# Patient Record
Sex: FEMALE | Race: WHITE | Employment: OTHER | URBAN - METROPOLITAN AREA
[De-identification: names, ages, dates, MRNs, and addresses within clinical notes are randomized per-mention and may not be internally consistent; named-entity substitution may affect disease eponyms.]

---

## 2017-01-13 ENCOUNTER — OFFICE VISIT (OUTPATIENT)
Dept: FAMILY MEDICINE CLINIC | Age: 62
End: 2017-01-13

## 2017-01-13 VITALS
BODY MASS INDEX: 39.4 KG/M2 | HEART RATE: 72 BPM | SYSTOLIC BLOOD PRESSURE: 120 MMHG | HEIGHT: 64 IN | DIASTOLIC BLOOD PRESSURE: 70 MMHG | WEIGHT: 230.8 LBS | TEMPERATURE: 97.7 F

## 2017-01-13 DIAGNOSIS — I10 ESSENTIAL HYPERTENSION: Primary | ICD-10-CM

## 2017-01-13 DIAGNOSIS — R53.83 FATIGUE, UNSPECIFIED TYPE: ICD-10-CM

## 2017-01-13 DIAGNOSIS — N83.202 CYST OF LEFT OVARY: ICD-10-CM

## 2017-01-13 DIAGNOSIS — I10 ESSENTIAL HYPERTENSION: ICD-10-CM

## 2017-01-13 DIAGNOSIS — E11.9 TYPE 2 DIABETES MELLITUS WITHOUT COMPLICATION, WITHOUT LONG-TERM CURRENT USE OF INSULIN (HCC): ICD-10-CM

## 2017-01-13 DIAGNOSIS — D75.89 MACROCYTOSIS: ICD-10-CM

## 2017-01-13 LAB
FOLATE: 12.9 NG/ML (ref 4.78–24.2)
MAGNESIUM: 1.8 MG/DL (ref 1.8–2.4)
TSH SERPL DL<=0.05 MIU/L-ACNC: 1.57 UIU/ML (ref 0.27–4.2)
VITAMIN B-12: 397 PG/ML (ref 211–911)

## 2017-01-13 PROCEDURE — 99213 OFFICE O/P EST LOW 20 MIN: CPT | Performed by: FAMILY MEDICINE

## 2017-01-13 RX ORDER — POTASSIUM CHLORIDE 750 MG/1
10 TABLET, EXTENDED RELEASE ORAL 2 TIMES DAILY
Qty: 90 TABLET | Refills: 2 | Status: SHIPPED
Start: 2017-01-13 | End: 2017-03-07 | Stop reason: SDUPTHER

## 2017-01-13 ASSESSMENT — PATIENT HEALTH QUESTIONNAIRE - PHQ9
2. FEELING DOWN, DEPRESSED OR HOPELESS: 0
SUM OF ALL RESPONSES TO PHQ QUESTIONS 1-9: 0
SUM OF ALL RESPONSES TO PHQ9 QUESTIONS 1 & 2: 0
1. LITTLE INTEREST OR PLEASURE IN DOING THINGS: 0

## 2017-03-07 ENCOUNTER — TELEPHONE (OUTPATIENT)
Dept: FAMILY MEDICINE CLINIC | Age: 62
End: 2017-03-07

## 2017-03-07 RX ORDER — POTASSIUM CHLORIDE 750 MG/1
10 TABLET, EXTENDED RELEASE ORAL 2 TIMES DAILY
Qty: 180 TABLET | Refills: 1 | Status: SHIPPED | OUTPATIENT
Start: 2017-03-07 | End: 2017-10-06 | Stop reason: SDUPTHER

## 2017-03-13 ENCOUNTER — HOSPITAL ENCOUNTER (OUTPATIENT)
Dept: ULTRASOUND IMAGING | Age: 62
Discharge: HOME | End: 2017-03-13
Attending: INTERNAL MEDICINE | Admitting: INTERNAL MEDICINE

## 2017-03-13 DIAGNOSIS — R19.00 PELVIC MASS: ICD-10-CM

## 2017-03-13 DIAGNOSIS — N63.0 BREAST LUMP: ICD-10-CM

## 2017-03-13 DIAGNOSIS — R19.00 INTRA-ABDOMINAL AND PELVIC SWELLING, MASS AND LUMP, UNSPECIFIED SITE: ICD-10-CM

## 2017-03-20 ENCOUNTER — OFFICE VISIT (OUTPATIENT)
Dept: FAMILY MEDICINE CLINIC | Age: 62
End: 2017-03-20

## 2017-03-20 ENCOUNTER — TELEPHONE (OUTPATIENT)
Dept: FAMILY MEDICINE CLINIC | Age: 62
End: 2017-03-20

## 2017-03-20 ENCOUNTER — HOSPITAL ENCOUNTER (OUTPATIENT)
Dept: NON INVASIVE DIAGNOSTICS | Age: 62
Discharge: HOME | End: 2017-03-20
Attending: FAMILY MEDICINE | Admitting: FAMILY MEDICINE

## 2017-03-20 VITALS
TEMPERATURE: 97.5 F | DIASTOLIC BLOOD PRESSURE: 80 MMHG | WEIGHT: 230.6 LBS | BODY MASS INDEX: 39.37 KG/M2 | HEIGHT: 64 IN | SYSTOLIC BLOOD PRESSURE: 124 MMHG

## 2017-03-20 DIAGNOSIS — R05.9 COUGH: ICD-10-CM

## 2017-03-20 DIAGNOSIS — R05.9 COUGH: Primary | ICD-10-CM

## 2017-03-20 DIAGNOSIS — R09.81 SINUS CONGESTION: ICD-10-CM

## 2017-03-20 PROCEDURE — 99213 OFFICE O/P EST LOW 20 MIN: CPT | Performed by: FAMILY MEDICINE

## 2017-03-20 RX ORDER — AZITHROMYCIN 250 MG/1
TABLET, FILM COATED ORAL
Qty: 1 PACKET | Refills: 0 | Status: SHIPPED | OUTPATIENT
Start: 2017-03-20 | End: 2017-03-30

## 2017-05-15 ENCOUNTER — OFFICE VISIT (OUTPATIENT)
Dept: FAMILY MEDICINE CLINIC | Age: 62
End: 2017-05-15

## 2017-05-15 VITALS
TEMPERATURE: 98 F | DIASTOLIC BLOOD PRESSURE: 80 MMHG | WEIGHT: 235.6 LBS | HEART RATE: 72 BPM | BODY MASS INDEX: 40.22 KG/M2 | SYSTOLIC BLOOD PRESSURE: 124 MMHG | HEIGHT: 64 IN

## 2017-05-15 DIAGNOSIS — I10 ESSENTIAL HYPERTENSION: ICD-10-CM

## 2017-05-15 DIAGNOSIS — E78.2 MIXED HYPERLIPIDEMIA: ICD-10-CM

## 2017-05-15 DIAGNOSIS — E11.9 TYPE 2 DIABETES MELLITUS WITHOUT COMPLICATION, WITHOUT LONG-TERM CURRENT USE OF INSULIN (HCC): ICD-10-CM

## 2017-05-15 DIAGNOSIS — I10 ESSENTIAL HYPERTENSION: Primary | ICD-10-CM

## 2017-05-15 PROCEDURE — 99213 OFFICE O/P EST LOW 20 MIN: CPT | Performed by: FAMILY MEDICINE

## 2017-05-15 RX ORDER — HYDROCHLOROTHIAZIDE 25 MG/1
25 TABLET ORAL DAILY
Qty: 90 TABLET | Refills: 2 | Status: SHIPPED | OUTPATIENT
Start: 2017-05-15 | End: 2018-04-13 | Stop reason: SDUPTHER

## 2017-05-15 RX ORDER — METFORMIN HYDROCHLORIDE 500 MG/1
500 TABLET, EXTENDED RELEASE ORAL 2 TIMES DAILY WITH MEALS
Qty: 180 TABLET | Refills: 1 | Status: SHIPPED | OUTPATIENT
Start: 2017-05-15 | End: 2018-01-16 | Stop reason: SDUPTHER

## 2017-05-15 ASSESSMENT — ENCOUNTER SYMPTOMS
DIARRHEA: 0
CONSTIPATION: 0
BLOOD IN STOOL: 0
NAUSEA: 0
COUGH: 0
VOMITING: 0
CHEST TIGHTNESS: 0
ABDOMINAL PAIN: 0
SHORTNESS OF BREATH: 0
ABDOMINAL DISTENTION: 0

## 2017-05-16 ENCOUNTER — TELEPHONE (OUTPATIENT)
Dept: FAMILY MEDICINE CLINIC | Age: 62
End: 2017-05-16

## 2017-05-16 LAB
A/G RATIO: 2.3 (ref 1.1–2.2)
ALBUMIN SERPL-MCNC: 4.1 G/DL (ref 3.4–5)
ALP BLD-CCNC: 82 U/L (ref 40–129)
ALT SERPL-CCNC: 62 U/L (ref 10–40)
ANION GAP SERPL CALCULATED.3IONS-SCNC: 14 MMOL/L (ref 3–16)
AST SERPL-CCNC: 37 U/L (ref 15–37)
BILIRUB SERPL-MCNC: 0.4 MG/DL (ref 0–1)
BUN BLDV-MCNC: 24 MG/DL (ref 7–20)
CALCIUM SERPL-MCNC: 9.1 MG/DL (ref 8.3–10.6)
CHLORIDE BLD-SCNC: 101 MMOL/L (ref 99–110)
CHOLESTEROL, TOTAL: 169 MG/DL (ref 0–199)
CO2: 27 MMOL/L (ref 21–32)
CREAT SERPL-MCNC: 0.7 MG/DL (ref 0.6–1.1)
ESTIMATED AVERAGE GLUCOSE: 157.1 MG/DL
GFR AFRICAN AMERICAN: >60
GFR NON-AFRICAN AMERICAN: >60
GLOBULIN: 1.8 G/DL
GLUCOSE BLD-MCNC: 136 MG/DL (ref 70–99)
HBA1C MFR BLD: 7.1 %
HDLC SERPL-MCNC: 55 MG/DL (ref 40–60)
LDL CHOLESTEROL CALCULATED: 89 MG/DL
POTASSIUM SERPL-SCNC: 4.2 MMOL/L (ref 3.5–5.1)
SODIUM BLD-SCNC: 142 MMOL/L (ref 136–145)
TOTAL PROTEIN: 5.9 G/DL (ref 6.4–8.2)
TRIGL SERPL-MCNC: 125 MG/DL (ref 0–150)
VLDLC SERPL CALC-MCNC: 25 MG/DL

## 2017-06-26 PROBLEM — Z51.81 VISIT FOR MONITORING RITUXAN THERAPY: Status: ACTIVE | Noted: 2017-06-26

## 2017-06-26 PROBLEM — Z79.620 VISIT FOR MONITORING RITUXAN THERAPY: Status: ACTIVE | Noted: 2017-06-26

## 2017-08-23 ENCOUNTER — OFFICE VISIT (OUTPATIENT)
Dept: FAMILY MEDICINE CLINIC | Age: 62
End: 2017-08-23

## 2017-08-23 VITALS
TEMPERATURE: 97.6 F | SYSTOLIC BLOOD PRESSURE: 118 MMHG | DIASTOLIC BLOOD PRESSURE: 80 MMHG | WEIGHT: 235.8 LBS | HEIGHT: 64 IN | BODY MASS INDEX: 40.26 KG/M2

## 2017-08-23 DIAGNOSIS — B02.30 HERPES ZOSTER WITH OPHTHALMIC COMPLICATION, UNSPECIFIED HERPES ZOSTER EYE DISEASE: Primary | ICD-10-CM

## 2017-08-23 PROCEDURE — 99213 OFFICE O/P EST LOW 20 MIN: CPT | Performed by: FAMILY MEDICINE

## 2017-08-23 RX ORDER — FAMCICLOVIR 500 MG/1
500 TABLET, FILM COATED ORAL 3 TIMES DAILY
Qty: 21 TABLET | Refills: 0 | Status: SHIPPED | OUTPATIENT
Start: 2017-08-23 | End: 2017-08-30

## 2017-08-24 ENCOUNTER — TELEPHONE (OUTPATIENT)
Dept: FAMILY MEDICINE CLINIC | Age: 62
End: 2017-08-24

## 2017-08-25 ENCOUNTER — TELEPHONE (OUTPATIENT)
Dept: FAMILY MEDICINE CLINIC | Age: 62
End: 2017-08-25

## 2017-08-25 LAB
VARICELLA-ZOSTER, PCR: DETECTED
VZ SOURCE: ABNORMAL

## 2017-09-15 ENCOUNTER — OFFICE VISIT (OUTPATIENT)
Dept: FAMILY MEDICINE CLINIC | Age: 62
End: 2017-09-15

## 2017-09-15 VITALS
WEIGHT: 238.8 LBS | TEMPERATURE: 97.8 F | DIASTOLIC BLOOD PRESSURE: 84 MMHG | SYSTOLIC BLOOD PRESSURE: 122 MMHG | BODY MASS INDEX: 40.77 KG/M2 | HEIGHT: 64 IN

## 2017-09-15 DIAGNOSIS — E11.9 TYPE 2 DIABETES MELLITUS WITHOUT COMPLICATION, WITHOUT LONG-TERM CURRENT USE OF INSULIN (HCC): ICD-10-CM

## 2017-09-15 DIAGNOSIS — Z12.11 COLON CANCER SCREENING: ICD-10-CM

## 2017-09-15 DIAGNOSIS — I10 ESSENTIAL HYPERTENSION: Primary | ICD-10-CM

## 2017-09-15 DIAGNOSIS — I10 ESSENTIAL HYPERTENSION: ICD-10-CM

## 2017-09-15 LAB
A/G RATIO: 1.9 (ref 1.1–2.2)
ALBUMIN SERPL-MCNC: 4.4 G/DL (ref 3.4–5)
ALP BLD-CCNC: 100 U/L (ref 40–129)
ALT SERPL-CCNC: 80 U/L (ref 10–40)
ANION GAP SERPL CALCULATED.3IONS-SCNC: 17 MMOL/L (ref 3–16)
AST SERPL-CCNC: 55 U/L (ref 15–37)
BILIRUB SERPL-MCNC: 0.7 MG/DL (ref 0–1)
BUN BLDV-MCNC: 14 MG/DL (ref 7–20)
CALCIUM SERPL-MCNC: 9.9 MG/DL (ref 8.3–10.6)
CHLORIDE BLD-SCNC: 99 MMOL/L (ref 99–110)
CO2: 27 MMOL/L (ref 21–32)
CREAT SERPL-MCNC: 0.6 MG/DL (ref 0.6–1.1)
CREATININE URINE: 50.7 MG/DL (ref 28–259)
GFR AFRICAN AMERICAN: >60
GFR NON-AFRICAN AMERICAN: >60
GLOBULIN: 2.3 G/DL
GLUCOSE BLD-MCNC: 122 MG/DL (ref 70–99)
MICROALBUMIN UR-MCNC: <1.2 MG/DL
MICROALBUMIN/CREAT UR-RTO: NORMAL MG/G (ref 0–30)
POTASSIUM SERPL-SCNC: 3.8 MMOL/L (ref 3.5–5.1)
SODIUM BLD-SCNC: 143 MMOL/L (ref 136–145)
TOTAL PROTEIN: 6.7 G/DL (ref 6.4–8.2)

## 2017-09-15 PROCEDURE — 99213 OFFICE O/P EST LOW 20 MIN: CPT | Performed by: FAMILY MEDICINE

## 2017-09-15 RX ORDER — BRIMONIDINE TARTRATE/TIMOLOL 0.2%-0.5%
DROPS OPHTHALMIC (EYE)
Refills: 4 | COMMUNITY
Start: 2017-09-02 | End: 2018-04-13 | Stop reason: ALTCHOICE

## 2017-09-15 RX ORDER — ACYCLOVIR 800 MG/1
800 TABLET ORAL 2 TIMES DAILY
Refills: 3 | COMMUNITY
Start: 2017-08-30 | End: 2019-03-08 | Stop reason: ALTCHOICE

## 2017-09-15 RX ORDER — LOTEPREDNOL ETABONATE 5 MG/G
GEL OPHTHALMIC
Refills: 4 | Status: ON HOLD | COMMUNITY
Start: 2017-09-02 | End: 2019-11-21

## 2017-09-16 LAB
ESTIMATED AVERAGE GLUCOSE: 157.1 MG/DL
HBA1C MFR BLD: 7.1 %

## 2017-10-06 RX ORDER — POTASSIUM CHLORIDE 750 MG/1
TABLET, EXTENDED RELEASE ORAL
Qty: 180 TABLET | Refills: 1 | Status: SHIPPED | OUTPATIENT
Start: 2017-10-06 | End: 2018-04-13 | Stop reason: SDUPTHER

## 2017-10-23 ENCOUNTER — HOSPITAL ENCOUNTER (OUTPATIENT)
Dept: VASCULAR LAB | Age: 62
Discharge: HOME | End: 2017-10-23
Attending: INTERNAL MEDICINE | Admitting: INTERNAL MEDICINE

## 2017-10-23 DIAGNOSIS — R60.9 EDEMA: ICD-10-CM

## 2017-10-27 ENCOUNTER — NURSE ONLY (OUTPATIENT)
Dept: FAMILY MEDICINE CLINIC | Age: 62
End: 2017-10-27

## 2017-10-27 DIAGNOSIS — Z23 NEED FOR PNEUMOCOCCAL VACCINATION: Primary | ICD-10-CM

## 2017-10-27 PROCEDURE — 90471 IMMUNIZATION ADMIN: CPT | Performed by: FAMILY MEDICINE

## 2017-10-27 PROCEDURE — 90670 PCV13 VACCINE IM: CPT | Performed by: FAMILY MEDICINE

## 2018-01-15 ENCOUNTER — OFFICE VISIT (OUTPATIENT)
Dept: FAMILY MEDICINE CLINIC | Age: 63
End: 2018-01-15

## 2018-01-15 ENCOUNTER — HOSPITAL ENCOUNTER (OUTPATIENT)
Dept: NON INVASIVE DIAGNOSTICS | Age: 63
Discharge: HOME | End: 2018-01-15
Attending: FAMILY MEDICINE | Admitting: FAMILY MEDICINE

## 2018-01-15 VITALS
SYSTOLIC BLOOD PRESSURE: 124 MMHG | WEIGHT: 242.8 LBS | TEMPERATURE: 97.5 F | HEART RATE: 72 BPM | BODY MASS INDEX: 41.45 KG/M2 | DIASTOLIC BLOOD PRESSURE: 82 MMHG | HEIGHT: 64 IN

## 2018-01-15 DIAGNOSIS — G89.29 CHRONIC MIDLINE LOW BACK PAIN WITH LEFT-SIDED SCIATICA: Primary | ICD-10-CM

## 2018-01-15 DIAGNOSIS — M54.42 CHRONIC MIDLINE LOW BACK PAIN WITH LEFT-SIDED SCIATICA: Primary | ICD-10-CM

## 2018-01-15 DIAGNOSIS — I10 ESSENTIAL HYPERTENSION: ICD-10-CM

## 2018-01-15 DIAGNOSIS — M25.561 CHRONIC PAIN OF BOTH KNEES: ICD-10-CM

## 2018-01-15 DIAGNOSIS — G89.29 CHRONIC PAIN OF BOTH KNEES: ICD-10-CM

## 2018-01-15 DIAGNOSIS — E11.9 TYPE 2 DIABETES MELLITUS WITHOUT COMPLICATION, WITHOUT LONG-TERM CURRENT USE OF INSULIN (HCC): ICD-10-CM

## 2018-01-15 DIAGNOSIS — M25.562 CHRONIC PAIN OF BOTH KNEES: ICD-10-CM

## 2018-01-15 DIAGNOSIS — G89.29 CHRONIC MIDLINE LOW BACK PAIN WITH LEFT-SIDED SCIATICA: ICD-10-CM

## 2018-01-15 DIAGNOSIS — E78.2 MIXED HYPERLIPIDEMIA: ICD-10-CM

## 2018-01-15 DIAGNOSIS — M54.42 CHRONIC MIDLINE LOW BACK PAIN WITH LEFT-SIDED SCIATICA: ICD-10-CM

## 2018-01-15 LAB
ESTIMATED AVERAGE GLUCOSE: 177.2 MG/DL
GLUCOSE BLD-MCNC: 176 MG/DL (ref 70–99)
HBA1C MFR BLD: 7.8 %

## 2018-01-15 PROCEDURE — 99213 OFFICE O/P EST LOW 20 MIN: CPT | Performed by: FAMILY MEDICINE

## 2018-01-15 ASSESSMENT — ENCOUNTER SYMPTOMS
BLOOD IN STOOL: 0
SHORTNESS OF BREATH: 0

## 2018-01-16 DIAGNOSIS — M54.42 CHRONIC LEFT-SIDED LOW BACK PAIN WITH LEFT-SIDED SCIATICA: Primary | ICD-10-CM

## 2018-01-16 DIAGNOSIS — G89.29 CHRONIC LEFT-SIDED LOW BACK PAIN WITH LEFT-SIDED SCIATICA: Primary | ICD-10-CM

## 2018-01-16 RX ORDER — METFORMIN HYDROCHLORIDE 500 MG/1
1000 TABLET, EXTENDED RELEASE ORAL 2 TIMES DAILY WITH MEALS
Qty: 180 TABLET | Refills: 1
Start: 2018-01-16 | End: 2018-01-17 | Stop reason: SDUPTHER

## 2018-01-17 ENCOUNTER — OFFICE VISIT (OUTPATIENT)
Dept: ORTHOPEDIC SURGERY | Age: 63
End: 2018-01-17

## 2018-01-17 ENCOUNTER — HOSPITAL ENCOUNTER (OUTPATIENT)
Dept: NON INVASIVE DIAGNOSTICS | Age: 63
Discharge: HOME | End: 2018-01-17
Attending: ORTHOPAEDIC SURGERY | Admitting: ORTHOPAEDIC SURGERY

## 2018-01-17 VITALS
RESPIRATION RATE: 16 BRPM | BODY MASS INDEX: 41.32 KG/M2 | HEIGHT: 64 IN | DIASTOLIC BLOOD PRESSURE: 77 MMHG | SYSTOLIC BLOOD PRESSURE: 129 MMHG | HEART RATE: 101 BPM | WEIGHT: 242 LBS

## 2018-01-17 DIAGNOSIS — M54.16 LUMBAR RADICULOPATHY, CHRONIC: ICD-10-CM

## 2018-01-17 DIAGNOSIS — E66.01 MORBID OBESITY WITH BMI OF 40.0-44.9, ADULT (HCC): ICD-10-CM

## 2018-01-17 DIAGNOSIS — M17.12 PRIMARY OSTEOARTHRITIS OF LEFT KNEE: ICD-10-CM

## 2018-01-17 DIAGNOSIS — M17.12 PRIMARY OSTEOARTHRITIS OF LEFT KNEE: Primary | ICD-10-CM

## 2018-01-17 PROCEDURE — 99244 OFF/OP CNSLTJ NEW/EST MOD 40: CPT | Performed by: ORTHOPAEDIC SURGERY

## 2018-01-17 RX ORDER — METFORMIN HYDROCHLORIDE 500 MG/1
1000 TABLET, EXTENDED RELEASE ORAL 2 TIMES DAILY WITH MEALS
Qty: 180 TABLET | Refills: 1 | Status: SHIPPED | OUTPATIENT
Start: 2018-01-17 | End: 2018-01-29 | Stop reason: SDUPTHER

## 2018-01-17 NOTE — PROGRESS NOTES
twice a day 180 tablet 1    acyclovir (ZOVIRAX) 800 MG tablet Take 800 mg by mouth 3 times daily  3    COMBIGAN 0.2-0.5 % ophthalmic solution USE ONE DROP INTO LEFT EYE TWICE A DAY AS DIRECTED  4    LOTEMAX 0.5 % ophthalmic gel INSTILL 1 GTT MARIMAR QID  4    LORazepam (ATIVAN) 0.5 MG tablet Take 1 tablet by mouth every 6 hours as needed for Anxiety 30 tablet 0    hydrochlorothiazide (HYDRODIURIL) 25 MG tablet Take 1 tablet by mouth daily 90 tablet 2     No current facility-administered medications for this visit. Past Medical History:   Diagnosis Date    Cancer ([facility] [address]     Hypertension     Melanoma in situ of back [facility]) 2011    dr Driscoll Born        Past Surgical History:   Procedure Laterality Date    BREAST CYST ASPIRATION      in past benign bilateral over years. 2 episodes     SECTION      CHOLECYSTECTOMY      COLONOSCOPY  9/10/12    polyp, su, repeat 5 years    ENDOMETRIAL ABLATION  2007    FINE NEEDLE ASPIRATION      GALLBLADDER SURGERY      TUBAL LIGATION         Family History   Problem Relation Age of Onset    High Blood Pressure Mother     Cancer Mother      breast    High Blood Pressure Father     Cancer Father      lung       Social History     Social History    Marital status:      Spouse name: N/A    Number of children: N/A    Years of education: N/A     Occupational History    Not on file.      Social History Main Topics    Smoking status: Former Smoker     Years: 0.80     Types: Cigarettes     Start date: 1975     Quit date: 2016    Smokeless tobacco: Never Used    Alcohol use 0.0 oz/week      Comment: 2-3 times a week    Drug use: Unknown    Sexual activity: Not on file     Other Topics Concern    Not on file     Social History Narrative    No narrative on file        Vitals:    18 0934   BP: 129/77   Pulse: 101   Resp: 16   Weight: 242 lb (109.8 kg)   Height: 5' 4\" (1.626 m)       Physical Exam  Constitutional  well-groomed, well-nourished, morbidly obese  Psychiatric  pleasant, normal mood & affect, oriented to place, person, and situation  Cardiovascular  RRR, positive peripheral edema, no varicosities  Respiratory  respirations even and unlabored  Gastrointestinal  protuberant belly   HEENT - normocephalic atraumatic  Skin  no rashes, wounds, or lesions seen  Spine - equivocal SLR  Neurological - SILT SP/DP/T/sural/saphenous nerve distributions; EHL/TA/GS intact  Bilateral knee:   Clinically neutral alignment    moderate effusion noted   No atrophy seen    Moderate tenderness to palpation medial joint line bilaterally   Mild tenderness to palpation lateral joint line   Range of Motion:    Right 5 - 90    Left 5 - 95   Special tests:    pain with Layla exam     positive crepitus with ROM    positive patellar grind, painful palpable click   Ligamentous testing:    Varus stress stable    Valgus stress stable    Imaging:  Images were personally reviewed by myself and discussed with the patient  Bilateral knee 4 views performed today in clinic   - Overall alignment is varus, right greater than left. The medial compartment is bone on bone consistent with severe OA in the right knee, and moderately narrowed on the left. Medial compartment is associated with large osteophytes seen. The lateral compartment is mildly narrowed with medium osteophytes, right greater than left. The anterior compartment is moderately narrowed with small osteophytes seen. Loose bodies are seen posteriorly. There is evidence of mild tibiofemoral subluxation consistent with varus deformity. Assessment & Plan:  63 y.o. female who presents with   1. Primary osteoarthritis of left knee  XR KNEE RIGHT (MIN 4 VIEWS)    [facility]   2.  Primary osteoarthritis of right knee  XR KNEE LEFT (MIN 4 VIEWS)   3. Lumbar radiculopathy, chronic      MRI lumbar spine scheduled for end of month, will have her see Dr Mason Davey after

## 2018-01-17 NOTE — LETTER
[address] [address]  Phone: [phone]  Fax: [phone]    Tati Mcclellan MD        January 17, 2018     Nayan Jules MD  [address]    Patient: Alycia Brown  MR Number: [id number]  YOB: 1955  Date of Visit: 1/17/2018    Dear Dr. Artist Feeling:    Thank you for referring Alycia Brown to me for consultation of her bilateral knee pain. I discussed with her nonoperative strategies, and recommended weight loss to decrease stress on her joints. We elected to defer injections for now. If you have any questions or if I may be of further assistance, please do not hesitate to contact me. I look forward to following Alycia Brown along with you.     Sincerely,    Tati Mcclellan MD

## 2018-01-26 ENCOUNTER — TELEPHONE (OUTPATIENT)
Dept: FAMILY MEDICINE CLINIC | Age: 63
End: 2018-01-26

## 2018-01-29 ENCOUNTER — HOSPITAL ENCOUNTER (OUTPATIENT)
Dept: MRI IMAGING | Age: 63
Discharge: HOME | End: 2018-01-29
Attending: FAMILY MEDICINE | Admitting: FAMILY MEDICINE

## 2018-01-29 ENCOUNTER — TELEPHONE (OUTPATIENT)
Dept: FAMILY MEDICINE CLINIC | Age: 63
End: 2018-01-29

## 2018-01-29 DIAGNOSIS — M54.42 CHRONIC LEFT-SIDED LOW BACK PAIN WITH LEFT-SIDED SCIATICA: ICD-10-CM

## 2018-01-29 DIAGNOSIS — G89.29 CHRONIC LEFT-SIDED LOW BACK PAIN WITH LEFT-SIDED SCIATICA: ICD-10-CM

## 2018-01-29 RX ORDER — METFORMIN HYDROCHLORIDE 500 MG/1
1000 TABLET, EXTENDED RELEASE ORAL 2 TIMES DAILY WITH MEALS
Qty: 360 TABLET | Refills: 1 | Status: SHIPPED | OUTPATIENT
Start: 2018-01-29 | End: 2018-08-10 | Stop reason: SDUPTHER

## 2018-01-29 NOTE — TELEPHONE ENCOUNTER
Mannie Miller from [address] [place] [address] calling to make sure this is correct change on   metFORMIN (GLUCOPHAGE-XR) 500 MG extended release tablet 180 tablet 1 1/17/2018     Sig - Route: Take 2 tablets by mouth 2 times daily (with meals) - Oral       and if she can get verbal ok to dispense #360 x 3.     Please advise, Mannie Miller [phone]

## 2018-02-07 ENCOUNTER — TELEPHONE (OUTPATIENT)
Dept: FAMILY MEDICINE CLINIC | Age: 63
End: 2018-02-07

## 2018-02-15 ENCOUNTER — TELEPHONE (OUTPATIENT)
Dept: FAMILY MEDICINE CLINIC | Age: 63
End: 2018-02-15

## 2018-02-15 DIAGNOSIS — E11.9 TYPE 2 DIABETES MELLITUS WITHOUT COMPLICATION, WITHOUT LONG-TERM CURRENT USE OF INSULIN (HCC): ICD-10-CM

## 2018-02-15 DIAGNOSIS — I10 ESSENTIAL HYPERTENSION: Primary | ICD-10-CM

## 2018-02-16 ENCOUNTER — OFFICE VISIT (OUTPATIENT)
Dept: ORTHOPEDIC SURGERY | Age: 63
End: 2018-02-16

## 2018-02-16 VITALS
HEART RATE: 64 BPM | SYSTOLIC BLOOD PRESSURE: 138 MMHG | HEIGHT: 64 IN | BODY MASS INDEX: 41.32 KG/M2 | TEMPERATURE: 97.5 F | WEIGHT: 242 LBS | DIASTOLIC BLOOD PRESSURE: 80 MMHG

## 2018-02-16 DIAGNOSIS — M70.62 TROCHANTERIC BURSITIS OF LEFT HIP: ICD-10-CM

## 2018-02-16 DIAGNOSIS — M51.36 DEGENERATIVE DISC DISEASE, LUMBAR: Primary | ICD-10-CM

## 2018-02-16 PROBLEM — M51.369 DEGENERATIVE DISC DISEASE, LUMBAR: Status: ACTIVE | Noted: 2018-02-16

## 2018-02-16 PROCEDURE — 99243 OFF/OP CNSLTJ NEW/EST LOW 30: CPT | Performed by: ORTHOPAEDIC SURGERY

## 2018-03-07 ENCOUNTER — HOSPITAL ENCOUNTER (OUTPATIENT)
Dept: PHYSICAL THERAPY | Age: 63
Discharge: HOME | End: 2018-03-23
Admitting: ORTHOPAEDIC SURGERY

## 2018-03-07 NOTE — FLOWSHEET NOTE
pain L buttock when walking at a faster pace. Stair climbing with 2 rails: ascended and descended alternating. Pt stated at home her stairs are taller and she takes one step at a time. Body Mechanics: DTR B quads and Achilles 0/3, (-) clonus B, (-) dural tension. Strength RLE  R Hip Flexion: 4/5 (L low back pain)  Strength LLE  L Hip Flexion: 4-/5  L Hip ABduction: 4+/5 (minimal pain)  L Hip ADduction: 4/5  L Hip External Rotation: 4+/5      Exercises:current hx CA  Exercise/Equipment Resistance/Repetitions Other comments                                                                            Other Therapeutic Activities:      Home Exercise Program:    3/8/18 HEP was instructed and included add set, glut set, SKTC, piriformis stretch. .  Pt was given written hand outs and demonstrated exercises correctly. Pt expressed understanding of exercises. Manual Treatments:    STM L ITB/TFL 15'. Modalities:  Has CA    Timed Code Treatment Minutes:  30    Total Treatment Minutes:  60    Treatment/Activity Tolerance:  [] Patient tolerated treatment well [] Patient limited by fatigue  [x] Patient limited by pain  [] Patient limited by other medical complications  [] Other:     Prognosis: [x] Good [] Fair  [] Poor    Patient Requires Follow-up: [] Yes  [] No    Plan:   [] Continue per plan of care [] Alter current plan (see comments)  [x] Plan of care initiated [] Hold pending MD visit [] Discharge  Plan for Next Session: begin L hip strengthening.      Electronically signed by:  Ruth Ann Lim, PT

## 2018-03-07 NOTE — PROGRESS NOTES
Outpatient Physical Therapy  [] [facility]    Phone: [phone]   Fax: [phone]   [] [facility]  Phone: [phone]              Fax: [phone]  [x] [facility]   Phone: [phone]   Fax: [phone]     To: Referring Practitioner: Dr. Joann Yip      Patient: Arnav Joiner   : 1955   MRN: [id number]  Evaluation Date: 3/7/2018      Diagnosis Information:  · Diagnosis: DDD, lumbar (M51.36); Trochanteric bursitis of L hip (M70.62)   · Treatment Diagnosis: Decreased functional mobility     Physical Therapy Certification/Re-Certification Form  Dear Dr. Rafaela Buerger,  The following patient has been evaluated for physical therapy services and for therapy to continue, Medicare requires monthly physician review of the treatment plan. Please review the attached evaluation and/or summary of the patient's plan of care, and verify that you agree therapy should continue by signing the attached document and sending it back to our office. Plan of Care/Treatment to date:  [x] Therapeutic Exercise    [] Modalities:  [] Therapeutic Activity     [] Ultrasound  [] Electrical Stimulation  [] Gait Training      [] Cervical Traction [] Lumbar Traction  [] Neuromuscular Re-education    [x] Cold/hotpack [] Iontophoresis   [x] Instruction in HEP     Other:  [x] Manual Therapy      []             [] Aquatic Therapy      []           ? Frequency/Duration: 6 PT sessions total    Rehab Potential: [] Excellent [x] Good [] Fair  [] Poor       Electronically signed by:  Meliton Monzon PT      If you have any questions or concerns, please don't hesitate to call.   Thank you for your referral.      Physician Signature:________________________________Date:__________________  By signing above, therapists plan is approved by physician
6 PT sessions  Short term goal 1: Pt will be independent with HEP. Short term goal 2: L hip ABD strength 4+/5 and painless. Short term goal 3: Pt will be able to walk 1-2 miles without pain following walking. Patient Goals   Patient goals : \"I don't want any pain. I want to get up and I want to move. \"       Therapy Time   Individual Concurrent Group Co-treatment   Time In 6700         Time Out 1630         Minutes 60         Timed Code Treatment Minutes: 720 Jose Harmon

## 2018-03-09 ENCOUNTER — HOSPITAL ENCOUNTER (OUTPATIENT)
Dept: OTHER | Age: 63
Discharge: HOME | End: 2018-03-16
Attending: ORTHOPAEDIC SURGERY | Admitting: ORTHOPAEDIC SURGERY

## 2018-03-16 ENCOUNTER — HOSPITAL ENCOUNTER (OUTPATIENT)
Dept: PHYSICAL THERAPY | Age: 63
Discharge: HOME | End: 2018-03-17
Admitting: ORTHOPAEDIC SURGERY

## 2018-03-16 NOTE — FLOWSHEET NOTE
Physical Therapy Daily Treatment Note  Date:  3/16/2018    Patient Name:  Teresa Lara    :  1955  MRN: [id number]  Restrictions/Precautions:    Pertinent Medical History:  Medical/Treatment Diagnosis Information:  · Diagnosis: DDD, lumbar (M51.36); Trochanteric bursitis of L hip (M70.62)  · Treatment Diagnosis: Decreased functional mobility  Insurance/Certification information:  PT Insurance Information: [facility]  Physician Information:  Referring Practitioner: Dr. Alex Woods of care signed (Y/N):  Sent to in basket on 3/7/18  Visit# / total visits:    Pain level: /10     G-Code (if applicable):      Date / Visit # G-Code Applied:  3/7/18  PT G-Codes  Functional Assessment Tool Used: LEFS  Score: 42  Functional Limitation: Mobility: Walking and moving around  Mobility: Walking and Moving Around Current Status (): At least 40 percent but less than 60 percent impaired, limited or restricted  Mobility: Walking and Moving Around Goal Status (): At least 20 percent but less than 40 percent impaired, limited or restricted    Progress Note: []  Yes  []  No  Next due by: Visit #10      History of Injury: Pt presented to PT with L hip pain. Pain started in September with walking at [address] [facility] with her daughter. Hip pain has gradually become worse. Pt rated L hip pain 5-9/10. Pain increases when L side lying. 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 [employer]me[employer]idal[employer] I can't sit for long periods. \"  Pt can sit up to 1 hour. Pt stated her walking is limited. Pt has weakness with stair climbing- \"I go one at a time. \"  Pt stated she leans when she walks on level surfaces, as told to her by her sister. Last week pt walked 2 miles, 2 times, without pain, but \"I feel the pressure\" after walking. Subjective:   Did okay following initial evaluation. 3/12/18 Back was sore following last treatment, has soreness with bridge at home.   Sitting at home at the computer bothers pt.  3/16/18 pain

## 2018-03-19 ENCOUNTER — HOSPITAL ENCOUNTER (OUTPATIENT)
Dept: PHYSICAL THERAPY | Age: 63
Discharge: HOME | End: 2018-03-20
Admitting: ORTHOPAEDIC SURGERY

## 2018-03-19 NOTE — FLOWSHEET NOTE
Physical Therapy Daily Treatment Note  Date:  3/19/2018    Patient Name:  Bobbi Ulrich    :  1955  MRN: [id number]  Restrictions/Precautions:    Pertinent Medical History:  Medical/Treatment Diagnosis Information:  · Diagnosis: DDD, lumbar (M51.36); Trochanteric bursitis of L hip (M70.62)  · Treatment Diagnosis: Decreased functional mobility  Insurance/Certification information:  PT Insurance Information: [employer]  Physician Information:  Referring Practitioner: Dr. Edis Grajeda of care signed (Y/N):  Sent to in basket on 3/7/18  Visit# / total visits:  5/6  Pain level: /10     G-Code (if applicable):      Date / Visit # G-Code Applied:  3/7/18  PT G-Codes  Functional Assessment Tool Used: LEFS  Score: 42  Functional Limitation: Mobility: Walking and moving around  Mobility: Walking and Moving Around Current Status (): At least 40 percent but less than 60 percent impaired, limited or restricted  Mobility: Walking and Moving Around Goal Status (): At least 20 percent but less than 40 percent impaired, limited or restricted    Progress Note: []  Yes  []  No  Next due by: Visit #10      History of Injury: Pt presented to PT with L hip pain. Pain started in September with walking at [address] [facility] with her daughter. Hip pain has gradually become worse. Pt rated L hip pain 5-9/10. Pain increases when L side lying. 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 [employer] I can't sit for long periods. \"  Pt can sit up to 1 hour. Pt stated her walking is limited. Pt has weakness with stair climbing- \"I go one at a time. \"  Pt stated she leans when she walks on level surfaces, as told to her by her sister. Last week pt walked 2 miles, 2 times, without pain, but \"I feel the pressure\" after walking. Subjective:   Did okay following initial evaluation. 3/12/18 Back was sore following last treatment, has soreness with bridge at home.   Sitting at home at the computer bothers pt.  3/16/18 pain has been a lot better. Pain was rated 1-2/10. No longer has pain from lying on L side. Pt has stiffness from sitting a long time, but not pain. Pt stated walking is not limited, and no longer feels \"pressure\" from walking a long time. Stair climbing is \"much better\" as she can alternate. 3/19/18 Pt stated she walked on the [facility] into a dorm without pain. Pt has not had any pain for the past 3 days. Pt has not woken due to L buttock/back pain recently. Pt can lie on L side. 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 Pt has long walk from car to office. Objective:  Observation:   Test measurements:   3/7/18  Palpation: TTP L TFL, L ITB. Observation: Gait: decreased L trunk roation, L pelvis elevated, pain L buttock when walking at a faster pace. Stair climbing with 2 rails: ascended and descended alternating. Pt stated at home her stairs are taller and she takes one step at a time. Body Mechanics: DTR B quads and Achilles 0/3, (-) clonus B, (-) dural tension. Strength RLE  R Hip Flexion: 4/5 (L low back pain)  Strength LLE  L Hip Flexion: 4-/5  L Hip ABduction: 4+/5 (minimal pain)  L Hip ADduction: 4/5  L Hip External Rotation: 4+/5   3/16/18  Palpation: TTP L ITB. Observation: Gait: decreased L trunk roation, L pelvis elevated, pain L buttock when walking at a faster pace. Strength RLE  R Hip Flexion: 5/5 (no back pain)  Strength LLE  L Hip Flexion: 5/5  L Hip ABduction: 5/5 (no pain)  L Hip ADduction: 5/5  L Hip External Rotation: 4+/5      Exercises:current hx CA  Exercise/Equipment Resistance/Repetitions Other comments   Nu Step 7'    Stretches  Gastroc incline  Hamstring   2x30\"  2x30\"    Bridge  T-band hip ER 10x  Green, 20x    R SL  L pelvic pro/retraction   20x                                                         Other Therapeutic Activities:    3/12/18 PT and pt discussed sitting posture with towel roll, with seat height.     Home Exercise Program:    3/8/18 HEP was instructed and included add set, glut set, SKTC, piriformis stretch. .  Pt was given written hand outs and demonstrated exercises correctly. Pt expressed understanding of exercises. 3/9/18 fall outs, bridge; reviewed HEP from 3/8/18- pt performed well, although PT corrected PPT.  3/12/18 reviewed PPT- good technique. Reviewed bridge, asked pt to raise buttocks only 1-2 inches from surface. New HEP: seated trunk flexion, standing HS stretch. 3/16/18 clam shell      Manual Treatments:    STM L ITB, L LE MFR pull; manual total 15'. Modalities:  Has CA    Timed Code Treatment Minutes:  35    Total Treatment Minutes:  35    Treatment/Activity Tolerance:  [] Patient tolerated treatment well [] Patient limited by fatigue  [x] Patient limited by pain  [] Patient limited by other medical complications  [] Other:     Prognosis: [x] Good [] Fair  [] Poor    Patient Requires Follow-up: [] Yes  [] No    Plan:   [] Continue per plan of care [] Alter current plan (see comments)  [x] Plan of care initiated [] Hold pending MD visit [] Discharge  Plan for Next Session: begin L hip strengthening.      Electronically signed by:  Pravin Rodriguez PT

## 2018-04-06 DIAGNOSIS — E11.9 TYPE 2 DIABETES MELLITUS WITHOUT COMPLICATION, WITHOUT LONG-TERM CURRENT USE OF INSULIN (HCC): ICD-10-CM

## 2018-04-06 DIAGNOSIS — I10 ESSENTIAL HYPERTENSION: ICD-10-CM

## 2018-04-06 LAB
A/G RATIO: 2.3 (ref 1.1–2.2)
ALBUMIN SERPL-MCNC: 4.3 G/DL (ref 3.4–5)
ALP BLD-CCNC: 98 U/L (ref 40–129)
ALT SERPL-CCNC: 64 U/L (ref 10–40)
ANION GAP SERPL CALCULATED.3IONS-SCNC: 17 MMOL/L (ref 3–16)
AST SERPL-CCNC: 47 U/L (ref 15–37)
BACTERIA: ABNORMAL /HPF
BILIRUB SERPL-MCNC: 0.6 MG/DL (ref 0–1)
BILIRUBIN URINE: NEGATIVE
BLOOD, URINE: NEGATIVE
BUN BLDV-MCNC: 19 MG/DL (ref 7–20)
CALCIUM SERPL-MCNC: 9.2 MG/DL (ref 8.3–10.6)
CHLORIDE BLD-SCNC: 100 MMOL/L (ref 99–110)
CHOLESTEROL, TOTAL: 190 MG/DL (ref 0–199)
CLARITY: ABNORMAL
CO2: 26 MMOL/L (ref 21–32)
COLOR: YELLOW
CREAT SERPL-MCNC: 0.7 MG/DL (ref 0.6–1.2)
EPITHELIAL CELLS, UA: 2 /HPF (ref 0–5)
ESTIMATED AVERAGE GLUCOSE: 154.2 MG/DL
FOLATE: 11.93 NG/ML (ref 4.78–24.2)
GFR AFRICAN AMERICAN: >60
GFR NON-AFRICAN AMERICAN: >60
GLOBULIN: 1.9 G/DL
GLUCOSE BLD-MCNC: 151 MG/DL (ref 70–99)
GLUCOSE URINE: NEGATIVE MG/DL
HBA1C MFR BLD: 7 %
HDLC SERPL-MCNC: 55 MG/DL (ref 40–60)
HYALINE CASTS: 2 /LPF (ref 0–8)
KETONES, URINE: NEGATIVE MG/DL
LDL CHOLESTEROL CALCULATED: 102 MG/DL
LEUKOCYTE ESTERASE, URINE: ABNORMAL
MICROSCOPIC EXAMINATION: YES
NITRITE, URINE: NEGATIVE
PH UA: 6
POTASSIUM SERPL-SCNC: 4.2 MMOL/L (ref 3.5–5.1)
PROTEIN UA: NEGATIVE MG/DL
RBC UA: 5 /HPF (ref 0–4)
SODIUM BLD-SCNC: 143 MMOL/L (ref 136–145)
SPECIFIC GRAVITY UA: 1.02
TOTAL PROTEIN: 6.2 G/DL (ref 6.4–8.2)
TRIGL SERPL-MCNC: 167 MG/DL (ref 0–150)
UROBILINOGEN, URINE: 0.2 E.U./DL
VITAMIN B-12: 427 PG/ML (ref 211–911)
VLDLC SERPL CALC-MCNC: 33 MG/DL
WBC UA: 24 /HPF (ref 0–5)

## 2018-04-10 DIAGNOSIS — R82.81 PYURIA: Primary | ICD-10-CM

## 2018-04-11 DIAGNOSIS — R82.81 PYURIA: ICD-10-CM

## 2018-04-11 DIAGNOSIS — C83.08 SMALL CELL B-CELL LYMPHOMA, LYMPH NODES OF MULTIPLE SITES (HCC): ICD-10-CM

## 2018-04-13 ENCOUNTER — OFFICE VISIT (OUTPATIENT)
Dept: FAMILY MEDICINE CLINIC | Age: 63
End: 2018-04-13

## 2018-04-13 VITALS
HEART RATE: 80 BPM | BODY MASS INDEX: 40.46 KG/M2 | TEMPERATURE: 97.6 F | HEIGHT: 64 IN | WEIGHT: 237 LBS | SYSTOLIC BLOOD PRESSURE: 120 MMHG | DIASTOLIC BLOOD PRESSURE: 84 MMHG

## 2018-04-13 DIAGNOSIS — F41.9 ANXIETY: ICD-10-CM

## 2018-04-13 DIAGNOSIS — N39.0 URINARY TRACT INFECTION WITHOUT HEMATURIA, SITE UNSPECIFIED: ICD-10-CM

## 2018-04-13 DIAGNOSIS — E78.2 MIXED HYPERLIPIDEMIA: ICD-10-CM

## 2018-04-13 DIAGNOSIS — E11.9 TYPE 2 DIABETES MELLITUS WITHOUT COMPLICATION, WITHOUT LONG-TERM CURRENT USE OF INSULIN (HCC): ICD-10-CM

## 2018-04-13 DIAGNOSIS — I10 ESSENTIAL HYPERTENSION: Primary | ICD-10-CM

## 2018-04-13 LAB
ORGANISM: ABNORMAL
URINE CULTURE, ROUTINE: ABNORMAL
URINE CULTURE, ROUTINE: ABNORMAL

## 2018-04-13 PROCEDURE — 99213 OFFICE O/P EST LOW 20 MIN: CPT | Performed by: FAMILY MEDICINE

## 2018-04-13 RX ORDER — SULFAMETHOXAZOLE AND TRIMETHOPRIM 800; 160 MG/1; MG/1
1 TABLET ORAL 2 TIMES DAILY
Qty: 14 TABLET | Refills: 0 | Status: SHIPPED | OUTPATIENT
Start: 2018-04-13 | End: 2018-04-20

## 2018-04-13 RX ORDER — LORAZEPAM 0.5 MG/1
0.5 TABLET ORAL 2 TIMES DAILY PRN
Qty: 30 TABLET | Refills: 0 | Status: SHIPPED | OUTPATIENT
Start: 2018-04-13 | End: 2018-04-28

## 2018-04-13 RX ORDER — HYDROCHLOROTHIAZIDE 25 MG/1
25 TABLET ORAL DAILY
Qty: 90 TABLET | Refills: 2 | Status: SHIPPED | OUTPATIENT
Start: 2018-04-13 | End: 2018-10-03 | Stop reason: CLARIF

## 2018-04-13 RX ORDER — POTASSIUM CHLORIDE 750 MG/1
10 TABLET, EXTENDED RELEASE ORAL 2 TIMES DAILY
Qty: 180 TABLET | Refills: 2 | Status: SHIPPED | OUTPATIENT
Start: 2018-04-13 | End: 2018-11-09 | Stop reason: SDUPTHER

## 2018-04-13 ASSESSMENT — ENCOUNTER SYMPTOMS
ABDOMINAL PAIN: 0
VOMITING: 0
CONSTIPATION: 0
NAUSEA: 0
DIARRHEA: 0
ABDOMINAL DISTENTION: 0
CHEST TIGHTNESS: 0
COUGH: 0
SHORTNESS OF BREATH: 0
BLOOD IN STOOL: 0

## 2018-04-13 ASSESSMENT — PATIENT HEALTH QUESTIONNAIRE - PHQ9
SUM OF ALL RESPONSES TO PHQ QUESTIONS 1-9: 0
2. FEELING DOWN, DEPRESSED OR HOPELESS: 0
1. LITTLE INTEREST OR PLEASURE IN DOING THINGS: 0
SUM OF ALL RESPONSES TO PHQ9 QUESTIONS 1 & 2: 0

## 2018-06-12 ENCOUNTER — HOSPITAL ENCOUNTER (OUTPATIENT)
Dept: CT IMAGING | Age: 63
Discharge: HOME | End: 2018-06-12
Attending: INTERNAL MEDICINE | Admitting: INTERNAL MEDICINE

## 2018-06-12 VITALS
SYSTOLIC BLOOD PRESSURE: 128 MMHG | DIASTOLIC BLOOD PRESSURE: 63 MMHG | TEMPERATURE: 97.1 F | BODY MASS INDEX: 39.69 KG/M2 | WEIGHT: 224 LBS | OXYGEN SATURATION: 98 % | HEART RATE: 58 BPM | HEIGHT: 63 IN | RESPIRATION RATE: 18 BRPM

## 2018-06-12 DIAGNOSIS — C83.08 SMALL B-CELL LYMPHOMA OF LYMPH NODES OF MULTIPLE REGIONS (HCC): ICD-10-CM

## 2018-06-12 DIAGNOSIS — C83.08 SMALL CELL B-CELL LYMPHOMA OF LYMPH NODES OF MULTIPLE SITES (HCC): ICD-10-CM

## 2018-06-12 LAB
GLUCOSE BLD-MCNC: 130 MG/DL (ref 70–99)
INR BLD: 0.98 (ref 0.86–1.14)
PERFORMED ON: ABNORMAL
PLATELET # BLD: 202 K/UL (ref 135–450)
PROTHROMBIN TIME: 11.2 SEC (ref 9.8–13)

## 2018-06-12 RX ORDER — MIDAZOLAM HYDROCHLORIDE 1 MG/ML
INJECTION INTRAMUSCULAR; INTRAVENOUS DAILY PRN
Status: COMPLETED | OUTPATIENT
Start: 2018-06-12 | End: 2018-06-12

## 2018-06-12 RX ORDER — FENTANYL CITRATE 50 UG/ML
INJECTION, SOLUTION INTRAMUSCULAR; INTRAVENOUS DAILY PRN
Status: COMPLETED | OUTPATIENT
Start: 2018-06-12 | End: 2018-06-12

## 2018-06-12 RX ADMIN — MIDAZOLAM HYDROCHLORIDE 0.5 MG: 1 INJECTION INTRAMUSCULAR; INTRAVENOUS at 09:32

## 2018-06-12 RX ADMIN — MIDAZOLAM HYDROCHLORIDE 0.5 MG: 1 INJECTION INTRAMUSCULAR; INTRAVENOUS at 09:36

## 2018-06-12 RX ADMIN — FENTANYL CITRATE 25 MCG: 50 INJECTION, SOLUTION INTRAMUSCULAR; INTRAVENOUS at 09:28

## 2018-06-12 RX ADMIN — MIDAZOLAM HYDROCHLORIDE 1 MG: 1 INJECTION INTRAMUSCULAR; INTRAVENOUS at 09:29

## 2018-06-12 RX ADMIN — FENTANYL CITRATE 25 MCG: 50 INJECTION, SOLUTION INTRAMUSCULAR; INTRAVENOUS at 09:30

## 2018-06-12 ASSESSMENT — PAIN SCALES - GENERAL
PAINLEVEL_OUTOF10: 0

## 2018-06-12 ASSESSMENT — PAIN - FUNCTIONAL ASSESSMENT: PAIN_FUNCTIONAL_ASSESSMENT: 0-10

## 2018-06-29 ENCOUNTER — HOSPITAL ENCOUNTER (OUTPATIENT)
Dept: CT IMAGING | Age: 63
Discharge: HOME | End: 2018-06-29
Attending: INTERNAL MEDICINE | Admitting: INTERNAL MEDICINE

## 2018-06-29 VITALS
HEART RATE: 69 BPM | WEIGHT: 222 LBS | RESPIRATION RATE: 16 BRPM | DIASTOLIC BLOOD PRESSURE: 65 MMHG | BODY MASS INDEX: 39.34 KG/M2 | HEIGHT: 63 IN | OXYGEN SATURATION: 96 % | SYSTOLIC BLOOD PRESSURE: 110 MMHG | TEMPERATURE: 98 F

## 2018-06-29 DIAGNOSIS — C83.08 SMALL B-CELL LYMPHOMA OF LYMPH NODES OF MULTIPLE REGIONS (HCC): ICD-10-CM

## 2018-06-29 DIAGNOSIS — C83.08 SMALL CELL B-CELL LYMPHOMA OF LYMPH NODES OF MULTIPLE SITES (HCC): ICD-10-CM

## 2018-06-29 LAB
APTT: 91.1 SEC (ref 26–36)
BASOPHILS ABSOLUTE: 0.1 K/UL (ref 0–0.2)
BASOPHILS RELATIVE PERCENT: 1.2 %
EOSINOPHILS ABSOLUTE: 0.2 K/UL (ref 0–0.6)
EOSINOPHILS RELATIVE PERCENT: 1.6 %
HCT VFR BLD CALC: 40.5 % (ref 36–48)
HEMOGLOBIN: 13.9 G/DL (ref 12–16)
INR BLD: 0.98 (ref 0.86–1.14)
LYMPHOCYTES ABSOLUTE: 0.5 K/UL (ref 1–5.1)
LYMPHOCYTES RELATIVE PERCENT: 5.1 %
MCH RBC QN AUTO: 32.8 PG (ref 26–34)
MCHC RBC AUTO-ENTMCNC: 34.2 G/DL (ref 31–36)
MCV RBC AUTO: 95.8 FL (ref 80–100)
MONOCYTES ABSOLUTE: 0.6 K/UL (ref 0–1.3)
MONOCYTES RELATIVE PERCENT: 6.7 %
NEUTROPHILS ABSOLUTE: 8.2 K/UL (ref 1.7–7.7)
NEUTROPHILS RELATIVE PERCENT: 85.4 %
PDW BLD-RTO: 13.9 % (ref 12.4–15.4)
PLATELET # BLD: 271 K/UL (ref 135–450)
PMV BLD AUTO: 9.8 FL (ref 5–10.5)
PROTHROMBIN TIME: 11.2 SEC (ref 9.8–13)
RBC # BLD: 4.23 M/UL (ref 4–5.2)
WBC # BLD: 9.6 K/UL (ref 4–11)

## 2018-06-29 RX ORDER — MIDAZOLAM HYDROCHLORIDE 1 MG/ML
2 INJECTION INTRAMUSCULAR; INTRAVENOUS ONCE
Status: COMPLETED | OUTPATIENT
Start: 2018-06-29 | End: 2018-06-29

## 2018-06-29 RX ORDER — FENTANYL CITRATE 50 UG/ML
50 INJECTION, SOLUTION INTRAMUSCULAR; INTRAVENOUS EVERY 5 MIN PRN
Status: DISCONTINUED | OUTPATIENT
Start: 2018-06-29 | End: 2018-06-30 | Stop reason: HOSPADM

## 2018-06-29 RX ORDER — ACETAMINOPHEN 325 MG/1
650 TABLET ORAL EVERY 4 HOURS PRN
Status: DISCONTINUED | OUTPATIENT
Start: 2018-06-29 | End: 2018-06-30 | Stop reason: HOSPADM

## 2018-06-29 RX ORDER — MIDAZOLAM HYDROCHLORIDE 1 MG/ML
1 INJECTION INTRAMUSCULAR; INTRAVENOUS EVERY 5 MIN PRN
Status: DISCONTINUED | OUTPATIENT
Start: 2018-06-29 | End: 2018-06-30 | Stop reason: HOSPADM

## 2018-06-29 RX ORDER — HEPARIN SODIUM (PORCINE) LOCK FLUSH IV SOLN 100 UNIT/ML 100 UNIT/ML
100 SOLUTION INTRAVENOUS PRN
Status: DISCONTINUED | OUTPATIENT
Start: 2018-06-29 | End: 2018-06-30 | Stop reason: HOSPADM

## 2018-06-29 RX ORDER — ONDANSETRON 2 MG/ML
4 INJECTION INTRAMUSCULAR; INTRAVENOUS EVERY 8 HOURS PRN
Status: DISCONTINUED | OUTPATIENT
Start: 2018-06-29 | End: 2018-06-30 | Stop reason: HOSPADM

## 2018-06-29 RX ADMIN — HEPARIN SODIUM (PORCINE) LOCK FLUSH IV SOLN 100 UNIT/ML 300 UNITS: 100 SOLUTION at 12:19

## 2018-06-29 RX ADMIN — MIDAZOLAM HYDROCHLORIDE 1 MG: 1 INJECTION INTRAMUSCULAR; INTRAVENOUS at 11:05

## 2018-06-29 RX ADMIN — FENTANYL CITRATE 50 MCG: 50 INJECTION, SOLUTION INTRAMUSCULAR; INTRAVENOUS at 11:05

## 2018-06-29 RX ADMIN — MIDAZOLAM HYDROCHLORIDE 1 MG: 1 INJECTION INTRAMUSCULAR; INTRAVENOUS at 11:10

## 2018-06-29 RX ADMIN — FENTANYL CITRATE 50 MCG: 50 INJECTION, SOLUTION INTRAMUSCULAR; INTRAVENOUS at 11:15

## 2018-06-29 RX ADMIN — MIDAZOLAM HYDROCHLORIDE 2 MG: 1 INJECTION INTRAMUSCULAR; INTRAVENOUS at 11:15

## 2018-06-29 RX ADMIN — ACETAMINOPHEN 650 MG: 325 TABLET ORAL at 12:10

## 2018-06-29 RX ADMIN — FENTANYL CITRATE 25 MCG: 50 INJECTION, SOLUTION INTRAMUSCULAR; INTRAVENOUS at 11:10

## 2018-06-29 ASSESSMENT — PAIN SCALES - GENERAL
PAINLEVEL_OUTOF10: 0

## 2018-07-02 ENCOUNTER — HOSPITAL ENCOUNTER (OUTPATIENT)
Dept: CT IMAGING | Age: 63
Discharge: HOME | End: 2018-07-02
Attending: INTERNAL MEDICINE | Admitting: INTERNAL MEDICINE

## 2018-07-02 DIAGNOSIS — C83.08 SMALL B-CELL LYMPHOMA OF LYMPH NODES OF MULTIPLE REGIONS (HCC): ICD-10-CM

## 2018-07-02 DIAGNOSIS — C83.08 SMALL CELL B-CELL LYMPHOMA OF LYMPH NODES OF MULTIPLE SITES (HCC): ICD-10-CM

## 2018-08-01 ENCOUNTER — TELEPHONE (OUTPATIENT)
Dept: FAMILY MEDICINE CLINIC | Age: 63
End: 2018-08-01

## 2018-08-01 DIAGNOSIS — E11.9 TYPE 2 DIABETES MELLITUS WITHOUT COMPLICATION, WITHOUT LONG-TERM CURRENT USE OF INSULIN (HCC): Primary | ICD-10-CM

## 2018-08-01 NOTE — TELEPHONE ENCOUNTER
Pt has an appt on Aug. 13, 2018 for DM, HTN check up  , pt is needing blood work prior to appt. Pl advise.    [phone]

## 2018-08-08 DIAGNOSIS — E11.9 TYPE 2 DIABETES MELLITUS WITHOUT COMPLICATION, WITHOUT LONG-TERM CURRENT USE OF INSULIN (HCC): ICD-10-CM

## 2018-08-08 LAB
A/G RATIO: 2.1 (ref 1.1–2.2)
ALBUMIN SERPL-MCNC: 4 G/DL (ref 3.4–5)
ALP BLD-CCNC: 108 U/L (ref 40–129)
ALT SERPL-CCNC: 44 U/L (ref 10–40)
ANION GAP SERPL CALCULATED.3IONS-SCNC: 14 MMOL/L (ref 3–16)
AST SERPL-CCNC: 35 U/L (ref 15–37)
BILIRUB SERPL-MCNC: 0.6 MG/DL (ref 0–1)
BUN BLDV-MCNC: 13 MG/DL (ref 7–20)
CALCIUM SERPL-MCNC: 9.1 MG/DL (ref 8.3–10.6)
CHLORIDE BLD-SCNC: 102 MMOL/L (ref 99–110)
CHOLESTEROL, TOTAL: 161 MG/DL (ref 0–199)
CO2: 28 MMOL/L (ref 21–32)
CREAT SERPL-MCNC: 0.6 MG/DL (ref 0.6–1.2)
GFR AFRICAN AMERICAN: >60
GFR NON-AFRICAN AMERICAN: >60
GLOBULIN: 1.9 G/DL
GLUCOSE BLD-MCNC: 104 MG/DL (ref 70–99)
LDL CHOLESTEROL DIRECT: 96 MG/DL
POTASSIUM SERPL-SCNC: 3.6 MMOL/L (ref 3.5–5.1)
SODIUM BLD-SCNC: 144 MMOL/L (ref 136–145)
TOTAL PROTEIN: 5.9 G/DL (ref 6.4–8.2)

## 2018-08-09 LAB
ESTIMATED AVERAGE GLUCOSE: 125.5 MG/DL
HBA1C MFR BLD: 6 %

## 2018-08-10 ENCOUNTER — TELEPHONE (OUTPATIENT)
Dept: FAMILY MEDICINE CLINIC | Age: 63
End: 2018-08-10

## 2018-08-10 RX ORDER — METFORMIN HYDROCHLORIDE 500 MG/1
500 TABLET, EXTENDED RELEASE ORAL 2 TIMES DAILY WITH MEALS
Qty: 180 TABLET | Refills: 1 | Status: SHIPPED | OUTPATIENT
Start: 2018-08-10 | End: 2018-08-13 | Stop reason: ALTCHOICE

## 2018-08-10 NOTE — TELEPHONE ENCOUNTER
Pt needs refill on metFORMIN (GLUCOPHAGE-XR) 500 MG extended release tablet to [address] , [address] [phone] - F [phone]. She is almost out.       Please advise, [phone]

## 2018-08-13 ENCOUNTER — OFFICE VISIT (OUTPATIENT)
Dept: FAMILY MEDICINE CLINIC | Age: 63
End: 2018-08-13

## 2018-08-13 VITALS
DIASTOLIC BLOOD PRESSURE: 80 MMHG | WEIGHT: 220.8 LBS | HEIGHT: 63 IN | HEART RATE: 72 BPM | BODY MASS INDEX: 39.12 KG/M2 | SYSTOLIC BLOOD PRESSURE: 116 MMHG | TEMPERATURE: 98.1 F

## 2018-08-13 DIAGNOSIS — E11.9 TYPE 2 DIABETES MELLITUS WITHOUT COMPLICATION, WITHOUT LONG-TERM CURRENT USE OF INSULIN (HCC): ICD-10-CM

## 2018-08-13 DIAGNOSIS — I10 ESSENTIAL HYPERTENSION: Primary | ICD-10-CM

## 2018-08-13 PROCEDURE — 99213 OFFICE O/P EST LOW 20 MIN: CPT | Performed by: FAMILY MEDICINE

## 2018-08-13 NOTE — PROGRESS NOTES
-----------------------------------  Body mass index is 39.11 kg/m². Wt Readings from Last 3 Encounters:         When saw me the first time 1/2016 she weighed 234#  08/13/18 : 220 lb 12.8 oz (100.2 kg)              Intentional weight loss  06/29/18 : 222 lb (100.7 kg)  06/12/18 : 224 lb (101.6 kg)    BP Readings from Last 3 Encounters:  08/13/18 : 116/80  06/29/18 : 110/65  06/12/18 : 128/63                  Review of Systems    Objective:   Physical Exam   Constitutional: She appears well-developed and well-nourished. No distress. Neck: No thyroid mass and no thyromegaly present. Cardiovascular: Normal rate, regular rhythm and normal heart sounds. Exam reveals no gallop and no friction rub. No murmur heard. Trace edema ankles and lower legs   Pulmonary/Chest: Effort normal and breath sounds normal. No accessory muscle usage. No tachypnea. No respiratory distress. She has no decreased breath sounds. She has no wheezes. She has no rhonchi. She has no rales. Lymphadenopathy:     She has no cervical adenopathy. Right: No supraclavicular adenopathy present. Left: No supraclavicular adenopathy present. Neurological: She is alert. Skin: Skin is warm, dry and intact. She is not diaphoretic. No pallor. Assessment:        Diagnosis Orders   1. Essential hypertension     2. Type 2 diabetes mellitus without complication, without long-term current use of insulin ([address]         Doing  Well.  She does have worsening of lymphoma disease  May be having some issues with medications now that She has lost weight       Plan:      Stop the metformin  Continue the other medications  Call in 1-2 weeks with how you are feeling  See back in about 3 months         Alvaro Tejeda MD

## 2018-08-28 ENCOUNTER — TELEPHONE (OUTPATIENT)
Dept: FAMILY MEDICINE CLINIC | Age: 63
End: 2018-08-28

## 2018-09-21 ENCOUNTER — HOSPITAL ENCOUNTER (OUTPATIENT)
Dept: CT IMAGING | Age: 63
Discharge: HOME | End: 2018-09-21
Payer: COMMERCIAL

## 2018-09-21 DIAGNOSIS — C83.08 SMALL B-CELL LYMPHOMA OF LYMPH NODES OF MULTIPLE REGIONS (HCC): ICD-10-CM

## 2018-09-21 PROCEDURE — 71260 CT THORAX DX C+: CPT

## 2018-09-21 PROCEDURE — 74177 CT ABD & PELVIS W/CONTRAST: CPT

## 2018-09-21 PROCEDURE — 70491 CT SOFT TISSUE NECK W/DYE: CPT

## 2018-09-21 PROCEDURE — 6360000004 HC RX CONTRAST MEDICATION: Performed by: INTERNAL MEDICINE

## 2018-09-21 RX ORDER — HEPARIN SODIUM (PORCINE) LOCK FLUSH IV SOLN 100 UNIT/ML 100 UNIT/ML
500 SOLUTION INTRAVENOUS PRN
Status: DISCONTINUED | OUTPATIENT
Start: 2018-09-21 | End: 2018-09-22 | Stop reason: HOSPADM

## 2018-09-21 RX ADMIN — IOPAMIDOL 100 ML: 755 INJECTION, SOLUTION INTRAVENOUS at 10:05

## 2018-09-21 RX ADMIN — IOHEXOL 50 ML: 240 INJECTION, SOLUTION INTRATHECAL; INTRAVASCULAR; INTRAVENOUS; ORAL at 10:05

## 2018-09-24 ENCOUNTER — HOSPITAL ENCOUNTER (OUTPATIENT)
Dept: CT IMAGING | Age: 63
Discharge: HOME | End: 2018-09-24
Payer: COMMERCIAL

## 2018-09-24 DIAGNOSIS — C83.08 SMALL B-CELL LYMPHOMA OF LYMPH NODES OF MULTIPLE REGIONS (HCC): ICD-10-CM

## 2018-09-24 PROCEDURE — 71260 CT THORAX DX C+: CPT

## 2018-09-24 PROCEDURE — 6360000004 HC RX CONTRAST MEDICATION: Performed by: INTERNAL MEDICINE

## 2018-09-24 RX ADMIN — IOPAMIDOL 75 ML: 755 INJECTION, SOLUTION INTRAVENOUS at 11:43

## 2018-09-26 ENCOUNTER — TELEPHONE (OUTPATIENT)
Dept: FAMILY MEDICINE CLINIC | Age: 63
End: 2018-09-26

## 2018-09-26 DIAGNOSIS — E11.9 TYPE 2 DIABETES MELLITUS WITHOUT COMPLICATION, WITHOUT LONG-TERM CURRENT USE OF INSULIN (HCC): Primary | ICD-10-CM

## 2018-09-26 NOTE — TELEPHONE ENCOUNTER
Pt is going out of the country to [place] on Nov. 10, 2018 and pt is wanting to know if she could get the Hep A vaccine and flu vaccine at the same time here at our office?     Pl advise  [phone]

## 2018-09-27 ENCOUNTER — NURSE ONLY (OUTPATIENT)
Dept: FAMILY MEDICINE CLINIC | Age: 63
End: 2018-09-27
Payer: COMMERCIAL

## 2018-09-27 DIAGNOSIS — Z23 NEED FOR HEPATITIS A IMMUNIZATION: Primary | ICD-10-CM

## 2018-09-27 DIAGNOSIS — Z23 NEEDS FLU SHOT: ICD-10-CM

## 2018-09-27 PROCEDURE — 90471 IMMUNIZATION ADMIN: CPT | Performed by: FAMILY MEDICINE

## 2018-09-27 PROCEDURE — 90686 IIV4 VACC NO PRSV 0.5 ML IM: CPT | Performed by: FAMILY MEDICINE

## 2018-09-27 PROCEDURE — 90472 IMMUNIZATION ADMIN EACH ADD: CPT | Performed by: FAMILY MEDICINE

## 2018-09-27 PROCEDURE — 90632 HEPA VACCINE ADULT IM: CPT | Performed by: FAMILY MEDICINE

## 2018-09-27 NOTE — PROGRESS NOTES
Vaccine Information Sheet, \"Influenza - Inactivated\"  given to Hansa Urias, or parent/legal guardian of  Hansa Urias and verbalized understanding. Patient responses:    Have you ever had a reaction to a flu vaccine? No  Are you able to eat eggs without adverse effects? Yes  Do you have any current illness? No  Have you ever had Guillian Alvord Syndrome? No    Flu vaccine given per order. Please see immunization tab.

## 2018-09-27 NOTE — TELEPHONE ENCOUNTER
[phone] [facility] advised that lab orders are in [facility] and to be fasting at least 10-12 hours.

## 2018-10-01 ENCOUNTER — HOSPITAL ENCOUNTER (OUTPATIENT)
Age: 63
Discharge: HOME | End: 2018-10-01
Payer: COMMERCIAL

## 2018-10-01 ENCOUNTER — TELEPHONE (OUTPATIENT)
Dept: CARDIOLOGY CLINIC | Age: 63
End: 2018-10-01

## 2018-10-01 LAB
EKG ATRIAL RATE: 340 BPM
EKG DIAGNOSIS: NORMAL
EKG Q-T INTERVAL: 346 MS
EKG QRS DURATION: 78 MS
EKG QTC CALCULATION (BAZETT): 465 MS
EKG R AXIS: 35 DEGREES
EKG T AXIS: -38 DEGREES
EKG VENTRICULAR RATE: 109 BPM

## 2018-10-01 PROCEDURE — 93005 ELECTROCARDIOGRAM TRACING: CPT | Performed by: NURSE PRACTITIONER

## 2018-10-01 PROCEDURE — 93010 ELECTROCARDIOGRAM REPORT: CPT | Performed by: INTERNAL MEDICINE

## 2018-10-03 ENCOUNTER — HOSPITAL ENCOUNTER (OUTPATIENT)
Dept: NON INVASIVE DIAGNOSTICS | Age: 63
Discharge: HOME | End: 2018-10-03
Payer: COMMERCIAL

## 2018-10-03 ENCOUNTER — OFFICE VISIT (OUTPATIENT)
Dept: CARDIOLOGY CLINIC | Age: 63
End: 2018-10-03
Payer: COMMERCIAL

## 2018-10-03 VITALS
SYSTOLIC BLOOD PRESSURE: 118 MMHG | OXYGEN SATURATION: 98 % | WEIGHT: 223 LBS | HEIGHT: 63 IN | HEART RATE: 122 BPM | BODY MASS INDEX: 39.51 KG/M2 | DIASTOLIC BLOOD PRESSURE: 78 MMHG

## 2018-10-03 DIAGNOSIS — I48.91 NEW ONSET A-FIB (HCC): ICD-10-CM

## 2018-10-03 DIAGNOSIS — C85.90 NON-HODGKIN'S LYMPHOMA, UNSPECIFIED BODY REGION, UNSPECIFIED NON-HODGKIN LYMPHOMA TYPE (HCC): ICD-10-CM

## 2018-10-03 DIAGNOSIS — R42 DIZZINESS: ICD-10-CM

## 2018-10-03 DIAGNOSIS — I10 ESSENTIAL HYPERTENSION: ICD-10-CM

## 2018-10-03 DIAGNOSIS — R60.0 LOCALIZED EDEMA: ICD-10-CM

## 2018-10-03 DIAGNOSIS — I48.91 NEW ONSET A-FIB (HCC): Primary | ICD-10-CM

## 2018-10-03 LAB
LV EF: 55 %
LVEF MODALITY: NORMAL

## 2018-10-03 PROCEDURE — 93306 TTE W/DOPPLER COMPLETE: CPT

## 2018-10-03 PROCEDURE — 93000 ELECTROCARDIOGRAM COMPLETE: CPT | Performed by: INTERNAL MEDICINE

## 2018-10-03 PROCEDURE — 99215 OFFICE O/P EST HI 40 MIN: CPT | Performed by: INTERNAL MEDICINE

## 2018-10-03 RX ORDER — FUROSEMIDE 20 MG/1
20 TABLET ORAL DAILY
Qty: 30 TABLET | Refills: 3 | Status: SHIPPED | OUTPATIENT
Start: 2018-10-03 | End: 2018-12-17 | Stop reason: SDUPTHER

## 2018-10-03 RX ORDER — METOPROLOL SUCCINATE 25 MG/1
25 TABLET, EXTENDED RELEASE ORAL EVERY EVENING
Qty: 30 TABLET | Refills: 3 | Status: SHIPPED | OUTPATIENT
Start: 2018-10-03 | End: 2018-10-24 | Stop reason: SDUPTHER

## 2018-10-03 RX ORDER — ASPIRIN 325 MG
325 TABLET ORAL DAILY
COMMUNITY
End: 2018-10-31 | Stop reason: HOSPADM

## 2018-10-03 NOTE — PROGRESS NOTES
her rate. Her CHADS-Vasc risk score is elevated at 3, therefore she warrants anticoagulation therapy. I will have her begin taking Eliquis 5 mg BID. She can begin taking this after her upcoming tooth extraction. I have also ordered an echocardiogram to rule out any structural abnormalities such as LV dysfunction or LA chamber enlargement. It is difficult to say if the Revlimid caused this but I would continue the treatment. In light of her lower extremity edema, I will have her begin taking Lasix 20 mg daily. The edema is probably multifactorial form the cancer treatment and the a-fib. Stopping the HCTZ for the dizziness just may it come to the fore. The dizziness was probably actually from rapid atrial fibrillation with some lower blood pressures. I will see her in office for follow up in 2 weeks and determine whether to proceed to cardioversion. .     This note was scribed in the presence of Kishore Arora MD by General Souza, RN. Physician Attestation:  The scribes documentation has been prepared under my direction and personally reviewed by me in its entirety. I, Dr. Noah Cohen personally performed the services described in this documentation as scribed by my RN,  [employer] in my presence, and I confirm that the note above accurately reflects all work, treatment, procedures, and medical decision making performed by me.

## 2018-10-10 DIAGNOSIS — I48.91 NEW ONSET A-FIB (HCC): ICD-10-CM

## 2018-10-10 LAB
ANION GAP SERPL CALCULATED.3IONS-SCNC: 12 MMOL/L (ref 3–16)
BUN BLDV-MCNC: 17 MG/DL (ref 7–20)
CALCIUM SERPL-MCNC: 9.1 MG/DL (ref 8.3–10.6)
CHLORIDE BLD-SCNC: 103 MMOL/L (ref 99–110)
CO2: 28 MMOL/L (ref 21–32)
CREAT SERPL-MCNC: 0.7 MG/DL (ref 0.6–1.2)
GFR AFRICAN AMERICAN: >60
GFR NON-AFRICAN AMERICAN: >60
GLUCOSE BLD-MCNC: 119 MG/DL (ref 70–99)
POTASSIUM SERPL-SCNC: 4.2 MMOL/L (ref 3.5–5.1)
SODIUM BLD-SCNC: 143 MMOL/L (ref 136–145)

## 2018-10-17 ENCOUNTER — OFFICE VISIT (OUTPATIENT)
Dept: SLEEP MEDICINE | Age: 63
End: 2018-10-17
Payer: COMMERCIAL

## 2018-10-17 VITALS
TEMPERATURE: 97.7 F | HEART RATE: 61 BPM | DIASTOLIC BLOOD PRESSURE: 80 MMHG | WEIGHT: 216.2 LBS | HEIGHT: 63 IN | RESPIRATION RATE: 18 BRPM | OXYGEN SATURATION: 97 % | BODY MASS INDEX: 38.31 KG/M2 | SYSTOLIC BLOOD PRESSURE: 110 MMHG

## 2018-10-17 DIAGNOSIS — I10 ESSENTIAL HYPERTENSION: ICD-10-CM

## 2018-10-17 DIAGNOSIS — I48.91 ATRIAL FIBRILLATION, CONTROLLED (HCC): ICD-10-CM

## 2018-10-17 DIAGNOSIS — G47.33 OSA (OBSTRUCTIVE SLEEP APNEA): Primary | ICD-10-CM

## 2018-10-17 PROCEDURE — 99204 OFFICE O/P NEW MOD 45 MIN: CPT | Performed by: PSYCHIATRY & NEUROLOGY

## 2018-10-17 RX ORDER — LORATADINE 10 MG/1
10 CAPSULE, LIQUID FILLED ORAL DAILY
COMMUNITY

## 2018-10-17 ASSESSMENT — ENCOUNTER SYMPTOMS
APNEA: 0
CHOKING: 0
GASTROINTESTINAL NEGATIVE: 1
EYES NEGATIVE: 1
ALLERGIC/IMMUNOLOGIC NEGATIVE: 1

## 2018-10-17 ASSESSMENT — SLEEP AND FATIGUE QUESTIONNAIRES
HOW LIKELY ARE YOU TO NOD OFF OR FALL ASLEEP WHILE SITTING AND TALKING TO SOMEONE: 0
HOW LIKELY ARE YOU TO NOD OFF OR FALL ASLEEP IN A CAR, WHILE STOPPED FOR A FEW MINUTES IN TRAFFIC: 0
HOW LIKELY ARE YOU TO NOD OFF OR FALL ASLEEP WHILE SITTING AND READING: 0
HOW LIKELY ARE YOU TO NOD OFF OR FALL ASLEEP WHILE SITTING INACTIVE IN A PUBLIC PLACE: 0
HOW LIKELY ARE YOU TO NOD OFF OR FALL ASLEEP WHILE WATCHING TV: 1
HOW LIKELY ARE YOU TO NOD OFF OR FALL ASLEEP WHILE LYING DOWN TO REST IN THE AFTERNOON WHEN CIRCUMSTANCES PERMIT: 0
HOW LIKELY ARE YOU TO NOD OFF OR FALL ASLEEP WHILE SITTING QUIETLY AFTER LUNCH WITHOUT ALCOHOL: 0
NECK CIRCUMFERENCE (INCHES): 14.75
HOW LIKELY ARE YOU TO NOD OFF OR FALL ASLEEP WHEN YOU ARE A PASSENGER IN A CAR FOR AN HOUR WITHOUT A BREAK: 0
ESS TOTAL SCORE: 1

## 2018-10-17 NOTE — PROGRESS NOTES
MD CHIRAG Gray Board Certified in Sleep Medicine  Certified [facility] Sleep Medicine  Board Certified in Neurology [address]  [address] [facility] [address],  [address]  [address] [facility] ([address] [facility]  [address], [address]           [address]  [address] [address] [phone]    Subjective:     Patient ID: Kristin Gale is a 63 y.o. female. Chief Complaint   Patient presents with    Other     Dr Kashif Hernandez, onset of afib, snores,        HPI:        Kristin Glae is a 63 y.o. female referred by Dr Kashif Hernandez fora sleep evaluation. She complains of snoring, tossing and turning, teeth grinding, new diagnosis A-Fib but she denies snorting, choking, periods of not breathing, knees buckling with laughing, completely or partially paralyzed while falling asleep or waking up, noisy environment, uncomfortable room temperature, uncomfortable bedding. Symptoms began a few years ago, unchanged since that time. The patient's bed-partner confirmed the snoring but not the stopped breathing at night  SLEEP SCHEDULE: Goes to bed around 8-11 PM in theweekdays and 10-11 PM in the weekends. It usually takes the patient 15-20 minutes to fall asleep. The patient gets up 1-2 per night to go to the bathroom. The Patient finally gets up at 5-7 AM during the weekdays and 5-7 AM in the weekends. patient wakes up with dry mouth and sometimes morning headache. . the headache usually dull headache lasts 30-60 minutes. The patient has restless sleep with frequent arousals in addition to the Patient has significant daytime sleepiness. The Patient scored Total score: 1 on [place] Sleepiness Scale ( more than 10 is indicative of daytime sleepiness)and 18 in fatigue scale ( more than 36 is indicativeof daytime fatigue).  The patient takes 1-2 naps/week for 60 minutes and pain    Abnormal EKG    Small cell b-cell lymphoma, lymph nodes of multiple sites ([address]    Type 2 diabetes mellitus without complication (HCC)    Hyperlipidemia    Cyst of left ovary    Endometrial thickening on ultrasound    Visit for monitoring Rituxan therapy    Degenerative disc disease, lumbar    Trochanteric bursitis of left hip       Past Medical History:   Diagnosis Date    [facility]     Hypertension     Melanoma in situ of back [facility]) 2011    dr Frantz Bocanegra       Past Surgical History:   Procedure Laterality Date    BREAST CYST ASPIRATION      in past benign bilateral over years. 2 episodes     SECTION      CHOLECYSTECTOMY      COLONOSCOPY  9/10/12    polyp, us, repeat 5 years    ENDOMETRIAL ABLATION  2007    FINE NEEDLE ASPIRATION      GALLBLADDER SURGERY      OVARY REMOVAL Left 2018    benign growth, dr Love Gone at [facility]    TUBAL LIGATION         Family History   Problem Relation Age of Onset    High Blood Pressure Mother     Cancer Mother         breast    High Blood Pressure Father     Cancer Father         lung       Review of Systems   Constitutional: Negative. HENT: Negative. Eyes: Negative. Respiratory: Negative for apnea and choking. Cardiovascular: Positive for leg swelling. Gastrointestinal: Negative. Endocrine: Negative. Genitourinary: Positive for frequency (nighttime). Musculoskeletal: Negative. Allergic/Immunologic: Negative. Neurological: Negative. Hematological: Negative. Psychiatric/Behavioral: The patient is nervous/anxious. All other systems reviewed and are negative. Objective:     Vitals:  Weight BMI Neck circumference    Wt Readings from Last 3 Encounters:   10/17/18 216 lb 3.2 oz (98.1 kg)   10/03/18 223 lb (101.2 kg)   18 220 lb 12.8 oz (100.2 kg)    Body mass index is 38.3 kg/m².  Neck circumference: 14.75     BP HR SaO2   BP Readings from Last 3 Encounters:   10/17/18 110/80   10/03/18 118/78

## 2018-10-24 ENCOUNTER — TELEPHONE (OUTPATIENT)
Dept: CARDIOLOGY CLINIC | Age: 63
End: 2018-10-24

## 2018-10-24 ENCOUNTER — OFFICE VISIT (OUTPATIENT)
Dept: CARDIOLOGY CLINIC | Age: 63
End: 2018-10-24
Payer: COMMERCIAL

## 2018-10-24 VITALS
HEIGHT: 63 IN | WEIGHT: 217 LBS | OXYGEN SATURATION: 98 % | DIASTOLIC BLOOD PRESSURE: 82 MMHG | BODY MASS INDEX: 38.45 KG/M2 | SYSTOLIC BLOOD PRESSURE: 138 MMHG | HEART RATE: 116 BPM

## 2018-10-24 DIAGNOSIS — I48.91 ATRIAL FIBRILLATION WITH RVR (HCC): ICD-10-CM

## 2018-10-24 DIAGNOSIS — I10 ESSENTIAL HYPERTENSION: ICD-10-CM

## 2018-10-24 DIAGNOSIS — I48.91 ATRIAL FIBRILLATION WITH RVR (HCC): Primary | ICD-10-CM

## 2018-10-24 DIAGNOSIS — C85.90 NON-HODGKIN'S LYMPHOMA, UNSPECIFIED BODY REGION, UNSPECIFIED NON-HODGKIN LYMPHOMA TYPE (HCC): ICD-10-CM

## 2018-10-24 DIAGNOSIS — R60.0 LOCALIZED EDEMA: ICD-10-CM

## 2018-10-24 LAB
ANION GAP SERPL CALCULATED.3IONS-SCNC: 16 MMOL/L (ref 3–16)
BUN BLDV-MCNC: 22 MG/DL (ref 7–20)
CALCIUM SERPL-MCNC: 9.6 MG/DL (ref 8.3–10.6)
CHLORIDE BLD-SCNC: 101 MMOL/L (ref 99–110)
CO2: 27 MMOL/L (ref 21–32)
CREAT SERPL-MCNC: 0.8 MG/DL (ref 0.6–1.2)
GFR AFRICAN AMERICAN: >60
GFR NON-AFRICAN AMERICAN: >60
GLUCOSE BLD-MCNC: 92 MG/DL (ref 70–99)
POTASSIUM SERPL-SCNC: 3.8 MMOL/L (ref 3.5–5.1)
SODIUM BLD-SCNC: 144 MMOL/L (ref 136–145)

## 2018-10-24 PROCEDURE — 93000 ELECTROCARDIOGRAM COMPLETE: CPT | Performed by: INTERNAL MEDICINE

## 2018-10-24 PROCEDURE — 99214 OFFICE O/P EST MOD 30 MIN: CPT | Performed by: INTERNAL MEDICINE

## 2018-10-24 RX ORDER — METOPROLOL SUCCINATE 50 MG/1
50 TABLET, EXTENDED RELEASE ORAL EVERY EVENING
Qty: 30 TABLET | Refills: 4
Start: 2018-10-24 | End: 2018-10-31

## 2018-10-24 NOTE — PROGRESS NOTES
[address] [facility]  1955    Reason for Consult: Atrial Fibrillation     CC: Atrial Fibrillation     HPI:  The patient is 63 y.o. female with a past medical history significant for hypertension and atrial fibrillation. She was treated for Non-Hodgkins lymphoma with radiation. She was told earlier this year that the previous spot of cancer has returned. She is currently being treated again and was thought to be in atrial fibrillation while in office at [facility]. She was sent for an ECG and this was confirmed. She is currently on the 4 of 12 months of treatment. She feels a little dyspnea with ambulation but nothing significant. She presents today for follow up. She continues with chemo and has been tolerating. She reports when in office at [facility] earlier this week she was told she was in a regular rhythm. No ECG was done at that time. She has not noticed any racing of her heartbeat. Review of Systems:  Constitutional: No fatigue, weakness, night sweats or fever. HEENT: No new vision difficulties or ringing in the ears. Respiratory: No new SOB, PND, orthopnea or cough. Cardiovascular: See HPI   GI: No n/v, diarrhea, constipation, abdominal pain or changes in bowel habits. No melena, no hematochezia  : No urinary frequency, urgency, incontinence, hematuria or dysuria. Skin: No cyanosis or skin lesions. Musculoskeletal: New leg swelling bilaterally. No new muscle or joint pain. Neurological: Lightheadedness. No syncope or TIA-like symptoms. Psychiatric: No anxiety, insomnia or depression     Past Medical History:   Diagnosis Date    [facility]     Hypertension     Melanoma [facility] situ of back [facility])     dr Helena Thomas     Past Surgical History:   Procedure Laterality Date    BREAST CYST ASPIRATION      in past benign bilateral over years.  2 episodes     SECTION      CHOLECYSTECTOMY      COLONOSCOPY  9/10/12    polyp, sharlene, repeat

## 2018-10-31 ENCOUNTER — HOSPITAL ENCOUNTER (OUTPATIENT)
Dept: CARDIAC CATH/INVASIVE PROCEDURES | Age: 63
Discharge: HOME | End: 2018-10-31
Payer: COMMERCIAL

## 2018-10-31 DIAGNOSIS — I48.91 ATRIAL FIBRILLATION WITH RVR (HCC): ICD-10-CM

## 2018-10-31 PROCEDURE — 93010 ELECTROCARDIOGRAM REPORT: CPT | Performed by: INTERNAL MEDICINE

## 2018-10-31 PROCEDURE — 92960 CARDIOVERSION ELECTRIC EXT: CPT | Performed by: INTERNAL MEDICINE

## 2018-10-31 PROCEDURE — 93005 ELECTROCARDIOGRAM TRACING: CPT | Performed by: INTERNAL MEDICINE

## 2018-10-31 RX ORDER — METOPROLOL SUCCINATE 50 MG/1
50 TABLET, EXTENDED RELEASE ORAL 2 TIMES DAILY
Qty: 30 TABLET | Refills: 4 | Status: SHIPPED | OUTPATIENT
Start: 2018-10-31 | End: 2018-10-31

## 2018-10-31 RX ORDER — SODIUM CHLORIDE 0.9 % (FLUSH) 0.9 %
10 SYRINGE (ML) INJECTION EVERY 12 HOURS SCHEDULED
Status: DISCONTINUED | OUTPATIENT
Start: 2018-10-31 | End: 2018-10-31 | Stop reason: ALTCHOICE

## 2018-10-31 RX ORDER — SODIUM CHLORIDE 0.9 % (FLUSH) 0.9 %
10 SYRINGE (ML) INJECTION EVERY 12 HOURS SCHEDULED
Status: DISCONTINUED | OUTPATIENT
Start: 2018-10-31 | End: 2018-11-01 | Stop reason: HOSPADM

## 2018-10-31 RX ORDER — SODIUM CHLORIDE 0.9 % (FLUSH) 0.9 %
10 SYRINGE (ML) INJECTION PRN
Status: DISCONTINUED | OUTPATIENT
Start: 2018-10-31 | End: 2018-10-31 | Stop reason: ALTCHOICE

## 2018-10-31 RX ORDER — SODIUM CHLORIDE 9 MG/ML
INJECTION, SOLUTION INTRAVENOUS CONTINUOUS
Status: DISCONTINUED | OUTPATIENT
Start: 2018-10-31 | End: 2018-11-01 | Stop reason: HOSPADM

## 2018-10-31 RX ORDER — SODIUM CHLORIDE 0.9 % (FLUSH) 0.9 %
10 SYRINGE (ML) INJECTION PRN
Status: DISCONTINUED | OUTPATIENT
Start: 2018-10-31 | End: 2018-11-01 | Stop reason: HOSPADM

## 2018-10-31 RX ORDER — METOPROLOL SUCCINATE 50 MG/1
50 TABLET, EXTENDED RELEASE ORAL 2 TIMES DAILY
Qty: 60 TABLET | Refills: 4 | Status: SHIPPED | OUTPATIENT
Start: 2018-10-31 | End: 2018-12-14

## 2018-10-31 NOTE — H&P
ENDOMETRIAL ABLATION   2007    FINE NEEDLE ASPIRATION        GALLBLADDER SURGERY        OVARY REMOVAL Left 01/19/2018     benign growth, dr Delmar Gil [facility]    TUBAL LIGATION             Family History         Family History   Problem Relation Age of Onset    High Blood Pressure Mother      Cancer Mother           breast    High Blood Pressure Father      Cancer Father           lung                 Social History   Substance Use Topics    Smoking status: Former Smoker       Years: 0.80       Types: Cigarettes       Start date: 1/7/1975       Quit date: 1/2/2016    Smokeless tobacco: Never Used    Alcohol use 0.0 oz/week          Comment: 2-3 times a week              Allergies   Allergen Reactions    Penicillins Hives      Current Facility-Administered Medications          Current Outpatient Prescriptions   Medication Sig Dispense Refill    loratadine (CLARITIN) 10 MG capsule Take 10 mg by mouth daily        aspirin 325 MG tablet Take 325 mg by mouth daily        furosemide (LASIX) 20 MG tablet Take 1 tablet by mouth daily 30 tablet 3    metoprolol succinate (TOPROL XL) 25 MG extended release tablet Take 1 tablet by mouth every evening 30 tablet 3    apixaban (ELIQUIS) 5 MG TABS tablet Take 1 tablet by mouth 2 times daily 60 tablet 4    potassium chloride (KLOR-CON M) 10 MEQ extended release tablet Take 1 tablet by mouth 2 times daily 180 tablet 2    acyclovir (ZOVIRAX) 800 MG tablet Take 800 mg by mouth 3 times daily   3    LOTEMAX 0.5 % ophthalmic gel INSTILL 1 GTT MARIMAR QID   4      No current facility-administered medications for this visit.          [id number] Score for Atrial Fibrillation Stroke Risk    Risk   Factors   Component Value   C CHF No 0   H HTN Yes 1   A2 Age >= 76 No,  (63 y.o.) 0   D DM Yes 1   S2 Prior Stroke/TIA No 0   V Vascular Disease No 0   A Age 74-69 No,  (59 y.o.) 0   Sc Sex female 1     [id number]  Score   3   Score last updated 72/6/23 5:27 PM     Click here for a T3 through T7 levels, both within the spinal canal and adjacent   to the thoracic spine. Some differential considerations include lymphoma,   metastatic disease, paraspinal hemorrhage or abscess, or neurogenic tumor. MRI the thoracic spine with and without IV contrast is recommended for   further assessment. 2. No findings to suggest large central pulmonary embolism. 3. Small nonspecific left breast nodule, for which correlation with any   recent mammogram is suggested. 4. Hepatic steatosis. 5. Nodularity of bilateral adrenal glands, indeterminate by CT criteria.      MRI:  12/30/15  IMPRESSION:   1. Enhancing left paraspinal mass extending from T3 through T6 with extension   into the left neural foraminal and left epidural space from T3-T4 disc level   through T6 vertebral body level. This compresses and displaces the upper   thoracic spinal cord to the right, resulting in moderate to severe spinal   canal stenosis at T4 and T5. Differential diagnosis includes metastatic   disease, lymphoma, or possibly neurogenic neoplasm. No abnormal thoracic   spinal cord signal to suggest intramedullary disease or edema. The findings were sent to the [facility]   at 9:46 a.m. on 01/04/2016 to be communicated to a licensed caregiver.      ECHO:  12/30/15  Concentric LVH with excellent systolic function. EF is 60%. Trivial Mitral, Aortic and Tricuspid Regurgitation.     Stress Study:1/15/16  Summary   Pharmacological Stress/MPI Results:       1. Technically a satisfactory study. 2. Normal pharmacological stress portion of the study. 3. No evidence of Ischemia by Myocardial Perfusion Imaging. 4. Gated Study shows normal LV size and Systolic function; EF is >63 %.      Echo 10/3/18  Overall left ventricular systolic function appears normal. Ejection fraction  is visually estimated to be 55%. Patient appears to be in atrial fibrillation with rapid ventricular response.   Normal

## 2018-11-01 LAB
EKG ATRIAL RATE: 120 BPM
EKG DIAGNOSIS: NORMAL
EKG Q-T INTERVAL: 350 MS
EKG QRS DURATION: 78 MS
EKG QTC CALCULATION (BAZETT): 467 MS
EKG R AXIS: 38 DEGREES
EKG T AXIS: -30 DEGREES
EKG VENTRICULAR RATE: 107 BPM

## 2018-11-01 NOTE — PROCEDURES
[address] [place] [address] 16                                 PROCEDURE NOTE    PATIENT NAME: Stella Mary                     :        1955  MED REC NO:   [id number]                          ROOM:  ACCOUNT NO:   [de-identified]                           ADMIT DATE: 10/31/2018  PROVIDER:     Kev Mcarthur MD    [address] CARDIOVERSION REPORT. DATE OF PROCEDURE:  10/31/2018    SURGEON:  Kev Mcarthur MD    INDICATIONS FOR PROCEDURE:  Atrial fibrillation with rapid ventricular  response. PROCEDURE:  The risks and benefits of the procedure were explained to  the patient and informed consent was obtained. She was brought to the  cardiac catheterization lab recovery area and placed in the supine  position. Cardioversion pads were placed in the anterior and posterior  distribution. Emergency equipment was then placed. The patient received deep sedation with a total of 70 mg of IV Brevital.  Her ASA grade was II and her Mallampati score was I. Total duration of  sedation was 10 minutes. The vital signs were monitored throughout the  procedure and remained stable. There were no complications from  sedation. When deep sedation was achieved, the patient received a total of three  200-joule biphasic synchronize cardioversion shocks. She had pauses  after each shock, but returned to atrial fibrillation. Pads were  repositioned after each shock. Unfortunately, we were unsuccessful in  getting the patient into a normal sinus rhythm. She remains in atrial  fibrillation with an accelerated rate in the low 100s. She recovered with no neurologic deficits. There were no complications  in the procedure. ASSESSMENT:  Unsuccessful attempt x3 shocks to get the patient out of  atrial fibrillation with rapid ventricular response. The patient will have her Toprol-XL increased to 50 mg b.i.d.   She

## 2018-11-03 DIAGNOSIS — E11.9 TYPE 2 DIABETES MELLITUS WITHOUT COMPLICATION, WITHOUT LONG-TERM CURRENT USE OF INSULIN (HCC): ICD-10-CM

## 2018-11-03 LAB
ANION GAP SERPL CALCULATED.3IONS-SCNC: 13 MMOL/L (ref 3–16)
BUN BLDV-MCNC: 16 MG/DL (ref 7–20)
CALCIUM SERPL-MCNC: 9.2 MG/DL (ref 8.3–10.6)
CHLORIDE BLD-SCNC: 105 MMOL/L (ref 99–110)
CHOLESTEROL, TOTAL: 169 MG/DL (ref 0–199)
CO2: 24 MMOL/L (ref 21–32)
CREAT SERPL-MCNC: 0.7 MG/DL (ref 0.6–1.2)
GFR AFRICAN AMERICAN: >60
GFR NON-AFRICAN AMERICAN: >60
GLUCOSE BLD-MCNC: 130 MG/DL (ref 70–99)
HDLC SERPL-MCNC: 54 MG/DL (ref 40–60)
LDL CHOLESTEROL CALCULATED: 91 MG/DL
POTASSIUM SERPL-SCNC: 4.1 MMOL/L (ref 3.5–5.1)
SODIUM BLD-SCNC: 142 MMOL/L (ref 136–145)
TRIGL SERPL-MCNC: 120 MG/DL (ref 0–150)
VLDLC SERPL CALC-MCNC: 24 MG/DL

## 2018-11-04 LAB
ESTIMATED AVERAGE GLUCOSE: 125.5 MG/DL
HBA1C MFR BLD: 6 %

## 2018-11-07 ENCOUNTER — HOSPITAL ENCOUNTER (OUTPATIENT)
Dept: SLEEP CENTER | Age: 63
Discharge: HOME | End: 2018-11-07
Payer: COMMERCIAL

## 2018-11-07 DIAGNOSIS — I48.91 ATRIAL FIBRILLATION, CONTROLLED (HCC): ICD-10-CM

## 2018-11-07 DIAGNOSIS — G47.33 OSA (OBSTRUCTIVE SLEEP APNEA): ICD-10-CM

## 2018-11-07 DIAGNOSIS — I10 ESSENTIAL HYPERTENSION: ICD-10-CM

## 2018-11-07 PROCEDURE — 95806 SLEEP STUDY UNATT&RESP EFFT: CPT

## 2018-11-08 PROCEDURE — 95806 SLEEP STUDY UNATT&RESP EFFT: CPT | Performed by: PSYCHIATRY & NEUROLOGY

## 2018-11-09 ENCOUNTER — OFFICE VISIT (OUTPATIENT)
Dept: FAMILY MEDICINE CLINIC | Age: 63
End: 2018-11-09
Payer: COMMERCIAL

## 2018-11-09 VITALS
DIASTOLIC BLOOD PRESSURE: 82 MMHG | TEMPERATURE: 97.8 F | WEIGHT: 220 LBS | HEIGHT: 63 IN | SYSTOLIC BLOOD PRESSURE: 128 MMHG | HEART RATE: 60 BPM | BODY MASS INDEX: 38.98 KG/M2

## 2018-11-09 DIAGNOSIS — I10 ESSENTIAL HYPERTENSION: ICD-10-CM

## 2018-11-09 DIAGNOSIS — E11.9 TYPE 2 DIABETES MELLITUS WITHOUT COMPLICATION, WITHOUT LONG-TERM CURRENT USE OF INSULIN (HCC): Primary | ICD-10-CM

## 2018-11-09 PROCEDURE — 99213 OFFICE O/P EST LOW 20 MIN: CPT | Performed by: FAMILY MEDICINE

## 2018-11-09 RX ORDER — POTASSIUM CHLORIDE 750 MG/1
10 TABLET, EXTENDED RELEASE ORAL 2 TIMES DAILY
Qty: 180 TABLET | Refills: 2 | Status: SHIPPED | OUTPATIENT
Start: 2018-11-09 | End: 2018-12-13 | Stop reason: DRUGHIGH

## 2018-11-14 ENCOUNTER — TELEPHONE (OUTPATIENT)
Dept: SLEEP MEDICINE | Age: 63
End: 2018-11-14

## 2018-11-14 NOTE — TELEPHONE ENCOUNTER
Sleep study showed moderate ZOE. AHI was 25.5  per hr. And O2 Desaturations to 78%. Dr Santos Gomez titration.   Left a message for pt to call back

## 2018-11-19 ENCOUNTER — OFFICE VISIT (OUTPATIENT)
Dept: CARDIOLOGY CLINIC | Age: 63
End: 2018-11-19
Payer: COMMERCIAL

## 2018-11-19 VITALS
HEIGHT: 63 IN | SYSTOLIC BLOOD PRESSURE: 128 MMHG | BODY MASS INDEX: 39.16 KG/M2 | OXYGEN SATURATION: 97 % | WEIGHT: 221 LBS | DIASTOLIC BLOOD PRESSURE: 80 MMHG | HEART RATE: 109 BPM

## 2018-11-19 DIAGNOSIS — I48.0 PAF (PAROXYSMAL ATRIAL FIBRILLATION) (HCC): Primary | ICD-10-CM

## 2018-11-19 DIAGNOSIS — I10 ESSENTIAL HYPERTENSION: ICD-10-CM

## 2018-11-19 PROCEDURE — 93000 ELECTROCARDIOGRAM COMPLETE: CPT | Performed by: INTERNAL MEDICINE

## 2018-11-19 PROCEDURE — 99205 OFFICE O/P NEW HI 60 MIN: CPT | Performed by: INTERNAL MEDICINE

## 2018-11-19 NOTE — PATIENT INSTRUCTIONS
anesthesia.     · If you take blood thinners, such as warfarin (Coumadin), clopidogrel (Plavix), or aspirin, be sure to talk to your doctor. He or she will tell you if you should stop taking these medicines before your procedure. Make sure that you understand exactly what your doctor wants you to do.     · Your doctor will tell you which medicines to take or stop before your procedure. You may need to stop taking certain medicines a week or more before the procedure. So talk to your doctor as soon as you can.     · If you have an advance directive, let your doctor know. It may include a living will and a durable power of  for health care. Bring a copy to the hospital. If you don't have one, you may want to prepare one. It lets your doctor and loved ones know your health care wishes. Doctors advise that everyone prepare these papers before any type of surgery or procedure.     · Be sure to tell your doctor about any problems you have with your stomach or esophagus. Procedures can be stressful. This information will help you understand what you can expect. And it will help you safely prepare for your procedure. What happens on the day of the procedure? · Follow the instructions exactly about when to stop eating and drinking. If you don't, your procedure may be canceled. If your doctor told you to take your medicines on the day of the procedure, take them with only a sip of water.     · Take a bath or shower before you come in for your procedure. Do not apply lotions, perfumes, deodorants, or nail polish.     · Take off all jewelry and piercings. And take out contact lenses, if you wear them.    At the hospital or surgery center   · Bring a picture ID.     · You will be kept comfortable and safe by your anesthesia provider. You may get medicine that relaxes you or puts you in a light sleep. The area being worked on will be numb.     · You will get some medicine sprayed in your throat.  This will numb your

## 2018-12-12 ENCOUNTER — TELEPHONE (OUTPATIENT)
Dept: CARDIOLOGY CLINIC | Age: 63
End: 2018-12-12

## 2018-12-12 DIAGNOSIS — I10 ESSENTIAL HYPERTENSION: ICD-10-CM

## 2018-12-12 DIAGNOSIS — I48.0 PAF (PAROXYSMAL ATRIAL FIBRILLATION) (HCC): ICD-10-CM

## 2018-12-12 LAB
ANION GAP SERPL CALCULATED.3IONS-SCNC: 15 MMOL/L (ref 3–16)
BUN BLDV-MCNC: 13 MG/DL (ref 7–20)
CALCIUM SERPL-MCNC: 9 MG/DL (ref 8.3–10.6)
CHLORIDE BLD-SCNC: 103 MMOL/L (ref 99–110)
CO2: 26 MMOL/L (ref 21–32)
CREAT SERPL-MCNC: 0.7 MG/DL (ref 0.6–1.2)
GFR AFRICAN AMERICAN: >60
GFR NON-AFRICAN AMERICAN: >60
GLUCOSE BLD-MCNC: 133 MG/DL (ref 70–99)
POTASSIUM SERPL-SCNC: 3.6 MMOL/L (ref 3.5–5.1)
SODIUM BLD-SCNC: 144 MMOL/L (ref 136–145)

## 2018-12-12 NOTE — TELEPHONE ENCOUNTER
Likely from her cold/ ear stuffiness etc or dehydration causing dizziness. She has complained of dizziness before but the syncope is unlikely to be from the Afib. Regardless, we would like to meet her needs. Carlos Brought, I know that she had chose the original date for the LISA/ DCCV. But, please call her and offer to move the LISA/ DCCV up to this Friday.  Thanks

## 2018-12-13 ENCOUNTER — TELEPHONE (OUTPATIENT)
Dept: FAMILY MEDICINE CLINIC | Age: 63
End: 2018-12-13

## 2018-12-13 RX ORDER — POTASSIUM CHLORIDE 750 MG/1
10 TABLET, EXTENDED RELEASE ORAL 2 TIMES DAILY
Qty: 180 TABLET | Refills: 2 | Status: SHIPPED | OUTPATIENT
Start: 2018-12-13 | End: 2019-03-08 | Stop reason: SDUPTHER

## 2018-12-13 NOTE — TELEPHONE ENCOUNTER
Pt is calling about med potassium chloride (KLOR-CON M) 10 MEQ extended release tablet     Pt stated that [facility] - adjusted for her to take 2 tablets 2 times a day. Pl advise.    [phone]

## 2018-12-14 ENCOUNTER — TELEPHONE (OUTPATIENT)
Dept: CARDIOLOGY CLINIC | Age: 63
End: 2018-12-14

## 2018-12-14 ENCOUNTER — HOSPITAL ENCOUNTER (OUTPATIENT)
Dept: CT IMAGING | Age: 63
Discharge: HOME | End: 2018-12-14
Payer: COMMERCIAL

## 2018-12-14 ENCOUNTER — HOSPITAL ENCOUNTER (OUTPATIENT)
Dept: CARDIAC CATH/INVASIVE PROCEDURES | Age: 63
Discharge: HOME | End: 2018-12-14
Payer: COMMERCIAL

## 2018-12-14 VITALS — HEIGHT: 63 IN | WEIGHT: 218.92 LBS | BODY MASS INDEX: 38.79 KG/M2

## 2018-12-14 DIAGNOSIS — C83.08 SMALL B-CELL LYMPHOMA OF LYMPH NODES OF MULTIPLE REGIONS (HCC): ICD-10-CM

## 2018-12-14 PROCEDURE — 74177 CT ABD & PELVIS W/CONTRAST: CPT

## 2018-12-14 PROCEDURE — 92960 CARDIOVERSION ELECTRIC EXT: CPT | Performed by: FAMILY MEDICINE

## 2018-12-14 PROCEDURE — 6360000002 HC RX W HCPCS: Performed by: RADIOLOGY

## 2018-12-14 PROCEDURE — 6360000004 HC RX CONTRAST MEDICATION: Performed by: INTERNAL MEDICINE

## 2018-12-14 PROCEDURE — 92960 CARDIOVERSION ELECTRIC EXT: CPT | Performed by: INTERNAL MEDICINE

## 2018-12-14 PROCEDURE — 70491 CT SOFT TISSUE NECK W/DYE: CPT

## 2018-12-14 PROCEDURE — 71260 CT THORAX DX C+: CPT

## 2018-12-14 RX ORDER — SODIUM CHLORIDE 0.9 % (FLUSH) 0.9 %
10 SYRINGE (ML) INJECTION EVERY 12 HOURS SCHEDULED
Status: DISCONTINUED | OUTPATIENT
Start: 2018-12-14 | End: 2018-12-15 | Stop reason: HOSPADM

## 2018-12-14 RX ORDER — HEPARIN SODIUM (PORCINE) LOCK FLUSH IV SOLN 100 UNIT/ML 100 UNIT/ML
500 SOLUTION INTRAVENOUS PRN
Status: DISCONTINUED | OUTPATIENT
Start: 2018-12-14 | End: 2018-12-15 | Stop reason: HOSPADM

## 2018-12-14 RX ORDER — SODIUM CHLORIDE 0.9 % (FLUSH) 0.9 %
10 SYRINGE (ML) INJECTION PRN
Status: DISCONTINUED | OUTPATIENT
Start: 2018-12-14 | End: 2018-12-15 | Stop reason: HOSPADM

## 2018-12-14 RX ORDER — FLECAINIDE ACETATE 50 MG/1
50 TABLET ORAL 2 TIMES DAILY
Qty: 60 TABLET | Refills: 3 | Status: SHIPPED | OUTPATIENT
Start: 2018-12-14 | End: 2018-12-27

## 2018-12-14 RX ORDER — SODIUM CHLORIDE 9 MG/ML
INJECTION, SOLUTION INTRAVENOUS CONTINUOUS
Status: DISCONTINUED | OUTPATIENT
Start: 2018-12-14 | End: 2018-12-15 | Stop reason: HOSPADM

## 2018-12-14 RX ORDER — METOPROLOL SUCCINATE 50 MG/1
12.5 TABLET, EXTENDED RELEASE ORAL 2 TIMES DAILY
Qty: 60 TABLET | Refills: 4 | Status: SHIPPED | OUTPATIENT
Start: 2018-12-14 | End: 2018-12-17 | Stop reason: SDUPTHER

## 2018-12-14 RX ADMIN — Medication 500 UNITS: at 09:20

## 2018-12-14 RX ADMIN — IOHEXOL 50 ML: 240 INJECTION, SOLUTION INTRATHECAL; INTRAVASCULAR; INTRAVENOUS; ORAL at 08:58

## 2018-12-14 RX ADMIN — IOPAMIDOL 100 ML: 755 INJECTION, SOLUTION INTRAVENOUS at 08:58

## 2018-12-14 NOTE — PRE SEDATION
ophthalmic gel INSTILL 1 GTT MARIMAR QID 9/2/17   Historical Provider, MD     Coumadin Use Last 7 Days:  no  Antiplatelet drug therapy use last 7 days: no  Other anticoagulant use last 7 days: yes - Eliquis  Additional Medication Information:  n/a      Pre-Sedation Documentation and Exam:   I have personally completed a history, physical exam & review of systems for this patient (see notes).     Mallampati Airway Assessment:  Mallampati Class I - (soft palate, fauces, uvula & anterior/posterior tonsillar pillars are visible)    Prior History of Anesthesia Complications:   none    ASA Classification:  Class 3 - A patient with severe systemic disease that limits activity but is not incapacitating    Sedation/ Anesthesia Plan:   intravenous sedation    Medications Planned:   midazolam (Versed) intravenously, fentanyl intravenously and Methohexital    Patient is an appropriate candidate for plan of sedation: yes    Electronically signed by Tommy Mitchell MD on 12/14/2018 at 10:57 AM

## 2018-12-14 NOTE — TELEPHONE ENCOUNTER
Jose Ramon Quintana from [facility] called in stating he needed to speak to a nurse or Dr. Arcelia Moritz about Val Nila medication, he needs clarification on how it should be taken. You can reach Yuriy at [phone].

## 2018-12-14 NOTE — TELEPHONE ENCOUNTER
I called and spoke with pharmacist and advised that patient should take Metoprolol 12.5 mg BID per Dr. Elva Bamberger procedure note.

## 2018-12-15 ENCOUNTER — HOSPITAL ENCOUNTER (OUTPATIENT)
Dept: SLEEP CENTER | Age: 63
Discharge: HOME | End: 2018-12-15
Payer: COMMERCIAL

## 2018-12-15 DIAGNOSIS — G47.33 OSA (OBSTRUCTIVE SLEEP APNEA): ICD-10-CM

## 2018-12-15 PROCEDURE — 95811 POLYSOM 6/>YRS CPAP 4/> PARM: CPT

## 2018-12-15 PROCEDURE — 95811 POLYSOM 6/>YRS CPAP 4/> PARM: CPT | Performed by: PSYCHIATRY & NEUROLOGY

## 2018-12-17 DIAGNOSIS — I48.91 NEW ONSET A-FIB (HCC): ICD-10-CM

## 2018-12-17 RX ORDER — FUROSEMIDE 20 MG/1
20 TABLET ORAL DAILY
Qty: 30 TABLET | Refills: 5 | Status: SHIPPED | OUTPATIENT
Start: 2018-12-17 | End: 2018-12-21 | Stop reason: SDUPTHER

## 2018-12-17 RX ORDER — METOPROLOL SUCCINATE 50 MG/1
25 TABLET, EXTENDED RELEASE ORAL 2 TIMES DAILY
Qty: 60 TABLET | Refills: 5 | Status: SHIPPED | OUTPATIENT
Start: 2018-12-17 | End: 2018-12-21 | Stop reason: SDUPTHER

## 2018-12-19 ENCOUNTER — TELEPHONE (OUTPATIENT)
Dept: PULMONOLOGY | Age: 63
End: 2018-12-19

## 2018-12-20 DIAGNOSIS — I48.91 NEW ONSET A-FIB (HCC): ICD-10-CM

## 2018-12-21 RX ORDER — FUROSEMIDE 20 MG/1
20 TABLET ORAL DAILY
Qty: 90 TABLET | Refills: 3 | Status: SHIPPED | OUTPATIENT
Start: 2018-12-21 | End: 2019-07-22 | Stop reason: SDUPTHER

## 2018-12-21 RX ORDER — METOPROLOL SUCCINATE 50 MG/1
25 TABLET, EXTENDED RELEASE ORAL 2 TIMES DAILY
Qty: 90 TABLET | Refills: 3 | Status: SHIPPED | OUTPATIENT
Start: 2018-12-21 | End: 2018-12-26 | Stop reason: DRUGHIGH

## 2018-12-26 RX ORDER — METOPROLOL SUCCINATE 25 MG/1
12.5 TABLET, EXTENDED RELEASE ORAL 2 TIMES DAILY
COMMUNITY
End: 2019-07-22 | Stop reason: SDUPTHER

## 2018-12-27 ENCOUNTER — TELEPHONE (OUTPATIENT)
Dept: CARDIOLOGY CLINIC | Age: 63
End: 2018-12-27

## 2018-12-27 ENCOUNTER — HOSPITAL ENCOUNTER (OUTPATIENT)
Dept: CARDIAC CATH/INVASIVE PROCEDURES | Age: 63
Discharge: HOME | End: 2018-12-27
Payer: COMMERCIAL

## 2018-12-27 LAB
EKG ATRIAL RATE: 63 BPM
EKG DIAGNOSIS: NORMAL
EKG P AXIS: 51 DEGREES
EKG P-R INTERVAL: 162 MS
EKG Q-T INTERVAL: 438 MS
EKG QRS DURATION: 88 MS
EKG QTC CALCULATION (BAZETT): 448 MS
EKG R AXIS: 50 DEGREES
EKG T AXIS: 22 DEGREES
EKG VENTRICULAR RATE: 63 BPM

## 2018-12-27 PROCEDURE — 92960 CARDIOVERSION ELECTRIC EXT: CPT | Performed by: INTERNAL MEDICINE

## 2018-12-27 PROCEDURE — 92960 CARDIOVERSION ELECTRIC EXT: CPT | Performed by: FAMILY MEDICINE

## 2018-12-27 PROCEDURE — 93005 ELECTROCARDIOGRAM TRACING: CPT | Performed by: INTERNAL MEDICINE

## 2018-12-27 RX ORDER — SODIUM CHLORIDE 9 MG/ML
INJECTION, SOLUTION INTRAVENOUS CONTINUOUS
Status: DISCONTINUED | OUTPATIENT
Start: 2018-12-27 | End: 2018-12-28 | Stop reason: HOSPADM

## 2018-12-27 RX ORDER — SODIUM CHLORIDE 0.9 % (FLUSH) 0.9 %
10 SYRINGE (ML) INJECTION PRN
Status: DISCONTINUED | OUTPATIENT
Start: 2018-12-27 | End: 2018-12-28 | Stop reason: HOSPADM

## 2018-12-27 RX ORDER — FLECAINIDE ACETATE 50 MG/1
100 TABLET ORAL 2 TIMES DAILY
Qty: 60 TABLET | Refills: 3 | Status: SHIPPED | OUTPATIENT
Start: 2018-12-27 | End: 2019-01-02 | Stop reason: SDUPTHER

## 2018-12-27 RX ORDER — SODIUM CHLORIDE 0.9 % (FLUSH) 0.9 %
10 SYRINGE (ML) INJECTION EVERY 12 HOURS SCHEDULED
Status: DISCONTINUED | OUTPATIENT
Start: 2018-12-27 | End: 2018-12-28 | Stop reason: HOSPADM

## 2019-01-02 ENCOUNTER — TELEPHONE (OUTPATIENT)
Dept: CARDIOLOGY CLINIC | Age: 64
End: 2019-01-02

## 2019-01-02 RX ORDER — FLECAINIDE ACETATE 100 MG/1
100 TABLET ORAL 2 TIMES DAILY
Qty: 180 TABLET | Refills: 2 | Status: SHIPPED | OUTPATIENT
Start: 2019-01-02 | End: 2019-01-04 | Stop reason: SDUPTHER

## 2019-01-04 RX ORDER — FLECAINIDE ACETATE 100 MG/1
100 TABLET ORAL 2 TIMES DAILY
Qty: 60 TABLET | Refills: 0 | Status: SHIPPED | OUTPATIENT
Start: 2019-01-04 | End: 2019-07-22 | Stop reason: SDUPTHER

## 2019-01-14 ENCOUNTER — OFFICE VISIT (OUTPATIENT)
Dept: CARDIOLOGY CLINIC | Age: 64
End: 2019-01-14
Payer: COMMERCIAL

## 2019-01-14 VITALS
SYSTOLIC BLOOD PRESSURE: 136 MMHG | OXYGEN SATURATION: 98 % | BODY MASS INDEX: 39.34 KG/M2 | HEART RATE: 64 BPM | WEIGHT: 222 LBS | HEIGHT: 63 IN | DIASTOLIC BLOOD PRESSURE: 64 MMHG

## 2019-01-14 DIAGNOSIS — Z71.89 MEDICATION CARE PLAN DISCUSSED WITH PATIENT: ICD-10-CM

## 2019-01-14 DIAGNOSIS — I48.19 PERSISTENT ATRIAL FIBRILLATION (HCC): Primary | ICD-10-CM

## 2019-01-14 DIAGNOSIS — R42 DIZZINESS: ICD-10-CM

## 2019-01-14 PROCEDURE — 93000 ELECTROCARDIOGRAM COMPLETE: CPT | Performed by: INTERNAL MEDICINE

## 2019-01-14 PROCEDURE — 99214 OFFICE O/P EST MOD 30 MIN: CPT | Performed by: INTERNAL MEDICINE

## 2019-02-28 ENCOUNTER — TELEPHONE (OUTPATIENT)
Dept: FAMILY MEDICINE CLINIC | Age: 64
End: 2019-02-28

## 2019-02-28 DIAGNOSIS — E11.9 TYPE 2 DIABETES MELLITUS WITHOUT COMPLICATION, WITHOUT LONG-TERM CURRENT USE OF INSULIN (HCC): Primary | ICD-10-CM

## 2019-03-07 DIAGNOSIS — I48.0 PAF (PAROXYSMAL ATRIAL FIBRILLATION) (HCC): ICD-10-CM

## 2019-03-07 DIAGNOSIS — E11.9 TYPE 2 DIABETES MELLITUS WITHOUT COMPLICATION, WITHOUT LONG-TERM CURRENT USE OF INSULIN (HCC): ICD-10-CM

## 2019-03-07 LAB
A/G RATIO: 1.7 (ref 1.1–2.2)
ALBUMIN SERPL-MCNC: 3.8 G/DL (ref 3.4–5)
ALP BLD-CCNC: 114 U/L (ref 40–129)
ALT SERPL-CCNC: 37 U/L (ref 10–40)
ANION GAP SERPL CALCULATED.3IONS-SCNC: 13 MMOL/L (ref 3–16)
AST SERPL-CCNC: 31 U/L (ref 15–37)
BILIRUB SERPL-MCNC: 0.3 MG/DL (ref 0–1)
BUN BLDV-MCNC: 17 MG/DL (ref 7–20)
CALCIUM SERPL-MCNC: 8.9 MG/DL (ref 8.3–10.6)
CHLORIDE BLD-SCNC: 99 MMOL/L (ref 99–110)
CO2: 23 MMOL/L (ref 21–32)
CREAT SERPL-MCNC: 0.7 MG/DL (ref 0.6–1.2)
ESTIMATED AVERAGE GLUCOSE: 177.2 MG/DL
GFR AFRICAN AMERICAN: >60
GFR NON-AFRICAN AMERICAN: >60
GLOBULIN: 2.3 G/DL
GLUCOSE BLD-MCNC: 165 MG/DL (ref 70–99)
HBA1C MFR BLD: 7.8 %
POTASSIUM SERPL-SCNC: 4.1 MMOL/L (ref 3.5–5.1)
SODIUM BLD-SCNC: 135 MMOL/L (ref 136–145)
TOTAL PROTEIN: 6.1 G/DL (ref 6.4–8.2)

## 2019-03-08 ENCOUNTER — OFFICE VISIT (OUTPATIENT)
Dept: FAMILY MEDICINE CLINIC | Age: 64
End: 2019-03-08
Payer: COMMERCIAL

## 2019-03-08 ENCOUNTER — HOSPITAL ENCOUNTER (OUTPATIENT)
Dept: CT IMAGING | Age: 64
Discharge: HOME | End: 2019-03-08
Payer: COMMERCIAL

## 2019-03-08 VITALS
SYSTOLIC BLOOD PRESSURE: 122 MMHG | DIASTOLIC BLOOD PRESSURE: 72 MMHG | WEIGHT: 231 LBS | TEMPERATURE: 98 F | BODY MASS INDEX: 40.93 KG/M2 | HEART RATE: 64 BPM | HEIGHT: 63 IN

## 2019-03-08 DIAGNOSIS — I10 ESSENTIAL HYPERTENSION: Primary | ICD-10-CM

## 2019-03-08 DIAGNOSIS — C83.08 SMALL CELL B-CELL LYMPHOMA, LYMPH NODES OF MULTIPLE SITES (HCC): ICD-10-CM

## 2019-03-08 DIAGNOSIS — C83.08 SMALL CELL B-CELL LYMPHOMA OF LYMPH NODES OF MULTIPLE SITES (HCC): ICD-10-CM

## 2019-03-08 DIAGNOSIS — J06.9 ACUTE UPPER RESPIRATORY INFECTION, UNSPECIFIED: ICD-10-CM

## 2019-03-08 DIAGNOSIS — E78.2 MIXED HYPERLIPIDEMIA: ICD-10-CM

## 2019-03-08 DIAGNOSIS — E11.9 TYPE 2 DIABETES MELLITUS WITHOUT COMPLICATION, WITHOUT LONG-TERM CURRENT USE OF INSULIN (HCC): ICD-10-CM

## 2019-03-08 PROCEDURE — 99214 OFFICE O/P EST MOD 30 MIN: CPT | Performed by: FAMILY MEDICINE

## 2019-03-08 PROCEDURE — 74177 CT ABD & PELVIS W/CONTRAST: CPT

## 2019-03-08 PROCEDURE — 6360000004 HC RX CONTRAST MEDICATION: Performed by: INTERNAL MEDICINE

## 2019-03-08 PROCEDURE — 70491 CT SOFT TISSUE NECK W/DYE: CPT

## 2019-03-08 RX ORDER — NATEGLINIDE 60 MG/1
60 TABLET ORAL
Qty: 90 TABLET | Refills: 1 | Status: SHIPPED | OUTPATIENT
Start: 2019-03-08 | End: 2019-04-12 | Stop reason: SINTOL

## 2019-03-08 RX ORDER — POTASSIUM CHLORIDE 750 MG/1
20 TABLET, EXTENDED RELEASE ORAL 2 TIMES DAILY
Qty: 360 TABLET | Refills: 2 | Status: SHIPPED | OUTPATIENT
Start: 2019-03-08 | End: 2020-04-06 | Stop reason: SDUPTHER

## 2019-03-08 RX ADMIN — IOHEXOL 50 ML: 240 INJECTION, SOLUTION INTRATHECAL; INTRAVASCULAR; INTRAVENOUS; ORAL at 07:30

## 2019-03-08 RX ADMIN — IOVERSOL 100 ML: 678 INJECTION INTRA-ARTERIAL; INTRAVENOUS at 08:50

## 2019-03-08 ASSESSMENT — ENCOUNTER SYMPTOMS
CHEST TIGHTNESS: 0
SHORTNESS OF BREATH: 0
ABDOMINAL DISTENTION: 0
DIARRHEA: 0
BLOOD IN STOOL: 0
CONSTIPATION: 0
VOMITING: 0
NAUSEA: 0
ABDOMINAL PAIN: 0

## 2019-03-08 ASSESSMENT — PATIENT HEALTH QUESTIONNAIRE - PHQ9
1. LITTLE INTEREST OR PLEASURE IN DOING THINGS: 0
2. FEELING DOWN, DEPRESSED OR HOPELESS: 0
SUM OF ALL RESPONSES TO PHQ9 QUESTIONS 1 & 2: 0
SUM OF ALL RESPONSES TO PHQ QUESTIONS 1-9: 0
SUM OF ALL RESPONSES TO PHQ QUESTIONS 1-9: 0

## 2019-03-12 ENCOUNTER — TELEPHONE (OUTPATIENT)
Dept: FAMILY MEDICINE CLINIC | Age: 64
End: 2019-03-12

## 2019-03-18 ENCOUNTER — OFFICE VISIT (OUTPATIENT)
Dept: SLEEP MEDICINE | Age: 64
End: 2019-03-18
Payer: COMMERCIAL

## 2019-03-18 VITALS
OXYGEN SATURATION: 93 % | WEIGHT: 234 LBS | DIASTOLIC BLOOD PRESSURE: 82 MMHG | TEMPERATURE: 97.7 F | HEIGHT: 63 IN | SYSTOLIC BLOOD PRESSURE: 116 MMHG | BODY MASS INDEX: 41.46 KG/M2 | HEART RATE: 70 BPM | RESPIRATION RATE: 16 BRPM

## 2019-03-18 DIAGNOSIS — Z99.89 DEPENDENCE ON OTHER ENABLING MACHINES AND DEVICES: ICD-10-CM

## 2019-03-18 DIAGNOSIS — G47.33 OSA ON CPAP: Primary | ICD-10-CM

## 2019-03-18 DIAGNOSIS — Z99.89 OSA ON CPAP: Primary | ICD-10-CM

## 2019-03-18 PROCEDURE — 99213 OFFICE O/P EST LOW 20 MIN: CPT | Performed by: PSYCHIATRY & NEUROLOGY

## 2019-03-18 ASSESSMENT — SLEEP AND FATIGUE QUESTIONNAIRES
HOW LIKELY ARE YOU TO NOD OFF OR FALL ASLEEP IN A CAR, WHILE STOPPED FOR A FEW MINUTES IN TRAFFIC: 0
HOW LIKELY ARE YOU TO NOD OFF OR FALL ASLEEP WHEN YOU ARE A PASSENGER IN A CAR FOR AN HOUR WITHOUT A BREAK: 0
ESS TOTAL SCORE: 1
HOW LIKELY ARE YOU TO NOD OFF OR FALL ASLEEP WHILE LYING DOWN TO REST IN THE AFTERNOON WHEN CIRCUMSTANCES PERMIT: 0
HOW LIKELY ARE YOU TO NOD OFF OR FALL ASLEEP WHILE SITTING AND READING: 0
HOW LIKELY ARE YOU TO NOD OFF OR FALL ASLEEP WHILE SITTING AND TALKING TO SOMEONE: 0
HOW LIKELY ARE YOU TO NOD OFF OR FALL ASLEEP WHILE WATCHING TV: 1
HOW LIKELY ARE YOU TO NOD OFF OR FALL ASLEEP WHILE SITTING INACTIVE IN A PUBLIC PLACE: 0
HOW LIKELY ARE YOU TO NOD OFF OR FALL ASLEEP WHILE SITTING QUIETLY AFTER LUNCH WITHOUT ALCOHOL: 0

## 2019-03-18 ASSESSMENT — ENCOUNTER SYMPTOMS
EYES NEGATIVE: 1
GASTROINTESTINAL NEGATIVE: 1
ALLERGIC/IMMUNOLOGIC NEGATIVE: 1
CHOKING: 1
APNEA: 0

## 2019-03-28 ENCOUNTER — NURSE ONLY (OUTPATIENT)
Dept: FAMILY MEDICINE CLINIC | Age: 64
End: 2019-03-28
Payer: COMMERCIAL

## 2019-03-28 DIAGNOSIS — Z23 NEED FOR HEPATITIS A IMMUNIZATION: Primary | ICD-10-CM

## 2019-03-28 PROCEDURE — 90632 HEPA VACCINE ADULT IM: CPT | Performed by: FAMILY MEDICINE

## 2019-03-28 PROCEDURE — 90471 IMMUNIZATION ADMIN: CPT | Performed by: FAMILY MEDICINE

## 2019-04-10 ENCOUNTER — TELEPHONE (OUTPATIENT)
Dept: FAMILY MEDICINE CLINIC | Age: 64
End: 2019-04-10

## 2019-04-10 NOTE — TELEPHONE ENCOUNTER
Pt was prescribed nateglinide (STARLIX) 60 MG tablet       Pt is not like taking this pill - joints/muscles ache, it is not bring her sugars down a whole lot, short winded in the evening, cant climb stairs by the time she gets to the top she is short winded. Pt would like to know if she could be prescribed something different or do you want to see her for an apt. Pl advise.    [phone]

## 2019-04-12 ENCOUNTER — OFFICE VISIT (OUTPATIENT)
Dept: FAMILY MEDICINE CLINIC | Age: 64
End: 2019-04-12
Payer: COMMERCIAL

## 2019-04-12 VITALS
WEIGHT: 238 LBS | DIASTOLIC BLOOD PRESSURE: 82 MMHG | HEART RATE: 84 BPM | HEIGHT: 63 IN | BODY MASS INDEX: 42.17 KG/M2 | SYSTOLIC BLOOD PRESSURE: 126 MMHG | TEMPERATURE: 97.7 F

## 2019-04-12 DIAGNOSIS — E11.9 TYPE 2 DIABETES MELLITUS WITHOUT COMPLICATION, WITHOUT LONG-TERM CURRENT USE OF INSULIN (HCC): Primary | ICD-10-CM

## 2019-04-12 PROCEDURE — 99213 OFFICE O/P EST LOW 20 MIN: CPT | Performed by: FAMILY MEDICINE

## 2019-04-12 NOTE — PROGRESS NOTES
126/82  03/18/19 : 116/82  03/08/19 : 122/72          Review of Systems    Objective:   Physical Exam   Constitutional: She appears well-developed and well-nourished. No distress. Cardiovascular: Normal rate, regular rhythm and normal heart sounds. Exam reveals no gallop and no friction rub. No murmur heard. Pulses:       Dorsalis pedis pulses are 2+ on the right side, and 2+ on the left side. Traced edema at the ankles   Pulmonary/Chest: Effort normal and breath sounds normal. No accessory muscle usage. No tachypnea. No respiratory distress. She has no decreased breath sounds. She has no wheezes. She has no rhonchi. She has no rales. Lymphadenopathy:     She has no cervical adenopathy. Right: No supraclavicular adenopathy present. Left: No supraclavicular adenopathy present. Neurological: She is alert. She displays no tremor. Skin: Skin is warm, dry and intact. She is not diaphoretic. No cyanosis. No pallor. Nails show no clubbing. Psychiatric: She has a normal mood and affect. Her speech is normal.       Assessment:        Diagnosis Orders   1.  Type 2 diabetes mellitus without complication, without long-term current use of insulin (HCC)         Potential side effects of the medicine  She has not done the colon check and advised to have done  If well I may add amaryl every am  She will call me on monday      Plan:      Stay off of the starlix  Call me on Monday with update as I may add another medication at that time  See back in 2 months        Kandis Sever, MD

## 2019-04-12 NOTE — PATIENT INSTRUCTIONS
Stay off of the starlix  Call me on Monday with update as I may add another medication at that time  See back in 2 months

## 2019-04-15 ENCOUNTER — TELEPHONE (OUTPATIENT)
Dept: FAMILY MEDICINE CLINIC | Age: 64
End: 2019-04-15

## 2019-04-15 RX ORDER — METFORMIN HYDROCHLORIDE 500 MG/1
500 TABLET, EXTENDED RELEASE ORAL
Qty: 30 TABLET | Refills: 3 | Status: SHIPPED | OUTPATIENT
Start: 2019-04-15 | End: 2019-06-20 | Stop reason: DRUGHIGH

## 2019-04-15 NOTE — TELEPHONE ENCOUNTER
Pt calling with update on how she is feeling since stopping Starlix, she is feeling great since stopping. Her feet are not swollen anymore and she feels great, she is actually out shopping right now because she feels so good. Please let her know what you are going to call in for her.     Please advise, [phone]

## 2019-04-16 ENCOUNTER — TELEPHONE (OUTPATIENT)
Dept: FAMILY MEDICINE CLINIC | Age: 64
End: 2019-04-16

## 2019-04-16 NOTE — TELEPHONE ENCOUNTER
The other med is similar to starlix and could have same effect  If she can not use the metformin I will try it

## 2019-04-16 NOTE — TELEPHONE ENCOUNTER
Abdi Starr is asking why she is back on Metformin. She thought it was discussed and there was something else you could give her. Also she is asking for a 90 day supply sent to [facility].

## 2019-04-16 NOTE — TELEPHONE ENCOUNTER
[phone] Miguel Travis advised and states she will try with Metformin.    She states she will just  the 30 day supply that was already sent to local pharmacy to see how she does it on first.

## 2019-05-03 ENCOUNTER — HOSPITAL ENCOUNTER (OUTPATIENT)
Dept: CT IMAGING | Age: 64
Discharge: HOME | End: 2019-05-03
Payer: COMMERCIAL

## 2019-05-03 DIAGNOSIS — C83.08 SMALL CELL B-CELL LYMPHOMA, LYMPH NODES OF MULTIPLE SITES (HCC): ICD-10-CM

## 2019-05-03 LAB
CREAT SERPL-MCNC: 0.6 MG/DL (ref 0.6–1.2)
GFR AFRICAN AMERICAN: >60
GFR NON-AFRICAN AMERICAN: >60

## 2019-05-03 PROCEDURE — 70491 CT SOFT TISSUE NECK W/DYE: CPT

## 2019-05-03 PROCEDURE — 36415 COLL VENOUS BLD VENIPUNCTURE: CPT

## 2019-05-03 PROCEDURE — 82565 ASSAY OF CREATININE: CPT

## 2019-05-03 PROCEDURE — 74177 CT ABD & PELVIS W/CONTRAST: CPT

## 2019-05-03 PROCEDURE — 6360000004 HC RX CONTRAST MEDICATION: Performed by: INTERNAL MEDICINE

## 2019-05-03 RX ADMIN — IOPAMIDOL 125 ML: 755 INJECTION, SOLUTION INTRAVENOUS at 10:23

## 2019-05-03 RX ADMIN — IOHEXOL 50 ML: 240 INJECTION, SOLUTION INTRATHECAL; INTRAVASCULAR; INTRAVENOUS; ORAL at 09:08

## 2019-05-22 ENCOUNTER — TELEPHONE (OUTPATIENT)
Dept: FAMILY MEDICINE CLINIC | Age: 64
End: 2019-05-22

## 2019-05-23 RX ORDER — GLIMEPIRIDE 1 MG/1
1 TABLET ORAL EVERY MORNING
Qty: 30 TABLET | Refills: 1 | Status: SHIPPED | OUTPATIENT
Start: 2019-05-23 | End: 2019-05-28

## 2019-05-23 NOTE — TELEPHONE ENCOUNTER
[phone] - Romina Lamb advised and verbalized understanding. [employer] [facility].

## 2019-05-23 NOTE — TELEPHONE ENCOUNTER
I will send in new pill now  Where to?   Start the pill tomorrow am with breakfast  Continue the other  See me on Monday  Take fluids

## 2019-05-28 ENCOUNTER — OFFICE VISIT (OUTPATIENT)
Dept: FAMILY MEDICINE CLINIC | Age: 64
End: 2019-05-28
Payer: COMMERCIAL

## 2019-05-28 VITALS
HEIGHT: 63 IN | WEIGHT: 231 LBS | DIASTOLIC BLOOD PRESSURE: 80 MMHG | TEMPERATURE: 97.8 F | SYSTOLIC BLOOD PRESSURE: 118 MMHG | HEART RATE: 76 BPM | BODY MASS INDEX: 40.93 KG/M2

## 2019-05-28 DIAGNOSIS — E11.9 TYPE 2 DIABETES MELLITUS WITHOUT COMPLICATION, WITHOUT LONG-TERM CURRENT USE OF INSULIN (HCC): Primary | ICD-10-CM

## 2019-05-28 PROCEDURE — 99213 OFFICE O/P EST LOW 20 MIN: CPT | Performed by: FAMILY MEDICINE

## 2019-05-28 RX ORDER — GLIMEPIRIDE 1 MG/1
1 TABLET ORAL 2 TIMES DAILY WITH MEALS
Qty: 180 TABLET | Refills: 1 | Status: SHIPPED | OUTPATIENT
Start: 2019-05-28 | End: 2019-06-04 | Stop reason: DRUGHIGH

## 2019-05-28 NOTE — PROGRESS NOTES
Subjective:      Patient ID: Erin Araiza is a 66 y.o. female. Patient presents with: Follow-up: diabetes - sugars have been high at home     She  Has had some higher glucose   She is on steroid with the chemo  however with the med changes the numbers are down now to the 100's and 200's.   Numbers have dropped    She had a fever over the weekend  resolved  No cough no v urine is no pain no blood  No exposure  No sores on the feet  Her abdomen is fine no sx but she did have some lower abdomen pain during her last chemo but that resolved      YOB: 1955    Date of Visit:  5/28/2019     -- Penicillins -- Hives    Current Outpatient Medications:  glimepiride (AMARYL) 1 MG tablet, Take 1 tablet by mouth every morning, Disp: 30 tablet, Rfl: 1  metFORMIN (GLUCOPHAGE-XR) 500 MG extended release tablet, Take 1 tablet by mouth daily (with breakfast), Disp: 30 tablet, Rfl: 3  potassium chloride (KLOR-CON M) 10 MEQ extended release tablet, Take 2 tablets by mouth 2 times daily, Disp: 360 tablet, Rfl: 2  flecainide (TAMBOCOR) 100 MG tablet, Take 1 tablet by mouth 2 times daily, Disp: 60 tablet, Rfl: 0  metoprolol succinate (TOPROL XL) 25 MG extended release tablet, Take 12.5 mg by mouth 2 times daily , Disp: , Rfl:   apixaban (ELIQUIS) 5 MG TABS tablet, Take 1 tablet by mouth 2 times daily, Disp: 180 tablet, Rfl: 5  furosemide (LASIX) 20 MG tablet, Take 1 tablet by mouth daily, Disp: 90 tablet, Rfl: 3  loratadine (CLARITIN) 10 MG capsule, Take 10 mg by mouth as needed , Disp: , Rfl:   LOTEMAX 0.5 % ophthalmic gel, INSTILL 1 GTT MARIMAR QID, Disp: , Rfl: 4    No current facility-administered medications for this visit.       ---------------------------               05/28/19                      1423         ---------------------------   BP:          118/80         Site:    Left Upper Arm     Position:     Sitting        Cuff Size:   Large Adult                  Temp:   97.8 °F (36.6 °C)   TempSrc:      Oral          Weight: 231 lb (104.8 kg)   Height:   5' 3\" (1.6 m)    ---------------------------  Body mass index is 40.92 kg/m². Wt Readings from Last 3 Encounters:  05/28/19 : 231 lb (104.8 kg)  04/12/19 : 238 lb (108 kg)  03/18/19 : 234 lb (106.1 kg)    BP Readings from Last 3 Encounters:  05/28/19 : 118/80  04/12/19 : 126/82  03/18/19 : 116/82              Review of Systems    Objective:   Physical Exam   Constitutional: She appears well-developed and well-nourished. No distress. Cardiovascular: Normal rate, regular rhythm and normal heart sounds. Exam reveals no gallop and no friction rub. No murmur heard. Pulmonary/Chest: Effort normal and breath sounds normal. No accessory muscle usage. No tachypnea. No respiratory distress. She has no decreased breath sounds. She has no wheezes. She has no rhonchi. She has no rales. Lymphadenopathy:     She has no cervical adenopathy. Right: No supraclavicular adenopathy present. Left: No supraclavicular adenopathy present. Neurological: She is alert. Skin: Skin is warm, dry and intact. She is not diaphoretic. No pallor. Assessment:        Diagnosis Orders   1.  Type 2 diabetes mellitus without complication, without long-term current use of insulin ([facility])       Orders Placed This Encounter   Medications    glimepiride (AMARYL) 1 MG tablet     Sig: Take 1 tablet by mouth 2 times daily (with meals)     Dispense:  180 tablet     Refill:  1       She does use a cpap machine  She was concerned as the glucose numbers were higher and she wanted to consult      Plan:      Increase the glimepiride to one twice a day  If any further fevers do let Dr Yohan Kimbroughuse know  See back in 6 to 8 weeks        Monika Clark MD

## 2019-05-28 NOTE — PATIENT INSTRUCTIONS
Increase the glimepiride to one twice a day  If any further fevers do let Dr Ashley Montana know  See back in 6 to 8 weeks

## 2019-06-04 ENCOUNTER — TELEPHONE (OUTPATIENT)
Dept: FAMILY MEDICINE CLINIC | Age: 64
End: 2019-06-04

## 2019-06-04 RX ORDER — GLIMEPIRIDE 1 MG/1
2 TABLET ORAL 2 TIMES DAILY WITH MEALS
Qty: 180 TABLET | Refills: 1 | Status: SHIPPED
Start: 2019-06-04 | End: 2019-06-04 | Stop reason: SDUPTHER

## 2019-06-04 RX ORDER — GLIMEPIRIDE 2 MG/1
2 TABLET ORAL 2 TIMES DAILY WITH MEALS
Qty: 180 TABLET | Refills: 1 | Status: SHIPPED | OUTPATIENT
Start: 2019-06-04 | End: 2019-07-15 | Stop reason: DRUGHIGH

## 2019-06-04 NOTE — TELEPHONE ENCOUNTER
Pt called with BS numbers:  Before Breakfast    5-29      198  5-30      193  5-31      224  6-1        164  6-2        256  6-3        188  6-4        482 pt had her first treatment today.  She is has treatment for the next 7 days

## 2019-06-11 ENCOUNTER — TELEPHONE (OUTPATIENT)
Dept: FAMILY MEDICINE CLINIC | Age: 64
End: 2019-06-11

## 2019-06-11 NOTE — TELEPHONE ENCOUNTER
She was increased to 2 mg twice a day on 6/4/19 (see phone note).  correct    She had been taking 1 mg twice a day and glucose not coming down  She has been on steroids

## 2019-06-13 ENCOUNTER — TELEPHONE (OUTPATIENT)
Dept: FAMILY MEDICINE CLINIC | Age: 64
End: 2019-06-13

## 2019-06-13 NOTE — TELEPHONE ENCOUNTER
Pt calling in her blood sugar readings. 6/6     488  6/7     258  6/8     267  6/9     329     213 -6 pm on 6/9  6/10   193  6/11    215  6/12    234      Pt states she was still on steroids most of the week.    [phone]

## 2019-06-13 NOTE — TELEPHONE ENCOUNTER
Spoke with pt she is taking the medicine correctly she is also taking metformin and when she tested before breakfast it was 198. Pt want to stay with the same dosage and see what happens she is no longer on the steroids or antibiotic.

## 2019-06-20 ENCOUNTER — TELEPHONE (OUTPATIENT)
Dept: FAMILY MEDICINE CLINIC | Age: 64
End: 2019-06-20

## 2019-06-20 RX ORDER — METFORMIN HYDROCHLORIDE 500 MG/1
500 TABLET, EXTENDED RELEASE ORAL 2 TIMES DAILY WITH MEALS
Qty: 180 TABLET | Refills: 1 | Status: SHIPPED | OUTPATIENT
Start: 2019-06-20 | End: 2019-07-15 | Stop reason: DRUGHIGH

## 2019-06-20 RX ORDER — METFORMIN HYDROCHLORIDE 500 MG/1
500 TABLET, EXTENDED RELEASE ORAL 2 TIMES DAILY WITH MEALS
Qty: 30 TABLET | Refills: 3 | Status: SHIPPED
Start: 2019-06-20 | End: 2019-06-20

## 2019-06-20 NOTE — TELEPHONE ENCOUNTER
Pt calling with blood sugar readings:      6/13         198  6/14          205  6/15          206  6/16          198  6/17           207  6/18           200 morning     186 @ 5:00  6/19           232    Please advise, [phone]

## 2019-06-20 NOTE — TELEPHONE ENCOUNTER
[phone]  - Flory Solano advised and verbalized understanding. Flory Solano is asking if Glimepiride may cause yeast/bladder infections? Also she is requesting Metformin (with new directions) sent to [facility].

## 2019-06-20 NOTE — TELEPHONE ENCOUNTER
There are some diabetes meds that can cause that, glimepiride is not however  She is not on the med that would

## 2019-06-27 ENCOUNTER — TELEPHONE (OUTPATIENT)
Dept: FAMILY MEDICINE CLINIC | Age: 64
End: 2019-06-27

## 2019-06-27 NOTE — TELEPHONE ENCOUNTER
Pt called back and stated that she is having a hard time getting around, walking     Stiffness in legs/feet not sure if the medication is the cause.      Pl advise

## 2019-06-27 NOTE — TELEPHONE ENCOUNTER
Pt calling with blood sugars,    6/20       198  6/21       189  6/22       205  6/23       207  6/24       200  6/25       Forgot to check  6/26       192  6/27       157      Please advise, [phone]

## 2019-06-28 ENCOUNTER — OFFICE VISIT (OUTPATIENT)
Dept: FAMILY MEDICINE CLINIC | Age: 64
End: 2019-06-28
Payer: COMMERCIAL

## 2019-06-28 ENCOUNTER — TELEPHONE (OUTPATIENT)
Dept: CARDIOLOGY CLINIC | Age: 64
End: 2019-06-28

## 2019-06-28 VITALS
DIASTOLIC BLOOD PRESSURE: 70 MMHG | SYSTOLIC BLOOD PRESSURE: 112 MMHG | TEMPERATURE: 98 F | WEIGHT: 236 LBS | BODY MASS INDEX: 41.82 KG/M2 | HEIGHT: 63 IN

## 2019-06-28 DIAGNOSIS — M79.605 PAIN IN BOTH LOWER EXTREMITIES: Primary | ICD-10-CM

## 2019-06-28 DIAGNOSIS — M79.604 PAIN IN BOTH LOWER EXTREMITIES: Primary | ICD-10-CM

## 2019-06-28 PROCEDURE — 99213 OFFICE O/P EST LOW 20 MIN: CPT | Performed by: FAMILY MEDICINE

## 2019-06-28 NOTE — PROGRESS NOTES
Subjective:      Patient ID: Chris Valiente is a 66 y.o. female. Patient presents with:  Leg Pain: stiffness in legs and feet    This seems to have flared with the metformin  She has had pbs with metformin in past    She is also retaining some water  She is off steroid now for about 2 week  Her sugars are coming down now to less than 190    She has discomfort in legs     YOB: 1955    Date of Visit:  6/28/2019     -- Penicillins -- Hives    Current Outpatient Medications:  metFORMIN (GLUCOPHAGE-XR) 500 MG extended release tablet, Take 1 tablet by mouth 2 times daily (with meals), Disp: 180 tablet, Rfl: 1  glimepiride (AMARYL) 2 MG tablet, Take 1 tablet by mouth 2 times daily (with meals), Disp: 180 tablet, Rfl: 1  potassium chloride (KLOR-CON M) 10 MEQ extended release tablet, Take 2 tablets by mouth 2 times daily, Disp: 360 tablet, Rfl: 2  flecainide (TAMBOCOR) 100 MG tablet, Take 1 tablet by mouth 2 times daily, Disp: 60 tablet, Rfl: 0  metoprolol succinate (TOPROL XL) 25 MG extended release tablet, Take 12.5 mg by mouth 2 times daily , Disp: , Rfl:   apixaban (ELIQUIS) 5 MG TABS tablet, Take 1 tablet by mouth 2 times daily, Disp: 180 tablet, Rfl: 5  furosemide (LASIX) 20 MG tablet, Take 1 tablet by mouth daily, Disp: 90 tablet, Rfl: 3  loratadine (CLARITIN) 10 MG capsule, Take 10 mg by mouth as needed , Disp: , Rfl:   LOTEMAX 0.5 % ophthalmic gel, INSTILL 1 GTT MARIMAR QID, Disp: , Rfl: 4    No current facility-administered medications for this visit.       -------------------------              06/28/19                    1121        -------------------------   BP:         112/70        Site:   Left Upper Arm    Position:    Sitting       Cuff Size:  Large Adult     Temp:   98 °F (36.7 °C)   TempSrc:     Oral         Weight: 236 lb (107 kg)   Height:  5' 3\" (1.6 m)   -------------------------  Body mass index is 41.81 kg/m².      Wt Readings from Last 3 Encounters:  06/28/19 : 236 lb (107 kg)  05/28/19 : 231 lb (104.8 kg)  04/12/19 : 238 lb (108 kg)    BP Readings from Last 3 Encounters:  06/28/19 : 112/70  05/28/19 : 118/80  04/12/19 : 126/82                Review of Systems    Objective:   Physical Exam   Constitutional: She appears well-developed and well-nourished. No distress. Pulmonary/Chest: Effort normal and breath sounds normal. She has no decreased breath sounds. She has no wheezes. She has no rhonchi. She has no rales. Musculoskeletal:   Legs ok no lesion no res  She has trace edema in the lower legs   Skin: She is not diaphoretic. Assessment:        Diagnosis Orders   1.  Pain in both lower extremities         Possible reaction to metformin  Mild dependent edema      Plan:      Stop the metformin  We will consider one pill a day later  Call me on Monday and let me know how doing  For the next 3 days only take the lasix twice a day        Justo Richards MD

## 2019-06-28 NOTE — PATIENT INSTRUCTIONS
Stop the metformin  We will consider one pill a day later  Call me on Monday and let me know how doing  For the next 3 days only take the lasix twice a day

## 2019-07-01 ENCOUNTER — TELEPHONE (OUTPATIENT)
Dept: FAMILY MEDICINE CLINIC | Age: 64
End: 2019-07-01

## 2019-07-01 NOTE — TELEPHONE ENCOUNTER
Pt calling to let you know she feels better, she saw foot doctor on Friday and is on anti-inflammatory and wearing a boot.       Please advise, [phone]

## 2019-07-08 ENCOUNTER — TELEPHONE (OUTPATIENT)
Dept: FAMILY MEDICINE CLINIC | Age: 64
End: 2019-07-08

## 2019-07-08 DIAGNOSIS — I10 ESSENTIAL HYPERTENSION: Primary | ICD-10-CM

## 2019-07-08 DIAGNOSIS — E11.9 TYPE 2 DIABETES MELLITUS WITHOUT COMPLICATION, WITHOUT LONG-TERM CURRENT USE OF INSULIN (HCC): ICD-10-CM

## 2019-07-08 NOTE — TELEPHONE ENCOUNTER
Pt calling with Blood Sugars:    6/27      157  - had been off metformin for 4                           Days. 6/28      180  6/29      167  6/30      135  7/1        177  7/2        135  7/3         193   Started taking 1 a day  7/4         164  7/5         169  7/6         129  7/7         179  7/8          156      When does she need to be seen again.     Please advise, [phone]

## 2019-07-12 DIAGNOSIS — I10 ESSENTIAL HYPERTENSION: ICD-10-CM

## 2019-07-12 DIAGNOSIS — E11.9 TYPE 2 DIABETES MELLITUS WITHOUT COMPLICATION, WITHOUT LONG-TERM CURRENT USE OF INSULIN (HCC): ICD-10-CM

## 2019-07-12 LAB
ANION GAP SERPL CALCULATED.3IONS-SCNC: 14 MMOL/L (ref 3–16)
BUN BLDV-MCNC: 22 MG/DL (ref 7–20)
CALCIUM SERPL-MCNC: 9.9 MG/DL (ref 8.3–10.6)
CHLORIDE BLD-SCNC: 104 MMOL/L (ref 99–110)
CO2: 23 MMOL/L (ref 21–32)
CREAT SERPL-MCNC: 0.8 MG/DL (ref 0.6–1.2)
ESTIMATED AVERAGE GLUCOSE: 191.5 MG/DL
GFR AFRICAN AMERICAN: >60
GFR NON-AFRICAN AMERICAN: >60
GLUCOSE BLD-MCNC: 152 MG/DL (ref 70–99)
HBA1C MFR BLD: 8.3 %
POTASSIUM SERPL-SCNC: 4.5 MMOL/L (ref 3.5–5.1)
SODIUM BLD-SCNC: 141 MMOL/L (ref 136–145)

## 2019-07-15 ENCOUNTER — OFFICE VISIT (OUTPATIENT)
Dept: FAMILY MEDICINE CLINIC | Age: 64
End: 2019-07-15
Payer: COMMERCIAL

## 2019-07-15 VITALS
SYSTOLIC BLOOD PRESSURE: 118 MMHG | TEMPERATURE: 98.1 F | WEIGHT: 236 LBS | HEART RATE: 68 BPM | DIASTOLIC BLOOD PRESSURE: 74 MMHG | HEIGHT: 63 IN | BODY MASS INDEX: 41.82 KG/M2

## 2019-07-15 DIAGNOSIS — E11.9 TYPE 2 DIABETES MELLITUS WITHOUT COMPLICATION, WITHOUT LONG-TERM CURRENT USE OF INSULIN (HCC): Primary | ICD-10-CM

## 2019-07-15 PROCEDURE — 99213 OFFICE O/P EST LOW 20 MIN: CPT | Performed by: FAMILY MEDICINE

## 2019-07-15 RX ORDER — METFORMIN HYDROCHLORIDE 500 MG/1
500 TABLET, EXTENDED RELEASE ORAL
Qty: 180 TABLET | Refills: 1 | Status: ON HOLD
Start: 2019-07-15 | End: 2019-12-09 | Stop reason: HOSPADM

## 2019-07-15 RX ORDER — GLIMEPIRIDE 2 MG/1
3 TABLET ORAL 2 TIMES DAILY WITH MEALS
Qty: 180 TABLET | Refills: 1 | Status: SHIPPED
Start: 2019-07-15 | End: 2019-07-30

## 2019-07-18 NOTE — PROGRESS NOTES
to my treatment were discussed. This note was scribed in the presence of Erum Mcclellan MD by Aaron Grace RN. The scribe's documentation has been prepared under my direction and personally reviewed by me in its entirety. I confirm that the note above accurately reflects all work, physical examination, the discussion of treatments and procedures, and medical decision making performed by me.     Erum Mcclellan MD, MS, [facility], [facility]  Cardiac Electrophysiology  [address]  [address] [facility], [address] [place] [address]  [facility] [address]  [phone]

## 2019-07-22 ENCOUNTER — OFFICE VISIT (OUTPATIENT)
Dept: CARDIOLOGY CLINIC | Age: 64
End: 2019-07-22
Payer: COMMERCIAL

## 2019-07-22 VITALS
HEIGHT: 63 IN | SYSTOLIC BLOOD PRESSURE: 122 MMHG | WEIGHT: 232 LBS | DIASTOLIC BLOOD PRESSURE: 68 MMHG | BODY MASS INDEX: 41.11 KG/M2 | OXYGEN SATURATION: 98 % | HEART RATE: 76 BPM

## 2019-07-22 DIAGNOSIS — I48.0 PAF (PAROXYSMAL ATRIAL FIBRILLATION) (HCC): Primary | ICD-10-CM

## 2019-07-22 DIAGNOSIS — G47.33 OSA ON CPAP: ICD-10-CM

## 2019-07-22 DIAGNOSIS — Z99.89 OSA ON CPAP: ICD-10-CM

## 2019-07-22 DIAGNOSIS — I48.19 PERSISTENT ATRIAL FIBRILLATION (HCC): ICD-10-CM

## 2019-07-22 PROCEDURE — 99214 OFFICE O/P EST MOD 30 MIN: CPT | Performed by: INTERNAL MEDICINE

## 2019-07-22 PROCEDURE — 93000 ELECTROCARDIOGRAM COMPLETE: CPT | Performed by: INTERNAL MEDICINE

## 2019-07-22 RX ORDER — METOPROLOL SUCCINATE 25 MG/1
12.5 TABLET, EXTENDED RELEASE ORAL 2 TIMES DAILY
Qty: 90 TABLET | Refills: 3 | Status: ON HOLD | OUTPATIENT
Start: 2019-07-22 | End: 2019-12-09 | Stop reason: SDUPTHER

## 2019-07-22 RX ORDER — FLECAINIDE ACETATE 100 MG/1
100 TABLET ORAL 2 TIMES DAILY
Qty: 180 TABLET | Refills: 3 | Status: SHIPPED | OUTPATIENT
Start: 2019-07-22 | End: 2020-08-17 | Stop reason: SDUPTHER

## 2019-07-22 RX ORDER — FUROSEMIDE 20 MG/1
20 TABLET ORAL DAILY
Qty: 90 TABLET | Refills: 3 | Status: ON HOLD | OUTPATIENT
Start: 2019-07-22 | End: 2019-12-09 | Stop reason: SDUPTHER

## 2019-07-22 NOTE — PATIENT INSTRUCTIONS
Patient Education        Heart-Healthy Diet: Care Instructions  Your Care Instructions    A heart-healthy diet has lots of vegetables, fruits, nuts, beans, and whole grains, and is low in salt. It limits foods that are high in saturated fat, such as meats, cheeses, and fried foods. It may be hard to change your diet, but even small changes can lower your risk of heart attack and heart disease. Follow-up care is a key part of your treatment and safety. Be sure to make and go to all appointments, and call your doctor if you are having problems. It's also a good idea to know your test results and keep a list of the medicines you take. How can you care for yourself at home? Watch your portions  · Learn what a serving is. A \"serving\" and a \"portion\" are not always the same thing. Make sure that you are not eating larger portions than are recommended. For example, a serving of pasta is ½ cup. A serving size of meat is 2 to 3 ounces. A 3-ounce serving is about the size of a deck of cards. Measure serving sizes until you are good at [place]" them. Keep in mind that restaurants often serve portions that are 2 or 3 times the size of one serving. · To keep your energy level up and keep you from feeling hungry, eat often but in smaller portions. · Eat only the number of calories you need to stay at a healthy weight. If you need to lose weight, eat fewer calories than your body burns (through exercise and other physical activity). Eat more fruits and vegetables  · Eat a variety of fruit and vegetables every day. Dark green, deep orange, red, or yellow fruits and vegetables are especially good for you. Examples include spinach, carrots, peaches, and berries. · Keep carrots, celery, and other veggies handy for snacks. Buy fruit that is in season and store it where you can see it so that you will be tempted to eat it. · Cook dishes that have a lot of veggies in them, such as stir-fries and soups.   Limit saturated and

## 2019-07-25 ENCOUNTER — HOSPITAL ENCOUNTER (OUTPATIENT)
Dept: CT IMAGING | Age: 64
Discharge: HOME | End: 2019-07-25
Payer: COMMERCIAL

## 2019-07-25 DIAGNOSIS — C83.08 SMALL CELL B-CELL LYMPHOMA, LYMPH NODES OF MULTIPLE SITES (HCC): ICD-10-CM

## 2019-07-25 PROCEDURE — 74177 CT ABD & PELVIS W/CONTRAST: CPT

## 2019-07-25 PROCEDURE — 70491 CT SOFT TISSUE NECK W/DYE: CPT

## 2019-07-25 PROCEDURE — 6360000004 HC RX CONTRAST MEDICATION: Performed by: INTERNAL MEDICINE

## 2019-07-25 RX ADMIN — IOPAMIDOL 50 ML: 755 INJECTION, SOLUTION INTRAVENOUS at 09:18

## 2019-07-25 RX ADMIN — IOHEXOL 50 ML: 240 INJECTION, SOLUTION INTRATHECAL; INTRAVASCULAR; INTRAVENOUS; ORAL at 07:52

## 2019-07-25 RX ADMIN — IOPAMIDOL 75 ML: 755 INJECTION, SOLUTION INTRAVENOUS at 09:25

## 2019-07-30 ENCOUNTER — TELEPHONE (OUTPATIENT)
Dept: FAMILY MEDICINE CLINIC | Age: 64
End: 2019-07-30

## 2019-07-30 RX ORDER — GLIMEPIRIDE 2 MG/1
3 TABLET ORAL
Qty: 90 TABLET | Refills: 0 | Status: ON HOLD | OUTPATIENT
Start: 2019-07-30 | End: 2019-09-12 | Stop reason: HOSPADM

## 2019-07-30 RX ORDER — GLIMEPIRIDE 2 MG/1
3 TABLET ORAL 2 TIMES DAILY WITH MEALS
Qty: 270 TABLET | Refills: 1 | Status: SHIPPED | OUTPATIENT
Start: 2019-07-30 | End: 2020-02-10

## 2019-07-30 RX ORDER — GLIMEPIRIDE 2 MG/1
3 TABLET ORAL 2 TIMES DAILY WITH MEALS
Qty: 270 TABLET | Refills: 1 | Status: CANCELLED | OUTPATIENT
Start: 2019-07-30

## 2019-07-30 NOTE — TELEPHONE ENCOUNTER
Pt has been taking glimepiride  1 1/2 pills twice a day. If this is not correct she needs a change in direction script to be sent to [facility]. Pt would like to know today if she has been taking incorrectly.     Please [facility],[phone]

## 2019-07-30 NOTE — TELEPHONE ENCOUNTER
Lorene Perdomo is requesting a 30 day supply be sent to [facility] and [facility] as well. She will be out before mail in supply gets to her.

## 2019-08-01 ENCOUNTER — TELEPHONE (OUTPATIENT)
Dept: FAMILY MEDICINE CLINIC | Age: 64
End: 2019-08-01

## 2019-08-05 ENCOUNTER — HOSPITAL ENCOUNTER (OUTPATIENT)
Dept: CT IMAGING | Age: 64
Discharge: HOME | End: 2019-08-05
Payer: COMMERCIAL

## 2019-08-05 VITALS
HEART RATE: 54 BPM | OXYGEN SATURATION: 98 % | DIASTOLIC BLOOD PRESSURE: 69 MMHG | HEIGHT: 63 IN | WEIGHT: 235.01 LBS | TEMPERATURE: 97.6 F | SYSTOLIC BLOOD PRESSURE: 115 MMHG | RESPIRATION RATE: 15 BRPM | BODY MASS INDEX: 41.64 KG/M2

## 2019-08-05 DIAGNOSIS — C83.08 SMALL B-CELL LYMPHOMA OF LYMPH NODES OF MULTIPLE REGIONS (HCC): ICD-10-CM

## 2019-08-05 LAB
GLUCOSE BLD-MCNC: 123 MG/DL (ref 70–99)
HCT VFR BLD CALC: 36.7 % (ref 36–48)
HEMOGLOBIN: 11.9 G/DL (ref 12–16)
INR BLD: 1.01 (ref 0.86–1.14)
MCH RBC QN AUTO: 29.9 PG (ref 26–34)
MCHC RBC AUTO-ENTMCNC: 32.5 G/DL (ref 31–36)
MCV RBC AUTO: 92.1 FL (ref 80–100)
PDW BLD-RTO: 18.2 % (ref 12.4–15.4)
PERFORMED ON: ABNORMAL
PLATELET # BLD: 242 K/UL (ref 135–450)
PMV BLD AUTO: 9.8 FL (ref 5–10.5)
PROTHROMBIN TIME: 11.5 SEC (ref 9.8–13)
RBC # BLD: 3.99 M/UL (ref 4–5.2)
WBC # BLD: 6.6 K/UL (ref 4–11)

## 2019-08-05 PROCEDURE — 88342 IMHCHEM/IMCYTCHM 1ST ANTB: CPT

## 2019-08-05 PROCEDURE — 6360000002 HC RX W HCPCS: Performed by: RADIOLOGY

## 2019-08-05 PROCEDURE — 7100000011 HC PHASE II RECOVERY - ADDTL 15 MIN

## 2019-08-05 PROCEDURE — 88333 PATH CONSLTJ SURG CYTO XM 1: CPT

## 2019-08-05 PROCEDURE — 7100000010 HC PHASE II RECOVERY - FIRST 15 MIN

## 2019-08-05 PROCEDURE — 88341 IMHCHEM/IMCYTCHM EA ADD ANTB: CPT

## 2019-08-05 PROCEDURE — 36415 COLL VENOUS BLD VENIPUNCTURE: CPT

## 2019-08-05 PROCEDURE — 85027 COMPLETE CBC AUTOMATED: CPT

## 2019-08-05 PROCEDURE — 77012 CT SCAN FOR NEEDLE BIOPSY: CPT

## 2019-08-05 PROCEDURE — 88305 TISSUE EXAM BY PATHOLOGIST: CPT

## 2019-08-05 PROCEDURE — 88185 FLOWCYTOMETRY/TC ADD-ON: CPT

## 2019-08-05 PROCEDURE — 99152 MOD SED SAME PHYS/QHP 5/>YRS: CPT

## 2019-08-05 PROCEDURE — 88184 FLOWCYTOMETRY/ TC 1 MARKER: CPT

## 2019-08-05 PROCEDURE — 85610 PROTHROMBIN TIME: CPT

## 2019-08-05 PROCEDURE — 88360 TUMOR IMMUNOHISTOCHEM/MANUAL: CPT

## 2019-08-05 RX ORDER — ONDANSETRON 2 MG/ML
4 INJECTION INTRAMUSCULAR; INTRAVENOUS EVERY 8 HOURS PRN
Status: DISCONTINUED | OUTPATIENT
Start: 2019-08-05 | End: 2019-08-06 | Stop reason: HOSPADM

## 2019-08-05 RX ORDER — ACETAMINOPHEN 325 MG/1
650 TABLET ORAL EVERY 4 HOURS PRN
Status: DISCONTINUED | OUTPATIENT
Start: 2019-08-05 | End: 2019-08-06 | Stop reason: HOSPADM

## 2019-08-05 RX ORDER — FENTANYL CITRATE 50 UG/ML
INJECTION, SOLUTION INTRAMUSCULAR; INTRAVENOUS DAILY PRN
Status: COMPLETED | OUTPATIENT
Start: 2019-08-05 | End: 2019-08-05

## 2019-08-05 RX ORDER — MIDAZOLAM HYDROCHLORIDE 1 MG/ML
INJECTION INTRAMUSCULAR; INTRAVENOUS DAILY PRN
Status: COMPLETED | OUTPATIENT
Start: 2019-08-05 | End: 2019-08-05

## 2019-08-05 RX ADMIN — MIDAZOLAM 1 MG: 1 INJECTION INTRAMUSCULAR; INTRAVENOUS at 10:20

## 2019-08-05 RX ADMIN — FENTANYL CITRATE 25 MCG: 50 INJECTION INTRAMUSCULAR; INTRAVENOUS at 10:32

## 2019-08-05 RX ADMIN — FENTANYL CITRATE 50 MCG: 50 INJECTION INTRAMUSCULAR; INTRAVENOUS at 10:15

## 2019-08-05 RX ADMIN — MIDAZOLAM 1 MG: 1 INJECTION INTRAMUSCULAR; INTRAVENOUS at 10:14

## 2019-08-05 ASSESSMENT — PAIN SCALES - GENERAL
PAINLEVEL_OUTOF10: 0
PAINLEVEL_OUTOF10: 0

## 2019-08-05 ASSESSMENT — PAIN - FUNCTIONAL ASSESSMENT: PAIN_FUNCTIONAL_ASSESSMENT: 0-10

## 2019-08-05 NOTE — BRIEF OP NOTE
Brief Postoperative Note    Annette Ornelas  YOB: 1955  [id number]    Pre-operative Diagnosis: lymphoma    Post-operative Diagnosis: Same    Procedure: CT guided mesenteric mass biopsy    Anesthesia: Moderate Sedation    Surgeons/Assistants: Dr. Ricco Schaffer    Estimated Blood Loss: less than 1ml     Complications: None    Specimens: Was Obtained: mesenteric mass    Findings: 4 white core tissue biopsy specimens    Electronically signed by Josy Kenyon MD on 8/5/2019 at 10:36 AM

## 2019-08-05 NOTE — PRE SEDATION
Sedation Pre-Procedure Note    Patient Name: James Costa   YOB: 1955  Room/Bed: Room/bed info not found  Medical Record Number: [id number]  Date: 2019   Time: 8:51 AM       Indication:  Lymphoma with enlarging mesenteric mass    Consent: I have discussed with the patient and/or the patient representative the indication, alternatives, and the possible risks and/or complications of the planned procedure and the anesthesia methods. The patient and/or patient representative appear to understand and agree to proceed. Vital Signs:   Vitals:    19 0842   BP: 135/81   Pulse: 62   Resp: 16   SpO2: 97%       Past Medical History:   has a past medical history of Cancer ([facility] [address] Hypertension, and Melanoma in situ of back ([facility] [address] Past Surgical History:   has a past surgical history that includes Tubal ligation; Cholecystectomy (); Endometrial ablation (); Breast cyst aspiration; Colonoscopy (9/10/12); Gallbladder surgery; fine needle aspiration;  section; Ovary removal (Left, 2018); and Colonoscopy (10/09/2017). Medications:   Scheduled Meds:   Continuous Infusions:   PRN Meds:   Home Meds:   Prior to Admission medications    Medication Sig Start Date End Date Taking?  Authorizing Provider   glimepiride (AMARYL) 2 MG tablet Take 1.5 tablets by mouth 2 times daily (with meals) 19   Montez Hubbard MD   glimepiride (AMARYL) 2 MG tablet Take 1.5 tablets by mouth 2 times daily (before meals) 19   Montez Hubbard MD   metoprolol succinate (TOPROL XL) 25 MG extended release tablet Take 0.5 tablets by mouth 2 times daily 19   Ariana Martínez MD   flecainide [facility]) 100 MG tablet Take 1 tablet by mouth 2 times daily 19   Ariana Martínez MD   apixaban Jessica Estrella) 5 MG TABS tablet Take 1 tablet by mouth 2 times daily 19   Ariana Martínez MD   furosemide (LASIX) 20 MG tablet Take 1 tablet by mouth daily 19   Ariana Martínez MD   metFORMIN (GLUCOPHAGE-XR) 500 MG

## 2019-08-12 ENCOUNTER — HOSPITAL ENCOUNTER (OUTPATIENT)
Dept: ULTRASOUND IMAGING | Age: 64
Discharge: HOME | End: 2019-08-12
Payer: COMMERCIAL

## 2019-08-12 DIAGNOSIS — C83.30 DIFFUSE LARGE B-CELL LYMPHOMA, UNSPECIFIED BODY REGION (HCC): ICD-10-CM

## 2019-08-12 DIAGNOSIS — E04.1 THYROID NODULE: ICD-10-CM

## 2019-08-12 PROCEDURE — 76536 US EXAM OF HEAD AND NECK: CPT

## 2019-08-26 ENCOUNTER — HOSPITAL ENCOUNTER (OUTPATIENT)
Dept: ULTRASOUND IMAGING | Age: 64
Discharge: HOME | End: 2019-08-26
Payer: COMMERCIAL

## 2019-08-26 DIAGNOSIS — E04.1 LEFT THYROID NODULE: ICD-10-CM

## 2019-08-26 DIAGNOSIS — E04.1 RIGHT THYROID NODULE: ICD-10-CM

## 2019-08-26 PROCEDURE — 76942 ECHO GUIDE FOR BIOPSY: CPT

## 2019-08-26 PROCEDURE — 2709999900 US BIOPSY THYROID

## 2019-08-26 PROCEDURE — 88173 CYTOPATH EVAL FNA REPORT: CPT

## 2019-08-26 PROCEDURE — 88305 TISSUE EXAM BY PATHOLOGIST: CPT

## 2019-08-26 NOTE — BRIEF OP NOTE
Brief Postoperative Note  ______________________________________________________________    Patient: Di Duty  YOB: 1955  MRN: [id number]  Date of Procedure:     Pre-Op Diagnosis: Bilateral thyroid nodules     Post-Op Diagnosis: Same       US FNA of bilateral thyroid nodules     Anesthesia: Local anesthesia    Kristian Raymond DO     Estimated Blood Loss (mL): less than 50 mL     Complications: None    Specimens:   Four 22 gauge FNA samples obtained from right and left thyroid nodules under US guidance     Findings:   Four 22 gauge FNA samples obtained from right and left thyroid nodules under US guidance     Rashid Steve MD  Date: 8/26/2019  Time: 8:41 AM

## 2019-09-09 ENCOUNTER — APPOINTMENT (OUTPATIENT)
Dept: GENERAL RADIOLOGY | Age: 64
DRG: 847 | End: 2019-09-09
Attending: INTERNAL MEDICINE
Payer: COMMERCIAL

## 2019-09-09 ENCOUNTER — HOSPITAL ENCOUNTER (INPATIENT)
Age: 64
LOS: 3 days | Discharge: HOME | DRG: 847 | End: 2019-09-12
Attending: INTERNAL MEDICINE | Admitting: INTERNAL MEDICINE
Payer: COMMERCIAL

## 2019-09-09 PROBLEM — C85.10 B-CELL LYMPHOMA (HCC): Status: ACTIVE | Noted: 2019-09-09

## 2019-09-09 LAB
BILIRUBIN URINE: NEGATIVE
BLOOD, URINE: NEGATIVE
CLARITY: CLEAR
COLOR: YELLOW
EKG ATRIAL RATE: 60 BPM
EKG DIAGNOSIS: NORMAL
EKG P AXIS: 61 DEGREES
EKG P-R INTERVAL: 168 MS
EKG Q-T INTERVAL: 448 MS
EKG QRS DURATION: 96 MS
EKG QTC CALCULATION (BAZETT): 448 MS
EKG R AXIS: 49 DEGREES
EKG T AXIS: 50 DEGREES
EKG VENTRICULAR RATE: 60 BPM
EPITHELIAL CELLS, UA: NORMAL /HPF
GLUCOSE URINE: >=1000 MG/DL
KETONES, URINE: NEGATIVE MG/DL
LEUKOCYTE ESTERASE, URINE: ABNORMAL
MICROSCOPIC EXAMINATION: YES
NITRITE, URINE: NEGATIVE
PH UA: 6 (ref 5–8)
PROTEIN UA: NEGATIVE MG/DL
RBC UA: NORMAL /HPF (ref 0–2)
SPECIFIC GRAVITY UA: <=1.005 (ref 1–1.03)
URINE TYPE: ABNORMAL
UROBILINOGEN, URINE: 0.2 E.U./DL
WBC UA: NORMAL /HPF (ref 0–5)

## 2019-09-09 PROCEDURE — 3E03305 INTRODUCTION OF OTHER ANTINEOPLASTIC INTO PERIPHERAL VEIN, PERCUTANEOUS APPROACH: ICD-10-PCS | Performed by: INTERNAL MEDICINE

## 2019-09-09 PROCEDURE — 71046 X-RAY EXAM CHEST 2 VIEWS: CPT

## 2019-09-09 PROCEDURE — 96417 CHEMO IV INFUS EACH ADDL SEQ: CPT

## 2019-09-09 PROCEDURE — 2580000003 HC RX 258: Performed by: NURSE PRACTITIONER

## 2019-09-09 PROCEDURE — 93010 ELECTROCARDIOGRAM REPORT: CPT | Performed by: INTERNAL MEDICINE

## 2019-09-09 PROCEDURE — 81001 URINALYSIS AUTO W/SCOPE: CPT

## 2019-09-09 PROCEDURE — 2060000000 HC ICU INTERMEDIATE R&B

## 2019-09-09 PROCEDURE — 6360000002 HC RX W HCPCS: Performed by: INTERNAL MEDICINE

## 2019-09-09 PROCEDURE — 6370000000 HC RX 637 (ALT 250 FOR IP): Performed by: INTERNAL MEDICINE

## 2019-09-09 PROCEDURE — 93005 ELECTROCARDIOGRAM TRACING: CPT | Performed by: NURSE PRACTITIONER

## 2019-09-09 PROCEDURE — 96415 CHEMO IV INFUSION ADDL HR: CPT

## 2019-09-09 PROCEDURE — 87081 CULTURE SCREEN ONLY: CPT

## 2019-09-09 PROCEDURE — 6370000000 HC RX 637 (ALT 250 FOR IP): Performed by: NURSE PRACTITIONER

## 2019-09-09 PROCEDURE — 2580000003 HC RX 258: Performed by: INTERNAL MEDICINE

## 2019-09-09 RX ORDER — DEXAMETHASONE 4 MG/1
20 TABLET ORAL ONCE
Status: COMPLETED | OUTPATIENT
Start: 2019-09-11 | End: 2019-09-11

## 2019-09-09 RX ORDER — DEXAMETHASONE 4 MG/1
20 TABLET ORAL ONCE
Status: COMPLETED | OUTPATIENT
Start: 2019-09-10 | End: 2019-09-10

## 2019-09-09 RX ORDER — ONDANSETRON HYDROCHLORIDE 8 MG/1
24 TABLET, FILM COATED ORAL ONCE
Status: COMPLETED | OUTPATIENT
Start: 2019-09-11 | End: 2019-09-11

## 2019-09-09 RX ORDER — DEXTROSE MONOHYDRATE 50 MG/ML
100 INJECTION, SOLUTION INTRAVENOUS PRN
Status: DISCONTINUED | OUTPATIENT
Start: 2019-09-09 | End: 2019-09-12 | Stop reason: HOSPADM

## 2019-09-09 RX ORDER — PROCHLORPERAZINE MALEATE 10 MG
10 TABLET ORAL EVERY 4 HOURS PRN
Status: DISCONTINUED | OUTPATIENT
Start: 2019-09-09 | End: 2019-09-12 | Stop reason: HOSPADM

## 2019-09-09 RX ORDER — DEXTROSE MONOHYDRATE 25 G/50ML
12.5 INJECTION, SOLUTION INTRAVENOUS PRN
Status: DISCONTINUED | OUTPATIENT
Start: 2019-09-09 | End: 2019-09-12 | Stop reason: HOSPADM

## 2019-09-09 RX ORDER — DEXTROSE AND SODIUM CHLORIDE 5; .45 G/100ML; G/100ML
INJECTION, SOLUTION INTRAVENOUS CONTINUOUS
Status: DISCONTINUED | OUTPATIENT
Start: 2019-09-09 | End: 2019-09-12

## 2019-09-09 RX ORDER — SODIUM CHLORIDE 0.9 % (FLUSH) 0.9 %
10 SYRINGE (ML) INJECTION EVERY 12 HOURS SCHEDULED
Status: DISCONTINUED | OUTPATIENT
Start: 2019-09-09 | End: 2019-09-12 | Stop reason: HOSPADM

## 2019-09-09 RX ORDER — FLECAINIDE ACETATE 50 MG/1
100 TABLET ORAL 2 TIMES DAILY
Status: DISCONTINUED | OUTPATIENT
Start: 2019-09-09 | End: 2019-09-12 | Stop reason: HOSPADM

## 2019-09-09 RX ORDER — MAGNESIUM SULFATE IN WATER 40 MG/ML
4 INJECTION, SOLUTION INTRAVENOUS PRN
Status: DISCONTINUED | OUTPATIENT
Start: 2019-09-09 | End: 2019-09-12 | Stop reason: HOSPADM

## 2019-09-09 RX ORDER — PROCHLORPERAZINE EDISYLATE 5 MG/ML
10 INJECTION INTRAMUSCULAR; INTRAVENOUS EVERY 4 HOURS PRN
Status: DISCONTINUED | OUTPATIENT
Start: 2019-09-09 | End: 2019-09-12 | Stop reason: HOSPADM

## 2019-09-09 RX ORDER — ACETAMINOPHEN 325 MG/1
650 TABLET ORAL EVERY 4 HOURS PRN
Status: DISCONTINUED | OUTPATIENT
Start: 2019-09-09 | End: 2019-09-12 | Stop reason: HOSPADM

## 2019-09-09 RX ORDER — METOPROLOL SUCCINATE 25 MG/1
12.5 TABLET, EXTENDED RELEASE ORAL 2 TIMES DAILY
Status: DISCONTINUED | OUTPATIENT
Start: 2019-09-09 | End: 2019-09-12 | Stop reason: HOSPADM

## 2019-09-09 RX ORDER — LORAZEPAM 2 MG/ML
0.5 INJECTION INTRAMUSCULAR EVERY 4 HOURS PRN
Status: DISCONTINUED | OUTPATIENT
Start: 2019-09-09 | End: 2019-09-12 | Stop reason: HOSPADM

## 2019-09-09 RX ORDER — POTASSIUM CHLORIDE 29.8 MG/ML
80 INJECTION INTRAVENOUS PRN
Status: DISCONTINUED | OUTPATIENT
Start: 2019-09-09 | End: 2019-09-12 | Stop reason: HOSPADM

## 2019-09-09 RX ORDER — LORAZEPAM 0.5 MG/1
0.5 TABLET ORAL EVERY 4 HOURS PRN
Status: DISCONTINUED | OUTPATIENT
Start: 2019-09-09 | End: 2019-09-12 | Stop reason: HOSPADM

## 2019-09-09 RX ORDER — SODIUM CHLORIDE 9 MG/ML
INJECTION, SOLUTION INTRAVENOUS CONTINUOUS PRN
Status: DISCONTINUED | OUTPATIENT
Start: 2019-09-09 | End: 2019-09-12 | Stop reason: HOSPADM

## 2019-09-09 RX ORDER — GLIMEPIRIDE 2 MG/1
3 TABLET ORAL
Status: DISCONTINUED | OUTPATIENT
Start: 2019-09-09 | End: 2019-09-10

## 2019-09-09 RX ORDER — ALLOPURINOL 300 MG/1
300 TABLET ORAL DAILY
Status: DISCONTINUED | OUTPATIENT
Start: 2019-09-09 | End: 2019-09-12 | Stop reason: HOSPADM

## 2019-09-09 RX ORDER — POTASSIUM CHLORIDE 20 MEQ/1
20 TABLET, EXTENDED RELEASE ORAL 2 TIMES DAILY
Status: DISCONTINUED | OUTPATIENT
Start: 2019-09-09 | End: 2019-09-12 | Stop reason: HOSPADM

## 2019-09-09 RX ORDER — FUROSEMIDE 20 MG/1
20 TABLET ORAL DAILY
Status: DISCONTINUED | OUTPATIENT
Start: 2019-09-10 | End: 2019-09-12 | Stop reason: HOSPADM

## 2019-09-09 RX ORDER — FUROSEMIDE 10 MG/ML
40 INJECTION INTRAMUSCULAR; INTRAVENOUS EVERY 12 HOURS
Status: DISCONTINUED | OUTPATIENT
Start: 2019-09-10 | End: 2019-09-12 | Stop reason: HOSPADM

## 2019-09-09 RX ORDER — METFORMIN HYDROCHLORIDE 500 MG/1
500 TABLET, EXTENDED RELEASE ORAL
Status: DISCONTINUED | OUTPATIENT
Start: 2019-09-10 | End: 2019-09-10

## 2019-09-09 RX ORDER — NICOTINE POLACRILEX 4 MG
15 LOZENGE BUCCAL PRN
Status: DISCONTINUED | OUTPATIENT
Start: 2019-09-09 | End: 2019-09-12 | Stop reason: HOSPADM

## 2019-09-09 RX ORDER — ONDANSETRON HYDROCHLORIDE 8 MG/1
24 TABLET, FILM COATED ORAL ONCE
Status: COMPLETED | OUTPATIENT
Start: 2019-09-10 | End: 2019-09-10

## 2019-09-09 RX ADMIN — ETOPOSIDE 220 MG: 20 INJECTION, SOLUTION, CONCENTRATE INTRAVENOUS at 18:08

## 2019-09-09 RX ADMIN — DEXTROSE AND SODIUM CHLORIDE: 5; 450 INJECTION, SOLUTION INTRAVENOUS at 16:53

## 2019-09-09 RX ADMIN — APIXABAN 5 MG: 5 TABLET, FILM COATED ORAL at 21:09

## 2019-09-09 RX ADMIN — Medication 10 ML: at 21:10

## 2019-09-09 RX ADMIN — GLIMEPIRIDE 3 MG: 2 TABLET ORAL at 17:34

## 2019-09-09 RX ADMIN — ALLOPURINOL 300 MG: 300 TABLET ORAL at 16:21

## 2019-09-09 RX ADMIN — FLECAINIDE ACETATE 100 MG: 50 TABLET ORAL at 21:09

## 2019-09-09 RX ADMIN — METOPROLOL SUCCINATE 12.5 MG: 25 TABLET, EXTENDED RELEASE ORAL at 17:33

## 2019-09-09 RX ADMIN — POTASSIUM CHLORIDE 20 MEQ: 20 TABLET, EXTENDED RELEASE ORAL at 17:34

## 2019-09-09 RX ADMIN — SODIUM CHLORIDE 15 ML: 900 IRRIGANT IRRIGATION at 21:11

## 2019-09-09 ASSESSMENT — PAIN SCALES - GENERAL
PAINLEVEL_OUTOF10: 0
PAINLEVEL_OUTOF10: 0

## 2019-09-10 LAB
ALBUMIN SERPL-MCNC: 3.8 G/DL (ref 3.4–5)
ALP BLD-CCNC: 90 U/L (ref 40–129)
ALT SERPL-CCNC: 22 U/L (ref 10–40)
ANION GAP SERPL CALCULATED.3IONS-SCNC: 13 MMOL/L (ref 3–16)
AST SERPL-CCNC: 18 U/L (ref 15–37)
BASOPHILS ABSOLUTE: 0 K/UL (ref 0–0.2)
BASOPHILS RELATIVE PERCENT: 0.1 %
BILIRUB SERPL-MCNC: 0.3 MG/DL (ref 0–1)
BILIRUBIN DIRECT: <0.2 MG/DL (ref 0–0.3)
BILIRUBIN, INDIRECT: ABNORMAL MG/DL (ref 0–1)
BUN BLDV-MCNC: 17 MG/DL (ref 7–20)
CALCIUM SERPL-MCNC: 9 MG/DL (ref 8.3–10.6)
CHLORIDE BLD-SCNC: 106 MMOL/L (ref 99–110)
CO2: 22 MMOL/L (ref 21–32)
CREAT SERPL-MCNC: 0.6 MG/DL (ref 0.6–1.2)
EKG ATRIAL RATE: 267 BPM
EKG DIAGNOSIS: NORMAL
EKG Q-T INTERVAL: 366 MS
EKG QRS DURATION: 82 MS
EKG QTC CALCULATION (BAZETT): 408 MS
EKG R AXIS: 15 DEGREES
EKG T AXIS: -6 DEGREES
EKG VENTRICULAR RATE: 75 BPM
EOSINOPHILS ABSOLUTE: 0 K/UL (ref 0–0.6)
EOSINOPHILS RELATIVE PERCENT: 0 %
GFR AFRICAN AMERICAN: >60
GFR NON-AFRICAN AMERICAN: >60
GLUCOSE BLD-MCNC: 204 MG/DL (ref 70–99)
GLUCOSE BLD-MCNC: 211 MG/DL (ref 70–99)
GLUCOSE BLD-MCNC: 260 MG/DL (ref 70–99)
GLUCOSE BLD-MCNC: 264 MG/DL (ref 70–99)
HCT VFR BLD CALC: 36.2 % (ref 36–48)
HEMOGLOBIN: 12 G/DL (ref 12–16)
LACTATE DEHYDROGENASE: 226 U/L (ref 100–190)
LYMPHOCYTES ABSOLUTE: 0.3 K/UL (ref 1–5.1)
LYMPHOCYTES RELATIVE PERCENT: 3.8 %
MCH RBC QN AUTO: 30.3 PG (ref 26–34)
MCHC RBC AUTO-ENTMCNC: 33.1 G/DL (ref 31–36)
MCV RBC AUTO: 91.5 FL (ref 80–100)
MONOCYTES ABSOLUTE: 0.4 K/UL (ref 0–1.3)
MONOCYTES RELATIVE PERCENT: 5.6 %
NEUTROPHILS ABSOLUTE: 7 K/UL (ref 1.7–7.7)
NEUTROPHILS RELATIVE PERCENT: 90.5 %
PDW BLD-RTO: 17.3 % (ref 12.4–15.4)
PERFORMED ON: ABNORMAL
PHOSPHORUS: 2.7 MG/DL (ref 2.5–4.9)
PLATELET # BLD: 220 K/UL (ref 135–450)
PMV BLD AUTO: 9.2 FL (ref 5–10.5)
POTASSIUM SERPL-SCNC: 4.2 MMOL/L (ref 3.5–5.1)
RBC # BLD: 3.95 M/UL (ref 4–5.2)
SODIUM BLD-SCNC: 141 MMOL/L (ref 136–145)
TOTAL PROTEIN: 6.1 G/DL (ref 6.4–8.2)
URIC ACID, SERUM: 5.4 MG/DL (ref 2.6–6)
WBC # BLD: 7.7 K/UL (ref 4–11)

## 2019-09-10 PROCEDURE — 6370000000 HC RX 637 (ALT 250 FOR IP): Performed by: NURSE PRACTITIONER

## 2019-09-10 PROCEDURE — 84550 ASSAY OF BLOOD/URIC ACID: CPT

## 2019-09-10 PROCEDURE — 3E04317 INTRODUCTION OF OTHER THROMBOLYTIC INTO CENTRAL VEIN, PERCUTANEOUS APPROACH: ICD-10-PCS | Performed by: INTERNAL MEDICINE

## 2019-09-10 PROCEDURE — 6370000000 HC RX 637 (ALT 250 FOR IP): Performed by: INTERNAL MEDICINE

## 2019-09-10 PROCEDURE — 93010 ELECTROCARDIOGRAM REPORT: CPT | Performed by: INTERNAL MEDICINE

## 2019-09-10 PROCEDURE — 84100 ASSAY OF PHOSPHORUS: CPT

## 2019-09-10 PROCEDURE — 83615 LACTATE (LD) (LDH) ENZYME: CPT

## 2019-09-10 PROCEDURE — 96413 CHEMO IV INFUSION 1 HR: CPT

## 2019-09-10 PROCEDURE — 96417 CHEMO IV INFUS EACH ADDL SEQ: CPT

## 2019-09-10 PROCEDURE — 6360000002 HC RX W HCPCS: Performed by: NURSE PRACTITIONER

## 2019-09-10 PROCEDURE — 2060000000 HC ICU INTERMEDIATE R&B

## 2019-09-10 PROCEDURE — 80048 BASIC METABOLIC PNL TOTAL CA: CPT

## 2019-09-10 PROCEDURE — 96415 CHEMO IV INFUSION ADDL HR: CPT

## 2019-09-10 PROCEDURE — 93005 ELECTROCARDIOGRAM TRACING: CPT | Performed by: NURSE PRACTITIONER

## 2019-09-10 PROCEDURE — 2580000003 HC RX 258: Performed by: INTERNAL MEDICINE

## 2019-09-10 PROCEDURE — 36592 COLLECT BLOOD FROM PICC: CPT

## 2019-09-10 PROCEDURE — 80076 HEPATIC FUNCTION PANEL: CPT

## 2019-09-10 PROCEDURE — 2580000003 HC RX 258: Performed by: NURSE PRACTITIONER

## 2019-09-10 PROCEDURE — 6360000002 HC RX W HCPCS: Performed by: INTERNAL MEDICINE

## 2019-09-10 PROCEDURE — 85025 COMPLETE CBC W/AUTO DIFF WBC: CPT

## 2019-09-10 RX ADMIN — MESNA: 100 INJECTION, SOLUTION INTRAVENOUS at 14:03

## 2019-09-10 RX ADMIN — ALTEPLASE 2 MG: 2.2 INJECTION, POWDER, LYOPHILIZED, FOR SOLUTION INTRAVENOUS at 03:19

## 2019-09-10 RX ADMIN — LORAZEPAM 0.5 MG: 0.5 TABLET ORAL at 00:30

## 2019-09-10 RX ADMIN — CARBOPLATIN 580 MG: 10 INJECTION, SOLUTION INTRAVENOUS at 13:15

## 2019-09-10 RX ADMIN — INSULIN LISPRO 4 UNITS: 100 INJECTION, SOLUTION INTRAVENOUS; SUBCUTANEOUS at 07:32

## 2019-09-10 RX ADMIN — INSULIN LISPRO 6 UNITS: 100 INJECTION, SOLUTION INTRAVENOUS; SUBCUTANEOUS at 16:48

## 2019-09-10 RX ADMIN — METOPROLOL SUCCINATE 12.5 MG: 25 TABLET, EXTENDED RELEASE ORAL at 09:02

## 2019-09-10 RX ADMIN — DEXTROSE AND SODIUM CHLORIDE: 5; 450 INJECTION, SOLUTION INTRAVENOUS at 07:31

## 2019-09-10 RX ADMIN — SODIUM CHLORIDE 15 ML: 900 IRRIGANT IRRIGATION at 14:15

## 2019-09-10 RX ADMIN — DEXTROSE AND SODIUM CHLORIDE: 5; 450 INJECTION, SOLUTION INTRAVENOUS at 00:33

## 2019-09-10 RX ADMIN — SODIUM CHLORIDE 15 ML: 900 IRRIGANT IRRIGATION at 18:09

## 2019-09-10 RX ADMIN — ONDANSETRON HYDROCHLORIDE 24 MG: 8 TABLET, FILM COATED ORAL at 09:48

## 2019-09-10 RX ADMIN — FUROSEMIDE 20 MG: 20 TABLET ORAL at 09:03

## 2019-09-10 RX ADMIN — INSULIN LISPRO 3 UNITS: 100 INJECTION, SOLUTION INTRAVENOUS; SUBCUTANEOUS at 20:51

## 2019-09-10 RX ADMIN — FLECAINIDE ACETATE 100 MG: 50 TABLET ORAL at 09:37

## 2019-09-10 RX ADMIN — DEXAMETHASONE 20 MG: 4 TABLET ORAL at 09:48

## 2019-09-10 RX ADMIN — Medication 10 ML: at 09:05

## 2019-09-10 RX ADMIN — ETOPOSIDE 220 MG: 20 INJECTION, SOLUTION, CONCENTRATE INTRAVENOUS at 10:34

## 2019-09-10 RX ADMIN — APIXABAN 5 MG: 5 TABLET, FILM COATED ORAL at 20:43

## 2019-09-10 RX ADMIN — POTASSIUM CHLORIDE 20 MEQ: 20 TABLET, EXTENDED RELEASE ORAL at 18:05

## 2019-09-10 RX ADMIN — ALLOPURINOL 300 MG: 300 TABLET ORAL at 09:03

## 2019-09-10 RX ADMIN — SODIUM CHLORIDE 15 ML: 900 IRRIGANT IRRIGATION at 07:00

## 2019-09-10 RX ADMIN — APIXABAN 5 MG: 5 TABLET, FILM COATED ORAL at 09:04

## 2019-09-10 RX ADMIN — FLECAINIDE ACETATE 100 MG: 50 TABLET ORAL at 20:43

## 2019-09-10 RX ADMIN — POTASSIUM CHLORIDE 20 MEQ: 20 TABLET, EXTENDED RELEASE ORAL at 09:04

## 2019-09-10 RX ADMIN — DEXTROSE AND SODIUM CHLORIDE: 5; 450 INJECTION, SOLUTION INTRAVENOUS at 16:51

## 2019-09-10 RX ADMIN — METOPROLOL SUCCINATE 12.5 MG: 25 TABLET, EXTENDED RELEASE ORAL at 18:04

## 2019-09-10 RX ADMIN — LORAZEPAM 0.5 MG: 0.5 TABLET ORAL at 20:43

## 2019-09-10 ASSESSMENT — PAIN SCALES - GENERAL
PAINLEVEL_OUTOF10: 0

## 2019-09-10 NOTE — PLAN OF CARE
Problem: Falls - Risk of:  Goal: Will remain free from falls  Description  Will remain free from falls  Outcome: Met This Shift  Note:   Orthostatic vital signs obtained at start of shift - see flowsheet for details. Pt does not meet criteria for orthostasis. Pt is a medium fall risk. See Quinn Scale Fall Score and ABCDS Injury Risk assessments. Pt bed is in low position, side rails up, call light and belongings are in reach. Fall risk light is on outside pts room. Pt encouraged to call for assistance as needed. Will continue with hourly rounds for PO intake, pain needs, toileting and repositioning as needed. Problem: PROTECTIVE PRECAUTIONS  Goal: Patient will remain free of nosocomial Infections  Outcome: Met This Shift  Note:   Pt remains in neutropenic precautions. Pt educated on wearing mask when in hallways. Pt, staff, and visitors adhering to handwashing guidelines. Pt showers daily with chlorhexidine and linens changed daily per protocol. Pt verbalizes understanding of low microbial diet. Will continue to monitor. Problem: Infection - Central Venous Catheter-Associated Bloodstream Infection:  Goal: Will show no infection signs and symptoms  Description  Will show no infection signs and symptoms  Outcome: Ongoing  Note:   CVC site remains free of signs/symptoms of infection. No drainage, edema, erythema, pain, or warmth noted at site. Dressing changes continue per protocol and on an as needed basis - see flowsheet. Compliant with [facility] Bath Protocol:  Performed CHG bath today per [facility] protocol utilizing CHG solution in the shower. CVC site cleansed with CHG wipe over dressing, skin surrounding dressing, and first 6\" of IV tubing. Pt tolerated well. Continued to encourage daily CHG bathing per [facility] protocol.

## 2019-09-11 LAB
ALBUMIN SERPL-MCNC: 3.8 G/DL (ref 3.4–5)
ALP BLD-CCNC: 83 U/L (ref 40–129)
ALT SERPL-CCNC: 20 U/L (ref 10–40)
ANION GAP SERPL CALCULATED.3IONS-SCNC: 13 MMOL/L (ref 3–16)
AST SERPL-CCNC: 13 U/L (ref 15–37)
BASOPHILS ABSOLUTE: 0 K/UL (ref 0–0.2)
BASOPHILS RELATIVE PERCENT: 0.1 %
BILIRUB SERPL-MCNC: <0.2 MG/DL (ref 0–1)
BILIRUBIN DIRECT: <0.2 MG/DL (ref 0–0.3)
BILIRUBIN, INDIRECT: ABNORMAL MG/DL (ref 0–1)
BUN BLDV-MCNC: 19 MG/DL (ref 7–20)
CALCIUM SERPL-MCNC: 8.9 MG/DL (ref 8.3–10.6)
CHLORIDE BLD-SCNC: 105 MMOL/L (ref 99–110)
CO2: 23 MMOL/L (ref 21–32)
CREAT SERPL-MCNC: 0.8 MG/DL (ref 0.6–1.2)
EOSINOPHILS ABSOLUTE: 0 K/UL (ref 0–0.6)
EOSINOPHILS RELATIVE PERCENT: 0 %
GFR AFRICAN AMERICAN: >60
GFR NON-AFRICAN AMERICAN: >60
GLUCOSE BLD-MCNC: 184 MG/DL (ref 70–99)
GLUCOSE BLD-MCNC: 217 MG/DL (ref 70–99)
GLUCOSE BLD-MCNC: 239 MG/DL (ref 70–99)
GLUCOSE BLD-MCNC: 293 MG/DL (ref 70–99)
GLUCOSE BLD-MCNC: 297 MG/DL (ref 70–99)
HCT VFR BLD CALC: 36.3 % (ref 36–48)
HEMOGLOBIN: 12 G/DL (ref 12–16)
LACTATE DEHYDROGENASE: 180 U/L (ref 100–190)
LYMPHOCYTES ABSOLUTE: 0.2 K/UL (ref 1–5.1)
LYMPHOCYTES RELATIVE PERCENT: 3.4 %
MCH RBC QN AUTO: 30.4 PG (ref 26–34)
MCHC RBC AUTO-ENTMCNC: 33.2 G/DL (ref 31–36)
MCV RBC AUTO: 91.8 FL (ref 80–100)
MONOCYTES ABSOLUTE: 0.4 K/UL (ref 0–1.3)
MONOCYTES RELATIVE PERCENT: 5.7 %
NEUTROPHILS ABSOLUTE: 6.5 K/UL (ref 1.7–7.7)
NEUTROPHILS RELATIVE PERCENT: 90.8 %
PDW BLD-RTO: 17.6 % (ref 12.4–15.4)
PERFORMED ON: ABNORMAL
PHOSPHORUS: 3 MG/DL (ref 2.5–4.9)
PLATELET # BLD: 232 K/UL (ref 135–450)
PMV BLD AUTO: 9.5 FL (ref 5–10.5)
POTASSIUM SERPL-SCNC: 3.7 MMOL/L (ref 3.5–5.1)
RBC # BLD: 3.96 M/UL (ref 4–5.2)
SODIUM BLD-SCNC: 141 MMOL/L (ref 136–145)
TOTAL PROTEIN: 6 G/DL (ref 6.4–8.2)
URIC ACID, SERUM: 4.1 MG/DL (ref 2.6–6)
VRE CULTURE: NORMAL
WBC # BLD: 7.2 K/UL (ref 4–11)

## 2019-09-11 PROCEDURE — 6370000000 HC RX 637 (ALT 250 FOR IP): Performed by: NURSE PRACTITIONER

## 2019-09-11 PROCEDURE — 84100 ASSAY OF PHOSPHORUS: CPT

## 2019-09-11 PROCEDURE — 84550 ASSAY OF BLOOD/URIC ACID: CPT

## 2019-09-11 PROCEDURE — 85025 COMPLETE CBC W/AUTO DIFF WBC: CPT

## 2019-09-11 PROCEDURE — 2580000003 HC RX 258: Performed by: INTERNAL MEDICINE

## 2019-09-11 PROCEDURE — 6360000002 HC RX W HCPCS: Performed by: INTERNAL MEDICINE

## 2019-09-11 PROCEDURE — 96415 CHEMO IV INFUSION ADDL HR: CPT

## 2019-09-11 PROCEDURE — 83615 LACTATE (LD) (LDH) ENZYME: CPT

## 2019-09-11 PROCEDURE — 80076 HEPATIC FUNCTION PANEL: CPT

## 2019-09-11 PROCEDURE — 80048 BASIC METABOLIC PNL TOTAL CA: CPT

## 2019-09-11 PROCEDURE — 6370000000 HC RX 637 (ALT 250 FOR IP): Performed by: INTERNAL MEDICINE

## 2019-09-11 PROCEDURE — 2580000003 HC RX 258: Performed by: NURSE PRACTITIONER

## 2019-09-11 PROCEDURE — 96413 CHEMO IV INFUSION 1 HR: CPT

## 2019-09-11 PROCEDURE — 94660 CPAP INITIATION&MGMT: CPT

## 2019-09-11 PROCEDURE — 2060000000 HC ICU INTERMEDIATE R&B

## 2019-09-11 PROCEDURE — 36592 COLLECT BLOOD FROM PICC: CPT

## 2019-09-11 RX ADMIN — MESNA 2200 MG: 100 INJECTION, SOLUTION INTRAVENOUS at 14:57

## 2019-09-11 RX ADMIN — POTASSIUM CHLORIDE 20 MEQ: 20 TABLET, EXTENDED RELEASE ORAL at 18:34

## 2019-09-11 RX ADMIN — ETOPOSIDE 220 MG: 20 INJECTION, SOLUTION, CONCENTRATE INTRAVENOUS at 15:29

## 2019-09-11 RX ADMIN — APIXABAN 5 MG: 5 TABLET, FILM COATED ORAL at 07:01

## 2019-09-11 RX ADMIN — FUROSEMIDE 20 MG: 20 TABLET ORAL at 08:50

## 2019-09-11 RX ADMIN — LORAZEPAM 0.5 MG: 0.5 TABLET ORAL at 03:51

## 2019-09-11 RX ADMIN — INSULIN LISPRO 2 UNITS: 100 INJECTION, SOLUTION INTRAVENOUS; SUBCUTANEOUS at 22:45

## 2019-09-11 RX ADMIN — DEXAMETHASONE 20 MG: 4 TABLET ORAL at 10:03

## 2019-09-11 RX ADMIN — SODIUM CHLORIDE 15 ML: 900 IRRIGANT IRRIGATION at 18:58

## 2019-09-11 RX ADMIN — MESNA 2200 MG: 100 INJECTION, SOLUTION INTRAVENOUS at 22:30

## 2019-09-11 RX ADMIN — INSULIN LISPRO 2 UNITS: 100 INJECTION, SOLUTION INTRAVENOUS; SUBCUTANEOUS at 11:28

## 2019-09-11 RX ADMIN — MESNA 2200 MG: 100 INJECTION, SOLUTION INTRAVENOUS at 18:57

## 2019-09-11 RX ADMIN — ONDANSETRON HYDROCHLORIDE 24 MG: 8 TABLET, FILM COATED ORAL at 10:03

## 2019-09-11 RX ADMIN — FLECAINIDE ACETATE 100 MG: 50 TABLET ORAL at 06:50

## 2019-09-11 RX ADMIN — Medication 10 ML: at 20:00

## 2019-09-11 RX ADMIN — METOPROLOL SUCCINATE 12.5 MG: 25 TABLET, EXTENDED RELEASE ORAL at 18:34

## 2019-09-11 RX ADMIN — POTASSIUM CHLORIDE 20 MEQ: 20 TABLET, EXTENDED RELEASE ORAL at 08:50

## 2019-09-11 RX ADMIN — INSULIN LISPRO 4 UNITS: 100 INJECTION, SOLUTION INTRAVENOUS; SUBCUTANEOUS at 07:33

## 2019-09-11 RX ADMIN — METOPROLOL SUCCINATE 12.5 MG: 25 TABLET, EXTENDED RELEASE ORAL at 06:54

## 2019-09-11 RX ADMIN — ALLOPURINOL 300 MG: 300 TABLET ORAL at 08:50

## 2019-09-11 RX ADMIN — FUROSEMIDE 40 MG: 10 INJECTION, SOLUTION INTRAMUSCULAR; INTRAVENOUS at 19:03

## 2019-09-11 RX ADMIN — DEXTROSE AND SODIUM CHLORIDE: 5; 450 INJECTION, SOLUTION INTRAVENOUS at 13:02

## 2019-09-11 RX ADMIN — SODIUM CHLORIDE 15 ML: 900 IRRIGANT IRRIGATION at 22:00

## 2019-09-11 RX ADMIN — SODIUM CHLORIDE 15 ML: 900 IRRIGANT IRRIGATION at 13:03

## 2019-09-11 RX ADMIN — Medication 10 ML: at 08:50

## 2019-09-11 RX ADMIN — APIXABAN 5 MG: 5 TABLET, FILM COATED ORAL at 18:34

## 2019-09-11 RX ADMIN — INSULIN LISPRO 6 UNITS: 100 INJECTION, SOLUTION INTRAVENOUS; SUBCUTANEOUS at 17:11

## 2019-09-11 RX ADMIN — FLECAINIDE ACETATE 100 MG: 50 TABLET ORAL at 18:33

## 2019-09-11 ASSESSMENT — PAIN SCALES - GENERAL
PAINLEVEL_OUTOF10: 0

## 2019-09-11 NOTE — PROGRESS NOTES
4 Eyes Admission Assessment     I agree as the admission nurse that 2 RN's have performed a thorough Head to Toe Skin Assessment on the patient. ALL assessment sites listed below have been assessed on admission. Areas assessed by both nurses: Dyane Stank  [x]   Head, Face, and Ears   [x]   Shoulders, Back, and Chest  [x]   Arms, Elbows, and Hands   [x]   Coccyx, Sacrum, and Ischum  [x]   Legs, Feet, and Heels        Does the Patient have Skin Breakdown?   No         Mathieu Prevention initiated:  NA   Wound Care Orders initiated:  NA      WO nurse consulted for Pressure Injury (Stage 3,4, Unstageable, DTI, NWPT, and Complex wounds):  NA      Nurse 1 eSignature: Pamila Clipper    **SHARE this note so that the co-signing nurse is able to place an eSignature**    Nurse 2 eSignature: {Esignature:[id number]}
Patient admitted to [address] via self from [facility] for diagnosis of DLBC. Here to receive chemotherapy regimen ICE. Original chemotherapy orders reviewed and acknowledged. Appropriateness of chemotherapy treatment regimen ICE for diagnosis of DLBC was verified. Patient educated on chemotherapy regimen. Consent for chemotherapy obtained. Estimated body surface area is 2.17 meters squared as calculated from the following:    Height as of this encounter: 5' 3\" (1.6 m). Weight as of this encounter: 234 lb 3.2 oz (106.2 kg). verified. Appropriate dosing calculations of chemotherapy based on above height, weight, and BSA verified. Patient and  oriented to patient room including call light and bed controls. Admission assessment completed - see admission flowsheet documentation. Patient is a medium fall risk. Safety measures instituted per policy. Patient and  oriented to unit policies and procedures including: pain management practices, unit safety precautions, family rapid response, q4h vital signs and assessments, daily 4am lab draws, weekly chest x-rays, weekly VRE rectal swabs for surveillance, daily chlorhexidine bathing, standing transfusion orders, and routine central line care. Also discussed use of call light and how to get in touch with nursing staff. Stressed the importance of calling out immediately for any changes in condition including but not limited to: pain, chills, fever, nausea, vomiting, diarrhea, chest pain, sob/mcconnell, assistance with toileting, bleeding, or any other symptoms that are out of the ordinary for the patient. Patient verbalizes understanding of all instructions and will call for assistance as needed.
filed at 9/10/2019 0617  Gross per 24 hour   Intake 1041 ml   Output 3450 ml   Net -2409 ml       Vital Signs:  BP (!) 149/69   Pulse 78   Temp 97.5 °F (36.4 °C) (Oral)   Resp 18   Ht 5' 3\" (1.6 m)   Wt 234 lb 3.2 oz (106.2 kg)   SpO2 98%   BMI 41.49 kg/m²     Weight:    Wt Readings from Last 3 Encounters:   09/09/19 234 lb 3.2 oz (106.2 kg)   08/05/19 235 lb 0.2 oz (106.6 kg)   07/22/19 232 lb (105.2 kg)         General: Awake, alert and oriented. HEENT: normocephalic, PERRL, no scleral erythema or icterus, Oral mucosa moist and intact, throat clear  NECK: supple without palpable adenopathy  BACK: Straight negative CVAT  SKIN: warm dry and intact without lesions rashes or masses  CHEST: CTA bilaterally without use of accessory muscles  CV: Normal S1 S2, RRR, no MRG  ABD: NT ND normoactive BS, no palpable masses or hepatosplenomegaly  EXTREMITIES: without edema, denies calf tenderness  NEURO: CN II - XII grossly intact  CATHETER: Right IJ PAC (2/26/16, IR) - CDI    Data    CBC:   Recent Labs     09/10/19  0615   WBC 7.7   HGB 12.0   HCT 36.2   MCV 91.5        BMP/Mag:  Recent Labs     09/10/19  0310      K 4.2      CO2 22   PHOS 2.7   BUN 17   CREATININE 0.6     LIVP:   Recent Labs     09/10/19  0310   AST 18   ALT 22   BILIDIR <0.2   BILITOT 0.3   ALKPHOS 90     Coags: No results for input(s): PROTIME, INR, APTT in the last 72 hours. Uric Acid   Recent Labs     09/10/19  0310   LABURIC 5.4       PROBLEM LIST:             1. 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 (Dx 1/2016)  2. Atrial fibrillation w/ RVR (Dx 10/2018)    3. Type 2 DM  4. Chronic  anticoagulation      TREATMENT:             1. XRT to thoracic spine   2. Bendamustine / Rituxan x 6 cycles (ending 7/2016)  3. Rituxan maintenance x 12 months  4. Clinical Trial - Revlimid & Rituxan x 7 cycles (ending 12/2018)   5. CVP- R x 8 cycles (12/2018 - 6/2019)    6.  R-ICE   Cycle #1 -
autologous stem cell transplant with GCSF mobilization     Cycle 1, Day + 3    2. ID: Patient is currently afebrile without evidence of infection.   - Start Diflucan, Valtrex and Levaquin prophylaxis once ANC < 1.5.     3. Heme:  Stable blood counts  - Transfuse for Hgb < 7 and Platelets < 10 K.   - No transfusion today     4. Metabolic:  Electrolytes are WNL and renal fxn stable  TLS:  No evidence, cont allopurinol ppx   - Cont KCl 20 meq bid   - Cont IVF:  D5.45NS @ 150 mL/hr  - Replace Potassium and Magnesium per protocol. 6.  GI / Nutrition:     - Cont low microbial diet  - Dietary to follow closely     7. Endocrine: Hyperglycemia d/t steroids  - Hold Amaryl and Metformin daily; resume on d/c  - Cont SSI, medium regimen Lispro     8. Cardiac:   - H/o A. Fib  - Cont Eliquis, hold when platelets < 68C  - Cont flecainide & Toprol XL 12.5 mg daily   - Cont Lasix 20 mg daily     9.  Thyroid nodule - benign on biopsy 8/26/2019    - DVT Prophylaxis: Platelets >62,849 cells/dL, - daily lovenox prophylaxis ordered  Contraindications to pharmacologic prophylaxis: Thrombocytopenia  Contraindications to mechanical prophylaxis: None    - Disposition: on 9/12/19 w/ OBI, then OV on 9/14/19      MD Martha Saunders MD  [facility]  Please contact me through [facility]

## 2019-09-12 VITALS
DIASTOLIC BLOOD PRESSURE: 63 MMHG | RESPIRATION RATE: 22 BRPM | BODY MASS INDEX: 43.18 KG/M2 | HEART RATE: 83 BPM | TEMPERATURE: 97.8 F | SYSTOLIC BLOOD PRESSURE: 145 MMHG | HEIGHT: 63 IN | WEIGHT: 243.7 LBS | OXYGEN SATURATION: 100 %

## 2019-09-12 LAB
ALBUMIN SERPL-MCNC: 3.8 G/DL (ref 3.4–5)
ALP BLD-CCNC: 81 U/L (ref 40–129)
ALT SERPL-CCNC: 29 U/L (ref 10–40)
ANION GAP SERPL CALCULATED.3IONS-SCNC: 14 MMOL/L (ref 3–16)
APTT: 29.2 SEC (ref 26–36)
AST SERPL-CCNC: 20 U/L (ref 15–37)
BASOPHILS ABSOLUTE: 0 K/UL (ref 0–0.2)
BASOPHILS RELATIVE PERCENT: 0.1 %
BILIRUB SERPL-MCNC: 0.3 MG/DL (ref 0–1)
BILIRUBIN DIRECT: <0.2 MG/DL (ref 0–0.3)
BILIRUBIN, INDIRECT: ABNORMAL MG/DL (ref 0–1)
BUN BLDV-MCNC: 25 MG/DL (ref 7–20)
CALCIUM SERPL-MCNC: 8.8 MG/DL (ref 8.3–10.6)
CHLORIDE BLD-SCNC: 104 MMOL/L (ref 99–110)
CO2: 24 MMOL/L (ref 21–32)
CREAT SERPL-MCNC: 0.9 MG/DL (ref 0.6–1.2)
EOSINOPHILS ABSOLUTE: 0 K/UL (ref 0–0.6)
EOSINOPHILS RELATIVE PERCENT: 0 %
GFR AFRICAN AMERICAN: >60
GFR NON-AFRICAN AMERICAN: >60
GLUCOSE BLD-MCNC: 154 MG/DL (ref 70–99)
GLUCOSE BLD-MCNC: 164 MG/DL (ref 70–99)
HCT VFR BLD CALC: 35.6 % (ref 36–48)
HEMOGLOBIN: 11.8 G/DL (ref 12–16)
INR BLD: 1.08 (ref 0.86–1.14)
LACTATE DEHYDROGENASE: 193 U/L (ref 100–190)
LYMPHOCYTES ABSOLUTE: 0.3 K/UL (ref 1–5.1)
LYMPHOCYTES RELATIVE PERCENT: 4 %
MAGNESIUM: 2.2 MG/DL (ref 1.8–2.4)
MCH RBC QN AUTO: 30.6 PG (ref 26–34)
MCHC RBC AUTO-ENTMCNC: 33.2 G/DL (ref 31–36)
MCV RBC AUTO: 92.1 FL (ref 80–100)
MONOCYTES ABSOLUTE: 0.3 K/UL (ref 0–1.3)
MONOCYTES RELATIVE PERCENT: 4 %
NEUTROPHILS ABSOLUTE: 6.4 K/UL (ref 1.7–7.7)
NEUTROPHILS RELATIVE PERCENT: 91.9 %
PDW BLD-RTO: 17.5 % (ref 12.4–15.4)
PERFORMED ON: ABNORMAL
PHOSPHORUS: 3.5 MG/DL (ref 2.5–4.9)
PLATELET # BLD: 234 K/UL (ref 135–450)
PMV BLD AUTO: 9.2 FL (ref 5–10.5)
POTASSIUM SERPL-SCNC: 3.5 MMOL/L (ref 3.5–5.1)
PROTHROMBIN TIME: 12.3 SEC (ref 9.8–13)
RBC # BLD: 3.86 M/UL (ref 4–5.2)
SODIUM BLD-SCNC: 142 MMOL/L (ref 136–145)
TOTAL PROTEIN: 6 G/DL (ref 6.4–8.2)
URIC ACID, SERUM: 3.7 MG/DL (ref 2.6–6)
WBC # BLD: 6.9 K/UL (ref 4–11)

## 2019-09-12 PROCEDURE — 36591 DRAW BLOOD OFF VENOUS DEVICE: CPT

## 2019-09-12 PROCEDURE — 6370000000 HC RX 637 (ALT 250 FOR IP): Performed by: INTERNAL MEDICINE

## 2019-09-12 PROCEDURE — 36592 COLLECT BLOOD FROM PICC: CPT

## 2019-09-12 PROCEDURE — 83735 ASSAY OF MAGNESIUM: CPT

## 2019-09-12 PROCEDURE — 84100 ASSAY OF PHOSPHORUS: CPT

## 2019-09-12 PROCEDURE — 6360000002 HC RX W HCPCS: Performed by: NURSE PRACTITIONER

## 2019-09-12 PROCEDURE — 84550 ASSAY OF BLOOD/URIC ACID: CPT

## 2019-09-12 PROCEDURE — 2580000003 HC RX 258: Performed by: NURSE PRACTITIONER

## 2019-09-12 PROCEDURE — 6370000000 HC RX 637 (ALT 250 FOR IP): Performed by: NURSE PRACTITIONER

## 2019-09-12 PROCEDURE — 80048 BASIC METABOLIC PNL TOTAL CA: CPT

## 2019-09-12 PROCEDURE — 85025 COMPLETE CBC W/AUTO DIFF WBC: CPT

## 2019-09-12 PROCEDURE — 80076 HEPATIC FUNCTION PANEL: CPT

## 2019-09-12 PROCEDURE — 2580000003 HC RX 258: Performed by: INTERNAL MEDICINE

## 2019-09-12 PROCEDURE — 85730 THROMBOPLASTIN TIME PARTIAL: CPT

## 2019-09-12 PROCEDURE — 85610 PROTHROMBIN TIME: CPT

## 2019-09-12 PROCEDURE — 83615 LACTATE (LD) (LDH) ENZYME: CPT

## 2019-09-12 RX ORDER — ALLOPURINOL 300 MG/1
300 TABLET ORAL DAILY
Qty: 7 TABLET | Refills: 0 | Status: ON HOLD | OUTPATIENT
Start: 2019-09-12 | End: 2019-10-05 | Stop reason: HOSPADM

## 2019-09-12 RX ORDER — HEPARIN SODIUM (PORCINE) LOCK FLUSH IV SOLN 100 UNIT/ML 100 UNIT/ML
500 SOLUTION INTRAVENOUS ONCE
Status: COMPLETED | OUTPATIENT
Start: 2019-09-12 | End: 2019-09-12

## 2019-09-12 RX ORDER — PROCHLORPERAZINE MALEATE 10 MG
10 TABLET ORAL EVERY 6 HOURS PRN
Qty: 60 TABLET | Refills: 3 | Status: SHIPPED | OUTPATIENT
Start: 2019-09-12 | End: 2020-03-02

## 2019-09-12 RX ORDER — ONDANSETRON HYDROCHLORIDE 8 MG/1
8 TABLET, FILM COATED ORAL EVERY 8 HOURS PRN
Qty: 30 TABLET | Refills: 3 | Status: SHIPPED | OUTPATIENT
Start: 2019-09-12 | End: 2020-03-02

## 2019-09-12 RX ADMIN — SODIUM CHLORIDE 15 ML: 900 IRRIGANT IRRIGATION at 06:00

## 2019-09-12 RX ADMIN — Medication 500 UNITS: at 11:26

## 2019-09-12 RX ADMIN — FUROSEMIDE 20 MG: 20 TABLET ORAL at 08:19

## 2019-09-12 RX ADMIN — POTASSIUM CHLORIDE 20 MEQ: 20 TABLET, EXTENDED RELEASE ORAL at 08:19

## 2019-09-12 RX ADMIN — METOPROLOL SUCCINATE 12.5 MG: 25 TABLET, EXTENDED RELEASE ORAL at 07:00

## 2019-09-12 RX ADMIN — FLECAINIDE ACETATE 100 MG: 50 TABLET ORAL at 06:00

## 2019-09-12 RX ADMIN — DEXTROSE AND SODIUM CHLORIDE: 5; 450 INJECTION, SOLUTION INTRAVENOUS at 06:00

## 2019-09-12 RX ADMIN — ALLOPURINOL 300 MG: 300 TABLET ORAL at 08:19

## 2019-09-12 RX ADMIN — APIXABAN 5 MG: 5 TABLET, FILM COATED ORAL at 06:00

## 2019-09-12 RX ADMIN — INSULIN LISPRO 2 UNITS: 100 INJECTION, SOLUTION INTRAVENOUS; SUBCUTANEOUS at 07:45

## 2019-09-12 RX ADMIN — Medication 10 ML: at 08:19

## 2019-09-12 ASSESSMENT — PAIN SCALES - GENERAL
PAINLEVEL_OUTOF10: 0
PAINLEVEL_OUTOF10: 0

## 2019-09-12 NOTE — DISCHARGE SUMMARY
in the last 72 hours. PROBLEM LIST:             1. Follicular NHL w/ D9-E4 paraspinal mass and 9 cm mass at root of mesentery. (Dx 1/2016)  2. Atrial fibrillation w/ RVR (Dx 10/2018)    3. Type 2 DM  4. Chronic  anticoagulation      TREATMENT:             1. XRT to thoracic spine   2. Bendamustine / Rituxan x 6 cycles (ending 7/2016)  3. Rituxan maintenance x 12 months  4. Clinical Trial - Revlimid & Rituxan x 7 cycles (ending 12/2018)   5. CVP- R x 8 cycles (12/2018 - 6/2019)    6. R-ICE   Cycle #1 - 9/9/19     ASSESSMENT AND PLAN:              1. 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 PLAN: R - ICE x 2 or 3 cycles followed by autologous stem cell transplant with GCSF mobilization     Cycle 1, Day + 4     2. ID: Patient is currently afebrile without evidence of infection.   - Start Diflucan, Valtrex and Levaquin prophylaxis once ANC < 1.5.      3. Heme:  Anemia related to recent chemotherapy  - Transfuse for Hgb < 7 and Platelets < 20 K.   - No transfusion today   -OBI received 9/12/19 on discharge     4. Metabolic:  Electrolytes are WNL and renal fxn stable  TLS:  No evidence, cont allopurinol ppx   - Cont KCl 20 meq bid   - Replace Potassium and Magnesium per protocol.      6.  GI / Nutrition:          - Cont low microbial diet  - Dietary to follow closely      7.  Endocrine: Hyperglycemia d/t steroids  - Restart Amaryl and Metformin daily       8.  Cardiac:   - H/o A. Fib  - Cont Eliquis, hold when platelets < 11X  - Cont flecainide & Toprol XL 12.5 mg daily   - Cont Lasix 20 mg daily      9. Thyroid nodule - benign on biopsy 8/26/2019      Condition on discharge:  Stable    Discharge Instructions:  Patient will follow up with labs and provider evaluation on Saturday, 9/14/19. The patient was advised on activity and dietary restrictions.     The patient was advised to follow up in the emergency department or contact the physician with any unresolved nausea/vomiting/diarrhea/pain

## 2019-09-16 ENCOUNTER — TELEPHONE (OUTPATIENT)
Dept: FAMILY MEDICINE CLINIC | Age: 64
End: 2019-09-16

## 2019-09-23 ENCOUNTER — HOSPITAL ENCOUNTER (OUTPATIENT)
Dept: ONCOLOGY | Age: 64
Setting detail: INFUSION SERIES
Discharge: HOME | End: 2019-09-23
Payer: COMMERCIAL

## 2019-09-23 VITALS
TEMPERATURE: 98 F | RESPIRATION RATE: 16 BRPM | SYSTOLIC BLOOD PRESSURE: 124 MMHG | HEART RATE: 101 BPM | DIASTOLIC BLOOD PRESSURE: 82 MMHG

## 2019-09-23 LAB
ABO/RH: NORMAL
ANTIBODY SCREEN: NORMAL
BLOOD BANK DISPENSE STATUS: NORMAL
BLOOD BANK PRODUCT CODE: NORMAL
BPU ID: NORMAL
DESCRIPTION BLOOD BANK: NORMAL

## 2019-09-23 PROCEDURE — 2580000003 HC RX 258: Performed by: INTERNAL MEDICINE

## 2019-09-23 PROCEDURE — 86901 BLOOD TYPING SEROLOGIC RH(D): CPT

## 2019-09-23 PROCEDURE — 86900 BLOOD TYPING SEROLOGIC ABO: CPT

## 2019-09-23 PROCEDURE — 6360000002 HC RX W HCPCS: Performed by: INTERNAL MEDICINE

## 2019-09-23 PROCEDURE — 86850 RBC ANTIBODY SCREEN: CPT

## 2019-09-23 PROCEDURE — P9037 PLATE PHERES LEUKOREDU IRRAD: HCPCS

## 2019-09-23 PROCEDURE — 36591 DRAW BLOOD OFF VENOUS DEVICE: CPT

## 2019-09-23 PROCEDURE — 36430 TRANSFUSION BLD/BLD COMPNT: CPT

## 2019-09-23 PROCEDURE — 99203 OFFICE O/P NEW LOW 30 MIN: CPT

## 2019-09-23 RX ORDER — SODIUM CHLORIDE 9 MG/ML
INJECTION, SOLUTION INTRAVENOUS CONTINUOUS
Status: CANCELLED | OUTPATIENT
Start: 2019-09-23

## 2019-09-23 RX ORDER — DIPHENHYDRAMINE HYDROCHLORIDE 50 MG/ML
50 INJECTION INTRAMUSCULAR; INTRAVENOUS ONCE
Status: CANCELLED | OUTPATIENT
Start: 2019-09-23

## 2019-09-23 RX ORDER — 0.9 % SODIUM CHLORIDE 0.9 %
10 VIAL (ML) INJECTION ONCE
Status: CANCELLED | OUTPATIENT
Start: 2019-09-23

## 2019-09-23 RX ORDER — 0.9 % SODIUM CHLORIDE 0.9 %
250 INTRAVENOUS SOLUTION INTRAVENOUS ONCE
Status: COMPLETED | OUTPATIENT
Start: 2019-09-23 | End: 2019-09-23

## 2019-09-23 RX ORDER — HEPARIN SODIUM (PORCINE) LOCK FLUSH IV SOLN 100 UNIT/ML 100 UNIT/ML
500 SOLUTION INTRAVENOUS PRN
Status: DISCONTINUED | OUTPATIENT
Start: 2019-09-23 | End: 2019-09-24 | Stop reason: HOSPADM

## 2019-09-23 RX ORDER — METHYLPREDNISOLONE SODIUM SUCCINATE 125 MG/2ML
125 INJECTION, POWDER, LYOPHILIZED, FOR SOLUTION INTRAMUSCULAR; INTRAVENOUS ONCE
Status: CANCELLED | OUTPATIENT
Start: 2019-09-23

## 2019-09-23 RX ORDER — SODIUM CHLORIDE 0.9 % (FLUSH) 0.9 %
20 SYRINGE (ML) INJECTION PRN
Status: CANCELLED | OUTPATIENT
Start: 2019-09-23

## 2019-09-23 RX ADMIN — Medication 500 UNITS: at 14:00

## 2019-09-23 RX ADMIN — SODIUM CHLORIDE 250 ML: 9 INJECTION, SOLUTION INTRAVENOUS at 12:30

## 2019-09-23 NOTE — PLAN OF CARE
Problem: Bleeding:  Goal: Will show no signs and symptoms of excessive bleeding  Description  Will show no signs and symptoms of excessive bleeding  Note:   Patient's hemoglobin this AM: 11.5   Patient's platelet count this AM: 17.0  Pt seen and assessed at [facility]. Seen at [address] [place] [address] today for Platelet transfusion per standing orders for above lab values. Blood products transfused per [facility] policy. Pt tolerated transfusion well and without incident. Pt verbalizes understanding of discharge instructions. Discharged ambulatory to home. Problem: Falls - Risk of:  Goal: Will remain free from falls  Description  Will remain free from falls  Note:   Pt is a medium fall risk. Explained fall risk precautions to pt and rationale behind their use to keep the patient safe. Belongings are in reach. Pt encouraged to notify staff for any and all assistance. Staff present in tx suite throughout entirety of pts treatment to monitor and protect from falls. Assistance provided when ambulating to restroom utilizing [employer].

## 2019-09-30 ENCOUNTER — HOSPITAL ENCOUNTER (INPATIENT)
Age: 64
LOS: 5 days | Discharge: HOME | DRG: 847 | End: 2019-10-05
Attending: INTERNAL MEDICINE | Admitting: INTERNAL MEDICINE
Payer: COMMERCIAL

## 2019-09-30 ENCOUNTER — APPOINTMENT (OUTPATIENT)
Dept: GENERAL RADIOLOGY | Age: 64
DRG: 847 | End: 2019-09-30
Attending: INTERNAL MEDICINE
Payer: COMMERCIAL

## 2019-09-30 PROBLEM — C85.90 NON-HODGKIN'S LYMPHOMA (HCC): Status: ACTIVE | Noted: 2019-09-30

## 2019-09-30 LAB
ALBUMIN SERPL-MCNC: 3.3 G/DL (ref 3.4–5)
ANION GAP SERPL CALCULATED.3IONS-SCNC: 12 MMOL/L (ref 3–16)
BILIRUBIN URINE: NEGATIVE
BLOOD, URINE: NEGATIVE
BUN BLDV-MCNC: 14 MG/DL (ref 7–20)
CALCIUM SERPL-MCNC: 7.4 MG/DL (ref 8.3–10.6)
CHLORIDE BLD-SCNC: 110 MMOL/L (ref 99–110)
CLARITY: CLEAR
CO2: 22 MMOL/L (ref 21–32)
COLOR: YELLOW
CREAT SERPL-MCNC: 0.7 MG/DL (ref 0.6–1.2)
EKG ATRIAL RATE: 227 BPM
EKG DIAGNOSIS: NORMAL
EKG Q-T INTERVAL: 362 MS
EKG QRS DURATION: 90 MS
EKG QTC CALCULATION (BAZETT): 496 MS
EKG R AXIS: 65 DEGREES
EKG T AXIS: 2 DEGREES
EKG VENTRICULAR RATE: 113 BPM
GFR AFRICAN AMERICAN: >60
GFR NON-AFRICAN AMERICAN: >60
GLUCOSE BLD-MCNC: 250 MG/DL (ref 70–99)
GLUCOSE BLD-MCNC: 295 MG/DL (ref 70–99)
GLUCOSE URINE: >=1000 MG/DL
KETONES, URINE: NEGATIVE MG/DL
LEUKOCYTE ESTERASE, URINE: NEGATIVE
MICROSCOPIC EXAMINATION: ABNORMAL
NITRITE, URINE: NEGATIVE
PERFORMED ON: ABNORMAL
PH UA: 6 (ref 5–8)
PHOSPHORUS: 2.4 MG/DL (ref 2.5–4.9)
POTASSIUM SERPL-SCNC: 3.5 MMOL/L (ref 3.5–5.1)
PROTEIN UA: NEGATIVE MG/DL
SODIUM BLD-SCNC: 144 MMOL/L (ref 136–145)
SPECIFIC GRAVITY UA: <=1.005 (ref 1–1.03)
URINE TYPE: ABNORMAL
UROBILINOGEN, URINE: 0.2 E.U./DL

## 2019-09-30 PROCEDURE — 96415 CHEMO IV INFUSION ADDL HR: CPT

## 2019-09-30 PROCEDURE — 2060000000 HC ICU INTERMEDIATE R&B

## 2019-09-30 PROCEDURE — 2580000003 HC RX 258: Performed by: INTERNAL MEDICINE

## 2019-09-30 PROCEDURE — 2580000003 HC RX 258: Performed by: NURSE PRACTITIONER

## 2019-09-30 PROCEDURE — 3E03305 INTRODUCTION OF OTHER ANTINEOPLASTIC INTO PERIPHERAL VEIN, PERCUTANEOUS APPROACH: ICD-10-PCS | Performed by: INTERNAL MEDICINE

## 2019-09-30 PROCEDURE — 6370000000 HC RX 637 (ALT 250 FOR IP): Performed by: NURSE PRACTITIONER

## 2019-09-30 PROCEDURE — 71046 X-RAY EXAM CHEST 2 VIEWS: CPT

## 2019-09-30 PROCEDURE — 93005 ELECTROCARDIOGRAM TRACING: CPT | Performed by: NURSE PRACTITIONER

## 2019-09-30 PROCEDURE — 93010 ELECTROCARDIOGRAM REPORT: CPT | Performed by: INTERNAL MEDICINE

## 2019-09-30 PROCEDURE — 6360000002 HC RX W HCPCS: Performed by: INTERNAL MEDICINE

## 2019-09-30 PROCEDURE — 81003 URINALYSIS AUTO W/O SCOPE: CPT

## 2019-09-30 PROCEDURE — 87081 CULTURE SCREEN ONLY: CPT

## 2019-09-30 PROCEDURE — 96417 CHEMO IV INFUS EACH ADDL SEQ: CPT

## 2019-09-30 PROCEDURE — 36415 COLL VENOUS BLD VENIPUNCTURE: CPT

## 2019-09-30 PROCEDURE — 80069 RENAL FUNCTION PANEL: CPT

## 2019-09-30 RX ORDER — LORAZEPAM 0.5 MG/1
0.5 TABLET ORAL EVERY 4 HOURS PRN
Status: DISCONTINUED | OUTPATIENT
Start: 2019-09-30 | End: 2019-10-05 | Stop reason: HOSPADM

## 2019-09-30 RX ORDER — ONDANSETRON HYDROCHLORIDE 8 MG/1
8 TABLET, FILM COATED ORAL EVERY 8 HOURS PRN
Status: DISCONTINUED | OUTPATIENT
Start: 2019-09-30 | End: 2019-10-05 | Stop reason: HOSPADM

## 2019-09-30 RX ORDER — POTASSIUM CHLORIDE 29.8 MG/ML
20 INJECTION INTRAVENOUS PRN
Status: DISCONTINUED | OUTPATIENT
Start: 2019-09-30 | End: 2019-10-05 | Stop reason: HOSPADM

## 2019-09-30 RX ORDER — PROCHLORPERAZINE EDISYLATE 5 MG/ML
10 INJECTION INTRAMUSCULAR; INTRAVENOUS EVERY 4 HOURS PRN
Status: DISCONTINUED | OUTPATIENT
Start: 2019-09-30 | End: 2019-10-05 | Stop reason: HOSPADM

## 2019-09-30 RX ORDER — GLIMEPIRIDE 2 MG/1
3 TABLET ORAL 2 TIMES DAILY WITH MEALS
Status: DISCONTINUED | OUTPATIENT
Start: 2019-09-30 | End: 2019-10-05 | Stop reason: HOSPADM

## 2019-09-30 RX ORDER — DEXTROSE MONOHYDRATE 50 MG/ML
100 INJECTION, SOLUTION INTRAVENOUS PRN
Status: DISCONTINUED | OUTPATIENT
Start: 2019-09-30 | End: 2019-10-05 | Stop reason: HOSPADM

## 2019-09-30 RX ORDER — METFORMIN HYDROCHLORIDE 500 MG/1
500 TABLET, EXTENDED RELEASE ORAL
Status: DISCONTINUED | OUTPATIENT
Start: 2019-10-01 | End: 2019-10-05 | Stop reason: HOSPADM

## 2019-09-30 RX ORDER — ACETAMINOPHEN 325 MG/1
650 TABLET ORAL EVERY 4 HOURS PRN
Status: DISCONTINUED | OUTPATIENT
Start: 2019-09-30 | End: 2019-10-05 | Stop reason: HOSPADM

## 2019-09-30 RX ORDER — NICOTINE POLACRILEX 4 MG
15 LOZENGE BUCCAL PRN
Status: DISCONTINUED | OUTPATIENT
Start: 2019-09-30 | End: 2019-09-30 | Stop reason: SDUPTHER

## 2019-09-30 RX ORDER — DEXAMETHASONE 4 MG/1
20 TABLET ORAL ONCE
Status: COMPLETED | OUTPATIENT
Start: 2019-10-01 | End: 2019-10-01

## 2019-09-30 RX ORDER — NICOTINE POLACRILEX 4 MG
15 LOZENGE BUCCAL PRN
Status: DISCONTINUED | OUTPATIENT
Start: 2019-09-30 | End: 2019-10-05 | Stop reason: HOSPADM

## 2019-09-30 RX ORDER — DEXAMETHASONE 4 MG/1
20 TABLET ORAL ONCE
Status: COMPLETED | OUTPATIENT
Start: 2019-10-02 | End: 2019-10-02

## 2019-09-30 RX ORDER — DEXTROSE MONOHYDRATE 25 G/50ML
12.5 INJECTION, SOLUTION INTRAVENOUS PRN
Status: DISCONTINUED | OUTPATIENT
Start: 2019-09-30 | End: 2019-10-05 | Stop reason: HOSPADM

## 2019-09-30 RX ORDER — SODIUM CHLORIDE 0.9 % (FLUSH) 0.9 %
10 SYRINGE (ML) INJECTION EVERY 12 HOURS SCHEDULED
Status: DISCONTINUED | OUTPATIENT
Start: 2019-09-30 | End: 2019-10-05 | Stop reason: HOSPADM

## 2019-09-30 RX ORDER — DEXTROSE MONOHYDRATE 50 MG/ML
100 INJECTION, SOLUTION INTRAVENOUS PRN
Status: DISCONTINUED | OUTPATIENT
Start: 2019-09-30 | End: 2019-09-30 | Stop reason: SDUPTHER

## 2019-09-30 RX ORDER — PROCHLORPERAZINE MALEATE 10 MG
10 TABLET ORAL EVERY 4 HOURS PRN
Status: DISCONTINUED | OUTPATIENT
Start: 2019-09-30 | End: 2019-10-05 | Stop reason: HOSPADM

## 2019-09-30 RX ORDER — DEXTROSE AND SODIUM CHLORIDE 5; .45 G/100ML; G/100ML
INJECTION, SOLUTION INTRAVENOUS CONTINUOUS
Status: DISCONTINUED | OUTPATIENT
Start: 2019-09-30 | End: 2019-10-01

## 2019-09-30 RX ORDER — PROCHLORPERAZINE MALEATE 10 MG
10 TABLET ORAL EVERY 6 HOURS PRN
Status: DISCONTINUED | OUTPATIENT
Start: 2019-09-30 | End: 2019-09-30

## 2019-09-30 RX ORDER — FUROSEMIDE 20 MG/1
20 TABLET ORAL DAILY
Status: DISCONTINUED | OUTPATIENT
Start: 2019-09-30 | End: 2019-10-05 | Stop reason: HOSPADM

## 2019-09-30 RX ORDER — FLECAINIDE ACETATE 100 MG/1
100 TABLET ORAL 2 TIMES DAILY
Status: DISCONTINUED | OUTPATIENT
Start: 2019-09-30 | End: 2019-10-05 | Stop reason: HOSPADM

## 2019-09-30 RX ORDER — ONDANSETRON HYDROCHLORIDE 8 MG/1
24 TABLET, FILM COATED ORAL ONCE
Status: COMPLETED | OUTPATIENT
Start: 2019-10-01 | End: 2019-10-01

## 2019-09-30 RX ORDER — DEXTROSE MONOHYDRATE 25 G/50ML
12.5 INJECTION, SOLUTION INTRAVENOUS PRN
Status: DISCONTINUED | OUTPATIENT
Start: 2019-09-30 | End: 2019-09-30 | Stop reason: SDUPTHER

## 2019-09-30 RX ORDER — LORAZEPAM 2 MG/ML
0.5 INJECTION INTRAMUSCULAR EVERY 4 HOURS PRN
Status: DISCONTINUED | OUTPATIENT
Start: 2019-09-30 | End: 2019-10-05 | Stop reason: HOSPADM

## 2019-09-30 RX ORDER — SODIUM CHLORIDE 9 MG/ML
INJECTION, SOLUTION INTRAVENOUS CONTINUOUS PRN
Status: DISCONTINUED | OUTPATIENT
Start: 2019-09-30 | End: 2019-10-05 | Stop reason: HOSPADM

## 2019-09-30 RX ORDER — CETIRIZINE HYDROCHLORIDE 10 MG/1
10 TABLET ORAL DAILY
Status: DISCONTINUED | OUTPATIENT
Start: 2019-10-01 | End: 2019-10-05 | Stop reason: HOSPADM

## 2019-09-30 RX ORDER — METOPROLOL SUCCINATE 25 MG/1
12.5 TABLET, EXTENDED RELEASE ORAL 2 TIMES DAILY
Status: DISCONTINUED | OUTPATIENT
Start: 2019-09-30 | End: 2019-10-03

## 2019-09-30 RX ORDER — MAGNESIUM SULFATE IN WATER 40 MG/ML
4 INJECTION, SOLUTION INTRAVENOUS PRN
Status: DISCONTINUED | OUTPATIENT
Start: 2019-09-30 | End: 2019-10-05 | Stop reason: HOSPADM

## 2019-09-30 RX ORDER — FUROSEMIDE 10 MG/ML
40 INJECTION INTRAMUSCULAR; INTRAVENOUS EVERY 12 HOURS
Status: DISCONTINUED | OUTPATIENT
Start: 2019-10-01 | End: 2019-10-03

## 2019-09-30 RX ORDER — ONDANSETRON HYDROCHLORIDE 8 MG/1
24 TABLET, FILM COATED ORAL ONCE
Status: COMPLETED | OUTPATIENT
Start: 2019-10-02 | End: 2019-10-02

## 2019-09-30 RX ORDER — POTASSIUM CHLORIDE 20 MEQ/1
20 TABLET, EXTENDED RELEASE ORAL 2 TIMES DAILY
Status: DISCONTINUED | OUTPATIENT
Start: 2019-09-30 | End: 2019-10-05 | Stop reason: HOSPADM

## 2019-09-30 RX ADMIN — SODIUM CHLORIDE 15 ML: 900 IRRIGANT IRRIGATION at 21:33

## 2019-09-30 RX ADMIN — APIXABAN 5 MG: 5 TABLET, FILM COATED ORAL at 04:44

## 2019-09-30 RX ADMIN — POTASSIUM CHLORIDE 20 MEQ: 20 TABLET, EXTENDED RELEASE ORAL at 21:16

## 2019-09-30 RX ADMIN — DEXTROSE AND SODIUM CHLORIDE: 5; 450 INJECTION, SOLUTION INTRAVENOUS at 16:08

## 2019-09-30 RX ADMIN — METOPROLOL SUCCINATE 12.5 MG: 25 TABLET, EXTENDED RELEASE ORAL at 21:16

## 2019-09-30 RX ADMIN — Medication 10 ML: at 21:33

## 2019-09-30 RX ADMIN — GLIMEPIRIDE 3 MG: 2 TABLET ORAL at 17:30

## 2019-09-30 RX ADMIN — ETOPOSIDE 220 MG: 20 INJECTION, SOLUTION, CONCENTRATE INTRAVENOUS at 17:59

## 2019-09-30 RX ADMIN — FLECAINIDE ACETATE 100 MG: 100 TABLET ORAL at 21:15

## 2019-09-30 ASSESSMENT — PAIN SCALES - GENERAL: PAINLEVEL_OUTOF10: 0

## 2019-09-30 NOTE — PROGRESS NOTES
4 Eyes Admission Assessment     I agree as the admission nurse that 2 RN's have performed a thorough Head to Toe Skin Assessment on the patient. ALL assessment sites listed below have been assessed on admission. Areas assessed by both nurses: Ernestine Anderson and [facility]   [x]   Head, Face, and Ears   [x]   Shoulders, Back, and Chest  [x]   Arms, Elbows, and Hands   [x]   Coccyx, Sacrum, and Ischum  [x]   Legs, Feet, and Heels        Does the Patient have Skin Breakdown?   No         Mathieu Prevention initiated:  NA   Wound Care Orders initiated:  NA      [facility] nurse consulted for Pressure Injury (Stage 3,4, Unstageable, DTI, NWPT, and Complex wounds):  NA      Nurse 1 eSignature: Ernestine Anderson    **SHARE this note so that the co-signing nurse is able to place an eSignature**    Nurse 2 eSignature: {Esignature:[id number]}

## 2019-09-30 NOTE — PROGRESS NOTES
4 Eyes Admission Assessment     I agree as the admission nurse that 2 RN's have performed a thorough Head to Toe Skin Assessment on the patient. ALL assessment sites listed below have been assessed on admission. Areas assessed by both nurses: Susy Muniz and [facility]   [x]   Head, Face, and Ears   [x]   Shoulders, Back, and Chest  [x]   Arms, Elbows, and Hands   [x]   Coccyx, Sacrum, and Ischum  [x]   Legs, Feet, and Heels        Does the Patient have Skin Breakdown?   No         Mathieu Prevention initiated:  NA   Wound Care Orders initiated:  NA      [facility] nurse consulted for Pressure Injury (Stage 3,4, Unstageable, DTI, NWPT, and Complex wounds):  NA      Nurse 1 eSignature: Susy Muniz    **SHARE this note so that the co-signing nurse is able to place an eSignature**    Nurse 2 eSignature: Electronically signed by Aggie Lambert RN on 9/30/19 at 5:31 PM

## 2019-09-30 NOTE — CARE COORDINATION
Type of Admission  B Cell Lymphoma  C2  Day #1 R-ICE        Central venous catheter  Single Lumen Port Right Chest        Plan  Proceed with Course #2 of R-ICE with re-staging for Auto SCT after this course        Update  9/30/19: Planned admission for 2nd course of R-ICE. Received Rituxan at [facility] prior to admit. Reports requiring IV fluids in the office 2 after discharge. Education  9/30/19:  Reviewed treatment calendar. Discussed ways to add extra fluid to her diet post discharge, states she can tolerate G2, certain flavors.         Discharge  Thursday, 10/3---> Neulasta OBI at Discharge        Pending

## 2019-09-30 NOTE — PROGRESS NOTES
Administration: Chemotherapy drug Etoside independently verified with Cynthia Grace RN prior to administration. Acknowledgement of informed consent for chemotherapy administration verified. Original order, appropriateness of regimen, drug supplied, height, weight, BSA, dose calculations, expiration dates/times, drug appearance, and two patient identifiers were verified by both RNs. Drug checked for vesicant/irritant status and for risk of hypersensitivity. Most recent laboratory values and allergies, were reviewed. Positive, brisk blood return via CVC was confirmed prior to administration. Chest x-ray for correct line placement reviewed. Vikas Starr and Cynthia Grace RN verified correct rate of chemotherapy and maintenance IV fluids. Patient was educated on chemotherapy regimen prior to administration including indication for treatment related to disease & side effects of chemotherapy drug. Patient verbalizes understanding of all instructions.

## 2019-09-30 NOTE — H&P
[facility]              History and Physical        Attending Physician: Brii Lares MD    Primary Care: Hallie Dougherty MD       Referring MD: Brii Lares MD  [address] [place] [address]. Angel Silva [address] [place] [address]    Name: Rebecca Chris :  1955  MRN:  [id number]    Admission: 2019      Date: 2019    Chief Complaint: No chief complaint on file. 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 She underwent radiation therapy to the involved area followed by systemic treatment with bendamustine and rituximab. She completed 6 cycles and proceeded to maintenance rituximab after that. She completed 11 of 12 cycles until restaging scans on 2018 revealed interval increase in the size and new hypermetabolic activity within the mesentery. This lesion was described as 8.6×4.5 cm with SUV of 10.8. She underwent a needle biopsy of this lesion on 2018 (case number [id number]) that reported findings consistent with B-cell non-Hodgkin's lymphoma follicular subtype grade 1-2/3. She begin a trial at [facility] with Rituxan and Revlimid that was discontinued in 2018 r/t dizziness and worsening afib and was started on R-CVP in 2019. She quickly progressed after her treatment completed in 2019 proven by progression on CT in 2019. Biopsy was reviewed in pathology conference and patient was determined to be a 3A follicular lymphoma. She has decided to pursue R-ICE chemotherapy followed by autologous stem cell transplant. She returns to [facility] for follow up and admission for cycle #2 R-ICE. She tolerated Rituxan at [facility] without issues prior to her admission.  She is feeling well, but does have complaints of ongoing weakness to her BL legs that improves in the hospital while on steroids. She denies fever, chills, bleeding, or GI changes. She denies night sweats or shortness of breath. She has no unexplained weight loss and has a good appetite. HISTORY:          Past Surgical History:   Procedure Laterality Date    BREAST CYST ASPIRATION      in past benign bilateral over years. 2 episodes     SECTION      CHOLECYSTECTOMY      COLONOSCOPY  9/10/12    polyp, su, repeat 5 years    COLONOSCOPY  10/09/2017    dr Garima Bob and check in 5 years. polyps    ENDOMETRIAL ABLATION  2007    FINE NEEDLE ASPIRATION      GALLBLADDER SURGERY      OTHER SURGICAL HISTORY  2019    Abdominal Mass BX    OTHER SURGICAL HISTORY  2019    CT BIOPSY ABDOMEN RETROPERITONEUM,    OVARY REMOVAL Left 2018    benign growth, dr Jonn Weber at [place] [address]. TUNNELED VENOUS PORT PLACEMENT Right 2016    Dr. Fawad Arenas          Past Medical History:   Diagnosis Date    Cancer [facility])     Hypertension     Melanoma in situ of back [facility])     dr Rupert Mendoza       Medications:   No current facility-administered medications on file prior to encounter.       Current Outpatient Medications on File Prior to Encounter   Medication Sig Dispense Refill    allopurinol (ZYLOPRIM) 300 MG tablet Take 1 tablet by mouth daily for 7 days 7 tablet 0    prochlorperazine (COMPAZINE) 10 MG tablet Take 1 tablet by mouth every 6 hours as needed (nausea) 60 tablet 3    ondansetron (ZOFRAN) 8 MG tablet Take 1 tablet by mouth every 8 hours as needed for Nausea or Vomiting 30 tablet 3    glimepiride (AMARYL) 2 MG tablet Take 1.5 tablets by mouth 2 times daily (with meals) 270 tablet 1    metoprolol succinate (TOPROL XL) 25 MG extended release tablet Take 0.5 tablets by mouth 2 times daily 90 tablet 3    flecainide (TAMBOCOR) 100 MG tablet Take 1 tablet by mouth 2 times daily 180 tablet 3    apixaban (ELIQUIS) 5 MG TABS tablet Take 1 tablet by mouth 2 times daily (Patient not taking: Reported on 9/23/2019) 180 tablet 3    furosemide (LASIX) 20 MG tablet Take 1 tablet by mouth daily 90 tablet 3    metFORMIN (GLUCOPHAGE-XR) 500 MG extended release tablet Take 1 tablet by mouth daily (with breakfast) 180 tablet 1    potassium chloride (KLOR-CON M) 10 MEQ extended release tablet Take 2 tablets by mouth 2 times daily 360 tablet 2    loratadine (CLARITIN) 10 MG capsule Take 10 mg by mouth as needed       LOTEMAX 0.5 % ophthalmic gel INSTILL 1 GTT MARIMAR QID  4       Allergies: Allergies as of 09/30/2019 - Review Complete 09/23/2019   Allergen Reaction Noted    Penicillins Hives 12/30/2015          Family History   Problem Relation Age of Onset    High Blood Pressure Mother     Cancer Mother         breast    High Blood Pressure Father     Cancer Father         lung          Social History     Tobacco Use    Smoking status: Former Smoker     Packs/day: 1.00     Years: 0.80     Pack years: 0.80     Types: Cigarettes     Start date: 1/7/1975     Last attempt to quit: 1/2/2016     Years since quitting: 3.7    Smokeless tobacco: Never Used   Substance Use Topics    Alcohol use:  Yes     Alcohol/week: 0.0 standard drinks     Comment: 2-3 times a week    Drug use: Not on file       ROS:          See HPI    PHYSICAL EXAM:            General: Awake and alert  HEENT: symmetrical, PERRL, no scleral erythema or icterus, Oral mucosa moist and intact, throat clear  NECK: supple without palpable adenopathy  BACK: Straight negative CVAT  SKIN: warm dry and intact without lesions rashes or masses  CHEST: CTA bilaterally without use of accessory muscles  CV: S1 S2 RRR, no MRG; pulses 2+=bilaterally, capillary refill <2sec  ABD: NT ND normoactive BS, no palpable masses or hepatosplenomegaly  EXTREMITIES: without edema denies calf tenderness  NEURO: CN II - XII grossly intact, alert and oriented to person place time and situation  CATHETER:     DATA:            Recent Labs     09/12/19  0300 09/11/19  0339 09/10/19  0615   WBC 6.9 7.2 7.7   LYMPHOPCT 4.0 3.4 3.8   RBC 3.86* 3.96* 3.95*   HGB 11.8* 12.0 12.0   HCT 35.6* 36.3 36.2   MCV 92.1 91.8 91.5   MCH 30.6 30.4 30.3   MCHC 33.2 33.2 33.1   RDW 17.5* 17.6* 17.3*    232 220         Recent Labs     09/12/19  0300 09/11/19  0339 09/10/19  0310   LABALBU 3.8 3.8 3.8   ALT 29 20 22   AST 20 13* 18   BILITOT 0.3 <0.2 0.3   BUN 25* 19 17   CALCIUM 8.8 8.9 9.0    105 106   CREATININE 0.9 0.8 0.6   LABGLOM >60 >60 >60   CO2 24 23 22   GLUCOSE 154* 297* 204*   K 3.5 3.7 4.2   PROT 6.0* 6.0* 6.1*    141 141   ANIONGAP 14 13 13       PROBLEM LIST:            1. Follicular NHL w/ V0-K2 paraspinal mass and 9 cm mass at root of mesentery. (Dx 1/2016)  2. Atrial fibrillation w/ RVR (Dx 10/2018)   3. Type 2 DM  4. Chronic anticoagulation      TREATMENT:            1. XRT to thoracic spine   2. Bendamustine / Rituxan x 6 cycles (ending 7/2016)  3. Rituxan maintenance x 12 months  4. Clinical Trial - Revlimid & Rituxan x 7 cycles (ending 12/2018)   5. CVP- R x 8 cycles (12/2018 - 6/2019)     6. R-ICE   Cycle #1 - 9/9/19   Cycle #2: 9/30/19      ASSESSMENT AND PLAN:            1. Follicular NHL: ongoing mesentery lesion measuring 8 x 5 cm. Cycle 2 Day 1 R-ICE    restaging pet/ct and if in an adequate remission cont with ASCT    2. ID: Patient is currently afebrile without evidence of infection.   - Start Diflucan, Valtrex and Levaquin prophylaxis once ANC < 1.5    3. Heme: pancytopenia related to recent chemotherapy  - Transfuse for Hgb < 7 and Platelets < 10 K.   - No transfusion today     4. Metabolic: Electrolytes are WNL and renal fxn stable  - Continue KCl 20 meq bid     6. GI / Nutrition:   - Continue low microbial diet    7. Endocrine: Hyperglycemia d/t steroids  - Restart Amaryl and Metformin daily    8. Cardiac:   - H/o A.  Fib  - Continue Eliquis, hold when

## 2019-10-01 LAB
ALBUMIN SERPL-MCNC: 3.7 G/DL (ref 3.4–5)
ALP BLD-CCNC: 119 U/L (ref 40–129)
ALT SERPL-CCNC: 30 U/L (ref 10–40)
ANION GAP SERPL CALCULATED.3IONS-SCNC: 13 MMOL/L (ref 3–16)
AST SERPL-CCNC: 22 U/L (ref 15–37)
BASOPHILS ABSOLUTE: 0 K/UL (ref 0–0.2)
BASOPHILS RELATIVE PERCENT: 0.3 %
BILIRUB SERPL-MCNC: 0.4 MG/DL (ref 0–1)
BILIRUBIN DIRECT: <0.2 MG/DL (ref 0–0.3)
BILIRUBIN, INDIRECT: ABNORMAL MG/DL (ref 0–1)
BUN BLDV-MCNC: 17 MG/DL (ref 7–20)
CALCIUM SERPL-MCNC: 8.9 MG/DL (ref 8.3–10.6)
CHLORIDE BLD-SCNC: 106 MMOL/L (ref 99–110)
CO2: 22 MMOL/L (ref 21–32)
CREAT SERPL-MCNC: 0.8 MG/DL (ref 0.6–1.2)
EOSINOPHILS ABSOLUTE: 0 K/UL (ref 0–0.6)
EOSINOPHILS RELATIVE PERCENT: 0 %
GFR AFRICAN AMERICAN: >60
GFR NON-AFRICAN AMERICAN: >60
GLUCOSE BLD-MCNC: 153 MG/DL (ref 70–99)
GLUCOSE BLD-MCNC: 217 MG/DL (ref 70–99)
GLUCOSE BLD-MCNC: 281 MG/DL (ref 70–99)
GLUCOSE BLD-MCNC: 302 MG/DL (ref 70–99)
GLUCOSE BLD-MCNC: 303 MG/DL (ref 70–99)
HCT VFR BLD CALC: 28.5 % (ref 36–48)
HEMOGLOBIN: 9.6 G/DL (ref 12–16)
LACTATE DEHYDROGENASE: 319 U/L (ref 100–190)
LYMPHOCYTES ABSOLUTE: 0.3 K/UL (ref 1–5.1)
LYMPHOCYTES RELATIVE PERCENT: 1.8 %
MCH RBC QN AUTO: 30.1 PG (ref 26–34)
MCHC RBC AUTO-ENTMCNC: 33.6 G/DL (ref 31–36)
MCV RBC AUTO: 89.4 FL (ref 80–100)
MONOCYTES ABSOLUTE: 0.5 K/UL (ref 0–1.3)
MONOCYTES RELATIVE PERCENT: 3.8 %
NEUTROPHILS ABSOLUTE: 13.1 K/UL (ref 1.7–7.7)
NEUTROPHILS RELATIVE PERCENT: 94.1 %
PDW BLD-RTO: 16.5 % (ref 12.4–15.4)
PERFORMED ON: ABNORMAL
PHOSPHORUS: 3.1 MG/DL (ref 2.5–4.9)
PLATELET # BLD: 301 K/UL (ref 135–450)
PMV BLD AUTO: 8.5 FL (ref 5–10.5)
POTASSIUM SERPL-SCNC: 4.3 MMOL/L (ref 3.5–5.1)
RBC # BLD: 3.19 M/UL (ref 4–5.2)
SODIUM BLD-SCNC: 141 MMOL/L (ref 136–145)
TOTAL PROTEIN: 6.2 G/DL (ref 6.4–8.2)
URIC ACID, SERUM: 5.7 MG/DL (ref 2.6–6)
WBC # BLD: 13.9 K/UL (ref 4–11)

## 2019-10-01 PROCEDURE — 36592 COLLECT BLOOD FROM PICC: CPT

## 2019-10-01 PROCEDURE — 2580000003 HC RX 258: Performed by: NURSE PRACTITIONER

## 2019-10-01 PROCEDURE — 6370000000 HC RX 637 (ALT 250 FOR IP): Performed by: NURSE PRACTITIONER

## 2019-10-01 PROCEDURE — 96417 CHEMO IV INFUS EACH ADDL SEQ: CPT

## 2019-10-01 PROCEDURE — 96415 CHEMO IV INFUSION ADDL HR: CPT

## 2019-10-01 PROCEDURE — 80048 BASIC METABOLIC PNL TOTAL CA: CPT

## 2019-10-01 PROCEDURE — 6370000000 HC RX 637 (ALT 250 FOR IP): Performed by: INTERNAL MEDICINE

## 2019-10-01 PROCEDURE — 96413 CHEMO IV INFUSION 1 HR: CPT

## 2019-10-01 PROCEDURE — 80076 HEPATIC FUNCTION PANEL: CPT

## 2019-10-01 PROCEDURE — 85025 COMPLETE CBC W/AUTO DIFF WBC: CPT

## 2019-10-01 PROCEDURE — 2060000000 HC ICU INTERMEDIATE R&B

## 2019-10-01 PROCEDURE — 6360000002 HC RX W HCPCS: Performed by: INTERNAL MEDICINE

## 2019-10-01 PROCEDURE — 84100 ASSAY OF PHOSPHORUS: CPT

## 2019-10-01 PROCEDURE — 2580000003 HC RX 258: Performed by: INTERNAL MEDICINE

## 2019-10-01 PROCEDURE — 83615 LACTATE (LD) (LDH) ENZYME: CPT

## 2019-10-01 PROCEDURE — 84550 ASSAY OF BLOOD/URIC ACID: CPT

## 2019-10-01 RX ORDER — SODIUM CHLORIDE 450 MG/100ML
INJECTION, SOLUTION INTRAVENOUS CONTINUOUS
Status: DISCONTINUED | OUTPATIENT
Start: 2019-10-01 | End: 2019-10-03

## 2019-10-01 RX ADMIN — INSULIN LISPRO 3 UNITS: 100 INJECTION, SOLUTION INTRAVENOUS; SUBCUTANEOUS at 11:42

## 2019-10-01 RX ADMIN — FLECAINIDE ACETATE 100 MG: 100 TABLET ORAL at 08:35

## 2019-10-01 RX ADMIN — METFORMIN HYDROCHLORIDE 500 MG: 500 TABLET, EXTENDED RELEASE ORAL at 08:34

## 2019-10-01 RX ADMIN — SODIUM CHLORIDE 15 ML: 900 IRRIGANT IRRIGATION at 20:27

## 2019-10-01 RX ADMIN — INSULIN LISPRO 12 UNITS: 100 INJECTION, SOLUTION INTRAVENOUS; SUBCUTANEOUS at 08:37

## 2019-10-01 RX ADMIN — Medication 10 ML: at 08:34

## 2019-10-01 RX ADMIN — APIXABAN 5 MG: 5 TABLET, FILM COATED ORAL at 20:22

## 2019-10-01 RX ADMIN — POTASSIUM CHLORIDE 20 MEQ: 20 TABLET, EXTENDED RELEASE ORAL at 20:22

## 2019-10-01 RX ADMIN — Medication 10 ML: at 20:21

## 2019-10-01 RX ADMIN — GLIMEPIRIDE 3 MG: 2 TABLET ORAL at 17:00

## 2019-10-01 RX ADMIN — POTASSIUM CHLORIDE 20 MEQ: 20 TABLET, EXTENDED RELEASE ORAL at 08:34

## 2019-10-01 RX ADMIN — DEXAMETHASONE 20 MG: 4 TABLET ORAL at 09:31

## 2019-10-01 RX ADMIN — MESNA: 100 INJECTION, SOLUTION INTRAVENOUS at 14:04

## 2019-10-01 RX ADMIN — ONDANSETRON HYDROCHLORIDE 24 MG: 8 TABLET, FILM COATED ORAL at 09:31

## 2019-10-01 RX ADMIN — SODIUM CHLORIDE: 4.5 INJECTION, SOLUTION INTRAVENOUS at 18:02

## 2019-10-01 RX ADMIN — CETIRIZINE HYDROCHLORIDE 10 MG: 10 TABLET, FILM COATED ORAL at 08:34

## 2019-10-01 RX ADMIN — DEXTROSE AND SODIUM CHLORIDE: 5; 450 INJECTION, SOLUTION INTRAVENOUS at 06:47

## 2019-10-01 RX ADMIN — METOPROLOL SUCCINATE 12.5 MG: 25 TABLET, EXTENDED RELEASE ORAL at 08:33

## 2019-10-01 RX ADMIN — APIXABAN 5 MG: 5 TABLET, FILM COATED ORAL at 08:33

## 2019-10-01 RX ADMIN — SODIUM CHLORIDE: 4.5 INJECTION, SOLUTION INTRAVENOUS at 08:34

## 2019-10-01 RX ADMIN — SODIUM CHLORIDE: 4.5 INJECTION, SOLUTION INTRAVENOUS at 23:32

## 2019-10-01 RX ADMIN — ETOPOSIDE 220 MG: 20 INJECTION, SOLUTION, CONCENTRATE INTRAVENOUS at 10:28

## 2019-10-01 RX ADMIN — GLIMEPIRIDE 3 MG: 2 TABLET ORAL at 08:34

## 2019-10-01 RX ADMIN — INSULIN LISPRO 12 UNITS: 100 INJECTION, SOLUTION INTRAVENOUS; SUBCUTANEOUS at 16:58

## 2019-10-01 RX ADMIN — FLECAINIDE ACETATE 100 MG: 100 TABLET ORAL at 20:22

## 2019-10-01 RX ADMIN — METOPROLOL SUCCINATE 12.5 MG: 25 TABLET, EXTENDED RELEASE ORAL at 20:22

## 2019-10-01 RX ADMIN — INSULIN LISPRO 3 UNITS: 100 INJECTION, SOLUTION INTRAVENOUS; SUBCUTANEOUS at 20:17

## 2019-10-01 RX ADMIN — FUROSEMIDE 20 MG: 20 TABLET ORAL at 08:34

## 2019-10-01 RX ADMIN — CARBOPLATIN 477 MG: 10 INJECTION, SOLUTION INTRAVENOUS at 12:36

## 2019-10-01 ASSESSMENT — PAIN SCALES - GENERAL
PAINLEVEL_OUTOF10: 0

## 2019-10-01 NOTE — PROGRESS NOTES
[address] Progress Note    10/1/2019     Ester Bryson    MRN: [id number]    : 1955    Referring MD: Aron Burgess MD  [address] [address]      SUBJECTIVE:  Doing well - no new complaints    ECOG PS:  (1) Restricted in physically strenuous activity, ambulatory and able to do work of light nature    Isolation: None    Medications    Scheduled Meds:   insulin lispro  0-18 Units Subcutaneous TID WC    insulin lispro  0-9 Units Subcutaneous Nightly    apixaban  5 mg Oral BID    flecainide  100 mg Oral BID    furosemide  20 mg Oral Daily    glimepiride  3 mg Oral BID WC    cetirizine  10 mg Oral Daily    metFORMIN  500 mg Oral Daily with breakfast    metoprolol succinate  12.5 mg Oral BID    potassium chloride  20 mEq Oral BID    Saline Mouthwash  15 mL Swish & Spit 4x Daily AC & HS    sodium chloride flush  10 mL Intravenous 2 times per day    furosemide  40 mg Intravenous Q12H    ondansetron  24 mg Oral Once    dexamethasone  20 mg Oral Once    etoposide (VEPESID) chemo IVPB  220 mg Intravenous Once    CARBOplatin (PARAPLATIN) chemo infusion  477 mg Intravenous Once    [START ON 10/2/2019] ondansetron  24 mg Oral Once    [START ON 10/2/2019] dexamethasone  20 mg Oral Once    [START ON 10/2/2019] mesna (MESNEX) IVPB  2,200 mg Intravenous Once    [START ON 10/2/2019] etoposide (VEPESID) chemo IVPB  220 mg Intravenous Once    [START ON 10/2/2019] mesna (MESNEX) IVPB  2,200 mg Intravenous Once    [START ON 10/2/2019] mesna (MESNEX) IVPB  2,200 mg Intravenous Once     Continuous Infusions:   sodium chloride      dextrose 5 % and 0.45 % NaCl 150 mL/hr at 10/01/19 0647    ifosfamide/mesna (IFEX/MESNEX) chemo infusion      dextrose       PRN Meds:.ondansetron, acetaminophen, sodium chloride, alteplase, magnesium hydroxide, magnesium sulfate, potassium chloride, Saline Mouthwash, prochlorperazine **OR** prochlorperazine, LORazepam **OR** LORazepam, glucose, dextrose, glucagon (rDNA), dextrose    ROS:  As noted above, otherwise remainder of 10-point ROS negative    Physical Exam:     I&O:      Intake/Output Summary (Last 24 hours) at 10/1/2019 0738  Last data filed at 10/1/2019 0647  Gross per 24 hour   Intake 3521 ml   Output 3500 ml   Net 21 ml       Vital Signs:  BP (!) 120/57   Pulse 82   Temp 97.7 °F (36.5 °C) (Oral)   Resp 16   Ht 5' 3\" (1.6 m)   Wt 235 lb (106.6 kg)   SpO2 95%   BMI 41.63 kg/m²     Weight:    Wt Readings from Last 3 Encounters:   09/30/19 235 lb (106.6 kg)   09/12/19 243 lb 11.2 oz (110.5 kg)   08/05/19 235 lb 0.2 oz (106.6 kg)         General: Awake, alert and oriented. HEENT: normocephalic, no scleral erythema or icterus, Oral mucosa moist and intact, throat clear  NECK: supple without palpable adenopathy  BACK: Straight negative CVAT  SKIN: warm dry and intact without lesions rashes or masses  CHEST: CTA bilaterally without use of accessory muscles  CV: Normal S1 S2, RRR, no MRG  ABD: NT ND normoactive BS, no palpable masses or hepatosplenomegaly  EXTREMITIES: without edema, denies calf tenderness  NEURO: CN II - XII grossly intact  CATHETER: Right IJ PAC (2/26/16, IR) - CDI    Data    CBC:   Recent Labs     10/01/19  0345   WBC 13.9*   HGB 9.6*   HCT 28.5*   MCV 89.4        BMP/Mag:  Recent Labs     09/30/19  1545 10/01/19  0345    141   K 3.5 4.3    106   CO2 22 22   PHOS 2.4* 3.1   BUN 14 17   CREATININE 0.7 0.8     LIVP:   Recent Labs     10/01/19  0345   AST 22   ALT 30   BILIDIR <0.2   BILITOT 0.4   ALKPHOS 119     Coags: No results for input(s): PROTIME, INR, APTT in the last 72 hours. Uric Acid   Recent Labs     10/01/19  0345   LABURIC 5.7       PROBLEM LIST:             1. Follicular NHL w/ X3-Y6 paraspinal mass and 9 cm mass at root of mesentery. (Dx 1/2016)  2. Atrial fibrillation w/ RVR (Dx 10/2018)    3.  Type 2 DM  4.  Chronic  anticoagulation      TREATMENT:             1.  XRT to thoracic spine   2. Bendamustine / Rituxan x 6 cycles (ending 7/2016)  3.  Rituxan maintenance x 12 months  4. Clinical Trial - Revlimid & Rituxan x 7 cycles (ending 12/2018)   5. CVP- R x 8 cycles (12/2018 - 6/2019)    6. R-ICE   Cycle #1 - 9/9/19  Cycle #2 - 9/30/19     ASSESSMENT AND PLAN:             1. 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 PLAN: R - ICE x 2 or 3 cycles followed by PET/CT scan. Then autologous stem cell transplant with GCSF mobilization if in adequate remission      Cycle 1, Day + 2     2. ID: Patient is currently afebrile without evidence of infection.   - Start Diflucan, Valtrex and Levaquin prophylaxis once ANC < 1.5.      3. Heme:  Anemia related to recent chemotherapy  - Transfuse for Hgb < 7 and Platelets < 68 K.   - No transfusion today   - OBI on 10/3/19     4. Metabolic:  Electrolytes are WNL and renal fxn stable  - Change IVF:  .45NS @ 150 mL/hr  - Cont KCl 20 meq bid   - Replace Potassium and Magnesium per protocol.      6.  GI / Nutrition:          - Cont low microbial diet  - Dietary to follow closely      7.  Endocrine: Hyperglycemia d/t steroids  - Cont Amaryl and Metformin daily  - Start high regimen Lispro SSI, AC&HS     8.  Cardiac: No acute issues   - H/o A. Fib  - Cont Eliquis, hold when platelets < 03Y  - Cont flecainide & Toprol XL 12.5 mg daily   - Cont Lasix 20 mg daily      9. Thyroid nodule:  - S/p biopsy (8/26/2019) - benign       - DVT Prophylaxis: Prophylaxis on hold d/t contraindication  Contraindications to pharmacologic prophylaxis: Patient already on therapeutic anticoagulation  Contraindications to mechanical prophylaxis: None    - Disposition: Home and return to [facility] the following:  OBI - 10/03/2019 arrival @ 11:30 am or after DC from [address] [facility] & labs:  10/05/2019 arrival @ 9:30 am       ALEXUS Moreland - FABBY You [employer]. Donis Chakraborty DO, MS  Oncology/Hematology Care    Please contact via:  1. [employer]  2.   Cell Phone:  [phone]    10/1/2019   10:49 AM

## 2019-10-01 NOTE — PLAN OF CARE
Problem: Discharge Planning:  Goal: Discharged to appropriate level of care  Description  Discharged to appropriate level of care  10/1/2019 0753 by Dayami Armenta RN  Outcome: Ongoing  Note:   Pt has been updated on POC and has no questions at this time. Will continue to monitor. Problem: PROTECTIVE PRECAUTIONS  Goal: Patient will remain free of nosocomial Infections  10/1/2019 0753 by Dayami Armenta RN  Outcome: Ongoing  Note:   Pt currently in a private, positive pressure room. Educated pt on wearing a mask when neutropenic and/or leaving the floor. No living plants or flowers allowed. Also reinforced importance of hand hygiene. Pt following a low microbial diet. Surfaces throughout room cleaned with bleach wipes per unit policy. 10/1/2019 0752 by Dayami Armenta RN  Outcome: Ongoing     Problem: Falls - Risk of:  Goal: Will remain free from falls  Description  Will remain free from falls  10/1/2019 0753 by Dayami Armenta RN  Outcome: Ongoing  Note:   Orthostatic vital signs obtained at start of shift - see flowsheet for details. Pt does not meet criteria for orthostasis. Pt is a Med fall risk. See Kecia Bradley Fall Score and ABCDS Injury Risk assessments. - Screening for Orthostasis AND not a [place] Risk per OSBORN/ABCDS: Pt bed is in low position, side rails up, call light and belongings are in reach. Fall risk light is on outside pts room. Pt encouraged to call for assistance as needed. Will continue with hourly rounds for PO intake, pain needs, toileting and repositioning as needed. 10/1/2019 0752 by Dayami Armenta RN  Outcome: Ongoing     Problem: Venous Thromboembolism:  Goal: Will show no signs or symptoms of venous thromboembolism  Description  Will show no signs or symptoms of venous thromboembolism  Outcome: Ongoing  Note:     Adherent with DVT Prevention: Pt is at risk for DVT d/t decreased mobility and cancer treatment.   Pt educated on importance of activity. Pt has orders for eliquis . Pt verbalizes understanding of need for prophylaxis while inpatient.

## 2019-10-01 NOTE — PROGRESS NOTES
VSS. Reassessment completed. Scheduled meds given at this time. Pt resting comfortably in chair. Call light within reach. Denies further needs at this time. Will continue to monitor. Administration: Chemotherapy drug carboplatin independently verified with Phill Colon RN prior to administration. Acknowledgement of informed consent for chemotherapy administration verified. Original order, appropriateness of regimen, drug supplied, height, weight, BSA, dose calculations, expiration dates/times, drug appearance, and two patient identifiers were verified by both RNs. Drug checked for vesicant/irritant status and for risk of hypersensitivity. Most recent laboratory values and allergies, were reviewed. Positive, brisk blood return via CVC was confirmed prior to administration. Chest x-ray for correct line placement reviewed. Lucille Maharaj and Phill Colon RN verified correct rate of chemotherapy and maintenance IV fluids. Patient was educated on chemotherapy regimen prior to administration including indication for treatment related to disease & side effects of chemotherapy drug. Patient verbalizes understanding of all instructions. Completion of Chemotherapy: Monitoring during infusion done per policy, see Flowsheets. Blood return verified before, during, and after infusion per policy; no signs of extravasation. Pt tolerating chemotherapy well and without incident. Chemotherapy infusion end time on the [facility] Will continue to monitor.

## 2019-10-01 NOTE — PROGRESS NOTES
VSS. Ortho negative. Assessment completed. Scheduled meds given whole with water. Pt resting comfortably in chair. Call light within reach. Denies further needs at this time. Will continue to monitor.  Electronically signed by Claudia Shah RN on 10/1/2019 at 8:45 AM

## 2019-10-01 NOTE — CARE COORDINATION
Initial Social Work Note    Patient was admitted to the unit yesterday for C2 of R-ICE. She was diagnosed in 6571 with follicular NHL. SW talked with patient today. She asked about needing to stay local after transplant. Patient lives in [place], [place]. NIYA explained she would likely need to stay near the hospital.  NIYA talked with her regarding the [facility] List (SW will give this to her tomorrow), The [address] [place] [address] and ACS. Each were explained to her. She said presently family and friends have been able to get her to appointments. Patient does not have any services in the home. Plan of care:  SW will monitor for discharge and psychosocial needs    Estimated discharge date: Thursday, 10/3  Destination: return home  Advanced Directives on chart? No  Patient/family aware of discharge plans?  Yes    EDGAR Babcock, [address]

## 2019-10-01 NOTE — PROGRESS NOTES
VSS. Reassessment completed. Scheduled meds given at this time. Pt resting comfortably in chair. Call light within reach. Denies further needs at this time. Will continue to monitor.  Electronically signed by Latonya Mora RN on 10/1/2019 at 5:14 PM

## 2019-10-01 NOTE — PROGRESS NOTES
Completion of Chemotherapy: Monitoring during infusion done per policy, see Flowsheets. Blood return verified before, during, and after infusion per policy; no signs of extravasation. Pt tolerated chemotherapy well and without incident. Chemotherapy infusion end time on the [facility]. Will continue to monitor.

## 2019-10-01 NOTE — PLAN OF CARE
Problem: PROTECTIVE PRECAUTIONS  Goal: Patient will remain free of nosocomial Infections  10/1/2019 1810 by Luis Alfredo Rouse RN  Outcome: Ongoing  Note:   Pt remains in protective precautions. No living plants or fresh flowers in his/her room. Patient educated on wearing mask when in hallways. Patient, staff, and visitors adhering to handwashing guidelines. Patient cleansed with chlorhexidine wipes and linens changed daily per protocol. Pt verbalizes understanding of low microbial diet. Patient remains free of nosocomial infections. Problem: Falls - Risk of:  Goal: Will remain free from falls  Description  Will remain free from falls  10/1/2019 1810 by Luis Alfredo Rouse RN  Outcome: Ongoing  Note:   Orthostatic vital signs obtained at start of shift - see flowsheet for details. Pt does not meet criteria for orthostasis. Pt is a Med fall risk. See [facility] Fall Score and ABCDS Injury Risk assessments. Pt bed is in low position, side rails up, call light and belongings are in reach. Fall risk light is on outside pts room. Pt encouraged to call for assistance as needed. Will continue with hourly rounds for PO intake, pain needs, toileting and repositioning as needed.

## 2019-10-01 NOTE — PROGRESS NOTES
Administration: Chemotherapy drug ifosfamide independently verified with Satnam Dominguez RN prior to administration. Acknowledgement of informed consent for chemotherapy administration verified. Original order, appropriateness of regimen, drug supplied, height, weight, BSA, dose calculations, expiration dates/times, drug appearance, and two patient identifiers were verified by both RNs. Drug checked for vesicant/irritant status and for risk of hypersensitivity. Most recent laboratory values and allergies, were reviewed. Positive, brisk blood return via CVC was confirmed prior to administration. Chest x-ray for correct line placement reviewed. Leopoldo Crutch and Satnam Dominguez RN verified correct rate of chemotherapy and maintenance IV fluids. Patient was educated on chemotherapy regimen prior to administration including indication for treatment related to disease & side effects of chemotherapy drug. Patient verbalizes understanding of all instructions. Completion of Chemotherapy: Monitoring during infusion done per policy, see Flowsheets. Blood return verified before, during, and after infusion per policy; no signs of extravasation. Pt tolerating chemotherapy well and without incident. Chemotherapy infusion end time on the [facility] Will continue to monitor.

## 2019-10-01 NOTE — PROGRESS NOTES
Administration: Chemotherapy drug etoposide independently verified with Shelley Woodward RN prior to administration. Acknowledgement of informed consent for chemotherapy administration verified. Original order, appropriateness of regimen, drug supplied, height, weight, BSA, dose calculations, expiration dates/times, drug appearance, and two patient identifiers were verified by both RNs. Drug checked for vesicant/irritant status and for risk of hypersensitivity. Most recent laboratory values and allergies, were reviewed. Positive, brisk blood return via CVC was confirmed prior to administration. Chest x-ray for correct line placement reviewed. Claudia Shah and Shelley Woodward RN verified correct rate of chemotherapy and maintenance IV fluids. Patient was educated on chemotherapy regimen prior to administration including indication for treatment related to disease & side effects of chemotherapy drug. Patient verbalizes understanding of all instructions. Completion of Chemotherapy: Monitoring during infusion done per policy, see Flowsheets. Blood return verified before, during, and after infusion per policy; no signs of extravasation. Pt tolerating chemotherapy well and without incident. Chemotherapy infusion end time on the [facility] Will continue to monitor.

## 2019-10-02 LAB
ALBUMIN SERPL-MCNC: 4 G/DL (ref 3.4–5)
ALP BLD-CCNC: 103 U/L (ref 40–129)
ALT SERPL-CCNC: 34 U/L (ref 10–40)
ANION GAP SERPL CALCULATED.3IONS-SCNC: 17 MMOL/L (ref 3–16)
AST SERPL-CCNC: 24 U/L (ref 15–37)
BASOPHILS ABSOLUTE: 0 K/UL (ref 0–0.2)
BASOPHILS RELATIVE PERCENT: 0.2 %
BILIRUB SERPL-MCNC: <0.2 MG/DL (ref 0–1)
BILIRUBIN DIRECT: <0.2 MG/DL (ref 0–0.3)
BILIRUBIN, INDIRECT: ABNORMAL MG/DL (ref 0–1)
BUN BLDV-MCNC: 21 MG/DL (ref 7–20)
CALCIUM SERPL-MCNC: 8.6 MG/DL (ref 8.3–10.6)
CHLORIDE BLD-SCNC: 105 MMOL/L (ref 99–110)
CO2: 20 MMOL/L (ref 21–32)
CREAT SERPL-MCNC: 0.7 MG/DL (ref 0.6–1.2)
EOSINOPHILS ABSOLUTE: 0 K/UL (ref 0–0.6)
EOSINOPHILS RELATIVE PERCENT: 0 %
GFR AFRICAN AMERICAN: >60
GFR NON-AFRICAN AMERICAN: >60
GLUCOSE BLD-MCNC: 156 MG/DL (ref 70–99)
GLUCOSE BLD-MCNC: 179 MG/DL (ref 70–99)
GLUCOSE BLD-MCNC: 204 MG/DL (ref 70–99)
GLUCOSE BLD-MCNC: 238 MG/DL (ref 70–99)
GLUCOSE BLD-MCNC: 245 MG/DL (ref 70–99)
HCT VFR BLD CALC: 29.3 % (ref 36–48)
HEMOGLOBIN: 9.8 G/DL (ref 12–16)
LACTATE DEHYDROGENASE: 266 U/L (ref 100–190)
LYMPHOCYTES ABSOLUTE: 0.2 K/UL (ref 1–5.1)
LYMPHOCYTES RELATIVE PERCENT: 1.7 %
MCH RBC QN AUTO: 30.1 PG (ref 26–34)
MCHC RBC AUTO-ENTMCNC: 33.3 G/DL (ref 31–36)
MCV RBC AUTO: 90.2 FL (ref 80–100)
MONOCYTES ABSOLUTE: 0.5 K/UL (ref 0–1.3)
MONOCYTES RELATIVE PERCENT: 4.2 %
NEUTROPHILS ABSOLUTE: 11.6 K/UL (ref 1.7–7.7)
NEUTROPHILS RELATIVE PERCENT: 93.9 %
PDW BLD-RTO: 16.5 % (ref 12.4–15.4)
PERFORMED ON: ABNORMAL
PHOSPHORUS: 3.4 MG/DL (ref 2.5–4.9)
PLATELET # BLD: 347 K/UL (ref 135–450)
PMV BLD AUTO: 8.8 FL (ref 5–10.5)
POTASSIUM SERPL-SCNC: 4.2 MMOL/L (ref 3.5–5.1)
RBC # BLD: 3.25 M/UL (ref 4–5.2)
SODIUM BLD-SCNC: 142 MMOL/L (ref 136–145)
TOTAL PROTEIN: 6 G/DL (ref 6.4–8.2)
URIC ACID, SERUM: 5.1 MG/DL (ref 2.6–6)
VRE CULTURE: NORMAL
WBC # BLD: 12.4 K/UL (ref 4–11)

## 2019-10-02 PROCEDURE — 6370000000 HC RX 637 (ALT 250 FOR IP): Performed by: NURSE PRACTITIONER

## 2019-10-02 PROCEDURE — 80076 HEPATIC FUNCTION PANEL: CPT

## 2019-10-02 PROCEDURE — 6360000002 HC RX W HCPCS: Performed by: INTERNAL MEDICINE

## 2019-10-02 PROCEDURE — 36591 DRAW BLOOD OFF VENOUS DEVICE: CPT

## 2019-10-02 PROCEDURE — 2580000003 HC RX 258: Performed by: NURSE PRACTITIONER

## 2019-10-02 PROCEDURE — 84550 ASSAY OF BLOOD/URIC ACID: CPT

## 2019-10-02 PROCEDURE — 2060000000 HC ICU INTERMEDIATE R&B

## 2019-10-02 PROCEDURE — 84100 ASSAY OF PHOSPHORUS: CPT

## 2019-10-02 PROCEDURE — 85025 COMPLETE CBC W/AUTO DIFF WBC: CPT

## 2019-10-02 PROCEDURE — 83615 LACTATE (LD) (LDH) ENZYME: CPT

## 2019-10-02 PROCEDURE — 80048 BASIC METABOLIC PNL TOTAL CA: CPT

## 2019-10-02 PROCEDURE — 96413 CHEMO IV INFUSION 1 HR: CPT

## 2019-10-02 PROCEDURE — 2580000003 HC RX 258: Performed by: INTERNAL MEDICINE

## 2019-10-02 PROCEDURE — 6370000000 HC RX 637 (ALT 250 FOR IP): Performed by: INTERNAL MEDICINE

## 2019-10-02 PROCEDURE — 96415 CHEMO IV INFUSION ADDL HR: CPT

## 2019-10-02 RX ADMIN — METOPROLOL SUCCINATE 12.5 MG: 25 TABLET, EXTENDED RELEASE ORAL at 08:14

## 2019-10-02 RX ADMIN — SODIUM CHLORIDE: 4.5 INJECTION, SOLUTION INTRAVENOUS at 06:35

## 2019-10-02 RX ADMIN — GLIMEPIRIDE 3 MG: 2 TABLET ORAL at 07:36

## 2019-10-02 RX ADMIN — INSULIN LISPRO 3 UNITS: 100 INJECTION, SOLUTION INTRAVENOUS; SUBCUTANEOUS at 20:21

## 2019-10-02 RX ADMIN — SODIUM CHLORIDE 15 ML: 900 IRRIGANT IRRIGATION at 20:23

## 2019-10-02 RX ADMIN — METOPROLOL SUCCINATE 12.5 MG: 25 TABLET, EXTENDED RELEASE ORAL at 20:23

## 2019-10-02 RX ADMIN — MESNA 2200 MG: 100 INJECTION, SOLUTION INTRAVENOUS at 15:34

## 2019-10-02 RX ADMIN — Medication 10 ML: at 08:14

## 2019-10-02 RX ADMIN — APIXABAN 5 MG: 5 TABLET, FILM COATED ORAL at 20:23

## 2019-10-02 RX ADMIN — CETIRIZINE HYDROCHLORIDE 10 MG: 10 TABLET, FILM COATED ORAL at 08:14

## 2019-10-02 RX ADMIN — MESNA 2200 MG: 100 INJECTION, SOLUTION INTRAVENOUS at 18:52

## 2019-10-02 RX ADMIN — ONDANSETRON HYDROCHLORIDE 24 MG: 8 TABLET, FILM COATED ORAL at 12:51

## 2019-10-02 RX ADMIN — FLECAINIDE ACETATE 100 MG: 100 TABLET ORAL at 08:15

## 2019-10-02 RX ADMIN — SODIUM CHLORIDE: 4.5 INJECTION, SOLUTION INTRAVENOUS at 11:17

## 2019-10-02 RX ADMIN — GLIMEPIRIDE 3 MG: 2 TABLET ORAL at 16:53

## 2019-10-02 RX ADMIN — Medication 10 ML: at 20:23

## 2019-10-02 RX ADMIN — POTASSIUM CHLORIDE 20 MEQ: 20 TABLET, EXTENDED RELEASE ORAL at 08:14

## 2019-10-02 RX ADMIN — METFORMIN HYDROCHLORIDE 500 MG: 500 TABLET, EXTENDED RELEASE ORAL at 07:36

## 2019-10-02 RX ADMIN — SODIUM CHLORIDE: 4.5 INJECTION, SOLUTION INTRAVENOUS at 20:31

## 2019-10-02 RX ADMIN — INSULIN LISPRO 3 UNITS: 100 INJECTION, SOLUTION INTRAVENOUS; SUBCUTANEOUS at 07:33

## 2019-10-02 RX ADMIN — SODIUM CHLORIDE 15 ML: 900 IRRIGANT IRRIGATION at 17:00

## 2019-10-02 RX ADMIN — FLECAINIDE ACETATE 100 MG: 100 TABLET ORAL at 20:24

## 2019-10-02 RX ADMIN — INSULIN LISPRO 3 UNITS: 100 INJECTION, SOLUTION INTRAVENOUS; SUBCUTANEOUS at 11:19

## 2019-10-02 RX ADMIN — POTASSIUM CHLORIDE 20 MEQ: 20 TABLET, EXTENDED RELEASE ORAL at 20:23

## 2019-10-02 RX ADMIN — MESNA 2200 MG: 100 INJECTION, SOLUTION INTRAVENOUS at 20:27

## 2019-10-02 RX ADMIN — INSULIN LISPRO 6 UNITS: 100 INJECTION, SOLUTION INTRAVENOUS; SUBCUTANEOUS at 16:57

## 2019-10-02 RX ADMIN — DEXAMETHASONE 20 MG: 4 TABLET ORAL at 12:52

## 2019-10-02 RX ADMIN — SODIUM CHLORIDE 15 ML: 900 IRRIGANT IRRIGATION at 11:13

## 2019-10-02 RX ADMIN — ETOPOSIDE 220 MG: 20 INJECTION, SOLUTION, CONCENTRATE INTRAVENOUS at 16:02

## 2019-10-02 RX ADMIN — APIXABAN 5 MG: 5 TABLET, FILM COATED ORAL at 08:14

## 2019-10-02 RX ADMIN — FUROSEMIDE 20 MG: 20 TABLET ORAL at 08:14

## 2019-10-02 ASSESSMENT — PAIN SCALES - GENERAL
PAINLEVEL_OUTOF10: 0

## 2019-10-02 NOTE — CARE COORDINATION
Type of Admission  B Cell Lymphoma  C2  Day #3 R-ICE        Central venous catheter  Single Lumen Port Right Chest        Plan  Proceed with Course #2 of R-ICE with re-staging for Auto SCT after this course        Update  9/30/19: Planned admission for 2nd course of R-ICE. Received Rituxan at [facility] prior to admit. Reports requiring IV fluids in the office for 2 days after discharge. Education  9/30/19:  Reviewed treatment calendar. Discussed ways to add extra fluid to her diet post discharge, states she can tolerate G2, certain flavors.         Discharge  Thursday, 10/3---> Neulasta OBI at Discharge        Pending

## 2019-10-02 NOTE — CARE COORDINATION
Type of Admission  B Cell Lymphoma  C2  Day #3 R-ICE        Central venous catheter  Single Lumen Port Right Chest        Plan  Proceed with Course #2 of R-ICE with re-staging for Auto SCT after this course        Update  9/30/19: Planned admission for 2nd course of R-ICE. Received Rituxan at [facility] prior to admit. Reports requiring IV fluids in the office for 2 days after discharge. 10/2/19:  Discussed follow up appointments at [facility] post discharge tomorrow. Education  9/30/19:  Reviewed treatment calendar. Discussed ways to add extra fluid to her diet post discharge, states she can tolerate G2, certain flavors.         Discharge  Thursday, 10/3---> Neulasta OBI at Discharge--> See AVS for appoitments        Pending

## 2019-10-02 NOTE — PROGRESS NOTES
[facility] Progress Note    10/2/2019     Rafy Levine    MRN: [id number]    : 1955    Referring MD: Snehal Smith MD  [address] [facility], [place] [facility] [address]      SUBJECTIVE:  Doing well - no new complaints    ECOG PS:  (1) Restricted in physically strenuous activity, ambulatory and able to do work of light nature    Isolation: None    Medications    Scheduled Meds:   insulin lispro  0-18 Units Subcutaneous TID WC    insulin lispro  0-9 Units Subcutaneous Nightly    apixaban  5 mg Oral BID    flecainide  100 mg Oral BID    furosemide  20 mg Oral Daily    glimepiride  3 mg Oral BID WC    cetirizine  10 mg Oral Daily    metFORMIN  500 mg Oral Daily with breakfast    metoprolol succinate  12.5 mg Oral BID    potassium chloride  20 mEq Oral BID    Saline Mouthwash  15 mL Swish & Spit 4x Daily AC & HS    sodium chloride flush  10 mL Intravenous 2 times per day    furosemide  40 mg Intravenous Q12H    ondansetron  24 mg Oral Once    dexamethasone  20 mg Oral Once    mesna (MESNEX) IVPB  2,200 mg Intravenous Once    etoposide (VEPESID) chemo IVPB  220 mg Intravenous Once    mesna (MESNEX) IVPB  2,200 mg Intravenous Once    mesna (MESNEX) IVPB  2,200 mg Intravenous Once     Continuous Infusions:   sodium chloride 150 mL/hr at 10/02/19 0635    sodium chloride      ifosfamide/mesna (IFEX/MESNEX) chemo infusion 41.7 mL/hr at 10/01/19 1404    dextrose       PRN Meds:.ondansetron, acetaminophen, sodium chloride, alteplase, magnesium hydroxide, magnesium sulfate, potassium chloride, Saline Mouthwash, prochlorperazine **OR** prochlorperazine, LORazepam **OR** LORazepam, glucose, dextrose, glucagon (rDNA), dextrose    ROS:  As noted above, otherwise remainder of 10-point ROS negative    Physical Exam:     I&O:      Intake/Output Summary (Last 24 hours) at 10/2/2019 0748  Last data filed at 10/2/2019 0544  Gross per 24 hour   Intake 6465 ml   Output 6650 ml   Net -185 ml (ending 12/2018)   5. CVP- R x 8 cycles (12/2018 - 6/2019)    6. R-ICE   Cycle #1 - 9/9/19  Cycle #2 - 9/30/19     ASSESSMENT AND PLAN:             1. 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 PLAN: R - ICE x 2 or 3 cycles followed by PET/CT scan. Then autologous stem cell transplant with GCSF mobilization if in adequate remission      Cycle 1, Day + 3     2. ID: Patient is currently afebrile without evidence of infection.   - Start Diflucan, Valtrex and Levaquin prophylaxis once ANC < 1.5.      3. Heme:  Anemia related to recent chemotherapy, leukocytosis r/t recent neulasta  - Transfuse for Hgb < 7 and Platelets < 34 K.   - No transfusion today   - OBI on 10/3/19     4. Metabolic:  Electrolytes are WNL except acidosis and steroid induced hyperglycemia and renal fxn stable  - Change IVF:  .45NS @ 150 mL/hr  - Cont KCl 20 meq bid   - Replace Potassium and Magnesium per protocol.      6.  GI / Nutrition:          - Cont low microbial diet  - Dietary to follow closely      7.  Endocrine: Hyperglycemia d/t steroids  - Cont Amaryl and Metformin daily  - Start high regimen Lispro SSI, AC&HS     8.  Cardiac: No acute issues   - H/o A. Fib  - Cont Eliquis, hold when platelets < 58W  - Cont flecainide & Toprol XL 12.5 mg daily   - Cont Lasix 20 mg daily      9. Thyroid nodule:  - S/p biopsy (8/26/2019) - benign       - DVT Prophylaxis: Prophylaxis on hold d/t contraindication  Contraindications to pharmacologic prophylaxis: Patient already on therapeutic anticoagulation  Contraindications to mechanical prophylaxis: None    - Disposition: Home and return to [facility] the following:  OBI - 10/03/2019 arrival @ 11:30 am or after DC from [address] [facility] & labs:  10/05/2019 arrival @ 9:30 am       ALEXUS Rose - FABBY Ma. Katherin Neighbours, DO, MS  Oncology/Hematology Care    Please contact via:  1. Perfect Serve  2.   Cell Phone:  [phone]    10/2/2019   7:48 AM

## 2019-10-02 NOTE — PLAN OF CARE
Problem: Discharge Planning:  Goal: Discharged to appropriate level of care  Description  Discharged to appropriate level of care  10/2/2019 1308 by Michelle Guerra RN  Outcome: Ongoing  When appropriate    Problem: PROTECTIVE PRECAUTIONS  Goal: Patient will remain free of nosocomial Infections  10/2/2019 1308 by Michelle Guerra RN  Outcome: Ongoing  Pt showing no S/S of infection; Protective precautions followed this shift      Problem: Falls - Risk of:  Goal: Will remain free from falls  Description  Will remain free from falls    Outcome: Ongoing  Note:   Orthostatic vital signs obtained at start of shift - see flowsheet for details. Pt does not meet criteria for orthostasis. Pt is a Med fall risk. See Kecia Bradley Fall Score and ABCDS Injury Risk assessments. - Screening for Orthostasis AND not a [place] Risk per OSBORN/ABCDS: Pt bed is in low position, side rails up, call light and belongings are in reach. Fall risk light is on outside pts room. Pt encouraged to call for assistance as needed. Will continue with hourly rounds for PO intake, pain needs, toileting and repositioning as needed. Problem: Venous Thromboembolism:  Goal: Will show no signs or symptoms of venous thromboembolism  Description  Will show no signs or symptoms of venous thromboembolism  10/2/2019 1308 by Michelle Guerra RN  Outcome: Ongoing    Problem: Bleeding:  Goal: Will show no signs and symptoms of excessive bleeding  Description  Will show no signs and symptoms of excessive bleeding  10/2/2019 1308 by Michelle Guerra RN  Outcome: Ongoing  Patient's hemoglobin this AM:   Recent Labs     10/02/19  0405   HGB 9.8*     Patient's platelet count this AM:   Recent Labs     10/02/19  0405       Thrombocytopenia Precautions in place. Patient showing no signs or symptoms of active bleeding. Transfusion not indicated at this time. Patient verbalizes understanding of all instructions.  Will continue to assess and implement POC. Call light within reach and hourly rounding in place.

## 2019-10-02 NOTE — PLAN OF CARE
Problem: Discharge Planning:  Goal: Discharged to appropriate level of care  Description  Discharged to appropriate level of care  Outcome: Ongoing  Note:   Pt aware of discharge plan. Problem: PROTECTIVE PRECAUTIONS  Goal: Patient will remain free of nosocomial Infections  10/2/2019 0457 by Neville Marcelino RN  Outcome: Ongoing  Note:   Pt currently in a private, positive pressure room. Educated pt on wearing a mask when neutropenic and/or leaving the floor. No living plants or flowers allowed. Also reinforced importance of hand hygiene. Pt following a low microbial diet. Surfaces throughout room cleaned with bleach wipes per unit policy. Problem: Falls - Risk of:  Goal: Will remain free from falls  Description  Will remain free from falls  10/2/2019 0457 by Neville Marcelino RN  Outcome: Ongoing  Note:   Orthostatic vital signs obtained at start of shift - see flowsheet for details. Pt does not meet criteria for orthostasis. Pt is a Med fall risk. See Bhumi Good Fall Score and ABCDS Injury Risk assessments. - Screening for Orthostasis AND not a [place] Risk per OSBORN/ABCDS: Pt bed is in low position, side rails up, call light and belongings are in reach. Fall risk light is on outside pts room. Pt encouraged to call for assistance as needed. Will continue with hourly rounds for PO intake, pain needs, toileting and repositioning as needed. Problem: Venous Thromboembolism:  Goal: Will show no signs or symptoms of venous thromboembolism  Description  Will show no signs or symptoms of venous thromboembolism  Outcome: Ongoing  Note:   On Treatment for DVT/PE: Pt with recent hx of clot. Currently on treatment dose Elaquis. Pt at risk of bleeding d/t anticoagulation. Cautioned pt on safety precautions to decrease risk of bleeding. Will notify provider if platelets drop below 50,000 and/or if pt has invasive procedure scheduled in order to hold anticoagulation.       Problem: Bleeding:  Goal: Will show no signs and symptoms of excessive bleeding  Description  Will show no signs and symptoms of excessive bleeding  Outcome: Ongoing  Note:   Patient's hemoglobin this AM:   Recent Labs     10/02/19  0405   HGB 9.8*     Patient's platelet count this AM:   Recent Labs     10/02/19  0405       Thrombocytopenia not present at this time. Patient showing no signs or symptoms of active bleeding. Transfusion not indicated at this time. Patient verbalizes understanding of all instructions. Will continue to assess and implement POC. Call light within reach and hourly rounding in place.

## 2019-10-02 NOTE — ONCOLOGY
Administration: Chemotherapy drug Etoposide independently verified with Juan Solorzano RN prior to administration. Acknowledgement of informed consent for chemotherapy administration verified. Original order, appropriateness of regimen, drug supplied, height, weight, BSA, dose calculations, expiration dates/times, drug appearance, and two patient identifiers were verified by both RNs. Drug checked for vesicant/irritant status and for risk of hypersensitivity. Most recent laboratory values and allergies, were reviewed. Positive, brisk blood return via CVC was confirmed prior to administration. Chest x-ray for correct line placement reviewed. Ольга Martin and Juan Solorzano RN verified correct rate of chemotherapy and maintenance IV fluids. Patient was educated on chemotherapy regimen prior to administration including indication for treatment related to disease & side effects of chemotherapy drug. Patient verbalizes understanding of all instructions. Completion of Chemotherapy: Monitoring during infusion done per policy, see Flowsheets. Blood return verified before, during, and after infusion per policy; no signs of extravasation. Pt tolerating chemotherapy well and without incident. Chemotherapy infusion end time on the [facility] Will continue to monitor.

## 2019-10-03 LAB
ALBUMIN SERPL-MCNC: 3.5 G/DL (ref 3.4–5)
ALP BLD-CCNC: 95 U/L (ref 40–129)
ALT SERPL-CCNC: 35 U/L (ref 10–40)
ANION GAP SERPL CALCULATED.3IONS-SCNC: 13 MMOL/L (ref 3–16)
APTT: 28.9 SEC (ref 26–36)
AST SERPL-CCNC: 21 U/L (ref 15–37)
BASOPHILS ABSOLUTE: 0 K/UL (ref 0–0.2)
BASOPHILS RELATIVE PERCENT: 0.1 %
BILIRUB SERPL-MCNC: <0.2 MG/DL (ref 0–1)
BILIRUBIN DIRECT: <0.2 MG/DL (ref 0–0.3)
BILIRUBIN, INDIRECT: ABNORMAL MG/DL (ref 0–1)
BUN BLDV-MCNC: 20 MG/DL (ref 7–20)
CALCIUM SERPL-MCNC: 8.6 MG/DL (ref 8.3–10.6)
CHLORIDE BLD-SCNC: 107 MMOL/L (ref 99–110)
CO2: 22 MMOL/L (ref 21–32)
CREAT SERPL-MCNC: 0.8 MG/DL (ref 0.6–1.2)
EKG ATRIAL RATE: 248 BPM
EKG DIAGNOSIS: NORMAL
EKG P AXIS: 103 DEGREES
EKG Q-T INTERVAL: 296 MS
EKG QRS DURATION: 76 MS
EKG QTC CALCULATION (BAZETT): 425 MS
EKG R AXIS: 74 DEGREES
EKG T AXIS: -84 DEGREES
EKG VENTRICULAR RATE: 124 BPM
EOSINOPHILS ABSOLUTE: 0 K/UL (ref 0–0.6)
EOSINOPHILS RELATIVE PERCENT: 0 %
GFR AFRICAN AMERICAN: >60
GFR NON-AFRICAN AMERICAN: >60
GLUCOSE BLD-MCNC: 123 MG/DL (ref 70–99)
GLUCOSE BLD-MCNC: 140 MG/DL (ref 70–99)
GLUCOSE BLD-MCNC: 145 MG/DL (ref 70–99)
GLUCOSE BLD-MCNC: 169 MG/DL (ref 70–99)
GLUCOSE BLD-MCNC: 206 MG/DL (ref 70–99)
HCT VFR BLD CALC: 28.2 % (ref 36–48)
HEMOGLOBIN: 9.6 G/DL (ref 12–16)
INR BLD: 1.06 (ref 0.86–1.14)
LACTATE DEHYDROGENASE: 238 U/L (ref 100–190)
LYMPHOCYTES ABSOLUTE: 0.2 K/UL (ref 1–5.1)
LYMPHOCYTES RELATIVE PERCENT: 3.2 %
MAGNESIUM: 2.3 MG/DL (ref 1.8–2.4)
MCH RBC QN AUTO: 30.3 PG (ref 26–34)
MCHC RBC AUTO-ENTMCNC: 33.9 G/DL (ref 31–36)
MCV RBC AUTO: 89.4 FL (ref 80–100)
MONOCYTES ABSOLUTE: 0.2 K/UL (ref 0–1.3)
MONOCYTES RELATIVE PERCENT: 3.3 %
NEUTROPHILS ABSOLUTE: 6.3 K/UL (ref 1.7–7.7)
NEUTROPHILS RELATIVE PERCENT: 93.4 %
PDW BLD-RTO: 16.6 % (ref 12.4–15.4)
PERFORMED ON: ABNORMAL
PHOSPHORUS: 3.4 MG/DL (ref 2.5–4.9)
PLATELET # BLD: 349 K/UL (ref 135–450)
PMV BLD AUTO: 8.5 FL (ref 5–10.5)
POTASSIUM SERPL-SCNC: 3.9 MMOL/L (ref 3.5–5.1)
PROTHROMBIN TIME: 12.1 SEC (ref 9.8–13)
RBC # BLD: 3.16 M/UL (ref 4–5.2)
SODIUM BLD-SCNC: 142 MMOL/L (ref 136–145)
TOTAL PROTEIN: 5.4 G/DL (ref 6.4–8.2)
URIC ACID, SERUM: 5 MG/DL (ref 2.6–6)
WBC # BLD: 6.7 K/UL (ref 4–11)

## 2019-10-03 PROCEDURE — 83735 ASSAY OF MAGNESIUM: CPT

## 2019-10-03 PROCEDURE — 6370000000 HC RX 637 (ALT 250 FOR IP): Performed by: INTERNAL MEDICINE

## 2019-10-03 PROCEDURE — 6370000000 HC RX 637 (ALT 250 FOR IP): Performed by: NURSE PRACTITIONER

## 2019-10-03 PROCEDURE — 85610 PROTHROMBIN TIME: CPT

## 2019-10-03 PROCEDURE — 85730 THROMBOPLASTIN TIME PARTIAL: CPT

## 2019-10-03 PROCEDURE — 83615 LACTATE (LD) (LDH) ENZYME: CPT

## 2019-10-03 PROCEDURE — 80076 HEPATIC FUNCTION PANEL: CPT

## 2019-10-03 PROCEDURE — 84100 ASSAY OF PHOSPHORUS: CPT

## 2019-10-03 PROCEDURE — 84550 ASSAY OF BLOOD/URIC ACID: CPT

## 2019-10-03 PROCEDURE — 93005 ELECTROCARDIOGRAM TRACING: CPT | Performed by: NURSE PRACTITIONER

## 2019-10-03 PROCEDURE — 36591 DRAW BLOOD OFF VENOUS DEVICE: CPT

## 2019-10-03 PROCEDURE — 85025 COMPLETE CBC W/AUTO DIFF WBC: CPT

## 2019-10-03 PROCEDURE — 80048 BASIC METABOLIC PNL TOTAL CA: CPT

## 2019-10-03 PROCEDURE — 2580000003 HC RX 258: Performed by: NURSE PRACTITIONER

## 2019-10-03 PROCEDURE — 99254 IP/OBS CNSLTJ NEW/EST MOD 60: CPT | Performed by: INTERNAL MEDICINE

## 2019-10-03 PROCEDURE — 93010 ELECTROCARDIOGRAM REPORT: CPT | Performed by: INTERNAL MEDICINE

## 2019-10-03 PROCEDURE — 2060000000 HC ICU INTERMEDIATE R&B

## 2019-10-03 RX ORDER — METOPROLOL SUCCINATE 25 MG/1
25 TABLET, EXTENDED RELEASE ORAL 2 TIMES DAILY
Status: DISCONTINUED | OUTPATIENT
Start: 2019-10-03 | End: 2019-10-04

## 2019-10-03 RX ADMIN — APIXABAN 5 MG: 5 TABLET, FILM COATED ORAL at 20:30

## 2019-10-03 RX ADMIN — CETIRIZINE HYDROCHLORIDE 10 MG: 10 TABLET, FILM COATED ORAL at 08:49

## 2019-10-03 RX ADMIN — FLECAINIDE ACETATE 100 MG: 100 TABLET ORAL at 20:30

## 2019-10-03 RX ADMIN — POTASSIUM CHLORIDE 20 MEQ: 20 TABLET, EXTENDED RELEASE ORAL at 08:49

## 2019-10-03 RX ADMIN — METOPROLOL SUCCINATE 12.5 MG: 25 TABLET, EXTENDED RELEASE ORAL at 08:49

## 2019-10-03 RX ADMIN — FUROSEMIDE 20 MG: 20 TABLET ORAL at 08:49

## 2019-10-03 RX ADMIN — GLIMEPIRIDE 3 MG: 2 TABLET ORAL at 07:27

## 2019-10-03 RX ADMIN — INSULIN LISPRO 6 UNITS: 100 INJECTION, SOLUTION INTRAVENOUS; SUBCUTANEOUS at 16:42

## 2019-10-03 RX ADMIN — METOPROLOL SUCCINATE 25 MG: 25 TABLET, EXTENDED RELEASE ORAL at 20:30

## 2019-10-03 RX ADMIN — GLIMEPIRIDE 3 MG: 2 TABLET ORAL at 16:43

## 2019-10-03 RX ADMIN — SODIUM CHLORIDE: 4.5 INJECTION, SOLUTION INTRAVENOUS at 03:24

## 2019-10-03 RX ADMIN — INSULIN LISPRO 3 UNITS: 100 INJECTION, SOLUTION INTRAVENOUS; SUBCUTANEOUS at 11:32

## 2019-10-03 RX ADMIN — INSULIN LISPRO 2 UNITS: 100 INJECTION, SOLUTION INTRAVENOUS; SUBCUTANEOUS at 21:30

## 2019-10-03 RX ADMIN — FLECAINIDE ACETATE 100 MG: 100 TABLET ORAL at 08:50

## 2019-10-03 RX ADMIN — SODIUM CHLORIDE 15 ML: 900 IRRIGANT IRRIGATION at 20:30

## 2019-10-03 RX ADMIN — METOPROLOL TARTRATE 12.5 MG: 25 TABLET ORAL at 15:17

## 2019-10-03 RX ADMIN — METFORMIN HYDROCHLORIDE 500 MG: 500 TABLET, EXTENDED RELEASE ORAL at 07:27

## 2019-10-03 RX ADMIN — POTASSIUM CHLORIDE 20 MEQ: 20 TABLET, EXTENDED RELEASE ORAL at 20:30

## 2019-10-03 RX ADMIN — SODIUM CHLORIDE 15 ML: 900 IRRIGANT IRRIGATION at 10:28

## 2019-10-03 RX ADMIN — Medication 10 ML: at 20:30

## 2019-10-03 RX ADMIN — APIXABAN 5 MG: 5 TABLET, FILM COATED ORAL at 08:49

## 2019-10-03 RX ADMIN — SODIUM CHLORIDE 15 ML: 900 IRRIGANT IRRIGATION at 16:44

## 2019-10-03 RX ADMIN — Medication 10 ML: at 08:50

## 2019-10-03 ASSESSMENT — PAIN SCALES - GENERAL
PAINLEVEL_OUTOF10: 0

## 2019-10-03 NOTE — PROGRESS NOTES
[address] [facility] Progress Note    10/3/2019     June Durán    MRN: [id number]    : 1955    Referring MD: Wallace Zafar MD  [address]  [address]  [place] [facility]      SUBJECTIVE:  C/o dizziness    ECOG PS:  (1) Restricted in physically strenuous activity, ambulatory and able to do work of light nature    Isolation: None    Medications    Scheduled Meds:   insulin lispro  0-18 Units Subcutaneous TID WC    insulin lispro  0-9 Units Subcutaneous Nightly    apixaban  5 mg Oral BID    flecainide  100 mg Oral BID    furosemide  20 mg Oral Daily    glimepiride  3 mg Oral BID WC    cetirizine  10 mg Oral Daily    metFORMIN  500 mg Oral Daily with breakfast    metoprolol succinate  12.5 mg Oral BID    potassium chloride  20 mEq Oral BID    Saline Mouthwash  15 mL Swish & Spit 4x Daily AC & HS    sodium chloride flush  10 mL Intravenous 2 times per day    furosemide  40 mg Intravenous Q12H     Continuous Infusions:   sodium chloride Stopped (10/03/19 1027)    sodium chloride      dextrose       PRN Meds:.ondansetron, acetaminophen, sodium chloride, alteplase, magnesium hydroxide, magnesium sulfate, potassium chloride, Saline Mouthwash, prochlorperazine **OR** prochlorperazine, LORazepam **OR** LORazepam, glucose, dextrose, glucagon (rDNA), dextrose    ROS:  As noted above, otherwise remainder of 10-point ROS negative    Physical Exam:     I&O:      Intake/Output Summary (Last 24 hours) at 10/3/2019 1035  Last data filed at 10/3/2019 1027  Gross per 24 hour   Intake 5463 ml   Output 8950 ml   Net -3487 ml       Vital Signs:  /88   Pulse 131   Temp 97.4 °F (36.3 °C) (Oral)   Resp 17   Ht 5' 3\" (1.6 m)   Wt 246 lb (111.6 kg)   SpO2 99%   BMI 43.58 kg/m²     Weight:    Wt Readings from Last 3 Encounters:   10/03/19 246 lb (111.6 kg)   19 243 lb 11.2 oz (110.5 kg)   19 235 lb 0.2 oz (106.6 kg)         General: Awake, alert and oriented. HEENT: normocephalic, no scleral erythema or icterus, Oral mucosa moist and intact, throat clear  NECK: supple without palpable adenopathy  BACK: Straight negative CVAT  SKIN: warm dry and intact without lesions rashes or masses  CHEST: CTA bilaterally without use of accessory muscles  CV: Normal S1 S2, RRR, no MRG  ABD: NT ND normoactive BS, no palpable masses or hepatosplenomegaly  EXTREMITIES: without edema, denies calf tenderness  NEURO: CN II - XII grossly intact  CATHETER: Right IJ PAC (2/26/16, IR) - CDI    Data    CBC:   Recent Labs     10/01/19  0345 10/02/19  0405 10/03/19  0520   WBC 13.9* 12.4* 6.7   HGB 9.6* 9.8* 9.6*   HCT 28.5* 29.3* 28.2*   MCV 89.4 90.2 89.4    347 349     BMP/Mag:  Recent Labs     10/01/19  0345 10/02/19  0405 10/03/19  0520    142 142   K 4.3 4.2 3.9    105 107   CO2 22 20* 22   PHOS 3.1 3.4 3.4   BUN 17 21* 20   CREATININE 0.8 0.7 0.8   MG  --   --  2.30     LIVP:   Recent Labs     10/01/19  0345 10/02/19  0405 10/03/19  0520   AST 22 24 21   ALT 30 34 35   BILIDIR <0.2 <0.2 <0.2   BILITOT 0.4 <0.2 <0.2   ALKPHOS 119 103 95     Coags:   Recent Labs     10/03/19  0338   PROTIME 12.1   INR 1.06   APTT 28.9     Uric Acid   Recent Labs     10/01/19  0345 10/02/19  0405 10/03/19  0520   LABURIC 5.7 5.1 5.0       PROBLEM LIST:             1. Follicular NHL w/ B7-B9 paraspinal mass and 9 cm mass at root of mesentery. (Dx 1/2016)  2. Atrial fibrillation w/ RVR (Dx 10/2018)    3.  Type 2 DM  4.  Chronic  anticoagulation      TREATMENT:             1. XRT to thoracic spine   2. Bendamustine / Rituxan x 6 cycles (ending 7/2016)  3.  Rituxan maintenance x 12 months  4. Clinical Trial - Revlimid & Rituxan x 7 cycles (ending 12/2018)   5. CVP- R x 8 cycles (12/2018 - 6/2019)    6. R-ICE   Cycle #1 - 9/9/19  Cycle #2 - 9/30/19     ASSESSMENT AND PLAN:             1. 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      PLAN: R - ICE x 2 or 3 cycles followed by PET/CT scan. Then autologous stem cell transplant with GCSF mobilization if in adequate remission      Cycle 1, Day + 4     2. ID: Patient is currently afebrile without evidence of infection.   - Start Diflucan, Valtrex and Levaquin prophylaxis once ANC < 1.5.      3. Heme:  Anemia related to recent chemotherapy, leukocytosis r/t recent neulasta  - Transfuse for Hgb < 7 and Platelets < 73 K.   - No transfusion today   - OBI on discharge     4. Metabolic:  Electrolytes are WNL except steroid induced hyperglycemia and renal fxn stable  - Decrease IVF:  .45NS @ 50 mL/hr  - Cont KCl 20 meq bid   - Replace Potassium and Magnesium per protocol.      6.  GI / Nutrition:          - Cont low microbial diet  - Dietary to follow closely      7.  Endocrine: Hyperglycemia d/t steroids  - Cont Amaryl and Metformin daily  - Start high regimen Lispro SSI, AC&HS     8.  Cardiac: c/o dizziness   - H/o A. Fib  - Cont Eliquis, hold when platelets < 39F  - Cont flecainide & Toprol XL 12.5 mg daily   - Cont Lasix 20 mg daily   - STAT EKG (10/3/19) - Pending  - Cont telemetry and if cont to have symptoms and EKG changes will consult EP     9. Thyroid nodule:  - S/p biopsy (8/26/2019) - benign       - DVT Prophylaxis: Prophylaxis on hold d/t contraindication  Contraindications to pharmacologic prophylaxis: Patient already on therapeutic anticoagulation  Contraindications to mechanical prophylaxis: None    - Disposition: Home and return to [facility] the following:  OBI - 10/03/2019 arrival @ 11:30 am or after DC from [address] [facility] & labs:  10/05/2019 arrival @ 9:30 am       Qian Lara APRN - CNP       Render Hammad. Mirella Cole DO, MS  Oncology/Hematology Care    Please contact via:  1. Perfect Serve  2.   Cell Phone:  [phone]    10/3/2019   10:35 AM

## 2019-10-03 NOTE — FLOWSHEET NOTE
10/03/19 0932   Encounter Summary   Services provided to: Patient   Referral/Consult From: Rounding   Continue Visiting   (es 10/3)   Complexity of Encounter Low   Length of Encounter 15 minutes   Routine   Type Initial   Outcome Refused/declined

## 2019-10-03 NOTE — CARE COORDINATION
Type of Admission  B Cell Lymphoma  C2  Day #3 R-ICE        Central venous catheter  Single Lumen Port Right Chest        Plan  Proceed with Course #2 of R-ICE with re-staging for Auto SCT after this course        Update  9/30/19: Planned admission for 2nd course of R-ICE. Received Rituxan at [facility] prior to admit. Reports requiring IV fluids in the office for 2 days after discharge. 10/2/19:  Discussed follow up appointments at [facility] post discharge tomorrow. 10/3/19:  Discharge held due to episode of a.fiv, seen Dr. Shanel Whittaker. Education  9/30/19:  Reviewed treatment calendar. Discussed ways to add extra fluid to her diet post discharge, states she can tolerate G2, certain flavors.         Discharge  Thursday, 10/3---> Neulasta OBI at Discharge--> See AVS for appoitments        Pending

## 2019-10-03 NOTE — CONSULTS
History of Present Illness:  Neftali Higginbotham is a 66 y.o. patient whom we were asked to see for afib. Here for chemo for NHL. Has hx of afib and has had ECV 3x in past.  Last in Dec.  It is relatively asx. Has been in afib since admit for this round of chemo. Was to go home today but has been noted that HR up at times. HR has been reasonable since admit otherwise. Notes feeling of some mild light headedness and sensation coming up back of head. Seems to correlate with RVR. RVR at times. Otherwise in between asx. No chest pain. She feels puffy from all fluids. No pnd or orthopnea. Past Medical History:   has a past medical history of Atrial fibrillation with RVR ([address] Cancer ([address] Hypertension, and Melanoma in situ of back ([address] Surgical History:   has a past surgical history that includes Tubal ligation; Cholecystectomy (); Endometrial ablation (); Breast cyst aspiration; Colonoscopy (9/10/12); Gallbladder surgery; fine needle aspiration;  section; Ovary removal (Left, 2018); Colonoscopy (10/09/2017); Tunneled venous port placement (Right, 2016); other surgical history (2019); and other surgical history (2019). Social History:   reports that she quit smoking about 3 years ago. Her smoking use included cigarettes. She started smoking about 44 years ago. She has a 0.80 pack-year smoking history. She has never used smokeless tobacco. She reports that she drinks alcohol. Family History:  No evidence for sudden cardiac death or premature CAD    Home Medications:  Were reviewed and are listed in nursing record. and/or listed below  Prior to Admission medications    Medication Sig Start Date End Date Taking?  Authorizing Provider   glimepiride (AMARYL) 2 MG tablet Take 1.5 tablets by mouth 2 times daily (with meals) 19  Yes Sade Euceda MD   metoprolol succinate (TOPROL XL) 25 MG extended release tablet Take 0.5 tablets by mouth 2 times daily 7/22/19  Yes Kendra Cai MD   flecainide [facility] [place]) 100 MG tablet Take 1 tablet by mouth 2 times daily 7/22/19  Yes Kendra Cai MD   apixaban [facility]) 5 MG TABS tablet Take 1 tablet by mouth 2 times daily 7/22/19  Yes Kendra Cai MD   furosemide (LASIX) 20 MG tablet Take 1 tablet by mouth daily 7/22/19  Yes Kendra Cai MD   metFORMIN (GLUCOPHAGE-XR) 500 MG extended release tablet Take 1 tablet by mouth daily (with breakfast) 7/15/19  Yes Reynaldo Barclay MD   potassium chloride (KLOR-CON M) 10 MEQ extended release tablet Take 2 tablets by mouth 2 times daily 3/8/19  Yes Reynaldo Barclay MD   loratadine (CLARITIN) 10 MG capsule Take 10 mg by mouth as needed    Yes Historical Provider, MD   allopurinol (ZYLOPRIM) 300 MG tablet Take 1 tablet by mouth daily for 7 days 9/12/19 9/19/19  Nirmala Garrido MD   prochlorperazine (COMPAZINE) 10 MG tablet Take 1 tablet by mouth every 6 hours as needed (nausea) 9/12/19   Nirmala Garrido MD   ondansetron (ZOFRAN) 8 MG tablet Take 1 tablet by mouth every 8 hours as needed for Nausea or Vomiting 9/12/19   Nirmala Garrido MD   LOTEMAX 0.5 % ophthalmic gel INSTILL 1 GTT MARIMAR QID 9/2/17   Historical Provider, MD        Allergies:  Penicillins     Review of Systems:     · Constitutional: there has been no unanticipated weight loss. There's been no change in energy level, sleep pattern, or activity level. · Eyes: No visual changes or diplopia. No scleral icterus. · ENT: No Headaches, hearing loss or vertigo. No mouth sores or sore throat. · Cardiovascular: No loss of consciousness. No hemoptysis, pleuritic pain, or phlebitis. · Respiratory: No cough or wheezing, no sputum production. No hematemesis. · Gastrointestinal: No abdominal pain, appetite loss, blood in stools. No change in bowel or bladder habits. · Genitourinary: No dysuria, trouble voiding, or hematuria. · Musculoskeletal:  No gait disturbance, weakness or joint complaints.   · Integumentary: No rash or pruritis. · All other systems reviewed negative as done. Physical Examination:    Vitals:    10/03/19 1121   BP: 116/83   Pulse:    Resp:    Temp: 97.5 °F (36.4 °C)   SpO2: 99%    Weight: 246 lb (111.6 kg)         General Appearance:  Alert, cooperative, no distress, appears stated age   Head:  Normocephalic, without obvious abnormality, atraumatic   Eyes:  PERRL, conjunctiva/corneas clear       Nose: Nares normal, no drainage or sinus tenderness   Throat: Lips, mucosa, and tongue normal   Neck: Supple, symmetrical, trachea midline       Lungs:   Clear to auscultation bilaterally, respirations unlabored   Chest Wall:  No tenderness or deformity   Heart:  Regular rate and rhythm, S1, S2 normal, no murmur, rub or gallop   Abdomen:   Soft, non-tender, bowel sounds active all four quadrants,  no masses, no organomegaly           Extremities: Extremities normal, atraumatic, no cyanosis.  Tr-+edema       Skin: Skin color, texture, turgor normal, no rashes or lesions   Pysch: Normal mood and affect   Neurologic: Normal gross motor and sensory exam.         Labs  CBC:   Lab Results   Component Value Date    WBC 6.7 10/03/2019    RBC 3.16 10/03/2019    RBC 4.44 08/21/2017    HGB 9.6 10/03/2019    HCT 28.2 10/03/2019    MCV 89.4 10/03/2019    RDW 16.6 10/03/2019     10/03/2019     CMP:    Lab Results   Component Value Date     10/03/2019    K 3.9 10/03/2019     10/03/2019    CO2 22 10/03/2019    BUN 20 10/03/2019    CREATININE 0.8 10/03/2019    GFRAA >60 10/03/2019    AGRATIO 1.7 03/07/2019    LABGLOM >60 10/03/2019    GLUCOSE 145 10/03/2019    GLUCOSE 133 08/21/2017    PROT 5.4 10/03/2019    PROT 6.5 08/21/2017    CALCIUM 8.6 10/03/2019    BILITOT <0.2 10/03/2019    ALKPHOS 95 10/03/2019    AST 21 10/03/2019    ALT 35 10/03/2019     PT/INR:  No results found for: PTINR  Lab Results   Component Value Date    TROPONINI <0.01 12/29/2015       EKG:  I have reviewed EKG with the following interpretation:  Impression:  Atrial flutter 2:1 , NSSTTW changes. Assessment  Patient Active Problem List   Diagnosis    Essential hypertension    Precordial pain    Abnormal EKG    Small cell b-cell lymphoma, lymph nodes of multiple sites [facility]    Type 2 diabetes mellitus without complication ([facility]    Hyperlipidemia    Cyst of left ovary    Endometrial thickening on ultrasound    Visit for monitoring Rituxan therapy    Degenerative disc disease, lumbar    Trochanteric bursitis of left hip    Atrial fibrillation with RVR (HCC)    ZOE (obstructive sleep apnea)    B-cell lymphoma (HCC)    Non-Hodgkin's lymphoma ([facility]         Plan:    Appears to have been in afib since admit. HR controlled until today. Likely RVR related to volume loading with chemo. Has some LE edema bilaterally. Diuresing. Likely RVR periods more symptomatic. Will give additional metoprolol then increase toprol to 25 mg bid. Monitor another 24 hrs. She is AC with eliquis. Asx when HR controlled.

## 2019-10-03 NOTE — DISCHARGE SUMMARY
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 Her PMH is also significant for atrial fibrillation on chronic anticoagulation and type 2 DM. She was initially treated w/ radiation therapy to the involved area followed by systemic treatment w/ 6 cycles of bendamustine and rituximab (completed 2016). She then received maintenance rituximab after that x 11 months (completed 2018). Restaging scans (18) revealed interval increase in the size and new hypermetabolic activity within the mesentery. This lesion was described as 8.6×4.5 cm with SUV of 10.8. She underwent a needle biopsy of this lesion on 2018 (case number [id number]) that reported findings consistent with B-cell non-Hodgkin's lymphoma follicular subtype, grade 1-2/3. She begin a trial at [facility] w/ Rituxan and Revlimid (stopped 2018) r/t dizziness and worsening afib. She then began treatment w/ R-CVP, and completed 8 cycles (2019 - 2019). She quickly progressed after her treatment completed in 2019 proven by progression on CT in 2019. Biopsy was reviewed in pathology conference and patient was determined to be a 3A follicular lymphoma. She is now being treated w/ R-ICE chemotherapy w/ plans for autologous stem cell transplant if adequate remission obtained.        She was admitted (19) for cyce #2 of R-ICE.  She cont to report muscle weakness in her right leg that is ongoing [facility] Progress Note    Date of Service: 10/8/2018    Chief Complaint  78 y.o. male admitted 2018 with cough and shortness of breath.  Patient was admitted to the hospital and shortly thereafter had significant increase in shortness of breath and required intubation.    Interval Problem Update          [facility] coloscopy done yesterday with biopsy results pending  Follows commands when sedation weaned  Converted to sinus rhythm this morning  Weaned off pressors  Insulin drip 2 units/h  Vent day #10  Tolerating trickle feed                                Consultants/Specialty  Pulmonary  Cardiology    Disposition  To Remain in ICU        Review of Systems   Unable to perform ROS: Intubated      Physical Exam  Laboratory/Imaging   Hemodynamics  Temp (24hrs), Av.3 °C (99.2 °F), Min:37.2 °C (99 °F), Max:37.4 °C (99.3 °F)   Temperature: 37.4 °C (99.3 °F), Monitored Temp: 37.4 °C (99.3 °F)  Pulse  Av.1  Min: 60  Max: 142 Heart Rate (Monitored): 90  NIBP: 134/75 CVP (mm Hg): (!) 10 MM HG    Respiratory  Smith Vent Mode: Spont, Rate (breaths/min): 16, PEEP/CPAP: 8, PEEP/CPAP: 8, FiO2: 30, P Peak (PIP): 16, P MEAN: 11   Respiration: (!) 32, Pulse Oximetry: 91 %     Work Of Breathing / Effort: Vented  RUL Breath Sounds: Clear, RML Breath Sounds: Diminished, RLL Breath Sounds: Diminished, BRITTANY Breath Sounds: Clear, LLL Breath Sounds: Diminished    Fluids    Intake/Output Summary (Last 24 hours) at 10/08/18 0907  Last data filed at 10/08/18 0800   Gross per 24 hour   Intake          1790.93 ml   Output             2180 ml   Net          -389.07 ml       Nutrition  Orders Placed This Encounter   Procedures   • Diet NPO     Standing Status:   Standing     Number of Occurrences:   1     Order Specific Question:   Type:     Answer:   Now [1]     Order Specific Question:   Restrict to:     Answer:   Strict [1]     Physical Exam   Constitutional: He appears well-developed and well-nourished. He is intubated.    HENT:   Head: Normocephalic and atraumatic.   Nose: Nose normal.   Mouth/Throat: Oropharynx is clear and moist. No oropharyngeal exudate.   Cortrak in place   Eyes: Pupils are equal, round, and reactive to light. Conjunctivae are normal. Right eye exhibits no discharge. Left eye exhibits no discharge.   Neck: Neck supple. No JVD present. No tracheal deviation present.   Cardiovascular: Normal rate and regular rhythm.  Exam reveals gallop.    No murmur heard.  Pulmonary/Chest: He is intubated. No respiratory distress. He has decreased breath sounds. He has no wheezes. He has no rhonchi. He has rales. He exhibits no tenderness.   Abdominal: Soft. Bowel sounds are normal. He exhibits no distension. There is no tenderness. There is no rebound.   Musculoskeletal: He exhibits edema. He exhibits no tenderness.   Neurological: No cranial nerve deficit. He exhibits normal muscle tone.   Patient is sedated  No focal deficits noted   Skin: Skin is warm and dry. No rash noted. He is not diaphoretic. No cyanosis or erythema. Nails show no clubbing.   Psychiatric:   Sedated   Nursing note and vitals reviewed.      Recent Labs      10/06/18   0458  10/07/18   0410  10/08/18   0617   WBC  11.8*  8.5  15.7*   RBC  3.70*  3.31*  3.62*   HEMOGLOBIN  11.2*  10.3*  11.1*   HEMATOCRIT  35.5*  32.1*  35.6*   MCV  95.9  97.0  98.3*   MCH  30.3  31.1  30.7   MCHC  31.5*  32.1*  31.2*   RDW  48.5  50.2*  51.9*   PLATELETCT  199  132*  237   MPV  10.5  10.3  10.1     Recent Labs      10/06/18   2315  10/07/18   0410  10/08/18   0617   SODIUM  147*  148*  149*   POTASSIUM  3.7  4.1  4.5   CHLORIDE  113*  114*  113*   CO2  25  27  28   GLUCOSE  240*  136*  171*   BUN  79*  76*  71*   CREATININE  0.94  0.91  0.98   CALCIUM  9.0  8.9  9.0             Recent Labs      10/06/18   2315  10/08/18   0617   TRIGLYCERIDE  98  75          Assessment/Plan     * Acute respiratory failure with hypoxia (HCC)- (present on admission)   Assessment & Plan     and she believes related to her mass in his abdomen. She otherwise feels well and tolerated chemotherapy well except for experiencing dizziness, atrial flutter and arrhythmia w/ long pauses. Cardiology was consulted and increased her Toprol dose for 1 dose. The pauses increased, but then she changed to NSR. She now has resumed her home Toprol dose and she now feels much better. Her dizziness has improved. She will be discharged home on 10/5/19 and receive Neulasta injection. She will then [facility] to [facility] on 10/7/19 for provider visit and labs. Physical Exam:     Vital Signs:  /70   Pulse 80   Temp 97.8 °F (36.6 °C) (Oral)   Resp 16   Ht 5' 3\" (1.6 m)   Wt 242 lb 12.8 oz (110.1 kg)   SpO2 99%   BMI 43.01 kg/m²     Weight:    Wt Readings from Last 3 Encounters:   10/02/19 242 lb 12.8 oz (110.1 kg)   09/12/19 243 lb 11.2 oz (110.5 kg)   08/05/19 235 lb 0.2 oz (106.6 kg)       General: Awake, alert and oriented.   HEENT: normocephalic, no scleral erythema or icterus, Oral mucosa moist and intact, throat clear  NECK: supple without palpable adenopathy  BACK: Straight negative CVAT  SKIN: warm dry and intact without lesions rashes or masses  CHEST: CTA bilaterally without use of accessory muscles  CV: Normal S1 S2, RRR, no MRG  ABD: NT ND normoactive BS, no palpable masses or hepatosplenomegaly  EXTREMITIES: without edema, denies calf tenderness  NEURO: CN II - XII grossly intact  CATHETER: Right IJ PAC (2/26/16, IR) - CDI    Discharge Laboratory Data:  CBC:   Recent Labs     10/01/19  0345 10/02/19  0405 10/03/19  0520   WBC 13.9* 12.4* 6.7   HGB 9.6* 9.8* 9.6*   HCT 28.5* 29.3* 28.2*   MCV 89.4 90.2 89.4    347 349     BMP/Mag:  Recent Labs     10/01/19  0345 10/02/19  0405 10/03/19  0520    142 142   K 4.3 4.2 3.9    105 107   CO2 22 20* 22   PHOS 3.1 3.4 3.4   BUN 17 21* 20   CREATININE 0.8 0.7 0.8   MG  --   --  2.30     LIVP:   Recent Labs     10/01/19  0345 10/02/19  0403 CTA on 9-29 neg  for PE  Echo with normal EF and pulmonary hypertension    Vent management per critical care discussed with Dr. Sierra  Underwent diagnostic bronchoscopy with biopsy yesterday follow-up on results            Acute pulmonary edema (HCC)   Assessment & Plan    Continue forced diuresis and monitor        CAP (community acquired pneumonia)- (present on admission)   Assessment & Plan    Completed course of antibiotics         Hemoptysis   Assessment & Plan    Lovenox discontinued    Continue clinical monitoring and follow-up on lung biopsy        Paroxysmal atrial fibrillation/atrial flutter (HCC)   Assessment & Plan    Continue amiodarone and metoprolol  Lovenox discontinued given hemoptysis  Reverted to sinus rhythm continue telemetry monitoring        Pulmonary hypertension (HCC)   Assessment & Plan    9/29/18 echocardiogram with RVSP of 65 mmHg  CT was negative for pulmonary embolism    Continue IV Lasix              Elevated troponin- (present on admission)   Assessment & Plan    Likely demand ischemia        Hypernatremia   Assessment & Plan    Increase Free water flushes          Ileus (HCC)   Assessment & Plan    Clinically improved  Remains on Reglan  We will advance tube feeding and continue close monitoring        Hyperglycemia- (present on admission)   Assessment & Plan    HbA1c 6.5 on 7/18  On insulin drip continue close monitoring        Dyslipidemia- (present on admission)   Assessment & Plan    Continue atorvastatin            Quality-Core Measures   Reviewed items::  Labs reviewed, Medications reviewed and Radiology images reviewed  Garces catheter::  Critically Ill - Requiring Accurate Measurement of Urinary Output, Unconscious / Sedated Patient on a Ventilator and Strict Intake and Output During Sepisis or Shock  Central line in place:  Sepsis, Shock and Vasopressors  DVT prophylaxis pharmacological::  Contraindicated - High bleeding risk  Ulcer Prophylaxis::  Yes  Antibiotics:   Treating active infection/contamination beyond 24 hours perioperative coverage         following:  Neulasta injection - 10/05/2019 after DC from [facility] & labs:  10/07/2019    The patient was advised on activity and dietary restrictions. The patient was advised to follow up in the emergency department or contact the physician with any unresolved nausea/vomiting/diarrhea/pain or temperature greater than 100.5 F or any other unusual symptoms. This discharge summary and plan was discussed and agreed upon with Dr. William Abel.     Tony Pradhan, CNP

## 2019-10-03 NOTE — PLAN OF CARE
Problem: Discharge Planning:  Goal: Discharged to appropriate level of care  Description  Discharged to appropriate level of care  10/2/2019 1308 by Gonzalo Daniel RN  Outcome: Ongoing  When appropriate    Problem: PROTECTIVE PRECAUTIONS  Goal: Patient will remain free of nosocomial Infections  10/2/2019 1308 by Gonzalo Daniel RN  Outcome: Ongoing  Pt showing no S/S of infection; Protective precautions followed this shift      Problem: Falls - Risk of:  Goal: Will remain free from falls  Description  Will remain free from falls    Outcome: Ongoing  Note:   Orthostatic vital signs obtained at start of shift - see flowsheet for details. Pt does not meet criteria for orthostasis. Pt is a Med fall risk. See [place] Siemens Fall Score and ABCDS Injury Risk assessments. - Screening for Orthostasis AND not a [place] Risk per OSBORN/ABCDS: Pt bed is in low position, side rails up, call light and belongings are in reach. Fall risk light is on outside pts room. Pt encouraged to call for assistance as needed. Will continue with hourly rounds for PO intake, pain needs, toileting and repositioning as needed. Problem: Venous Thromboembolism:  Goal: Will show no signs or symptoms of venous thromboembolism  Description  Will show no signs or symptoms of venous thromboembolism  10/2/2019 1308 by Gonzalo Daniel RN  Outcome: Ongoing    Problem: Bleeding:  Goal: Will show no signs and symptoms of excessive bleeding  Description  Will show no signs and symptoms of excessive bleeding  10/2/2019 1308 by Gonzalo Daniel RN  Outcome: Ongoing  Patient's hemoglobin this AM:   Recent Labs     10/03/19  0520   HGB 9.6*     Patient's platelet count this AM:   Recent Labs     10/03/19  0520       Thrombocytopenia Precautions in place. Patient showing no signs or symptoms of active bleeding. Transfusion not indicated at this time. Patient verbalizes understanding of all instructions.  Will continue to assess and implement POC. Call light within reach and hourly rounding in place.

## 2019-10-03 NOTE — FLOWSHEET NOTE
10/03/19 0840   Encounter Summary   Services provided to: Patient   Volunteer Visit   (10/2 Lenard Tamayo)   Sacraments   Communion Patient received communion

## 2019-10-03 NOTE — PLAN OF CARE
Problem: Discharge Planning:  Goal: Discharged to appropriate level of care  Description  Discharged to appropriate level of care  Outcome: Ongoing  Note:   Pt aware of discharge plan. Problem: PROTECTIVE PRECAUTIONS  Goal: Patient will remain free of nosocomial Infections  Outcome: Ongoing  Note:   Pt currently in a private, positive pressure room. Educated pt on wearing a mask when neutropenic and/or leaving the floor. No living plants or flowers allowed. Also reinforced importance of hand hygiene. Pt following a low microbial diet. Surfaces throughout room cleaned with bleach wipes per unit policy. Problem: Falls - Risk of:  Goal: Will remain free from falls  Description  Will remain free from falls  Outcome: Ongoing  Note:   Orthostatic vital signs obtained at start of shift - see flowsheet for details. Pt does not meet criteria for orthostasis. Pt is a Med fall risk. See [place] Siemens Fall Score and ABCDS Injury Risk assessments. - Screening for Orthostasis AND not a [place] Risk per OSBORN/ABCDS: Pt bed is in low position, side rails up, call light and belongings are in reach. Fall risk light is on outside pts room. Pt encouraged to call for assistance as needed. Will continue with hourly rounds for PO intake, pain needs, toileting and repositioning as needed. Problem: Venous Thromboembolism:  Goal: Will show no signs or symptoms of venous thromboembolism  Description  Will show no signs or symptoms of venous thromboembolism  Outcome: Ongoing  Note:   On Treatment for DVT/PE: Pt with recent hx of clot. Currently on treatment dose Elaquis. Pt at risk of bleeding d/t anticoagulation. Cautioned pt on safety precautions to decrease risk of bleeding. Will notify provider if platelets drop below 50,000 and/or if pt has invasive procedure scheduled in order to hold anticoagulation.       Problem: Bleeding:  Goal: Will show no signs and symptoms of excessive bleeding  Description  Will show no signs and symptoms of excessive bleeding  Outcome: Ongoing  Note:   Patient's hemoglobin this AM:   Recent Labs     10/03/19  0520   HGB 9.6*     Patient's platelet count this AM:   Recent Labs     10/03/19  0520       Thrombocytopenia not present at this time. Patient showing no signs or symptoms of active bleeding. Transfusion not indicated at this time. Patient verbalizes understanding of all instructions. Will continue to assess and implement POC. Call light within reach and hourly rounding in place.

## 2019-10-04 LAB
ALBUMIN SERPL-MCNC: 3.4 G/DL (ref 3.4–5)
ALP BLD-CCNC: 85 U/L (ref 40–129)
ALT SERPL-CCNC: 39 U/L (ref 10–40)
ANION GAP SERPL CALCULATED.3IONS-SCNC: 11 MMOL/L (ref 3–16)
AST SERPL-CCNC: 32 U/L (ref 15–37)
BASOPHILS ABSOLUTE: 0 K/UL (ref 0–0.2)
BASOPHILS RELATIVE PERCENT: 0.2 %
BILIRUB SERPL-MCNC: <0.2 MG/DL (ref 0–1)
BILIRUBIN DIRECT: <0.2 MG/DL (ref 0–0.3)
BILIRUBIN, INDIRECT: ABNORMAL MG/DL (ref 0–1)
BUN BLDV-MCNC: 24 MG/DL (ref 7–20)
CALCIUM SERPL-MCNC: 8.5 MG/DL (ref 8.3–10.6)
CHLORIDE BLD-SCNC: 110 MMOL/L (ref 99–110)
CO2: 26 MMOL/L (ref 21–32)
CREAT SERPL-MCNC: 0.9 MG/DL (ref 0.6–1.2)
EOSINOPHILS ABSOLUTE: 0 K/UL (ref 0–0.6)
EOSINOPHILS RELATIVE PERCENT: 0 %
GFR AFRICAN AMERICAN: >60
GFR NON-AFRICAN AMERICAN: >60
GLUCOSE BLD-MCNC: 105 MG/DL (ref 70–99)
GLUCOSE BLD-MCNC: 127 MG/DL (ref 70–99)
GLUCOSE BLD-MCNC: 170 MG/DL (ref 70–99)
GLUCOSE BLD-MCNC: 170 MG/DL (ref 70–99)
GLUCOSE BLD-MCNC: 203 MG/DL (ref 70–99)
HCT VFR BLD CALC: 27 % (ref 36–48)
HEMOGLOBIN: 9.2 G/DL (ref 12–16)
LACTATE DEHYDROGENASE: 198 U/L (ref 100–190)
LYMPHOCYTES ABSOLUTE: 0.2 K/UL (ref 1–5.1)
LYMPHOCYTES RELATIVE PERCENT: 5.8 %
MCH RBC QN AUTO: 30.2 PG (ref 26–34)
MCHC RBC AUTO-ENTMCNC: 33.9 G/DL (ref 31–36)
MCV RBC AUTO: 89.2 FL (ref 80–100)
MONOCYTES ABSOLUTE: 0 K/UL (ref 0–1.3)
MONOCYTES RELATIVE PERCENT: 1.1 %
NEUTROPHILS ABSOLUTE: 3.7 K/UL (ref 1.7–7.7)
NEUTROPHILS RELATIVE PERCENT: 92.9 %
PDW BLD-RTO: 17 % (ref 12.4–15.4)
PERFORMED ON: ABNORMAL
PHOSPHORUS: 4.1 MG/DL (ref 2.5–4.9)
PLATELET # BLD: 362 K/UL (ref 135–450)
PMV BLD AUTO: 8.4 FL (ref 5–10.5)
POTASSIUM SERPL-SCNC: 4.4 MMOL/L (ref 3.5–5.1)
RBC # BLD: 3.03 M/UL (ref 4–5.2)
SODIUM BLD-SCNC: 147 MMOL/L (ref 136–145)
TOTAL PROTEIN: 5.3 G/DL (ref 6.4–8.2)
URIC ACID, SERUM: 5.8 MG/DL (ref 2.6–6)
WBC # BLD: 4 K/UL (ref 4–11)

## 2019-10-04 PROCEDURE — 80076 HEPATIC FUNCTION PANEL: CPT

## 2019-10-04 PROCEDURE — 84100 ASSAY OF PHOSPHORUS: CPT

## 2019-10-04 PROCEDURE — 84550 ASSAY OF BLOOD/URIC ACID: CPT

## 2019-10-04 PROCEDURE — 36591 DRAW BLOOD OFF VENOUS DEVICE: CPT

## 2019-10-04 PROCEDURE — 83615 LACTATE (LD) (LDH) ENZYME: CPT

## 2019-10-04 PROCEDURE — 99233 SBSQ HOSP IP/OBS HIGH 50: CPT | Performed by: INTERNAL MEDICINE

## 2019-10-04 PROCEDURE — 6370000000 HC RX 637 (ALT 250 FOR IP): Performed by: NURSE PRACTITIONER

## 2019-10-04 PROCEDURE — 85025 COMPLETE CBC W/AUTO DIFF WBC: CPT

## 2019-10-04 PROCEDURE — 2060000000 HC ICU INTERMEDIATE R&B

## 2019-10-04 PROCEDURE — 80048 BASIC METABOLIC PNL TOTAL CA: CPT

## 2019-10-04 PROCEDURE — 6370000000 HC RX 637 (ALT 250 FOR IP): Performed by: INTERNAL MEDICINE

## 2019-10-04 PROCEDURE — 2580000003 HC RX 258: Performed by: NURSE PRACTITIONER

## 2019-10-04 RX ORDER — METOPROLOL SUCCINATE 25 MG/1
12.5 TABLET, EXTENDED RELEASE ORAL 2 TIMES DAILY
Status: DISCONTINUED | OUTPATIENT
Start: 2019-10-04 | End: 2019-10-05 | Stop reason: HOSPADM

## 2019-10-04 RX ADMIN — SODIUM CHLORIDE 15 ML: 900 IRRIGANT IRRIGATION at 16:34

## 2019-10-04 RX ADMIN — CETIRIZINE HYDROCHLORIDE 10 MG: 10 TABLET, FILM COATED ORAL at 08:09

## 2019-10-04 RX ADMIN — Medication 10 ML: at 08:09

## 2019-10-04 RX ADMIN — GLIMEPIRIDE 3 MG: 2 TABLET ORAL at 10:35

## 2019-10-04 RX ADMIN — GLIMEPIRIDE 3 MG: 2 TABLET ORAL at 16:35

## 2019-10-04 RX ADMIN — POTASSIUM CHLORIDE 20 MEQ: 20 TABLET, EXTENDED RELEASE ORAL at 20:03

## 2019-10-04 RX ADMIN — FLECAINIDE ACETATE 100 MG: 100 TABLET ORAL at 20:04

## 2019-10-04 RX ADMIN — APIXABAN 5 MG: 5 TABLET, FILM COATED ORAL at 20:04

## 2019-10-04 RX ADMIN — FUROSEMIDE 20 MG: 20 TABLET ORAL at 08:09

## 2019-10-04 RX ADMIN — Medication 10 ML: at 20:04

## 2019-10-04 RX ADMIN — POTASSIUM CHLORIDE 20 MEQ: 20 TABLET, EXTENDED RELEASE ORAL at 08:10

## 2019-10-04 RX ADMIN — METFORMIN HYDROCHLORIDE 500 MG: 500 TABLET, EXTENDED RELEASE ORAL at 10:35

## 2019-10-04 RX ADMIN — SODIUM CHLORIDE 15 ML: 900 IRRIGANT IRRIGATION at 10:37

## 2019-10-04 RX ADMIN — APIXABAN 5 MG: 5 TABLET, FILM COATED ORAL at 10:35

## 2019-10-04 RX ADMIN — SODIUM CHLORIDE 15 ML: 900 IRRIGANT IRRIGATION at 20:05

## 2019-10-04 RX ADMIN — FLECAINIDE ACETATE 100 MG: 100 TABLET ORAL at 10:34

## 2019-10-04 RX ADMIN — METOPROLOL SUCCINATE 12.5 MG: 25 TABLET, EXTENDED RELEASE ORAL at 20:03

## 2019-10-04 ASSESSMENT — PAIN SCALES - GENERAL
PAINLEVEL_OUTOF10: 0

## 2019-10-04 NOTE — PLAN OF CARE
Problem: Discharge Planning:  Goal: Discharged to appropriate level of care  Description  Discharged to appropriate level of care  Outcome: Ongoing   Pt verbalize understanding of her treatment plan. Will monitor. Problem: PROTECTIVE PRECAUTIONS  Goal: Patient will remain free of nosocomial Infections  10/4/2019 1736 by Tere Delgado RN  Outcome: Ongoing   Pt verbalize understanding of the precautions. Will monitor. Problem: Falls - Risk of:  Goal: Will remain free from falls  Description  Will remain free from falls  10/4/2019 1736 by Tere Delgado RN  Outcome: Ongoing    Pt with activity orders for up ad ramila. Encouraged pt to be up OOB as much as possible throughout the day and for all meals. Encouraged frequent short naps as necessary to preserve energy but instructed that while awake, pt should be OOB. Encouraged pt to ambulate in halls. Pt is visualized to be OOB % of the time this shift. Will continue to encourage frequent activity. Problem: Venous Thromboembolism:  Goal: Will show no signs or symptoms of venous thromboembolism  Description  Will show no signs or symptoms of venous thromboembolism  10/4/2019 1736 by Tere Delgado RN  Outcome: Ongoing   Adherent with DVT Prevention: Pt is at risk for DVT d/t decreased mobility and cancer treatment. Pt educated on importance of activity. Pt has orders for Eliquis. Problem: Bleeding:  Goal: Will show no signs and symptoms of excessive bleeding  Description  Will show no signs and symptoms of excessive bleeding  10/4/2019 1736 by Tere Delgado RN  Outcome: Ongoing   Patient's hemoglobin this AM:   Recent Labs     10/04/19  0354   HGB 9.2*     Patient's platelet count this AM:   Recent Labs     10/04/19  0354       Thrombocytopenia Precautions in place. Patient showing no signs or symptoms of active bleeding. Transfusion not indicated at this time. Patient verbalizes understanding of all instructions.  Will continue

## 2019-10-04 NOTE — PROGRESS NOTES
[address] Daily Progress Note      Admit Date:  9/30/2019    Subjective:  Ms. Tasha Kaur was seen and examined. F/U afib/RVR/light headed. No further sx since last pm.  Converted overnight. Feels much better. Edema better. Had pauses but asx.     Objective:   /64   Pulse 63   Temp 98 °F (36.7 °C) (Oral)   Resp 18   Ht 5' 3\" (1.6 m)   Wt 242 lb 9.6 oz (110 kg)   SpO2 99%   BMI 42.97 kg/m²       Intake/Output Summary (Last 24 hours) at 10/4/2019 0915  Last data filed at 10/4/2019 0600  Gross per 24 hour   Intake 1320 ml   Output 3300 ml   Net -1980 ml       TELEMETRY: Sinus     Physical Exam:  General:  Awake, alert, NAD  Skin:  Warm and dry  Neck:  JVP not elevated  Chest:  Clear to auscultation, respiration normal  Cardiovascular:  RRR S1S2  Abdomen:  Soft nontender  Extremities:  tr edema    Medications:    metoprolol succinate  12.5 mg Oral BID    apixaban  5 mg Oral BID    flecainide  100 mg Oral BID    furosemide  20 mg Oral Daily    glimepiride  3 mg Oral BID WC    cetirizine  10 mg Oral Daily    metFORMIN  500 mg Oral Daily with breakfast    potassium chloride  20 mEq Oral BID    Saline Mouthwash  15 mL Swish & Spit 4x Daily AC & HS    sodium chloride flush  10 mL Intravenous 2 times per day      sodium chloride      dextrose       ondansetron, acetaminophen, sodium chloride, alteplase, magnesium hydroxide, magnesium sulfate, potassium chloride, Saline Mouthwash, prochlorperazine **OR** prochlorperazine, LORazepam **OR** LORazepam, glucose, dextrose, glucagon (rDNA), dextrose    Lab Data:  CBC:   Recent Labs     10/02/19  0405 10/03/19  0520 10/04/19  0354   WBC 12.4* 6.7 4.0   HGB 9.8* 9.6* 9.2*   HCT 29.3* 28.2* 27.0*   MCV 90.2 89.4 89.2    349 362     BMP:   Recent Labs     10/02/19  0405 10/03/19  0520 10/04/19  0354    142 147*   K 4.2 3.9 4.4    107 110   CO2 20* 22 26   PHOS 3.4 3.4 4.1   BUN 21* 20 24*   CREATININE 0.7 0.8 0.9     LIVER PROFILE: Recent Labs     10/02/19  0405 10/03/19  0520 10/04/19  0354   AST 24 21 32   ALT 34 35 39   BILIDIR <0.2 <0.2 <0.2   BILITOT <0.2 <0.2 <0.2   ALKPHOS 103 95 85     PT/INR:   Recent Labs     10/03/19  0338   PROTIME 12.1   INR 1.06     APTT:   Recent Labs     10/03/19  0338   APTT 28.9     BNP:  No results for input(s): BNP in the last 72 hours. IMAGING:     Assessment:  Patient Active Problem List    Diagnosis Date Noted    Small cell b-cell lymphoma, lymph nodes of multiple sites [facility] 01/29/2016     Priority: High    Non-Hodgkin's lymphoma ([facility] 09/30/2019    B-cell lymphoma ([facility] 09/09/2019    ZOE (obstructive sleep apnea)     Atrial fibrillation with RVR ([facility] 10/31/2018    Degenerative disc disease, lumbar 02/16/2018    Trochanteric bursitis of left hip 02/16/2018    Visit for monitoring Rituxan therapy 06/26/2017    Cyst of left ovary 07/18/2016    Endometrial thickening on ultrasound 07/18/2016    Hyperlipidemia 06/10/2016    Type 2 diabetes mellitus without complication ([facility] 37/93/3570    Precordial pain 01/08/2016    Abnormal EKG 01/08/2016    Essential hypertension 01/07/2016       Plan:  1. Converted to sinus. Had pauses on higher dose toprol. Feels better now in sinus. Diuresed well. Will put back to prior dose of toprol. She tolerated this well with no pauses. If no issues through today ok for home this pm or in am.  On previous regimen.        Core Measures:  · Discharge instructions:   · LVEF documented:   · ACEI for LV dysfunction:   · Smoking Cessation:    John Wilkes MD 10/4/2019 9:15 AM

## 2019-10-04 NOTE — PROGRESS NOTES
[facility] Progress Note    10/4/2019     Andreas Box    MRN: [id number]    : 1955    Referring MD: Robbie Church., MD  [address]  [address]  [place] [facility]      SUBJECTIVE:  No new issues - flushing and palpatations resolved since yesterday    ECOG PS:  (1) Restricted in physically strenuous activity, ambulatory and able to do work of light nature    Isolation: None    Medications    Scheduled Meds:   [Held by provider] metoprolol succinate  25 mg Oral BID    apixaban  5 mg Oral BID    flecainide  100 mg Oral BID    furosemide  20 mg Oral Daily    glimepiride  3 mg Oral BID WC    cetirizine  10 mg Oral Daily    metFORMIN  500 mg Oral Daily with breakfast    potassium chloride  20 mEq Oral BID    Saline Mouthwash  15 mL Swish & Spit 4x Daily AC & HS    sodium chloride flush  10 mL Intravenous 2 times per day     Continuous Infusions:   sodium chloride      dextrose       PRN Meds:.ondansetron, acetaminophen, sodium chloride, alteplase, magnesium hydroxide, magnesium sulfate, potassium chloride, Saline Mouthwash, prochlorperazine **OR** prochlorperazine, LORazepam **OR** LORazepam, glucose, dextrose, glucagon (rDNA), dextrose    ROS:  As noted above, otherwise remainder of 10-point ROS negative    Physical Exam:     I&O:      Intake/Output Summary (Last 24 hours) at 10/4/2019 0708  Last data filed at 10/4/2019 0600  Gross per 24 hour   Intake 1560 ml   Output 3300 ml   Net -1740 ml       Vital Signs:  /62   Pulse 70   Temp 97.8 °F (36.6 °C) (Oral)   Resp 18   Ht 5' 3\" (1.6 m)   Wt 246 lb (111.6 kg)   SpO2 98%   BMI 43.58 kg/m²     Weight:    Wt Readings from Last 3 Encounters:   10/03/19 246 lb (111.6 kg)   19 243 lb 11.2 oz (110.5 kg)   19 235 lb 0.2 oz (106.6 kg)         General: Awake, alert and oriented.   HEENT: normocephalic, no scleral erythema or icterus, Oral mucosa moist and intact, throat clear  NECK: supple without palpable adenopathy  BACK: Straight negative CVAT  SKIN: warm dry and intact without lesions rashes or masses  CHEST: CTA bilaterally without use of accessory muscles  CV: Normal S1 S2, RRR, no MRG  ABD: NT ND normoactive BS, no palpable masses or hepatosplenomegaly  EXTREMITIES: without edema, denies calf tenderness  NEURO: CN II - XII grossly intact  CATHETER: Right IJ PAC (2/26/16, IR) - CDI    Data    CBC:   Recent Labs     10/02/19  0405 10/03/19  0520 10/04/19  0354   WBC 12.4* 6.7 4.0   HGB 9.8* 9.6* 9.2*   HCT 29.3* 28.2* 27.0*   MCV 90.2 89.4 89.2    349 362     BMP/Mag:  Recent Labs     10/02/19  0405 10/03/19  0520 10/04/19  0354    142 147*   K 4.2 3.9 4.4    107 110   CO2 20* 22 26   PHOS 3.4 3.4 4.1   BUN 21* 20 24*   CREATININE 0.7 0.8 0.9   MG  --  2.30  --      LIVP:   Recent Labs     10/02/19  0405 10/03/19  0520 10/04/19  0354   AST 24 21 32   ALT 34 35 39   BILIDIR <0.2 <0.2 <0.2   BILITOT <0.2 <0.2 <0.2   ALKPHOS 103 95 85     Coags:   Recent Labs     10/03/19  0338   PROTIME 12.1   INR 1.06   APTT 28.9     Uric Acid   Recent Labs     10/02/19  0405 10/03/19  0520 10/04/19  0354   LABURIC 5.1 5.0 5.8       PROBLEM LIST:             1. 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 (Dx 1/2016)  2. Atrial fibrillation w/ RVR (Dx 10/2018)    3.  Type 2 DM  4.  Chronic  anticoagulation      TREATMENT:             1. XRT to thoracic spine   2. Bendamustine / Rituxan x 6 cycles (ending 7/2016)  3.  Rituxan maintenance x 12 months  4. Clinical Trial - Revlimid & Rituxan x 7 cycles (ending 12/2018)   5. CVP- R x 8 cycles (12/2018 - 6/2019)    6. R-ICE   Cycle #1 - 9/9/19  Cycle #2 - 9/30/19     ASSESSMENT AND PLAN:             1. 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 PLAN: R - ICE x 2 or 3 cycles followed by PET/CT scan. Then autologous stem cell transplant with GCSF mobilization if in adequate remission      Cycle 1, Day + 5     2.  ID: Patient is currently afebrile without evidence of infection.   - Start Diflucan, Valtrex and Levaquin prophylaxis once ANC < 1.5.      3. Heme:  Anemia related to recent chemotherapy, leukocytosis r/t recent neulasta  - Transfuse for Hgb < 7 and Platelets < 99 K.   - No transfusion today   - Will need Neulasta if dc'd on 10/5, otherwise will need daily Granix      4. Metabolic:  Electrolytes are WNL and renal fxn stable except for hyperNa  - Cont KCl 20 meq bid   - Replace Potassium and Magnesium per protocol.      6.  GI / Nutrition:          - Cont low microbial diet  - Dietary to follow closely      7.  Endocrine: Hyperglycemia d/t steroids  - Cont Amaryl and Metformin daily     8.  Cardiac: c/o dizziness   - H/o A. Fib  - Cont Eliquis, hold when platelets < 22P  - Cont flecainide  - Toprol XL increased to 25 mg bid, but now on hold w/ more pauses   - Cont Lasix 20 mg daily   - EKG (10/3/19) - A. flutter  - Cont telemetry  - Cardiology following, appreciate recs  - Pt currently NPO for possible procedure today      9. Thyroid nodule:  - S/p biopsy (8/26/2019) - benign       - DVT Prophylaxis: Prophylaxis on hold d/t contraindication  Contraindications to pharmacologic prophylaxis: Patient already on therapeutic anticoagulation  Contraindications to mechanical prophylaxis: None    - Disposition: Home and return to [facility] the following:   Will need Neulasta, NOT OBI, if able to be discharged on 10/5      ALEXUS Schwab - CNP

## 2019-10-04 NOTE — PLAN OF CARE
Problem: Falls - Risk of:  Goal: Will remain free from falls  Description  Will remain free from falls  Outcome: Ongoing  Note:   Pt with steady gait, up ad ramila, no issues at this time, instructed pt to call out for assistance as needed, pt verbalized understanding, bed in low position, side rails up, call light in reach, will continue to monitor. Problem: Infection - Central Venous Catheter-Associated Bloodstream Infection:  Goal: Will show no infection signs and symptoms  Description  Will show no infection signs and symptoms  Outcome: Ongoing  Note:   Pt afebrile, no S/S of infection, CVC site free from S/S of infection, no drainage, edema, redness, swelling, or tenderness, flushes easily with brisk blood return, dressing changes continue per protocol and on an as needed basis - see flowsheet, will continue to monitor. Problem: Bleeding:  Goal: Will show no signs and symptoms of excessive bleeding  Description  Will show no signs and symptoms of excessive bleeding  Outcome: Ongoing  Note:   Pt at risk for bleeding, no S/S of bleeding noted, PLTS were 362 with AM labs, orders to transfuse if equal to or less then 10, will continue to monitor. Problem: PROTECTIVE PRECAUTIONS  Goal: Patient will remain free of nosocomial Infections  Outcome: Ongoing  Note:   Pt afebrile, no S/S of infection, will continue to monitor. Compliant with [facility] Bath Protocol:  Performed CHG bath today per [facility] protocol utilizing CHG solution. CVC site cleansed with CHG wipe over dressing, skin surrounding dressing, and first 6\" of IV tubing. Pt tolerated well. Continued to encourage daily CHG bathing per [facility] protocol. Problem: Activity:  Goal: Ability to tolerate increased activity will improve  Description  Ability to tolerate increased activity will improve  Outcome: Ongoing  Note:   Stable/No Isolation Precautions:  Pt with activity orders for up ad ramila.   Encouraged pt to be up OOB as much as possible throughout the day and for all meals. Encouraged frequent short naps as necessary to preserve energy but instructed that while awake, pt should be OOB. Encouraged pt to ambulate in halls. Pt not seen up in halls this shift. Pt is visualized to be OOB 0-25% of the time this shift. Will continue to encourage frequent activity. Problem: Skin Integrity:  Goal: Absence of new skin breakdown  Description  Absence of new skin breakdown  Outcome: Ongoing  Note:   Pt's skin remains intact, no evidence of breakdown noted, will continue to monitor. Problem: Venous Thromboembolism:  Goal: Will show no signs or symptoms of venous thromboembolism  Description  Will show no signs or symptoms of venous thromboembolism  Outcome: Ongoing  Note:   On AC Treatment for A-Fib: Pt with recent hx of A-Fib. Currently on treatment dose Eliquis. Pt at risk of bleeding d/t anticoagulation. Cautioned pt on safety precautions to decrease risk of bleeding. Will notify provider if platelets drop below 50,000 and/or if pt has invasive procedure scheduled in order to hold anticoagulation.

## 2019-10-05 VITALS
WEIGHT: 241.6 LBS | OXYGEN SATURATION: 97 % | BODY MASS INDEX: 42.81 KG/M2 | SYSTOLIC BLOOD PRESSURE: 104 MMHG | HEIGHT: 63 IN | RESPIRATION RATE: 16 BRPM | TEMPERATURE: 97.8 F | DIASTOLIC BLOOD PRESSURE: 55 MMHG | HEART RATE: 70 BPM

## 2019-10-05 LAB
ALBUMIN SERPL-MCNC: 3.2 G/DL (ref 3.4–5)
ALP BLD-CCNC: 79 U/L (ref 40–129)
ALT SERPL-CCNC: 34 U/L (ref 10–40)
ANION GAP SERPL CALCULATED.3IONS-SCNC: 8 MMOL/L (ref 3–16)
AST SERPL-CCNC: 28 U/L (ref 15–37)
BASOPHILS ABSOLUTE: 0 K/UL (ref 0–0.2)
BASOPHILS RELATIVE PERCENT: 0 %
BILIRUB SERPL-MCNC: <0.2 MG/DL (ref 0–1)
BILIRUBIN DIRECT: <0.2 MG/DL (ref 0–0.3)
BILIRUBIN, INDIRECT: ABNORMAL MG/DL (ref 0–1)
BUN BLDV-MCNC: 28 MG/DL (ref 7–20)
CALCIUM SERPL-MCNC: 8.3 MG/DL (ref 8.3–10.6)
CHLORIDE BLD-SCNC: 108 MMOL/L (ref 99–110)
CO2: 23 MMOL/L (ref 21–32)
CREAT SERPL-MCNC: 1 MG/DL (ref 0.6–1.2)
EOSINOPHILS ABSOLUTE: 0 K/UL (ref 0–0.6)
EOSINOPHILS RELATIVE PERCENT: 0.2 %
GFR AFRICAN AMERICAN: >60
GFR NON-AFRICAN AMERICAN: 56
GLUCOSE BLD-MCNC: 125 MG/DL (ref 70–99)
GLUCOSE BLD-MCNC: 182 MG/DL (ref 70–99)
HCT VFR BLD CALC: 25.4 % (ref 36–48)
HEMOGLOBIN: 8.5 G/DL (ref 12–16)
LACTATE DEHYDROGENASE: 212 U/L (ref 100–190)
LYMPHOCYTES ABSOLUTE: 0.1 K/UL (ref 1–5.1)
LYMPHOCYTES RELATIVE PERCENT: 4.9 %
MCH RBC QN AUTO: 29.7 PG (ref 26–34)
MCHC RBC AUTO-ENTMCNC: 33.5 G/DL (ref 31–36)
MCV RBC AUTO: 88.7 FL (ref 80–100)
MONOCYTES ABSOLUTE: 0 K/UL (ref 0–1.3)
MONOCYTES RELATIVE PERCENT: 1.4 %
NEUTROPHILS ABSOLUTE: 2.8 K/UL (ref 1.7–7.7)
NEUTROPHILS RELATIVE PERCENT: 93.5 %
PDW BLD-RTO: 16.8 % (ref 12.4–15.4)
PERFORMED ON: ABNORMAL
PHOSPHORUS: 3.1 MG/DL (ref 2.5–4.9)
PLATELET # BLD: 334 K/UL (ref 135–450)
PMV BLD AUTO: 8.3 FL (ref 5–10.5)
POTASSIUM SERPL-SCNC: 3.9 MMOL/L (ref 3.5–5.1)
RBC # BLD: 2.86 M/UL (ref 4–5.2)
SODIUM BLD-SCNC: 139 MMOL/L (ref 136–145)
TOTAL PROTEIN: 5.1 G/DL (ref 6.4–8.2)
URIC ACID, SERUM: 5.8 MG/DL (ref 2.6–6)
WBC # BLD: 3 K/UL (ref 4–11)

## 2019-10-05 PROCEDURE — 83615 LACTATE (LD) (LDH) ENZYME: CPT

## 2019-10-05 PROCEDURE — 6370000000 HC RX 637 (ALT 250 FOR IP): Performed by: NURSE PRACTITIONER

## 2019-10-05 PROCEDURE — 2580000003 HC RX 258: Performed by: NURSE PRACTITIONER

## 2019-10-05 PROCEDURE — 6370000000 HC RX 637 (ALT 250 FOR IP): Performed by: INTERNAL MEDICINE

## 2019-10-05 PROCEDURE — 80048 BASIC METABOLIC PNL TOTAL CA: CPT

## 2019-10-05 PROCEDURE — 84550 ASSAY OF BLOOD/URIC ACID: CPT

## 2019-10-05 PROCEDURE — 80076 HEPATIC FUNCTION PANEL: CPT

## 2019-10-05 PROCEDURE — 84100 ASSAY OF PHOSPHORUS: CPT

## 2019-10-05 PROCEDURE — 85025 COMPLETE CBC W/AUTO DIFF WBC: CPT

## 2019-10-05 RX ADMIN — CETIRIZINE HYDROCHLORIDE 10 MG: 10 TABLET, FILM COATED ORAL at 08:35

## 2019-10-05 RX ADMIN — FUROSEMIDE 20 MG: 20 TABLET ORAL at 08:35

## 2019-10-05 RX ADMIN — GLIMEPIRIDE 3 MG: 2 TABLET ORAL at 08:35

## 2019-10-05 RX ADMIN — FLECAINIDE ACETATE 100 MG: 100 TABLET ORAL at 08:35

## 2019-10-05 RX ADMIN — METFORMIN HYDROCHLORIDE 500 MG: 500 TABLET, EXTENDED RELEASE ORAL at 08:35

## 2019-10-05 RX ADMIN — METOPROLOL SUCCINATE 12.5 MG: 25 TABLET, EXTENDED RELEASE ORAL at 08:35

## 2019-10-05 RX ADMIN — Medication 10 ML: at 08:34

## 2019-10-05 RX ADMIN — APIXABAN 5 MG: 5 TABLET, FILM COATED ORAL at 08:35

## 2019-10-05 RX ADMIN — POTASSIUM CHLORIDE 20 MEQ: 20 TABLET, EXTENDED RELEASE ORAL at 08:35

## 2019-10-05 RX ADMIN — SODIUM CHLORIDE 15 ML: 900 IRRIGANT IRRIGATION at 07:06

## 2019-10-05 ASSESSMENT — PAIN SCALES - GENERAL: PAINLEVEL_OUTOF10: 0

## 2019-10-05 NOTE — PROGRESS NOTES
Discharge teaching and instructions for diagnosis of NHL completed with patient using teachback method. AVS reviewed. Medications, dosages, schedule, and potential side effects reviewed with patient. Patient voiced understanding regarding prescriptions, follow up appointments, and care of self at home. Discharged ambulatory to  home with support per family. Pt went over to [facility] at discharge for neulasta injection. Verified appropriate follow up appointments scheduled & pt instructed on how to schedule appts. Diet & activity discussed. Patient verbalized understanding and questions were answered to her satisfaction. Signed discharge instructions were given to the patient and a copy placed in the paper-lite chart.       Lucille Maharaj

## 2019-10-05 NOTE — DISCHARGE SUMMARY
[facility] Discharge Summary             Attending Physician: Maxwell Garcia MD    Referring MD: Snehal Smith MD  [address] [address]    Name: Rafy Levine :  1955  MRN:  [id number]    Admission: 2019   Discharge:   10/5/19    Date:  10/5/19    Diagnosis on admit: Follicular NHL    Procedures: Routine chest x-ray,  Chemotherapy, laboratories, EKG, IV fluid hydration    Medications:   Current Facility-Administered Medications   Medication Dose Route Frequency Provider Last Rate Last Dose    metoprolol succinate (TOPROL XL) extended release tablet 12.5 mg  12.5 mg Oral BID Steffen Vital MD   12.5 mg at 10/05/19 5582    apixaban (ELIQUIS) tablet 5 mg  5 mg Oral BID Anika Dunk, APRN - CNP   5 mg at 10/05/19 0835    flecainide (TAMBOCOR) tablet 100 mg  100 mg Oral BID Anika Dunk, APRN - CNP   100 mg at 10/05/19 0835    furosemide (LASIX) tablet 20 mg  20 mg Oral Daily Anika Dunk, APRN - CNP   20 mg at 10/05/19 0835    glimepiride (AMARYL) tablet 3 mg  3 mg Oral BID WC Anika Dunk, APRN - CNP   3 mg at 10/05/19 0835    cetirizine (ZYRTEC) tablet 10 mg  10 mg Oral Daily Anika Dunk, APRN - CNP   10 mg at 10/05/19 0835    metFORMIN (GLUCOPHAGE-XR) extended release tablet 500 mg  500 mg Oral Daily with breakfast Anika Dunk, APRN - CNP   500 mg at 10/05/19 0835    ondansetron (ZOFRAN) tablet 8 mg  8 mg Oral Q8H PRN Anika Dunk, APRN - CNP        potassium chloride (KLOR-CON M) extended release tablet 20 mEq  20 mEq Oral BID Anika Dunk, APRN - CNP   20 mEq at 10/05/19 0835    acetaminophen (TYLENOL) tablet 650 mg  650 mg Oral Q4H PRN Fabi Schoenhoft, APRN - CNP        0.9 % sodium chloride infusion   Intravenous Continuous PRN Lia LARA Bacat, APRN - CNP        alteplase (CATHFLO) injection 2 mg  2 mg Intracatheter PRN Jose Luis Negron, APRN - CNP        magnesium hydroxide (MILK OF MAGNESIA) 400 MG/5ML suspension 10 mL  10 mL Oral Daily PRN Nichole Acres, APRN - CNP        magnesium sulfate 4 g in 100 mL IVPB premix  4 g Intravenous PRN Nichole Acres, APRN - CNP        potassium chloride 20 mEq/50 mL IVPB (Central Line)  20 mEq Intravenous PRN Nichole Acres, APRN - CNP        Saline Mouthwash 15 mL  15 mL Swish & Spit 4x Daily AC & HS Nichole Acres, APRN - CNP   15 mL at 10/05/19 0706    Saline Mouthwash 15 mL  15 mL Swish & Spit Q4H PRN Nichole Acres, APRN - CNP        sodium chloride flush 0.9 % injection 10 mL  10 mL Intravenous 2 times per day Nichole Acres, APRN - CNP   10 mL at 10/05/19 0834    prochlorperazine (COMPAZINE) tablet 10 mg  10 mg Oral Q4H PRN Otilia Little MD        Or   [facility] prochlorperazine (COMPAZINE) injection 10 mg  10 mg Intravenous Q4H PRN Otilia Little MD        LORazepam (ATIVAN) tablet 0.5 mg  0.5 mg Oral Q4H PRN Otilia Little MD        Or    LORazepam (ATIVAN) injection 0.5 mg  0.5 mg Intravenous Q4H PRN Otilia Little MD        glucose (GLUTOSE) 40 % oral gel 15 g  15 g Oral PRN Katie Rivera APRN - CNP        dextrose 50 % IV solution  12.5 g Intravenous PRN ALEXUS Simeon - CNP        glucagon (rDNA) injection 1 mg  1 mg Intramuscular PRN ALEXUS Simeon - CNP        dextrose 5 % solution  100 mL/hr Intravenous PRN ALEXUS Simeon CNP             Reason for Admission: Cycle #2 Pr-155 Jo Ambriz Course: This is a 67 yo female w/ follicular NHL confined to the thoracic area (Dx 2016). Her PMH is also significant for atrial fibrillation on chronic anticoagulation and type 2 DM. She was initially treated w/ radiation therapy to the involved area followed by systemic treatment w/ 6 cycles of bendamustine and rituximab (completed 7/2016). She then received maintenance rituximab after that x 11 months (completed 5/2018).  Restaging scans (6/1/18) revealed interval increase in the size and new hypermetabolic activity within the mesentery. This lesion was described as 8.6×4.5 cm with SUV of 10.8. She underwent a needle biopsy of this lesion on 6/12/2018 (case number [id number]) that reported findings consistent with B-cell non-Hodgkin's lymphoma follicular subtype, grade 1-2/3. She begin a trial at [facility] w/ Rituxan and Revlimid (stopped 12/2018) r/t dizziness and worsening afib. She then began treatment w/ R-CVP, and completed 8 cycles (1/2019 - 6/2019). She quickly progressed after her treatment completed in June 2019 proven by progression on CT in June 2019. Biopsy was reviewed in pathology conference and patient was determined to be a 3A follicular lymphoma. She is now being treated w/ R-ICE chemotherapy w/ plans for autologous stem cell transplant if adequate remission obtained.        She was admitted (9/30/19) for cyce #2 of R-ICE. She cont to report muscle weakness in her right leg that is ongoing and she believes related to her mass in his abdomen. She otherwise feels well and tolerated chemotherapy well except for experiencing dizziness, atrial flutter and arrhythmia w/ long pauses. Cardiology was consulted and increased her Toprol dose for 1 dose. The pauses increased, but then she changed to NSR. She now has resumed her home Toprol dose and she now feels much better. Her dizziness has improved. She will be discharged home on 10/5/19 and receive Neulasta injection. She will then return to [facility] on 10/7/19 for provider visit and labs. Physical Exam:     Vital Signs:  BP (!) 104/55   Pulse 70   Temp 97.8 °F (36.6 °C) (Oral)   Resp 16   Ht 5' 3\" (1.6 m)   Wt 241 lb 9.6 oz (109.6 kg)   SpO2 97%   BMI 42.80 kg/m²     Weight:    Wt Readings from Last 3 Encounters:   10/05/19 241 lb 9.6 oz (109.6 kg)   09/12/19 243 lb 11.2 oz (110.5 kg)   08/05/19 235 lb 0.2 oz (106.6 kg)       General: Awake, alert and oriented.   HEENT: normocephalic, no scleral erythema or icterus, Oral mucosa moist and intact, throat clear  NECK: supple without palpable adenopathy  BACK: Straight negative CVAT  SKIN: warm dry and intact without lesions rashes or masses  CHEST: CTA bilaterally without use of accessory muscles  CV: Normal S1 S2, RRR, no MRG  ABD: NT ND normoactive BS, no palpable masses or hepatosplenomegaly  EXTREMITIES: without edema, denies calf tenderness  NEURO: CN II - XII grossly intact  CATHETER: Right IJ PAC (2/26/16, IR) - CDI    Discharge Laboratory Data:  CBC:   Recent Labs     10/03/19  0520 10/04/19  0354 10/05/19  0335   WBC 6.7 4.0 3.0*   HGB 9.6* 9.2* 8.5*   HCT 28.2* 27.0* 25.4*   MCV 89.4 89.2 88.7    362 334     BMP/Mag:  Recent Labs     10/03/19  0520 10/04/19  0354 10/05/19  0335    147* 139   K 3.9 4.4 3.9    110 108   CO2 22 26 23   PHOS 3.4 4.1 3.1   BUN 20 24* 28*   CREATININE 0.8 0.9 1.0   MG 2.30  --   --      LIVP:   Recent Labs     10/03/19  0520 10/04/19  0354 10/05/19  0335   AST 21 32 28   ALT 35 39 34   BILIDIR <0.2 <0.2 <0.2   BILITOT <0.2 <0.2 <0.2   ALKPHOS 95 85 79     Coags:   Recent Labs     10/03/19  0338   PROTIME 12.1   INR 1.06   APTT 28.9     Uric Acid   Recent Labs     10/03/19  0520 10/04/19  0354 10/05/19  0335   LABURIC 5.0 5.8 5.8       PROBLEM LIST:             1. Follicular NHL w/ T3-R9 paraspinal mass and 9 cm mass at root of mesentery. (Dx 1/2016)  2. Atrial fibrillation w/ RVR (Dx 10/2018)    3.  Type 2 DM  4.  Chronic anticoagulation      TREATMENT:             1. XRT to thoracic spine   2. Bendamustine / Rituxan x 6 cycles (ending 7/2016)  3.  Rituxan maintenance x 12 months  4. Clinical Trial - Revlimid & Rituxan x 7 cycles (ending 12/2018)   5. CVP- R x 8 cycles (12/2018 - 6/2019)    6. R-ICE   Cycle #1 - 9/9/19  Cycle #2 - 9/30/19     ASSESSMENT AND PLAN:              1. 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 PLAN: R - ICE x 2 or 3 cycles followed by PET/CT scan.  Then autologous stem cell transplant with GCSF mobilization if in adequate remission      Cycle 1, Day + 6     2. ID: Patient is currently afebrile without evidence of infection.   - Start Diflucan, Valtrex and Levaquin prophylaxis once ANC < 1.5.      3. Heme:  Anemia related to recent chemotherapy  - Transfuse for Hgb < 7 and Platelets < 92 K.   - No transfusion today   - OBI on 10/3/19     4. Metabolic:  Electrolytes are WNL & renal fxn stable  - Cont KCl 20 meq bid     6.  GI / Nutrition:          - Cont low microbial diet     7.  Endocrine: Hyperglycemia d/t steroids  - Cont Amaryl and Metformin daily     8.  Cardiac: A. Flutter and pauses have resolved. She feels well. - H/o A. Fib  - Cont Eliquis, hold when platelets < 21M  - Cont flecainide & Toprol XL 12.5 mg bid  - Cont Lasix 20 mg daily      9. Thyroid nodule:  - S/p biopsy (8/26/2019) - benign      Condition on discharge:  Stable    Discharge Instructions:  return to [facility] the following:  Neulasta injection - 10/05/2019 after DC from [facility] labs:  10/07/2019    The patient was advised on activity and dietary restrictions. The patient was advised to follow up in the emergency department or contact the physician with any unresolved nausea/vomiting/diarrhea/pain or temperature greater than 100.5 F or any other unusual symptoms. Norm Leung.  [place], [address]

## 2019-10-05 NOTE — PLAN OF CARE
Problem: Discharge Planning:  Goal: Discharged to appropriate level of care  Description  Discharged to appropriate level of care  Outcome: Completed     Problem: PROTECTIVE PRECAUTIONS  Goal: Patient will remain free of nosocomial Infections  Outcome: Completed     Problem: Falls - Risk of:  Goal: Will remain free from falls  Description  Will remain free from falls  Outcome: Completed  Goal: Absence of physical injury  Description  Absence of physical injury  Outcome: Completed     Problem: Venous Thromboembolism:  Goal: Will show no signs or symptoms of venous thromboembolism  Description  Will show no signs or symptoms of venous thromboembolism  Outcome: Completed  Goal: Absence of signs or symptoms of impaired coagulation  Description  Absence of signs or symptoms of impaired coagulation  Outcome: Completed     Problem: Bleeding:  Goal: Will show no signs and symptoms of excessive bleeding  Description  Will show no signs and symptoms of excessive bleeding  Outcome: Completed     Problem: Infection - Central Venous Catheter-Associated Bloodstream Infection:  Goal: Will show no infection signs and symptoms  Description  Will show no infection signs and symptoms  Outcome: Completed     Problem:  Activity:  Goal: Ability to tolerate increased activity will improve  Description  Ability to tolerate increased activity will improve  Outcome: Completed     Problem: Nutrition Deficit:  Goal: Ability to achieve adequate nutritional intake will improve  Description  Ability to achieve adequate nutritional intake will improve  Outcome: Completed     Problem: Skin Integrity:  Goal: Absence of new skin breakdown  Description  Absence of new skin breakdown  Outcome: Completed

## 2019-10-05 NOTE — PROGRESS NOTES
VSS. Ortho negative. Assessment completed. Scheduled meds given at this time whole with water. Pt resting comfortably in bed. Call light within reach. Denies further needs at this time. Will continue to monitor.  Electronically signed by Erica Davis RN on 10/5/2019 at 8:42 AM

## 2019-10-09 NOTE — ADT AUTH CERT
Utilization Reviews         Medical Oncology [address] [place] [address] Day 6 (10/5/2019) by Farooq Mayen RN         Review Status Review Entered   Completed 10/9/2019 12:09       Criteria Review      Care Day: 6 Care Date: 10/5/2019 Level of Care:    Guideline Day 1    Level Of Care    (X) Discharge planning. See General Discharge Planning Tool.     Clinical Status    ( ) * Clinical Indications met [U]    Interventions    ( ) Inpatient interventions as needed    10/2/2019 3:30 PM EDT by Aaron French inpt chemo    10/2/2019 3:27 PM EDT by Elie Novel      inpt chemo    * Milestone       Medical Oncology [address] [place] [address] Day 5 (10/4/2019) by Farooq Mayen RN         Review Status Review Entered   Completed 10/9/2019 11:58       Criteria Review      Care Day: 5 Care Date: 10/4/2019 Level of Care:    Guideline Day 1    Clinical Status    ( ) * Clinical Indications met [U]    Interventions    (X) Inpatient interventions as needed    10/2/2019 3:30 PM EDT by Lakeside Novel      inpt chemo    10/2/2019 3:27 PM EDT by Lakeside Novel      inpt chemo    * Milestone   Additional Notes   10/4-    AFIB W LONG PAUSES-- LOPRESSOR WAS STOPPED, LATER CONVERTED TO SR    FEELS BETTER    FEELS LIKE FLUID IS LESS    TOLERATING CHEMO    CONTINUE TO MONITOR    PLAN DC 10.5    Vital Signs:  /62   Pulse 70   Temp 97.8 °F (36.6 °C) (Oral)   Resp 18       [facility] Day 4 (10/3/2019) by Farooq Mayen RN         Review Status Review Entered   Completed 10/9/2019 11:54       Criteria Review      Care Day: 4 Care Date: 10/3/2019 Level of Care:    Guideline Day 1    Clinical Status    ( ) * Clinical Indications met [U]    Interventions    (X) Inpatient interventions as needed    10/2/2019 3:30 PM EDT by Elie Novel      inpt chemo    10/2/2019 3:27 PM EDT by Elie Novel      inpt chemo    * Milestone   Additional Notes   10/3-    Continuing chemotherapy    Cont ivf 50    T 97.8, bp 126/72, hr 86- 113,  rr 18    ++++ AFIB--- W RVR-- AT TIMES    CARDIOLOGY CONSULTED-    patient whom we were asked to see for Ann Marie Hoffman for chemo for NHL.  Has hx of afib and has had ECV 3x in past.  Last in Dec.  It is relatively asx.  Has been in afib since admit for this round of chemo.  Was to go home today but has been noted that HR up at times. Lombardi has been reasonable since admit otherwise.  Notes feeling of some mild light headedness and sensation coming up back of head.  Seems to correlate with RVR.  RVR at times.  Otherwise in between asx.  No chest pain.  She feels puffy from all fluids    PLAN--    Appears to have been in afib since admit. Lombardi controlled until today.  Likely RVR related to volume loading with chemo.  Has some LE edema bilaterally. Diuresing.   Likely RVR periods more symptomatic.  Will give additional metoprolol then increase toprol to 25 mg bid.  Monitor another 24 hrs. She is AC with eliquis.  Asx when HR controlled. ON ELIQUIS BID    FLECAINIMIDE 100 BID    LOPRESSOR BID       Medical Oncology [facility] - Care Day 3 (10/2/2019) by Timur Jefferson RN         Review Status Review Entered   Completed 10/9/2019 11:48       Criteria Review      Care Day: 3 Care Date: 10/2/2019 Level of Care:    Guideline Day 1    Clinical Status    ( ) * Clinical Indications met [U]    Interventions    (X) Inpatient interventions as needed    10/2/2019 3:30 PM EDT by Mich Patel      inpt chemo    10/2/2019 3:27 PM EDT by Mich Patel      inpt chemo    * Milestone   Additional Notes   10/2-    Clinical update- : Planned admission for 2nd course of R-ICE. Vital Signs:  /80   Pulse 73   Temp 97.8 °F (36.6 °C) (Oral)   Resp 20    Wbc 12, h/h 9/29, k 4.2, bun 12, creat 0.7    Exam unremarkable    Per oncology-    ASSESSMENT AND PLAN:                   1. 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 PLAN: R - ICE x 2 or 3 cycles followed by PET/CT scan.  Then autologous stem cell transplant with GCSF mobilization if in adequate remission         Cycle 1, Day + 3         2. ID: Patient is currently afebrile without evidence of infection.    - Start Diflucan, Valtrex and Levaquin prophylaxis once ANC < 1.5.         3. Heme:  Anemia related to recent chemotherapy, leukocytosis r/t recent neulasta    - Transfuse for Hgb < 7 and Platelets < 73 K.    - No transfusion today     - OBI on 10/3/19         4. Metabolic:  Electrolytes are WNL except acidosis and steroid induced hyperglycemia and renal fxn stable    - Change IVF:  .45NS @ 150 mL/hr    - Cont KCl 20 meq bid    - Replace Potassium and Magnesium per protocol.         6.  GI / Nutrition:            - Cont low microbial diet    - Dietary to follow closely         7.  Endocrine: Hyperglycemia d/t steroids    - Cont Amaryl and Metformin daily    - Start high regimen Lispro SSI, AC&HS         8.  Cardiac: No acute issues    - H/o A. Fib    - Cont Eliquis, hold when platelets < 54V    - Cont flecainide & Toprol XL 12.5 mg daily    - Cont Lasix 20 mg daily         9.  Thyroid nodule:    - S/p biopsy (8/26/2019) - benign              - DVT Prophylaxis: Prophylaxis on hold d/t contraindication    Contraindications to pharmacologic prophylaxis: Patient already on therapeutic anticoagulation    Contraindications to mechanical prophylaxis: None         - Disposition: Home and return to [facility] the following:    OBI - 10/03/2019 arrival @ 11:30 am or after DC from [facility] labs:  10/05/2019 arrival @ 9:30 am

## 2019-10-11 ENCOUNTER — HOSPITAL ENCOUNTER (OUTPATIENT)
Dept: ONCOLOGY | Age: 64
Setting detail: INFUSION SERIES
Discharge: HOME | End: 2019-10-11
Payer: COMMERCIAL

## 2019-10-11 LAB
EKG ATRIAL RATE: 78 BPM
EKG DIAGNOSIS: NORMAL
EKG P AXIS: 60 DEGREES
EKG P-R INTERVAL: 172 MS
EKG Q-T INTERVAL: 374 MS
EKG QRS DURATION: 82 MS
EKG QTC CALCULATION (BAZETT): 426 MS
EKG R AXIS: 53 DEGREES
EKG T AXIS: 22 DEGREES
EKG VENTRICULAR RATE: 78 BPM

## 2019-10-11 PROCEDURE — 93005 ELECTROCARDIOGRAM TRACING: CPT | Performed by: INTERNAL MEDICINE

## 2019-10-11 PROCEDURE — 93010 ELECTROCARDIOGRAM REPORT: CPT | Performed by: INTERNAL MEDICINE

## 2019-10-14 NOTE — ADT AUTH CERT
Medical Oncology [address] [place] [address] Day 6 (10/5/2019) by Radha Turcios RN         Review Status Review Entered   Completed 10/9/2019 12:09       Criteria Review      Care Day: 6 Care Date: 10/5/2019 Level of Care:    Guideline Day 1    Level Of Care    (X) Discharge planning. See General Discharge Planning Tool.     Clinical Status    ( ) * Clinical Indications met [U]    Interventions    ( ) Inpatient interventions as needed    10/2/2019 3:30 PM EDT by Hemalatha Maldonado      inpt chemo    10/2/2019 3:27 PM EDT by Hemalatha Maldonado      inpt chemo    * Milestone   Additional Notes   10/5-    discharged home on 10/5/19 and receive Neulasta injection.  She will then return to [facility] on 10/7/19 for provider visit and labs.        Medical Oncology [address] [place] [address] Day 5 (10/4/2019) by Radha Turcios RN         Review Status Review Entered   Completed 10/9/2019 11:58       Criteria Review      Care Day: 5 Care Date: 10/4/2019 Level of Care:    Guideline Day 1    Clinical Status    ( ) * Clinical Indications met [U]    Interventions    (X) Inpatient interventions as needed    10/2/2019 3:30 PM EDT by Hemalatha Maldonado      inpt chemo    10/2/2019 3:27 PM EDT by Hemalatha Maldonado      inpt chemo    * Milestone   Additional Notes   10/4-    AFIB W LONG PAUSES-- LOPRESSOR WAS STOPPED, LATER CONVERTED TO SR    FEELS BETTER    FEELS LIKE FLUID IS LESS    TOLERATING CHEMO    CONTINUE TO MONITOR    PLAN DC 10.5    Vital Signs:  /62   Pulse 70   Temp 97.8 °F (36.6 °C) (Oral)   Resp 18       Medical Oncology [facility] Day 4 (10/3/2019) by Radha Turcios RN         Review Status Review Entered   Completed 10/9/2019 11:54       Criteria Review      Care Day: 4 Care Date: 10/3/2019 Level of Care:    Guideline Day 1    Clinical Status    ( ) * Clinical Indications met [U]    Interventions    (X) Inpatient interventions as needed    10/2/2019 3:30 PM EDT by Agnieszka Quale chemo    10/2/2019 3:27 PM EDT by Agnieszka Quale chemo    * Milestone Additional Notes   10/3-    Continuing chemotherapy    Cont ivf 50    T 97.8, bp 126/72, hr 86- 113,  rr 18    ++++ AFIB--- W RVR-- AT TIMES    CARDIOLOGY CONSULTED-    patient whom we were asked to see for Sharon Rodriguez for chemo for NHL.  Has hx of afib and has had ECV 3x in past.  Last in Dec.  It is relatively asx.  Has been in afib since admit for this round of chemo.  Was to go home today but has been noted that HR up at times. Lombardi has been reasonable since admit otherwise.  Notes feeling of some mild light headedness and sensation coming up back of head.  Seems to correlate with RVR.  RVR at times.  Otherwise in between asx.  No chest pain.  She feels puffy from all fluids    PLAN--    Appears to have been in afib since admit. Lombardi controlled until today.  Likely RVR related to volume loading with chemo.  Has some LE edema bilaterally. Diuresing.   Likely RVR periods more symptomatic.  Will give additional metoprolol then increase toprol to 25 mg bid.  Monitor another 24 hrs. She is AC with eliquis.  Asx when HR controlled. ON ELIQUIS BID    FLECAINIMIDE 100 BID    LOPRESSOR BID       Medical Oncology [facility] - Care Day 3 (10/2/2019) by Aaron Layne RN         Review Status Review Entered   Completed 10/9/2019 11:48       Criteria Review      Care Day: 3 Care Date: 10/2/2019 Level of Care:    Guideline Day 1    Clinical Status    ( ) * Clinical Indications met [U]    Interventions    (X) Inpatient interventions as needed    10/2/2019 3:30 PM EDT by Madison Hull      inpt chemo    10/2/2019 3:27 PM EDT by Madison Hull      inpt chemo    * Milestone   Additional Notes   10/2-    Clinical update- : Planned admission for 2nd course of R-ICE. Vital Signs:  /80   Pulse 73   Temp 97.8 °F (36.6 °C) (Oral)   Resp 20    Wbc 12, h/h 9/29, k 4.2, bun 12, creat 0.7    Exam unremarkable    Per oncology-    ASSESSMENT AND PLAN:                   1. 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 PLAN: R - ICE x 2 or 3 cycles followed by PET/CT scan. Then autologous stem cell transplant with GCSF mobilization if in adequate remission         Cycle 1, Day + 3         2. ID: Patient is currently afebrile without evidence of infection.    - Start Diflucan, Valtrex and Levaquin prophylaxis once ANC < 1.5.         3. Heme:  Anemia related to recent chemotherapy, leukocytosis r/t recent neulasta    - Transfuse for Hgb < 7 and Platelets < 68 K.    - No transfusion today     - OBI on 10/3/19         4. Metabolic:  Electrolytes are WNL except acidosis and steroid induced hyperglycemia and renal fxn stable    - Change IVF:  .45NS @ 150 mL/hr    - Cont KCl 20 meq bid    - Replace Potassium and Magnesium per protocol.         6.  GI / Nutrition:            - Cont low microbial diet    - Dietary to follow closely         7.  Endocrine: Hyperglycemia d/t steroids    - Cont Amaryl and Metformin daily    - Start high regimen Lispro SSI, AC&HS         8.  Cardiac: No acute issues    - H/o A. Fib    - Cont Eliquis, hold when platelets < 11S    - Cont flecainide & Toprol XL 12.5 mg daily    - Cont Lasix 20 mg daily         9.  Thyroid nodule:    - S/p biopsy (8/26/2019) - benign              - DVT Prophylaxis: Prophylaxis on hold d/t contraindication    Contraindications to pharmacologic prophylaxis: Patient already on therapeutic anticoagulation    Contraindications to mechanical prophylaxis: None         - Disposition: Home and return to [facility] the following:    OBI - 10/03/2019 arrival @ 11:30 am or after DC from [facility] labs:  10/05/2019 arrival @ 9:30 am

## 2019-10-18 ENCOUNTER — HOSPITAL ENCOUNTER (OUTPATIENT)
Dept: ONCOLOGY | Age: 64
Setting detail: INFUSION SERIES
Discharge: HOME | End: 2019-10-18
Payer: COMMERCIAL

## 2019-10-18 ENCOUNTER — HOSPITAL ENCOUNTER (OUTPATIENT)
Dept: NON INVASIVE DIAGNOSTICS | Age: 64
Discharge: HOME | End: 2019-10-18
Payer: COMMERCIAL

## 2019-10-18 ENCOUNTER — HOSPITAL ENCOUNTER (OUTPATIENT)
Dept: PULMONOLOGY | Age: 64
Discharge: HOME | End: 2019-10-18
Payer: COMMERCIAL

## 2019-10-18 VITALS
SYSTOLIC BLOOD PRESSURE: 112 MMHG | DIASTOLIC BLOOD PRESSURE: 65 MMHG | WEIGHT: 235.89 LBS | BODY MASS INDEX: 41.8 KG/M2 | TEMPERATURE: 97.9 F | HEIGHT: 63 IN | HEART RATE: 73 BPM | RESPIRATION RATE: 16 BRPM

## 2019-10-18 VITALS — OXYGEN SATURATION: 100 %

## 2019-10-18 LAB
A/G RATIO: 1.8 (ref 1.1–2.2)
ABO/RH: NORMAL
ALBUMIN SERPL-MCNC: 4 G/DL (ref 3.4–5)
ALP BLD-CCNC: 168 U/L (ref 40–129)
ALT SERPL-CCNC: 28 U/L (ref 10–40)
ANION GAP SERPL CALCULATED.3IONS-SCNC: 14 MMOL/L (ref 3–16)
ANISOCYTOSIS: ABNORMAL
ANTIBODY SCREEN: NORMAL
APTT: 25.9 SEC (ref 26–36)
AST SERPL-CCNC: 27 U/L (ref 15–37)
BACTERIA: ABNORMAL /HPF
BANDED NEUTROPHILS RELATIVE PERCENT: 4 % (ref 0–7)
BASOPHILS ABSOLUTE: 0 K/UL (ref 0–0.2)
BASOPHILS RELATIVE PERCENT: 0 %
BILIRUB SERPL-MCNC: 0.3 MG/DL (ref 0–1)
BILIRUBIN DIRECT: <0.2 MG/DL (ref 0–0.3)
BILIRUBIN URINE: NEGATIVE
BILIRUBIN, INDIRECT: NORMAL MG/DL (ref 0–1)
BLOOD, URINE: NEGATIVE
BUN BLDV-MCNC: 16 MG/DL (ref 7–20)
CALCIUM SERPL-MCNC: 9.2 MG/DL (ref 8.3–10.6)
CHLORIDE BLD-SCNC: 100 MMOL/L (ref 99–110)
CLARITY: CLEAR
CO2: 25 MMOL/L (ref 21–32)
COLOR: YELLOW
CREAT SERPL-MCNC: 0.8 MG/DL (ref 0.6–1.2)
EKG ATRIAL RATE: 85 BPM
EKG DIAGNOSIS: NORMAL
EKG P AXIS: 51 DEGREES
EKG P-R INTERVAL: 174 MS
EKG Q-T INTERVAL: 390 MS
EKG QRS DURATION: 92 MS
EKG QTC CALCULATION (BAZETT): 464 MS
EKG R AXIS: 21 DEGREES
EKG T AXIS: 64 DEGREES
EKG VENTRICULAR RATE: 85 BPM
EOSINOPHILS ABSOLUTE: 0 K/UL (ref 0–0.6)
EOSINOPHILS RELATIVE PERCENT: 0 %
EPITHELIAL CELLS, UA: ABNORMAL /HPF
GFR AFRICAN AMERICAN: >60
GFR NON-AFRICAN AMERICAN: >60
GLOBULIN: 2.2 G/DL
GLUCOSE BLD-MCNC: 125 MG/DL (ref 70–99)
GLUCOSE URINE: NEGATIVE MG/DL
HAV IGM SER IA-ACNC: NORMAL
HBV SURFACE AB TITR SER: <3.5 MIU/ML
HCT VFR BLD CALC: 24.4 % (ref 36–48)
HEMOGLOBIN: 8.2 G/DL (ref 12–16)
INR BLD: 0.98 (ref 0.86–1.14)
KETONES, URINE: NEGATIVE MG/DL
LACTATE DEHYDROGENASE: 447 U/L (ref 100–190)
LEUKOCYTE ESTERASE, URINE: NEGATIVE
LV EF: 53 %
LVEF MODALITY: NORMAL
LYMPHOCYTES ABSOLUTE: 0.8 K/UL (ref 1–5.1)
LYMPHOCYTES RELATIVE PERCENT: 4 %
MAGNESIUM: 2.1 MG/DL (ref 1.8–2.4)
MCH RBC QN AUTO: 29.4 PG (ref 26–34)
MCHC RBC AUTO-ENTMCNC: 33.6 G/DL (ref 31–36)
MCV RBC AUTO: 87.5 FL (ref 80–100)
METAMYELOCYTES RELATIVE PERCENT: 1 %
MICROCYTES: ABNORMAL
MICROSCOPIC EXAMINATION: YES
MONOCYTES ABSOLUTE: 1.2 K/UL (ref 0–1.3)
MONOCYTES RELATIVE PERCENT: 6 %
MYELOCYTE PERCENT: 5 %
NEUTROPHILS ABSOLUTE: 18 K/UL (ref 1.7–7.7)
NEUTROPHILS RELATIVE PERCENT: 80 %
NITRITE, URINE: NEGATIVE
NUCLEATED RED BLOOD CELLS: 1 /100 WBC
NUCLEATED RED BLOOD CELLS: 1 /100 WBC
PDW BLD-RTO: 16.5 % (ref 12.4–15.4)
PH UA: 6.5 (ref 5–8)
PHOSPHORUS: 2.5 MG/DL (ref 2.5–4.9)
PLATELET # BLD: 53 K/UL (ref 135–450)
PLATELET SLIDE REVIEW: ABNORMAL
PMV BLD AUTO: 9.7 FL (ref 5–10.5)
POLYCHROMASIA: ABNORMAL
POTASSIUM SERPL-SCNC: 4.1 MMOL/L (ref 3.5–5.1)
PROTEIN UA: 30 MG/DL
PROTHROMBIN TIME: 11.2 SEC (ref 9.8–13)
RBC # BLD: 2.79 M/UL (ref 4–5.2)
RBC UA: ABNORMAL /HPF (ref 0–2)
SICKLE CELL SCREEN: NEGATIVE
SODIUM BLD-SCNC: 139 MMOL/L (ref 136–145)
SPECIFIC GRAVITY UA: 1.02 (ref 1–1.03)
STOMATOCYTES: ABNORMAL
TOTAL PROTEIN: 6.2 G/DL (ref 6.4–8.2)
URIC ACID, SERUM: 5.4 MG/DL (ref 2.6–6)
URINE TYPE: ABNORMAL
UROBILINOGEN, URINE: 0.2 E.U./DL
WBC # BLD: 20 K/UL (ref 4–11)
WBC UA: ABNORMAL /HPF (ref 0–5)

## 2019-10-18 PROCEDURE — 85730 THROMBOPLASTIN TIME PARTIAL: CPT

## 2019-10-18 PROCEDURE — 6360000002 HC RX W HCPCS: Performed by: INTERNAL MEDICINE

## 2019-10-18 PROCEDURE — 84550 ASSAY OF BLOOD/URIC ACID: CPT

## 2019-10-18 PROCEDURE — 94060 EVALUATION OF WHEEZING: CPT

## 2019-10-18 PROCEDURE — 86900 BLOOD TYPING SEROLOGIC ABO: CPT

## 2019-10-18 PROCEDURE — 86709 HEPATITIS A IGM ANTIBODY: CPT

## 2019-10-18 PROCEDURE — 94760 N-INVAS EAR/PLS OXIMETRY 1: CPT

## 2019-10-18 PROCEDURE — 83036 HEMOGLOBIN GLYCOSYLATED A1C: CPT

## 2019-10-18 PROCEDURE — 86901 BLOOD TYPING SEROLOGIC RH(D): CPT

## 2019-10-18 PROCEDURE — 85610 PROTHROMBIN TIME: CPT

## 2019-10-18 PROCEDURE — 36591 DRAW BLOOD OFF VENOUS DEVICE: CPT

## 2019-10-18 PROCEDURE — 85660 RBC SICKLE CELL TEST: CPT

## 2019-10-18 PROCEDURE — 84100 ASSAY OF PHOSPHORUS: CPT

## 2019-10-18 PROCEDURE — 86850 RBC ANTIBODY SCREEN: CPT

## 2019-10-18 PROCEDURE — 86707 HEPATITIS BE ANTIBODY: CPT

## 2019-10-18 PROCEDURE — 86706 HEP B SURFACE ANTIBODY: CPT

## 2019-10-18 PROCEDURE — 83615 LACTATE (LD) (LDH) ENZYME: CPT

## 2019-10-18 PROCEDURE — 99211 OFF/OP EST MAY X REQ PHY/QHP: CPT

## 2019-10-18 PROCEDURE — 94729 DIFFUSING CAPACITY: CPT

## 2019-10-18 PROCEDURE — 86787 VARICELLA-ZOSTER ANTIBODY: CPT

## 2019-10-18 PROCEDURE — 86645 CMV ANTIBODY IGM: CPT

## 2019-10-18 PROCEDURE — 82248 BILIRUBIN DIRECT: CPT

## 2019-10-18 PROCEDURE — 94726 PLETHYSMOGRAPHY LUNG VOLUMES: CPT

## 2019-10-18 PROCEDURE — 81001 URINALYSIS AUTO W/SCOPE: CPT

## 2019-10-18 PROCEDURE — 93306 TTE W/DOPPLER COMPLETE: CPT

## 2019-10-18 PROCEDURE — 93005 ELECTROCARDIOGRAM TRACING: CPT

## 2019-10-18 PROCEDURE — 94640 AIRWAY INHALATION TREATMENT: CPT

## 2019-10-18 PROCEDURE — 86694 HERPES SIMPLEX NES ANTBDY: CPT

## 2019-10-18 PROCEDURE — 85025 COMPLETE CBC W/AUTO DIFF WBC: CPT

## 2019-10-18 PROCEDURE — 80053 COMPREHEN METABOLIC PANEL: CPT

## 2019-10-18 PROCEDURE — 94664 DEMO&/EVAL PT USE INHALER: CPT

## 2019-10-18 PROCEDURE — 83735 ASSAY OF MAGNESIUM: CPT

## 2019-10-18 RX ORDER — ALBUTEROL SULFATE 2.5 MG/3ML
2.5 SOLUTION RESPIRATORY (INHALATION) ONCE
Status: COMPLETED | OUTPATIENT
Start: 2019-10-18 | End: 2019-10-18

## 2019-10-18 RX ORDER — HEPARIN SODIUM (PORCINE) LOCK FLUSH IV SOLN 100 UNIT/ML 100 UNIT/ML
300 SOLUTION INTRAVENOUS PRN
Status: DISCONTINUED | OUTPATIENT
Start: 2019-10-18 | End: 2019-10-18

## 2019-10-18 RX ORDER — HEPARIN SODIUM (PORCINE) LOCK FLUSH IV SOLN 100 UNIT/ML 100 UNIT/ML
300 SOLUTION INTRAVENOUS PRN
Status: DISCONTINUED | OUTPATIENT
Start: 2019-10-18 | End: 2019-10-19 | Stop reason: HOSPADM

## 2019-10-18 RX ADMIN — ALBUTEROL SULFATE 2.5 MG: 2.5 SOLUTION RESPIRATORY (INHALATION) at 14:02

## 2019-10-18 RX ADMIN — Medication 300 UNITS: at 11:55

## 2019-10-18 NOTE — PROGRESS NOTES
Pt in Outpatient Infusion, here for labs and teaching/education per transplant [facility] [place] drawn per RN via right upper port without difficulty, port de-accessed prior to discharge. Pt v/u of next appointment in [facility], d/c'd ambulatory with pt's .

## 2019-10-19 LAB — VARICELLA-ZOSTER VIRUS AB, IGG: NORMAL

## 2019-10-21 LAB
CYTOMEGALOVIRUS IGM ANTIBODY: <8 AU/ML
ESTIMATED AVERAGE GLUCOSE: 185.8 MG/DL
HBA1C MFR BLD: 8.1 %
HEPATITIS BE ANTIBODY: NEGATIVE
HERPES TYPE 1/2 IGM COMBINED: 0.85 IV
HERPES TYPE I/II IGG COMBINED: 19.1 IV

## 2019-10-21 NOTE — BH NOTE
Pre-Transplant Psychological Evaluation, Jaswant Obregon, 10/18/2019  Screening Measures: FACT-LYM, PHQ-9  Reason for Referral:  Mrs. Anna Rogel and her  Blair Priest were interviewed in preparation for her autologous stem cell transplant for follicular non-Hodgkins lymphoma. She was diagnosed in 2016 and received XRT to her thoracic spine and Bendamustine/Rituxan for 6 cycles, followed by 12 months of Rituxan maintenance. She completed 11 of 12 cycles until restaging scans in June 2018 revealed progressive disease. 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 She began trial on Rituxan and Revlimid which was discontinued in December 2018 because of worsening dizziness and Afib. She was started on R-CVP in January 2019 but quickly progressed after completion of treatment in June 2019. She went on to receive 2 cycles of R-ICE, tolerating chemotherapy fairly well except for experiencing atrial flutter, dizziness, and arrhythmia with long pauses. The plan is for her to have a 3rd cycle of R-ICE and mobilize for stem cell collection off that cycle. She may have XRT to the mass in her mesentery if it is not resolved after transplant. Mental Status/Appearance/Coping Style: Mrs. Anna Rogel is an obese 19-year-old  woman who was somewhat guarded and difficult to engage through her evaluation. She is an independent person and doesnt like being the center of attention. Until her illness progressed for several months and became increasingly disabling, she didnt consult a physician until she couldnt function. She describes herself as a homebody and has few interests beyond television and family. She and her family dont go to a [employer] and spirituality and prayer are not comforts on which she focuses. She and her  appear to have a solid relationship, but he will work through her transplant and they will find others to support her care.     Social/Family History/Support System: Mrs. Anna Rogel grew up in [place], [place]. She is the oldest of 3 children born to her parents with a brother who is 62 and a sister who is 48. Her mother worked at [employer] in the [place] office and her father was in car sales, then was a , and finally a driving examiner. Her mother  in  of breast cancer and her father  in  of lung cancer. She says her father also painted cars and she says she and her siblings spent lots of time in the garage amongst the fumes from the paint. She worked in [employer] and after graduating for a total of 9 years. She followed some boy to [place], where she worked in a [employer] for 2 years before returning home. She met her  in  and asked him to be her date at a wedding. They have been  for 25 years. Blair Priest works in [employer] for [employer]. The couple has a 24years old daughter, Almarie Citizen, who is a senior at [employer] in [address] in [place]. Mrs. Anna Rogel and her  live in [place], [place], [place] the country but their lot is only an acre. Occupational History/Financial Concerns: Mrs. Anna Rogel has held various jobs, including working in a [employer], working at [employer], and working at an [employer]. Her current job is at [employer] in [place]. She was working from home 3 days per week and has worked part time since her daughter was born. Quality of Life Issues (Body Image, hair loss, sexual functioning):  Mrs. Anna Rogel complains of fatigue and problems with concentration and anxiety. History/Current or Projected Mental Health Issues/substance use:  Mrs. Anna Rogel denies any history of anxiety or depression but does admit that uncertainty about the future makes her anxious. She says she quit smoking 4 years ago and doesnt drink. Her  makes homemade wine and she will drink that.     Assessment and Plan:  The only concern about Mrs. Mathieu Renee in terms of psychosocial needs is that she hasnt gotten committed caregivers as of this writing. She says she has a list of people who have said they will help but the current intention is to not have her  involved during his working hours. She lives a good distance from the hospital and there was not mention of staying close by. It would be my guess that she is going to be inpatient during her transplant recovery given her cardiac history. She does need to show a consistent list of caregivers and a plan for organizing rides to appointments prior to admission. She will receive routine distress screening and intervention as needed.     Jovanny Mae.D, ABPP

## 2019-10-22 LAB — VARICELLA ZOSTER AB IGM: 0.08 ISR

## 2019-11-01 ENCOUNTER — APPOINTMENT (OUTPATIENT)
Dept: GENERAL RADIOLOGY | Age: 64
DRG: 847 | End: 2019-11-01
Attending: INTERNAL MEDICINE
Payer: COMMERCIAL

## 2019-11-01 ENCOUNTER — HOSPITAL ENCOUNTER (INPATIENT)
Dept: INTERVENTIONAL RADIOLOGY/VASCULAR | Age: 64
LOS: 3 days | Discharge: HOME | DRG: 847 | End: 2019-11-04
Attending: INTERNAL MEDICINE | Admitting: INTERNAL MEDICINE
Payer: COMMERCIAL

## 2019-11-01 PROBLEM — Z51.11 ENCOUNTER FOR CHEMOTHERAPY MANAGEMENT: Status: ACTIVE | Noted: 2019-11-01

## 2019-11-01 LAB
ALBUMIN SERPL-MCNC: 3.7 G/DL (ref 3.4–5)
ALP BLD-CCNC: 93 U/L (ref 40–129)
ALT SERPL-CCNC: 38 U/L (ref 10–40)
ANION GAP SERPL CALCULATED.3IONS-SCNC: 10 MMOL/L (ref 3–16)
APTT: 28.8 SEC (ref 26–36)
AST SERPL-CCNC: 35 U/L (ref 15–37)
BASOPHILS ABSOLUTE: 0.1 K/UL (ref 0–0.2)
BASOPHILS RELATIVE PERCENT: 0.9 %
BILIRUB SERPL-MCNC: 0.3 MG/DL (ref 0–1)
BILIRUBIN DIRECT: <0.2 MG/DL (ref 0–0.3)
BILIRUBIN URINE: NEGATIVE
BILIRUBIN, INDIRECT: ABNORMAL MG/DL (ref 0–1)
BLOOD, URINE: NEGATIVE
BUN BLDV-MCNC: 20 MG/DL (ref 7–20)
CALCIUM SERPL-MCNC: 8.5 MG/DL (ref 8.3–10.6)
CHLORIDE BLD-SCNC: 108 MMOL/L (ref 99–110)
CLARITY: CLEAR
CO2: 25 MMOL/L (ref 21–32)
COLOR: YELLOW
CREAT SERPL-MCNC: 0.7 MG/DL (ref 0.6–1.2)
EOSINOPHILS ABSOLUTE: 0.1 K/UL (ref 0–0.6)
EOSINOPHILS RELATIVE PERCENT: 1.4 %
GFR AFRICAN AMERICAN: >60
GFR NON-AFRICAN AMERICAN: >60
GLUCOSE BLD-MCNC: 154 MG/DL (ref 70–99)
GLUCOSE BLD-MCNC: 156 MG/DL (ref 70–99)
GLUCOSE BLD-MCNC: 198 MG/DL (ref 70–99)
GLUCOSE URINE: NEGATIVE MG/DL
HCT VFR BLD CALC: 27 % (ref 36–48)
HCT VFR BLD CALC: 30 % (ref 36–48)
HEMOGLOBIN: 8.8 G/DL (ref 12–16)
HEMOGLOBIN: 9.7 G/DL (ref 12–16)
INR BLD: 0.96 (ref 0.86–1.14)
KETONES, URINE: NEGATIVE MG/DL
LACTATE DEHYDROGENASE: 243 U/L (ref 100–190)
LEUKOCYTE ESTERASE, URINE: NEGATIVE
LYMPHOCYTES ABSOLUTE: 0.3 K/UL (ref 1–5.1)
LYMPHOCYTES RELATIVE PERCENT: 6.1 %
MAGNESIUM: 2.1 MG/DL (ref 1.8–2.4)
MCH RBC QN AUTO: 31.4 PG (ref 26–34)
MCH RBC QN AUTO: 31.4 PG (ref 26–34)
MCHC RBC AUTO-ENTMCNC: 32.4 G/DL (ref 31–36)
MCHC RBC AUTO-ENTMCNC: 32.5 G/DL (ref 31–36)
MCV RBC AUTO: 96.7 FL (ref 80–100)
MCV RBC AUTO: 96.9 FL (ref 80–100)
MICROSCOPIC EXAMINATION: NORMAL
MONOCYTES ABSOLUTE: 0.7 K/UL (ref 0–1.3)
MONOCYTES RELATIVE PERCENT: 13.1 %
NEUTROPHILS ABSOLUTE: 4.4 K/UL (ref 1.7–7.7)
NEUTROPHILS RELATIVE PERCENT: 78.5 %
NITRITE, URINE: NEGATIVE
PDW BLD-RTO: 26.8 % (ref 12.4–15.4)
PDW BLD-RTO: 27.1 % (ref 12.4–15.4)
PERFORMED ON: ABNORMAL
PERFORMED ON: ABNORMAL
PH UA: 6 (ref 5–8)
PHOSPHORUS: 4 MG/DL (ref 2.5–4.9)
PLATELET # BLD: 243 K/UL (ref 135–450)
PLATELET # BLD: 279 K/UL (ref 135–450)
PMV BLD AUTO: 8.5 FL (ref 5–10.5)
PMV BLD AUTO: 8.8 FL (ref 5–10.5)
POTASSIUM SERPL-SCNC: 3.9 MMOL/L (ref 3.5–5.1)
PROTEIN UA: NEGATIVE MG/DL
PROTHROMBIN TIME: 11 SEC (ref 9.8–13)
RBC # BLD: 2.8 M/UL (ref 4–5.2)
RBC # BLD: 3.1 M/UL (ref 4–5.2)
SODIUM BLD-SCNC: 143 MMOL/L (ref 136–145)
SPECIFIC GRAVITY UA: >=1.03 (ref 1–1.03)
TOTAL PROTEIN: 5.6 G/DL (ref 6.4–8.2)
URIC ACID, SERUM: 6.8 MG/DL (ref 2.6–6)
URINE TYPE: NORMAL
UROBILINOGEN, URINE: 0.2 E.U./DL
WBC # BLD: 5.7 K/UL (ref 4–11)
WBC # BLD: 6.6 K/UL (ref 4–11)

## 2019-11-01 PROCEDURE — 2500000003 HC RX 250 WO HCPCS

## 2019-11-01 PROCEDURE — 85027 COMPLETE CBC AUTOMATED: CPT

## 2019-11-01 PROCEDURE — 85025 COMPLETE CBC W/AUTO DIFF WBC: CPT

## 2019-11-01 PROCEDURE — 02H633Z INSERTION OF INFUSION DEVICE INTO RIGHT ATRIUM, PERCUTANEOUS APPROACH: ICD-10-PCS | Performed by: RADIOLOGY

## 2019-11-01 PROCEDURE — 2580000003 HC RX 258: Performed by: INTERNAL MEDICINE

## 2019-11-01 PROCEDURE — 6360000002 HC RX W HCPCS

## 2019-11-01 PROCEDURE — 84100 ASSAY OF PHOSPHORUS: CPT

## 2019-11-01 PROCEDURE — C1894 INTRO/SHEATH, NON-LASER: HCPCS

## 2019-11-01 PROCEDURE — 84550 ASSAY OF BLOOD/URIC ACID: CPT

## 2019-11-01 PROCEDURE — C1769 GUIDE WIRE: HCPCS

## 2019-11-01 PROCEDURE — 83735 ASSAY OF MAGNESIUM: CPT

## 2019-11-01 PROCEDURE — 80048 BASIC METABOLIC PNL TOTAL CA: CPT

## 2019-11-01 PROCEDURE — 6370000000 HC RX 637 (ALT 250 FOR IP): Performed by: NURSE PRACTITIONER

## 2019-11-01 PROCEDURE — 6360000002 HC RX W HCPCS: Performed by: INTERNAL MEDICINE

## 2019-11-01 PROCEDURE — 81003 URINALYSIS AUTO W/O SCOPE: CPT

## 2019-11-01 PROCEDURE — 85730 THROMBOPLASTIN TIME PARTIAL: CPT

## 2019-11-01 PROCEDURE — 2060000000 HC ICU INTERMEDIATE R&B

## 2019-11-01 PROCEDURE — 80076 HEPATIC FUNCTION PANEL: CPT

## 2019-11-01 PROCEDURE — 0JH63XZ INSERTION OF TUNNELED VASCULAR ACCESS DEVICE INTO CHEST SUBCUTANEOUS TISSUE AND FASCIA, PERCUTANEOUS APPROACH: ICD-10-PCS | Performed by: RADIOLOGY

## 2019-11-01 PROCEDURE — 36558 INSERT TUNNELED CV CATH: CPT

## 2019-11-01 PROCEDURE — 85610 PROTHROMBIN TIME: CPT

## 2019-11-01 PROCEDURE — 6370000000 HC RX 637 (ALT 250 FOR IP): Performed by: INTERNAL MEDICINE

## 2019-11-01 PROCEDURE — 83615 LACTATE (LD) (LDH) ENZYME: CPT

## 2019-11-01 PROCEDURE — 3E04305 INTRODUCTION OF OTHER ANTINEOPLASTIC INTO CENTRAL VEIN, PERCUTANEOUS APPROACH: ICD-10-PCS | Performed by: INTERNAL MEDICINE

## 2019-11-01 PROCEDURE — 96415 CHEMO IV INFUSION ADDL HR: CPT

## 2019-11-01 PROCEDURE — 2580000003 HC RX 258

## 2019-11-01 PROCEDURE — 71046 X-RAY EXAM CHEST 2 VIEWS: CPT

## 2019-11-01 PROCEDURE — 77001 FLUOROGUIDE FOR VEIN DEVICE: CPT

## 2019-11-01 PROCEDURE — 87081 CULTURE SCREEN ONLY: CPT

## 2019-11-01 PROCEDURE — 93005 ELECTROCARDIOGRAM TRACING: CPT | Performed by: NURSE PRACTITIONER

## 2019-11-01 PROCEDURE — 94150 VITAL CAPACITY TEST: CPT

## 2019-11-01 PROCEDURE — 2580000003 HC RX 258: Performed by: NURSE PRACTITIONER

## 2019-11-01 PROCEDURE — 96413 CHEMO IV INFUSION 1 HR: CPT

## 2019-11-01 PROCEDURE — C1751 CATH, INF, PER/CENT/MIDLINE: HCPCS

## 2019-11-01 PROCEDURE — 99152 MOD SED SAME PHYS/QHP 5/>YRS: CPT

## 2019-11-01 PROCEDURE — 6370000000 HC RX 637 (ALT 250 FOR IP): Performed by: STUDENT IN AN ORGANIZED HEALTH CARE EDUCATION/TRAINING PROGRAM

## 2019-11-01 PROCEDURE — B5181ZA FLUOROSCOPY OF SUPERIOR VENA CAVA USING LOW OSMOLAR CONTRAST, GUIDANCE: ICD-10-PCS | Performed by: RADIOLOGY

## 2019-11-01 RX ORDER — DEXTROSE MONOHYDRATE 50 MG/ML
100 INJECTION, SOLUTION INTRAVENOUS PRN
Status: DISCONTINUED | OUTPATIENT
Start: 2019-11-01 | End: 2019-11-04 | Stop reason: HOSPADM

## 2019-11-01 RX ORDER — ONDANSETRON HYDROCHLORIDE 8 MG/1
24 TABLET, FILM COATED ORAL ONCE
Status: COMPLETED | OUTPATIENT
Start: 2019-11-02 | End: 2019-11-02

## 2019-11-01 RX ORDER — PROCHLORPERAZINE MALEATE 10 MG
10 TABLET ORAL EVERY 4 HOURS PRN
Status: DISCONTINUED | OUTPATIENT
Start: 2019-11-01 | End: 2019-11-04 | Stop reason: HOSPADM

## 2019-11-01 RX ORDER — FUROSEMIDE 10 MG/ML
40 INJECTION INTRAMUSCULAR; INTRAVENOUS EVERY 12 HOURS
Status: DISCONTINUED | OUTPATIENT
Start: 2019-11-02 | End: 2019-11-04 | Stop reason: HOSPADM

## 2019-11-01 RX ORDER — DEXTROSE AND SODIUM CHLORIDE 5; .45 G/100ML; G/100ML
INJECTION, SOLUTION INTRAVENOUS CONTINUOUS
Status: DISCONTINUED | OUTPATIENT
Start: 2019-11-01 | End: 2019-11-03

## 2019-11-01 RX ORDER — SODIUM CHLORIDE 9 MG/ML
INJECTION, SOLUTION INTRAVENOUS CONTINUOUS PRN
Status: DISCONTINUED | OUTPATIENT
Start: 2019-11-01 | End: 2019-11-04 | Stop reason: HOSPADM

## 2019-11-01 RX ORDER — DEXTROSE MONOHYDRATE 25 G/50ML
12.5 INJECTION, SOLUTION INTRAVENOUS PRN
Status: DISCONTINUED | OUTPATIENT
Start: 2019-11-01 | End: 2019-11-04 | Stop reason: HOSPADM

## 2019-11-01 RX ORDER — LOTEPREDNOL ETABONATE 5 MG/G
1 GEL OPHTHALMIC 4 TIMES DAILY
Status: DISCONTINUED | OUTPATIENT
Start: 2019-11-01 | End: 2019-11-04 | Stop reason: HOSPADM

## 2019-11-01 RX ORDER — DEXAMETHASONE 4 MG/1
20 TABLET ORAL ONCE
Status: COMPLETED | OUTPATIENT
Start: 2019-11-02 | End: 2019-11-02

## 2019-11-01 RX ORDER — SODIUM CHLORIDE 9 MG/ML
INJECTION, SOLUTION INTRAVENOUS
Status: COMPLETED
Start: 2019-11-01 | End: 2019-11-01

## 2019-11-01 RX ORDER — METFORMIN HYDROCHLORIDE 500 MG/1
500 TABLET, EXTENDED RELEASE ORAL
Status: DISCONTINUED | OUTPATIENT
Start: 2019-11-02 | End: 2019-11-04 | Stop reason: HOSPADM

## 2019-11-01 RX ORDER — INSULIN LISPRO 100 [IU]/ML
0-12 INJECTION, SOLUTION INTRAVENOUS; SUBCUTANEOUS
Status: DISCONTINUED | OUTPATIENT
Start: 2019-11-01 | End: 2019-11-04 | Stop reason: HOSPADM

## 2019-11-01 RX ORDER — POTASSIUM CHLORIDE 20 MEQ/1
20 TABLET, EXTENDED RELEASE ORAL 2 TIMES DAILY
Status: DISCONTINUED | OUTPATIENT
Start: 2019-11-01 | End: 2019-11-04 | Stop reason: HOSPADM

## 2019-11-01 RX ORDER — PROCHLORPERAZINE EDISYLATE 5 MG/ML
10 INJECTION INTRAMUSCULAR; INTRAVENOUS EVERY 4 HOURS PRN
Status: DISCONTINUED | OUTPATIENT
Start: 2019-11-01 | End: 2019-11-04 | Stop reason: HOSPADM

## 2019-11-01 RX ORDER — LORAZEPAM 0.5 MG/1
0.5 TABLET ORAL EVERY 4 HOURS PRN
Status: DISCONTINUED | OUTPATIENT
Start: 2019-11-01 | End: 2019-11-04 | Stop reason: HOSPADM

## 2019-11-01 RX ORDER — VALACYCLOVIR HYDROCHLORIDE 500 MG/1
500 TABLET, FILM COATED ORAL 2 TIMES DAILY
Status: DISCONTINUED | OUTPATIENT
Start: 2019-11-01 | End: 2019-11-04 | Stop reason: HOSPADM

## 2019-11-01 RX ORDER — METOPROLOL SUCCINATE 25 MG/1
12.5 TABLET, EXTENDED RELEASE ORAL 2 TIMES DAILY
Status: DISCONTINUED | OUTPATIENT
Start: 2019-11-01 | End: 2019-11-04 | Stop reason: HOSPADM

## 2019-11-01 RX ORDER — INSULIN LISPRO 100 [IU]/ML
0-6 INJECTION, SOLUTION INTRAVENOUS; SUBCUTANEOUS NIGHTLY
Status: DISCONTINUED | OUTPATIENT
Start: 2019-11-01 | End: 2019-11-04 | Stop reason: HOSPADM

## 2019-11-01 RX ORDER — SODIUM CHLORIDE 0.9 % (FLUSH) 0.9 %
10 SYRINGE (ML) INJECTION EVERY 12 HOURS SCHEDULED
Status: DISCONTINUED | OUTPATIENT
Start: 2019-11-01 | End: 2019-11-04 | Stop reason: HOSPADM

## 2019-11-01 RX ORDER — NICOTINE POLACRILEX 4 MG
15 LOZENGE BUCCAL PRN
Status: DISCONTINUED | OUTPATIENT
Start: 2019-11-01 | End: 2019-11-04 | Stop reason: HOSPADM

## 2019-11-01 RX ORDER — ONDANSETRON HYDROCHLORIDE 8 MG/1
24 TABLET, FILM COATED ORAL ONCE
Status: COMPLETED | OUTPATIENT
Start: 2019-11-03 | End: 2019-11-03

## 2019-11-01 RX ORDER — CETIRIZINE HYDROCHLORIDE 10 MG/1
10 TABLET ORAL DAILY
Status: DISCONTINUED | OUTPATIENT
Start: 2019-11-01 | End: 2019-11-04 | Stop reason: HOSPADM

## 2019-11-01 RX ORDER — DEXAMETHASONE 4 MG/1
20 TABLET ORAL ONCE
Status: COMPLETED | OUTPATIENT
Start: 2019-11-03 | End: 2019-11-03

## 2019-11-01 RX ORDER — POTASSIUM CHLORIDE 29.8 MG/ML
80 INJECTION INTRAVENOUS PRN
Status: DISCONTINUED | OUTPATIENT
Start: 2019-11-01 | End: 2019-11-04 | Stop reason: HOSPADM

## 2019-11-01 RX ORDER — LORAZEPAM 2 MG/ML
0.5 INJECTION INTRAMUSCULAR EVERY 4 HOURS PRN
Status: DISCONTINUED | OUTPATIENT
Start: 2019-11-01 | End: 2019-11-04 | Stop reason: HOSPADM

## 2019-11-01 RX ORDER — MAGNESIUM SULFATE IN WATER 40 MG/ML
4 INJECTION, SOLUTION INTRAVENOUS PRN
Status: DISCONTINUED | OUTPATIENT
Start: 2019-11-01 | End: 2019-11-04 | Stop reason: HOSPADM

## 2019-11-01 RX ORDER — GLIMEPIRIDE 2 MG/1
3 TABLET ORAL 2 TIMES DAILY WITH MEALS
Status: DISCONTINUED | OUTPATIENT
Start: 2019-11-01 | End: 2019-11-04 | Stop reason: HOSPADM

## 2019-11-01 RX ORDER — FLECAINIDE ACETATE 100 MG/1
100 TABLET ORAL 2 TIMES DAILY
Status: DISCONTINUED | OUTPATIENT
Start: 2019-11-01 | End: 2019-11-04 | Stop reason: HOSPADM

## 2019-11-01 RX ADMIN — FLECAINIDE ACETATE 100 MG: 100 TABLET ORAL at 20:34

## 2019-11-01 RX ADMIN — SODIUM CHLORIDE 500 ML: 9 INJECTION, SOLUTION INTRAVENOUS at 18:08

## 2019-11-01 RX ADMIN — Medication 10 ML: at 20:33

## 2019-11-01 RX ADMIN — INSULIN LISPRO 2 UNITS: 100 INJECTION, SOLUTION INTRAVENOUS; SUBCUTANEOUS at 18:04

## 2019-11-01 RX ADMIN — GLIMEPIRIDE 3 MG: 2 TABLET ORAL at 18:02

## 2019-11-01 RX ADMIN — ETOPOSIDE 220 MG: 20 INJECTION, SOLUTION, CONCENTRATE INTRAVENOUS at 18:17

## 2019-11-01 RX ADMIN — VALACYCLOVIR HYDROCHLORIDE 500 MG: 500 TABLET, FILM COATED ORAL at 20:31

## 2019-11-01 RX ADMIN — METOPROLOL SUCCINATE 12.5 MG: 25 TABLET, EXTENDED RELEASE ORAL at 17:50

## 2019-11-01 RX ADMIN — DEXTROSE AND SODIUM CHLORIDE: 5; 450 INJECTION, SOLUTION INTRAVENOUS at 15:53

## 2019-11-01 RX ADMIN — SODIUM CHLORIDE 15 ML: 900 IRRIGANT IRRIGATION at 20:36

## 2019-11-01 RX ADMIN — SODIUM CHLORIDE 15 ML: 900 IRRIGANT IRRIGATION at 17:58

## 2019-11-01 RX ADMIN — POTASSIUM CHLORIDE 20 MEQ: 20 TABLET, EXTENDED RELEASE ORAL at 20:32

## 2019-11-01 RX ADMIN — DEXTROSE AND SODIUM CHLORIDE: 5; 450 INJECTION, SOLUTION INTRAVENOUS at 23:06

## 2019-11-01 RX ADMIN — APIXABAN 5 MG: 5 TABLET, FILM COATED ORAL at 20:32

## 2019-11-01 RX ADMIN — CETIRIZINE HYDROCHLORIDE 10 MG: 10 TABLET, FILM COATED ORAL at 17:50

## 2019-11-01 RX ADMIN — INSULIN LISPRO 1 UNITS: 100 INJECTION, SOLUTION INTRAVENOUS; SUBCUTANEOUS at 20:35

## 2019-11-01 ASSESSMENT — PAIN SCALES - GENERAL
PAINLEVEL_OUTOF10: 0

## 2019-11-02 LAB
ALBUMIN SERPL-MCNC: 3.4 G/DL (ref 3.4–5)
ALP BLD-CCNC: 88 U/L (ref 40–129)
ALT SERPL-CCNC: 36 U/L (ref 10–40)
ANION GAP SERPL CALCULATED.3IONS-SCNC: 11 MMOL/L (ref 3–16)
ANISOCYTOSIS: ABNORMAL
AST SERPL-CCNC: 31 U/L (ref 15–37)
BASOPHILS ABSOLUTE: 0.1 K/UL (ref 0–0.2)
BASOPHILS RELATIVE PERCENT: 1 %
BILIRUB SERPL-MCNC: 0.4 MG/DL (ref 0–1)
BILIRUBIN DIRECT: <0.2 MG/DL (ref 0–0.3)
BILIRUBIN, INDIRECT: ABNORMAL MG/DL (ref 0–1)
BUN BLDV-MCNC: 18 MG/DL (ref 7–20)
CALCIUM SERPL-MCNC: 8.1 MG/DL (ref 8.3–10.6)
CHLORIDE BLD-SCNC: 108 MMOL/L (ref 99–110)
CO2: 22 MMOL/L (ref 21–32)
CREAT SERPL-MCNC: 0.9 MG/DL (ref 0.6–1.2)
EKG ATRIAL RATE: 59 BPM
EKG DIAGNOSIS: NORMAL
EKG P AXIS: 41 DEGREES
EKG P-R INTERVAL: 176 MS
EKG Q-T INTERVAL: 424 MS
EKG QRS DURATION: 82 MS
EKG QTC CALCULATION (BAZETT): 419 MS
EKG R AXIS: 25 DEGREES
EKG T AXIS: 20 DEGREES
EKG VENTRICULAR RATE: 59 BPM
EOSINOPHILS ABSOLUTE: 0.1 K/UL (ref 0–0.6)
EOSINOPHILS RELATIVE PERCENT: 1.8 %
GFR AFRICAN AMERICAN: >60
GFR NON-AFRICAN AMERICAN: >60
GLUCOSE BLD-MCNC: 159 MG/DL (ref 70–99)
GLUCOSE BLD-MCNC: 163 MG/DL (ref 70–99)
GLUCOSE BLD-MCNC: 285 MG/DL (ref 70–99)
GLUCOSE BLD-MCNC: 286 MG/DL (ref 70–99)
GLUCOSE BLD-MCNC: 427 MG/DL (ref 70–99)
HCT VFR BLD CALC: 25.4 % (ref 36–48)
HEMOGLOBIN: 8.3 G/DL (ref 12–16)
LACTATE DEHYDROGENASE: 227 U/L (ref 100–190)
LYMPHOCYTES ABSOLUTE: 0.2 K/UL (ref 1–5.1)
LYMPHOCYTES RELATIVE PERCENT: 4.5 %
MACROCYTES: ABNORMAL
MCH RBC QN AUTO: 31.7 PG (ref 26–34)
MCHC RBC AUTO-ENTMCNC: 32.8 G/DL (ref 31–36)
MCV RBC AUTO: 96.7 FL (ref 80–100)
MICROCYTES: ABNORMAL
MONOCYTES ABSOLUTE: 0.7 K/UL (ref 0–1.3)
MONOCYTES RELATIVE PERCENT: 14.2 %
NEUTROPHILS ABSOLUTE: 4 K/UL (ref 1.7–7.7)
NEUTROPHILS RELATIVE PERCENT: 78.5 %
PDW BLD-RTO: 25.8 % (ref 12.4–15.4)
PERFORMED ON: ABNORMAL
PHOSPHORUS: 4.3 MG/DL (ref 2.5–4.9)
PLATELET # BLD: 218 K/UL (ref 135–450)
PLATELET SLIDE REVIEW: ADEQUATE
PMV BLD AUTO: 8.3 FL (ref 5–10.5)
POIKILOCYTES: ABNORMAL
POLYCHROMASIA: ABNORMAL
POTASSIUM SERPL-SCNC: 4.5 MMOL/L (ref 3.5–5.1)
RBC # BLD: 2.62 M/UL (ref 4–5.2)
SLIDE REVIEW: ABNORMAL
SODIUM BLD-SCNC: 141 MMOL/L (ref 136–145)
TOTAL PROTEIN: 5.3 G/DL (ref 6.4–8.2)
URIC ACID, SERUM: 6 MG/DL (ref 2.6–6)
WBC # BLD: 5 K/UL (ref 4–11)

## 2019-11-02 PROCEDURE — 83615 LACTATE (LD) (LDH) ENZYME: CPT

## 2019-11-02 PROCEDURE — 6370000000 HC RX 637 (ALT 250 FOR IP): Performed by: STUDENT IN AN ORGANIZED HEALTH CARE EDUCATION/TRAINING PROGRAM

## 2019-11-02 PROCEDURE — 85025 COMPLETE CBC W/AUTO DIFF WBC: CPT

## 2019-11-02 PROCEDURE — 96413 CHEMO IV INFUSION 1 HR: CPT

## 2019-11-02 PROCEDURE — 6370000000 HC RX 637 (ALT 250 FOR IP): Performed by: NURSE PRACTITIONER

## 2019-11-02 PROCEDURE — 2580000003 HC RX 258: Performed by: INTERNAL MEDICINE

## 2019-11-02 PROCEDURE — 2580000003 HC RX 258: Performed by: NURSE PRACTITIONER

## 2019-11-02 PROCEDURE — 2060000000 HC ICU INTERMEDIATE R&B

## 2019-11-02 PROCEDURE — 93010 ELECTROCARDIOGRAM REPORT: CPT | Performed by: INTERNAL MEDICINE

## 2019-11-02 PROCEDURE — 96415 CHEMO IV INFUSION ADDL HR: CPT

## 2019-11-02 PROCEDURE — 6360000002 HC RX W HCPCS: Performed by: INTERNAL MEDICINE

## 2019-11-02 PROCEDURE — 80048 BASIC METABOLIC PNL TOTAL CA: CPT

## 2019-11-02 PROCEDURE — 36591 DRAW BLOOD OFF VENOUS DEVICE: CPT

## 2019-11-02 PROCEDURE — 84100 ASSAY OF PHOSPHORUS: CPT

## 2019-11-02 PROCEDURE — 84550 ASSAY OF BLOOD/URIC ACID: CPT

## 2019-11-02 PROCEDURE — 6370000000 HC RX 637 (ALT 250 FOR IP): Performed by: INTERNAL MEDICINE

## 2019-11-02 PROCEDURE — 80076 HEPATIC FUNCTION PANEL: CPT

## 2019-11-02 RX ORDER — FUROSEMIDE 20 MG/1
20 TABLET ORAL DAILY
Status: DISCONTINUED | OUTPATIENT
Start: 2019-11-02 | End: 2019-11-04 | Stop reason: HOSPADM

## 2019-11-02 RX ADMIN — ETOPOSIDE 220 MG: 20 INJECTION, SOLUTION, CONCENTRATE INTRAVENOUS at 10:07

## 2019-11-02 RX ADMIN — LORAZEPAM 0.5 MG: 0.5 TABLET ORAL at 21:34

## 2019-11-02 RX ADMIN — DEXTROSE AND SODIUM CHLORIDE: 5; 450 INJECTION, SOLUTION INTRAVENOUS at 21:45

## 2019-11-02 RX ADMIN — VALACYCLOVIR HYDROCHLORIDE 500 MG: 500 TABLET, FILM COATED ORAL at 21:34

## 2019-11-02 RX ADMIN — CETIRIZINE HYDROCHLORIDE 10 MG: 10 TABLET, FILM COATED ORAL at 09:34

## 2019-11-02 RX ADMIN — APIXABAN 5 MG: 5 TABLET, FILM COATED ORAL at 21:34

## 2019-11-02 RX ADMIN — DEXTROSE AND SODIUM CHLORIDE: 5; 450 INJECTION, SOLUTION INTRAVENOUS at 16:04

## 2019-11-02 RX ADMIN — ONDANSETRON HYDROCHLORIDE 24 MG: 8 TABLET, FILM COATED ORAL at 09:33

## 2019-11-02 RX ADMIN — DEXTROSE AND SODIUM CHLORIDE: 5; 450 INJECTION, SOLUTION INTRAVENOUS at 06:19

## 2019-11-02 RX ADMIN — APIXABAN 5 MG: 5 TABLET, FILM COATED ORAL at 09:33

## 2019-11-02 RX ADMIN — METOPROLOL SUCCINATE 12.5 MG: 25 TABLET, EXTENDED RELEASE ORAL at 16:04

## 2019-11-02 RX ADMIN — GLIMEPIRIDE 3 MG: 2 TABLET ORAL at 10:07

## 2019-11-02 RX ADMIN — SODIUM CHLORIDE 15 ML: 900 IRRIGANT IRRIGATION at 23:14

## 2019-11-02 RX ADMIN — MESNA: 100 INJECTION, SOLUTION INTRAVENOUS at 13:17

## 2019-11-02 RX ADMIN — GLIMEPIRIDE 3 MG: 2 TABLET ORAL at 16:04

## 2019-11-02 RX ADMIN — CARBOPLATIN 630 MG: 10 INJECTION, SOLUTION INTRAVENOUS at 12:23

## 2019-11-02 RX ADMIN — INSULIN LISPRO 12 UNITS: 100 INJECTION, SOLUTION INTRAVENOUS; SUBCUTANEOUS at 11:31

## 2019-11-02 RX ADMIN — VALACYCLOVIR HYDROCHLORIDE 500 MG: 500 TABLET, FILM COATED ORAL at 10:07

## 2019-11-02 RX ADMIN — INSULIN LISPRO 6 UNITS: 100 INJECTION, SOLUTION INTRAVENOUS; SUBCUTANEOUS at 16:25

## 2019-11-02 RX ADMIN — FUROSEMIDE 20 MG: 20 TABLET ORAL at 13:26

## 2019-11-02 RX ADMIN — FLECAINIDE ACETATE 100 MG: 100 TABLET ORAL at 10:07

## 2019-11-02 RX ADMIN — INSULIN LISPRO 3 UNITS: 100 INJECTION, SOLUTION INTRAVENOUS; SUBCUTANEOUS at 21:34

## 2019-11-02 RX ADMIN — Medication 10 ML: at 23:14

## 2019-11-02 RX ADMIN — METOPROLOL SUCCINATE 12.5 MG: 25 TABLET, EXTENDED RELEASE ORAL at 09:33

## 2019-11-02 RX ADMIN — DEXAMETHASONE 20 MG: 4 TABLET ORAL at 09:33

## 2019-11-02 RX ADMIN — SODIUM CHLORIDE 15 ML: 900 IRRIGANT IRRIGATION at 06:46

## 2019-11-02 RX ADMIN — POTASSIUM CHLORIDE 20 MEQ: 20 TABLET, EXTENDED RELEASE ORAL at 21:34

## 2019-11-02 RX ADMIN — Medication 10 ML: at 09:34

## 2019-11-02 RX ADMIN — METFORMIN HYDROCHLORIDE 500 MG: 500 TABLET, EXTENDED RELEASE ORAL at 10:08

## 2019-11-02 RX ADMIN — POTASSIUM CHLORIDE 20 MEQ: 20 TABLET, EXTENDED RELEASE ORAL at 09:33

## 2019-11-02 RX ADMIN — INSULIN LISPRO 2 UNITS: 100 INJECTION, SOLUTION INTRAVENOUS; SUBCUTANEOUS at 07:54

## 2019-11-02 ASSESSMENT — PAIN SCALES - GENERAL
PAINLEVEL_OUTOF10: 0

## 2019-11-03 LAB
ALBUMIN SERPL-MCNC: 3.5 G/DL (ref 3.4–5)
ALP BLD-CCNC: 93 U/L (ref 40–129)
ALT SERPL-CCNC: 42 U/L (ref 10–40)
ANION GAP SERPL CALCULATED.3IONS-SCNC: 14 MMOL/L (ref 3–16)
AST SERPL-CCNC: 31 U/L (ref 15–37)
BASOPHILS ABSOLUTE: 0 K/UL (ref 0–0.2)
BASOPHILS RELATIVE PERCENT: 0.1 %
BILIRUB SERPL-MCNC: 0.5 MG/DL (ref 0–1)
BILIRUBIN DIRECT: <0.2 MG/DL (ref 0–0.3)
BILIRUBIN, INDIRECT: ABNORMAL MG/DL (ref 0–1)
BUN BLDV-MCNC: 20 MG/DL (ref 7–20)
CALCIUM SERPL-MCNC: 8.6 MG/DL (ref 8.3–10.6)
CHLORIDE BLD-SCNC: 103 MMOL/L (ref 99–110)
CO2: 21 MMOL/L (ref 21–32)
CREAT SERPL-MCNC: 0.8 MG/DL (ref 0.6–1.2)
EOSINOPHILS ABSOLUTE: 0 K/UL (ref 0–0.6)
EOSINOPHILS RELATIVE PERCENT: 0.1 %
GFR AFRICAN AMERICAN: >60
GFR NON-AFRICAN AMERICAN: >60
GLUCOSE BLD-MCNC: 220 MG/DL (ref 70–99)
GLUCOSE BLD-MCNC: 245 MG/DL (ref 70–99)
GLUCOSE BLD-MCNC: 263 MG/DL (ref 70–99)
GLUCOSE BLD-MCNC: 283 MG/DL (ref 70–99)
GLUCOSE BLD-MCNC: 331 MG/DL (ref 70–99)
HCT VFR BLD CALC: 26.9 % (ref 36–48)
HEMOGLOBIN: 8.9 G/DL (ref 12–16)
LACTATE DEHYDROGENASE: 233 U/L (ref 100–190)
LYMPHOCYTES ABSOLUTE: 0.1 K/UL (ref 1–5.1)
LYMPHOCYTES RELATIVE PERCENT: 2.2 %
MCH RBC QN AUTO: 31.7 PG (ref 26–34)
MCHC RBC AUTO-ENTMCNC: 32.9 G/DL (ref 31–36)
MCV RBC AUTO: 96.5 FL (ref 80–100)
MONOCYTES ABSOLUTE: 0.5 K/UL (ref 0–1.3)
MONOCYTES RELATIVE PERCENT: 9.7 %
NEUTROPHILS ABSOLUTE: 4.3 K/UL (ref 1.7–7.7)
NEUTROPHILS RELATIVE PERCENT: 87.9 %
PDW BLD-RTO: 25.7 % (ref 12.4–15.4)
PERFORMED ON: ABNORMAL
PHOSPHORUS: 3.4 MG/DL (ref 2.5–4.9)
PLATELET # BLD: 216 K/UL (ref 135–450)
PMV BLD AUTO: 8.8 FL (ref 5–10.5)
POTASSIUM SERPL-SCNC: 4 MMOL/L (ref 3.5–5.1)
RBC # BLD: 2.79 M/UL (ref 4–5.2)
SODIUM BLD-SCNC: 138 MMOL/L (ref 136–145)
TOTAL PROTEIN: 5.7 G/DL (ref 6.4–8.2)
URIC ACID, SERUM: 5.2 MG/DL (ref 2.6–6)
VRE CULTURE: NORMAL
WBC # BLD: 4.8 K/UL (ref 4–11)

## 2019-11-03 PROCEDURE — 2580000003 HC RX 258: Performed by: NURSE PRACTITIONER

## 2019-11-03 PROCEDURE — 83615 LACTATE (LD) (LDH) ENZYME: CPT

## 2019-11-03 PROCEDURE — 36592 COLLECT BLOOD FROM PICC: CPT

## 2019-11-03 PROCEDURE — 84100 ASSAY OF PHOSPHORUS: CPT

## 2019-11-03 PROCEDURE — 80076 HEPATIC FUNCTION PANEL: CPT

## 2019-11-03 PROCEDURE — 80048 BASIC METABOLIC PNL TOTAL CA: CPT

## 2019-11-03 PROCEDURE — 85025 COMPLETE CBC W/AUTO DIFF WBC: CPT

## 2019-11-03 PROCEDURE — 84550 ASSAY OF BLOOD/URIC ACID: CPT

## 2019-11-03 PROCEDURE — 6370000000 HC RX 637 (ALT 250 FOR IP): Performed by: INTERNAL MEDICINE

## 2019-11-03 PROCEDURE — 96413 CHEMO IV INFUSION 1 HR: CPT

## 2019-11-03 PROCEDURE — 2060000000 HC ICU INTERMEDIATE R&B

## 2019-11-03 PROCEDURE — 96415 CHEMO IV INFUSION ADDL HR: CPT

## 2019-11-03 PROCEDURE — 2580000003 HC RX 258: Performed by: INTERNAL MEDICINE

## 2019-11-03 PROCEDURE — 6370000000 HC RX 637 (ALT 250 FOR IP): Performed by: STUDENT IN AN ORGANIZED HEALTH CARE EDUCATION/TRAINING PROGRAM

## 2019-11-03 PROCEDURE — 6370000000 HC RX 637 (ALT 250 FOR IP): Performed by: NURSE PRACTITIONER

## 2019-11-03 PROCEDURE — 6360000002 HC RX W HCPCS: Performed by: INTERNAL MEDICINE

## 2019-11-03 RX ORDER — FUROSEMIDE 10 MG/ML
20 INJECTION INTRAMUSCULAR; INTRAVENOUS ONCE
Status: COMPLETED | OUTPATIENT
Start: 2019-11-03 | End: 2019-11-03

## 2019-11-03 RX ORDER — SODIUM CHLORIDE 9 MG/ML
INJECTION, SOLUTION INTRAVENOUS CONTINUOUS
Status: DISCONTINUED | OUTPATIENT
Start: 2019-11-03 | End: 2019-11-04 | Stop reason: HOSPADM

## 2019-11-03 RX ADMIN — DEXAMETHASONE 20 MG: 4 TABLET ORAL at 09:31

## 2019-11-03 RX ADMIN — ETOPOSIDE 220 MG: 20 INJECTION, SOLUTION, CONCENTRATE INTRAVENOUS at 13:11

## 2019-11-03 RX ADMIN — Medication 10 ML: at 09:34

## 2019-11-03 RX ADMIN — POTASSIUM CHLORIDE 20 MEQ: 20 TABLET, EXTENDED RELEASE ORAL at 09:30

## 2019-11-03 RX ADMIN — CETIRIZINE HYDROCHLORIDE 10 MG: 10 TABLET, FILM COATED ORAL at 09:31

## 2019-11-03 RX ADMIN — INSULIN LISPRO 4 UNITS: 100 INJECTION, SOLUTION INTRAVENOUS; SUBCUTANEOUS at 11:30

## 2019-11-03 RX ADMIN — METOPROLOL SUCCINATE 12.5 MG: 25 TABLET, EXTENDED RELEASE ORAL at 09:31

## 2019-11-03 RX ADMIN — VALACYCLOVIR HYDROCHLORIDE 500 MG: 500 TABLET, FILM COATED ORAL at 09:39

## 2019-11-03 RX ADMIN — SODIUM CHLORIDE 15 ML: 900 IRRIGANT IRRIGATION at 17:19

## 2019-11-03 RX ADMIN — VALACYCLOVIR HYDROCHLORIDE 500 MG: 500 TABLET, FILM COATED ORAL at 21:01

## 2019-11-03 RX ADMIN — METOPROLOL SUCCINATE 12.5 MG: 25 TABLET, EXTENDED RELEASE ORAL at 17:12

## 2019-11-03 RX ADMIN — MESNA 2200 MG: 100 INJECTION, SOLUTION INTRAVENOUS at 21:01

## 2019-11-03 RX ADMIN — FLECAINIDE ACETATE 100 MG: 100 TABLET ORAL at 00:09

## 2019-11-03 RX ADMIN — MESNA 2200 MG: 100 INJECTION, SOLUTION INTRAVENOUS at 17:08

## 2019-11-03 RX ADMIN — METFORMIN HYDROCHLORIDE 500 MG: 500 TABLET, EXTENDED RELEASE ORAL at 09:30

## 2019-11-03 RX ADMIN — INSULIN LISPRO 3 UNITS: 100 INJECTION, SOLUTION INTRAVENOUS; SUBCUTANEOUS at 21:01

## 2019-11-03 RX ADMIN — SODIUM CHLORIDE: 9 INJECTION, SOLUTION INTRAVENOUS at 13:18

## 2019-11-03 RX ADMIN — FLECAINIDE ACETATE 100 MG: 100 TABLET ORAL at 09:30

## 2019-11-03 RX ADMIN — SODIUM CHLORIDE: 9 INJECTION, SOLUTION INTRAVENOUS at 12:45

## 2019-11-03 RX ADMIN — SODIUM CHLORIDE 15 ML: 900 IRRIGANT IRRIGATION at 11:53

## 2019-11-03 RX ADMIN — INSULIN LISPRO 8 UNITS: 100 INJECTION, SOLUTION INTRAVENOUS; SUBCUTANEOUS at 17:13

## 2019-11-03 RX ADMIN — INSULIN LISPRO 6 UNITS: 100 INJECTION, SOLUTION INTRAVENOUS; SUBCUTANEOUS at 07:50

## 2019-11-03 RX ADMIN — GLIMEPIRIDE 3 MG: 2 TABLET ORAL at 09:30

## 2019-11-03 RX ADMIN — GLIMEPIRIDE 3 MG: 2 TABLET ORAL at 17:12

## 2019-11-03 RX ADMIN — FUROSEMIDE 20 MG: 20 TABLET ORAL at 09:31

## 2019-11-03 RX ADMIN — MESNA 2200 MG: 100 INJECTION, SOLUTION INTRAVENOUS at 12:50

## 2019-11-03 RX ADMIN — APIXABAN 5 MG: 5 TABLET, FILM COATED ORAL at 21:01

## 2019-11-03 RX ADMIN — DEXTROSE AND SODIUM CHLORIDE: 5; 450 INJECTION, SOLUTION INTRAVENOUS at 10:23

## 2019-11-03 RX ADMIN — POTASSIUM CHLORIDE 20 MEQ: 20 TABLET, EXTENDED RELEASE ORAL at 21:01

## 2019-11-03 RX ADMIN — SODIUM CHLORIDE 15 ML: 900 IRRIGANT IRRIGATION at 21:07

## 2019-11-03 RX ADMIN — FLECAINIDE ACETATE 100 MG: 100 TABLET ORAL at 21:02

## 2019-11-03 RX ADMIN — Medication 10 ML: at 21:07

## 2019-11-03 RX ADMIN — SODIUM CHLORIDE 15 ML: 900 IRRIGANT IRRIGATION at 06:26

## 2019-11-03 RX ADMIN — ONDANSETRON HYDROCHLORIDE 24 MG: 8 TABLET, FILM COATED ORAL at 09:43

## 2019-11-03 RX ADMIN — FUROSEMIDE 20 MG: 10 INJECTION, SOLUTION INTRAMUSCULAR; INTRAVENOUS at 12:45

## 2019-11-03 RX ADMIN — APIXABAN 5 MG: 5 TABLET, FILM COATED ORAL at 09:31

## 2019-11-03 RX ADMIN — SODIUM CHLORIDE: 9 INJECTION, SOLUTION INTRAVENOUS at 23:37

## 2019-11-03 RX ADMIN — DEXTROSE AND SODIUM CHLORIDE: 5; 450 INJECTION, SOLUTION INTRAVENOUS at 03:48

## 2019-11-03 ASSESSMENT — PAIN SCALES - GENERAL
PAINLEVEL_OUTOF10: 0

## 2019-11-04 ENCOUNTER — APPOINTMENT (OUTPATIENT)
Dept: GENERAL RADIOLOGY | Age: 64
DRG: 847 | End: 2019-11-04
Attending: INTERNAL MEDICINE
Payer: COMMERCIAL

## 2019-11-04 ENCOUNTER — APPOINTMENT (OUTPATIENT)
Dept: VASCULAR LAB | Age: 64
DRG: 847 | End: 2019-11-04
Attending: INTERNAL MEDICINE
Payer: COMMERCIAL

## 2019-11-04 VITALS
OXYGEN SATURATION: 97 % | SYSTOLIC BLOOD PRESSURE: 122 MMHG | DIASTOLIC BLOOD PRESSURE: 66 MMHG | RESPIRATION RATE: 16 BRPM | HEART RATE: 90 BPM | BODY MASS INDEX: 44.47 KG/M2 | WEIGHT: 251 LBS | HEIGHT: 63 IN | TEMPERATURE: 97.8 F

## 2019-11-04 LAB
ALBUMIN SERPL-MCNC: 3.6 G/DL (ref 3.4–5)
ALP BLD-CCNC: 87 U/L (ref 40–129)
ALT SERPL-CCNC: 36 U/L (ref 10–40)
ANION GAP SERPL CALCULATED.3IONS-SCNC: 10 MMOL/L (ref 3–16)
APTT: 28.6 SEC (ref 26–36)
AST SERPL-CCNC: 19 U/L (ref 15–37)
BASOPHILS ABSOLUTE: 0 K/UL (ref 0–0.2)
BASOPHILS RELATIVE PERCENT: 0.1 %
BILIRUB SERPL-MCNC: 0.3 MG/DL (ref 0–1)
BILIRUBIN DIRECT: <0.2 MG/DL (ref 0–0.3)
BILIRUBIN URINE: NEGATIVE
BILIRUBIN, INDIRECT: ABNORMAL MG/DL (ref 0–1)
BLOOD, URINE: NEGATIVE
BUN BLDV-MCNC: 23 MG/DL (ref 7–20)
CALCIUM SERPL-MCNC: 8.7 MG/DL (ref 8.3–10.6)
CHLORIDE BLD-SCNC: 111 MMOL/L (ref 99–110)
CLARITY: CLEAR
CO2: 24 MMOL/L (ref 21–32)
COLOR: YELLOW
CREAT SERPL-MCNC: 0.6 MG/DL (ref 0.6–1.2)
EOSINOPHILS ABSOLUTE: 0 K/UL (ref 0–0.6)
EOSINOPHILS RELATIVE PERCENT: 0.1 %
GFR AFRICAN AMERICAN: >60
GFR NON-AFRICAN AMERICAN: >60
GLUCOSE BLD-MCNC: 104 MG/DL (ref 70–99)
GLUCOSE BLD-MCNC: 116 MG/DL (ref 70–99)
GLUCOSE BLD-MCNC: 179 MG/DL (ref 70–99)
GLUCOSE BLD-MCNC: 182 MG/DL (ref 70–99)
GLUCOSE URINE: NEGATIVE MG/DL
HCT VFR BLD CALC: 27.3 % (ref 36–48)
HEMOGLOBIN: 9 G/DL (ref 12–16)
INR BLD: 1.06 (ref 0.86–1.14)
KETONES, URINE: 15 MG/DL
LACTATE DEHYDROGENASE: 228 U/L (ref 100–190)
LEUKOCYTE ESTERASE, URINE: NEGATIVE
LYMPHOCYTES ABSOLUTE: 0.1 K/UL (ref 1–5.1)
LYMPHOCYTES RELATIVE PERCENT: 2.6 %
MAGNESIUM: 2.1 MG/DL (ref 1.8–2.4)
MCH RBC QN AUTO: 31.9 PG (ref 26–34)
MCHC RBC AUTO-ENTMCNC: 33.1 G/DL (ref 31–36)
MCV RBC AUTO: 96.6 FL (ref 80–100)
MICROSCOPIC EXAMINATION: ABNORMAL
MONOCYTES ABSOLUTE: 0.3 K/UL (ref 0–1.3)
MONOCYTES RELATIVE PERCENT: 5 %
NEUTROPHILS ABSOLUTE: 5.2 K/UL (ref 1.7–7.7)
NEUTROPHILS RELATIVE PERCENT: 92.2 %
NITRITE, URINE: NEGATIVE
PDW BLD-RTO: 24.9 % (ref 12.4–15.4)
PERFORMED ON: ABNORMAL
PH UA: 6 (ref 5–8)
PHOSPHORUS: 3.2 MG/DL (ref 2.5–4.9)
PLATELET # BLD: 219 K/UL (ref 135–450)
PMV BLD AUTO: 8.6 FL (ref 5–10.5)
POTASSIUM SERPL-SCNC: 3.7 MMOL/L (ref 3.5–5.1)
PROTEIN UA: NEGATIVE MG/DL
PROTHROMBIN TIME: 12.1 SEC (ref 9.8–13)
RBC # BLD: 2.82 M/UL (ref 4–5.2)
SODIUM BLD-SCNC: 145 MMOL/L (ref 136–145)
SPECIFIC GRAVITY UA: 1.02 (ref 1–1.03)
TOTAL PROTEIN: 5.4 G/DL (ref 6.4–8.2)
URIC ACID, SERUM: 4.3 MG/DL (ref 2.6–6)
URINE TYPE: ABNORMAL
UROBILINOGEN, URINE: 0.2 E.U./DL
WBC # BLD: 5.7 K/UL (ref 4–11)

## 2019-11-04 PROCEDURE — 80048 BASIC METABOLIC PNL TOTAL CA: CPT

## 2019-11-04 PROCEDURE — 84100 ASSAY OF PHOSPHORUS: CPT

## 2019-11-04 PROCEDURE — 2580000003 HC RX 258: Performed by: NURSE PRACTITIONER

## 2019-11-04 PROCEDURE — 36592 COLLECT BLOOD FROM PICC: CPT

## 2019-11-04 PROCEDURE — 6370000000 HC RX 637 (ALT 250 FOR IP): Performed by: INTERNAL MEDICINE

## 2019-11-04 PROCEDURE — 6360000002 HC RX W HCPCS: Performed by: INTERNAL MEDICINE

## 2019-11-04 PROCEDURE — 83615 LACTATE (LD) (LDH) ENZYME: CPT

## 2019-11-04 PROCEDURE — 84550 ASSAY OF BLOOD/URIC ACID: CPT

## 2019-11-04 PROCEDURE — 85610 PROTHROMBIN TIME: CPT

## 2019-11-04 PROCEDURE — 6370000000 HC RX 637 (ALT 250 FOR IP): Performed by: NURSE PRACTITIONER

## 2019-11-04 PROCEDURE — 85025 COMPLETE CBC W/AUTO DIFF WBC: CPT

## 2019-11-04 PROCEDURE — 93971 EXTREMITY STUDY: CPT

## 2019-11-04 PROCEDURE — 83735 ASSAY OF MAGNESIUM: CPT

## 2019-11-04 PROCEDURE — 6370000000 HC RX 637 (ALT 250 FOR IP): Performed by: STUDENT IN AN ORGANIZED HEALTH CARE EDUCATION/TRAINING PROGRAM

## 2019-11-04 PROCEDURE — 85730 THROMBOPLASTIN TIME PARTIAL: CPT

## 2019-11-04 PROCEDURE — 81003 URINALYSIS AUTO W/O SCOPE: CPT

## 2019-11-04 PROCEDURE — 80076 HEPATIC FUNCTION PANEL: CPT

## 2019-11-04 RX ORDER — HEPARIN SODIUM (PORCINE) LOCK FLUSH IV SOLN 100 UNIT/ML 100 UNIT/ML
500 SOLUTION INTRAVENOUS PRN
Status: DISCONTINUED | OUTPATIENT
Start: 2019-11-04 | End: 2019-11-04 | Stop reason: HOSPADM

## 2019-11-04 RX ADMIN — METOPROLOL SUCCINATE 12.5 MG: 25 TABLET, EXTENDED RELEASE ORAL at 08:17

## 2019-11-04 RX ADMIN — METFORMIN HYDROCHLORIDE 500 MG: 500 TABLET, EXTENDED RELEASE ORAL at 08:19

## 2019-11-04 RX ADMIN — POTASSIUM CHLORIDE 20 MEQ: 20 TABLET, EXTENDED RELEASE ORAL at 08:17

## 2019-11-04 RX ADMIN — METOPROLOL SUCCINATE 12.5 MG: 25 TABLET, EXTENDED RELEASE ORAL at 16:47

## 2019-11-04 RX ADMIN — Medication 500 UNITS: at 17:27

## 2019-11-04 RX ADMIN — INSULIN LISPRO 2 UNITS: 100 INJECTION, SOLUTION INTRAVENOUS; SUBCUTANEOUS at 16:50

## 2019-11-04 RX ADMIN — GLIMEPIRIDE 3 MG: 2 TABLET ORAL at 08:19

## 2019-11-04 RX ADMIN — Medication 10 ML: at 08:17

## 2019-11-04 RX ADMIN — SODIUM CHLORIDE 15 ML: 900 IRRIGANT IRRIGATION at 06:36

## 2019-11-04 RX ADMIN — GLIMEPIRIDE 3 MG: 2 TABLET ORAL at 16:47

## 2019-11-04 RX ADMIN — APIXABAN 5 MG: 5 TABLET, FILM COATED ORAL at 08:17

## 2019-11-04 RX ADMIN — VALACYCLOVIR HYDROCHLORIDE 500 MG: 500 TABLET, FILM COATED ORAL at 08:17

## 2019-11-04 RX ADMIN — CETIRIZINE HYDROCHLORIDE 10 MG: 10 TABLET, FILM COATED ORAL at 08:17

## 2019-11-04 RX ADMIN — FUROSEMIDE 20 MG: 20 TABLET ORAL at 08:17

## 2019-11-04 RX ADMIN — LORAZEPAM 0.5 MG: 0.5 TABLET ORAL at 07:26

## 2019-11-04 RX ADMIN — SODIUM CHLORIDE 15 ML: 900 IRRIGANT IRRIGATION at 11:52

## 2019-11-04 RX ADMIN — FLECAINIDE ACETATE 100 MG: 100 TABLET ORAL at 08:19

## 2019-11-04 RX ADMIN — SODIUM CHLORIDE 15 ML: 900 IRRIGANT IRRIGATION at 16:49

## 2019-11-04 ASSESSMENT — PAIN SCALES - GENERAL
PAINLEVEL_OUTOF10: 0

## 2019-11-11 ENCOUNTER — HOSPITAL ENCOUNTER (OUTPATIENT)
Dept: ONCOLOGY | Age: 64
Setting detail: INFUSION SERIES
Discharge: HOME | End: 2019-11-11
Payer: COMMERCIAL

## 2019-11-11 VITALS
TEMPERATURE: 97.8 F | HEART RATE: 99 BPM | RESPIRATION RATE: 16 BRPM | DIASTOLIC BLOOD PRESSURE: 80 MMHG | SYSTOLIC BLOOD PRESSURE: 124 MMHG

## 2019-11-11 DIAGNOSIS — C85.10 B-CELL LYMPHOMA, UNSPECIFIED B-CELL LYMPHOMA TYPE, UNSPECIFIED BODY REGION (HCC): Primary | ICD-10-CM

## 2019-11-11 LAB
BLOOD BANK DISPENSE STATUS: NORMAL
BLOOD BANK PRODUCT CODE: NORMAL
BPU ID: NORMAL
DESCRIPTION BLOOD BANK: NORMAL

## 2019-11-11 PROCEDURE — P9037 PLATE PHERES LEUKOREDU IRRAD: HCPCS

## 2019-11-11 PROCEDURE — 36430 TRANSFUSION BLD/BLD COMPNT: CPT

## 2019-11-11 PROCEDURE — 99213 OFFICE O/P EST LOW 20 MIN: CPT

## 2019-11-11 PROCEDURE — 6360000002 HC RX W HCPCS: Performed by: INTERNAL MEDICINE

## 2019-11-11 RX ORDER — 0.9 % SODIUM CHLORIDE 0.9 %
10 VIAL (ML) INJECTION ONCE
Status: CANCELLED | OUTPATIENT
Start: 2019-11-11

## 2019-11-11 RX ORDER — FLUCONAZOLE 200 MG/1
200 TABLET ORAL DAILY
Status: ON HOLD | COMMUNITY
End: 2019-11-21

## 2019-11-11 RX ORDER — DIPHENHYDRAMINE HYDROCHLORIDE 50 MG/ML
50 INJECTION INTRAMUSCULAR; INTRAVENOUS ONCE
Status: CANCELLED | OUTPATIENT
Start: 2019-11-11

## 2019-11-11 RX ORDER — LEVOFLOXACIN 500 MG/1
500 TABLET, FILM COATED ORAL DAILY
Status: ON HOLD | COMMUNITY
End: 2019-11-21

## 2019-11-11 RX ORDER — HEPARIN SODIUM (PORCINE) LOCK FLUSH IV SOLN 100 UNIT/ML 100 UNIT/ML
500 SOLUTION INTRAVENOUS PRN
Status: DISCONTINUED | OUTPATIENT
Start: 2019-11-11 | End: 2019-11-12 | Stop reason: HOSPADM

## 2019-11-11 RX ORDER — LOPERAMIDE HYDROCHLORIDE 2 MG/1
2 CAPSULE ORAL 4 TIMES DAILY PRN
COMMUNITY
End: 2020-03-02

## 2019-11-11 RX ORDER — SODIUM CHLORIDE 9 MG/ML
INJECTION, SOLUTION INTRAVENOUS CONTINUOUS
Status: CANCELLED | OUTPATIENT
Start: 2019-11-11

## 2019-11-11 RX ORDER — VALACYCLOVIR HYDROCHLORIDE 500 MG/1
500 TABLET, FILM COATED ORAL 2 TIMES DAILY
COMMUNITY
End: 2022-03-10

## 2019-11-11 RX ORDER — SODIUM CHLORIDE 0.9 % (FLUSH) 0.9 %
20 SYRINGE (ML) INJECTION PRN
Status: CANCELLED | OUTPATIENT
Start: 2019-11-11

## 2019-11-11 RX ORDER — METHYLPREDNISOLONE SODIUM SUCCINATE 125 MG/2ML
125 INJECTION, POWDER, LYOPHILIZED, FOR SOLUTION INTRAMUSCULAR; INTRAVENOUS ONCE
Status: CANCELLED | OUTPATIENT
Start: 2019-11-11

## 2019-11-11 RX ADMIN — Medication 500 UNITS: at 12:45

## 2019-11-12 ENCOUNTER — HOSPITAL ENCOUNTER (OUTPATIENT)
Dept: ONCOLOGY | Age: 64
Setting detail: INFUSION SERIES
Discharge: HOME | End: 2019-11-12
Payer: COMMERCIAL

## 2019-11-12 VITALS
RESPIRATION RATE: 16 BRPM | TEMPERATURE: 97.7 F | DIASTOLIC BLOOD PRESSURE: 63 MMHG | HEART RATE: 79 BPM | SYSTOLIC BLOOD PRESSURE: 112 MMHG

## 2019-11-12 DIAGNOSIS — C85.10 B-CELL LYMPHOMA, UNSPECIFIED B-CELL LYMPHOMA TYPE, UNSPECIFIED BODY REGION (HCC): Primary | ICD-10-CM

## 2019-11-12 LAB
ABO/RH: NORMAL
ANTIBODY SCREEN: NORMAL

## 2019-11-12 PROCEDURE — P9040 RBC LEUKOREDUCED IRRADIATED: HCPCS

## 2019-11-12 PROCEDURE — 99213 OFFICE O/P EST LOW 20 MIN: CPT

## 2019-11-12 PROCEDURE — 36430 TRANSFUSION BLD/BLD COMPNT: CPT

## 2019-11-12 PROCEDURE — 86900 BLOOD TYPING SEROLOGIC ABO: CPT

## 2019-11-12 PROCEDURE — 86850 RBC ANTIBODY SCREEN: CPT

## 2019-11-12 PROCEDURE — 86923 COMPATIBILITY TEST ELECTRIC: CPT

## 2019-11-12 PROCEDURE — 86901 BLOOD TYPING SEROLOGIC RH(D): CPT

## 2019-11-12 PROCEDURE — 6360000002 HC RX W HCPCS: Performed by: NURSE PRACTITIONER

## 2019-11-12 PROCEDURE — 36592 COLLECT BLOOD FROM PICC: CPT

## 2019-11-12 RX ORDER — SODIUM CHLORIDE 0.9 % (FLUSH) 0.9 %
20 SYRINGE (ML) INJECTION PRN
Status: CANCELLED | OUTPATIENT
Start: 2019-11-12

## 2019-11-12 RX ORDER — 0.9 % SODIUM CHLORIDE 0.9 %
10 VIAL (ML) INJECTION ONCE
Status: CANCELLED | OUTPATIENT
Start: 2019-11-12

## 2019-11-12 RX ORDER — SODIUM CHLORIDE 0.9 % (FLUSH) 0.9 %
10 SYRINGE (ML) INJECTION PRN
Status: CANCELLED | OUTPATIENT
Start: 2019-11-12

## 2019-11-12 RX ORDER — 0.9 % SODIUM CHLORIDE 0.9 %
250 INTRAVENOUS SOLUTION INTRAVENOUS ONCE
Status: CANCELLED | OUTPATIENT
Start: 2019-11-12

## 2019-11-12 RX ORDER — HEPARIN SODIUM (PORCINE) LOCK FLUSH IV SOLN 100 UNIT/ML 100 UNIT/ML
500 SOLUTION INTRAVENOUS PRN
Status: CANCELLED | OUTPATIENT
Start: 2019-11-12

## 2019-11-12 RX ORDER — FUROSEMIDE 10 MG/ML
20 INJECTION INTRAMUSCULAR; INTRAVENOUS ONCE
Status: CANCELLED | OUTPATIENT
Start: 2019-11-12

## 2019-11-12 RX ORDER — 0.9 % SODIUM CHLORIDE 0.9 %
250 INTRAVENOUS SOLUTION INTRAVENOUS ONCE
Status: DISCONTINUED | OUTPATIENT
Start: 2019-11-12 | End: 2019-11-13 | Stop reason: HOSPADM

## 2019-11-12 RX ORDER — SODIUM CHLORIDE 9 MG/ML
INJECTION, SOLUTION INTRAVENOUS CONTINUOUS
Status: CANCELLED | OUTPATIENT
Start: 2019-11-12

## 2019-11-12 RX ORDER — HEPARIN SODIUM (PORCINE) LOCK FLUSH IV SOLN 100 UNIT/ML 100 UNIT/ML
500 SOLUTION INTRAVENOUS PRN
Status: DISCONTINUED | OUTPATIENT
Start: 2019-11-12 | End: 2019-11-13 | Stop reason: HOSPADM

## 2019-11-12 RX ORDER — DIPHENHYDRAMINE HCL 25 MG
25 TABLET ORAL ONCE
Status: CANCELLED | OUTPATIENT
Start: 2019-11-12

## 2019-11-12 RX ORDER — ACETAMINOPHEN 325 MG/1
650 TABLET ORAL ONCE
Status: CANCELLED | OUTPATIENT
Start: 2019-11-12

## 2019-11-12 RX ORDER — DIPHENHYDRAMINE HYDROCHLORIDE 50 MG/ML
50 INJECTION INTRAMUSCULAR; INTRAVENOUS ONCE
Status: CANCELLED | OUTPATIENT
Start: 2019-11-12

## 2019-11-12 RX ORDER — METHYLPREDNISOLONE SODIUM SUCCINATE 125 MG/2ML
125 INJECTION, POWDER, LYOPHILIZED, FOR SOLUTION INTRAMUSCULAR; INTRAVENOUS ONCE
Status: CANCELLED | OUTPATIENT
Start: 2019-11-12

## 2019-11-12 RX ADMIN — Medication 500 UNITS: at 14:11

## 2019-11-13 ENCOUNTER — HOSPITAL ENCOUNTER (OUTPATIENT)
Dept: ONCOLOGY | Age: 64
Setting detail: INFUSION SERIES
Discharge: HOME | End: 2019-11-13
Payer: COMMERCIAL

## 2019-11-13 VITALS
RESPIRATION RATE: 16 BRPM | HEART RATE: 78 BPM | SYSTOLIC BLOOD PRESSURE: 118 MMHG | TEMPERATURE: 98 F | DIASTOLIC BLOOD PRESSURE: 50 MMHG

## 2019-11-13 DIAGNOSIS — C85.10 B-CELL LYMPHOMA, UNSPECIFIED B-CELL LYMPHOMA TYPE, UNSPECIFIED BODY REGION (HCC): Primary | ICD-10-CM

## 2019-11-13 PROCEDURE — P9037 PLATE PHERES LEUKOREDU IRRAD: HCPCS

## 2019-11-13 PROCEDURE — 99213 OFFICE O/P EST LOW 20 MIN: CPT

## 2019-11-13 PROCEDURE — 6360000002 HC RX W HCPCS: Performed by: INTERNAL MEDICINE

## 2019-11-13 PROCEDURE — 36430 TRANSFUSION BLD/BLD COMPNT: CPT

## 2019-11-13 RX ORDER — SODIUM CHLORIDE 0.9 % (FLUSH) 0.9 %
20 SYRINGE (ML) INJECTION PRN
Status: CANCELLED | OUTPATIENT
Start: 2019-11-13

## 2019-11-13 RX ORDER — SODIUM CHLORIDE 9 MG/ML
INJECTION, SOLUTION INTRAVENOUS CONTINUOUS
Status: CANCELLED | OUTPATIENT
Start: 2019-11-13

## 2019-11-13 RX ORDER — DIPHENHYDRAMINE HYDROCHLORIDE 50 MG/ML
50 INJECTION INTRAMUSCULAR; INTRAVENOUS ONCE
Status: CANCELLED | OUTPATIENT
Start: 2019-11-13

## 2019-11-13 RX ORDER — FUROSEMIDE 10 MG/ML
20 INJECTION INTRAMUSCULAR; INTRAVENOUS ONCE
Status: CANCELLED | OUTPATIENT
Start: 2019-11-13

## 2019-11-13 RX ORDER — HEPARIN SODIUM (PORCINE) LOCK FLUSH IV SOLN 100 UNIT/ML 100 UNIT/ML
500 SOLUTION INTRAVENOUS PRN
Status: DISCONTINUED | OUTPATIENT
Start: 2019-11-13 | End: 2019-11-14 | Stop reason: HOSPADM

## 2019-11-13 RX ORDER — ACETAMINOPHEN 325 MG/1
650 TABLET ORAL ONCE
Status: CANCELLED | OUTPATIENT
Start: 2019-11-13

## 2019-11-13 RX ORDER — METHYLPREDNISOLONE SODIUM SUCCINATE 125 MG/2ML
125 INJECTION, POWDER, LYOPHILIZED, FOR SOLUTION INTRAMUSCULAR; INTRAVENOUS ONCE
Status: CANCELLED | OUTPATIENT
Start: 2019-11-13

## 2019-11-13 RX ORDER — 0.9 % SODIUM CHLORIDE 0.9 %
10 VIAL (ML) INJECTION ONCE
Status: CANCELLED | OUTPATIENT
Start: 2019-11-13

## 2019-11-13 RX ORDER — 0.9 % SODIUM CHLORIDE 0.9 %
250 INTRAVENOUS SOLUTION INTRAVENOUS ONCE
Status: CANCELLED | OUTPATIENT
Start: 2019-11-13

## 2019-11-13 RX ORDER — DIPHENHYDRAMINE HCL 25 MG
25 TABLET ORAL ONCE
Status: CANCELLED | OUTPATIENT
Start: 2019-11-13

## 2019-11-13 RX ORDER — HEPARIN SODIUM (PORCINE) LOCK FLUSH IV SOLN 100 UNIT/ML 100 UNIT/ML
500 SOLUTION INTRAVENOUS PRN
Status: CANCELLED | OUTPATIENT
Start: 2019-11-13

## 2019-11-13 RX ORDER — SODIUM CHLORIDE 0.9 % (FLUSH) 0.9 %
10 SYRINGE (ML) INJECTION PRN
Status: CANCELLED | OUTPATIENT
Start: 2019-11-13

## 2019-11-13 RX ADMIN — Medication 500 UNITS: at 12:07

## 2019-11-13 NOTE — PLAN OF CARE
Problem: Bleeding:  Goal: Will show no signs and symptoms of excessive bleeding  Description  Will show no signs and symptoms of excessive bleeding  Outcome: Met This Shift  Note:   Patient's hemoglobin this AM: 7.6  Patient's platelet count this AM: 20  Pt seen and assessed at [facility]. Seen at [address] [place] [address] today for 1 unit of SDP per standing orders for above lab values. Blood products transfused per [facility] policy. Pt tolerated transfusion well and without incident. No s/s of bleeding noted or reported by pt, bleeding precautions reviewed. Pt verbalizes understanding of discharge instructions. Discharged ambulatory to home with friend.

## 2019-11-14 RX ORDER — LOPERAMIDE HYDROCHLORIDE 2 MG/1
2 CAPSULE ORAL PRN
Status: CANCELLED | OUTPATIENT
Start: 2019-11-15

## 2019-11-14 RX ORDER — PROCHLORPERAZINE MALEATE 10 MG
10 TABLET ORAL EVERY 6 HOURS PRN
Status: CANCELLED | OUTPATIENT
Start: 2019-11-15

## 2019-11-15 ENCOUNTER — HOSPITAL ENCOUNTER (OUTPATIENT)
Dept: ONCOLOGY | Age: 64
Setting detail: INFUSION SERIES
Discharge: HOME | End: 2019-11-15
Payer: COMMERCIAL

## 2019-11-15 VITALS
DIASTOLIC BLOOD PRESSURE: 70 MMHG | SYSTOLIC BLOOD PRESSURE: 104 MMHG | HEART RATE: 80 BPM | RESPIRATION RATE: 16 BRPM | TEMPERATURE: 98.7 F

## 2019-11-15 DIAGNOSIS — C83.00 SMALL B-CELL LYMPHOMA, UNSPECIFIED BODY REGION (HCC): Primary | ICD-10-CM

## 2019-11-15 DIAGNOSIS — C85.10 B-CELL LYMPHOMA, UNSPECIFIED B-CELL LYMPHOMA TYPE, UNSPECIFIED BODY REGION (HCC): ICD-10-CM

## 2019-11-15 LAB
A/G RATIO: 1.9 (ref 1.1–2.2)
ALBUMIN SERPL-MCNC: 3.8 G/DL (ref 3.4–5)
ALP BLD-CCNC: 294 U/L (ref 40–129)
ALT SERPL-CCNC: 45 U/L (ref 10–40)
ANION GAP SERPL CALCULATED.3IONS-SCNC: 9 MMOL/L (ref 3–16)
ANISOCYTOSIS: ABNORMAL
AST SERPL-CCNC: 33 U/L (ref 15–37)
BASOPHILS ABSOLUTE: 0.1 K/UL (ref 0–0.2)
BASOPHILS RELATIVE PERCENT: 1 %
BILIRUB SERPL-MCNC: 0.3 MG/DL (ref 0–1)
BLOOD BANK DISPENSE STATUS: NORMAL
BLOOD BANK PRODUCT CODE: NORMAL
BPU ID: NORMAL
BUN BLDV-MCNC: 14 MG/DL (ref 7–20)
CALCIUM SERPL-MCNC: 9.3 MG/DL (ref 8.3–10.6)
CHLORIDE BLD-SCNC: 105 MMOL/L (ref 99–110)
CO2: 24 MMOL/L (ref 21–32)
CREAT SERPL-MCNC: 0.8 MG/DL (ref 0.6–1.2)
DESCRIPTION BLOOD BANK: NORMAL
EOSINOPHILS ABSOLUTE: 0 K/UL (ref 0–0.6)
EOSINOPHILS RELATIVE PERCENT: 0 %
GFR AFRICAN AMERICAN: >60
GFR NON-AFRICAN AMERICAN: >60
GLOBULIN: 2 G/DL
GLUCOSE BLD-MCNC: 218 MG/DL (ref 70–99)
HCT VFR BLD CALC: 21 % (ref 36–48)
HCT VFR BLD CALC: 22.7 % (ref 36–48)
HEMOGLOBIN: 7.2 G/DL (ref 12–16)
HEMOGLOBIN: 7.7 G/DL (ref 12–16)
LYMPHOCYTES ABSOLUTE: 0.3 K/UL (ref 1–5.1)
LYMPHOCYTES RELATIVE PERCENT: 3 %
MAGNESIUM: 1.9 MG/DL (ref 1.8–2.4)
MCH RBC QN AUTO: 32.2 PG (ref 26–34)
MCH RBC QN AUTO: 32.4 PG (ref 26–34)
MCHC RBC AUTO-ENTMCNC: 33.9 G/DL (ref 31–36)
MCHC RBC AUTO-ENTMCNC: 34.1 G/DL (ref 31–36)
MCV RBC AUTO: 94.4 FL (ref 80–100)
MCV RBC AUTO: 95.7 FL (ref 80–100)
METAMYELOCYTES RELATIVE PERCENT: 3 %
MONOCYTES ABSOLUTE: 0.7 K/UL (ref 0–1.3)
MONOCYTES RELATIVE PERCENT: 7 %
NEUTROPHILS ABSOLUTE: 8.9 K/UL (ref 1.7–7.7)
NEUTROPHILS RELATIVE PERCENT: 86 %
PDW BLD-RTO: 20.7 % (ref 12.4–15.4)
PDW BLD-RTO: 20.9 % (ref 12.4–15.4)
PLATELET # BLD: 21 K/UL (ref 135–450)
PLATELET # BLD: 32 K/UL (ref 135–450)
PLATELET SLIDE REVIEW: ABNORMAL
PMV BLD AUTO: 8 FL (ref 5–10.5)
PMV BLD AUTO: 8.5 FL (ref 5–10.5)
POTASSIUM SERPL-SCNC: 4.7 MMOL/L (ref 3.5–5.1)
RBC # BLD: 2.22 M/UL (ref 4–5.2)
RBC # BLD: 2.37 M/UL (ref 4–5.2)
SODIUM BLD-SCNC: 138 MMOL/L (ref 136–145)
TEAR DROP CELLS: ABNORMAL
TOTAL PROTEIN: 5.8 G/DL (ref 6.4–8.2)
WBC # BLD: 10 K/UL (ref 4–11)
WBC # BLD: 9.2 K/UL (ref 4–11)

## 2019-11-15 PROCEDURE — 99215 OFFICE O/P EST HI 40 MIN: CPT

## 2019-11-15 PROCEDURE — 38206 HARVEST AUTO STEM CELLS: CPT

## 2019-11-15 PROCEDURE — 85027 COMPLETE CBC AUTOMATED: CPT

## 2019-11-15 PROCEDURE — 2580000003 HC RX 258: Performed by: INTERNAL MEDICINE

## 2019-11-15 PROCEDURE — 96365 THER/PROPH/DIAG IV INF INIT: CPT

## 2019-11-15 PROCEDURE — 96367 TX/PROPH/DG ADDL SEQ IV INF: CPT

## 2019-11-15 PROCEDURE — 6360000002 HC RX W HCPCS: Performed by: INTERNAL MEDICINE

## 2019-11-15 PROCEDURE — 80053 COMPREHEN METABOLIC PANEL: CPT

## 2019-11-15 PROCEDURE — 83735 ASSAY OF MAGNESIUM: CPT

## 2019-11-15 PROCEDURE — P9037 PLATE PHERES LEUKOREDU IRRAD: HCPCS

## 2019-11-15 PROCEDURE — 85025 COMPLETE CBC W/AUTO DIFF WBC: CPT

## 2019-11-15 PROCEDURE — 36592 COLLECT BLOOD FROM PICC: CPT

## 2019-11-15 PROCEDURE — 36430 TRANSFUSION BLD/BLD COMPNT: CPT

## 2019-11-15 PROCEDURE — 38207 CRYOPRESERVE STEM CELLS: CPT

## 2019-11-15 PROCEDURE — 96372 THER/PROPH/DIAG INJ SC/IM: CPT

## 2019-11-15 RX ORDER — SODIUM CHLORIDE 9 MG/ML
INJECTION, SOLUTION INTRAVENOUS CONTINUOUS
Status: CANCELLED | OUTPATIENT
Start: 2019-11-15

## 2019-11-15 RX ORDER — SODIUM CHLORIDE 0.9 % (FLUSH) 0.9 %
10 SYRINGE (ML) INJECTION PRN
Status: CANCELLED | OUTPATIENT
Start: 2019-11-15

## 2019-11-15 RX ORDER — DIPHENHYDRAMINE HCL 25 MG
25 TABLET ORAL EVERY 6 HOURS PRN
Status: ON HOLD | COMMUNITY
End: 2019-12-09 | Stop reason: HOSPADM

## 2019-11-15 RX ORDER — HEPARIN SODIUM (PORCINE) LOCK FLUSH IV SOLN 100 UNIT/ML 100 UNIT/ML
500 SOLUTION INTRAVENOUS PRN
Status: DISCONTINUED | OUTPATIENT
Start: 2019-11-15 | End: 2019-11-16 | Stop reason: HOSPADM

## 2019-11-15 RX ORDER — SODIUM CHLORIDE 0.9 % (FLUSH) 0.9 %
20 SYRINGE (ML) INJECTION PRN
Status: CANCELLED | OUTPATIENT
Start: 2019-11-15

## 2019-11-15 RX ORDER — HEPARIN SODIUM (PORCINE) LOCK FLUSH IV SOLN 100 UNIT/ML 100 UNIT/ML
300 SOLUTION INTRAVENOUS PRN
Status: DISCONTINUED | OUTPATIENT
Start: 2019-11-15 | End: 2019-11-16 | Stop reason: HOSPADM

## 2019-11-15 RX ORDER — HEPARIN SODIUM (PORCINE) LOCK FLUSH IV SOLN 100 UNIT/ML 100 UNIT/ML
500 SOLUTION INTRAVENOUS PRN
Status: CANCELLED | OUTPATIENT
Start: 2019-11-15

## 2019-11-15 RX ORDER — 0.9 % SODIUM CHLORIDE 0.9 %
10 VIAL (ML) INJECTION ONCE
Status: CANCELLED | OUTPATIENT
Start: 2019-11-15

## 2019-11-15 RX ORDER — DIPHENHYDRAMINE HYDROCHLORIDE 50 MG/ML
50 INJECTION INTRAMUSCULAR; INTRAVENOUS ONCE
Status: CANCELLED | OUTPATIENT
Start: 2019-11-15

## 2019-11-15 RX ORDER — LOPERAMIDE HYDROCHLORIDE 2 MG/1
2 CAPSULE ORAL PRN
Status: DISCONTINUED | OUTPATIENT
Start: 2019-11-15 | End: 2019-11-16 | Stop reason: HOSPADM

## 2019-11-15 RX ORDER — 0.9 % SODIUM CHLORIDE 0.9 %
250 INTRAVENOUS SOLUTION INTRAVENOUS ONCE
Status: CANCELLED | OUTPATIENT
Start: 2019-11-15

## 2019-11-15 RX ORDER — PROCHLORPERAZINE MALEATE 10 MG
10 TABLET ORAL EVERY 6 HOURS PRN
Status: CANCELLED | OUTPATIENT
Start: 2019-11-16

## 2019-11-15 RX ORDER — FUROSEMIDE 10 MG/ML
20 INJECTION INTRAMUSCULAR; INTRAVENOUS ONCE
Status: CANCELLED | OUTPATIENT
Start: 2019-11-15

## 2019-11-15 RX ORDER — PROCHLORPERAZINE MALEATE 10 MG
10 TABLET ORAL EVERY 6 HOURS PRN
Status: DISCONTINUED | OUTPATIENT
Start: 2019-11-15 | End: 2019-11-16 | Stop reason: HOSPADM

## 2019-11-15 RX ORDER — METHYLPREDNISOLONE SODIUM SUCCINATE 125 MG/2ML
125 INJECTION, POWDER, LYOPHILIZED, FOR SOLUTION INTRAMUSCULAR; INTRAVENOUS ONCE
Status: CANCELLED | OUTPATIENT
Start: 2019-11-15

## 2019-11-15 RX ORDER — LOPERAMIDE HYDROCHLORIDE 2 MG/1
2 CAPSULE ORAL PRN
Status: CANCELLED | OUTPATIENT
Start: 2019-11-16

## 2019-11-15 RX ORDER — ACETAMINOPHEN 500 MG
500 TABLET ORAL EVERY 6 HOURS PRN
COMMUNITY

## 2019-11-15 RX ORDER — DIPHENHYDRAMINE HCL 25 MG
25 TABLET ORAL ONCE
Status: CANCELLED | OUTPATIENT
Start: 2019-11-15

## 2019-11-15 RX ORDER — ACETAMINOPHEN 325 MG/1
650 TABLET ORAL ONCE
Status: CANCELLED | OUTPATIENT
Start: 2019-11-15

## 2019-11-15 RX ORDER — LORAZEPAM 1 MG/1
1 TABLET ORAL EVERY 6 HOURS PRN
COMMUNITY
End: 2021-08-20

## 2019-11-15 RX ADMIN — Medication 300 UNITS: at 16:22

## 2019-11-15 RX ADMIN — TBO-FILGRASTIM 600 MCG: 300 INJECTION, SOLUTION SUBCUTANEOUS at 07:08

## 2019-11-15 RX ADMIN — TBO-FILGRASTIM 480 MCG: 480 INJECTION, SOLUTION SUBCUTANEOUS at 07:09

## 2019-11-15 RX ADMIN — CALCIUM GLUCONATE 4 G: 98 INJECTION, SOLUTION INTRAVENOUS at 08:33

## 2019-11-15 RX ADMIN — Medication 300 UNITS: at 07:22

## 2019-11-15 NOTE — PROGRESS NOTES
Patient in OP Infusion for scheduled day 1 stem cell pheresis. Received Granix 1080 mcg subcut in 3 injections at 0710 which she tolerated well. Apheresis in progress per Ap. Will continue to monitor.

## 2019-11-15 NOTE — PROGRESS NOTES
results for input(s): PROTIME, INR, APTT in the last 72 hours. Uric Acid   No results for input(s): LABURIC in the last 72 hours. PROBLEM LIST:             1. Follicular NHL w/ I7-F8 paraspinal mass and 9 cm mass at root of mesentery. (Dx 1/2016)  2. Atrial fibrillation w/ RVR (Dx 10/2018)    3.  Type 2 DM  4.  Chronic  anticoagulation      TREATMENT:             1. XRT to thoracic spine   2. Bendamustine / Rituxan x 6 cycles (ending 7/2016)  3.  Rituxan maintenance x 12 months  4. Clinical Trial - Revlimid & Rituxan x 7 cycles (ending 12/2018)   5. CVP- R x 8 cycles (12/2018 - 6/2019)    6. R-ICE    Cycle #1 - 9/9/19   Cycle #2 - 9/30/19   Cycle #3 - 11/2/19 - mobilize stem cells off this cycle of tx      ASSESSMENT AND PLAN:             1. Follicular NHL: now in DE + SD     PLAN: R - ICE x 2 or 3 cycles followed by PET/CT scan. Then autologous stem cell transplant with GCSF mobilization if in adequate remission      Cycle 3 day +16. Today is the first day of peripheral blood stem cell collection. Her CD34 count yesterday was 9. The total white count today is 10. She will undergo a 20 L a pheresis procedure. The goal will be 2.5×10 to the 6 CD34 cells per kilogram.  She will be contacted later today with the total collections.     2. ID: Patient is currently afebrile without evidence of infection.   - Stopped Diflucan, Valtrex and Levaquin prophylaxis once ANC < 1.5.      3. Heme:  Anemia related to recent chemotherapy  - Transfuse for Hgb < 7 and Platelets < 88 K.   - Platelet count pending after the procedure   -     4. Metabolic:  hyperglycemia (200s)   - Cont KCl 20 meq bid   - Replace Potassium and Magnesium per protocol.      6.  GI / Nutrition:          - Cont low microbial diet  - Dietary to follow closely      7.  Endocrine: Hyperglycemia d/t steroids  - Cont Amaryl and Metformin daily     8.  Cardiac: c/o dizziness   - H/o A.  Fib  - Cont Eliquis, hold when platelets < 28E  - Cont flecainide  - Toprol XL increased to 25 mg bid 11/4   - Cont Lasix 20 mg daily   - EKG (10/3/19) - A. flutter  - Cont telemetry  - Cardiology following, appreciate recs     9.  Thyroid nodule:  - S/p biopsy (8/26/2019) - benign     - DVT Prophylaxis: Contraindications to pharmacologic prophylaxis: Patient already on therapeutic anticoagulation    - Disposition: d/c once chemotx infusion complete         Miguel Angel Garcia MD

## 2019-11-15 NOTE — PLAN OF CARE
Problem: KNOWLEDGE DEFICIT  Goal: Patient/S.O. demonstrates understanding of disease process, treatment plan, medications, and discharge instructions. Outcome: Met This Shift  Note:   Day one stem cell apheresis completed per Ap. Received platelets and one unit prbc's which she tolerated well. Verbalized understanding of d/c instructions. D/C'd ambulatory.

## 2019-11-16 ENCOUNTER — HOSPITAL ENCOUNTER (OUTPATIENT)
Dept: ONCOLOGY | Age: 64
Setting detail: INFUSION SERIES
Discharge: HOME | End: 2019-11-16
Payer: COMMERCIAL

## 2019-11-16 ENCOUNTER — HOSPITAL ENCOUNTER (OUTPATIENT)
Dept: ONCOLOGY | Age: 64
Setting detail: INFUSION SERIES
End: 2019-11-16
Payer: COMMERCIAL

## 2019-11-16 VITALS
HEART RATE: 90 BPM | DIASTOLIC BLOOD PRESSURE: 83 MMHG | SYSTOLIC BLOOD PRESSURE: 131 MMHG | TEMPERATURE: 97.9 F | RESPIRATION RATE: 16 BRPM

## 2019-11-16 PROCEDURE — 99213 OFFICE O/P EST LOW 20 MIN: CPT

## 2019-11-16 PROCEDURE — 6360000002 HC RX W HCPCS: Performed by: INTERNAL MEDICINE

## 2019-11-16 RX ORDER — HEPARIN SODIUM (PORCINE) LOCK FLUSH IV SOLN 100 UNIT/ML 100 UNIT/ML
1000 SOLUTION INTRAVENOUS PRN
Status: DISCONTINUED | OUTPATIENT
Start: 2019-11-16 | End: 2019-11-16

## 2019-11-16 RX ORDER — HEPARIN SODIUM (PORCINE) LOCK FLUSH IV SOLN 100 UNIT/ML 100 UNIT/ML
1500 SOLUTION INTRAVENOUS PRN
Status: DISCONTINUED | OUTPATIENT
Start: 2019-11-16 | End: 2019-11-17 | Stop reason: HOSPADM

## 2019-11-16 RX ADMIN — Medication 1500 UNITS: at 09:39

## 2019-11-16 NOTE — PROGRESS NOTES
Patient here for line care post stem cell collection. High dose heparin removed from blue and red lumens, all lines had caps changed, flushed and heparinized with low dose heparin. Patient will return to [facility] on Wednesday.   Patient left ambulatory with

## 2019-11-21 ENCOUNTER — HOSPITAL ENCOUNTER (INPATIENT)
Age: 64
LOS: 18 days | Discharge: HOME | DRG: 016 | End: 2019-12-09
Attending: INTERNAL MEDICINE | Admitting: INTERNAL MEDICINE
Payer: COMMERCIAL

## 2019-11-21 ENCOUNTER — APPOINTMENT (OUTPATIENT)
Dept: GENERAL RADIOLOGY | Age: 64
DRG: 016 | End: 2019-11-21
Attending: INTERNAL MEDICINE
Payer: COMMERCIAL

## 2019-11-21 DIAGNOSIS — I48.19 PERSISTENT ATRIAL FIBRILLATION (HCC): ICD-10-CM

## 2019-11-21 PROBLEM — C85.90 NHL (NON-HODGKIN'S LYMPHOMA) (HCC): Status: ACTIVE | Noted: 2019-11-21

## 2019-11-21 LAB
ALBUMIN SERPL-MCNC: 4.1 G/DL (ref 3.4–5)
ALP BLD-CCNC: 231 U/L (ref 40–129)
ALT SERPL-CCNC: 41 U/L (ref 10–40)
ANION GAP SERPL CALCULATED.3IONS-SCNC: 14 MMOL/L (ref 3–16)
APTT: 48.9 SEC (ref 24.2–36.2)
AST SERPL-CCNC: 38 U/L (ref 15–37)
BANDED NEUTROPHILS RELATIVE PERCENT: 7 % (ref 0–7)
BASOPHILS ABSOLUTE: 0 K/UL (ref 0–0.2)
BASOPHILS RELATIVE PERCENT: 0 %
BILIRUB SERPL-MCNC: 0.3 MG/DL (ref 0–1)
BILIRUBIN DIRECT: <0.2 MG/DL (ref 0–0.3)
BILIRUBIN URINE: NEGATIVE
BILIRUBIN, INDIRECT: ABNORMAL MG/DL (ref 0–1)
BLOOD, URINE: NEGATIVE
BUN BLDV-MCNC: 18 MG/DL (ref 7–20)
CALCIUM SERPL-MCNC: 8.7 MG/DL (ref 8.3–10.6)
CHLORIDE BLD-SCNC: 103 MMOL/L (ref 99–110)
CLARITY: CLEAR
CO2: 23 MMOL/L (ref 21–32)
COLOR: YELLOW
CREAT SERPL-MCNC: 0.9 MG/DL (ref 0.6–1.2)
EKG ATRIAL RATE: 72 BPM
EKG DIAGNOSIS: NORMAL
EKG P AXIS: 55 DEGREES
EKG P-R INTERVAL: 186 MS
EKG Q-T INTERVAL: 402 MS
EKG QRS DURATION: 90 MS
EKG QTC CALCULATION (BAZETT): 440 MS
EKG R AXIS: 68 DEGREES
EKG T AXIS: 59 DEGREES
EKG VENTRICULAR RATE: 72 BPM
EOSINOPHILS ABSOLUTE: 0 K/UL (ref 0–0.6)
EOSINOPHILS RELATIVE PERCENT: 0 %
GFR AFRICAN AMERICAN: >60
GFR NON-AFRICAN AMERICAN: >60
GLUCOSE BLD-MCNC: 203 MG/DL (ref 70–99)
GLUCOSE BLD-MCNC: 230 MG/DL (ref 70–99)
GLUCOSE BLD-MCNC: 339 MG/DL (ref 70–99)
GLUCOSE URINE: 250 MG/DL
HCT VFR BLD CALC: 26.2 % (ref 36–48)
HEMATOLOGY PATH CONSULT: NO
HEMOGLOBIN: 8.7 G/DL (ref 12–16)
INR BLD: 1.01 (ref 0.86–1.14)
KETONES, URINE: NEGATIVE MG/DL
LACTATE DEHYDROGENASE: 390 U/L (ref 100–190)
LEUKOCYTE ESTERASE, URINE: NEGATIVE
LYMPHOCYTES ABSOLUTE: 0.6 K/UL (ref 1–5.1)
LYMPHOCYTES RELATIVE PERCENT: 5 %
MAGNESIUM: 2.1 MG/DL (ref 1.8–2.4)
MCH RBC QN AUTO: 31.1 PG (ref 26–34)
MCHC RBC AUTO-ENTMCNC: 33.1 G/DL (ref 31–36)
MCV RBC AUTO: 93.8 FL (ref 80–100)
METAMYELOCYTES RELATIVE PERCENT: 7 %
MICROSCOPIC EXAMINATION: ABNORMAL
MONOCYTES ABSOLUTE: 1.4 K/UL (ref 0–1.3)
MONOCYTES RELATIVE PERCENT: 13 %
NEUTROPHILS ABSOLUTE: 9 K/UL (ref 1.7–7.7)
NEUTROPHILS RELATIVE PERCENT: 68 %
NITRITE, URINE: NEGATIVE
PDW BLD-RTO: 19.2 % (ref 12.4–15.4)
PERFORMED ON: ABNORMAL
PERFORMED ON: ABNORMAL
PH UA: 6 (ref 5–8)
PHOSPHORUS: 2.5 MG/DL (ref 2.5–4.9)
PLATELET # BLD: 87 K/UL (ref 135–450)
PLATELET SLIDE REVIEW: ABNORMAL
PMV BLD AUTO: 9.3 FL (ref 5–10.5)
POTASSIUM SERPL-SCNC: 4.4 MMOL/L (ref 3.5–5.1)
PROTEIN UA: NEGATIVE MG/DL
PROTHROMBIN TIME: 11.7 SEC (ref 10–13.2)
RBC # BLD: 2.79 M/UL (ref 4–5.2)
SODIUM BLD-SCNC: 140 MMOL/L (ref 136–145)
SPECIFIC GRAVITY UA: 1.02 (ref 1–1.03)
TOTAL PROTEIN: 6.7 G/DL (ref 6.4–8.2)
URIC ACID, SERUM: 5.1 MG/DL (ref 2.6–6)
URINE TYPE: ABNORMAL
UROBILINOGEN, URINE: 0.2 E.U./DL
WBC # BLD: 11 K/UL (ref 4–11)

## 2019-11-21 PROCEDURE — 2580000003 HC RX 258: Performed by: NURSE PRACTITIONER

## 2019-11-21 PROCEDURE — 6370000000 HC RX 637 (ALT 250 FOR IP): Performed by: NURSE PRACTITIONER

## 2019-11-21 PROCEDURE — 2060000000 HC ICU INTERMEDIATE R&B

## 2019-11-21 PROCEDURE — 2580000003 HC RX 258: Performed by: INTERNAL MEDICINE

## 2019-11-21 PROCEDURE — 80048 BASIC METABOLIC PNL TOTAL CA: CPT

## 2019-11-21 PROCEDURE — 87081 CULTURE SCREEN ONLY: CPT

## 2019-11-21 PROCEDURE — 6370000000 HC RX 637 (ALT 250 FOR IP): Performed by: INTERNAL MEDICINE

## 2019-11-21 PROCEDURE — 85730 THROMBOPLASTIN TIME PARTIAL: CPT

## 2019-11-21 PROCEDURE — 71046 X-RAY EXAM CHEST 2 VIEWS: CPT

## 2019-11-21 PROCEDURE — 83735 ASSAY OF MAGNESIUM: CPT

## 2019-11-21 PROCEDURE — 85025 COMPLETE CBC W/AUTO DIFF WBC: CPT

## 2019-11-21 PROCEDURE — 80076 HEPATIC FUNCTION PANEL: CPT

## 2019-11-21 PROCEDURE — 83615 LACTATE (LD) (LDH) ENZYME: CPT

## 2019-11-21 PROCEDURE — 84550 ASSAY OF BLOOD/URIC ACID: CPT

## 2019-11-21 PROCEDURE — 85610 PROTHROMBIN TIME: CPT

## 2019-11-21 PROCEDURE — 93005 ELECTROCARDIOGRAM TRACING: CPT | Performed by: NURSE PRACTITIONER

## 2019-11-21 PROCEDURE — 84100 ASSAY OF PHOSPHORUS: CPT

## 2019-11-21 PROCEDURE — 81003 URINALYSIS AUTO W/O SCOPE: CPT

## 2019-11-21 PROCEDURE — 6360000002 HC RX W HCPCS: Performed by: INTERNAL MEDICINE

## 2019-11-21 PROCEDURE — 93010 ELECTROCARDIOGRAM REPORT: CPT | Performed by: INTERNAL MEDICINE

## 2019-11-21 RX ORDER — FAMOTIDINE 20 MG/1
20 TABLET, FILM COATED ORAL 2 TIMES DAILY PRN
Status: DISCONTINUED | OUTPATIENT
Start: 2019-11-21 | End: 2019-12-09 | Stop reason: HOSPADM

## 2019-11-21 RX ORDER — ONDANSETRON HYDROCHLORIDE 8 MG/1
24 TABLET, FILM COATED ORAL ONCE
Status: COMPLETED | OUTPATIENT
Start: 2019-11-21 | End: 2019-11-21

## 2019-11-21 RX ORDER — LORAZEPAM 2 MG/ML
0.5 INJECTION INTRAMUSCULAR EVERY 4 HOURS PRN
Status: DISCONTINUED | OUTPATIENT
Start: 2019-11-21 | End: 2019-12-09 | Stop reason: HOSPADM

## 2019-11-21 RX ORDER — ALLOPURINOL 300 MG/1
300 TABLET ORAL DAILY
Status: COMPLETED | OUTPATIENT
Start: 2019-11-21 | End: 2019-11-26

## 2019-11-21 RX ORDER — DEXTROSE MONOHYDRATE 25 G/50ML
12.5 INJECTION, SOLUTION INTRAVENOUS PRN
Status: DISCONTINUED | OUTPATIENT
Start: 2019-11-21 | End: 2019-12-09 | Stop reason: HOSPADM

## 2019-11-21 RX ORDER — CETIRIZINE HYDROCHLORIDE 10 MG/1
10 TABLET ORAL DAILY
Status: DISCONTINUED | OUTPATIENT
Start: 2019-11-21 | End: 2019-12-09 | Stop reason: HOSPADM

## 2019-11-21 RX ORDER — PROCHLORPERAZINE MALEATE 10 MG
10 TABLET ORAL EVERY 4 HOURS PRN
Status: DISCONTINUED | OUTPATIENT
Start: 2019-11-21 | End: 2019-11-29

## 2019-11-21 RX ORDER — SODIUM CHLORIDE 0.9 % (FLUSH) 0.9 %
10 SYRINGE (ML) INJECTION PRN
Status: DISCONTINUED | OUTPATIENT
Start: 2019-11-21 | End: 2019-12-09 | Stop reason: HOSPADM

## 2019-11-21 RX ORDER — ONDANSETRON HYDROCHLORIDE 8 MG/1
24 TABLET, FILM COATED ORAL ONCE
Status: COMPLETED | OUTPATIENT
Start: 2019-11-26 | End: 2019-11-26

## 2019-11-21 RX ORDER — INSULIN LISPRO 100 [IU]/ML
0-6 INJECTION, SOLUTION INTRAVENOUS; SUBCUTANEOUS NIGHTLY
Status: DISCONTINUED | OUTPATIENT
Start: 2019-11-21 | End: 2019-11-27

## 2019-11-21 RX ORDER — NICOTINE POLACRILEX 4 MG
15 LOZENGE BUCCAL PRN
Status: DISCONTINUED | OUTPATIENT
Start: 2019-11-21 | End: 2019-12-09 | Stop reason: HOSPADM

## 2019-11-21 RX ORDER — DEXAMETHASONE 4 MG/1
20 TABLET ORAL ONCE
Status: COMPLETED | OUTPATIENT
Start: 2019-11-26 | End: 2019-11-26

## 2019-11-21 RX ORDER — SODIUM CHLORIDE 0.9 % (FLUSH) 0.9 %
10 SYRINGE (ML) INJECTION EVERY 12 HOURS SCHEDULED
Status: DISCONTINUED | OUTPATIENT
Start: 2019-11-21 | End: 2019-12-09 | Stop reason: HOSPADM

## 2019-11-21 RX ORDER — ACETAMINOPHEN 500 MG
500 TABLET ORAL EVERY 6 HOURS PRN
Status: DISCONTINUED | OUTPATIENT
Start: 2019-11-21 | End: 2019-12-09 | Stop reason: HOSPADM

## 2019-11-21 RX ORDER — VALACYCLOVIR HYDROCHLORIDE 500 MG/1
500 TABLET, FILM COATED ORAL 2 TIMES DAILY
Status: DISCONTINUED | OUTPATIENT
Start: 2019-11-21 | End: 2019-12-09 | Stop reason: HOSPADM

## 2019-11-21 RX ORDER — FLUCONAZOLE 200 MG/1
400 TABLET ORAL DAILY
Status: DISCONTINUED | OUTPATIENT
Start: 2019-11-27 | End: 2019-12-08

## 2019-11-21 RX ORDER — POTASSIUM CHLORIDE 29.8 MG/ML
80 INJECTION INTRAVENOUS PRN
Status: DISCONTINUED | OUTPATIENT
Start: 2019-11-21 | End: 2019-12-09 | Stop reason: HOSPADM

## 2019-11-21 RX ORDER — POTASSIUM CHLORIDE 20 MEQ/1
20 TABLET, EXTENDED RELEASE ORAL 2 TIMES DAILY
Status: DISCONTINUED | OUTPATIENT
Start: 2019-11-21 | End: 2019-11-29

## 2019-11-21 RX ORDER — FUROSEMIDE 20 MG/1
20 TABLET ORAL DAILY
Status: DISCONTINUED | OUTPATIENT
Start: 2019-11-22 | End: 2019-11-30

## 2019-11-21 RX ORDER — FLECAINIDE ACETATE 100 MG/1
100 TABLET ORAL 2 TIMES DAILY
Status: DISCONTINUED | OUTPATIENT
Start: 2019-11-21 | End: 2019-11-26

## 2019-11-21 RX ORDER — SODIUM CHLORIDE 9 MG/ML
INJECTION, SOLUTION INTRAVENOUS CONTINUOUS PRN
Status: DISCONTINUED | OUTPATIENT
Start: 2019-11-21 | End: 2019-11-29

## 2019-11-21 RX ORDER — FUROSEMIDE 10 MG/ML
40 INJECTION INTRAMUSCULAR; INTRAVENOUS EVERY 12 HOURS
Status: DISCONTINUED | OUTPATIENT
Start: 2019-11-22 | End: 2019-11-29

## 2019-11-21 RX ORDER — DIPHENHYDRAMINE HCL 25 MG
25 TABLET ORAL EVERY 6 HOURS PRN
Status: DISCONTINUED | OUTPATIENT
Start: 2019-11-21 | End: 2019-12-09 | Stop reason: HOSPADM

## 2019-11-21 RX ORDER — DEXTROSE MONOHYDRATE 50 MG/ML
100 INJECTION, SOLUTION INTRAVENOUS PRN
Status: DISCONTINUED | OUTPATIENT
Start: 2019-11-21 | End: 2019-12-09 | Stop reason: HOSPADM

## 2019-11-21 RX ORDER — DEXTROSE AND SODIUM CHLORIDE 5; .45 G/100ML; G/100ML
1000 INJECTION, SOLUTION INTRAVENOUS CONTINUOUS
Status: DISCONTINUED | OUTPATIENT
Start: 2019-11-21 | End: 2019-11-21

## 2019-11-21 RX ORDER — MAGNESIUM SULFATE IN WATER 40 MG/ML
4 INJECTION, SOLUTION INTRAVENOUS PRN
Status: DISCONTINUED | OUTPATIENT
Start: 2019-11-21 | End: 2019-12-09 | Stop reason: HOSPADM

## 2019-11-21 RX ORDER — INSULIN LISPRO 100 [IU]/ML
0-12 INJECTION, SOLUTION INTRAVENOUS; SUBCUTANEOUS
Status: DISCONTINUED | OUTPATIENT
Start: 2019-11-21 | End: 2019-11-27

## 2019-11-21 RX ORDER — DEXTROSE, SODIUM CHLORIDE, AND POTASSIUM CHLORIDE 5; .45; .075 G/100ML; G/100ML; G/100ML
INJECTION INTRAVENOUS CONTINUOUS
Status: DISCONTINUED | OUTPATIENT
Start: 2019-11-21 | End: 2019-11-21

## 2019-11-21 RX ORDER — METOPROLOL SUCCINATE 25 MG/1
12.5 TABLET, EXTENDED RELEASE ORAL 2 TIMES DAILY
Status: DISCONTINUED | OUTPATIENT
Start: 2019-11-21 | End: 2019-12-01

## 2019-11-21 RX ORDER — DEXAMETHASONE 4 MG/1
10 TABLET ORAL 2 TIMES DAILY
Status: COMPLETED | OUTPATIENT
Start: 2019-11-22 | End: 2019-11-25

## 2019-11-21 RX ORDER — SODIUM CHLORIDE 450 MG/100ML
INJECTION, SOLUTION INTRAVENOUS CONTINUOUS
Status: DISCONTINUED | OUTPATIENT
Start: 2019-11-21 | End: 2019-11-29

## 2019-11-21 RX ORDER — SODIUM CHLORIDE 450 MG/100ML
1000 INJECTION, SOLUTION INTRAVENOUS ONCE
Status: COMPLETED | OUTPATIENT
Start: 2019-11-21 | End: 2019-11-21

## 2019-11-21 RX ORDER — LORAZEPAM 0.5 MG/1
0.5 TABLET ORAL EVERY 4 HOURS PRN
Status: DISCONTINUED | OUTPATIENT
Start: 2019-11-21 | End: 2019-12-09 | Stop reason: HOSPADM

## 2019-11-21 RX ORDER — PROCHLORPERAZINE EDISYLATE 5 MG/ML
10 INJECTION INTRAMUSCULAR; INTRAVENOUS EVERY 4 HOURS PRN
Status: DISCONTINUED | OUTPATIENT
Start: 2019-11-21 | End: 2019-11-29

## 2019-11-21 RX ORDER — DEXAMETHASONE 4 MG/1
20 TABLET ORAL ONCE
Status: COMPLETED | OUTPATIENT
Start: 2019-11-21 | End: 2019-11-21

## 2019-11-21 RX ADMIN — FLECAINIDE ACETATE 100 MG: 100 TABLET ORAL at 21:23

## 2019-11-21 RX ADMIN — SODIUM CHLORIDE 1000 ML: 4.5 INJECTION, SOLUTION INTRAVENOUS at 13:45

## 2019-11-21 RX ADMIN — DEXAMETHASONE 20 MG: 4 TABLET ORAL at 15:43

## 2019-11-21 RX ADMIN — APIXABAN 5 MG: 5 TABLET, FILM COATED ORAL at 21:23

## 2019-11-21 RX ADMIN — VALACYCLOVIR HYDROCHLORIDE 500 MG: 500 TABLET, FILM COATED ORAL at 21:23

## 2019-11-21 RX ADMIN — SODIUM CHLORIDE 15 ML: 900 IRRIGANT IRRIGATION at 17:47

## 2019-11-21 RX ADMIN — SODIUM CHLORIDE 15 ML: 900 IRRIGANT IRRIGATION at 21:23

## 2019-11-21 RX ADMIN — ALLOPURINOL 300 MG: 300 TABLET ORAL at 12:21

## 2019-11-21 RX ADMIN — METOPROLOL SUCCINATE 12.5 MG: 25 TABLET, EXTENDED RELEASE ORAL at 17:42

## 2019-11-21 RX ADMIN — SODIUM CHLORIDE 15 ML: 900 IRRIGANT IRRIGATION at 10:36

## 2019-11-21 RX ADMIN — INSULIN LISPRO 4 UNITS: 100 INJECTION, SOLUTION INTRAVENOUS; SUBCUTANEOUS at 17:42

## 2019-11-21 RX ADMIN — INSULIN LISPRO 4 UNITS: 100 INJECTION, SOLUTION INTRAVENOUS; SUBCUTANEOUS at 21:19

## 2019-11-21 RX ADMIN — Medication 10 ML: at 21:23

## 2019-11-21 RX ADMIN — ONDANSETRON HYDROCHLORIDE 24 MG: 8 TABLET, FILM COATED ORAL at 15:42

## 2019-11-21 RX ADMIN — CARMUSTINE 600 MG: KIT at 16:31

## 2019-11-21 RX ADMIN — POTASSIUM CHLORIDE 20 MEQ: 20 TABLET, EXTENDED RELEASE ORAL at 21:23

## 2019-11-21 RX ADMIN — SODIUM CHLORIDE: 4.5 INJECTION, SOLUTION INTRAVENOUS at 16:31

## 2019-11-21 ASSESSMENT — PAIN SCALES - GENERAL
PAINLEVEL_OUTOF10: 0

## 2019-11-22 LAB
ALBUMIN SERPL-MCNC: 4.1 G/DL (ref 3.4–5)
ALP BLD-CCNC: 219 U/L (ref 40–129)
ALT SERPL-CCNC: 41 U/L (ref 10–40)
ANION GAP SERPL CALCULATED.3IONS-SCNC: 12 MMOL/L (ref 3–16)
ANISOCYTOSIS: ABNORMAL
AST SERPL-CCNC: 34 U/L (ref 15–37)
BANDED NEUTROPHILS RELATIVE PERCENT: 3 % (ref 0–7)
BASOPHILS ABSOLUTE: 0 K/UL (ref 0–0.2)
BASOPHILS RELATIVE PERCENT: 0 %
BILIRUB SERPL-MCNC: 0.5 MG/DL (ref 0–1)
BILIRUBIN DIRECT: <0.2 MG/DL (ref 0–0.3)
BILIRUBIN, INDIRECT: ABNORMAL MG/DL (ref 0–1)
BUN BLDV-MCNC: 18 MG/DL (ref 7–20)
CALCIUM SERPL-MCNC: 9 MG/DL (ref 8.3–10.6)
CHLORIDE BLD-SCNC: 104 MMOL/L (ref 99–110)
CO2: 21 MMOL/L (ref 21–32)
CREAT SERPL-MCNC: 0.8 MG/DL (ref 0.6–1.2)
EOSINOPHILS ABSOLUTE: 0 K/UL (ref 0–0.6)
EOSINOPHILS RELATIVE PERCENT: 0 %
GFR AFRICAN AMERICAN: >60
GFR NON-AFRICAN AMERICAN: >60
GLUCOSE BLD-MCNC: 248 MG/DL (ref 70–99)
GLUCOSE BLD-MCNC: 253 MG/DL (ref 70–99)
GLUCOSE BLD-MCNC: 326 MG/DL (ref 70–99)
GLUCOSE BLD-MCNC: 335 MG/DL (ref 70–99)
GLUCOSE BLD-MCNC: 374 MG/DL (ref 70–99)
HCT VFR BLD CALC: 26 % (ref 36–48)
HEMOGLOBIN: 8.7 G/DL (ref 12–16)
LACTATE DEHYDROGENASE: 402 U/L (ref 100–190)
LYMPHOCYTES ABSOLUTE: 0.7 K/UL (ref 1–5.1)
LYMPHOCYTES RELATIVE PERCENT: 6 %
MCH RBC QN AUTO: 31.4 PG (ref 26–34)
MCHC RBC AUTO-ENTMCNC: 33.4 G/DL (ref 31–36)
MCV RBC AUTO: 94.1 FL (ref 80–100)
METAMYELOCYTES RELATIVE PERCENT: 4 %
MONOCYTES ABSOLUTE: 0.2 K/UL (ref 0–1.3)
MONOCYTES RELATIVE PERCENT: 2 %
NEUTROPHILS ABSOLUTE: 10.5 K/UL (ref 1.7–7.7)
NEUTROPHILS RELATIVE PERCENT: 85 %
PDW BLD-RTO: 18.8 % (ref 12.4–15.4)
PERFORMED ON: ABNORMAL
PHOSPHORUS: 2.7 MG/DL (ref 2.5–4.9)
PLATELET # BLD: 113 K/UL (ref 135–450)
PMV BLD AUTO: 9.1 FL (ref 5–10.5)
POLYCHROMASIA: ABNORMAL
POTASSIUM SERPL-SCNC: 4.8 MMOL/L (ref 3.5–5.1)
RBC # BLD: 2.76 M/UL (ref 4–5.2)
SODIUM BLD-SCNC: 137 MMOL/L (ref 136–145)
TOTAL PROTEIN: 6.4 G/DL (ref 6.4–8.2)
URIC ACID, SERUM: 4.1 MG/DL (ref 2.6–6)
WBC # BLD: 11.4 K/UL (ref 4–11)

## 2019-11-22 PROCEDURE — 2580000003 HC RX 258: Performed by: NURSE PRACTITIONER

## 2019-11-22 PROCEDURE — 96409 CHEMO IV PUSH SNGL DRUG: CPT

## 2019-11-22 PROCEDURE — 80048 BASIC METABOLIC PNL TOTAL CA: CPT

## 2019-11-22 PROCEDURE — 2580000003 HC RX 258: Performed by: INTERNAL MEDICINE

## 2019-11-22 PROCEDURE — 6370000000 HC RX 637 (ALT 250 FOR IP): Performed by: NURSE PRACTITIONER

## 2019-11-22 PROCEDURE — 6360000002 HC RX W HCPCS: Performed by: INTERNAL MEDICINE

## 2019-11-22 PROCEDURE — 2060000000 HC ICU INTERMEDIATE R&B

## 2019-11-22 PROCEDURE — 84100 ASSAY OF PHOSPHORUS: CPT

## 2019-11-22 PROCEDURE — 85025 COMPLETE CBC W/AUTO DIFF WBC: CPT

## 2019-11-22 PROCEDURE — 6370000000 HC RX 637 (ALT 250 FOR IP): Performed by: INTERNAL MEDICINE

## 2019-11-22 PROCEDURE — 96415 CHEMO IV INFUSION ADDL HR: CPT

## 2019-11-22 PROCEDURE — 36592 COLLECT BLOOD FROM PICC: CPT

## 2019-11-22 PROCEDURE — 84550 ASSAY OF BLOOD/URIC ACID: CPT

## 2019-11-22 PROCEDURE — 80076 HEPATIC FUNCTION PANEL: CPT

## 2019-11-22 PROCEDURE — 96413 CHEMO IV INFUSION 1 HR: CPT

## 2019-11-22 PROCEDURE — 83615 LACTATE (LD) (LDH) ENZYME: CPT

## 2019-11-22 RX ADMIN — ALLOPURINOL 300 MG: 300 TABLET ORAL at 09:28

## 2019-11-22 RX ADMIN — CETIRIZINE HYDROCHLORIDE 10 MG: 10 TABLET, FILM COATED ORAL at 09:27

## 2019-11-22 RX ADMIN — Medication 10 ML: at 10:57

## 2019-11-22 RX ADMIN — FLECAINIDE ACETATE 100 MG: 100 TABLET ORAL at 20:41

## 2019-11-22 RX ADMIN — DEXAMETHASONE 10 MG: 4 TABLET ORAL at 10:29

## 2019-11-22 RX ADMIN — INSULIN LISPRO 4 UNITS: 100 INJECTION, SOLUTION INTRAVENOUS; SUBCUTANEOUS at 07:10

## 2019-11-22 RX ADMIN — SODIUM CHLORIDE 15 ML: 900 IRRIGANT IRRIGATION at 20:42

## 2019-11-22 RX ADMIN — SODIUM CHLORIDE 15 ML: 900 IRRIGANT IRRIGATION at 17:08

## 2019-11-22 RX ADMIN — VALACYCLOVIR HYDROCHLORIDE 500 MG: 500 TABLET, FILM COATED ORAL at 09:28

## 2019-11-22 RX ADMIN — INSULIN LISPRO 10 UNITS: 100 INJECTION, SOLUTION INTRAVENOUS; SUBCUTANEOUS at 17:04

## 2019-11-22 RX ADMIN — Medication 10 ML: at 20:42

## 2019-11-22 RX ADMIN — POTASSIUM CHLORIDE 20 MEQ: 20 TABLET, EXTENDED RELEASE ORAL at 09:28

## 2019-11-22 RX ADMIN — CYTARABINE 190 MG: 100 INJECTION, SOLUTION INTRATHECAL; INTRAVENOUS; SUBCUTANEOUS at 11:01

## 2019-11-22 RX ADMIN — ONDANSETRON HYDROCHLORIDE 12 MG: 8 TABLET, FILM COATED ORAL at 20:41

## 2019-11-22 RX ADMIN — ETOPOSIDE 190 MG: 20 INJECTION, SOLUTION, CONCENTRATE INTRAVENOUS at 12:00

## 2019-11-22 RX ADMIN — CYTARABINE 190 MG: 100 INJECTION, SOLUTION INTRATHECAL; INTRAVENOUS; SUBCUTANEOUS at 21:00

## 2019-11-22 RX ADMIN — INSULIN LISPRO 8 UNITS: 100 INJECTION, SOLUTION INTRAVENOUS; SUBCUTANEOUS at 11:09

## 2019-11-22 RX ADMIN — FUROSEMIDE 20 MG: 20 TABLET ORAL at 09:28

## 2019-11-22 RX ADMIN — INSULIN LISPRO 4 UNITS: 100 INJECTION, SOLUTION INTRAVENOUS; SUBCUTANEOUS at 21:58

## 2019-11-22 RX ADMIN — APIXABAN 5 MG: 5 TABLET, FILM COATED ORAL at 09:28

## 2019-11-22 RX ADMIN — DEXAMETHASONE 10 MG: 4 TABLET ORAL at 20:41

## 2019-11-22 RX ADMIN — APIXABAN 5 MG: 5 TABLET, FILM COATED ORAL at 20:40

## 2019-11-22 RX ADMIN — METOPROLOL SUCCINATE 12.5 MG: 25 TABLET, EXTENDED RELEASE ORAL at 17:05

## 2019-11-22 RX ADMIN — SODIUM CHLORIDE 15 ML: 900 IRRIGANT IRRIGATION at 10:34

## 2019-11-22 RX ADMIN — ONDANSETRON HYDROCHLORIDE 12 MG: 8 TABLET, FILM COATED ORAL at 10:29

## 2019-11-22 RX ADMIN — FLECAINIDE ACETATE 100 MG: 100 TABLET ORAL at 09:28

## 2019-11-22 RX ADMIN — POTASSIUM CHLORIDE 20 MEQ: 20 TABLET, EXTENDED RELEASE ORAL at 20:40

## 2019-11-22 RX ADMIN — SODIUM CHLORIDE: 4.5 INJECTION, SOLUTION INTRAVENOUS at 03:50

## 2019-11-22 RX ADMIN — SODIUM CHLORIDE: 4.5 INJECTION, SOLUTION INTRAVENOUS at 19:07

## 2019-11-22 RX ADMIN — METOPROLOL SUCCINATE 12.5 MG: 25 TABLET, EXTENDED RELEASE ORAL at 09:27

## 2019-11-22 RX ADMIN — VALACYCLOVIR HYDROCHLORIDE 500 MG: 500 TABLET, FILM COATED ORAL at 20:41

## 2019-11-22 RX ADMIN — ETOPOSIDE 190 MG: 20 INJECTION, SOLUTION, CONCENTRATE INTRAVENOUS at 21:49

## 2019-11-22 RX ADMIN — SODIUM CHLORIDE: 4.5 INJECTION, SOLUTION INTRAVENOUS at 10:28

## 2019-11-22 ASSESSMENT — PAIN SCALES - GENERAL
PAINLEVEL_OUTOF10: 0

## 2019-11-23 LAB
ANION GAP SERPL CALCULATED.3IONS-SCNC: 10 MMOL/L (ref 3–16)
ANISOCYTOSIS: ABNORMAL
BANDED NEUTROPHILS RELATIVE PERCENT: 1 % (ref 0–7)
BASOPHILS ABSOLUTE: 0 K/UL (ref 0–0.2)
BASOPHILS RELATIVE PERCENT: 0 %
BUN BLDV-MCNC: 25 MG/DL (ref 7–20)
CALCIUM SERPL-MCNC: 8.4 MG/DL (ref 8.3–10.6)
CHLORIDE BLD-SCNC: 107 MMOL/L (ref 99–110)
CO2: 22 MMOL/L (ref 21–32)
CREAT SERPL-MCNC: 0.8 MG/DL (ref 0.6–1.2)
EOSINOPHILS ABSOLUTE: 0 K/UL (ref 0–0.6)
EOSINOPHILS RELATIVE PERCENT: 0 %
GFR AFRICAN AMERICAN: >60
GFR NON-AFRICAN AMERICAN: >60
GLUCOSE BLD-MCNC: 276 MG/DL (ref 70–99)
GLUCOSE BLD-MCNC: 288 MG/DL (ref 70–99)
GLUCOSE BLD-MCNC: 332 MG/DL (ref 70–99)
GLUCOSE BLD-MCNC: 387 MG/DL (ref 70–99)
GLUCOSE BLD-MCNC: 437 MG/DL (ref 70–99)
HCT VFR BLD CALC: 22.7 % (ref 36–48)
HEMOGLOBIN: 7.7 G/DL (ref 12–16)
LYMPHOCYTES ABSOLUTE: 0.1 K/UL (ref 1–5.1)
LYMPHOCYTES RELATIVE PERCENT: 1 %
MACROCYTES: ABNORMAL
MCH RBC QN AUTO: 32.4 PG (ref 26–34)
MCHC RBC AUTO-ENTMCNC: 34.1 G/DL (ref 31–36)
MCV RBC AUTO: 94.9 FL (ref 80–100)
MONOCYTES ABSOLUTE: 0.3 K/UL (ref 0–1.3)
MONOCYTES RELATIVE PERCENT: 3 %
NEUTROPHILS ABSOLUTE: 8.4 K/UL (ref 1.7–7.7)
NEUTROPHILS RELATIVE PERCENT: 95 %
PDW BLD-RTO: 19.2 % (ref 12.4–15.4)
PERFORMED ON: ABNORMAL
PLATELET # BLD: 136 K/UL (ref 135–450)
PMV BLD AUTO: 8.9 FL (ref 5–10.5)
POLYCHROMASIA: ABNORMAL
POTASSIUM SERPL-SCNC: 4.7 MMOL/L (ref 3.5–5.1)
RBC # BLD: 2.39 M/UL (ref 4–5.2)
SODIUM BLD-SCNC: 139 MMOL/L (ref 136–145)
TEAR DROP CELLS: ABNORMAL
URIC ACID, SERUM: 4.1 MG/DL (ref 2.6–6)
VRE CULTURE: NORMAL
WBC # BLD: 8.8 K/UL (ref 4–11)

## 2019-11-23 PROCEDURE — 84550 ASSAY OF BLOOD/URIC ACID: CPT

## 2019-11-23 PROCEDURE — 96415 CHEMO IV INFUSION ADDL HR: CPT

## 2019-11-23 PROCEDURE — 2580000003 HC RX 258: Performed by: NURSE PRACTITIONER

## 2019-11-23 PROCEDURE — 36592 COLLECT BLOOD FROM PICC: CPT

## 2019-11-23 PROCEDURE — 6370000000 HC RX 637 (ALT 250 FOR IP): Performed by: NURSE PRACTITIONER

## 2019-11-23 PROCEDURE — 6360000002 HC RX W HCPCS: Performed by: INTERNAL MEDICINE

## 2019-11-23 PROCEDURE — 96411 CHEMO IV PUSH ADDL DRUG: CPT

## 2019-11-23 PROCEDURE — 2060000000 HC ICU INTERMEDIATE R&B

## 2019-11-23 PROCEDURE — 96413 CHEMO IV INFUSION 1 HR: CPT

## 2019-11-23 PROCEDURE — 80048 BASIC METABOLIC PNL TOTAL CA: CPT

## 2019-11-23 PROCEDURE — 85025 COMPLETE CBC W/AUTO DIFF WBC: CPT

## 2019-11-23 PROCEDURE — 2580000003 HC RX 258: Performed by: INTERNAL MEDICINE

## 2019-11-23 PROCEDURE — 96409 CHEMO IV PUSH SNGL DRUG: CPT

## 2019-11-23 PROCEDURE — 6370000000 HC RX 637 (ALT 250 FOR IP): Performed by: INTERNAL MEDICINE

## 2019-11-23 RX ADMIN — INSULIN LISPRO 6 UNITS: 100 INJECTION, SOLUTION INTRAVENOUS; SUBCUTANEOUS at 07:44

## 2019-11-23 RX ADMIN — SODIUM CHLORIDE: 4.5 INJECTION, SOLUTION INTRAVENOUS at 12:30

## 2019-11-23 RX ADMIN — VALACYCLOVIR HYDROCHLORIDE 500 MG: 500 TABLET, FILM COATED ORAL at 08:43

## 2019-11-23 RX ADMIN — ETOPOSIDE 190 MG: 20 INJECTION, SOLUTION, CONCENTRATE INTRAVENOUS at 12:14

## 2019-11-23 RX ADMIN — ONDANSETRON HYDROCHLORIDE 12 MG: 8 TABLET, FILM COATED ORAL at 10:36

## 2019-11-23 RX ADMIN — SODIUM CHLORIDE: 4.5 INJECTION, SOLUTION INTRAVENOUS at 21:51

## 2019-11-23 RX ADMIN — CYTARABINE 190 MG: 100 INJECTION, SOLUTION INTRATHECAL; INTRAVENOUS; SUBCUTANEOUS at 21:16

## 2019-11-23 RX ADMIN — DEXAMETHASONE 10 MG: 4 TABLET ORAL at 10:36

## 2019-11-23 RX ADMIN — APIXABAN 5 MG: 5 TABLET, FILM COATED ORAL at 08:43

## 2019-11-23 RX ADMIN — CETIRIZINE HYDROCHLORIDE 10 MG: 10 TABLET, FILM COATED ORAL at 08:43

## 2019-11-23 RX ADMIN — METOPROLOL SUCCINATE 12.5 MG: 25 TABLET, EXTENDED RELEASE ORAL at 08:43

## 2019-11-23 RX ADMIN — Medication 10 ML: at 08:43

## 2019-11-23 RX ADMIN — Medication 10 ML: at 20:51

## 2019-11-23 RX ADMIN — SODIUM CHLORIDE 15 ML: 900 IRRIGANT IRRIGATION at 20:51

## 2019-11-23 RX ADMIN — DEXAMETHASONE 10 MG: 4 TABLET ORAL at 20:48

## 2019-11-23 RX ADMIN — CYTARABINE 190 MG: 100 INJECTION, SOLUTION INTRATHECAL; INTRAVENOUS; SUBCUTANEOUS at 11:08

## 2019-11-23 RX ADMIN — APIXABAN 5 MG: 5 TABLET, FILM COATED ORAL at 20:48

## 2019-11-23 RX ADMIN — VALACYCLOVIR HYDROCHLORIDE 500 MG: 500 TABLET, FILM COATED ORAL at 20:48

## 2019-11-23 RX ADMIN — INSULIN LISPRO 12 UNITS: 100 INJECTION, SOLUTION INTRAVENOUS; SUBCUTANEOUS at 12:13

## 2019-11-23 RX ADMIN — SODIUM CHLORIDE: 4.5 INJECTION, SOLUTION INTRAVENOUS at 04:36

## 2019-11-23 RX ADMIN — INSULIN LISPRO 4 UNITS: 100 INJECTION, SOLUTION INTRAVENOUS; SUBCUTANEOUS at 20:58

## 2019-11-23 RX ADMIN — ALLOPURINOL 300 MG: 300 TABLET ORAL at 08:43

## 2019-11-23 RX ADMIN — ETOPOSIDE 190 MG: 20 INJECTION, SOLUTION, CONCENTRATE INTRAVENOUS at 21:51

## 2019-11-23 RX ADMIN — POTASSIUM CHLORIDE 20 MEQ: 20 TABLET, EXTENDED RELEASE ORAL at 08:43

## 2019-11-23 RX ADMIN — FLECAINIDE ACETATE 100 MG: 100 TABLET ORAL at 08:42

## 2019-11-23 RX ADMIN — METOPROLOL SUCCINATE 12.5 MG: 25 TABLET, EXTENDED RELEASE ORAL at 17:13

## 2019-11-23 RX ADMIN — POTASSIUM CHLORIDE 20 MEQ: 20 TABLET, EXTENDED RELEASE ORAL at 20:48

## 2019-11-23 RX ADMIN — INSULIN LISPRO 10 UNITS: 100 INJECTION, SOLUTION INTRAVENOUS; SUBCUTANEOUS at 17:13

## 2019-11-23 RX ADMIN — FLECAINIDE ACETATE 100 MG: 100 TABLET ORAL at 20:48

## 2019-11-23 RX ADMIN — FUROSEMIDE 20 MG: 20 TABLET ORAL at 08:42

## 2019-11-23 RX ADMIN — ONDANSETRON HYDROCHLORIDE 12 MG: 8 TABLET, FILM COATED ORAL at 20:48

## 2019-11-23 ASSESSMENT — PAIN SCALES - GENERAL
PAINLEVEL_OUTOF10: 0

## 2019-11-24 LAB
ANION GAP SERPL CALCULATED.3IONS-SCNC: 11 MMOL/L (ref 3–16)
BASOPHILS ABSOLUTE: 0 K/UL (ref 0–0.2)
BASOPHILS RELATIVE PERCENT: 0.1 %
BILIRUBIN URINE: NEGATIVE
BLOOD, URINE: NEGATIVE
BUN BLDV-MCNC: 27 MG/DL (ref 7–20)
CALCIUM SERPL-MCNC: 8.1 MG/DL (ref 8.3–10.6)
CHLORIDE BLD-SCNC: 106 MMOL/L (ref 99–110)
CLARITY: CLEAR
CO2: 20 MMOL/L (ref 21–32)
COLOR: YELLOW
CREAT SERPL-MCNC: 0.8 MG/DL (ref 0.6–1.2)
EOSINOPHILS ABSOLUTE: 0 K/UL (ref 0–0.6)
EOSINOPHILS RELATIVE PERCENT: 0 %
GFR AFRICAN AMERICAN: >60
GFR NON-AFRICAN AMERICAN: >60
GLUCOSE BLD-MCNC: 282 MG/DL (ref 70–99)
GLUCOSE BLD-MCNC: 286 MG/DL (ref 70–99)
GLUCOSE BLD-MCNC: 299 MG/DL (ref 70–99)
GLUCOSE BLD-MCNC: 364 MG/DL (ref 70–99)
GLUCOSE BLD-MCNC: 375 MG/DL (ref 70–99)
GLUCOSE URINE: >=1000 MG/DL
HCT VFR BLD CALC: 21.9 % (ref 36–48)
HEMOGLOBIN: 7.4 G/DL (ref 12–16)
KETONES, URINE: NEGATIVE MG/DL
LEUKOCYTE ESTERASE, URINE: NEGATIVE
LYMPHOCYTES ABSOLUTE: 0.1 K/UL (ref 1–5.1)
LYMPHOCYTES RELATIVE PERCENT: 1.5 %
MCH RBC QN AUTO: 32.3 PG (ref 26–34)
MCHC RBC AUTO-ENTMCNC: 33.8 G/DL (ref 31–36)
MCV RBC AUTO: 95.4 FL (ref 80–100)
MICROSCOPIC EXAMINATION: ABNORMAL
MONOCYTES ABSOLUTE: 0.1 K/UL (ref 0–1.3)
MONOCYTES RELATIVE PERCENT: 1.7 %
NEUTROPHILS ABSOLUTE: 4.2 K/UL (ref 1.7–7.7)
NEUTROPHILS RELATIVE PERCENT: 96.7 %
NITRITE, URINE: NEGATIVE
PDW BLD-RTO: 18.8 % (ref 12.4–15.4)
PERFORMED ON: ABNORMAL
PH UA: 6 (ref 5–8)
PLATELET # BLD: 150 K/UL (ref 135–450)
PMV BLD AUTO: 9.2 FL (ref 5–10.5)
POTASSIUM SERPL-SCNC: 4.3 MMOL/L (ref 3.5–5.1)
PROTEIN UA: NEGATIVE MG/DL
RBC # BLD: 2.29 M/UL (ref 4–5.2)
SODIUM BLD-SCNC: 137 MMOL/L (ref 136–145)
SPECIFIC GRAVITY UA: 1.01 (ref 1–1.03)
URIC ACID, SERUM: 3.8 MG/DL (ref 2.6–6)
URINE TYPE: ABNORMAL
UROBILINOGEN, URINE: 0.2 E.U./DL
WBC # BLD: 4.4 K/UL (ref 4–11)

## 2019-11-24 PROCEDURE — 36592 COLLECT BLOOD FROM PICC: CPT

## 2019-11-24 PROCEDURE — 6370000000 HC RX 637 (ALT 250 FOR IP): Performed by: INTERNAL MEDICINE

## 2019-11-24 PROCEDURE — 80048 BASIC METABOLIC PNL TOTAL CA: CPT

## 2019-11-24 PROCEDURE — 6370000000 HC RX 637 (ALT 250 FOR IP): Performed by: NURSE PRACTITIONER

## 2019-11-24 PROCEDURE — 81003 URINALYSIS AUTO W/O SCOPE: CPT

## 2019-11-24 PROCEDURE — 85025 COMPLETE CBC W/AUTO DIFF WBC: CPT

## 2019-11-24 PROCEDURE — 2580000003 HC RX 258: Performed by: NURSE PRACTITIONER

## 2019-11-24 PROCEDURE — 84550 ASSAY OF BLOOD/URIC ACID: CPT

## 2019-11-24 PROCEDURE — 6360000002 HC RX W HCPCS: Performed by: INTERNAL MEDICINE

## 2019-11-24 PROCEDURE — 2580000003 HC RX 258: Performed by: INTERNAL MEDICINE

## 2019-11-24 PROCEDURE — 2060000000 HC ICU INTERMEDIATE R&B

## 2019-11-24 RX ADMIN — INSULIN LISPRO 5 UNITS: 100 INJECTION, SOLUTION INTRAVENOUS; SUBCUTANEOUS at 20:35

## 2019-11-24 RX ADMIN — SODIUM CHLORIDE 15 ML: 900 IRRIGANT IRRIGATION at 06:34

## 2019-11-24 RX ADMIN — SODIUM CHLORIDE 15 ML: 900 IRRIGANT IRRIGATION at 20:38

## 2019-11-24 RX ADMIN — SODIUM CHLORIDE: 4.5 INJECTION, SOLUTION INTRAVENOUS at 17:24

## 2019-11-24 RX ADMIN — ONDANSETRON HYDROCHLORIDE 12 MG: 8 TABLET, FILM COATED ORAL at 10:36

## 2019-11-24 RX ADMIN — ETOPOSIDE 190 MG: 20 INJECTION, SOLUTION, CONCENTRATE INTRAVENOUS at 11:52

## 2019-11-24 RX ADMIN — ONDANSETRON HYDROCHLORIDE 12 MG: 8 TABLET, FILM COATED ORAL at 20:37

## 2019-11-24 RX ADMIN — INSULIN LISPRO 10 UNITS: 100 INJECTION, SOLUTION INTRAVENOUS; SUBCUTANEOUS at 17:09

## 2019-11-24 RX ADMIN — DEXAMETHASONE 10 MG: 4 TABLET ORAL at 10:35

## 2019-11-24 RX ADMIN — FLECAINIDE ACETATE 100 MG: 100 TABLET ORAL at 20:36

## 2019-11-24 RX ADMIN — ALLOPURINOL 300 MG: 300 TABLET ORAL at 08:31

## 2019-11-24 RX ADMIN — APIXABAN 5 MG: 5 TABLET, FILM COATED ORAL at 20:36

## 2019-11-24 RX ADMIN — SODIUM CHLORIDE: 4.5 INJECTION, SOLUTION INTRAVENOUS at 06:33

## 2019-11-24 RX ADMIN — Medication 10 ML: at 11:58

## 2019-11-24 RX ADMIN — POTASSIUM CHLORIDE 20 MEQ: 20 TABLET, EXTENDED RELEASE ORAL at 20:37

## 2019-11-24 RX ADMIN — VALACYCLOVIR HYDROCHLORIDE 500 MG: 500 TABLET, FILM COATED ORAL at 20:36

## 2019-11-24 RX ADMIN — INSULIN LISPRO 6 UNITS: 100 INJECTION, SOLUTION INTRAVENOUS; SUBCUTANEOUS at 11:50

## 2019-11-24 RX ADMIN — METOPROLOL SUCCINATE 12.5 MG: 25 TABLET, EXTENDED RELEASE ORAL at 17:16

## 2019-11-24 RX ADMIN — INSULIN LISPRO 6 UNITS: 100 INJECTION, SOLUTION INTRAVENOUS; SUBCUTANEOUS at 07:20

## 2019-11-24 RX ADMIN — CYTARABINE 190 MG: 100 INJECTION, SOLUTION INTRATHECAL; INTRAVENOUS; SUBCUTANEOUS at 11:19

## 2019-11-24 RX ADMIN — CETIRIZINE HYDROCHLORIDE 10 MG: 10 TABLET, FILM COATED ORAL at 08:31

## 2019-11-24 RX ADMIN — ETOPOSIDE 190 MG: 20 INJECTION, SOLUTION, CONCENTRATE INTRAVENOUS at 21:50

## 2019-11-24 RX ADMIN — POTASSIUM CHLORIDE 20 MEQ: 20 TABLET, EXTENDED RELEASE ORAL at 08:31

## 2019-11-24 RX ADMIN — CYTARABINE 190 MG: 100 INJECTION, SOLUTION INTRATHECAL; INTRAVENOUS; SUBCUTANEOUS at 21:30

## 2019-11-24 RX ADMIN — METOPROLOL SUCCINATE 12.5 MG: 25 TABLET, EXTENDED RELEASE ORAL at 08:31

## 2019-11-24 RX ADMIN — APIXABAN 5 MG: 5 TABLET, FILM COATED ORAL at 08:31

## 2019-11-24 RX ADMIN — FLECAINIDE ACETATE 100 MG: 100 TABLET ORAL at 08:31

## 2019-11-24 RX ADMIN — VALACYCLOVIR HYDROCHLORIDE 500 MG: 500 TABLET, FILM COATED ORAL at 08:31

## 2019-11-24 RX ADMIN — DEXAMETHASONE 10 MG: 4 TABLET ORAL at 20:37

## 2019-11-24 RX ADMIN — Medication 10 ML: at 20:37

## 2019-11-24 RX ADMIN — FUROSEMIDE 20 MG: 20 TABLET ORAL at 08:31

## 2019-11-24 ASSESSMENT — PAIN SCALES - GENERAL
PAINLEVEL_OUTOF10: 0

## 2019-11-25 ENCOUNTER — APPOINTMENT (OUTPATIENT)
Dept: GENERAL RADIOLOGY | Age: 64
DRG: 016 | End: 2019-11-25
Attending: INTERNAL MEDICINE
Payer: COMMERCIAL

## 2019-11-25 LAB
ABO/RH: NORMAL
ALBUMIN SERPL-MCNC: 3.7 G/DL (ref 3.4–5)
ALP BLD-CCNC: 143 U/L (ref 40–129)
ALT SERPL-CCNC: 49 U/L (ref 10–40)
ANION GAP SERPL CALCULATED.3IONS-SCNC: 14 MMOL/L (ref 3–16)
ANTIBODY SCREEN: NORMAL
APTT: 26.6 SEC (ref 24.2–36.2)
AST SERPL-CCNC: 40 U/L (ref 15–37)
BASOPHILS ABSOLUTE: 0 K/UL (ref 0–0.2)
BASOPHILS RELATIVE PERCENT: 0.2 %
BILIRUB SERPL-MCNC: 0.6 MG/DL (ref 0–1)
BILIRUBIN DIRECT: <0.2 MG/DL (ref 0–0.3)
BILIRUBIN, INDIRECT: ABNORMAL MG/DL (ref 0–1)
BLOOD BANK DISPENSE STATUS: NORMAL
BLOOD BANK PRODUCT CODE: NORMAL
BPU ID: NORMAL
BUN BLDV-MCNC: 23 MG/DL (ref 7–20)
CALCIUM SERPL-MCNC: 7.9 MG/DL (ref 8.3–10.6)
CHLORIDE BLD-SCNC: 104 MMOL/L (ref 99–110)
CO2: 21 MMOL/L (ref 21–32)
CREAT SERPL-MCNC: 0.8 MG/DL (ref 0.6–1.2)
DESCRIPTION BLOOD BANK: NORMAL
EOSINOPHILS ABSOLUTE: 0 K/UL (ref 0–0.6)
EOSINOPHILS RELATIVE PERCENT: 0.2 %
GFR AFRICAN AMERICAN: >60
GFR NON-AFRICAN AMERICAN: >60
GLUCOSE BLD-MCNC: 271 MG/DL (ref 70–99)
GLUCOSE BLD-MCNC: 276 MG/DL (ref 70–99)
GLUCOSE BLD-MCNC: 291 MG/DL (ref 70–99)
GLUCOSE BLD-MCNC: 356 MG/DL (ref 70–99)
GLUCOSE BLD-MCNC: 357 MG/DL (ref 70–99)
HCT VFR BLD CALC: 20.6 % (ref 36–48)
HEMOGLOBIN: 7 G/DL (ref 12–16)
INR BLD: 1.07 (ref 0.86–1.14)
LACTATE DEHYDROGENASE: 354 U/L (ref 100–190)
LYMPHOCYTES ABSOLUTE: 0 K/UL (ref 1–5.1)
LYMPHOCYTES RELATIVE PERCENT: 1.7 %
MAGNESIUM: 2.2 MG/DL (ref 1.8–2.4)
MCH RBC QN AUTO: 31.8 PG (ref 26–34)
MCHC RBC AUTO-ENTMCNC: 33.8 G/DL (ref 31–36)
MCV RBC AUTO: 94.1 FL (ref 80–100)
MONOCYTES ABSOLUTE: 0 K/UL (ref 0–1.3)
MONOCYTES RELATIVE PERCENT: 0.8 %
NEUTROPHILS ABSOLUTE: 1.9 K/UL (ref 1.7–7.7)
NEUTROPHILS RELATIVE PERCENT: 97.1 %
PDW BLD-RTO: 18.9 % (ref 12.4–15.4)
PERFORMED ON: ABNORMAL
PHOSPHORUS: 3.4 MG/DL (ref 2.5–4.9)
PLATELET # BLD: 156 K/UL (ref 135–450)
PMV BLD AUTO: 9 FL (ref 5–10.5)
POTASSIUM SERPL-SCNC: 4.7 MMOL/L (ref 3.5–5.1)
PROTHROMBIN TIME: 12.4 SEC (ref 10–13.2)
RBC # BLD: 2.19 M/UL (ref 4–5.2)
SODIUM BLD-SCNC: 139 MMOL/L (ref 136–145)
TOTAL PROTEIN: 5.4 G/DL (ref 6.4–8.2)
URIC ACID, SERUM: 3.6 MG/DL (ref 2.6–6)
WBC # BLD: 1.9 K/UL (ref 4–11)

## 2019-11-25 PROCEDURE — 96413 CHEMO IV INFUSION 1 HR: CPT

## 2019-11-25 PROCEDURE — 85610 PROTHROMBIN TIME: CPT

## 2019-11-25 PROCEDURE — 80076 HEPATIC FUNCTION PANEL: CPT

## 2019-11-25 PROCEDURE — 2060000000 HC ICU INTERMEDIATE R&B

## 2019-11-25 PROCEDURE — 96415 CHEMO IV INFUSION ADDL HR: CPT

## 2019-11-25 PROCEDURE — 96409 CHEMO IV PUSH SNGL DRUG: CPT

## 2019-11-25 PROCEDURE — 93005 ELECTROCARDIOGRAM TRACING: CPT | Performed by: INTERNAL MEDICINE

## 2019-11-25 PROCEDURE — 36430 TRANSFUSION BLD/BLD COMPNT: CPT

## 2019-11-25 PROCEDURE — 83615 LACTATE (LD) (LDH) ENZYME: CPT

## 2019-11-25 PROCEDURE — 36592 COLLECT BLOOD FROM PICC: CPT

## 2019-11-25 PROCEDURE — 6370000000 HC RX 637 (ALT 250 FOR IP): Performed by: NURSE PRACTITIONER

## 2019-11-25 PROCEDURE — 86901 BLOOD TYPING SEROLOGIC RH(D): CPT

## 2019-11-25 PROCEDURE — 84100 ASSAY OF PHOSPHORUS: CPT

## 2019-11-25 PROCEDURE — 85730 THROMBOPLASTIN TIME PARTIAL: CPT

## 2019-11-25 PROCEDURE — 84550 ASSAY OF BLOOD/URIC ACID: CPT

## 2019-11-25 PROCEDURE — P9040 RBC LEUKOREDUCED IRRADIATED: HCPCS

## 2019-11-25 PROCEDURE — 6360000002 HC RX W HCPCS: Performed by: INTERNAL MEDICINE

## 2019-11-25 PROCEDURE — 80048 BASIC METABOLIC PNL TOTAL CA: CPT

## 2019-11-25 PROCEDURE — 2580000003 HC RX 258: Performed by: NURSE PRACTITIONER

## 2019-11-25 PROCEDURE — 2580000003 HC RX 258: Performed by: INTERNAL MEDICINE

## 2019-11-25 PROCEDURE — 86923 COMPATIBILITY TEST ELECTRIC: CPT

## 2019-11-25 PROCEDURE — 6370000000 HC RX 637 (ALT 250 FOR IP): Performed by: INTERNAL MEDICINE

## 2019-11-25 PROCEDURE — 85025 COMPLETE CBC W/AUTO DIFF WBC: CPT

## 2019-11-25 PROCEDURE — 86850 RBC ANTIBODY SCREEN: CPT

## 2019-11-25 PROCEDURE — 71045 X-RAY EXAM CHEST 1 VIEW: CPT

## 2019-11-25 PROCEDURE — 83735 ASSAY OF MAGNESIUM: CPT

## 2019-11-25 PROCEDURE — 86900 BLOOD TYPING SEROLOGIC ABO: CPT

## 2019-11-25 RX ORDER — 0.9 % SODIUM CHLORIDE 0.9 %
250 INTRAVENOUS SOLUTION INTRAVENOUS ONCE
Status: COMPLETED | OUTPATIENT
Start: 2019-11-25 | End: 2019-11-25

## 2019-11-25 RX ADMIN — ETOPOSIDE 190 MG: 20 INJECTION, SOLUTION, CONCENTRATE INTRAVENOUS at 22:02

## 2019-11-25 RX ADMIN — INSULIN LISPRO 10 UNITS: 100 INJECTION, SOLUTION INTRAVENOUS; SUBCUTANEOUS at 17:04

## 2019-11-25 RX ADMIN — ONDANSETRON HYDROCHLORIDE 12 MG: 8 TABLET, FILM COATED ORAL at 20:22

## 2019-11-25 RX ADMIN — ALLOPURINOL 300 MG: 300 TABLET ORAL at 08:18

## 2019-11-25 RX ADMIN — INSULIN LISPRO 6 UNITS: 100 INJECTION, SOLUTION INTRAVENOUS; SUBCUTANEOUS at 11:30

## 2019-11-25 RX ADMIN — CYTARABINE 190 MG: 100 INJECTION, SOLUTION INTRATHECAL; INTRAVENOUS; SUBCUTANEOUS at 21:04

## 2019-11-25 RX ADMIN — SODIUM CHLORIDE 15 ML: 900 IRRIGANT IRRIGATION at 06:18

## 2019-11-25 RX ADMIN — INSULIN LISPRO 6 UNITS: 100 INJECTION, SOLUTION INTRAVENOUS; SUBCUTANEOUS at 07:10

## 2019-11-25 RX ADMIN — POTASSIUM CHLORIDE 20 MEQ: 20 TABLET, EXTENDED RELEASE ORAL at 21:03

## 2019-11-25 RX ADMIN — CETIRIZINE HYDROCHLORIDE 10 MG: 10 TABLET, FILM COATED ORAL at 08:18

## 2019-11-25 RX ADMIN — APIXABAN 5 MG: 5 TABLET, FILM COATED ORAL at 08:18

## 2019-11-25 RX ADMIN — Medication 10 ML: at 08:18

## 2019-11-25 RX ADMIN — ONDANSETRON HYDROCHLORIDE 12 MG: 8 TABLET, FILM COATED ORAL at 10:27

## 2019-11-25 RX ADMIN — VALACYCLOVIR HYDROCHLORIDE 500 MG: 500 TABLET, FILM COATED ORAL at 08:18

## 2019-11-25 RX ADMIN — INSULIN LISPRO 5 UNITS: 100 INJECTION, SOLUTION INTRAVENOUS; SUBCUTANEOUS at 21:04

## 2019-11-25 RX ADMIN — APIXABAN 5 MG: 5 TABLET, FILM COATED ORAL at 21:03

## 2019-11-25 RX ADMIN — FLECAINIDE ACETATE 100 MG: 100 TABLET ORAL at 22:03

## 2019-11-25 RX ADMIN — CYTARABINE 190 MG: 100 INJECTION, SOLUTION INTRATHECAL; INTRAVENOUS; SUBCUTANEOUS at 11:01

## 2019-11-25 RX ADMIN — SODIUM CHLORIDE 15 ML: 900 IRRIGANT IRRIGATION at 16:23

## 2019-11-25 RX ADMIN — DEXAMETHASONE 10 MG: 4 TABLET ORAL at 10:26

## 2019-11-25 RX ADMIN — POTASSIUM CHLORIDE 20 MEQ: 20 TABLET, EXTENDED RELEASE ORAL at 08:18

## 2019-11-25 RX ADMIN — SODIUM CHLORIDE: 4.5 INJECTION, SOLUTION INTRAVENOUS at 06:16

## 2019-11-25 RX ADMIN — ETOPOSIDE 190 MG: 20 INJECTION, SOLUTION, CONCENTRATE INTRAVENOUS at 11:50

## 2019-11-25 RX ADMIN — FUROSEMIDE 20 MG: 20 TABLET ORAL at 08:18

## 2019-11-25 RX ADMIN — VALACYCLOVIR HYDROCHLORIDE 500 MG: 500 TABLET, FILM COATED ORAL at 21:03

## 2019-11-25 RX ADMIN — DEXAMETHASONE 10 MG: 4 TABLET ORAL at 20:22

## 2019-11-25 RX ADMIN — METOPROLOL SUCCINATE 12.5 MG: 25 TABLET, EXTENDED RELEASE ORAL at 07:36

## 2019-11-25 RX ADMIN — Medication 10 ML: at 22:02

## 2019-11-25 RX ADMIN — METOPROLOL SUCCINATE 12.5 MG: 25 TABLET, EXTENDED RELEASE ORAL at 16:21

## 2019-11-25 RX ADMIN — FLECAINIDE ACETATE 100 MG: 100 TABLET ORAL at 08:19

## 2019-11-25 RX ADMIN — SODIUM CHLORIDE 250 ML: 9 INJECTION, SOLUTION INTRAVENOUS at 06:13

## 2019-11-25 RX ADMIN — SODIUM CHLORIDE 15 ML: 900 IRRIGANT IRRIGATION at 10:28

## 2019-11-25 ASSESSMENT — PAIN SCALES - GENERAL
PAINLEVEL_OUTOF10: 0

## 2019-11-26 PROBLEM — I48.0 PAROXYSMAL ATRIAL FIBRILLATION (HCC): Status: ACTIVE | Noted: 2019-11-26

## 2019-11-26 LAB
ANION GAP SERPL CALCULATED.3IONS-SCNC: 10 MMOL/L (ref 3–16)
BASOPHILS ABSOLUTE: 0 K/UL (ref 0–0.2)
BASOPHILS RELATIVE PERCENT: 0.2 %
BUN BLDV-MCNC: 31 MG/DL (ref 7–20)
CALCIUM SERPL-MCNC: 8.3 MG/DL (ref 8.3–10.6)
CHLORIDE BLD-SCNC: 104 MMOL/L (ref 99–110)
CO2: 23 MMOL/L (ref 21–32)
CREAT SERPL-MCNC: 0.9 MG/DL (ref 0.6–1.2)
EKG ATRIAL RATE: 141 BPM
EKG DIAGNOSIS: NORMAL
EKG Q-T INTERVAL: 352 MS
EKG QRS DURATION: 88 MS
EKG QTC CALCULATION (BAZETT): 456 MS
EKG R AXIS: 51 DEGREES
EKG T AXIS: -13 DEGREES
EKG VENTRICULAR RATE: 101 BPM
EOSINOPHILS ABSOLUTE: 0 K/UL (ref 0–0.6)
EOSINOPHILS RELATIVE PERCENT: 0 %
GFR AFRICAN AMERICAN: >60
GFR NON-AFRICAN AMERICAN: >60
GLUCOSE BLD-MCNC: 285 MG/DL (ref 70–99)
GLUCOSE BLD-MCNC: 304 MG/DL (ref 70–99)
GLUCOSE BLD-MCNC: 363 MG/DL (ref 70–99)
GLUCOSE BLD-MCNC: 384 MG/DL (ref 70–99)
GLUCOSE BLD-MCNC: 394 MG/DL (ref 70–99)
HCT VFR BLD CALC: 24 % (ref 36–48)
HEMOGLOBIN: 8.2 G/DL (ref 12–16)
LYMPHOCYTES ABSOLUTE: 0 K/UL (ref 1–5.1)
LYMPHOCYTES RELATIVE PERCENT: 0.9 %
MCH RBC QN AUTO: 32.2 PG (ref 26–34)
MCHC RBC AUTO-ENTMCNC: 34.3 G/DL (ref 31–36)
MCV RBC AUTO: 93.9 FL (ref 80–100)
MONOCYTES ABSOLUTE: 0 K/UL (ref 0–1.3)
MONOCYTES RELATIVE PERCENT: 0.1 %
NEUTROPHILS ABSOLUTE: 2.5 K/UL (ref 1.7–7.7)
NEUTROPHILS RELATIVE PERCENT: 98.8 %
PDW BLD-RTO: 17.9 % (ref 12.4–15.4)
PERFORMED ON: ABNORMAL
PLATELET # BLD: 206 K/UL (ref 135–450)
PMV BLD AUTO: 9 FL (ref 5–10.5)
POTASSIUM SERPL-SCNC: 4.6 MMOL/L (ref 3.5–5.1)
RBC # BLD: 2.56 M/UL (ref 4–5.2)
SODIUM BLD-SCNC: 137 MMOL/L (ref 136–145)
URIC ACID, SERUM: 3.6 MG/DL (ref 2.6–6)
WBC # BLD: 2.6 K/UL (ref 4–11)

## 2019-11-26 PROCEDURE — 6370000000 HC RX 637 (ALT 250 FOR IP): Performed by: INTERNAL MEDICINE

## 2019-11-26 PROCEDURE — 6370000000 HC RX 637 (ALT 250 FOR IP): Performed by: NURSE PRACTITIONER

## 2019-11-26 PROCEDURE — 36592 COLLECT BLOOD FROM PICC: CPT

## 2019-11-26 PROCEDURE — 2060000000 HC ICU INTERMEDIATE R&B

## 2019-11-26 PROCEDURE — 6360000002 HC RX W HCPCS: Performed by: INTERNAL MEDICINE

## 2019-11-26 PROCEDURE — 87081 CULTURE SCREEN ONLY: CPT

## 2019-11-26 PROCEDURE — 84550 ASSAY OF BLOOD/URIC ACID: CPT

## 2019-11-26 PROCEDURE — 85025 COMPLETE CBC W/AUTO DIFF WBC: CPT

## 2019-11-26 PROCEDURE — 99254 IP/OBS CNSLTJ NEW/EST MOD 60: CPT | Performed by: INTERNAL MEDICINE

## 2019-11-26 PROCEDURE — 2580000003 HC RX 258: Performed by: NURSE PRACTITIONER

## 2019-11-26 PROCEDURE — 93010 ELECTROCARDIOGRAM REPORT: CPT | Performed by: INTERNAL MEDICINE

## 2019-11-26 PROCEDURE — 94660 CPAP INITIATION&MGMT: CPT

## 2019-11-26 PROCEDURE — 96409 CHEMO IV PUSH SNGL DRUG: CPT

## 2019-11-26 PROCEDURE — 80048 BASIC METABOLIC PNL TOTAL CA: CPT

## 2019-11-26 RX ORDER — SODIUM CHLORIDE 0.9 % (FLUSH) 0.9 %
10 SYRINGE (ML) INJECTION PRN
Status: CANCELLED | OUTPATIENT
Start: 2019-11-26

## 2019-11-26 RX ORDER — SODIUM CHLORIDE 0.9 % (FLUSH) 0.9 %
10 SYRINGE (ML) INJECTION EVERY 12 HOURS SCHEDULED
Status: CANCELLED | OUTPATIENT
Start: 2019-11-26

## 2019-11-26 RX ADMIN — ONDANSETRON HYDROCHLORIDE 24 MG: 8 TABLET, FILM COATED ORAL at 09:33

## 2019-11-26 RX ADMIN — MELPHALAN HYDROCHLORIDE 250 MG: KIT at 10:20

## 2019-11-26 RX ADMIN — VALACYCLOVIR HYDROCHLORIDE 500 MG: 500 TABLET, FILM COATED ORAL at 09:33

## 2019-11-26 RX ADMIN — Medication 10 ML: at 09:34

## 2019-11-26 RX ADMIN — CETIRIZINE HYDROCHLORIDE 10 MG: 10 TABLET, FILM COATED ORAL at 09:33

## 2019-11-26 RX ADMIN — FLECAINIDE ACETATE 150 MG: 100 TABLET ORAL at 09:38

## 2019-11-26 RX ADMIN — ALLOPURINOL 300 MG: 300 TABLET ORAL at 09:33

## 2019-11-26 RX ADMIN — FUROSEMIDE 20 MG: 20 TABLET ORAL at 09:33

## 2019-11-26 RX ADMIN — SODIUM CHLORIDE 15 ML: 900 IRRIGANT IRRIGATION at 16:58

## 2019-11-26 RX ADMIN — SODIUM CHLORIDE: 4.5 INJECTION, SOLUTION INTRAVENOUS at 23:05

## 2019-11-26 RX ADMIN — SODIUM CHLORIDE 15 ML: 900 IRRIGANT IRRIGATION at 21:05

## 2019-11-26 RX ADMIN — SODIUM CHLORIDE 15 ML: 900 IRRIGANT IRRIGATION at 16:46

## 2019-11-26 RX ADMIN — SODIUM CHLORIDE: 4.5 INJECTION, SOLUTION INTRAVENOUS at 13:12

## 2019-11-26 RX ADMIN — INSULIN LISPRO 5 UNITS: 100 INJECTION, SOLUTION INTRAVENOUS; SUBCUTANEOUS at 21:00

## 2019-11-26 RX ADMIN — INSULIN LISPRO 10 UNITS: 100 INJECTION, SOLUTION INTRAVENOUS; SUBCUTANEOUS at 16:46

## 2019-11-26 RX ADMIN — FLECAINIDE ACETATE 150 MG: 100 TABLET ORAL at 21:04

## 2019-11-26 RX ADMIN — METOPROLOL SUCCINATE 12.5 MG: 25 TABLET, EXTENDED RELEASE ORAL at 17:03

## 2019-11-26 RX ADMIN — SODIUM CHLORIDE: 4.5 INJECTION, SOLUTION INTRAVENOUS at 03:54

## 2019-11-26 RX ADMIN — DEXAMETHASONE 20 MG: 4 TABLET ORAL at 09:32

## 2019-11-26 RX ADMIN — SODIUM CHLORIDE 15 ML: 900 IRRIGANT IRRIGATION at 12:07

## 2019-11-26 RX ADMIN — Medication 10 ML: at 21:04

## 2019-11-26 RX ADMIN — VALACYCLOVIR HYDROCHLORIDE 500 MG: 500 TABLET, FILM COATED ORAL at 21:03

## 2019-11-26 RX ADMIN — INSULIN LISPRO 10 UNITS: 100 INJECTION, SOLUTION INTRAVENOUS; SUBCUTANEOUS at 12:05

## 2019-11-26 RX ADMIN — METOPROLOL SUCCINATE 12.5 MG: 25 TABLET, EXTENDED RELEASE ORAL at 07:54

## 2019-11-26 RX ADMIN — APIXABAN 5 MG: 5 TABLET, FILM COATED ORAL at 09:33

## 2019-11-26 RX ADMIN — POTASSIUM CHLORIDE 20 MEQ: 20 TABLET, EXTENDED RELEASE ORAL at 09:33

## 2019-11-26 RX ADMIN — POTASSIUM CHLORIDE 20 MEQ: 20 TABLET, EXTENDED RELEASE ORAL at 21:03

## 2019-11-26 RX ADMIN — INSULIN LISPRO 6 UNITS: 100 INJECTION, SOLUTION INTRAVENOUS; SUBCUTANEOUS at 07:52

## 2019-11-26 RX ADMIN — APIXABAN 5 MG: 5 TABLET, FILM COATED ORAL at 21:03

## 2019-11-26 ASSESSMENT — PAIN SCALES - GENERAL
PAINLEVEL_OUTOF10: 0

## 2019-11-27 LAB
ALBUMIN SERPL-MCNC: 3.4 G/DL (ref 3.4–5)
ALP BLD-CCNC: 121 U/L (ref 40–129)
ALT SERPL-CCNC: 42 U/L (ref 10–40)
ANION GAP SERPL CALCULATED.3IONS-SCNC: 13 MMOL/L (ref 3–16)
AST SERPL-CCNC: 19 U/L (ref 15–37)
BASOPHILS ABSOLUTE: 0 K/UL (ref 0–0.2)
BASOPHILS RELATIVE PERCENT: 0.2 %
BILIRUB SERPL-MCNC: 0.6 MG/DL (ref 0–1)
BILIRUBIN DIRECT: <0.2 MG/DL (ref 0–0.3)
BILIRUBIN, INDIRECT: ABNORMAL MG/DL (ref 0–1)
BUN BLDV-MCNC: 31 MG/DL (ref 7–20)
CALCIUM SERPL-MCNC: 8.2 MG/DL (ref 8.3–10.6)
CHLORIDE BLD-SCNC: 100 MMOL/L (ref 99–110)
CO2: 23 MMOL/L (ref 21–32)
CREAT SERPL-MCNC: 0.8 MG/DL (ref 0.6–1.2)
EOSINOPHILS ABSOLUTE: 0 K/UL (ref 0–0.6)
EOSINOPHILS RELATIVE PERCENT: 0 %
GFR AFRICAN AMERICAN: >60
GFR NON-AFRICAN AMERICAN: >60
GLUCOSE BLD-MCNC: 229 MG/DL (ref 70–99)
GLUCOSE BLD-MCNC: 241 MG/DL (ref 70–99)
GLUCOSE BLD-MCNC: 264 MG/DL (ref 70–99)
GLUCOSE BLD-MCNC: 276 MG/DL (ref 70–99)
GLUCOSE BLD-MCNC: 345 MG/DL (ref 70–99)
HCT VFR BLD CALC: 22.3 % (ref 36–48)
HEMOGLOBIN: 7.6 G/DL (ref 12–16)
LACTATE DEHYDROGENASE: 302 U/L (ref 100–190)
LYMPHOCYTES ABSOLUTE: 0 K/UL (ref 1–5.1)
LYMPHOCYTES RELATIVE PERCENT: 1.3 %
MCH RBC QN AUTO: 32.1 PG (ref 26–34)
MCHC RBC AUTO-ENTMCNC: 34.2 G/DL (ref 31–36)
MCV RBC AUTO: 94 FL (ref 80–100)
MONOCYTES ABSOLUTE: 0 K/UL (ref 0–1.3)
MONOCYTES RELATIVE PERCENT: 0.2 %
NEUTROPHILS ABSOLUTE: 2.7 K/UL (ref 1.7–7.7)
NEUTROPHILS RELATIVE PERCENT: 98.3 %
PDW BLD-RTO: 17.7 % (ref 12.4–15.4)
PERFORMED ON: ABNORMAL
PHOSPHORUS: 4 MG/DL (ref 2.5–4.9)
PLATELET # BLD: 211 K/UL (ref 135–450)
PMV BLD AUTO: 8.6 FL (ref 5–10.5)
POTASSIUM SERPL-SCNC: 4.6 MMOL/L (ref 3.5–5.1)
RBC # BLD: 2.38 M/UL (ref 4–5.2)
SODIUM BLD-SCNC: 136 MMOL/L (ref 136–145)
TOTAL PROTEIN: 5.1 G/DL (ref 6.4–8.2)
URIC ACID, SERUM: 3.8 MG/DL (ref 2.6–6)
WBC # BLD: 2.7 K/UL (ref 4–11)

## 2019-11-27 PROCEDURE — 2580000003 HC RX 258: Performed by: NURSE PRACTITIONER

## 2019-11-27 PROCEDURE — 6370000000 HC RX 637 (ALT 250 FOR IP): Performed by: INTERNAL MEDICINE

## 2019-11-27 PROCEDURE — 30243Y0 TRANSFUSION OF AUTOLOGOUS HEMATOPOIETIC STEM CELLS INTO CENTRAL VEIN, PERCUTANEOUS APPROACH: ICD-10-PCS | Performed by: INTERNAL MEDICINE

## 2019-11-27 PROCEDURE — 38241 TRANSPLT AUTOL HCT/DONOR: CPT

## 2019-11-27 PROCEDURE — 6360000002 HC RX W HCPCS: Performed by: NURSE PRACTITIONER

## 2019-11-27 PROCEDURE — 36592 COLLECT BLOOD FROM PICC: CPT

## 2019-11-27 PROCEDURE — 99233 SBSQ HOSP IP/OBS HIGH 50: CPT | Performed by: INTERNAL MEDICINE

## 2019-11-27 PROCEDURE — 84100 ASSAY OF PHOSPHORUS: CPT

## 2019-11-27 PROCEDURE — 2060000000 HC ICU INTERMEDIATE R&B

## 2019-11-27 PROCEDURE — 6370000000 HC RX 637 (ALT 250 FOR IP): Performed by: NURSE PRACTITIONER

## 2019-11-27 PROCEDURE — 80076 HEPATIC FUNCTION PANEL: CPT

## 2019-11-27 PROCEDURE — 80048 BASIC METABOLIC PNL TOTAL CA: CPT

## 2019-11-27 PROCEDURE — 83615 LACTATE (LD) (LDH) ENZYME: CPT

## 2019-11-27 PROCEDURE — 38208 THAW PRESERVED STEM CELLS: CPT

## 2019-11-27 PROCEDURE — 6360000002 HC RX W HCPCS: Performed by: INTERNAL MEDICINE

## 2019-11-27 PROCEDURE — 84550 ASSAY OF BLOOD/URIC ACID: CPT

## 2019-11-27 PROCEDURE — 85025 COMPLETE CBC W/AUTO DIFF WBC: CPT

## 2019-11-27 RX ORDER — SODIUM CHLORIDE 9 MG/ML
INJECTION, SOLUTION INTRAVENOUS CONTINUOUS PRN
Status: DISCONTINUED | OUTPATIENT
Start: 2019-11-27 | End: 2019-12-09 | Stop reason: HOSPADM

## 2019-11-27 RX ORDER — DIPHENHYDRAMINE HYDROCHLORIDE 50 MG/ML
25 INJECTION INTRAMUSCULAR; INTRAVENOUS PRN
Status: DISPENSED | OUTPATIENT
Start: 2019-11-27 | End: 2019-11-28

## 2019-11-27 RX ORDER — INSULIN LISPRO 100 [IU]/ML
0-18 INJECTION, SOLUTION INTRAVENOUS; SUBCUTANEOUS
Status: DISCONTINUED | OUTPATIENT
Start: 2019-11-27 | End: 2019-12-09 | Stop reason: HOSPADM

## 2019-11-27 RX ORDER — ACETAMINOPHEN 325 MG/1
650 TABLET ORAL ONCE
Status: COMPLETED | OUTPATIENT
Start: 2019-11-27 | End: 2019-11-27

## 2019-11-27 RX ORDER — EPINEPHRINE 1 MG/ML
0.3 INJECTION, SOLUTION, CONCENTRATE INTRAVENOUS
Status: DISPENSED | OUTPATIENT
Start: 2019-11-27 | End: 2019-11-27

## 2019-11-27 RX ORDER — MORPHINE SULFATE 2 MG/ML
2 INJECTION, SOLUTION INTRAMUSCULAR; INTRAVENOUS PRN
Status: DISPENSED | OUTPATIENT
Start: 2019-11-27 | End: 2019-11-27

## 2019-11-27 RX ORDER — DIPHENHYDRAMINE HCL 25 MG
25 TABLET ORAL ONCE
Status: COMPLETED | OUTPATIENT
Start: 2019-11-27 | End: 2019-11-27

## 2019-11-27 RX ORDER — INSULIN LISPRO 100 [IU]/ML
0-9 INJECTION, SOLUTION INTRAVENOUS; SUBCUTANEOUS NIGHTLY
Status: DISCONTINUED | OUTPATIENT
Start: 2019-11-27 | End: 2019-12-09 | Stop reason: HOSPADM

## 2019-11-27 RX ADMIN — METOPROLOL SUCCINATE 12.5 MG: 25 TABLET, EXTENDED RELEASE ORAL at 08:18

## 2019-11-27 RX ADMIN — APIXABAN 5 MG: 5 TABLET, FILM COATED ORAL at 21:03

## 2019-11-27 RX ADMIN — ACETAMINOPHEN 650 MG: 325 TABLET ORAL at 10:52

## 2019-11-27 RX ADMIN — METOPROLOL SUCCINATE 12.5 MG: 25 TABLET, EXTENDED RELEASE ORAL at 18:14

## 2019-11-27 RX ADMIN — VALACYCLOVIR HYDROCHLORIDE 500 MG: 500 TABLET, FILM COATED ORAL at 21:03

## 2019-11-27 RX ADMIN — Medication 10 ML: at 08:23

## 2019-11-27 RX ADMIN — INSULIN LISPRO 5 UNITS: 100 INJECTION, SOLUTION INTRAVENOUS; SUBCUTANEOUS at 21:01

## 2019-11-27 RX ADMIN — SODIUM CHLORIDE 15 ML: 900 IRRIGANT IRRIGATION at 18:24

## 2019-11-27 RX ADMIN — INSULIN LISPRO 12 UNITS: 100 INJECTION, SOLUTION INTRAVENOUS; SUBCUTANEOUS at 18:14

## 2019-11-27 RX ADMIN — DIPHENHYDRAMINE HYDROCHLORIDE 25 MG: 25 TABLET ORAL at 10:52

## 2019-11-27 RX ADMIN — CETIRIZINE HYDROCHLORIDE 10 MG: 10 TABLET, FILM COATED ORAL at 08:18

## 2019-11-27 RX ADMIN — INSULIN LISPRO 6 UNITS: 100 INJECTION, SOLUTION INTRAVENOUS; SUBCUTANEOUS at 12:43

## 2019-11-27 RX ADMIN — FUROSEMIDE 20 MG: 20 TABLET ORAL at 08:18

## 2019-11-27 RX ADMIN — Medication 10 ML: at 21:04

## 2019-11-27 RX ADMIN — INSULIN LISPRO 4 UNITS: 100 INJECTION, SOLUTION INTRAVENOUS; SUBCUTANEOUS at 08:03

## 2019-11-27 RX ADMIN — SODIUM CHLORIDE: 4.5 INJECTION, SOLUTION INTRAVENOUS at 06:54

## 2019-11-27 RX ADMIN — VALACYCLOVIR HYDROCHLORIDE 500 MG: 500 TABLET, FILM COATED ORAL at 08:19

## 2019-11-27 RX ADMIN — FLUCONAZOLE 400 MG: 200 TABLET ORAL at 08:19

## 2019-11-27 RX ADMIN — SODIUM CHLORIDE 15 ML: 900 IRRIGANT IRRIGATION at 12:47

## 2019-11-27 RX ADMIN — SODIUM CHLORIDE: 4.5 INJECTION, SOLUTION INTRAVENOUS at 13:48

## 2019-11-27 RX ADMIN — POTASSIUM CHLORIDE 20 MEQ: 20 TABLET, EXTENDED RELEASE ORAL at 08:19

## 2019-11-27 RX ADMIN — FLECAINIDE ACETATE 150 MG: 100 TABLET ORAL at 08:19

## 2019-11-27 RX ADMIN — FUROSEMIDE 40 MG: 10 INJECTION, SOLUTION INTRAMUSCULAR; INTRAVENOUS at 18:23

## 2019-11-27 RX ADMIN — SODIUM CHLORIDE 15 ML: 900 IRRIGANT IRRIGATION at 21:05

## 2019-11-27 RX ADMIN — HYDROCORTISONE SODIUM SUCCINATE 100 MG: 100 INJECTION, POWDER, FOR SOLUTION INTRAMUSCULAR; INTRAVENOUS at 10:51

## 2019-11-27 RX ADMIN — SODIUM CHLORIDE: 4.5 INJECTION, SOLUTION INTRAVENOUS at 21:05

## 2019-11-27 RX ADMIN — APIXABAN 5 MG: 5 TABLET, FILM COATED ORAL at 08:18

## 2019-11-27 RX ADMIN — FLECAINIDE ACETATE 150 MG: 100 TABLET ORAL at 21:04

## 2019-11-27 RX ADMIN — POTASSIUM CHLORIDE 20 MEQ: 20 TABLET, EXTENDED RELEASE ORAL at 21:03

## 2019-11-27 ASSESSMENT — PAIN SCALES - GENERAL
PAINLEVEL_OUTOF10: 0

## 2019-11-28 LAB
ANION GAP SERPL CALCULATED.3IONS-SCNC: 12 MMOL/L (ref 3–16)
APTT: 26.7 SEC (ref 24.2–36.2)
BASOPHILS ABSOLUTE: 0 K/UL (ref 0–0.2)
BASOPHILS RELATIVE PERCENT: 0.2 %
BUN BLDV-MCNC: 34 MG/DL (ref 7–20)
CALCIUM SERPL-MCNC: 7.7 MG/DL (ref 8.3–10.6)
CHLORIDE BLD-SCNC: 103 MMOL/L (ref 99–110)
CO2: 24 MMOL/L (ref 21–32)
CREAT SERPL-MCNC: 0.8 MG/DL (ref 0.6–1.2)
EOSINOPHILS ABSOLUTE: 0 K/UL (ref 0–0.6)
EOSINOPHILS RELATIVE PERCENT: 0 %
GFR AFRICAN AMERICAN: >60
GFR NON-AFRICAN AMERICAN: >60
GLUCOSE BLD-MCNC: 147 MG/DL (ref 70–99)
GLUCOSE BLD-MCNC: 156 MG/DL (ref 70–99)
GLUCOSE BLD-MCNC: 189 MG/DL (ref 70–99)
GLUCOSE BLD-MCNC: 237 MG/DL (ref 70–99)
GLUCOSE BLD-MCNC: 252 MG/DL (ref 70–99)
HCT VFR BLD CALC: 21.8 % (ref 36–48)
HEMOGLOBIN: 7.6 G/DL (ref 12–16)
INR BLD: 1.04 (ref 0.86–1.14)
LYMPHOCYTES ABSOLUTE: 0.1 K/UL (ref 1–5.1)
LYMPHOCYTES RELATIVE PERCENT: 1.9 %
MAGNESIUM: 2.1 MG/DL (ref 1.8–2.4)
MCH RBC QN AUTO: 32.6 PG (ref 26–34)
MCHC RBC AUTO-ENTMCNC: 34.6 G/DL (ref 31–36)
MCV RBC AUTO: 94.1 FL (ref 80–100)
MONOCYTES ABSOLUTE: 0 K/UL (ref 0–1.3)
MONOCYTES RELATIVE PERCENT: 0.3 %
NEUTROPHILS ABSOLUTE: 3.8 K/UL (ref 1.7–7.7)
NEUTROPHILS RELATIVE PERCENT: 97.6 %
PDW BLD-RTO: 17.8 % (ref 12.4–15.4)
PERFORMED ON: ABNORMAL
PLATELET # BLD: 190 K/UL (ref 135–450)
PMV BLD AUTO: 7.9 FL (ref 5–10.5)
POTASSIUM SERPL-SCNC: 4.4 MMOL/L (ref 3.5–5.1)
PROTHROMBIN TIME: 12.1 SEC (ref 10–13.2)
RBC # BLD: 2.32 M/UL (ref 4–5.2)
SODIUM BLD-SCNC: 139 MMOL/L (ref 136–145)
URIC ACID, SERUM: 4.7 MG/DL (ref 2.6–6)
VRE CULTURE: NORMAL
WBC # BLD: 3.9 K/UL (ref 4–11)

## 2019-11-28 PROCEDURE — 80048 BASIC METABOLIC PNL TOTAL CA: CPT

## 2019-11-28 PROCEDURE — 6370000000 HC RX 637 (ALT 250 FOR IP): Performed by: NURSE PRACTITIONER

## 2019-11-28 PROCEDURE — 85730 THROMBOPLASTIN TIME PARTIAL: CPT

## 2019-11-28 PROCEDURE — 85025 COMPLETE CBC W/AUTO DIFF WBC: CPT

## 2019-11-28 PROCEDURE — 2580000003 HC RX 258: Performed by: NURSE PRACTITIONER

## 2019-11-28 PROCEDURE — 36592 COLLECT BLOOD FROM PICC: CPT

## 2019-11-28 PROCEDURE — 6370000000 HC RX 637 (ALT 250 FOR IP): Performed by: INTERNAL MEDICINE

## 2019-11-28 PROCEDURE — 85610 PROTHROMBIN TIME: CPT

## 2019-11-28 PROCEDURE — 2060000000 HC ICU INTERMEDIATE R&B

## 2019-11-28 PROCEDURE — 99232 SBSQ HOSP IP/OBS MODERATE 35: CPT | Performed by: INTERNAL MEDICINE

## 2019-11-28 PROCEDURE — 84550 ASSAY OF BLOOD/URIC ACID: CPT

## 2019-11-28 PROCEDURE — 83735 ASSAY OF MAGNESIUM: CPT

## 2019-11-28 RX ADMIN — METOPROLOL SUCCINATE 12.5 MG: 25 TABLET, EXTENDED RELEASE ORAL at 09:21

## 2019-11-28 RX ADMIN — SODIUM CHLORIDE: 4.5 INJECTION, SOLUTION INTRAVENOUS at 07:00

## 2019-11-28 RX ADMIN — VALACYCLOVIR HYDROCHLORIDE 500 MG: 500 TABLET, FILM COATED ORAL at 09:20

## 2019-11-28 RX ADMIN — INSULIN LISPRO 2 UNITS: 100 INJECTION, SOLUTION INTRAVENOUS; SUBCUTANEOUS at 20:29

## 2019-11-28 RX ADMIN — APIXABAN 5 MG: 5 TABLET, FILM COATED ORAL at 09:20

## 2019-11-28 RX ADMIN — POTASSIUM CHLORIDE 20 MEQ: 20 TABLET, EXTENDED RELEASE ORAL at 20:32

## 2019-11-28 RX ADMIN — Medication 10 ML: at 20:35

## 2019-11-28 RX ADMIN — FLECAINIDE ACETATE 150 MG: 100 TABLET ORAL at 09:21

## 2019-11-28 RX ADMIN — VALACYCLOVIR HYDROCHLORIDE 500 MG: 500 TABLET, FILM COATED ORAL at 20:32

## 2019-11-28 RX ADMIN — Medication 10 ML: at 09:27

## 2019-11-28 RX ADMIN — SODIUM CHLORIDE 15 ML: 900 IRRIGANT IRRIGATION at 11:32

## 2019-11-28 RX ADMIN — FLUCONAZOLE 400 MG: 200 TABLET ORAL at 09:20

## 2019-11-28 RX ADMIN — APIXABAN 5 MG: 5 TABLET, FILM COATED ORAL at 20:32

## 2019-11-28 RX ADMIN — SODIUM CHLORIDE 15 ML: 900 IRRIGANT IRRIGATION at 20:34

## 2019-11-28 RX ADMIN — INSULIN LISPRO 3 UNITS: 100 INJECTION, SOLUTION INTRAVENOUS; SUBCUTANEOUS at 07:12

## 2019-11-28 RX ADMIN — CETIRIZINE HYDROCHLORIDE 10 MG: 10 TABLET, FILM COATED ORAL at 09:20

## 2019-11-28 RX ADMIN — FLECAINIDE ACETATE 150 MG: 100 TABLET ORAL at 20:33

## 2019-11-28 RX ADMIN — FUROSEMIDE 20 MG: 20 TABLET ORAL at 09:20

## 2019-11-28 RX ADMIN — INSULIN LISPRO 9 UNITS: 100 INJECTION, SOLUTION INTRAVENOUS; SUBCUTANEOUS at 11:32

## 2019-11-28 RX ADMIN — PROCHLORPERAZINE MALEATE 10 MG: 10 TABLET ORAL at 14:28

## 2019-11-28 RX ADMIN — POTASSIUM CHLORIDE 20 MEQ: 20 TABLET, EXTENDED RELEASE ORAL at 09:20

## 2019-11-28 RX ADMIN — SODIUM CHLORIDE 15 ML: 900 IRRIGANT IRRIGATION at 16:40

## 2019-11-28 RX ADMIN — INSULIN LISPRO 6 UNITS: 100 INJECTION, SOLUTION INTRAVENOUS; SUBCUTANEOUS at 16:32

## 2019-11-28 RX ADMIN — METOPROLOL SUCCINATE 12.5 MG: 25 TABLET, EXTENDED RELEASE ORAL at 17:14

## 2019-11-28 ASSESSMENT — PAIN SCALES - GENERAL
PAINLEVEL_OUTOF10: 0

## 2019-11-29 LAB
ALBUMIN SERPL-MCNC: 3.2 G/DL (ref 3.4–5)
ALP BLD-CCNC: 104 U/L (ref 40–129)
ALT SERPL-CCNC: 44 U/L (ref 10–40)
ANION GAP SERPL CALCULATED.3IONS-SCNC: 9 MMOL/L (ref 3–16)
AST SERPL-CCNC: 36 U/L (ref 15–37)
BASOPHILS ABSOLUTE: 0 K/UL (ref 0–0.2)
BASOPHILS RELATIVE PERCENT: 0.2 %
BILIRUB SERPL-MCNC: 0.3 MG/DL (ref 0–1)
BILIRUBIN DIRECT: <0.2 MG/DL (ref 0–0.3)
BILIRUBIN, INDIRECT: ABNORMAL MG/DL (ref 0–1)
BUN BLDV-MCNC: 26 MG/DL (ref 7–20)
C DIFF TOXIN/ANTIGEN: NORMAL
CALCIUM SERPL-MCNC: 7.7 MG/DL (ref 8.3–10.6)
CHLORIDE BLD-SCNC: 106 MMOL/L (ref 99–110)
CO2: 22 MMOL/L (ref 21–32)
CREAT SERPL-MCNC: 0.6 MG/DL (ref 0.6–1.2)
EOSINOPHILS ABSOLUTE: 0 K/UL (ref 0–0.6)
EOSINOPHILS RELATIVE PERCENT: 0.2 %
GFR AFRICAN AMERICAN: >60
GFR NON-AFRICAN AMERICAN: >60
GLUCOSE BLD-MCNC: 181 MG/DL (ref 70–99)
GLUCOSE BLD-MCNC: 183 MG/DL (ref 70–99)
GLUCOSE BLD-MCNC: 208 MG/DL (ref 70–99)
GLUCOSE BLD-MCNC: 234 MG/DL (ref 70–99)
GLUCOSE BLD-MCNC: 236 MG/DL (ref 70–99)
HCT VFR BLD CALC: 21.4 % (ref 36–48)
HEMOGLOBIN: 7.4 G/DL (ref 12–16)
LACTATE DEHYDROGENASE: 298 U/L (ref 100–190)
LYMPHOCYTES ABSOLUTE: 0 K/UL (ref 1–5.1)
LYMPHOCYTES RELATIVE PERCENT: 1 %
MCH RBC QN AUTO: 32.8 PG (ref 26–34)
MCHC RBC AUTO-ENTMCNC: 34.6 G/DL (ref 31–36)
MCV RBC AUTO: 94.9 FL (ref 80–100)
MONOCYTES ABSOLUTE: 0 K/UL (ref 0–1.3)
MONOCYTES RELATIVE PERCENT: 0.3 %
NEUTROPHILS ABSOLUTE: 2.7 K/UL (ref 1.7–7.7)
NEUTROPHILS RELATIVE PERCENT: 98.3 %
PDW BLD-RTO: 17.3 % (ref 12.4–15.4)
PERFORMED ON: ABNORMAL
PHOSPHORUS: 3.3 MG/DL (ref 2.5–4.9)
PLATELET # BLD: 137 K/UL (ref 135–450)
PMV BLD AUTO: 8.4 FL (ref 5–10.5)
POTASSIUM SERPL-SCNC: 4.2 MMOL/L (ref 3.5–5.1)
RBC # BLD: 2.25 M/UL (ref 4–5.2)
SODIUM BLD-SCNC: 137 MMOL/L (ref 136–145)
TOTAL PROTEIN: 4.6 G/DL (ref 6.4–8.2)
URIC ACID, SERUM: 3.8 MG/DL (ref 2.6–6)
WBC # BLD: 2.8 K/UL (ref 4–11)

## 2019-11-29 PROCEDURE — 2060000000 HC ICU INTERMEDIATE R&B

## 2019-11-29 PROCEDURE — 6370000000 HC RX 637 (ALT 250 FOR IP): Performed by: NURSE PRACTITIONER

## 2019-11-29 PROCEDURE — 87449 NOS EACH ORGANISM AG IA: CPT

## 2019-11-29 PROCEDURE — 6370000000 HC RX 637 (ALT 250 FOR IP): Performed by: INTERNAL MEDICINE

## 2019-11-29 PROCEDURE — 83615 LACTATE (LD) (LDH) ENZYME: CPT

## 2019-11-29 PROCEDURE — 6360000002 HC RX W HCPCS: Performed by: NURSE PRACTITIONER

## 2019-11-29 PROCEDURE — 2580000003 HC RX 258: Performed by: NURSE PRACTITIONER

## 2019-11-29 PROCEDURE — 36592 COLLECT BLOOD FROM PICC: CPT

## 2019-11-29 PROCEDURE — 84550 ASSAY OF BLOOD/URIC ACID: CPT

## 2019-11-29 PROCEDURE — 87324 CLOSTRIDIUM AG IA: CPT

## 2019-11-29 PROCEDURE — 80076 HEPATIC FUNCTION PANEL: CPT

## 2019-11-29 PROCEDURE — 80048 BASIC METABOLIC PNL TOTAL CA: CPT

## 2019-11-29 PROCEDURE — 6360000002 HC RX W HCPCS: Performed by: INTERNAL MEDICINE

## 2019-11-29 PROCEDURE — 2580000003 HC RX 258: Performed by: INTERNAL MEDICINE

## 2019-11-29 PROCEDURE — 84100 ASSAY OF PHOSPHORUS: CPT

## 2019-11-29 PROCEDURE — 85025 COMPLETE CBC W/AUTO DIFF WBC: CPT

## 2019-11-29 RX ORDER — PROCHLORPERAZINE MALEATE 10 MG
10 TABLET ORAL EVERY 8 HOURS
Status: DISCONTINUED | OUTPATIENT
Start: 2019-11-29 | End: 2019-12-09 | Stop reason: HOSPADM

## 2019-11-29 RX ORDER — LOPERAMIDE HYDROCHLORIDE 2 MG/1
4 CAPSULE ORAL ONCE
Status: COMPLETED | OUTPATIENT
Start: 2019-11-29 | End: 2019-11-29

## 2019-11-29 RX ORDER — SODIUM CHLORIDE AND POTASSIUM CHLORIDE .9; .15 G/100ML; G/100ML
SOLUTION INTRAVENOUS CONTINUOUS
Status: DISCONTINUED | OUTPATIENT
Start: 2019-11-29 | End: 2019-12-02

## 2019-11-29 RX ORDER — POTASSIUM CHLORIDE 29.8 MG/ML
40 INJECTION INTRAVENOUS PRN
Status: DISCONTINUED | OUTPATIENT
Start: 2019-11-29 | End: 2019-12-09 | Stop reason: HOSPADM

## 2019-11-29 RX ORDER — ONDANSETRON HYDROCHLORIDE 8 MG/1
8 TABLET, FILM COATED ORAL EVERY 8 HOURS PRN
Status: DISCONTINUED | OUTPATIENT
Start: 2019-11-29 | End: 2019-12-02

## 2019-11-29 RX ORDER — MAGNESIUM SULFATE IN WATER 40 MG/ML
2 INJECTION, SOLUTION INTRAVENOUS PRN
Status: DISCONTINUED | OUTPATIENT
Start: 2019-11-29 | End: 2019-12-09 | Stop reason: HOSPADM

## 2019-11-29 RX ORDER — LOPERAMIDE HYDROCHLORIDE 2 MG/1
2 CAPSULE ORAL PRN
Status: DISCONTINUED | OUTPATIENT
Start: 2019-11-29 | End: 2019-12-09 | Stop reason: HOSPADM

## 2019-11-29 RX ORDER — FUROSEMIDE 20 MG/1
20 TABLET ORAL ONCE
Status: COMPLETED | OUTPATIENT
Start: 2019-11-29 | End: 2019-11-29

## 2019-11-29 RX ADMIN — APIXABAN 5 MG: 5 TABLET, FILM COATED ORAL at 09:10

## 2019-11-29 RX ADMIN — FLUCONAZOLE 400 MG: 200 TABLET ORAL at 09:10

## 2019-11-29 RX ADMIN — METOPROLOL SUCCINATE 12.5 MG: 25 TABLET, EXTENDED RELEASE ORAL at 17:02

## 2019-11-29 RX ADMIN — POTASSIUM CHLORIDE AND SODIUM CHLORIDE: 900; 150 INJECTION, SOLUTION INTRAVENOUS at 09:21

## 2019-11-29 RX ADMIN — INSULIN LISPRO 2 UNITS: 100 INJECTION, SOLUTION INTRAVENOUS; SUBCUTANEOUS at 21:21

## 2019-11-29 RX ADMIN — VALACYCLOVIR HYDROCHLORIDE 500 MG: 500 TABLET, FILM COATED ORAL at 21:14

## 2019-11-29 RX ADMIN — PROCHLORPERAZINE MALEATE 10 MG: 10 TABLET ORAL at 15:13

## 2019-11-29 RX ADMIN — Medication 10 ML: at 21:14

## 2019-11-29 RX ADMIN — LORAZEPAM 0.5 MG: 0.5 TABLET ORAL at 11:32

## 2019-11-29 RX ADMIN — INSULIN LISPRO 6 UNITS: 100 INJECTION, SOLUTION INTRAVENOUS; SUBCUTANEOUS at 17:09

## 2019-11-29 RX ADMIN — FLECAINIDE ACETATE 150 MG: 100 TABLET ORAL at 09:20

## 2019-11-29 RX ADMIN — Medication 10 ML: at 09:11

## 2019-11-29 RX ADMIN — METOPROLOL SUCCINATE 12.5 MG: 25 TABLET, EXTENDED RELEASE ORAL at 09:11

## 2019-11-29 RX ADMIN — LOPERAMIDE HYDROCHLORIDE 4 MG: 2 CAPSULE ORAL at 17:02

## 2019-11-29 RX ADMIN — FUROSEMIDE 20 MG: 20 TABLET ORAL at 09:10

## 2019-11-29 RX ADMIN — VALACYCLOVIR HYDROCHLORIDE 500 MG: 500 TABLET, FILM COATED ORAL at 09:11

## 2019-11-29 RX ADMIN — APIXABAN 5 MG: 5 TABLET, FILM COATED ORAL at 21:14

## 2019-11-29 RX ADMIN — INSULIN LISPRO 6 UNITS: 100 INJECTION, SOLUTION INTRAVENOUS; SUBCUTANEOUS at 11:32

## 2019-11-29 RX ADMIN — SODIUM CHLORIDE: 4.5 INJECTION, SOLUTION INTRAVENOUS at 03:28

## 2019-11-29 RX ADMIN — SODIUM CHLORIDE: 4.5 INJECTION, SOLUTION INTRAVENOUS at 06:45

## 2019-11-29 RX ADMIN — FLECAINIDE ACETATE 150 MG: 100 TABLET ORAL at 21:14

## 2019-11-29 RX ADMIN — INSULIN LISPRO 6 UNITS: 100 INJECTION, SOLUTION INTRAVENOUS; SUBCUTANEOUS at 07:25

## 2019-11-29 RX ADMIN — CETIRIZINE HYDROCHLORIDE 10 MG: 10 TABLET, FILM COATED ORAL at 09:11

## 2019-11-29 RX ADMIN — PROCHLORPERAZINE MALEATE 10 MG: 10 TABLET ORAL at 21:14

## 2019-11-29 RX ADMIN — PROCHLORPERAZINE EDISYLATE 10 MG: 5 INJECTION INTRAMUSCULAR; INTRAVENOUS at 06:22

## 2019-11-29 RX ADMIN — FUROSEMIDE 20 MG: 20 TABLET ORAL at 15:13

## 2019-11-29 ASSESSMENT — PAIN SCALES - GENERAL
PAINLEVEL_OUTOF10: 0

## 2019-11-30 LAB
ABO/RH: NORMAL
ANION GAP SERPL CALCULATED.3IONS-SCNC: 9 MMOL/L (ref 3–16)
ANTIBODY SCREEN: NORMAL
BLOOD BANK DISPENSE STATUS: NORMAL
BLOOD BANK PRODUCT CODE: NORMAL
BPU ID: NORMAL
BUN BLDV-MCNC: 17 MG/DL (ref 7–20)
CALCIUM SERPL-MCNC: 7.5 MG/DL (ref 8.3–10.6)
CHLORIDE BLD-SCNC: 106 MMOL/L (ref 99–110)
CO2: 24 MMOL/L (ref 21–32)
CREAT SERPL-MCNC: 0.6 MG/DL (ref 0.6–1.2)
DESCRIPTION BLOOD BANK: NORMAL
GFR AFRICAN AMERICAN: >60
GFR NON-AFRICAN AMERICAN: >60
GLUCOSE BLD-MCNC: 182 MG/DL (ref 70–99)
GLUCOSE BLD-MCNC: 187 MG/DL (ref 70–99)
GLUCOSE BLD-MCNC: 215 MG/DL (ref 70–99)
GLUCOSE BLD-MCNC: 225 MG/DL (ref 70–99)
GLUCOSE BLD-MCNC: 244 MG/DL (ref 70–99)
HCT VFR BLD CALC: 19.7 % (ref 36–48)
HEMOGLOBIN: 6.7 G/DL (ref 12–16)
MAGNESIUM: 1.9 MG/DL (ref 1.8–2.4)
MCH RBC QN AUTO: 31.9 PG (ref 26–34)
MCHC RBC AUTO-ENTMCNC: 34.1 G/DL (ref 31–36)
MCV RBC AUTO: 93.8 FL (ref 80–100)
PDW BLD-RTO: 17.1 % (ref 12.4–15.4)
PERFORMED ON: ABNORMAL
PHOSPHORUS: 3 MG/DL (ref 2.5–4.9)
PLATELET # BLD: 95 K/UL (ref 135–450)
PMV BLD AUTO: 8.2 FL (ref 5–10.5)
POTASSIUM SERPL-SCNC: 3.5 MMOL/L (ref 3.5–5.1)
RBC # BLD: 2.1 M/UL (ref 4–5.2)
SODIUM BLD-SCNC: 139 MMOL/L (ref 136–145)
URIC ACID, SERUM: 3.6 MG/DL (ref 2.6–6)
WBC # BLD: 0.2 K/UL (ref 4–11)

## 2019-11-30 PROCEDURE — 6370000000 HC RX 637 (ALT 250 FOR IP): Performed by: INTERNAL MEDICINE

## 2019-11-30 PROCEDURE — 86923 COMPATIBILITY TEST ELECTRIC: CPT

## 2019-11-30 PROCEDURE — 36430 TRANSFUSION BLD/BLD COMPNT: CPT

## 2019-11-30 PROCEDURE — P9037 PLATE PHERES LEUKOREDU IRRAD: HCPCS

## 2019-11-30 PROCEDURE — 6360000002 HC RX W HCPCS: Performed by: INTERNAL MEDICINE

## 2019-11-30 PROCEDURE — 86900 BLOOD TYPING SEROLOGIC ABO: CPT

## 2019-11-30 PROCEDURE — 84100 ASSAY OF PHOSPHORUS: CPT

## 2019-11-30 PROCEDURE — 86850 RBC ANTIBODY SCREEN: CPT

## 2019-11-30 PROCEDURE — 2060000000 HC ICU INTERMEDIATE R&B

## 2019-11-30 PROCEDURE — 83735 ASSAY OF MAGNESIUM: CPT

## 2019-11-30 PROCEDURE — 85025 COMPLETE CBC W/AUTO DIFF WBC: CPT

## 2019-11-30 PROCEDURE — 6360000002 HC RX W HCPCS: Performed by: NURSE PRACTITIONER

## 2019-11-30 PROCEDURE — 86901 BLOOD TYPING SEROLOGIC RH(D): CPT

## 2019-11-30 PROCEDURE — 36592 COLLECT BLOOD FROM PICC: CPT

## 2019-11-30 PROCEDURE — 80048 BASIC METABOLIC PNL TOTAL CA: CPT

## 2019-11-30 PROCEDURE — P9040 RBC LEUKOREDUCED IRRADIATED: HCPCS

## 2019-11-30 PROCEDURE — 2580000003 HC RX 258: Performed by: NURSE PRACTITIONER

## 2019-11-30 PROCEDURE — 2580000003 HC RX 258: Performed by: INTERNAL MEDICINE

## 2019-11-30 PROCEDURE — 6370000000 HC RX 637 (ALT 250 FOR IP): Performed by: NURSE PRACTITIONER

## 2019-11-30 PROCEDURE — 84550 ASSAY OF BLOOD/URIC ACID: CPT

## 2019-11-30 RX ORDER — FUROSEMIDE 10 MG/ML
40 INJECTION INTRAMUSCULAR; INTRAVENOUS 2 TIMES DAILY
Status: COMPLETED | OUTPATIENT
Start: 2019-11-30 | End: 2019-12-01

## 2019-11-30 RX ORDER — 0.9 % SODIUM CHLORIDE 0.9 %
250 INTRAVENOUS SOLUTION INTRAVENOUS ONCE
Status: COMPLETED | OUTPATIENT
Start: 2019-11-30 | End: 2019-11-30

## 2019-11-30 RX ADMIN — FUROSEMIDE 40 MG: 10 INJECTION, SOLUTION INTRAMUSCULAR; INTRAVENOUS at 17:56

## 2019-11-30 RX ADMIN — FLECAINIDE ACETATE 150 MG: 100 TABLET ORAL at 08:50

## 2019-11-30 RX ADMIN — INSULIN LISPRO 6 UNITS: 100 INJECTION, SOLUTION INTRAVENOUS; SUBCUTANEOUS at 11:36

## 2019-11-30 RX ADMIN — PROCHLORPERAZINE MALEATE 10 MG: 10 TABLET ORAL at 21:21

## 2019-11-30 RX ADMIN — POTASSIUM CHLORIDE 20 MEQ: 29.8 INJECTION, SOLUTION INTRAVENOUS at 07:41

## 2019-11-30 RX ADMIN — FLUCONAZOLE 400 MG: 200 TABLET ORAL at 08:49

## 2019-11-30 RX ADMIN — METOPROLOL SUCCINATE 12.5 MG: 25 TABLET, EXTENDED RELEASE ORAL at 16:57

## 2019-11-30 RX ADMIN — FLECAINIDE ACETATE 150 MG: 100 TABLET ORAL at 21:21

## 2019-11-30 RX ADMIN — INSULIN LISPRO 3 UNITS: 100 INJECTION, SOLUTION INTRAVENOUS; SUBCUTANEOUS at 21:21

## 2019-11-30 RX ADMIN — LOPERAMIDE HYDROCHLORIDE 2 MG: 2 CAPSULE ORAL at 18:17

## 2019-11-30 RX ADMIN — FUROSEMIDE 20 MG: 20 TABLET ORAL at 08:49

## 2019-11-30 RX ADMIN — SODIUM CHLORIDE 250 ML: 9 INJECTION, SOLUTION INTRAVENOUS at 06:37

## 2019-11-30 RX ADMIN — INSULIN LISPRO 3 UNITS: 100 INJECTION, SOLUTION INTRAVENOUS; SUBCUTANEOUS at 07:42

## 2019-11-30 RX ADMIN — VALACYCLOVIR HYDROCHLORIDE 500 MG: 500 TABLET, FILM COATED ORAL at 21:21

## 2019-11-30 RX ADMIN — POTASSIUM CHLORIDE AND SODIUM CHLORIDE: 900; 150 INJECTION, SOLUTION INTRAVENOUS at 04:22

## 2019-11-30 RX ADMIN — LOPERAMIDE HYDROCHLORIDE 2 MG: 2 CAPSULE ORAL at 14:00

## 2019-11-30 RX ADMIN — SODIUM CHLORIDE 15 ML: 900 IRRIGANT IRRIGATION at 21:22

## 2019-11-30 RX ADMIN — VALACYCLOVIR HYDROCHLORIDE 500 MG: 500 TABLET, FILM COATED ORAL at 08:49

## 2019-11-30 RX ADMIN — PROCHLORPERAZINE MALEATE 10 MG: 10 TABLET ORAL at 14:35

## 2019-11-30 RX ADMIN — PROCHLORPERAZINE MALEATE 10 MG: 10 TABLET ORAL at 06:36

## 2019-11-30 RX ADMIN — ONDANSETRON HYDROCHLORIDE 8 MG: 8 TABLET, FILM COATED ORAL at 11:38

## 2019-11-30 RX ADMIN — APIXABAN 5 MG: 5 TABLET, FILM COATED ORAL at 21:21

## 2019-11-30 RX ADMIN — Medication 10 ML: at 21:22

## 2019-11-30 RX ADMIN — INSULIN LISPRO 6 UNITS: 100 INJECTION, SOLUTION INTRAVENOUS; SUBCUTANEOUS at 16:54

## 2019-11-30 RX ADMIN — POTASSIUM CHLORIDE 20 MEQ: 29.8 INJECTION, SOLUTION INTRAVENOUS at 08:31

## 2019-11-30 RX ADMIN — LOPERAMIDE HYDROCHLORIDE 2 MG: 2 CAPSULE ORAL at 23:27

## 2019-11-30 RX ADMIN — METOPROLOL SUCCINATE 12.5 MG: 25 TABLET, EXTENDED RELEASE ORAL at 08:49

## 2019-11-30 RX ADMIN — MAGNESIUM SULFATE HEPTAHYDRATE 2 G: 40 INJECTION, SOLUTION INTRAVENOUS at 07:41

## 2019-11-30 RX ADMIN — CETIRIZINE HYDROCHLORIDE 10 MG: 10 TABLET, FILM COATED ORAL at 08:49

## 2019-11-30 RX ADMIN — APIXABAN 5 MG: 5 TABLET, FILM COATED ORAL at 08:49

## 2019-11-30 ASSESSMENT — PAIN SCALES - GENERAL
PAINLEVEL_OUTOF10: 0

## 2019-12-01 ENCOUNTER — APPOINTMENT (OUTPATIENT)
Dept: GENERAL RADIOLOGY | Age: 64
DRG: 016 | End: 2019-12-01
Attending: INTERNAL MEDICINE
Payer: COMMERCIAL

## 2019-12-01 LAB
ANION GAP SERPL CALCULATED.3IONS-SCNC: 11 MMOL/L (ref 3–16)
BILIRUBIN URINE: NEGATIVE
BLOOD, URINE: NEGATIVE
BUN BLDV-MCNC: 19 MG/DL (ref 7–20)
CALCIUM SERPL-MCNC: 7.7 MG/DL (ref 8.3–10.6)
CHLORIDE BLD-SCNC: 109 MMOL/L (ref 99–110)
CLARITY: CLEAR
CO2: 22 MMOL/L (ref 21–32)
COLOR: YELLOW
CREAT SERPL-MCNC: 0.6 MG/DL (ref 0.6–1.2)
GFR AFRICAN AMERICAN: >60
GFR NON-AFRICAN AMERICAN: >60
GLUCOSE BLD-MCNC: 170 MG/DL (ref 70–99)
GLUCOSE BLD-MCNC: 182 MG/DL (ref 70–99)
GLUCOSE BLD-MCNC: 190 MG/DL (ref 70–99)
GLUCOSE BLD-MCNC: 200 MG/DL (ref 70–99)
GLUCOSE BLD-MCNC: 270 MG/DL (ref 70–99)
GLUCOSE URINE: 250 MG/DL
HCT VFR BLD CALC: 22.8 % (ref 36–48)
HEMOGLOBIN: 7.8 G/DL (ref 12–16)
KETONES, URINE: NEGATIVE MG/DL
LACTATE: 2.83 MMOL/L (ref 0.4–2)
LACTATE: 2.88 MMOL/L (ref 0.4–2)
LACTIC ACID: 2.2 MMOL/L (ref 0.4–2)
LEUKOCYTE ESTERASE, URINE: NEGATIVE
MAGNESIUM: 1.9 MG/DL (ref 1.8–2.4)
MCH RBC QN AUTO: 31.4 PG (ref 26–34)
MCHC RBC AUTO-ENTMCNC: 34.1 G/DL (ref 31–36)
MCV RBC AUTO: 92 FL (ref 80–100)
MICROSCOPIC EXAMINATION: ABNORMAL
NITRITE, URINE: NEGATIVE
PDW BLD-RTO: 18.2 % (ref 12.4–15.4)
PERFORMED ON: ABNORMAL
PH UA: 6 (ref 5–8)
PHOSPHORUS: 2.8 MG/DL (ref 2.5–4.9)
PLATELET # BLD: 61 K/UL (ref 135–450)
PMV BLD AUTO: 7.6 FL (ref 5–10.5)
POC SAMPLE TYPE: ABNORMAL
POC SAMPLE TYPE: ABNORMAL
POTASSIUM SERPL-SCNC: 3.9 MMOL/L (ref 3.5–5.1)
PROTEIN UA: NEGATIVE MG/DL
RBC # BLD: 2.48 M/UL (ref 4–5.2)
SODIUM BLD-SCNC: 142 MMOL/L (ref 136–145)
SPECIFIC GRAVITY UA: 1.02 (ref 1–1.03)
URIC ACID, SERUM: 3.5 MG/DL (ref 2.6–6)
URINE TYPE: ABNORMAL
UROBILINOGEN, URINE: 0.2 E.U./DL
WBC # BLD: 0 K/UL (ref 4–11)

## 2019-12-01 PROCEDURE — 87205 SMEAR GRAM STAIN: CPT

## 2019-12-01 PROCEDURE — 36592 COLLECT BLOOD FROM PICC: CPT

## 2019-12-01 PROCEDURE — 71045 X-RAY EXAM CHEST 1 VIEW: CPT

## 2019-12-01 PROCEDURE — 87103 BLOOD FUNGUS CULTURE: CPT

## 2019-12-01 PROCEDURE — 84550 ASSAY OF BLOOD/URIC ACID: CPT

## 2019-12-01 PROCEDURE — 6370000000 HC RX 637 (ALT 250 FOR IP): Performed by: INTERNAL MEDICINE

## 2019-12-01 PROCEDURE — 81003 URINALYSIS AUTO W/O SCOPE: CPT

## 2019-12-01 PROCEDURE — 87086 URINE CULTURE/COLONY COUNT: CPT

## 2019-12-01 PROCEDURE — 87040 BLOOD CULTURE FOR BACTERIA: CPT

## 2019-12-01 PROCEDURE — 2580000003 HC RX 258: Performed by: INTERNAL MEDICINE

## 2019-12-01 PROCEDURE — 83735 ASSAY OF MAGNESIUM: CPT

## 2019-12-01 PROCEDURE — 80048 BASIC METABOLIC PNL TOTAL CA: CPT

## 2019-12-01 PROCEDURE — 87252 VIRUS INOCULATION TISSUE: CPT

## 2019-12-01 PROCEDURE — 82947 ASSAY GLUCOSE BLOOD QUANT: CPT

## 2019-12-01 PROCEDURE — 87253 VIRUS INOCULATE TISSUE ADDL: CPT

## 2019-12-01 PROCEDURE — 84100 ASSAY OF PHOSPHORUS: CPT

## 2019-12-01 PROCEDURE — 6360000002 HC RX W HCPCS: Performed by: INTERNAL MEDICINE

## 2019-12-01 PROCEDURE — 2060000000 HC ICU INTERMEDIATE R&B

## 2019-12-01 PROCEDURE — 6370000000 HC RX 637 (ALT 250 FOR IP): Performed by: NURSE PRACTITIONER

## 2019-12-01 PROCEDURE — 2580000003 HC RX 258: Performed by: NURSE PRACTITIONER

## 2019-12-01 PROCEDURE — 85025 COMPLETE CBC W/AUTO DIFF WBC: CPT

## 2019-12-01 PROCEDURE — 6360000002 HC RX W HCPCS: Performed by: NURSE PRACTITIONER

## 2019-12-01 PROCEDURE — 87070 CULTURE OTHR SPECIMN AEROBIC: CPT

## 2019-12-01 PROCEDURE — 83605 ASSAY OF LACTIC ACID: CPT

## 2019-12-01 PROCEDURE — 87102 FUNGUS ISOLATION CULTURE: CPT

## 2019-12-01 PROCEDURE — 99232 SBSQ HOSP IP/OBS MODERATE 35: CPT | Performed by: INTERNAL MEDICINE

## 2019-12-01 RX ORDER — FUROSEMIDE 10 MG/ML
40 INJECTION INTRAMUSCULAR; INTRAVENOUS DAILY
Status: DISCONTINUED | OUTPATIENT
Start: 2019-12-02 | End: 2019-12-02

## 2019-12-01 RX ORDER — FUROSEMIDE 10 MG/ML
40 INJECTION INTRAMUSCULAR; INTRAVENOUS DAILY
Status: DISCONTINUED | OUTPATIENT
Start: 2019-12-01 | End: 2019-12-01

## 2019-12-01 RX ORDER — ACETAMINOPHEN 325 MG/1
650 TABLET ORAL EVERY 4 HOURS PRN
Status: DISCONTINUED | OUTPATIENT
Start: 2019-12-01 | End: 2019-12-09 | Stop reason: HOSPADM

## 2019-12-01 RX ADMIN — POTASSIUM CHLORIDE 20 MEQ: 29.8 INJECTION, SOLUTION INTRAVENOUS at 08:08

## 2019-12-01 RX ADMIN — CETIRIZINE HYDROCHLORIDE 10 MG: 10 TABLET, FILM COATED ORAL at 09:14

## 2019-12-01 RX ADMIN — FLUCONAZOLE 400 MG: 200 TABLET ORAL at 09:14

## 2019-12-01 RX ADMIN — LOPERAMIDE HYDROCHLORIDE 2 MG: 2 CAPSULE ORAL at 08:09

## 2019-12-01 RX ADMIN — POTASSIUM CHLORIDE 20 MEQ: 29.8 INJECTION, SOLUTION INTRAVENOUS at 06:36

## 2019-12-01 RX ADMIN — LOPERAMIDE HYDROCHLORIDE 2 MG: 2 CAPSULE ORAL at 17:54

## 2019-12-01 RX ADMIN — APIXABAN 5 MG: 5 TABLET, FILM COATED ORAL at 09:14

## 2019-12-01 RX ADMIN — MAGNESIUM SULFATE HEPTAHYDRATE 2 G: 40 INJECTION, SOLUTION INTRAVENOUS at 06:36

## 2019-12-01 RX ADMIN — METOPROLOL TARTRATE 25 MG: 25 TABLET ORAL at 14:11

## 2019-12-01 RX ADMIN — INSULIN LISPRO 3 UNITS: 100 INJECTION, SOLUTION INTRAVENOUS; SUBCUTANEOUS at 16:29

## 2019-12-01 RX ADMIN — POTASSIUM CHLORIDE AND SODIUM CHLORIDE: 900; 150 INJECTION, SOLUTION INTRAVENOUS at 21:43

## 2019-12-01 RX ADMIN — FLECAINIDE ACETATE 150 MG: 100 TABLET ORAL at 21:21

## 2019-12-01 RX ADMIN — FUROSEMIDE 40 MG: 10 INJECTION, SOLUTION INTRAMUSCULAR; INTRAVENOUS at 09:14

## 2019-12-01 RX ADMIN — PROCHLORPERAZINE MALEATE 10 MG: 10 TABLET ORAL at 14:11

## 2019-12-01 RX ADMIN — PROCHLORPERAZINE MALEATE 10 MG: 10 TABLET ORAL at 06:38

## 2019-12-01 RX ADMIN — VALACYCLOVIR HYDROCHLORIDE 500 MG: 500 TABLET, FILM COATED ORAL at 21:22

## 2019-12-01 RX ADMIN — LORAZEPAM 0.5 MG: 0.5 TABLET ORAL at 11:45

## 2019-12-01 RX ADMIN — INSULIN LISPRO 9 UNITS: 100 INJECTION, SOLUTION INTRAVENOUS; SUBCUTANEOUS at 11:48

## 2019-12-01 RX ADMIN — MEROPENEM 1 G: 1 INJECTION, POWDER, FOR SOLUTION INTRAVENOUS at 23:26

## 2019-12-01 RX ADMIN — ACETAMINOPHEN 650 MG: 325 TABLET ORAL at 16:16

## 2019-12-01 RX ADMIN — LOPERAMIDE HYDROCHLORIDE 2 MG: 2 CAPSULE ORAL at 10:54

## 2019-12-01 RX ADMIN — VALACYCLOVIR HYDROCHLORIDE 500 MG: 500 TABLET, FILM COATED ORAL at 09:14

## 2019-12-01 RX ADMIN — INSULIN LISPRO 3 UNITS: 100 INJECTION, SOLUTION INTRAVENOUS; SUBCUTANEOUS at 21:41

## 2019-12-01 RX ADMIN — Medication 10 ML: at 09:15

## 2019-12-01 RX ADMIN — FLECAINIDE ACETATE 150 MG: 100 TABLET ORAL at 09:17

## 2019-12-01 RX ADMIN — INSULIN LISPRO 3 UNITS: 100 INJECTION, SOLUTION INTRAVENOUS; SUBCUTANEOUS at 07:20

## 2019-12-01 RX ADMIN — METOPROLOL TARTRATE 25 MG: 25 TABLET ORAL at 08:09

## 2019-12-01 RX ADMIN — METOPROLOL TARTRATE 25 MG: 25 TABLET ORAL at 20:19

## 2019-12-01 RX ADMIN — APIXABAN 5 MG: 5 TABLET, FILM COATED ORAL at 21:22

## 2019-12-01 RX ADMIN — PROCHLORPERAZINE MALEATE 10 MG: 10 TABLET ORAL at 21:21

## 2019-12-01 RX ADMIN — MEROPENEM 1 G: 1 INJECTION, POWDER, FOR SOLUTION INTRAVENOUS at 16:15

## 2019-12-01 RX ADMIN — ACETAMINOPHEN 650 MG: 325 TABLET ORAL at 20:19

## 2019-12-01 RX ADMIN — POTASSIUM CHLORIDE AND SODIUM CHLORIDE: 900; 150 INJECTION, SOLUTION INTRAVENOUS at 03:48

## 2019-12-01 ASSESSMENT — PAIN SCALES - GENERAL
PAINLEVEL_OUTOF10: 0

## 2019-12-02 ENCOUNTER — APPOINTMENT (OUTPATIENT)
Dept: GENERAL RADIOLOGY | Age: 64
DRG: 016 | End: 2019-12-02
Attending: INTERNAL MEDICINE
Payer: COMMERCIAL

## 2019-12-02 LAB
ALBUMIN SERPL-MCNC: 3 G/DL (ref 3.4–5)
ALP BLD-CCNC: 93 U/L (ref 40–129)
ALT SERPL-CCNC: 24 U/L (ref 10–40)
ANION GAP SERPL CALCULATED.3IONS-SCNC: 9 MMOL/L (ref 3–16)
APTT: 32.8 SEC (ref 24.2–36.2)
AST SERPL-CCNC: 12 U/L (ref 15–37)
BILIRUB SERPL-MCNC: 0.6 MG/DL (ref 0–1)
BILIRUBIN DIRECT: <0.2 MG/DL (ref 0–0.3)
BILIRUBIN, INDIRECT: ABNORMAL MG/DL (ref 0–1)
BUN BLDV-MCNC: 19 MG/DL (ref 7–20)
CALCIUM SERPL-MCNC: 7.9 MG/DL (ref 8.3–10.6)
CHLORIDE BLD-SCNC: 106 MMOL/L (ref 99–110)
CO2: 23 MMOL/L (ref 21–32)
CREAT SERPL-MCNC: 0.9 MG/DL (ref 0.6–1.2)
GFR AFRICAN AMERICAN: >60
GFR NON-AFRICAN AMERICAN: >60
GLUCOSE BLD-MCNC: 201 MG/DL (ref 70–99)
GLUCOSE BLD-MCNC: 204 MG/DL (ref 70–99)
GLUCOSE BLD-MCNC: 215 MG/DL (ref 70–99)
GLUCOSE BLD-MCNC: 235 MG/DL (ref 70–99)
GLUCOSE BLD-MCNC: 249 MG/DL (ref 70–99)
HCT VFR BLD CALC: 20.7 % (ref 36–48)
HEMOGLOBIN: 7.1 G/DL (ref 12–16)
INR BLD: 1.31 (ref 0.86–1.14)
LACTATE DEHYDROGENASE: 192 U/L (ref 100–190)
MAGNESIUM: 2 MG/DL (ref 1.8–2.4)
MCH RBC QN AUTO: 31.3 PG (ref 26–34)
MCHC RBC AUTO-ENTMCNC: 34.3 G/DL (ref 31–36)
MCV RBC AUTO: 91.3 FL (ref 80–100)
PDW BLD-RTO: 17.5 % (ref 12.4–15.4)
PERFORMED ON: ABNORMAL
PHOSPHORUS: 2.6 MG/DL (ref 2.5–4.9)
PLATELET # BLD: 26 K/UL (ref 135–450)
PMV BLD AUTO: 7.5 FL (ref 5–10.5)
POTASSIUM SERPL-SCNC: 4.1 MMOL/L (ref 3.5–5.1)
PROTHROMBIN TIME: 15.2 SEC (ref 10–13.2)
RBC # BLD: 2.27 M/UL (ref 4–5.2)
SODIUM BLD-SCNC: 138 MMOL/L (ref 136–145)
TOTAL PROTEIN: 5.2 G/DL (ref 6.4–8.2)
URIC ACID, SERUM: 4.2 MG/DL (ref 2.6–6)
WBC # BLD: 0 K/UL (ref 4–11)

## 2019-12-02 PROCEDURE — 71045 X-RAY EXAM CHEST 1 VIEW: CPT

## 2019-12-02 PROCEDURE — 85025 COMPLETE CBC W/AUTO DIFF WBC: CPT

## 2019-12-02 PROCEDURE — 99233 SBSQ HOSP IP/OBS HIGH 50: CPT | Performed by: INTERNAL MEDICINE

## 2019-12-02 PROCEDURE — 84100 ASSAY OF PHOSPHORUS: CPT

## 2019-12-02 PROCEDURE — 2580000003 HC RX 258: Performed by: INTERNAL MEDICINE

## 2019-12-02 PROCEDURE — 6370000000 HC RX 637 (ALT 250 FOR IP): Performed by: INTERNAL MEDICINE

## 2019-12-02 PROCEDURE — 6360000002 HC RX W HCPCS: Performed by: INTERNAL MEDICINE

## 2019-12-02 PROCEDURE — 85610 PROTHROMBIN TIME: CPT

## 2019-12-02 PROCEDURE — 80076 HEPATIC FUNCTION PANEL: CPT

## 2019-12-02 PROCEDURE — 84550 ASSAY OF BLOOD/URIC ACID: CPT

## 2019-12-02 PROCEDURE — 6370000000 HC RX 637 (ALT 250 FOR IP): Performed by: NURSE PRACTITIONER

## 2019-12-02 PROCEDURE — 83735 ASSAY OF MAGNESIUM: CPT

## 2019-12-02 PROCEDURE — 2580000003 HC RX 258: Performed by: NURSE PRACTITIONER

## 2019-12-02 PROCEDURE — 2060000000 HC ICU INTERMEDIATE R&B

## 2019-12-02 PROCEDURE — 83615 LACTATE (LD) (LDH) ENZYME: CPT

## 2019-12-02 PROCEDURE — 80048 BASIC METABOLIC PNL TOTAL CA: CPT

## 2019-12-02 PROCEDURE — 85730 THROMBOPLASTIN TIME PARTIAL: CPT

## 2019-12-02 RX ORDER — ONDANSETRON 2 MG/ML
8 INJECTION INTRAMUSCULAR; INTRAVENOUS EVERY 8 HOURS PRN
Status: DISCONTINUED | OUTPATIENT
Start: 2019-12-02 | End: 2019-12-05

## 2019-12-02 RX ORDER — ONDANSETRON HYDROCHLORIDE 8 MG/1
8 TABLET, FILM COATED ORAL EVERY 8 HOURS PRN
Status: DISCONTINUED | OUTPATIENT
Start: 2019-12-02 | End: 2019-12-05

## 2019-12-02 RX ADMIN — VALACYCLOVIR HYDROCHLORIDE 500 MG: 500 TABLET, FILM COATED ORAL at 20:39

## 2019-12-02 RX ADMIN — FLUCONAZOLE 400 MG: 200 TABLET ORAL at 09:55

## 2019-12-02 RX ADMIN — LOPERAMIDE HYDROCHLORIDE 2 MG: 2 CAPSULE ORAL at 18:05

## 2019-12-02 RX ADMIN — Medication 10 ML: at 09:56

## 2019-12-02 RX ADMIN — LORAZEPAM 0.5 MG: 2 INJECTION INTRAMUSCULAR; INTRAVENOUS at 10:24

## 2019-12-02 RX ADMIN — FLECAINIDE ACETATE 150 MG: 100 TABLET ORAL at 09:55

## 2019-12-02 RX ADMIN — MEROPENEM 1 G: 1 INJECTION, POWDER, FOR SOLUTION INTRAVENOUS at 15:53

## 2019-12-02 RX ADMIN — VALACYCLOVIR HYDROCHLORIDE 500 MG: 500 TABLET, FILM COATED ORAL at 09:56

## 2019-12-02 RX ADMIN — METOPROLOL TARTRATE 25 MG: 25 TABLET ORAL at 13:58

## 2019-12-02 RX ADMIN — INSULIN LISPRO 6 UNITS: 100 INJECTION, SOLUTION INTRAVENOUS; SUBCUTANEOUS at 08:00

## 2019-12-02 RX ADMIN — METOPROLOL TARTRATE 25 MG: 25 TABLET ORAL at 02:26

## 2019-12-02 RX ADMIN — SODIUM CHLORIDE 15 ML: 900 IRRIGANT IRRIGATION at 10:05

## 2019-12-02 RX ADMIN — ONDANSETRON HYDROCHLORIDE 8 MG: 8 TABLET, FILM COATED ORAL at 18:14

## 2019-12-02 RX ADMIN — INSULIN LISPRO 6 UNITS: 100 INJECTION, SOLUTION INTRAVENOUS; SUBCUTANEOUS at 17:04

## 2019-12-02 RX ADMIN — LOPERAMIDE HYDROCHLORIDE 2 MG: 2 CAPSULE ORAL at 23:42

## 2019-12-02 RX ADMIN — CETIRIZINE HYDROCHLORIDE 10 MG: 10 TABLET, FILM COATED ORAL at 09:55

## 2019-12-02 RX ADMIN — FLECAINIDE ACETATE 150 MG: 100 TABLET ORAL at 20:41

## 2019-12-02 RX ADMIN — INSULIN LISPRO 3 UNITS: 100 INJECTION, SOLUTION INTRAVENOUS; SUBCUTANEOUS at 20:48

## 2019-12-02 RX ADMIN — LOPERAMIDE HYDROCHLORIDE 2 MG: 2 CAPSULE ORAL at 13:58

## 2019-12-02 RX ADMIN — TBO-FILGRASTIM 300 MCG: 300 INJECTION, SOLUTION SUBCUTANEOUS at 17:38

## 2019-12-02 RX ADMIN — LOPERAMIDE HYDROCHLORIDE 2 MG: 2 CAPSULE ORAL at 09:55

## 2019-12-02 RX ADMIN — Medication 10 ML: at 20:48

## 2019-12-02 RX ADMIN — MEROPENEM 1 G: 1 INJECTION, POWDER, FOR SOLUTION INTRAVENOUS at 23:43

## 2019-12-02 RX ADMIN — INSULIN LISPRO 6 UNITS: 100 INJECTION, SOLUTION INTRAVENOUS; SUBCUTANEOUS at 11:30

## 2019-12-02 RX ADMIN — POTASSIUM CHLORIDE: 2 INJECTION, SOLUTION, CONCENTRATE INTRAVENOUS at 15:37

## 2019-12-02 RX ADMIN — METOPROLOL TARTRATE 25 MG: 25 TABLET ORAL at 08:06

## 2019-12-02 RX ADMIN — SODIUM CHLORIDE 15 ML: 900 IRRIGANT IRRIGATION at 17:08

## 2019-12-02 RX ADMIN — PROCHLORPERAZINE MALEATE 10 MG: 10 TABLET ORAL at 13:58

## 2019-12-02 RX ADMIN — LOPERAMIDE HYDROCHLORIDE 2 MG: 2 CAPSULE ORAL at 05:06

## 2019-12-02 RX ADMIN — MEROPENEM 1 G: 1 INJECTION, POWDER, FOR SOLUTION INTRAVENOUS at 08:06

## 2019-12-02 RX ADMIN — PROCHLORPERAZINE MALEATE 10 MG: 10 TABLET ORAL at 23:42

## 2019-12-02 RX ADMIN — ACETAMINOPHEN 650 MG: 325 TABLET ORAL at 02:26

## 2019-12-02 RX ADMIN — PROCHLORPERAZINE MALEATE 10 MG: 10 TABLET ORAL at 07:58

## 2019-12-02 ASSESSMENT — PAIN SCALES - GENERAL
PAINLEVEL_OUTOF10: 0

## 2019-12-03 LAB
ANION GAP SERPL CALCULATED.3IONS-SCNC: 9 MMOL/L (ref 3–16)
BLOOD BANK DISPENSE STATUS: NORMAL
BLOOD BANK DISPENSE STATUS: NORMAL
BLOOD BANK PRODUCT CODE: NORMAL
BLOOD BANK PRODUCT CODE: NORMAL
BPU ID: NORMAL
BPU ID: NORMAL
BUN BLDV-MCNC: 19 MG/DL (ref 7–20)
CALCIUM SERPL-MCNC: 7.6 MG/DL (ref 8.3–10.6)
CHLORIDE BLD-SCNC: 111 MMOL/L (ref 99–110)
CO2: 22 MMOL/L (ref 21–32)
CREAT SERPL-MCNC: 0.7 MG/DL (ref 0.6–1.2)
DESCRIPTION BLOOD BANK: NORMAL
DESCRIPTION BLOOD BANK: NORMAL
GFR AFRICAN AMERICAN: >60
GFR NON-AFRICAN AMERICAN: >60
GLUCOSE BLD-MCNC: 184 MG/DL (ref 70–99)
GLUCOSE BLD-MCNC: 191 MG/DL (ref 70–99)
GLUCOSE BLD-MCNC: 200 MG/DL (ref 70–99)
GLUCOSE BLD-MCNC: 202 MG/DL (ref 70–99)
GLUCOSE BLD-MCNC: 218 MG/DL (ref 70–99)
HCT VFR BLD CALC: 17.8 % (ref 36–48)
HEMOGLOBIN: 6.2 G/DL (ref 12–16)
MAGNESIUM: 2 MG/DL (ref 1.8–2.4)
MCH RBC QN AUTO: 31.9 PG (ref 26–34)
MCHC RBC AUTO-ENTMCNC: 34.5 G/DL (ref 31–36)
MCV RBC AUTO: 92.5 FL (ref 80–100)
PDW BLD-RTO: 17.5 % (ref 12.4–15.4)
PERFORMED ON: ABNORMAL
PHOSPHORUS: 2.3 MG/DL (ref 2.5–4.9)
PLATELET # BLD: 13 K/UL (ref 135–450)
PMV BLD AUTO: 8.1 FL (ref 5–10.5)
POTASSIUM SERPL-SCNC: 4.1 MMOL/L (ref 3.5–5.1)
RBC # BLD: 1.93 M/UL (ref 4–5.2)
SODIUM BLD-SCNC: 142 MMOL/L (ref 136–145)
THROAT CULTURE: NORMAL
URIC ACID, SERUM: 3.8 MG/DL (ref 2.6–6)
URINE CULTURE, ROUTINE: NORMAL
WBC # BLD: 0 K/UL (ref 4–11)

## 2019-12-03 PROCEDURE — 2060000000 HC ICU INTERMEDIATE R&B

## 2019-12-03 PROCEDURE — 6360000002 HC RX W HCPCS: Performed by: INTERNAL MEDICINE

## 2019-12-03 PROCEDURE — 6370000000 HC RX 637 (ALT 250 FOR IP): Performed by: NURSE PRACTITIONER

## 2019-12-03 PROCEDURE — 6370000000 HC RX 637 (ALT 250 FOR IP): Performed by: INTERNAL MEDICINE

## 2019-12-03 PROCEDURE — 84100 ASSAY OF PHOSPHORUS: CPT

## 2019-12-03 PROCEDURE — 2580000003 HC RX 258: Performed by: INTERNAL MEDICINE

## 2019-12-03 PROCEDURE — 80048 BASIC METABOLIC PNL TOTAL CA: CPT

## 2019-12-03 PROCEDURE — 84550 ASSAY OF BLOOD/URIC ACID: CPT

## 2019-12-03 PROCEDURE — 36430 TRANSFUSION BLD/BLD COMPNT: CPT

## 2019-12-03 PROCEDURE — 99233 SBSQ HOSP IP/OBS HIGH 50: CPT | Performed by: INTERNAL MEDICINE

## 2019-12-03 PROCEDURE — 36592 COLLECT BLOOD FROM PICC: CPT

## 2019-12-03 PROCEDURE — 85025 COMPLETE CBC W/AUTO DIFF WBC: CPT

## 2019-12-03 PROCEDURE — 83735 ASSAY OF MAGNESIUM: CPT

## 2019-12-03 PROCEDURE — 2580000003 HC RX 258: Performed by: NURSE PRACTITIONER

## 2019-12-03 RX ORDER — 0.9 % SODIUM CHLORIDE 0.9 %
250 INTRAVENOUS SOLUTION INTRAVENOUS ONCE
Status: COMPLETED | OUTPATIENT
Start: 2019-12-03 | End: 2019-12-03

## 2019-12-03 RX ADMIN — SODIUM CHLORIDE 15 ML: 900 IRRIGANT IRRIGATION at 11:44

## 2019-12-03 RX ADMIN — SODIUM CHLORIDE 15 ML: 900 IRRIGANT IRRIGATION at 06:33

## 2019-12-03 RX ADMIN — CETIRIZINE HYDROCHLORIDE 10 MG: 10 TABLET, FILM COATED ORAL at 08:44

## 2019-12-03 RX ADMIN — INSULIN LISPRO 6 UNITS: 100 INJECTION, SOLUTION INTRAVENOUS; SUBCUTANEOUS at 07:23

## 2019-12-03 RX ADMIN — MEROPENEM 1 G: 1 INJECTION, POWDER, FOR SOLUTION INTRAVENOUS at 16:37

## 2019-12-03 RX ADMIN — SODIUM CHLORIDE 250 ML: 9 INJECTION, SOLUTION INTRAVENOUS at 11:43

## 2019-12-03 RX ADMIN — METOPROLOL TARTRATE 25 MG: 25 TABLET ORAL at 20:10

## 2019-12-03 RX ADMIN — INSULIN LISPRO 3 UNITS: 100 INJECTION, SOLUTION INTRAVENOUS; SUBCUTANEOUS at 11:34

## 2019-12-03 RX ADMIN — FLECAINIDE ACETATE 150 MG: 100 TABLET ORAL at 08:44

## 2019-12-03 RX ADMIN — Medication 10 ML: at 21:00

## 2019-12-03 RX ADMIN — VALACYCLOVIR HYDROCHLORIDE 500 MG: 500 TABLET, FILM COATED ORAL at 20:09

## 2019-12-03 RX ADMIN — LOPERAMIDE HYDROCHLORIDE 2 MG: 2 CAPSULE ORAL at 22:55

## 2019-12-03 RX ADMIN — METOPROLOL TARTRATE 25 MG: 25 TABLET ORAL at 02:49

## 2019-12-03 RX ADMIN — SODIUM CHLORIDE 15 ML: 900 IRRIGANT IRRIGATION at 08:46

## 2019-12-03 RX ADMIN — METOPROLOL TARTRATE 25 MG: 25 TABLET ORAL at 14:26

## 2019-12-03 RX ADMIN — Medication 10 ML: at 08:45

## 2019-12-03 RX ADMIN — INSULIN LISPRO 2 UNITS: 100 INJECTION, SOLUTION INTRAVENOUS; SUBCUTANEOUS at 20:21

## 2019-12-03 RX ADMIN — PROCHLORPERAZINE MALEATE 10 MG: 10 TABLET ORAL at 14:26

## 2019-12-03 RX ADMIN — TBO-FILGRASTIM 300 MCG: 300 INJECTION, SOLUTION SUBCUTANEOUS at 18:13

## 2019-12-03 RX ADMIN — LOPERAMIDE HYDROCHLORIDE 2 MG: 2 CAPSULE ORAL at 11:32

## 2019-12-03 RX ADMIN — SODIUM CHLORIDE 250 ML: 9 INJECTION, SOLUTION INTRAVENOUS at 06:57

## 2019-12-03 RX ADMIN — MEROPENEM 1 G: 1 INJECTION, POWDER, FOR SOLUTION INTRAVENOUS at 08:44

## 2019-12-03 RX ADMIN — LOPERAMIDE HYDROCHLORIDE 2 MG: 2 CAPSULE ORAL at 14:58

## 2019-12-03 RX ADMIN — SODIUM CHLORIDE 15 ML: 900 IRRIGANT IRRIGATION at 16:40

## 2019-12-03 RX ADMIN — VALACYCLOVIR HYDROCHLORIDE 500 MG: 500 TABLET, FILM COATED ORAL at 08:44

## 2019-12-03 RX ADMIN — PROCHLORPERAZINE MALEATE 10 MG: 10 TABLET ORAL at 22:55

## 2019-12-03 RX ADMIN — POTASSIUM CHLORIDE: 2 INJECTION, SOLUTION, CONCENTRATE INTRAVENOUS at 03:52

## 2019-12-03 RX ADMIN — INSULIN LISPRO 6 UNITS: 100 INJECTION, SOLUTION INTRAVENOUS; SUBCUTANEOUS at 17:19

## 2019-12-03 RX ADMIN — METOPROLOL TARTRATE 25 MG: 25 TABLET ORAL at 07:20

## 2019-12-03 RX ADMIN — POTASSIUM CHLORIDE: 2 INJECTION, SOLUTION, CONCENTRATE INTRAVENOUS at 15:01

## 2019-12-03 RX ADMIN — FLUCONAZOLE 400 MG: 200 TABLET ORAL at 08:44

## 2019-12-03 RX ADMIN — FLECAINIDE ACETATE 150 MG: 100 TABLET ORAL at 20:10

## 2019-12-03 RX ADMIN — PROCHLORPERAZINE MALEATE 10 MG: 10 TABLET ORAL at 06:32

## 2019-12-03 ASSESSMENT — PAIN SCALES - GENERAL
PAINLEVEL_OUTOF10: 0

## 2019-12-04 LAB
ABO/RH: NORMAL
ALBUMIN SERPL-MCNC: 3 G/DL (ref 3.4–5)
ALP BLD-CCNC: 83 U/L (ref 40–129)
ALT SERPL-CCNC: 15 U/L (ref 10–40)
ANION GAP SERPL CALCULATED.3IONS-SCNC: 11 MMOL/L (ref 3–16)
ANION GAP SERPL CALCULATED.3IONS-SCNC: 7 MMOL/L (ref 3–16)
ANTIBODY SCREEN: NORMAL
AST SERPL-CCNC: 11 U/L (ref 15–37)
BILIRUB SERPL-MCNC: 0.3 MG/DL (ref 0–1)
BILIRUBIN DIRECT: <0.2 MG/DL (ref 0–0.3)
BILIRUBIN, INDIRECT: ABNORMAL MG/DL (ref 0–1)
BUN BLDV-MCNC: 13 MG/DL (ref 7–20)
BUN BLDV-MCNC: 16 MG/DL (ref 7–20)
CALCIUM SERPL-MCNC: 7.9 MG/DL (ref 8.3–10.6)
CALCIUM SERPL-MCNC: 7.9 MG/DL (ref 8.3–10.6)
CHLORIDE BLD-SCNC: 112 MMOL/L (ref 99–110)
CHLORIDE BLD-SCNC: 113 MMOL/L (ref 99–110)
CO2: 19 MMOL/L (ref 21–32)
CO2: 21 MMOL/L (ref 21–32)
CREAT SERPL-MCNC: 0.5 MG/DL (ref 0.6–1.2)
CREAT SERPL-MCNC: 0.6 MG/DL (ref 0.6–1.2)
FINAL REPORT: NORMAL
GFR AFRICAN AMERICAN: >60
GFR AFRICAN AMERICAN: >60
GFR NON-AFRICAN AMERICAN: >60
GFR NON-AFRICAN AMERICAN: >60
GLUCOSE BLD-MCNC: 106 MG/DL (ref 70–99)
GLUCOSE BLD-MCNC: 143 MG/DL (ref 70–99)
GLUCOSE BLD-MCNC: 173 MG/DL (ref 70–99)
GLUCOSE BLD-MCNC: 177 MG/DL (ref 70–99)
GLUCOSE BLD-MCNC: 185 MG/DL (ref 70–99)
GLUCOSE BLD-MCNC: 185 MG/DL (ref 70–99)
GLUCOSE BLD-MCNC: 202 MG/DL (ref 70–99)
HCT VFR BLD CALC: 19.6 % (ref 36–48)
HEMOGLOBIN: 6.8 G/DL (ref 12–16)
LACTATE DEHYDROGENASE: 163 U/L (ref 100–190)
MAGNESIUM: 2 MG/DL (ref 1.8–2.4)
MCH RBC QN AUTO: 31.6 PG (ref 26–34)
MCHC RBC AUTO-ENTMCNC: 34.6 G/DL (ref 31–36)
MCV RBC AUTO: 91.3 FL (ref 80–100)
PDW BLD-RTO: 16 % (ref 12.4–15.4)
PERFORMED ON: ABNORMAL
PHOSPHORUS: 2 MG/DL (ref 2.5–4.9)
PLATELET # BLD: 25 K/UL (ref 135–450)
PMV BLD AUTO: 7.8 FL (ref 5–10.5)
POTASSIUM SERPL-SCNC: 3.4 MMOL/L (ref 3.5–5.1)
POTASSIUM SERPL-SCNC: 3.6 MMOL/L (ref 3.5–5.1)
PRELIMINARY: NORMAL
RBC # BLD: 2.14 M/UL (ref 4–5.2)
SODIUM BLD-SCNC: 140 MMOL/L (ref 136–145)
SODIUM BLD-SCNC: 143 MMOL/L (ref 136–145)
TOTAL PROTEIN: 4.8 G/DL (ref 6.4–8.2)
URIC ACID, SERUM: 2.8 MG/DL (ref 2.6–6)
WBC # BLD: 0 K/UL (ref 4–11)

## 2019-12-04 PROCEDURE — 6360000002 HC RX W HCPCS: Performed by: INTERNAL MEDICINE

## 2019-12-04 PROCEDURE — 6360000002 HC RX W HCPCS: Performed by: NURSE PRACTITIONER

## 2019-12-04 PROCEDURE — P9037 PLATE PHERES LEUKOREDU IRRAD: HCPCS

## 2019-12-04 PROCEDURE — 86923 COMPATIBILITY TEST ELECTRIC: CPT

## 2019-12-04 PROCEDURE — 2580000003 HC RX 258: Performed by: INTERNAL MEDICINE

## 2019-12-04 PROCEDURE — 6370000000 HC RX 637 (ALT 250 FOR IP): Performed by: NURSE PRACTITIONER

## 2019-12-04 PROCEDURE — 2580000003 HC RX 258: Performed by: NURSE PRACTITIONER

## 2019-12-04 PROCEDURE — P9040 RBC LEUKOREDUCED IRRADIATED: HCPCS

## 2019-12-04 PROCEDURE — 86850 RBC ANTIBODY SCREEN: CPT

## 2019-12-04 PROCEDURE — 6370000000 HC RX 637 (ALT 250 FOR IP): Performed by: INTERNAL MEDICINE

## 2019-12-04 PROCEDURE — 86901 BLOOD TYPING SEROLOGIC RH(D): CPT

## 2019-12-04 PROCEDURE — 83735 ASSAY OF MAGNESIUM: CPT

## 2019-12-04 PROCEDURE — 36430 TRANSFUSION BLD/BLD COMPNT: CPT

## 2019-12-04 PROCEDURE — 86900 BLOOD TYPING SEROLOGIC ABO: CPT

## 2019-12-04 PROCEDURE — 84100 ASSAY OF PHOSPHORUS: CPT

## 2019-12-04 PROCEDURE — 36592 COLLECT BLOOD FROM PICC: CPT

## 2019-12-04 PROCEDURE — 2060000000 HC ICU INTERMEDIATE R&B

## 2019-12-04 PROCEDURE — 83615 LACTATE (LD) (LDH) ENZYME: CPT

## 2019-12-04 PROCEDURE — 85025 COMPLETE CBC W/AUTO DIFF WBC: CPT

## 2019-12-04 PROCEDURE — 84550 ASSAY OF BLOOD/URIC ACID: CPT

## 2019-12-04 PROCEDURE — 80048 BASIC METABOLIC PNL TOTAL CA: CPT

## 2019-12-04 PROCEDURE — 80076 HEPATIC FUNCTION PANEL: CPT

## 2019-12-04 PROCEDURE — 99233 SBSQ HOSP IP/OBS HIGH 50: CPT | Performed by: INTERNAL MEDICINE

## 2019-12-04 RX ORDER — DIPHENOXYLATE HYDROCHLORIDE AND ATROPINE SULFATE 2.5; .025 MG/1; MG/1
1 TABLET ORAL 4 TIMES DAILY PRN
Status: DISCONTINUED | OUTPATIENT
Start: 2019-12-04 | End: 2019-12-09 | Stop reason: HOSPADM

## 2019-12-04 RX ORDER — 0.9 % SODIUM CHLORIDE 0.9 %
250 INTRAVENOUS SOLUTION INTRAVENOUS ONCE
Status: COMPLETED | OUTPATIENT
Start: 2019-12-04 | End: 2019-12-04

## 2019-12-04 RX ADMIN — TBO-FILGRASTIM 300 MCG: 300 INJECTION, SOLUTION SUBCUTANEOUS at 18:39

## 2019-12-04 RX ADMIN — SODIUM CHLORIDE 250 ML: 9 INJECTION, SOLUTION INTRAVENOUS at 05:48

## 2019-12-04 RX ADMIN — DIPHENOXYLATE HYDROCHLORIDE AND ATROPINE SULFATE 1 TABLET: 2.5; .025 TABLET ORAL at 21:21

## 2019-12-04 RX ADMIN — PROCHLORPERAZINE MALEATE 10 MG: 10 TABLET ORAL at 14:13

## 2019-12-04 RX ADMIN — FLUCONAZOLE 400 MG: 200 TABLET ORAL at 09:01

## 2019-12-04 RX ADMIN — SODIUM CHLORIDE 15 ML: 900 IRRIGANT IRRIGATION at 11:00

## 2019-12-04 RX ADMIN — INSULIN LISPRO 2 UNITS: 100 INJECTION, SOLUTION INTRAVENOUS; SUBCUTANEOUS at 22:41

## 2019-12-04 RX ADMIN — MEROPENEM 1 G: 1 INJECTION, POWDER, FOR SOLUTION INTRAVENOUS at 08:22

## 2019-12-04 RX ADMIN — LOPERAMIDE HYDROCHLORIDE 2 MG: 2 CAPSULE ORAL at 14:57

## 2019-12-04 RX ADMIN — POTASSIUM CHLORIDE 20 MEQ: 29.8 INJECTION, SOLUTION INTRAVENOUS at 19:21

## 2019-12-04 RX ADMIN — FLECAINIDE ACETATE 150 MG: 100 TABLET ORAL at 21:54

## 2019-12-04 RX ADMIN — POTASSIUM CHLORIDE: 2 INJECTION, SOLUTION, CONCENTRATE INTRAVENOUS at 06:50

## 2019-12-04 RX ADMIN — INSULIN LISPRO 3 UNITS: 100 INJECTION, SOLUTION INTRAVENOUS; SUBCUTANEOUS at 11:46

## 2019-12-04 RX ADMIN — POTASSIUM CHLORIDE 80 MEQ: 400 INJECTION, SOLUTION INTRAVENOUS at 22:36

## 2019-12-04 RX ADMIN — PROCHLORPERAZINE MALEATE 10 MG: 10 TABLET ORAL at 22:35

## 2019-12-04 RX ADMIN — METOPROLOL TARTRATE 25 MG: 25 TABLET ORAL at 02:51

## 2019-12-04 RX ADMIN — INSULIN LISPRO 3 UNITS: 100 INJECTION, SOLUTION INTRAVENOUS; SUBCUTANEOUS at 07:42

## 2019-12-04 RX ADMIN — LOPERAMIDE HYDROCHLORIDE 2 MG: 2 CAPSULE ORAL at 10:37

## 2019-12-04 RX ADMIN — Medication 10 ML: at 09:06

## 2019-12-04 RX ADMIN — ONDANSETRON 8 MG: 2 INJECTION INTRAMUSCULAR; INTRAVENOUS at 19:27

## 2019-12-04 RX ADMIN — INSULIN LISPRO 6 UNITS: 100 INJECTION, SOLUTION INTRAVENOUS; SUBCUTANEOUS at 19:05

## 2019-12-04 RX ADMIN — METOPROLOL TARTRATE 25 MG: 25 TABLET ORAL at 08:20

## 2019-12-04 RX ADMIN — PROCHLORPERAZINE MALEATE 10 MG: 10 TABLET ORAL at 05:50

## 2019-12-04 RX ADMIN — METOPROLOL TARTRATE 25 MG: 25 TABLET ORAL at 14:13

## 2019-12-04 RX ADMIN — LOPERAMIDE HYDROCHLORIDE 2 MG: 2 CAPSULE ORAL at 06:10

## 2019-12-04 RX ADMIN — SODIUM CHLORIDE 15 ML: 900 IRRIGANT IRRIGATION at 17:36

## 2019-12-04 RX ADMIN — LOPERAMIDE HYDROCHLORIDE 2 MG: 2 CAPSULE ORAL at 23:11

## 2019-12-04 RX ADMIN — VALACYCLOVIR HYDROCHLORIDE 500 MG: 500 TABLET, FILM COATED ORAL at 09:01

## 2019-12-04 RX ADMIN — DIPHENOXYLATE HYDROCHLORIDE AND ATROPINE SULFATE 1 TABLET: 2.5; .025 TABLET ORAL at 17:23

## 2019-12-04 RX ADMIN — MEROPENEM 1 G: 1 INJECTION, POWDER, FOR SOLUTION INTRAVENOUS at 16:36

## 2019-12-04 RX ADMIN — VALACYCLOVIR HYDROCHLORIDE 500 MG: 500 TABLET, FILM COATED ORAL at 21:04

## 2019-12-04 RX ADMIN — Medication 10 ML: at 21:04

## 2019-12-04 RX ADMIN — CETIRIZINE HYDROCHLORIDE 10 MG: 10 TABLET, FILM COATED ORAL at 09:01

## 2019-12-04 RX ADMIN — FLECAINIDE ACETATE 150 MG: 100 TABLET ORAL at 09:02

## 2019-12-04 RX ADMIN — SODIUM CHLORIDE 15 ML: 900 IRRIGANT IRRIGATION at 21:04

## 2019-12-04 RX ADMIN — METOPROLOL TARTRATE 25 MG: 25 TABLET ORAL at 20:12

## 2019-12-04 RX ADMIN — POTASSIUM CHLORIDE 80 MEQ: 400 INJECTION, SOLUTION INTRAVENOUS at 21:02

## 2019-12-04 RX ADMIN — MEROPENEM 1 G: 1 INJECTION, POWDER, FOR SOLUTION INTRAVENOUS at 00:14

## 2019-12-04 RX ADMIN — ONDANSETRON HYDROCHLORIDE 8 MG: 8 TABLET, FILM COATED ORAL at 11:33

## 2019-12-04 ASSESSMENT — PAIN SCALES - GENERAL
PAINLEVEL_OUTOF10: 0

## 2019-12-05 LAB
ANION GAP SERPL CALCULATED.3IONS-SCNC: 12 MMOL/L (ref 3–16)
APTT: 30.1 SEC (ref 24.2–36.2)
BLOOD CULTURE, ROUTINE: NORMAL
BUN BLDV-MCNC: 11 MG/DL (ref 7–20)
CALCIUM SERPL-MCNC: 7.8 MG/DL (ref 8.3–10.6)
CHLORIDE BLD-SCNC: 114 MMOL/L (ref 99–110)
CO2: 18 MMOL/L (ref 21–32)
CREAT SERPL-MCNC: 0.6 MG/DL (ref 0.6–1.2)
CULTURE, BLOOD 2: NORMAL
GFR AFRICAN AMERICAN: >60
GFR NON-AFRICAN AMERICAN: >60
GLUCOSE BLD-MCNC: 196 MG/DL (ref 70–99)
GLUCOSE BLD-MCNC: 197 MG/DL (ref 70–99)
GLUCOSE BLD-MCNC: 233 MG/DL (ref 70–99)
GLUCOSE BLD-MCNC: 245 MG/DL (ref 70–99)
GLUCOSE BLD-MCNC: 259 MG/DL (ref 70–99)
HCT VFR BLD CALC: 26 % (ref 36–48)
HEMOGLOBIN: 8.8 G/DL (ref 12–16)
INR BLD: 1 (ref 0.86–1.14)
MAGNESIUM: 1.7 MG/DL (ref 1.8–2.4)
MCH RBC QN AUTO: 30.9 PG (ref 26–34)
MCHC RBC AUTO-ENTMCNC: 33.8 G/DL (ref 31–36)
MCV RBC AUTO: 91.3 FL (ref 80–100)
PDW BLD-RTO: 16.2 % (ref 12.4–15.4)
PERFORMED ON: ABNORMAL
PHOSPHORUS: 1.6 MG/DL (ref 2.5–4.9)
PLATELET # BLD: 13 K/UL (ref 135–450)
PMV BLD AUTO: 8.6 FL (ref 5–10.5)
POTASSIUM SERPL-SCNC: 4.1 MMOL/L (ref 3.5–5.1)
PROTHROMBIN TIME: 11.6 SEC (ref 10–13.2)
RBC # BLD: 2.85 M/UL (ref 4–5.2)
SODIUM BLD-SCNC: 144 MMOL/L (ref 136–145)
URIC ACID, SERUM: 2.6 MG/DL (ref 2.6–6)
WBC # BLD: 0.1 K/UL (ref 4–11)

## 2019-12-05 PROCEDURE — 6360000002 HC RX W HCPCS: Performed by: NURSE PRACTITIONER

## 2019-12-05 PROCEDURE — 99233 SBSQ HOSP IP/OBS HIGH 50: CPT | Performed by: INTERNAL MEDICINE

## 2019-12-05 PROCEDURE — 6360000002 HC RX W HCPCS: Performed by: INTERNAL MEDICINE

## 2019-12-05 PROCEDURE — 2580000003 HC RX 258: Performed by: NURSE PRACTITIONER

## 2019-12-05 PROCEDURE — 36592 COLLECT BLOOD FROM PICC: CPT

## 2019-12-05 PROCEDURE — 84550 ASSAY OF BLOOD/URIC ACID: CPT

## 2019-12-05 PROCEDURE — 85610 PROTHROMBIN TIME: CPT

## 2019-12-05 PROCEDURE — 6370000000 HC RX 637 (ALT 250 FOR IP): Performed by: NURSE PRACTITIONER

## 2019-12-05 PROCEDURE — 2500000003 HC RX 250 WO HCPCS: Performed by: NURSE PRACTITIONER

## 2019-12-05 PROCEDURE — 83735 ASSAY OF MAGNESIUM: CPT

## 2019-12-05 PROCEDURE — 2060000000 HC ICU INTERMEDIATE R&B

## 2019-12-05 PROCEDURE — 6370000000 HC RX 637 (ALT 250 FOR IP): Performed by: INTERNAL MEDICINE

## 2019-12-05 PROCEDURE — 84100 ASSAY OF PHOSPHORUS: CPT

## 2019-12-05 PROCEDURE — 2580000003 HC RX 258: Performed by: INTERNAL MEDICINE

## 2019-12-05 PROCEDURE — 85730 THROMBOPLASTIN TIME PARTIAL: CPT

## 2019-12-05 PROCEDURE — 85025 COMPLETE CBC W/AUTO DIFF WBC: CPT

## 2019-12-05 PROCEDURE — 80048 BASIC METABOLIC PNL TOTAL CA: CPT

## 2019-12-05 RX ORDER — ONDANSETRON 2 MG/ML
8 INJECTION INTRAMUSCULAR; INTRAVENOUS EVERY 8 HOURS
Status: DISCONTINUED | OUTPATIENT
Start: 2019-12-05 | End: 2019-12-09 | Stop reason: HOSPADM

## 2019-12-05 RX ORDER — FUROSEMIDE 10 MG/ML
40 INJECTION INTRAMUSCULAR; INTRAVENOUS ONCE
Status: COMPLETED | OUTPATIENT
Start: 2019-12-05 | End: 2019-12-05

## 2019-12-05 RX ADMIN — VALACYCLOVIR HYDROCHLORIDE 500 MG: 500 TABLET, FILM COATED ORAL at 21:21

## 2019-12-05 RX ADMIN — CETIRIZINE HYDROCHLORIDE 10 MG: 10 TABLET, FILM COATED ORAL at 08:05

## 2019-12-05 RX ADMIN — SODIUM BICARBONATE: 84 INJECTION, SOLUTION INTRAVENOUS at 13:15

## 2019-12-05 RX ADMIN — FUROSEMIDE 40 MG: 10 INJECTION, SOLUTION INTRAMUSCULAR; INTRAVENOUS at 08:49

## 2019-12-05 RX ADMIN — INSULIN LISPRO 6 UNITS: 100 INJECTION, SOLUTION INTRAVENOUS; SUBCUTANEOUS at 07:36

## 2019-12-05 RX ADMIN — POTASSIUM CHLORIDE 80 MEQ: 400 INJECTION, SOLUTION INTRAVENOUS at 00:11

## 2019-12-05 RX ADMIN — SODIUM CHLORIDE 15 ML: 900 IRRIGANT IRRIGATION at 21:22

## 2019-12-05 RX ADMIN — LOPERAMIDE HYDROCHLORIDE 2 MG: 2 CAPSULE ORAL at 02:57

## 2019-12-05 RX ADMIN — VALACYCLOVIR HYDROCHLORIDE 500 MG: 500 TABLET, FILM COATED ORAL at 08:48

## 2019-12-05 RX ADMIN — Medication 10 ML: at 08:50

## 2019-12-05 RX ADMIN — LOPERAMIDE HYDROCHLORIDE 2 MG: 2 CAPSULE ORAL at 18:40

## 2019-12-05 RX ADMIN — PROCHLORPERAZINE MALEATE 10 MG: 10 TABLET ORAL at 14:49

## 2019-12-05 RX ADMIN — FLECAINIDE ACETATE 150 MG: 100 TABLET ORAL at 08:06

## 2019-12-05 RX ADMIN — ONDANSETRON 8 MG: 2 INJECTION INTRAMUSCULAR; INTRAVENOUS at 09:15

## 2019-12-05 RX ADMIN — SODIUM CHLORIDE 15 ML: 900 IRRIGANT IRRIGATION at 13:15

## 2019-12-05 RX ADMIN — METOPROLOL TARTRATE 25 MG: 25 TABLET ORAL at 02:57

## 2019-12-05 RX ADMIN — TBO-FILGRASTIM 300 MCG: 300 INJECTION, SOLUTION SUBCUTANEOUS at 18:40

## 2019-12-05 RX ADMIN — METOPROLOL TARTRATE 25 MG: 25 TABLET ORAL at 08:06

## 2019-12-05 RX ADMIN — METOPROLOL TARTRATE 25 MG: 25 TABLET ORAL at 14:49

## 2019-12-05 RX ADMIN — METOPROLOL TARTRATE 25 MG: 25 TABLET ORAL at 21:21

## 2019-12-05 RX ADMIN — MEROPENEM 1 G: 1 INJECTION, POWDER, FOR SOLUTION INTRAVENOUS at 00:11

## 2019-12-05 RX ADMIN — SODIUM CHLORIDE 15 ML: 900 IRRIGANT IRRIGATION at 18:48

## 2019-12-05 RX ADMIN — ONDANSETRON 8 MG: 2 INJECTION INTRAMUSCULAR; INTRAVENOUS at 18:47

## 2019-12-05 RX ADMIN — FLECAINIDE ACETATE 150 MG: 100 TABLET ORAL at 21:22

## 2019-12-05 RX ADMIN — POTASSIUM CHLORIDE: 2 INJECTION, SOLUTION, CONCENTRATE INTRAVENOUS at 00:11

## 2019-12-05 RX ADMIN — PROCHLORPERAZINE MALEATE 10 MG: 10 TABLET ORAL at 22:36

## 2019-12-05 RX ADMIN — POTASSIUM PHOSPHATE, MONOBASIC AND POTASSIUM PHOSPHATE, DIBASIC 30 MMOL: 224; 236 INJECTION, SOLUTION, CONCENTRATE INTRAVENOUS at 10:04

## 2019-12-05 RX ADMIN — SODIUM CHLORIDE 15 ML: 900 IRRIGANT IRRIGATION at 06:17

## 2019-12-05 RX ADMIN — Medication 10 ML: at 21:22

## 2019-12-05 RX ADMIN — INSULIN LISPRO 2 UNITS: 100 INJECTION, SOLUTION INTRAVENOUS; SUBCUTANEOUS at 22:36

## 2019-12-05 RX ADMIN — DIPHENOXYLATE HYDROCHLORIDE AND ATROPINE SULFATE 1 TABLET: 2.5; .025 TABLET ORAL at 08:48

## 2019-12-05 RX ADMIN — MEROPENEM 1 G: 1 INJECTION, POWDER, FOR SOLUTION INTRAVENOUS at 16:22

## 2019-12-05 RX ADMIN — PROCHLORPERAZINE MALEATE 10 MG: 10 TABLET ORAL at 06:17

## 2019-12-05 RX ADMIN — FLUCONAZOLE 400 MG: 200 TABLET ORAL at 08:06

## 2019-12-05 RX ADMIN — MEROPENEM 1 G: 1 INJECTION, POWDER, FOR SOLUTION INTRAVENOUS at 08:05

## 2019-12-05 RX ADMIN — INSULIN LISPRO 6 UNITS: 100 INJECTION, SOLUTION INTRAVENOUS; SUBCUTANEOUS at 16:36

## 2019-12-05 ASSESSMENT — PAIN SCALES - GENERAL
PAINLEVEL_OUTOF10: 0

## 2019-12-06 LAB
ALBUMIN SERPL-MCNC: 2.5 G/DL (ref 3.4–5)
ALP BLD-CCNC: 91 U/L (ref 40–129)
ALT SERPL-CCNC: 12 U/L (ref 10–40)
ANION GAP SERPL CALCULATED.3IONS-SCNC: 13 MMOL/L (ref 3–16)
ANION GAP SERPL CALCULATED.3IONS-SCNC: 9 MMOL/L (ref 3–16)
AST SERPL-CCNC: 10 U/L (ref 15–37)
BILIRUB SERPL-MCNC: 0.4 MG/DL (ref 0–1)
BILIRUBIN DIRECT: <0.2 MG/DL (ref 0–0.3)
BILIRUBIN, INDIRECT: ABNORMAL MG/DL (ref 0–1)
BLOOD BANK DISPENSE STATUS: NORMAL
BLOOD BANK PRODUCT CODE: NORMAL
BPU ID: NORMAL
BUN BLDV-MCNC: 9 MG/DL (ref 7–20)
BUN BLDV-MCNC: 9 MG/DL (ref 7–20)
CALCIUM SERPL-MCNC: 7.2 MG/DL (ref 8.3–10.6)
CALCIUM SERPL-MCNC: 7.2 MG/DL (ref 8.3–10.6)
CHLORIDE BLD-SCNC: 108 MMOL/L (ref 99–110)
CHLORIDE BLD-SCNC: 110 MMOL/L (ref 99–110)
CO2: 20 MMOL/L (ref 21–32)
CO2: 22 MMOL/L (ref 21–32)
CREAT SERPL-MCNC: 0.6 MG/DL (ref 0.6–1.2)
CREAT SERPL-MCNC: 0.7 MG/DL (ref 0.6–1.2)
DESCRIPTION BLOOD BANK: NORMAL
GFR AFRICAN AMERICAN: >60
GFR AFRICAN AMERICAN: >60
GFR NON-AFRICAN AMERICAN: >60
GFR NON-AFRICAN AMERICAN: >60
GLUCOSE BLD-MCNC: 108 MG/DL (ref 70–99)
GLUCOSE BLD-MCNC: 152 MG/DL (ref 70–99)
GLUCOSE BLD-MCNC: 182 MG/DL (ref 70–99)
GLUCOSE BLD-MCNC: 182 MG/DL (ref 70–99)
GLUCOSE BLD-MCNC: 188 MG/DL (ref 70–99)
GLUCOSE BLD-MCNC: 195 MG/DL (ref 70–99)
GLUCOSE BLD-MCNC: 198 MG/DL (ref 70–99)
HCT VFR BLD CALC: 20.3 % (ref 36–48)
HCT VFR BLD CALC: 21.2 % (ref 36–48)
HEMOGLOBIN: 7 G/DL (ref 12–16)
HEMOGLOBIN: 7.2 G/DL (ref 12–16)
LACTATE DEHYDROGENASE: 168 U/L (ref 100–190)
MAGNESIUM: 1.5 MG/DL (ref 1.8–2.4)
MAGNESIUM: 1.5 MG/DL (ref 1.8–2.4)
MCH RBC QN AUTO: 30.7 PG (ref 26–34)
MCH RBC QN AUTO: 30.9 PG (ref 26–34)
MCHC RBC AUTO-ENTMCNC: 34 G/DL (ref 31–36)
MCHC RBC AUTO-ENTMCNC: 34.2 G/DL (ref 31–36)
MCV RBC AUTO: 90.3 FL (ref 80–100)
MCV RBC AUTO: 90.4 FL (ref 80–100)
PDW BLD-RTO: 15.9 % (ref 12.4–15.4)
PDW BLD-RTO: 16.2 % (ref 12.4–15.4)
PERFORMED ON: ABNORMAL
PHOSPHORUS: 2.2 MG/DL (ref 2.5–4.9)
PLATELET # BLD: 7 K/UL (ref 135–450)
PLATELET # BLD: 8 K/UL (ref 135–450)
PMV BLD AUTO: 9.2 FL (ref 5–10.5)
PMV BLD AUTO: 9.2 FL (ref 5–10.5)
POTASSIUM SERPL-SCNC: 2.6 MMOL/L (ref 3.5–5.1)
POTASSIUM SERPL-SCNC: 2.7 MMOL/L (ref 3.5–5.1)
RBC # BLD: 2.25 M/UL (ref 4–5.2)
RBC # BLD: 2.35 M/UL (ref 4–5.2)
SODIUM BLD-SCNC: 141 MMOL/L (ref 136–145)
SODIUM BLD-SCNC: 141 MMOL/L (ref 136–145)
TOTAL PROTEIN: 4.8 G/DL (ref 6.4–8.2)
URIC ACID, SERUM: 2.8 MG/DL (ref 2.6–6)
WBC # BLD: 0.2 K/UL (ref 4–11)
WBC # BLD: 0.2 K/UL (ref 4–11)

## 2019-12-06 PROCEDURE — 2580000003 HC RX 258: Performed by: NURSE PRACTITIONER

## 2019-12-06 PROCEDURE — 84100 ASSAY OF PHOSPHORUS: CPT

## 2019-12-06 PROCEDURE — 6360000002 HC RX W HCPCS: Performed by: INTERNAL MEDICINE

## 2019-12-06 PROCEDURE — 80048 BASIC METABOLIC PNL TOTAL CA: CPT

## 2019-12-06 PROCEDURE — 2580000003 HC RX 258: Performed by: INTERNAL MEDICINE

## 2019-12-06 PROCEDURE — 6370000000 HC RX 637 (ALT 250 FOR IP): Performed by: NURSE PRACTITIONER

## 2019-12-06 PROCEDURE — 36592 COLLECT BLOOD FROM PICC: CPT

## 2019-12-06 PROCEDURE — 84550 ASSAY OF BLOOD/URIC ACID: CPT

## 2019-12-06 PROCEDURE — 6370000000 HC RX 637 (ALT 250 FOR IP): Performed by: INTERNAL MEDICINE

## 2019-12-06 PROCEDURE — 6360000002 HC RX W HCPCS: Performed by: NURSE PRACTITIONER

## 2019-12-06 PROCEDURE — 99233 SBSQ HOSP IP/OBS HIGH 50: CPT | Performed by: INTERNAL MEDICINE

## 2019-12-06 PROCEDURE — 85027 COMPLETE CBC AUTOMATED: CPT

## 2019-12-06 PROCEDURE — 2500000003 HC RX 250 WO HCPCS: Performed by: NURSE PRACTITIONER

## 2019-12-06 PROCEDURE — 83615 LACTATE (LD) (LDH) ENZYME: CPT

## 2019-12-06 PROCEDURE — 85025 COMPLETE CBC W/AUTO DIFF WBC: CPT

## 2019-12-06 PROCEDURE — 80076 HEPATIC FUNCTION PANEL: CPT

## 2019-12-06 PROCEDURE — 2060000000 HC ICU INTERMEDIATE R&B

## 2019-12-06 PROCEDURE — 36430 TRANSFUSION BLD/BLD COMPNT: CPT

## 2019-12-06 PROCEDURE — 2500000003 HC RX 250 WO HCPCS: Performed by: INTERNAL MEDICINE

## 2019-12-06 PROCEDURE — 83735 ASSAY OF MAGNESIUM: CPT

## 2019-12-06 RX ORDER — SODIUM CHLORIDE 9 MG/ML
INJECTION, SOLUTION INTRAVENOUS
Status: DISPENSED
Start: 2019-12-06 | End: 2019-12-06

## 2019-12-06 RX ORDER — FUROSEMIDE 10 MG/ML
40 INJECTION INTRAMUSCULAR; INTRAVENOUS ONCE
Status: COMPLETED | OUTPATIENT
Start: 2019-12-06 | End: 2019-12-06

## 2019-12-06 RX ORDER — 0.9 % SODIUM CHLORIDE 0.9 %
250 INTRAVENOUS SOLUTION INTRAVENOUS ONCE
Status: COMPLETED | OUTPATIENT
Start: 2019-12-06 | End: 2019-12-06

## 2019-12-06 RX ADMIN — MEROPENEM 1 G: 1 INJECTION, POWDER, FOR SOLUTION INTRAVENOUS at 00:25

## 2019-12-06 RX ADMIN — TBO-FILGRASTIM 300 MCG: 300 INJECTION, SOLUTION SUBCUTANEOUS at 17:54

## 2019-12-06 RX ADMIN — METOPROLOL TARTRATE 25 MG: 25 TABLET ORAL at 09:45

## 2019-12-06 RX ADMIN — FLECAINIDE ACETATE 150 MG: 100 TABLET ORAL at 20:16

## 2019-12-06 RX ADMIN — INSULIN LISPRO 3 UNITS: 100 INJECTION, SOLUTION INTRAVENOUS; SUBCUTANEOUS at 12:09

## 2019-12-06 RX ADMIN — POTASSIUM CHLORIDE 20 MEQ: 400 INJECTION, SOLUTION INTRAVENOUS at 08:40

## 2019-12-06 RX ADMIN — POTASSIUM PHOSPHATE, MONOBASIC AND POTASSIUM PHOSPHATE, DIBASIC 36 MMOL: 224; 236 INJECTION, SOLUTION, CONCENTRATE INTRAVENOUS at 10:49

## 2019-12-06 RX ADMIN — MEROPENEM 1 G: 1 INJECTION, POWDER, FOR SOLUTION INTRAVENOUS at 23:49

## 2019-12-06 RX ADMIN — PROCHLORPERAZINE MALEATE 10 MG: 10 TABLET ORAL at 07:05

## 2019-12-06 RX ADMIN — LOPERAMIDE HYDROCHLORIDE 2 MG: 2 CAPSULE ORAL at 18:54

## 2019-12-06 RX ADMIN — VALACYCLOVIR HYDROCHLORIDE 500 MG: 500 TABLET, FILM COATED ORAL at 09:45

## 2019-12-06 RX ADMIN — ONDANSETRON 8 MG: 2 INJECTION INTRAMUSCULAR; INTRAVENOUS at 17:54

## 2019-12-06 RX ADMIN — DIPHENOXYLATE HYDROCHLORIDE AND ATROPINE SULFATE 1 TABLET: 2.5; .025 TABLET ORAL at 23:25

## 2019-12-06 RX ADMIN — SODIUM BICARBONATE: 84 INJECTION, SOLUTION INTRAVENOUS at 03:52

## 2019-12-06 RX ADMIN — INSULIN LISPRO 3 UNITS: 100 INJECTION, SOLUTION INTRAVENOUS; SUBCUTANEOUS at 17:02

## 2019-12-06 RX ADMIN — DIPHENOXYLATE HYDROCHLORIDE AND ATROPINE SULFATE 1 TABLET: 2.5; .025 TABLET ORAL at 02:01

## 2019-12-06 RX ADMIN — DIPHENOXYLATE HYDROCHLORIDE AND ATROPINE SULFATE 1 TABLET: 2.5; .025 TABLET ORAL at 13:34

## 2019-12-06 RX ADMIN — Medication 10 ML: at 09:46

## 2019-12-06 RX ADMIN — FUROSEMIDE 40 MG: 10 INJECTION, SOLUTION INTRAMUSCULAR; INTRAVENOUS at 09:45

## 2019-12-06 RX ADMIN — Medication 10 ML: at 20:16

## 2019-12-06 RX ADMIN — ONDANSETRON 8 MG: 2 INJECTION INTRAMUSCULAR; INTRAVENOUS at 03:52

## 2019-12-06 RX ADMIN — LOPERAMIDE HYDROCHLORIDE 2 MG: 2 CAPSULE ORAL at 13:35

## 2019-12-06 RX ADMIN — MEROPENEM 1 G: 1 INJECTION, POWDER, FOR SOLUTION INTRAVENOUS at 16:48

## 2019-12-06 RX ADMIN — ONDANSETRON 8 MG: 2 INJECTION INTRAMUSCULAR; INTRAVENOUS at 10:43

## 2019-12-06 RX ADMIN — CETIRIZINE HYDROCHLORIDE 10 MG: 10 TABLET, FILM COATED ORAL at 09:45

## 2019-12-06 RX ADMIN — FLUCONAZOLE 400 MG: 200 TABLET ORAL at 09:45

## 2019-12-06 RX ADMIN — INSULIN LISPRO 2 UNITS: 100 INJECTION, SOLUTION INTRAVENOUS; SUBCUTANEOUS at 22:20

## 2019-12-06 RX ADMIN — SODIUM CHLORIDE 250 ML: 9 INJECTION, SOLUTION INTRAVENOUS at 10:11

## 2019-12-06 RX ADMIN — FLECAINIDE ACETATE 150 MG: 100 TABLET ORAL at 10:17

## 2019-12-06 RX ADMIN — INSULIN LISPRO 3 UNITS: 100 INJECTION, SOLUTION INTRAVENOUS; SUBCUTANEOUS at 07:26

## 2019-12-06 RX ADMIN — METOPROLOL TARTRATE 25 MG: 25 TABLET ORAL at 03:52

## 2019-12-06 RX ADMIN — POTASSIUM CHLORIDE 80 MEQ: 400 INJECTION, SOLUTION INTRAVENOUS at 10:04

## 2019-12-06 RX ADMIN — MAGNESIUM SULFATE HEPTAHYDRATE 2 G: 40 INJECTION, SOLUTION INTRAVENOUS at 06:28

## 2019-12-06 RX ADMIN — SODIUM CHLORIDE 15 ML: 900 IRRIGANT IRRIGATION at 10:44

## 2019-12-06 RX ADMIN — PROCHLORPERAZINE MALEATE 10 MG: 10 TABLET ORAL at 22:20

## 2019-12-06 RX ADMIN — SODIUM CHLORIDE 15 ML: 900 IRRIGANT IRRIGATION at 20:17

## 2019-12-06 RX ADMIN — SODIUM CHLORIDE 250 ML: 9 INJECTION, SOLUTION INTRAVENOUS at 06:17

## 2019-12-06 RX ADMIN — POTASSIUM CHLORIDE 20 MEQ: 400 INJECTION, SOLUTION INTRAVENOUS at 07:28

## 2019-12-06 RX ADMIN — VALACYCLOVIR HYDROCHLORIDE 500 MG: 500 TABLET, FILM COATED ORAL at 20:16

## 2019-12-06 RX ADMIN — MEROPENEM 1 G: 1 INJECTION, POWDER, FOR SOLUTION INTRAVENOUS at 09:26

## 2019-12-06 RX ADMIN — METOPROLOL TARTRATE 25 MG: 25 TABLET ORAL at 14:54

## 2019-12-06 RX ADMIN — PROCHLORPERAZINE MALEATE 10 MG: 10 TABLET ORAL at 14:54

## 2019-12-06 RX ADMIN — POTASSIUM CHLORIDE 80 MEQ: 400 INJECTION, SOLUTION INTRAVENOUS at 06:28

## 2019-12-06 RX ADMIN — METOPROLOL TARTRATE 25 MG: 25 TABLET ORAL at 20:16

## 2019-12-06 ASSESSMENT — PAIN SCALES - GENERAL
PAINLEVEL_OUTOF10: 0

## 2019-12-07 LAB
ANION GAP SERPL CALCULATED.3IONS-SCNC: 11 MMOL/L (ref 3–16)
ATYPICAL LYMPHOCYTE RELATIVE PERCENT: 3 % (ref 0–6)
BANDED NEUTROPHILS RELATIVE PERCENT: 11 % (ref 0–7)
BASOPHILS ABSOLUTE: 0 K/UL (ref 0–0.2)
BASOPHILS RELATIVE PERCENT: 0 %
BUN BLDV-MCNC: 9 MG/DL (ref 7–20)
CALCIUM SERPL-MCNC: 7.6 MG/DL (ref 8.3–10.6)
CHLORIDE BLD-SCNC: 107 MMOL/L (ref 99–110)
CO2: 23 MMOL/L (ref 21–32)
CREAT SERPL-MCNC: 0.5 MG/DL (ref 0.6–1.2)
EOSINOPHILS ABSOLUTE: 0 K/UL (ref 0–0.6)
EOSINOPHILS RELATIVE PERCENT: 0 %
GFR AFRICAN AMERICAN: >60
GFR NON-AFRICAN AMERICAN: >60
GLUCOSE BLD-MCNC: 117 MG/DL (ref 70–99)
GLUCOSE BLD-MCNC: 139 MG/DL (ref 70–99)
GLUCOSE BLD-MCNC: 144 MG/DL (ref 70–99)
GLUCOSE BLD-MCNC: 170 MG/DL (ref 70–99)
GLUCOSE BLD-MCNC: 176 MG/DL (ref 70–99)
HCT VFR BLD CALC: 24.4 % (ref 36–48)
HEMOGLOBIN: 8.5 G/DL (ref 12–16)
LYMPHOCYTES ABSOLUTE: 0.1 K/UL (ref 1–5.1)
LYMPHOCYTES RELATIVE PERCENT: 10 %
MAGNESIUM: 1.7 MG/DL (ref 1.8–2.4)
MCH RBC QN AUTO: 31.1 PG (ref 26–34)
MCHC RBC AUTO-ENTMCNC: 34.7 G/DL (ref 31–36)
MCV RBC AUTO: 89.7 FL (ref 80–100)
METAMYELOCYTES RELATIVE PERCENT: 2 %
MONOCYTES ABSOLUTE: 0.2 K/UL (ref 0–1.3)
MONOCYTES RELATIVE PERCENT: 27 %
MYELOCYTE PERCENT: 2 %
NEUTROPHILS ABSOLUTE: 0.5 K/UL (ref 1.7–7.7)
NEUTROPHILS RELATIVE PERCENT: 45 %
NUCLEATED RED BLOOD CELLS: 7 /100 WBC
NUCLEATED RED BLOOD CELLS: 7 /100 WBC
PDW BLD-RTO: 15.1 % (ref 12.4–15.4)
PERFORMED ON: ABNORMAL
PHOSPHORUS: 2.7 MG/DL (ref 2.5–4.9)
PLATELET # BLD: 24 K/UL (ref 135–450)
PMV BLD AUTO: 8.3 FL (ref 5–10.5)
POLYCHROMASIA: ABNORMAL
POTASSIUM SERPL-SCNC: 2.5 MMOL/L (ref 3.5–5.1)
RBC # BLD: 2.72 M/UL (ref 4–5.2)
SODIUM BLD-SCNC: 141 MMOL/L (ref 136–145)
URIC ACID, SERUM: 3.7 MG/DL (ref 2.6–6)
WBC # BLD: 0.8 K/UL (ref 4–11)

## 2019-12-07 PROCEDURE — 6370000000 HC RX 637 (ALT 250 FOR IP): Performed by: NURSE PRACTITIONER

## 2019-12-07 PROCEDURE — 99233 SBSQ HOSP IP/OBS HIGH 50: CPT | Performed by: INTERNAL MEDICINE

## 2019-12-07 PROCEDURE — 83735 ASSAY OF MAGNESIUM: CPT

## 2019-12-07 PROCEDURE — 2580000003 HC RX 258: Performed by: NURSE PRACTITIONER

## 2019-12-07 PROCEDURE — 80048 BASIC METABOLIC PNL TOTAL CA: CPT

## 2019-12-07 PROCEDURE — 6360000002 HC RX W HCPCS: Performed by: INTERNAL MEDICINE

## 2019-12-07 PROCEDURE — 2580000003 HC RX 258: Performed by: INTERNAL MEDICINE

## 2019-12-07 PROCEDURE — 6370000000 HC RX 637 (ALT 250 FOR IP): Performed by: INTERNAL MEDICINE

## 2019-12-07 PROCEDURE — 84100 ASSAY OF PHOSPHORUS: CPT

## 2019-12-07 PROCEDURE — 6360000002 HC RX W HCPCS: Performed by: NURSE PRACTITIONER

## 2019-12-07 PROCEDURE — 85025 COMPLETE CBC W/AUTO DIFF WBC: CPT

## 2019-12-07 PROCEDURE — 84550 ASSAY OF BLOOD/URIC ACID: CPT

## 2019-12-07 PROCEDURE — 2060000000 HC ICU INTERMEDIATE R&B

## 2019-12-07 RX ORDER — SODIUM CHLORIDE 9 MG/ML
INJECTION, SOLUTION INTRAVENOUS CONTINUOUS
Status: DISCONTINUED | OUTPATIENT
Start: 2019-12-07 | End: 2019-12-09 | Stop reason: HOSPADM

## 2019-12-07 RX ADMIN — MEROPENEM 1 G: 1 INJECTION, POWDER, FOR SOLUTION INTRAVENOUS at 16:20

## 2019-12-07 RX ADMIN — MAGNESIUM SULFATE HEPTAHYDRATE 2 G: 40 INJECTION, SOLUTION INTRAVENOUS at 05:45

## 2019-12-07 RX ADMIN — METOPROLOL TARTRATE 25 MG: 25 TABLET ORAL at 21:17

## 2019-12-07 RX ADMIN — SODIUM CHLORIDE 15 ML: 900 IRRIGANT IRRIGATION at 06:37

## 2019-12-07 RX ADMIN — INSULIN LISPRO 3 UNITS: 100 INJECTION, SOLUTION INTRAVENOUS; SUBCUTANEOUS at 12:22

## 2019-12-07 RX ADMIN — VALACYCLOVIR HYDROCHLORIDE 500 MG: 500 TABLET, FILM COATED ORAL at 08:30

## 2019-12-07 RX ADMIN — SODIUM CHLORIDE 15 ML: 900 IRRIGANT IRRIGATION at 21:18

## 2019-12-07 RX ADMIN — MEROPENEM 1 G: 1 INJECTION, POWDER, FOR SOLUTION INTRAVENOUS at 08:30

## 2019-12-07 RX ADMIN — PROCHLORPERAZINE MALEATE 10 MG: 10 TABLET ORAL at 06:37

## 2019-12-07 RX ADMIN — METOPROLOL TARTRATE 25 MG: 25 TABLET ORAL at 14:26

## 2019-12-07 RX ADMIN — POTASSIUM CHLORIDE 20 MEQ: 400 INJECTION, SOLUTION INTRAVENOUS at 05:45

## 2019-12-07 RX ADMIN — INSULIN LISPRO 3 UNITS: 100 INJECTION, SOLUTION INTRAVENOUS; SUBCUTANEOUS at 18:29

## 2019-12-07 RX ADMIN — SODIUM CHLORIDE 1 ML: 9 INJECTION, SOLUTION INTRAVENOUS at 12:39

## 2019-12-07 RX ADMIN — FLECAINIDE ACETATE 150 MG: 100 TABLET ORAL at 12:31

## 2019-12-07 RX ADMIN — PROCHLORPERAZINE MALEATE 10 MG: 10 TABLET ORAL at 14:26

## 2019-12-07 RX ADMIN — TBO-FILGRASTIM 300 MCG: 300 INJECTION, SOLUTION SUBCUTANEOUS at 18:29

## 2019-12-07 RX ADMIN — POTASSIUM CHLORIDE 80 MEQ: 400 INJECTION, SOLUTION INTRAVENOUS at 09:44

## 2019-12-07 RX ADMIN — LOPERAMIDE HYDROCHLORIDE 2 MG: 2 CAPSULE ORAL at 09:44

## 2019-12-07 RX ADMIN — SODIUM CHLORIDE 15 ML: 900 IRRIGANT IRRIGATION at 16:20

## 2019-12-07 RX ADMIN — VALACYCLOVIR HYDROCHLORIDE 500 MG: 500 TABLET, FILM COATED ORAL at 21:17

## 2019-12-07 RX ADMIN — MEROPENEM 1 G: 1 INJECTION, POWDER, FOR SOLUTION INTRAVENOUS at 23:59

## 2019-12-07 RX ADMIN — FLUCONAZOLE 400 MG: 200 TABLET ORAL at 08:30

## 2019-12-07 RX ADMIN — PROCHLORPERAZINE MALEATE 10 MG: 10 TABLET ORAL at 22:31

## 2019-12-07 RX ADMIN — POTASSIUM CHLORIDE 80 MEQ: 400 INJECTION, SOLUTION INTRAVENOUS at 08:05

## 2019-12-07 RX ADMIN — FLECAINIDE ACETATE 150 MG: 100 TABLET ORAL at 21:17

## 2019-12-07 RX ADMIN — INSULIN LISPRO 2 UNITS: 100 INJECTION, SOLUTION INTRAVENOUS; SUBCUTANEOUS at 22:31

## 2019-12-07 RX ADMIN — Medication 10 ML: at 08:31

## 2019-12-07 RX ADMIN — SODIUM CHLORIDE: 9 INJECTION, SOLUTION INTRAVENOUS at 03:54

## 2019-12-07 RX ADMIN — ONDANSETRON 8 MG: 2 INJECTION INTRAMUSCULAR; INTRAVENOUS at 12:19

## 2019-12-07 RX ADMIN — ONDANSETRON 8 MG: 2 INJECTION INTRAMUSCULAR; INTRAVENOUS at 18:29

## 2019-12-07 RX ADMIN — Medication 10 ML: at 21:17

## 2019-12-07 RX ADMIN — METOPROLOL TARTRATE 25 MG: 25 TABLET ORAL at 03:52

## 2019-12-07 RX ADMIN — SODIUM CHLORIDE 15 ML: 900 IRRIGANT IRRIGATION at 12:36

## 2019-12-07 RX ADMIN — ONDANSETRON 8 MG: 2 INJECTION INTRAMUSCULAR; INTRAVENOUS at 03:52

## 2019-12-07 RX ADMIN — METOPROLOL TARTRATE 25 MG: 25 TABLET ORAL at 08:30

## 2019-12-07 RX ADMIN — POTASSIUM CHLORIDE 20 MEQ: 400 INJECTION, SOLUTION INTRAVENOUS at 06:38

## 2019-12-07 RX ADMIN — CETIRIZINE HYDROCHLORIDE 10 MG: 10 TABLET, FILM COATED ORAL at 08:30

## 2019-12-07 ASSESSMENT — PAIN SCALES - GENERAL
PAINLEVEL_OUTOF10: 0

## 2019-12-08 LAB
ABO/RH: NORMAL
ANION GAP SERPL CALCULATED.3IONS-SCNC: 10 MMOL/L (ref 3–16)
ANTIBODY SCREEN: NORMAL
BANDED NEUTROPHILS RELATIVE PERCENT: 11 % (ref 0–7)
BASOPHILS ABSOLUTE: 0 K/UL (ref 0–0.2)
BASOPHILS RELATIVE PERCENT: 0 %
BUN BLDV-MCNC: 8 MG/DL (ref 7–20)
CALCIUM SERPL-MCNC: 7.4 MG/DL (ref 8.3–10.6)
CHLORIDE BLD-SCNC: 112 MMOL/L (ref 99–110)
CO2: 22 MMOL/L (ref 21–32)
CREAT SERPL-MCNC: 0.5 MG/DL (ref 0.6–1.2)
EOSINOPHILS ABSOLUTE: 0 K/UL (ref 0–0.6)
EOSINOPHILS RELATIVE PERCENT: 0 %
GFR AFRICAN AMERICAN: >60
GFR NON-AFRICAN AMERICAN: >60
GLUCOSE BLD-MCNC: 121 MG/DL (ref 70–99)
GLUCOSE BLD-MCNC: 136 MG/DL (ref 70–99)
GLUCOSE BLD-MCNC: 172 MG/DL (ref 70–99)
GLUCOSE BLD-MCNC: 174 MG/DL (ref 70–99)
GLUCOSE BLD-MCNC: 181 MG/DL (ref 70–99)
HCT VFR BLD CALC: 25.1 % (ref 36–48)
HEMATOLOGY PATH CONSULT: NO
HEMOGLOBIN: 8.7 G/DL (ref 12–16)
LYMPHOCYTES ABSOLUTE: 0.2 K/UL (ref 1–5.1)
LYMPHOCYTES RELATIVE PERCENT: 7 %
MAGNESIUM: 1.8 MG/DL (ref 1.8–2.4)
MCH RBC QN AUTO: 31.2 PG (ref 26–34)
MCHC RBC AUTO-ENTMCNC: 34.4 G/DL (ref 31–36)
MCV RBC AUTO: 90.7 FL (ref 80–100)
METAMYELOCYTES RELATIVE PERCENT: 6 %
MONOCYTES ABSOLUTE: 0.3 K/UL (ref 0–1.3)
MONOCYTES RELATIVE PERCENT: 11 %
MYELOCYTE PERCENT: 8 %
NEUTROPHILS ABSOLUTE: 2.5 K/UL (ref 1.7–7.7)
NEUTROPHILS RELATIVE PERCENT: 57 %
NUCLEATED RED BLOOD CELLS: 3 /100 WBC
NUCLEATED RED BLOOD CELLS: 3 /100 WBC
PDW BLD-RTO: 15.5 % (ref 12.4–15.4)
PERFORMED ON: ABNORMAL
PHOSPHORUS: 1.9 MG/DL (ref 2.5–4.9)
PLATELET # BLD: 19 K/UL (ref 135–450)
PMV BLD AUTO: 8.6 FL (ref 5–10.5)
POTASSIUM SERPL-SCNC: 2.8 MMOL/L (ref 3.5–5.1)
RBC # BLD: 2.77 M/UL (ref 4–5.2)
SODIUM BLD-SCNC: 144 MMOL/L (ref 136–145)
URIC ACID, SERUM: 3.7 MG/DL (ref 2.6–6)
WBC # BLD: 3 K/UL (ref 4–11)

## 2019-12-08 PROCEDURE — 6360000002 HC RX W HCPCS: Performed by: NURSE PRACTITIONER

## 2019-12-08 PROCEDURE — 6360000002 HC RX W HCPCS: Performed by: INTERNAL MEDICINE

## 2019-12-08 PROCEDURE — 84100 ASSAY OF PHOSPHORUS: CPT

## 2019-12-08 PROCEDURE — 2060000000 HC ICU INTERMEDIATE R&B

## 2019-12-08 PROCEDURE — 2580000003 HC RX 258

## 2019-12-08 PROCEDURE — 84550 ASSAY OF BLOOD/URIC ACID: CPT

## 2019-12-08 PROCEDURE — 6370000000 HC RX 637 (ALT 250 FOR IP): Performed by: INTERNAL MEDICINE

## 2019-12-08 PROCEDURE — 83735 ASSAY OF MAGNESIUM: CPT

## 2019-12-08 PROCEDURE — 6370000000 HC RX 637 (ALT 250 FOR IP): Performed by: NURSE PRACTITIONER

## 2019-12-08 PROCEDURE — 86850 RBC ANTIBODY SCREEN: CPT

## 2019-12-08 PROCEDURE — 2580000003 HC RX 258: Performed by: INTERNAL MEDICINE

## 2019-12-08 PROCEDURE — 36592 COLLECT BLOOD FROM PICC: CPT

## 2019-12-08 PROCEDURE — 86901 BLOOD TYPING SEROLOGIC RH(D): CPT

## 2019-12-08 PROCEDURE — 2580000003 HC RX 258: Performed by: NURSE PRACTITIONER

## 2019-12-08 PROCEDURE — 86900 BLOOD TYPING SEROLOGIC ABO: CPT

## 2019-12-08 PROCEDURE — 99233 SBSQ HOSP IP/OBS HIGH 50: CPT | Performed by: INTERNAL MEDICINE

## 2019-12-08 PROCEDURE — 85025 COMPLETE CBC W/AUTO DIFF WBC: CPT

## 2019-12-08 PROCEDURE — 80048 BASIC METABOLIC PNL TOTAL CA: CPT

## 2019-12-08 RX ORDER — LOPERAMIDE HYDROCHLORIDE 2 MG/1
2 CAPSULE ORAL 4 TIMES DAILY
Status: DISCONTINUED | OUTPATIENT
Start: 2019-12-08 | End: 2019-12-09 | Stop reason: HOSPADM

## 2019-12-08 RX ORDER — SODIUM CHLORIDE 9 MG/ML
INJECTION, SOLUTION INTRAVENOUS
Status: COMPLETED
Start: 2019-12-08 | End: 2019-12-08

## 2019-12-08 RX ORDER — FUROSEMIDE 10 MG/ML
40 INJECTION INTRAMUSCULAR; INTRAVENOUS 2 TIMES DAILY
Status: DISCONTINUED | OUTPATIENT
Start: 2019-12-08 | End: 2019-12-09

## 2019-12-08 RX ADMIN — LOPERAMIDE HYDROCHLORIDE 2 MG: 2 CAPSULE ORAL at 11:42

## 2019-12-08 RX ADMIN — ONDANSETRON 8 MG: 2 INJECTION INTRAMUSCULAR; INTRAVENOUS at 03:22

## 2019-12-08 RX ADMIN — PROCHLORPERAZINE MALEATE 10 MG: 10 TABLET ORAL at 06:25

## 2019-12-08 RX ADMIN — SODIUM CHLORIDE 15 ML: 900 IRRIGANT IRRIGATION at 11:30

## 2019-12-08 RX ADMIN — INSULIN LISPRO 3 UNITS: 100 INJECTION, SOLUTION INTRAVENOUS; SUBCUTANEOUS at 11:36

## 2019-12-08 RX ADMIN — FLECAINIDE ACETATE 150 MG: 100 TABLET ORAL at 08:25

## 2019-12-08 RX ADMIN — VALACYCLOVIR HYDROCHLORIDE 500 MG: 500 TABLET, FILM COATED ORAL at 08:26

## 2019-12-08 RX ADMIN — Medication 10 ML: at 23:41

## 2019-12-08 RX ADMIN — ONDANSETRON 8 MG: 2 INJECTION INTRAMUSCULAR; INTRAVENOUS at 11:30

## 2019-12-08 RX ADMIN — METOPROLOL TARTRATE 25 MG: 25 TABLET ORAL at 08:26

## 2019-12-08 RX ADMIN — FLECAINIDE ACETATE 150 MG: 100 TABLET ORAL at 21:24

## 2019-12-08 RX ADMIN — METOPROLOL TARTRATE 25 MG: 25 TABLET ORAL at 14:26

## 2019-12-08 RX ADMIN — POTASSIUM CHLORIDE 80 MEQ: 400 INJECTION, SOLUTION INTRAVENOUS at 09:22

## 2019-12-08 RX ADMIN — SODIUM CHLORIDE 500 ML: 9 INJECTION, SOLUTION INTRAVENOUS at 05:09

## 2019-12-08 RX ADMIN — SODIUM CHLORIDE 1 ML: 9 INJECTION, SOLUTION INTRAVENOUS at 14:25

## 2019-12-08 RX ADMIN — FUROSEMIDE 40 MG: 10 INJECTION, SOLUTION INTRAMUSCULAR; INTRAVENOUS at 09:12

## 2019-12-08 RX ADMIN — FUROSEMIDE 40 MG: 10 INJECTION, SOLUTION INTRAMUSCULAR; INTRAVENOUS at 18:45

## 2019-12-08 RX ADMIN — LOPERAMIDE HYDROCHLORIDE 2 MG: 2 CAPSULE ORAL at 15:42

## 2019-12-08 RX ADMIN — PROCHLORPERAZINE MALEATE 10 MG: 10 TABLET ORAL at 23:41

## 2019-12-08 RX ADMIN — POTASSIUM CHLORIDE 80 MEQ: 400 INJECTION, SOLUTION INTRAVENOUS at 06:24

## 2019-12-08 RX ADMIN — PROCHLORPERAZINE MALEATE 10 MG: 10 TABLET ORAL at 14:26

## 2019-12-08 RX ADMIN — TBO-FILGRASTIM 300 MCG: 300 INJECTION, SOLUTION SUBCUTANEOUS at 18:45

## 2019-12-08 RX ADMIN — MEROPENEM 1 G: 1 INJECTION, POWDER, FOR SOLUTION INTRAVENOUS at 08:18

## 2019-12-08 RX ADMIN — VALACYCLOVIR HYDROCHLORIDE 500 MG: 500 TABLET, FILM COATED ORAL at 21:14

## 2019-12-08 RX ADMIN — CETIRIZINE HYDROCHLORIDE 10 MG: 10 TABLET, FILM COATED ORAL at 08:26

## 2019-12-08 RX ADMIN — Medication 10 ML: at 09:24

## 2019-12-08 RX ADMIN — SODIUM CHLORIDE: 9 INJECTION, SOLUTION INTRAVENOUS at 01:22

## 2019-12-08 RX ADMIN — MAGNESIUM SULFATE HEPTAHYDRATE 2 G: 40 INJECTION, SOLUTION INTRAVENOUS at 05:06

## 2019-12-08 RX ADMIN — LOPERAMIDE HYDROCHLORIDE 2 MG: 2 CAPSULE ORAL at 21:14

## 2019-12-08 RX ADMIN — LOPERAMIDE HYDROCHLORIDE 2 MG: 2 CAPSULE ORAL at 01:22

## 2019-12-08 RX ADMIN — METOPROLOL TARTRATE 25 MG: 25 TABLET ORAL at 21:14

## 2019-12-08 RX ADMIN — ONDANSETRON 8 MG: 2 INJECTION INTRAMUSCULAR; INTRAVENOUS at 18:45

## 2019-12-08 RX ADMIN — INSULIN LISPRO 2 UNITS: 100 INJECTION, SOLUTION INTRAVENOUS; SUBCUTANEOUS at 21:32

## 2019-12-08 RX ADMIN — FLUCONAZOLE 400 MG: 200 TABLET ORAL at 08:25

## 2019-12-08 RX ADMIN — INSULIN LISPRO 3 UNITS: 100 INJECTION, SOLUTION INTRAVENOUS; SUBCUTANEOUS at 17:10

## 2019-12-08 RX ADMIN — METOPROLOL TARTRATE 25 MG: 25 TABLET ORAL at 03:22

## 2019-12-08 RX ADMIN — POTASSIUM CHLORIDE 80 MEQ: 400 INJECTION, SOLUTION INTRAVENOUS at 05:05

## 2019-12-08 RX ADMIN — POTASSIUM CHLORIDE 80 MEQ: 400 INJECTION, SOLUTION INTRAVENOUS at 07:37

## 2019-12-08 ASSESSMENT — PAIN SCALES - GENERAL
PAINLEVEL_OUTOF10: 0

## 2019-12-09 ENCOUNTER — APPOINTMENT (OUTPATIENT)
Dept: GENERAL RADIOLOGY | Age: 64
DRG: 016 | End: 2019-12-09
Attending: INTERNAL MEDICINE
Payer: COMMERCIAL

## 2019-12-09 VITALS
HEIGHT: 63 IN | DIASTOLIC BLOOD PRESSURE: 59 MMHG | WEIGHT: 255.2 LBS | HEART RATE: 79 BPM | SYSTOLIC BLOOD PRESSURE: 133 MMHG | OXYGEN SATURATION: 100 % | RESPIRATION RATE: 16 BRPM | BODY MASS INDEX: 45.22 KG/M2 | TEMPERATURE: 97.9 F

## 2019-12-09 LAB
ALBUMIN SERPL-MCNC: 2.8 G/DL (ref 3.4–5)
ALP BLD-CCNC: 177 U/L (ref 40–129)
ALT SERPL-CCNC: 23 U/L (ref 10–40)
ANION GAP SERPL CALCULATED.3IONS-SCNC: 12 MMOL/L (ref 3–16)
ANISOCYTOSIS: ABNORMAL
APTT: 31.8 SEC (ref 24.2–36.2)
AST SERPL-CCNC: 16 U/L (ref 15–37)
BASOPHILS ABSOLUTE: 0 K/UL (ref 0–0.2)
BASOPHILS RELATIVE PERCENT: 0 %
BILIRUB SERPL-MCNC: 0.3 MG/DL (ref 0–1)
BILIRUBIN DIRECT: <0.2 MG/DL (ref 0–0.3)
BILIRUBIN URINE: NEGATIVE
BILIRUBIN, INDIRECT: ABNORMAL MG/DL (ref 0–1)
BLASTS RELATIVE PERCENT: 7 %
BLOOD, URINE: NEGATIVE
BUN BLDV-MCNC: 10 MG/DL (ref 7–20)
CALCIUM SERPL-MCNC: 7.6 MG/DL (ref 8.3–10.6)
CHLORIDE BLD-SCNC: 110 MMOL/L (ref 99–110)
CLARITY: CLEAR
CO2: 23 MMOL/L (ref 21–32)
COLOR: YELLOW
CREAT SERPL-MCNC: 0.6 MG/DL (ref 0.6–1.2)
EOSINOPHILS ABSOLUTE: 0 K/UL (ref 0–0.6)
EOSINOPHILS RELATIVE PERCENT: 0 %
GFR AFRICAN AMERICAN: >60
GFR NON-AFRICAN AMERICAN: >60
GLUCOSE BLD-MCNC: 120 MG/DL (ref 70–99)
GLUCOSE BLD-MCNC: 128 MG/DL (ref 70–99)
GLUCOSE BLD-MCNC: 199 MG/DL (ref 70–99)
GLUCOSE URINE: 100 MG/DL
HCT VFR BLD CALC: 24.5 % (ref 36–48)
HEMATOLOGY PATH CONSULT: NO
HEMOGLOBIN: 8.4 G/DL (ref 12–16)
INR BLD: 1.03 (ref 0.86–1.14)
KETONES, URINE: NEGATIVE MG/DL
LACTATE DEHYDROGENASE: 297 U/L (ref 100–190)
LEUKOCYTE ESTERASE, URINE: NEGATIVE
LYMPHOCYTES ABSOLUTE: 0.2 K/UL (ref 1–5.1)
LYMPHOCYTES RELATIVE PERCENT: 2 %
MAGNESIUM: 1.7 MG/DL (ref 1.8–2.4)
MCH RBC QN AUTO: 31 PG (ref 26–34)
MCHC RBC AUTO-ENTMCNC: 34.2 G/DL (ref 31–36)
MCV RBC AUTO: 90.7 FL (ref 80–100)
MICROSCOPIC EXAMINATION: ABNORMAL
MONOCYTES ABSOLUTE: 0.5 K/UL (ref 0–1.3)
MONOCYTES RELATIVE PERCENT: 7 %
MYELOCYTE PERCENT: 6 %
NEUTROPHILS ABSOLUTE: 6.4 K/UL (ref 1.7–7.7)
NEUTROPHILS RELATIVE PERCENT: 78 %
NITRITE, URINE: NEGATIVE
PDW BLD-RTO: 15.9 % (ref 12.4–15.4)
PERFORMED ON: ABNORMAL
PERFORMED ON: ABNORMAL
PH UA: 6 (ref 5–8)
PHOSPHORUS: 1.9 MG/DL (ref 2.5–4.9)
PLATELET # BLD: 22 K/UL (ref 135–450)
PMV BLD AUTO: 9.8 FL (ref 5–10.5)
POTASSIUM SERPL-SCNC: 2.9 MMOL/L (ref 3.5–5.1)
PROTEIN UA: NEGATIVE MG/DL
PROTHROMBIN TIME: 11.9 SEC (ref 10–13.2)
RBC # BLD: 2.7 M/UL (ref 4–5.2)
SODIUM BLD-SCNC: 145 MMOL/L (ref 136–145)
SPECIFIC GRAVITY UA: 1.01 (ref 1–1.03)
TOTAL PROTEIN: 4.7 G/DL (ref 6.4–8.2)
URIC ACID, SERUM: 4.1 MG/DL (ref 2.6–6)
URINE TYPE: ABNORMAL
UROBILINOGEN, URINE: 0.2 E.U./DL
WBC # BLD: 7.6 K/UL (ref 4–11)

## 2019-12-09 PROCEDURE — 6370000000 HC RX 637 (ALT 250 FOR IP): Performed by: INTERNAL MEDICINE

## 2019-12-09 PROCEDURE — 6360000002 HC RX W HCPCS: Performed by: NURSE PRACTITIONER

## 2019-12-09 PROCEDURE — 81003 URINALYSIS AUTO W/O SCOPE: CPT

## 2019-12-09 PROCEDURE — 80048 BASIC METABOLIC PNL TOTAL CA: CPT

## 2019-12-09 PROCEDURE — 84550 ASSAY OF BLOOD/URIC ACID: CPT

## 2019-12-09 PROCEDURE — 83735 ASSAY OF MAGNESIUM: CPT

## 2019-12-09 PROCEDURE — 36592 COLLECT BLOOD FROM PICC: CPT

## 2019-12-09 PROCEDURE — 80076 HEPATIC FUNCTION PANEL: CPT

## 2019-12-09 PROCEDURE — 2580000003 HC RX 258: Performed by: NURSE PRACTITIONER

## 2019-12-09 PROCEDURE — 85610 PROTHROMBIN TIME: CPT

## 2019-12-09 PROCEDURE — 83615 LACTATE (LD) (LDH) ENZYME: CPT

## 2019-12-09 PROCEDURE — 84100 ASSAY OF PHOSPHORUS: CPT

## 2019-12-09 PROCEDURE — 99233 SBSQ HOSP IP/OBS HIGH 50: CPT | Performed by: INTERNAL MEDICINE

## 2019-12-09 PROCEDURE — 85730 THROMBOPLASTIN TIME PARTIAL: CPT

## 2019-12-09 PROCEDURE — 6370000000 HC RX 637 (ALT 250 FOR IP): Performed by: NURSE PRACTITIONER

## 2019-12-09 PROCEDURE — 85025 COMPLETE CBC W/AUTO DIFF WBC: CPT

## 2019-12-09 PROCEDURE — 6360000002 HC RX W HCPCS: Performed by: INTERNAL MEDICINE

## 2019-12-09 RX ORDER — HEPARIN SODIUM (PORCINE) LOCK FLUSH IV SOLN 100 UNIT/ML 100 UNIT/ML
1500 SOLUTION INTRAVENOUS PRN
Status: DISCONTINUED | OUTPATIENT
Start: 2019-12-09 | End: 2019-12-09 | Stop reason: HOSPADM

## 2019-12-09 RX ORDER — METOPROLOL SUCCINATE 50 MG/1
100 TABLET, EXTENDED RELEASE ORAL ONCE
Status: COMPLETED | OUTPATIENT
Start: 2019-12-09 | End: 2019-12-09

## 2019-12-09 RX ORDER — METOPROLOL SUCCINATE 100 MG/1
100 TABLET, EXTENDED RELEASE ORAL DAILY
Qty: 30 TABLET | Refills: 2 | Status: SHIPPED | OUTPATIENT
Start: 2019-12-09 | End: 2020-03-02

## 2019-12-09 RX ORDER — FUROSEMIDE 20 MG/1
40 TABLET ORAL DAILY
Qty: 90 TABLET | Refills: 3 | Status: SHIPPED | OUTPATIENT
Start: 2019-12-09 | End: 2020-08-17 | Stop reason: SDUPTHER

## 2019-12-09 RX ADMIN — Medication 1500 UNITS: at 13:27

## 2019-12-09 RX ADMIN — Medication 10 ML: at 09:11

## 2019-12-09 RX ADMIN — POTASSIUM CHLORIDE 80 MEQ: 400 INJECTION, SOLUTION INTRAVENOUS at 05:38

## 2019-12-09 RX ADMIN — POTASSIUM CHLORIDE 20 MEQ: 400 INJECTION, SOLUTION INTRAVENOUS at 10:49

## 2019-12-09 RX ADMIN — POTASSIUM CHLORIDE 80 MEQ: 400 INJECTION, SOLUTION INTRAVENOUS at 06:41

## 2019-12-09 RX ADMIN — VALACYCLOVIR HYDROCHLORIDE 500 MG: 500 TABLET, FILM COATED ORAL at 09:07

## 2019-12-09 RX ADMIN — FUROSEMIDE 40 MG: 10 INJECTION, SOLUTION INTRAMUSCULAR; INTRAVENOUS at 09:11

## 2019-12-09 RX ADMIN — SODIUM CHLORIDE 15 ML: 900 IRRIGANT IRRIGATION at 11:33

## 2019-12-09 RX ADMIN — LOPERAMIDE HYDROCHLORIDE 2 MG: 2 CAPSULE ORAL at 09:08

## 2019-12-09 RX ADMIN — FLECAINIDE ACETATE 150 MG: 100 TABLET ORAL at 09:07

## 2019-12-09 RX ADMIN — PROCHLORPERAZINE MALEATE 10 MG: 10 TABLET ORAL at 05:47

## 2019-12-09 RX ADMIN — METOPROLOL TARTRATE 25 MG: 25 TABLET ORAL at 09:07

## 2019-12-09 RX ADMIN — CETIRIZINE HYDROCHLORIDE 10 MG: 10 TABLET, FILM COATED ORAL at 09:07

## 2019-12-09 RX ADMIN — LOPERAMIDE HYDROCHLORIDE 2 MG: 2 CAPSULE ORAL at 11:33

## 2019-12-09 RX ADMIN — ONDANSETRON 8 MG: 2 INJECTION INTRAMUSCULAR; INTRAVENOUS at 11:32

## 2019-12-09 RX ADMIN — INSULIN LISPRO 3 UNITS: 100 INJECTION, SOLUTION INTRAVENOUS; SUBCUTANEOUS at 11:38

## 2019-12-09 RX ADMIN — MAGNESIUM SULFATE HEPTAHYDRATE 2 G: 40 INJECTION, SOLUTION INTRAVENOUS at 05:40

## 2019-12-09 RX ADMIN — METOPROLOL TARTRATE 25 MG: 25 TABLET ORAL at 03:23

## 2019-12-09 RX ADMIN — ONDANSETRON 8 MG: 2 INJECTION INTRAMUSCULAR; INTRAVENOUS at 03:23

## 2019-12-09 RX ADMIN — POTASSIUM CHLORIDE 20 MEQ: 400 INJECTION, SOLUTION INTRAVENOUS at 09:04

## 2019-12-09 RX ADMIN — METOPROLOL SUCCINATE 100 MG: 50 TABLET, EXTENDED RELEASE ORAL at 11:00

## 2019-12-09 ASSESSMENT — PAIN SCALES - GENERAL
PAINLEVEL_OUTOF10: 0

## 2019-12-30 ENCOUNTER — PRE-PROCEDURE TELEPHONE (OUTPATIENT)
Dept: CARDIAC CATH/INVASIVE PROCEDURES | Age: 64
End: 2019-12-30

## 2019-12-30 LAB
CULTURE, FUNGUS BLOOD: NORMAL
CULTURE, FUNGUS BLOOD: NORMAL
FUNGUS (MYCOLOGY) CULTURE: NORMAL
FUNGUS (MYCOLOGY) CULTURE: NORMAL
FUNGUS STAIN: NORMAL
FUNGUS STAIN: NORMAL

## 2019-12-31 ENCOUNTER — HOSPITAL ENCOUNTER (OUTPATIENT)
Dept: INTERVENTIONAL RADIOLOGY/VASCULAR | Age: 64
Discharge: HOME | End: 2019-12-31
Attending: INTERNAL MEDICINE | Admitting: INTERNAL MEDICINE
Payer: COMMERCIAL

## 2019-12-31 VITALS — BODY MASS INDEX: 42.88 KG/M2 | HEIGHT: 63 IN | WEIGHT: 242 LBS | TEMPERATURE: 98.4 F

## 2019-12-31 LAB
HCT VFR BLD CALC: 34.1 % (ref 36–48)
HEMOGLOBIN: 11.2 G/DL (ref 12–16)
INR BLD: 0.99 (ref 0.86–1.14)
MCH RBC QN AUTO: 32.9 PG (ref 26–34)
MCHC RBC AUTO-ENTMCNC: 32.8 G/DL (ref 31–36)
MCV RBC AUTO: 100.3 FL (ref 80–100)
PDW BLD-RTO: 23.7 % (ref 12.4–15.4)
PLATELET # BLD: 164 K/UL (ref 135–450)
PMV BLD AUTO: 9 FL (ref 5–10.5)
PROTHROMBIN TIME: 11.5 SEC (ref 10–13.2)
RBC # BLD: 3.4 M/UL (ref 4–5.2)
WBC # BLD: 6.2 K/UL (ref 4–11)

## 2019-12-31 PROCEDURE — 6360000002 HC RX W HCPCS

## 2019-12-31 PROCEDURE — 36589 REMOVAL TUNNELED CV CATH: CPT

## 2019-12-31 PROCEDURE — 85610 PROTHROMBIN TIME: CPT

## 2019-12-31 PROCEDURE — 2500000003 HC RX 250 WO HCPCS

## 2019-12-31 PROCEDURE — 99152 MOD SED SAME PHYS/QHP 5/>YRS: CPT

## 2019-12-31 PROCEDURE — 85027 COMPLETE CBC AUTOMATED: CPT

## 2019-12-31 NOTE — H&P
Patient:  Nabeel Wills   :   1955      Relevant clinical history, particularly as it involves the pending procedure, was reviewed and discussed. The procedure including risks and benefits was discussed at length with the patient (or designated family member) and all questions were answered. Informed consent to proceed with the procedure was given. Vital signs were monitored and documented by the Radiology nurse. Targeted physical examination  Heart : regular rate and rhythm  Lungs : clear, breathing easily  Condition : stable    [facility] nurses notes reviewed and agreed. Past Medical History:        Diagnosis Date    [facility]     Cancer ([facility]     Hypertension     Melanoma in situ of back [facility])     dr Barbara Roth       Past Surgical History:           Procedure Laterality Date    BREAST CYST ASPIRATION      in past benign bilateral over years. 2 episodes     SECTION      CHOLECYSTECTOMY      COLONOSCOPY  9/10/12    polyp, su, repeat 5 years    COLONOSCOPY  10/09/2017    dr Preciado Block and check in 5 years. polyps    ENDOMETRIAL ABLATION      FINE NEEDLE ASPIRATION      GALLBLADDER SURGERY      OTHER SURGICAL HISTORY  2019    Abdominal Mass BX    OTHER SURGICAL HISTORY  2019    CT BIOPSY ABDOMEN RETROPERITONEUM,    OVARY REMOVAL Left 2018    benign growth, dr Flores Sensor at [facility]. TUNNELED VENOUS PORT PLACEMENT Right 2016    Dr. Billie Sy       Allergies:  Penicillins    Medications:   Home Meds  No current facility-administered medications on file prior to encounter.       Current Outpatient Medications on File Prior to Encounter   Medication Sig Dispense Refill    furosemide (LASIX) 20 MG tablet Take 2 tablets by mouth daily 90 tablet 3    metoprolol succinate (TOPROL XL) 100 MG extended release tablet Take 1 tablet by mouth daily 30 tablet 2    LORazepam (ATIVAN) 1 MG tablet Take 1 mg

## 2020-01-31 ENCOUNTER — TELEPHONE (OUTPATIENT)
Dept: FAMILY MEDICINE CLINIC | Age: 65
End: 2020-01-31

## 2020-01-31 NOTE — TELEPHONE ENCOUNTER
Pt has an appt on Feb. 5, 2020 for a DM check up  , pt is needing blood work prior to appt. Please advise pt if she is needing to fax for the blood work. Pl advise.  [phone]

## 2020-02-08 DIAGNOSIS — E11.9 TYPE 2 DIABETES MELLITUS WITHOUT COMPLICATION, WITHOUT LONG-TERM CURRENT USE OF INSULIN (HCC): ICD-10-CM

## 2020-02-08 LAB
ANION GAP SERPL CALCULATED.3IONS-SCNC: 13 MMOL/L (ref 3–16)
BUN BLDV-MCNC: 16 MG/DL (ref 7–20)
CALCIUM SERPL-MCNC: 9.4 MG/DL (ref 8.3–10.6)
CHLORIDE BLD-SCNC: 103 MMOL/L (ref 99–110)
CHOLESTEROL, TOTAL: 159 MG/DL (ref 0–199)
CO2: 23 MMOL/L (ref 21–32)
CREAT SERPL-MCNC: 0.7 MG/DL (ref 0.6–1.2)
GFR AFRICAN AMERICAN: >60
GFR NON-AFRICAN AMERICAN: >60
GLUCOSE BLD-MCNC: 136 MG/DL (ref 70–99)
HDLC SERPL-MCNC: 54 MG/DL (ref 40–60)
LDL CHOLESTEROL CALCULATED: 78 MG/DL
POTASSIUM SERPL-SCNC: 4.3 MMOL/L (ref 3.5–5.1)
SODIUM BLD-SCNC: 139 MMOL/L (ref 136–145)
TRIGL SERPL-MCNC: 136 MG/DL (ref 0–150)
VLDLC SERPL CALC-MCNC: 27 MG/DL

## 2020-02-09 LAB
ESTIMATED AVERAGE GLUCOSE: 134.1 MG/DL
HBA1C MFR BLD: 6.3 %

## 2020-02-10 RX ORDER — GLIMEPIRIDE 2 MG/1
2 TABLET ORAL 2 TIMES DAILY WITH MEALS
Qty: 270 TABLET | Refills: 1
Start: 2020-02-10 | End: 2020-04-06 | Stop reason: SDUPTHER

## 2020-02-11 ENCOUNTER — OFFICE VISIT (OUTPATIENT)
Dept: FAMILY MEDICINE CLINIC | Age: 65
End: 2020-02-11
Payer: COMMERCIAL

## 2020-02-11 VITALS
TEMPERATURE: 97.6 F | SYSTOLIC BLOOD PRESSURE: 132 MMHG | BODY MASS INDEX: 43.23 KG/M2 | HEART RATE: 76 BPM | DIASTOLIC BLOOD PRESSURE: 82 MMHG | WEIGHT: 244 LBS | HEIGHT: 63 IN

## 2020-02-11 PROCEDURE — 99213 OFFICE O/P EST LOW 20 MIN: CPT | Performed by: FAMILY MEDICINE

## 2020-02-11 PROCEDURE — 90471 IMMUNIZATION ADMIN: CPT | Performed by: FAMILY MEDICINE

## 2020-02-11 PROCEDURE — 90686 IIV4 VACC NO PRSV 0.5 ML IM: CPT | Performed by: FAMILY MEDICINE

## 2020-02-11 ASSESSMENT — PATIENT HEALTH QUESTIONNAIRE - PHQ9
SUM OF ALL RESPONSES TO PHQ9 QUESTIONS 1 & 2: 0
1. LITTLE INTEREST OR PLEASURE IN DOING THINGS: 0
SUM OF ALL RESPONSES TO PHQ QUESTIONS 1-9: 0
2. FEELING DOWN, DEPRESSED OR HOPELESS: 0
SUM OF ALL RESPONSES TO PHQ QUESTIONS 1-9: 0

## 2020-02-11 NOTE — PATIENT INSTRUCTIONS
Hold the metformin and see if the achy feeling resolves and let me know  Continue the glimepiride for now at twice a day  Call me the numbers in about 2 weeks  See in 3 months

## 2020-02-11 NOTE — PROGRESS NOTES
Subjective:      Patient ID: Jah Magallon is a 60 y.o. female. Patient presents with:  Check-Up: diabetes, - discuss lab results    She is here for f/u  She is well  Her glucose is in nid and upper 100's and at times to the 200's  She brought in log    She had been off the metformin from the hospital  Only recently did one of her other doctors told her to restart (1/31/20)  She notes it seems to cause achiness though. She has been on chemo treatments and hospital visits    No fever  No ha  No feet ok some numb on the toes  No sores  No racing of heart no cp no sob  No abdomen pain    YOB: 1955    Date of Visit:  2/11/2020     -- Penicillins -- Hives    Current Outpatient Medications:  glimepiride (AMARYL) 2 MG tablet, Take 1 tablet by mouth 2 times daily (with meals), Disp: 270 tablet, Rfl: 1  furosemide (LASIX) 20 MG tablet, Take 2 tablets by mouth daily, Disp: 90 tablet, Rfl: 3  metoprolol succinate (TOPROL XL) 100 MG extended release tablet, Take 1 tablet by mouth daily, Disp: 30 tablet, Rfl: 2  LORazepam (ATIVAN) 1 MG tablet, Take 1 mg by mouth every 6 hours as needed for Anxiety. , Disp: , Rfl:   acetaminophen (TYLENOL) 500 MG tablet, Take 500 mg by mouth every 6 hours as needed for Pain, Disp: , Rfl:   valACYclovir (VALTREX) 500 MG tablet, Take 500 mg by mouth 2 times daily, Disp: , Rfl:   raNITIdine HCl (ZANTAC PO), Take by mouth OTC, Disp: , Rfl:   loperamide (IMODIUM) 2 MG capsule, Take 2 mg by mouth 4 times daily as needed for Diarrhea Some diarrhea (x2 yesterday) and once today, Disp: , Rfl:   prochlorperazine (COMPAZINE) 10 MG tablet, Take 1 tablet by mouth every 6 hours as needed (nausea), Disp: 60 tablet, Rfl: 3  ondansetron (ZOFRAN) 8 MG tablet, Take 1 tablet by mouth every 8 hours as needed for Nausea or Vomiting, Disp: 30 tablet, Rfl: 3  flecainide (TAMBOCOR) 100 MG tablet, Take 1 tablet by mouth 2 times daily, Disp: 180 tablet, Rfl: 3  apixaban (ELIQUIS) 5 MG TABS tablet, Take 1 tablet by mouth 2 times daily, Disp: 180 tablet, Rfl: 3  potassium chloride (KLOR-CON M) 10 MEQ extended release tablet, Take 2 tablets by mouth 2 times daily, Disp: 360 tablet, Rfl: 2  loratadine (CLARITIN) 10 MG capsule, Take 10 mg by mouth as needed , Disp: , Rfl:     No current facility-administered medications for this visit.       ---------------------------               02/11/20                      0923         ---------------------------   BP:          132/82         Site:    Left Upper Arm     Position:     Sitting        Cuff Size:   Large Adult      Pulse:         76           Temp:   97.6 °F (36.4 °C)   TempSrc:      Oral          Weight: 244 lb (110.7 kg)   Height:   5' 3\" (1.6 m)    ---------------------------  Body mass index is 43.22 kg/m². Wt Readings from Last 3 Encounters:  02/11/20 : 244 lb (110.7 kg)  12/31/19 : 242 lb (109.8 kg)  12/09/19 : 255 lb 3.2 oz (115.8 kg)    BP Readings from Last 3 Encounters:  02/11/20 : 132/82  12/09/19 : (!) 133/59  11/16/19 : 131/83                Review of Systems    Objective:   Physical Exam  Constitutional:       General: She is not in acute distress. Appearance: She is well-developed. She is not diaphoretic. Cardiovascular:      Rate and Rhythm: Normal rate and regular rhythm. Heart sounds: Normal heart sounds. No murmur. No friction rub. No gallop. Pulmonary:      Effort: Pulmonary effort is normal. No tachypnea, accessory muscle usage or respiratory distress. Breath sounds: Normal breath sounds. No decreased breath sounds, wheezing, rhonchi or rales. Lymphadenopathy:      Cervical: No cervical adenopathy. Upper Body:      Right upper body: No supraclavicular adenopathy. Left upper body: No supraclavicular adenopathy. Skin:     General: Skin is warm and dry. Coloration: Skin is not pale. Neurological:      Mental Status: She is alert.          Assessment: Diagnosis Orders   1. Essential hypertension     2. Type 2 diabetes mellitus without complication, without long-term current use of insulin ([address]     3.  Needs flu shot         She was told to get another flu shot due to the stem cell treatment  I confirmed with patient and she told her to get it today      Plan:      Hold the metformin and see if the achy feeling resolves and let me know  Continue the glimepiride for now at twice a day  Call me the numbers in about 2 weeks  See in 3 months        Zohreh Seals MD

## 2020-02-11 NOTE — PROGRESS NOTES
Vaccine Information Sheet, \"Influenza - Inactivated\"  given to Xin Mckeon, or parent/legal guardian of  Xin Mckeon and verbalized understanding. Patient responses:    Have you ever had a reaction to a flu vaccine? No  Do you have any current illness? No  Have you ever had Guillian Cameron Syndrome? No  Do you have a serious allergy to any of the follow: Neomycin, Polymyxin, Thimerosal, eggs or egg products? No    Flu vaccine given per order. Please see immunization tab. Risks and benefits explained. Current VIS given.

## 2020-03-02 ENCOUNTER — OFFICE VISIT (OUTPATIENT)
Dept: CARDIOLOGY CLINIC | Age: 65
End: 2020-03-02
Payer: COMMERCIAL

## 2020-03-02 VITALS
WEIGHT: 245 LBS | BODY MASS INDEX: 43.41 KG/M2 | HEART RATE: 66 BPM | HEIGHT: 63 IN | DIASTOLIC BLOOD PRESSURE: 80 MMHG | SYSTOLIC BLOOD PRESSURE: 118 MMHG | OXYGEN SATURATION: 98 %

## 2020-03-02 PROCEDURE — 99214 OFFICE O/P EST MOD 30 MIN: CPT | Performed by: INTERNAL MEDICINE

## 2020-03-02 PROCEDURE — 93000 ELECTROCARDIOGRAM COMPLETE: CPT | Performed by: INTERNAL MEDICINE

## 2020-03-02 RX ORDER — METOPROLOL SUCCINATE 25 MG/1
25 TABLET, EXTENDED RELEASE ORAL DAILY
Qty: 90 TABLET | Refills: 3 | Status: ON HOLD
Start: 2020-03-02 | End: 2020-10-29 | Stop reason: HOSPADM

## 2020-03-02 NOTE — PROGRESS NOTES
Oriented. No distress. · Head: Normocephalic and atraumatic. · Mouth/Throat: Oropharynx is clear and moist.   · Eyes: Conjunctivae normal. EOM are normal.   · Neck: Normal range of motion. Neck supple. No rigidity. No JVD present. · Cardiovascular: Normal rate, regular rhythm, S1&S2 and intact distal pulses. · Pulmonary/Chest: Bilateral respiratory sounds. No wheezes. No rhonchi. · Abdominal: Soft. Bowel sounds present. No distension, No tenderness. · Musculoskeletal: No tenderness. No edema    · Lymphadenopathy: Has no cervical adenopathy. · Neurological: Alert and oriented. Cranial nerve appears intact, No Gross deficit   · Skin: Skin is warm and dry. No rash noted. · Psychiatric: Has a normal mood, affect and behavior     Labs:  Reviewed. EC20 reviewed, sinus rhythm, low voltage in precordial leads, poor R-wave progression -may be secondary to pulmonary disease, consider old anterior infarct. with v-rate of 62 bpm with QRS duration 98 ms. No pathologic Q waves, ventricular pre-excitation, or QT prolongation. Studies:   1. Event monitor:   None    2. Echo: 10/3/18  Overall left ventricular systolic function appears normal. Ejection fraction  is visually estimated to be 55%. Patient appears to be in atrial fibrillation with rapid ventricular response. Normal right ventricular size and function. There is a small pericardial effusion noted, localized near left ventricle. There is no evidence of right atrial or right ventricular collapse. 3. Stress Test: 1/15/16  1. Technically a satisfactory study.    2. Normal pharmacological stress portion of the study.    3. No evidence of Ischemia by Myocardial Perfusion Imaging.    4. Gated Study shows normal LV size and Systolic function; EF is >69 %.         4. Cath:   None      Procedures:  1. Unsuccessful DCCV attempt x 3 10/31/18  2.  Successful DCCV on 18      Plan:    Atrial fibrillation:  -Paroxysmal  -Asymptomatic  -She is

## 2020-04-06 RX ORDER — GLIMEPIRIDE 2 MG/1
2 TABLET ORAL 2 TIMES DAILY WITH MEALS
Qty: 180 TABLET | Refills: 1 | Status: SHIPPED | OUTPATIENT
Start: 2020-04-06 | End: 2020-08-11 | Stop reason: SDUPTHER

## 2020-04-06 RX ORDER — POTASSIUM CHLORIDE 750 MG/1
20 TABLET, EXTENDED RELEASE ORAL 2 TIMES DAILY
Qty: 360 TABLET | Refills: 1 | Status: SHIPPED | OUTPATIENT
Start: 2020-04-06 | End: 2020-10-20 | Stop reason: SDUPTHER

## 2020-04-17 ENCOUNTER — HOSPITAL ENCOUNTER (INPATIENT)
Age: 65
LOS: 1 days | Discharge: HOME | DRG: 872 | End: 2020-04-18
Attending: EMERGENCY MEDICINE | Admitting: INTERNAL MEDICINE
Payer: COMMERCIAL

## 2020-04-17 ENCOUNTER — OFFICE VISIT (OUTPATIENT)
Dept: PRIMARY CARE CLINIC | Age: 65
End: 2020-04-17
Payer: COMMERCIAL

## 2020-04-17 ENCOUNTER — APPOINTMENT (OUTPATIENT)
Dept: GENERAL RADIOLOGY | Age: 65
DRG: 872 | End: 2020-04-17
Payer: COMMERCIAL

## 2020-04-17 ENCOUNTER — TELEPHONE (OUTPATIENT)
Dept: PRIMARY CARE CLINIC | Age: 65
End: 2020-04-17

## 2020-04-17 VITALS
RESPIRATION RATE: 18 BRPM | WEIGHT: 245.2 LBS | HEART RATE: 96 BPM | BODY MASS INDEX: 43.44 KG/M2 | SYSTOLIC BLOOD PRESSURE: 118 MMHG | TEMPERATURE: 99.2 F | OXYGEN SATURATION: 88 % | DIASTOLIC BLOOD PRESSURE: 68 MMHG

## 2020-04-17 PROBLEM — Z51.11 ENCOUNTER FOR CHEMOTHERAPY MANAGEMENT: Status: RESOLVED | Noted: 2019-11-01 | Resolved: 2020-04-17

## 2020-04-17 PROBLEM — J96.01 ACUTE RESPIRATORY FAILURE WITH HYPOXIA (HCC): Status: ACTIVE | Noted: 2020-04-17

## 2020-04-17 LAB
A/G RATIO: 1.2 (ref 1.1–2.2)
ALBUMIN SERPL-MCNC: 3.6 G/DL (ref 3.4–5)
ALP BLD-CCNC: 172 U/L (ref 40–129)
ALT SERPL-CCNC: 28 U/L (ref 10–40)
ANION GAP SERPL CALCULATED.3IONS-SCNC: 16 MMOL/L (ref 3–16)
AST SERPL-CCNC: 56 U/L (ref 15–37)
BACTERIA: ABNORMAL /HPF
BASOPHILS ABSOLUTE: 0 K/UL (ref 0–0.2)
BASOPHILS RELATIVE PERCENT: 0.5 %
BILIRUB SERPL-MCNC: 0.6 MG/DL (ref 0–1)
BILIRUBIN URINE: NEGATIVE
BLOOD, URINE: NEGATIVE
BUN BLDV-MCNC: 12 MG/DL (ref 7–20)
C-REACTIVE PROTEIN: 71.5 MG/L (ref 0–5.1)
CALCIUM SERPL-MCNC: 9.3 MG/DL (ref 8.3–10.6)
CHLORIDE BLD-SCNC: 100 MMOL/L (ref 99–110)
CLARITY: CLEAR
CO2: 21 MMOL/L (ref 21–32)
COLOR: YELLOW
CREAT SERPL-MCNC: 0.8 MG/DL (ref 0.6–1.2)
D DIMER: 286 NG/ML DDU (ref 0–229)
EKG ATRIAL RATE: 95 BPM
EKG DIAGNOSIS: NORMAL
EKG P AXIS: 33 DEGREES
EKG P-R INTERVAL: 168 MS
EKG Q-T INTERVAL: 346 MS
EKG QRS DURATION: 82 MS
EKG QTC CALCULATION (BAZETT): 434 MS
EKG R AXIS: 27 DEGREES
EKG T AXIS: 13 DEGREES
EKG VENTRICULAR RATE: 95 BPM
EOSINOPHILS ABSOLUTE: 0 K/UL (ref 0–0.6)
EOSINOPHILS RELATIVE PERCENT: 0.8 %
EPITHELIAL CELLS, UA: 0 /HPF (ref 0–5)
GFR AFRICAN AMERICAN: >60
GFR NON-AFRICAN AMERICAN: >60
GLOBULIN: 3 G/DL
GLUCOSE BLD-MCNC: 170 MG/DL (ref 70–99)
GLUCOSE BLD-MCNC: 284 MG/DL (ref 70–99)
GLUCOSE URINE: NEGATIVE MG/DL
HCT VFR BLD CALC: 36.1 % (ref 36–48)
HEMOGLOBIN: 11.8 G/DL (ref 12–16)
HYALINE CASTS: 0 /LPF (ref 0–8)
INR BLD: 1.29 (ref 0.86–1.14)
KETONES, URINE: NEGATIVE MG/DL
LACTATE DEHYDROGENASE: 357 U/L (ref 100–190)
LACTIC ACID, SEPSIS: 2.3 MMOL/L (ref 0.4–1.9)
LACTIC ACID, SEPSIS: 2.5 MMOL/L (ref 0.4–1.9)
LACTIC ACID: 3.1 MMOL/L (ref 0.4–2)
LEUKOCYTE ESTERASE, URINE: ABNORMAL
LYMPHOCYTES ABSOLUTE: 0.4 K/UL (ref 1–5.1)
LYMPHOCYTES RELATIVE PERCENT: 11.3 %
MCH RBC QN AUTO: 31.8 PG (ref 26–34)
MCHC RBC AUTO-ENTMCNC: 32.8 G/DL (ref 31–36)
MCV RBC AUTO: 96.9 FL (ref 80–100)
MICROSCOPIC EXAMINATION: YES
MONOCYTES ABSOLUTE: 0.7 K/UL (ref 0–1.3)
MONOCYTES RELATIVE PERCENT: 19.2 %
NEUTROPHILS ABSOLUTE: 2.4 K/UL (ref 1.7–7.7)
NEUTROPHILS RELATIVE PERCENT: 68.2 %
NITRITE, URINE: POSITIVE
PDW BLD-RTO: 15.8 % (ref 12.4–15.4)
PERFORMED ON: ABNORMAL
PH UA: 5 (ref 5–8)
PLATELET # BLD: 237 K/UL (ref 135–450)
PMV BLD AUTO: 8.9 FL (ref 5–10.5)
POTASSIUM REFLEX MAGNESIUM: 3.7 MMOL/L (ref 3.5–5.1)
PRO-BNP: 296 PG/ML (ref 0–124)
PROCALCITONIN: 0.31 NG/ML (ref 0–0.15)
PROTEIN UA: NEGATIVE MG/DL
PROTHROMBIN TIME: 15 SEC (ref 10–13.2)
RBC # BLD: 3.72 M/UL (ref 4–5.2)
RBC UA: 1 /HPF (ref 0–4)
REPORT: NORMAL
RESPIRATORY PANEL PCR: NORMAL
SARS-COV-2, PCR: NOT DETECTED
SODIUM BLD-SCNC: 137 MMOL/L (ref 136–145)
SPECIFIC GRAVITY UA: 1.01 (ref 1–1.03)
TOTAL PROTEIN: 6.6 G/DL (ref 6.4–8.2)
TROPONIN: <0.01 NG/ML
URINE TYPE: ABNORMAL
UROBILINOGEN, URINE: 0.2 E.U./DL
WBC # BLD: 3.5 K/UL (ref 4–11)
WBC UA: 30 /HPF (ref 0–5)

## 2020-04-17 PROCEDURE — 6360000002 HC RX W HCPCS: Performed by: INTERNAL MEDICINE

## 2020-04-17 PROCEDURE — 81001 URINALYSIS AUTO W/SCOPE: CPT

## 2020-04-17 PROCEDURE — 83880 ASSAY OF NATRIURETIC PEPTIDE: CPT

## 2020-04-17 PROCEDURE — 83605 ASSAY OF LACTIC ACID: CPT

## 2020-04-17 PROCEDURE — 80053 COMPREHEN METABOLIC PANEL: CPT

## 2020-04-17 PROCEDURE — 93005 ELECTROCARDIOGRAM TRACING: CPT | Performed by: EMERGENCY MEDICINE

## 2020-04-17 PROCEDURE — 36415 COLL VENOUS BLD VENIPUNCTURE: CPT

## 2020-04-17 PROCEDURE — 71045 X-RAY EXAM CHEST 1 VIEW: CPT

## 2020-04-17 PROCEDURE — 2500000003 HC RX 250 WO HCPCS: Performed by: INTERNAL MEDICINE

## 2020-04-17 PROCEDURE — 84484 ASSAY OF TROPONIN QUANT: CPT

## 2020-04-17 PROCEDURE — 6360000002 HC RX W HCPCS: Performed by: EMERGENCY MEDICINE

## 2020-04-17 PROCEDURE — 96375 TX/PRO/DX INJ NEW DRUG ADDON: CPT

## 2020-04-17 PROCEDURE — 96366 THER/PROPH/DIAG IV INF ADDON: CPT

## 2020-04-17 PROCEDURE — 2580000003 HC RX 258: Performed by: INTERNAL MEDICINE

## 2020-04-17 PROCEDURE — 85025 COMPLETE CBC W/AUTO DIFF WBC: CPT

## 2020-04-17 PROCEDURE — 87449 NOS EACH ORGANISM AG IA: CPT

## 2020-04-17 PROCEDURE — 6370000000 HC RX 637 (ALT 250 FOR IP)

## 2020-04-17 PROCEDURE — 93010 ELECTROCARDIOGRAM REPORT: CPT | Performed by: INTERNAL MEDICINE

## 2020-04-17 PROCEDURE — 99285 EMERGENCY DEPT VISIT HI MDM: CPT

## 2020-04-17 PROCEDURE — 96365 THER/PROPH/DIAG IV INF INIT: CPT

## 2020-04-17 PROCEDURE — 6370000000 HC RX 637 (ALT 250 FOR IP): Performed by: INTERNAL MEDICINE

## 2020-04-17 PROCEDURE — 0100U HC RESPIRPTHGN MULT REV TRANS & AMP PRB TECH 21 TRGT: CPT

## 2020-04-17 PROCEDURE — G0378 HOSPITAL OBSERVATION PER HR: HCPCS

## 2020-04-17 PROCEDURE — 99215 OFFICE O/P EST HI 40 MIN: CPT | Performed by: FAMILY MEDICINE

## 2020-04-17 PROCEDURE — 86140 C-REACTIVE PROTEIN: CPT

## 2020-04-17 PROCEDURE — 85379 FIBRIN DEGRADATION QUANT: CPT

## 2020-04-17 PROCEDURE — 84145 PROCALCITONIN (PCT): CPT

## 2020-04-17 PROCEDURE — 96374 THER/PROPH/DIAG INJ IV PUSH: CPT

## 2020-04-17 PROCEDURE — 85610 PROTHROMBIN TIME: CPT

## 2020-04-17 PROCEDURE — 87040 BLOOD CULTURE FOR BACTERIA: CPT

## 2020-04-17 PROCEDURE — 2060000000 HC ICU INTERMEDIATE R&B

## 2020-04-17 PROCEDURE — 83615 LACTATE (LD) (LDH) ENZYME: CPT

## 2020-04-17 RX ORDER — 0.9 % SODIUM CHLORIDE 0.9 %
500 INTRAVENOUS SOLUTION INTRAVENOUS ONCE
Status: COMPLETED | OUTPATIENT
Start: 2020-04-17 | End: 2020-04-17

## 2020-04-17 RX ORDER — DILTIAZEM HYDROCHLORIDE 5 MG/ML
10 INJECTION INTRAVENOUS ONCE
Status: COMPLETED | OUTPATIENT
Start: 2020-04-17 | End: 2020-04-17

## 2020-04-17 RX ORDER — ACETAMINOPHEN 325 MG/1
650 TABLET ORAL EVERY 6 HOURS PRN
Status: DISCONTINUED | OUTPATIENT
Start: 2020-04-17 | End: 2020-04-18 | Stop reason: HOSPADM

## 2020-04-17 RX ORDER — FLECAINIDE ACETATE 100 MG/1
100 TABLET ORAL 2 TIMES DAILY
Status: DISCONTINUED | OUTPATIENT
Start: 2020-04-17 | End: 2020-04-18 | Stop reason: HOSPADM

## 2020-04-17 RX ORDER — PROMETHAZINE HYDROCHLORIDE 25 MG/1
12.5 TABLET ORAL EVERY 6 HOURS PRN
Status: DISCONTINUED | OUTPATIENT
Start: 2020-04-17 | End: 2020-04-18 | Stop reason: HOSPADM

## 2020-04-17 RX ORDER — SODIUM CHLORIDE 0.9 % (FLUSH) 0.9 %
10 SYRINGE (ML) INJECTION PRN
Status: DISCONTINUED | OUTPATIENT
Start: 2020-04-17 | End: 2020-04-18 | Stop reason: HOSPADM

## 2020-04-17 RX ORDER — NICOTINE POLACRILEX 4 MG
15 LOZENGE BUCCAL PRN
Status: DISCONTINUED | OUTPATIENT
Start: 2020-04-17 | End: 2020-04-18 | Stop reason: HOSPADM

## 2020-04-17 RX ORDER — POTASSIUM CHLORIDE 20 MEQ/1
20 TABLET, EXTENDED RELEASE ORAL 2 TIMES DAILY
Status: DISCONTINUED | OUTPATIENT
Start: 2020-04-17 | End: 2020-04-17 | Stop reason: ALTCHOICE

## 2020-04-17 RX ORDER — FUROSEMIDE 10 MG/ML
20 INJECTION INTRAMUSCULAR; INTRAVENOUS ONCE
Status: COMPLETED | OUTPATIENT
Start: 2020-04-17 | End: 2020-04-17

## 2020-04-17 RX ORDER — FUROSEMIDE 40 MG/1
40 TABLET ORAL DAILY
Status: DISCONTINUED | OUTPATIENT
Start: 2020-04-18 | End: 2020-04-18 | Stop reason: HOSPADM

## 2020-04-17 RX ORDER — VALACYCLOVIR HYDROCHLORIDE 500 MG/1
500 TABLET, FILM COATED ORAL 2 TIMES DAILY
Status: DISCONTINUED | OUTPATIENT
Start: 2020-04-17 | End: 2020-04-18 | Stop reason: HOSPADM

## 2020-04-17 RX ORDER — CETIRIZINE HYDROCHLORIDE 10 MG/1
10 TABLET ORAL DAILY
Status: DISCONTINUED | OUTPATIENT
Start: 2020-04-18 | End: 2020-04-18 | Stop reason: HOSPADM

## 2020-04-17 RX ORDER — POTASSIUM CHLORIDE 750 MG/1
20 TABLET, FILM COATED, EXTENDED RELEASE ORAL 2 TIMES DAILY
Status: DISCONTINUED | OUTPATIENT
Start: 2020-04-17 | End: 2020-04-18 | Stop reason: HOSPADM

## 2020-04-17 RX ORDER — DEXTROSE MONOHYDRATE 50 MG/ML
100 INJECTION, SOLUTION INTRAVENOUS PRN
Status: DISCONTINUED | OUTPATIENT
Start: 2020-04-17 | End: 2020-04-18 | Stop reason: HOSPADM

## 2020-04-17 RX ORDER — POLYETHYLENE GLYCOL 3350 17 G/17G
17 POWDER, FOR SOLUTION ORAL DAILY PRN
Status: DISCONTINUED | OUTPATIENT
Start: 2020-04-17 | End: 2020-04-18 | Stop reason: HOSPADM

## 2020-04-17 RX ORDER — SODIUM CHLORIDE 0.9 % (FLUSH) 0.9 %
10 SYRINGE (ML) INJECTION EVERY 12 HOURS SCHEDULED
Status: DISCONTINUED | OUTPATIENT
Start: 2020-04-17 | End: 2020-04-18 | Stop reason: HOSPADM

## 2020-04-17 RX ORDER — METOPROLOL SUCCINATE 25 MG/1
12.5 TABLET, EXTENDED RELEASE ORAL 2 TIMES DAILY
Status: DISCONTINUED | OUTPATIENT
Start: 2020-04-17 | End: 2020-04-18 | Stop reason: HOSPADM

## 2020-04-17 RX ORDER — ALBUTEROL SULFATE 90 UG/1
2 AEROSOL, METERED RESPIRATORY (INHALATION) EVERY 4 HOURS PRN
Status: DISCONTINUED | OUTPATIENT
Start: 2020-04-17 | End: 2020-04-18 | Stop reason: HOSPADM

## 2020-04-17 RX ORDER — DEXTROSE MONOHYDRATE 25 G/50ML
12.5 INJECTION, SOLUTION INTRAVENOUS PRN
Status: DISCONTINUED | OUTPATIENT
Start: 2020-04-17 | End: 2020-04-18 | Stop reason: HOSPADM

## 2020-04-17 RX ORDER — ONDANSETRON 2 MG/ML
4 INJECTION INTRAMUSCULAR; INTRAVENOUS EVERY 6 HOURS PRN
Status: DISCONTINUED | OUTPATIENT
Start: 2020-04-17 | End: 2020-04-18 | Stop reason: HOSPADM

## 2020-04-17 RX ORDER — ACETAMINOPHEN 650 MG/1
650 SUPPOSITORY RECTAL EVERY 6 HOURS PRN
Status: DISCONTINUED | OUTPATIENT
Start: 2020-04-17 | End: 2020-04-18 | Stop reason: HOSPADM

## 2020-04-17 RX ADMIN — METOPROLOL SUCCINATE 12.5 MG: 25 TABLET, EXTENDED RELEASE ORAL at 22:51

## 2020-04-17 RX ADMIN — CEFTRIAXONE 1 G: 1 INJECTION, POWDER, FOR SOLUTION INTRAMUSCULAR; INTRAVENOUS at 18:37

## 2020-04-17 RX ADMIN — INSULIN LISPRO 2 UNITS: 100 INJECTION, SOLUTION INTRAVENOUS; SUBCUTANEOUS at 22:53

## 2020-04-17 RX ADMIN — FUROSEMIDE 20 MG: 10 INJECTION, SOLUTION INTRAMUSCULAR; INTRAVENOUS at 16:27

## 2020-04-17 RX ADMIN — POTASSIUM CHLORIDE 20 MEQ: 1500 TABLET, EXTENDED RELEASE ORAL at 22:50

## 2020-04-17 RX ADMIN — SODIUM CHLORIDE 500 ML: 9 INJECTION, SOLUTION INTRAVENOUS at 18:37

## 2020-04-17 RX ADMIN — AZITHROMYCIN MONOHYDRATE 500 MG: 500 INJECTION, POWDER, LYOPHILIZED, FOR SOLUTION INTRAVENOUS at 18:58

## 2020-04-17 RX ADMIN — POTASSIUM CHLORIDE 20 MEQ: 750 TABLET, FILM COATED, EXTENDED RELEASE ORAL at 23:15

## 2020-04-17 RX ADMIN — DILTIAZEM HYDROCHLORIDE 10 MG: 5 INJECTION INTRAVENOUS at 18:30

## 2020-04-17 RX ADMIN — VALACYCLOVIR HYDROCHLORIDE 500 MG: 500 TABLET, FILM COATED ORAL at 23:14

## 2020-04-17 RX ADMIN — APIXABAN 5 MG: 5 TABLET, FILM COATED ORAL at 22:51

## 2020-04-17 RX ADMIN — FLECAINIDE ACETATE 100 MG: 100 TABLET ORAL at 22:51

## 2020-04-17 ASSESSMENT — PAIN SCALES - GENERAL
PAINLEVEL_OUTOF10: 0

## 2020-04-17 ASSESSMENT — ENCOUNTER SYMPTOMS
NAUSEA: 0
VOMITING: 0
ABDOMINAL PAIN: 0
SHORTNESS OF BREATH: 0
GASTROINTESTINAL NEGATIVE: 1
EYES NEGATIVE: 1
WHEEZING: 0
COUGH: 1

## 2020-04-17 NOTE — PROGRESS NOTES
social smoker. She is s/p successful cardioversion for atrial fibrillation on 12/27/18 and has had at least 3 other unsuccessful attempts prior. On Eliquis 5 mg BID for thromboembolic risk reduction. PFTs 10/18/19  CONCLUSION:  Moderate obstructive defect without bronchodilator response  based on having a low FEV1 and low forced vital capacity with normal  lung volumes and decreased diffusion corrected for anemia.          ECHO: 10/18/19  Left ventricular cavity size is normal. There is mild left ventricular  hypertrophy. Overall left ventricular systolic function appears normal with  an ejection fraction of 50-55%. No regional wall motion abnormalities are  noted. Indeterminate diastolic function. Trivial mitral, aortic, pulmonic, and tricuspid regurgitation. Estimated pulmonary artery systolic pressure is at 31 mmHg assuming a right  atrial pressure of 3 mmHg. Normal Global strain -24.1%. Stress test: 1/15/16  1.  Technically a satisfactory study.    2. Normal pharmacological stress portion of the study.    3. No evidence of Ischemia by Myocardial Perfusion Imaging.    4. Gated Study shows normal LV size and Systolic function; EF is >20 %.               Vitals:    04/17/20 1234 04/17/20 1327   BP: 118/68    Site: Left Upper Arm    Position: Sitting    Pulse: 96    Resp: 24 18   Temp: 99.2 °F (37.3 °C)    SpO2: 96% (!) 88% with slow walking for less than 1 min   Weight: 245 lb 3.2 oz (111.2 kg)        Allergies   Allergen Reactions    Penicillins Hives       Outpatient Medications Marked as Taking for the 4/17/20 encounter (Office Visit) with SCHEDULE, [facility]   Medication Sig Dispense Refill    potassium chloride (KLOR-CON M) 10 MEQ extended release tablet Take 2 tablets by mouth 2 times daily 360 tablet 1    glimepiride (AMARYL) 2 MG tablet Take 1 tablet by mouth 2 times daily (with meals) 180 tablet 1    metoprolol succinate (TOPROL XL) 25 MG extended release tablet Take 1

## 2020-04-17 NOTE — PROGRESS NOTES
04/17/20.        ASSESSMENT/PLAN:    COVID testing done outside at [facility], and patient sent in for evaluation due to high risk and symptoms

## 2020-04-17 NOTE — H&P
intermittent, ongoing for the past 3 weeks. She also reports cough. She was noted to be hypoxic with oxygen saturation in the mid 80s on room air during exertion on presentation to the emergency room. She was started on Rocephin and Zithromax in the emergency room. Past Medical History:      Diagnosis Date    [facility]rial fibrillation with RVR [facility]     Cancer ([facility]     Diabetes mellitus type 2, controlled ([facility]     Hypertension     Melanoma in situ of back [facility])     dr Fletcher Flowers       Past Surgical History:      Procedure Laterality Date    BONE MARROW TRANSPLANT      BREAST CYST ASPIRATION      in past benign bilateral over years. 2 episodes     SECTION      CHOLECYSTECTOMY      COLONOSCOPY  9/10/12    polyp, su, repeat 5 years    COLONOSCOPY  10/09/2017    dr Jeremy Rees and check in 5 years. polyps    ENDOMETRIAL ABLATION  2007    FINE NEEDLE ASPIRATION      GALLBLADDER SURGERY      OTHER SURGICAL HISTORY  2019    Abdominal Mass BX    OTHER SURGICAL HISTORY  2019    CT BIOPSY ABDOMEN RETROPERITONEUM,    OVARY REMOVAL Left 2018    benign growth, dr Salome Delacruz at [place] [address]. TUNNELED VENOUS PORT PLACEMENT Right 2016    Dr. Latrice Vega       Medications (prior to admission):  Prior to Admission medications    Medication Sig Start Date End Date Taking?  Authorizing Provider   glimepiride (AMARYL) 2 MG tablet Take 1 tablet by mouth 2 times daily (with meals) 20  Yes Adolfo Corea MD   metoprolol succinate (TOPROL XL) 25 MG extended release tablet Take 1 tablet by mouth daily  Patient taking differently: Take 12.5 mg by mouth 2 times daily  3/2/20  Yes Yojana Johnson MD   valACYclovir (VALTREX) 500 MG tablet Take 500 mg by mouth 2 times daily   Yes Historical Provider, MD   flecainide (TAMBOCOR) 100 MG tablet Take 1 tablet by mouth 2 times daily 19  Yes Yojana Johnson MD   apixaban (ELIQUIS) 5 MG TABS tablet Take 1 tablet by mouth 2 times daily 7/22/19  Yes Dutch aDhl MD   potassium chloride (KLOR-CON M) 10 MEQ extended release tablet Take 2 tablets by mouth 2 times daily 4/6/20   Clay Mayfield MD   furosemide (LASIX) 20 MG tablet Take 2 tablets by mouth daily 12/9/19   Erna Manrique MD   LORazepam (ATIVAN) 1 MG tablet Take 1 mg by mouth every 6 hours as needed for Anxiety. Historical Provider, MD   acetaminophen (TYLENOL) 500 MG tablet Take 500 mg by mouth every 6 hours as needed for Pain    Historical Provider, MD   loratadine (CLARITIN) 10 MG capsule Take 10 mg by mouth as needed     Historical Provider, MD       Allergy(ies):  Penicillins    Social History:  TOBACCO:  reports that she quit smoking about 4 years ago. Her smoking use included cigarettes. She started smoking about 45 years ago. She has a 0.80 pack-year smoking history. She has never used smokeless tobacco.  ETOH:  reports previous alcohol use. Family History:      Problem Relation Age of Onset    High Blood Pressure Mother     Cancer Mother         breast    High Blood Pressure Father     Cancer Father         lung       Review of Systems:  Pertinent positives are listed in HPI. At least 10-point ROS reviewed and were negative. Vitals and physical examination:  BP (!) 157/81   Pulse 106   Temp 98.1 °F (36.7 °C) (Oral)   Resp 22   Ht 5' 3\" (1.6 m)   Wt 243 lb (110.2 kg)   SpO2 95%   BMI 43.05 kg/m²   Gen/overall appearance: Not in acute distress. Alert. Oriented x3. Head: Normocephalic, atraumatic  Eyes: EOMI, good acuity  ENT: Oral mucosa moist  Neck: No JVD, thyromegaly  CVS: Nml S1S2, no MRG. Irregular tachycardia. Pulm: Clear bilaterally. No crackles/wheezes  Gastrointestinal: Soft, NT/ND, +BS  Musculoskeletal: No edema. Warm  Neuro: No focal deficit. Moves extremity spontaneously. Psychiatry: Appropriate affect. Not agitated. Skin: Warm, dry with normal turgor.  No rash  Capillary refill: Brisk,< 3 seconds   Peripheral Pulses: +2 palpable,

## 2020-04-17 NOTE — ED PROVIDER NOTES
Chloride 100 99 - 110 mmol/L    CO2 21 21 - 32 mmol/L    Anion Gap 16 3 - 16    Glucose 170 (H) 70 - 99 mg/dL    BUN 12 7 - 20 mg/dL    CREATININE 0.8 0.6 - 1.2 mg/dL    GFR Non-African American >60 >60    GFR African American >60 >60    Calcium 9.3 8.3 - 10.6 mg/dL    Total Protein 6.6 6.4 - 8.2 g/dL    Alb 3.6 3.4 - 5.0 g/dL    Albumin/Globulin Ratio 1.2 1.1 - 2.2    Total Bilirubin 0.6 0.0 - 1.0 mg/dL    Alkaline Phosphatase 172 (H) 40 - 129 U/L    ALT 28 10 - 40 U/L    AST 56 (H) 15 - 37 U/L    Globulin 3.0 g/dL   Troponin   Result Value Ref Range    Troponin <0.01 <0.01 ng/mL   Urinalysis, reflex to microscopic   Result Value Ref Range    Color, UA YELLOW Straw/Yellow    Clarity, UA Clear Clear    Glucose, Ur Negative Negative mg/dL    Bilirubin Urine Negative Negative    Ketones, Urine Negative Negative mg/dL    Specific Gravity, UA 1.008 1.005 - 1.030    Blood, Urine Negative Negative    pH, UA 5.0 5.0 - 8.0    Protein, UA Negative Negative mg/dL    Urobilinogen, Urine 0.2 <2.0 E.U./dL    Nitrite, Urine POSITIVE (A) Negative    Leukocyte Esterase, Urine SMALL (A) Negative    Microscopic Examination YES     Urine Type NotGiven    Brain Natriuretic Peptide   Result Value Ref Range    Pro- (H) 0 - 124 pg/mL   Lactate, Sepsis   Result Value Ref Range    Lactic Acid, Sepsis 2.3 (H) 0.4 - 1.9 mmol/L   Lactate, Sepsis   Result Value Ref Range    Lactic Acid, Sepsis 2.5 (H) 0.4 - 1.9 mmol/L   Microscopic Urinalysis   Result Value Ref Range    Bacteria, UA 4+ (A) None Seen /HPF    Hyaline Casts, UA 0 0 - 8 /LPF    WBC, UA 30 (H) 0 - 5 /HPF    RBC, UA 1 0 - 4 /HPF    Epithelial Cells, UA 0 0 - 5 /HPF   C-Reactive Protein   Result Value Ref Range    CRP 71.5 (H) 0.0 - 5.1 mg/L   Procalcitonin   Result Value Ref Range    Procalcitonin 0.31 (H) 0.00 - 0.15 ng/mL   Protime-INR   Result Value Ref Range    Protime 15.0 (H) 10.0 - 13.2 sec    INR 1.29 (H) 0.86 - 1.14   D-Dimer, Quantitative   Result Value Ref Range
m) 243 lb (110.2 kg)      height is 5' 3\" (1.6 m) and weight is 243 lb (110.2 kg). Her oral temperature is 98.5 °F (36.9 °C). Her blood pressure is 134/77 and her pulse is 105. Her respiration is 22 and oxygen saturation is 92%. Physical Exam  Constitutional: Appears well-developed and well-nourished. No distress. HENT:   Head: Normocephalic and atraumatic. Eyes: Conjunctivae and EOM are normal.   Neck: Neck supple. No JVD present. Cardiovascular: Normal rate and intact distal pulses. Extremities warm and well perfused. Cap refill less than 2 seconds  Pulmonary/Chest: Lungs clear to auscultation. Effort normal. No respiratory distress. Speaking full sentences. Abdominal: Nontender, exhibits no distension. Neurological: Alert. Answering questions appropriately. Ambulatory without ataxia. 5-5  strength bilaterally. Musculoskeletal: Mild nonpitting bilateral lower extremity edema. Skin: Skin is warm and dry. Psychiatric: Normal mood and affect. Behavior is normal.   Nursing note and vitals reviewed.       DIAGNOSTIC RESULTS   LABS:    Results for orders placed or performed during the hospital encounter of 04/17/20   CBC Auto Differential   Result Value Ref Range    WBC 3.5 (L) 4.0 - 11.0 K/uL    RBC 3.72 (L) 4.00 - 5.20 M/uL    Hemoglobin 11.8 (L) 12.0 - 16.0 g/dL    Hematocrit 36.1 36.0 - 48.0 %    MCV 96.9 80.0 - 100.0 fL    MCH 31.8 26.0 - 34.0 pg    MCHC 32.8 31.0 - 36.0 g/dL    RDW 15.8 (H) 12.4 - 15.4 %    Platelets 388 848 - 514 K/uL    MPV 8.9 5.0 - 10.5 fL    Neutrophils % 68.2 %    Lymphocytes % 11.3 %    Monocytes % 19.2 %    Eosinophils % 0.8 %    Basophils % 0.5 %    Neutrophils Absolute 2.4 1.7 - 7.7 K/uL    Lymphocytes Absolute 0.4 (L) 1.0 - 5.1 K/uL    Monocytes Absolute 0.7 0.0 - 1.3 K/uL    Eosinophils Absolute 0.0 0.0 - 0.6 K/uL    Basophils Absolute 0.0 0.0 - 0.2 K/uL   Lactate, Sepsis   Result Value Ref Range    Lactic Acid, Sepsis 2.3 (H) 0.4 - 1.9 mmol/L   EKG 12 Lead

## 2020-04-18 VITALS
BODY MASS INDEX: 42.03 KG/M2 | WEIGHT: 237.22 LBS | RESPIRATION RATE: 16 BRPM | OXYGEN SATURATION: 90 % | HEIGHT: 63 IN | TEMPERATURE: 98.2 F | HEART RATE: 82 BPM | DIASTOLIC BLOOD PRESSURE: 85 MMHG | SYSTOLIC BLOOD PRESSURE: 109 MMHG

## 2020-04-18 PROBLEM — A41.9 SEPSIS (HCC): Status: ACTIVE | Noted: 2020-04-18

## 2020-04-18 PROBLEM — J06.9 UPPER RESPIRATORY INFECTION: Status: ACTIVE | Noted: 2020-04-18

## 2020-04-18 LAB
A/G RATIO: 1.2 (ref 1.1–2.2)
ALBUMIN SERPL-MCNC: 3 G/DL (ref 3.4–5)
ALP BLD-CCNC: 141 U/L (ref 40–129)
ALT SERPL-CCNC: 22 U/L (ref 10–40)
ANION GAP SERPL CALCULATED.3IONS-SCNC: 16 MMOL/L (ref 3–16)
AST SERPL-CCNC: 38 U/L (ref 15–37)
BASOPHILS ABSOLUTE: 0 K/UL (ref 0–0.2)
BASOPHILS RELATIVE PERCENT: 0.4 %
BILIRUB SERPL-MCNC: 0.5 MG/DL (ref 0–1)
BUN BLDV-MCNC: 12 MG/DL (ref 7–20)
CALCIUM SERPL-MCNC: 8.8 MG/DL (ref 8.3–10.6)
CHLORIDE BLD-SCNC: 105 MMOL/L (ref 99–110)
CO2: 22 MMOL/L (ref 21–32)
CREAT SERPL-MCNC: 0.8 MG/DL (ref 0.6–1.2)
EOSINOPHILS ABSOLUTE: 0 K/UL (ref 0–0.6)
EOSINOPHILS RELATIVE PERCENT: 1.7 %
GFR AFRICAN AMERICAN: >60
GFR NON-AFRICAN AMERICAN: >60
GLOBULIN: 2.6 G/DL
GLUCOSE BLD-MCNC: 154 MG/DL (ref 70–99)
GLUCOSE BLD-MCNC: 186 MG/DL (ref 70–99)
HCT VFR BLD CALC: 31 % (ref 36–48)
HEMOGLOBIN: 10.3 G/DL (ref 12–16)
L. PNEUMOPHILA SEROGP 1 UR AG: NORMAL
LACTIC ACID: 1.2 MMOL/L (ref 0.4–2)
LYMPHOCYTES ABSOLUTE: 0.3 K/UL (ref 1–5.1)
LYMPHOCYTES RELATIVE PERCENT: 11.8 %
MAGNESIUM: 2 MG/DL (ref 1.8–2.4)
MCH RBC QN AUTO: 32.1 PG (ref 26–34)
MCHC RBC AUTO-ENTMCNC: 33.3 G/DL (ref 31–36)
MCV RBC AUTO: 96.3 FL (ref 80–100)
MONOCYTES ABSOLUTE: 0.5 K/UL (ref 0–1.3)
MONOCYTES RELATIVE PERCENT: 19.1 %
NEUTROPHILS ABSOLUTE: 1.7 K/UL (ref 1.7–7.7)
NEUTROPHILS RELATIVE PERCENT: 67 %
PDW BLD-RTO: 16.2 % (ref 12.4–15.4)
PERFORMED ON: ABNORMAL
PHOSPHORUS: 3 MG/DL (ref 2.5–4.9)
PLATELET # BLD: 196 K/UL (ref 135–450)
PMV BLD AUTO: 8.6 FL (ref 5–10.5)
POTASSIUM SERPL-SCNC: 3.7 MMOL/L (ref 3.5–5.1)
RBC # BLD: 3.22 M/UL (ref 4–5.2)
SODIUM BLD-SCNC: 143 MMOL/L (ref 136–145)
STREP PNEUMONIAE ANTIGEN, URINE: NORMAL
TOTAL PROTEIN: 5.6 G/DL (ref 6.4–8.2)
WBC # BLD: 2.6 K/UL (ref 4–11)

## 2020-04-18 PROCEDURE — 94760 N-INVAS EAR/PLS OXIMETRY 1: CPT

## 2020-04-18 PROCEDURE — 83605 ASSAY OF LACTIC ACID: CPT

## 2020-04-18 PROCEDURE — 85025 COMPLETE CBC W/AUTO DIFF WBC: CPT

## 2020-04-18 PROCEDURE — 6370000000 HC RX 637 (ALT 250 FOR IP)

## 2020-04-18 PROCEDURE — 6370000000 HC RX 637 (ALT 250 FOR IP): Performed by: INTERNAL MEDICINE

## 2020-04-18 PROCEDURE — 2580000003 HC RX 258: Performed by: INTERNAL MEDICINE

## 2020-04-18 PROCEDURE — 84100 ASSAY OF PHOSPHORUS: CPT

## 2020-04-18 PROCEDURE — G0378 HOSPITAL OBSERVATION PER HR: HCPCS

## 2020-04-18 PROCEDURE — 80053 COMPREHEN METABOLIC PANEL: CPT

## 2020-04-18 PROCEDURE — 83735 ASSAY OF MAGNESIUM: CPT

## 2020-04-18 RX ORDER — DEXTROMETHORPHAN POLISTIREX 30 MG/5ML
60 SUSPENSION ORAL 2 TIMES DAILY PRN
COMMUNITY
Start: 2020-04-18 | End: 2020-04-28

## 2020-04-18 RX ORDER — CEFUROXIME AXETIL 500 MG/1
500 TABLET ORAL 2 TIMES DAILY
Qty: 14 TABLET | Refills: 0 | Status: SHIPPED | OUTPATIENT
Start: 2020-04-18 | End: 2020-04-25

## 2020-04-18 RX ADMIN — Medication 10 ML: at 08:45

## 2020-04-18 RX ADMIN — VALACYCLOVIR HYDROCHLORIDE 500 MG: 500 TABLET, FILM COATED ORAL at 08:44

## 2020-04-18 RX ADMIN — FLECAINIDE ACETATE 100 MG: 100 TABLET ORAL at 08:44

## 2020-04-18 RX ADMIN — POTASSIUM CHLORIDE 20 MEQ: 750 TABLET, FILM COATED, EXTENDED RELEASE ORAL at 08:44

## 2020-04-18 RX ADMIN — CETIRIZINE HYDROCHLORIDE 10 MG: 10 TABLET, FILM COATED ORAL at 08:44

## 2020-04-18 RX ADMIN — APIXABAN 5 MG: 5 TABLET, FILM COATED ORAL at 08:44

## 2020-04-18 RX ADMIN — INSULIN LISPRO 1 UNITS: 100 INJECTION, SOLUTION INTRAVENOUS; SUBCUTANEOUS at 08:46

## 2020-04-18 ASSESSMENT — PAIN SCALES - GENERAL
PAINLEVEL_OUTOF10: 0
PAINLEVEL_OUTOF10: 0

## 2020-04-18 NOTE — PROGRESS NOTES
Pt arrived to floor via stretcher from ED and ambulated to bed. Telemetry activated. Patient oriented to room and use of call light. Call light and personal items within reach. Admission and assessment initiated. Admission complete. Denied further needs or questions at this time. Will continue to monitor.

## 2020-04-18 NOTE — DISCHARGE SUMMARY
Time: 04/17/20  5:41 PM   Result Value Ref Range    Respiratory Panel PCR       Respiratory Pathogens Panel PCR Result: Not Detected  See additional report for complete Respiratory Pathogens Panel     Respiratory Panel Film Array Report    Collection Time: 04/17/20  5:41 PM   Result Value Ref Range    Report SEE IMAGE    C-Reactive Protein    Collection Time: 04/17/20  6:55 PM   Result Value Ref Range    CRP 71.5 (H) 0.0 - 5.1 mg/L   Procalcitonin    Collection Time: 04/17/20  6:55 PM   Result Value Ref Range    Procalcitonin 0.31 (H) 0.00 - 0.15 ng/mL   Protime-INR    Collection Time: 04/17/20  6:55 PM   Result Value Ref Range    Protime 15.0 (H) 10.0 - 13.2 sec    INR 1.29 (H) 0.86 - 1.14   D-Dimer, Quantitative    Collection Time: 04/17/20  6:55 PM   Result Value Ref Range    D-Dimer, Quant 286 (H) 0 - 229 ng/mL DDU   Strep Pneumoniae Antigen    Collection Time: 04/17/20  6:55 PM   Result Value Ref Range    STREP PNEUMONIAE ANTIGEN, URINE       Presumptive Negative  Presumptive negative suggests no current or recent  pneumococcal infection. Infection due to Strep pneumoniae  cannot be ruled out since the antigen present in the sample  may be below the detection limit of the test.  Normal Range:Presumptive Negative     Legionella antigen, urine    Collection Time: 04/17/20  6:55 PM   Result Value Ref Range    L. pneumophila Serogp 1 Ur Ag       Presumptive Negative  No Legionella pneumophila serogroup 1 antigens detected. A negative result does not exclude infection with  Legionella pneumophila serogroup 1 nor does it rule out  other microbial-caused respiratory infections or  disease caused by other serogroups of  Legionella pneumophila.   Normal Range: Presumptive Negative     Lactate Dehydrogenase    Collection Time: 04/17/20  6:55 PM   Result Value Ref Range     (H) 100 - 190 U/L   POCT Glucose    Collection Time: 04/17/20 10:11 PM   Result Value Ref Range    POC Glucose 284 (H) 70 - 99 mg/dl 04/18/20  7:53 AM   Result Value Ref Range    POC Glucose 186 (H) 70 - 99 mg/dl    Performed on ACCU-CHEK          Follow up: with Arlyn Parks MD    Note that over 30 minutes was spent in preparing discharge papers, discussing discharge with patient, medication review, etc.    Thank you Arlyn Parks MD for the opportunity to be involved in this patient's care. If you have any questions or concerns please feel free to contact me at [phone].     Electronically signed by Amee Morgan MD on 4/18/2020 at 10:37 AM

## 2020-04-20 ENCOUNTER — CARE COORDINATION (OUTPATIENT)
Dept: CASE MANAGEMENT | Age: 65
End: 2020-04-20

## 2020-04-21 LAB
BLOOD CULTURE, ROUTINE: NORMAL
CULTURE, BLOOD 2: NORMAL

## 2020-05-11 ENCOUNTER — VIRTUAL VISIT (OUTPATIENT)
Dept: FAMILY MEDICINE CLINIC | Age: 65
End: 2020-05-11
Payer: COMMERCIAL

## 2020-05-11 PROCEDURE — 99213 OFFICE O/P EST LOW 20 MIN: CPT | Performed by: FAMILY MEDICINE

## 2020-05-11 ASSESSMENT — ENCOUNTER SYMPTOMS
ABDOMINAL PAIN: 0
SHORTNESS OF BREATH: 0

## 2020-05-11 NOTE — PROGRESS NOTES
Encounters:  04/18/20 : 237 lb 3.4 oz (107.6 kg)  04/17/20 : 245 lb 3.2 oz (111.2 kg)  03/02/20 : 245 lb (111.1 kg)    BP Readings from Last 3 Encounters:  04/18/20 : 109/85  04/17/20 : 118/68  03/02/20 : 118/80          Review of Systems   Constitutional: Negative for appetite change, chills, fever and unexpected weight change. Respiratory: Negative for shortness of breath. Cardiovascular: Negative for chest pain and palpitations. Gastrointestinal: Negative for abdominal pain. Genitourinary: Negative for difficulty urinating. Musculoskeletal:        Feet no lesions   Neurological: Negative for headaches. Objective:   Physical Exam  Constitutional:       General: She is not in acute distress. Appearance: Normal appearance. She is not ill-appearing. Neurological:      Mental Status: She is alert. Psychiatric:         Attention and Perception: Attention normal.         Speech: Speech normal.         Behavior: Behavior normal.         Assessment:        Diagnosis Orders   1. Essential hypertension  Comprehensive Metabolic Panel    Cholesterol, Total    Vitamin B12 & Folate    TSH without Reflex   2. Type 2 diabetes mellitus without complication, without long-term current use of insulin (HCC)  Hemoglobin A1C    Comprehensive Metabolic Panel    Cholesterol, Total    Vitamin B12 & Folate       Stable and doing well  Need to check the blood  Brain Kallie is  being evaluated by a Virtual Visit (video visit) encounter to address concerns as mentioned above. A caregiver was present when appropriate. Due to this being a TeleHealth encounter (During HEFEW-67 public health emergency), evaluation of the following organ systems was limited: Vitals/Constitutional/EENT/Resp/CV/GI//MS/Neuro/Skin/Heme-Lymph-Imm.   Pursuant to the emergency declaration under the [address], [address] [place] [address] authority and the [employer] Act, this

## 2020-05-18 DIAGNOSIS — E11.9 TYPE 2 DIABETES MELLITUS WITHOUT COMPLICATION, WITHOUT LONG-TERM CURRENT USE OF INSULIN (HCC): ICD-10-CM

## 2020-05-18 DIAGNOSIS — I10 ESSENTIAL HYPERTENSION: ICD-10-CM

## 2020-05-18 LAB
A/G RATIO: 1.9 (ref 1.1–2.2)
ALBUMIN SERPL-MCNC: 3.7 G/DL (ref 3.4–5)
ALP BLD-CCNC: 190 U/L (ref 40–129)
ALT SERPL-CCNC: 46 U/L (ref 10–40)
ANION GAP SERPL CALCULATED.3IONS-SCNC: 13 MMOL/L (ref 3–16)
AST SERPL-CCNC: 75 U/L (ref 15–37)
BILIRUB SERPL-MCNC: 0.6 MG/DL (ref 0–1)
BUN BLDV-MCNC: 13 MG/DL (ref 7–20)
CALCIUM SERPL-MCNC: 8.7 MG/DL (ref 8.3–10.6)
CHLORIDE BLD-SCNC: 107 MMOL/L (ref 99–110)
CHOLESTEROL, TOTAL: 181 MG/DL (ref 0–199)
CO2: 21 MMOL/L (ref 21–32)
CREAT SERPL-MCNC: 0.7 MG/DL (ref 0.6–1.2)
FOLATE: 14.78 NG/ML (ref 4.78–24.2)
GFR AFRICAN AMERICAN: >60
GFR NON-AFRICAN AMERICAN: >60
GLOBULIN: 2 G/DL
GLUCOSE BLD-MCNC: 218 MG/DL (ref 70–99)
POTASSIUM SERPL-SCNC: 3.7 MMOL/L (ref 3.5–5.1)
SODIUM BLD-SCNC: 141 MMOL/L (ref 136–145)
TOTAL PROTEIN: 5.7 G/DL (ref 6.4–8.2)
TSH SERPL DL<=0.05 MIU/L-ACNC: 4.08 UIU/ML (ref 0.27–4.2)
VITAMIN B-12: 506 PG/ML (ref 211–911)

## 2020-05-19 LAB
ESTIMATED AVERAGE GLUCOSE: 171.4 MG/DL
HBA1C MFR BLD: 7.6 %

## 2020-05-27 ENCOUNTER — TELEPHONE (OUTPATIENT)
Dept: SLEEP MEDICINE | Age: 65
End: 2020-05-27

## 2020-06-16 ENCOUNTER — TELEPHONE (OUTPATIENT)
Dept: FAMILY MEDICINE CLINIC | Age: 65
End: 2020-06-16

## 2020-06-16 NOTE — TELEPHONE ENCOUNTER
Patient is scheduled for PET scan this Friday, her blood sugar must be under 200 or they cant do test.  She is on metformin. 7am today after breakfast was 252    At 9am it was 297. She not sure if she is stressing herself out.       Please call, [phone]

## 2020-06-19 ENCOUNTER — TELEPHONE (OUTPATIENT)
Dept: FAMILY MEDICINE CLINIC | Age: 65
End: 2020-06-19

## 2020-06-22 ENCOUNTER — TELEPHONE (OUTPATIENT)
Dept: FAMILY MEDICINE CLINIC | Age: 65
End: 2020-06-22

## 2020-06-25 ENCOUNTER — OFFICE VISIT (OUTPATIENT)
Dept: SLEEP MEDICINE | Age: 65
End: 2020-06-25
Payer: COMMERCIAL

## 2020-06-25 VITALS
RESPIRATION RATE: 18 BRPM | DIASTOLIC BLOOD PRESSURE: 80 MMHG | SYSTOLIC BLOOD PRESSURE: 114 MMHG | TEMPERATURE: 98.3 F | HEART RATE: 88 BPM | WEIGHT: 238 LBS | OXYGEN SATURATION: 97 % | BODY MASS INDEX: 42.16 KG/M2

## 2020-06-25 PROCEDURE — 99213 OFFICE O/P EST LOW 20 MIN: CPT | Performed by: PSYCHIATRY & NEUROLOGY

## 2020-06-25 ASSESSMENT — SLEEP AND FATIGUE QUESTIONNAIRES
HOW LIKELY ARE YOU TO NOD OFF OR FALL ASLEEP WHILE SITTING QUIETLY AFTER LUNCH WITHOUT ALCOHOL: 0
HOW LIKELY ARE YOU TO NOD OFF OR FALL ASLEEP WHILE SITTING INACTIVE IN A PUBLIC PLACE: 0
HOW LIKELY ARE YOU TO NOD OFF OR FALL ASLEEP WHILE SITTING AND TALKING TO SOMEONE: 0
HOW LIKELY ARE YOU TO NOD OFF OR FALL ASLEEP WHEN YOU ARE A PASSENGER IN A CAR FOR AN HOUR WITHOUT A BREAK: 0
HOW LIKELY ARE YOU TO NOD OFF OR FALL ASLEEP WHILE WATCHING TV: 1
ESS TOTAL SCORE: 3
HOW LIKELY ARE YOU TO NOD OFF OR FALL ASLEEP WHILE SITTING AND READING: 1
HOW LIKELY ARE YOU TO NOD OFF OR FALL ASLEEP WHILE LYING DOWN TO REST IN THE AFTERNOON WHEN CIRCUMSTANCES PERMIT: 1
HOW LIKELY ARE YOU TO NOD OFF OR FALL ASLEEP IN A CAR, WHILE STOPPED FOR A FEW MINUTES IN TRAFFIC: 0

## 2020-06-25 ASSESSMENT — ENCOUNTER SYMPTOMS
CHOKING: 0
APNEA: 0

## 2020-06-25 NOTE — PROGRESS NOTES
Rogerio Pruett         : 1955        PHONE: [phone] (home)     Diagnosis: [] ZOE (G47.33) [] CSA (G47.31) [] Apnea (G47.30)   Length of Need: [] 12 Months [] 99 Months [] Other:    Machine (MARKUS!): [] Respironics Dream Station      Auto [] ResMed AirSense     Auto [] Other:     []  CPAP () [] Bilevel ()   Mode: [] Auto [] Spontaneous    Mode: [] Auto [] Spontaneous                                 Humidifier: [] Heated ()        [x] Water chamber replacement ()/ 1 per 6 months        Mask:   [x] Nasal () /1 per 3 months [] Full Face () /1 per 3 months   [x] Patient choice -Size and fit mask [] Patient Choice - Size and fit mask   [] Dispense:  [] Dispense:    [x] Headgear () / 1 per 3 months [] Headgear () / 1 per 3 months   [x] Replacement Nasal Cushion ()/2 per month [] Interface Replacement ()/1 per month   [x] Replacement Nasal Pillows ()/2 per month         Tubing: [x] Heated ()/1 per 3 months    [] Standard ()/1 per 3 months [] Other:           Filters: [x] Non-disposable ()/1 per 6 months     [x] Ultra-Fine, Disposable ()/2 per month        Miscellaneous: [] Chin Strap ()/ 1 per 6 months [] O2 bleed-in:       LPM   [] Oximetry on CPAP/Bilevel []  Other:          Start Order Date: 20    MEDICAL JUSTIFICATION:  I, the undersigned, certify that the above prescribed supplies are medically necessary for this patients wellbeing. In my opinion, the supplies are both reasonable and necessary in reference to accepted standards of medicalpractice in treatment of this patients condition.     Naz Díaz MD      NPI: [id number]       Order Signed Date: 20    Electronically signed by Naz Díaz MD on 2020 at 2:55 PM

## 2020-06-25 NOTE — PROGRESS NOTES
Smoker     Packs/day: 1.00     Years: 0.80     Pack years: 0.80     Types: Cigarettes     Start date: 1975     Last attempt to quit: 2016     Years since quittin.4    Smokeless tobacco: Never Used   Substance and Sexual Activity    Alcohol use: Not Currently     Alcohol/week: 0.0 standard drinks    Drug use: Never    Sexual activity: Not on file   Lifestyle    Physical activity     Days per week: Not on file     Minutes per session: Not on file    Stress: Not on file   Relationships    Social connections     Talks on phone: Not on file     Gets together: Not on file     Attends [employer] service: Not on file     Active member of club or organization: Not on file     Attends meetings of clubs or organizations: Not on file     Relationship status: Not on file    Intimate partner violence     Fear of current or ex partner: Not on file     Emotionally abused: Not on file     Physically abused: Not on file     Forced sexual activity: Not on file   Other Topics Concern    Not on file   Social History Narrative    Not on file       Prior to Admission medications    Medication Sig Start Date End Date Taking? Authorizing Provider   potassium chloride (KLOR-CON M) 10 MEQ extended release tablet Take 2 tablets by mouth 2 times daily 20  Yes Ksenia Francois MD   glimepiride (AMARYL) 2 MG tablet Take 1 tablet by mouth 2 times daily (with meals) 20  Yes Ksenia Francois MD   metoprolol succinate (TOPROL XL) 25 MG extended release tablet Take 1 tablet by mouth daily  Patient taking differently: Take 12.5 mg by mouth 2 times daily  3/2/20  Yes Ziggy Jacobs MD   furosemide (LASIX) 20 MG tablet Take 2 tablets by mouth daily 19  Yes Leena Dudley MD   LORazepam (ATIVAN) 1 MG tablet Take 1 mg by mouth every 6 hours as needed for Anxiety.    Yes Historical Provider, MD   acetaminophen (TYLENOL) 500 MG tablet Take 500 mg by mouth every 6 hours as needed for Pain   Yes Historical Provider, MD

## 2020-06-26 NOTE — PROGRESS NOTES
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) [] CSA (G47.31) [] Apnea (G47.30)   Length of Need: [] 12 Months [x] 99 Months [] Other:    Machine (MARKUS!): [] Respironics Dream Station      Auto [] ResMed AirSense     Auto [] Other:     []  CPAP () [] Bilevel ()   Mode: [] Auto [] Spontaneous    Mode: [] Auto [] Spontaneous                            Comfort Settings:   - Ramp Pressure: 4 cmH2O                                        - Ramp time: 15 min                                     -  Flex/EPR - 3 full time                                    - For ResMed Bilevel (TiMax-4 sec   TiMin- 0.2 sec)     Humidifier: [] Heated ()        [x] Water chamber replacement ()/ 1 per 6 months        Mask:   [x] Nasal () /1 per 3 months [] Full Face () /1 per 3 months   [x] Patient choice -Size and fit mask [] Patient Choice - Size and fit mask   [] Dispense:  [] Dispense:    [x] Headgear () / 1 per 3 months [] Headgear () / 1 per 3 months   [x] Replacement Nasal Cushion ()/2 per month [] Interface Replacement ()/1 per month   [x] Replacement Nasal Pillows ()/2 per month         Tubing: [x] Heated ()/1 per 3 months    [] Standard ()/1 per 3 months [] Other:           Filters: [x] Non-disposable ()/1 per 6 months     [x] Ultra-Fine, Disposable ()/2 per month        Miscellaneous: [] Chin Strap ()/ 1 per 6 months [] O2 bleed-in:       LPM   [] Oximetry on CPAP/Bilevel []  Other:          Start Order Date: 20    MEDICAL JUSTIFICATION:  I, the undersigned, certify that the above prescribed supplies are medically necessary for this patients wellbeing. In my opinion, the supplies are both reasonable and necessary in reference to accepted standards of medicalpractice in treatment of this patients condition.     Kelly Robin MD      NPI: [id number]       Order Signed Date: 06/26/20    Electronically signed by Monet Whittaker MD on 6/26/2020 at 11:08 AM

## 2020-08-07 ENCOUNTER — TELEPHONE (OUTPATIENT)
Dept: FAMILY MEDICINE CLINIC | Age: 65
End: 2020-08-07

## 2020-08-07 NOTE — TELEPHONE ENCOUNTER
Pt has an apt for Monday and is wanting to get her bw done prior, pt is needing bw orders. Pl advise.  [phone]

## 2020-08-08 DIAGNOSIS — E11.9 TYPE 2 DIABETES MELLITUS WITHOUT COMPLICATION, WITHOUT LONG-TERM CURRENT USE OF INSULIN (HCC): ICD-10-CM

## 2020-08-08 LAB
A/G RATIO: 1.9 (ref 1.1–2.2)
ALBUMIN SERPL-MCNC: 3.8 G/DL (ref 3.4–5)
ALP BLD-CCNC: 178 U/L (ref 40–129)
ALT SERPL-CCNC: 72 U/L (ref 10–40)
ANION GAP SERPL CALCULATED.3IONS-SCNC: 12 MMOL/L (ref 3–16)
AST SERPL-CCNC: 99 U/L (ref 15–37)
BILIRUB SERPL-MCNC: 0.5 MG/DL (ref 0–1)
BUN BLDV-MCNC: 16 MG/DL (ref 7–20)
CALCIUM SERPL-MCNC: 9.2 MG/DL (ref 8.3–10.6)
CHLORIDE BLD-SCNC: 106 MMOL/L (ref 99–110)
CO2: 22 MMOL/L (ref 21–32)
CREAT SERPL-MCNC: 1 MG/DL (ref 0.6–1.2)
ESTIMATED AVERAGE GLUCOSE: 205.9 MG/DL
GFR AFRICAN AMERICAN: >60
GFR NON-AFRICAN AMERICAN: 56
GLOBULIN: 2 G/DL
GLUCOSE BLD-MCNC: 206 MG/DL (ref 70–99)
HBA1C MFR BLD: 8.8 %
POTASSIUM SERPL-SCNC: 4.3 MMOL/L (ref 3.5–5.1)
SODIUM BLD-SCNC: 140 MMOL/L (ref 136–145)
TOTAL PROTEIN: 5.8 G/DL (ref 6.4–8.2)

## 2020-08-10 ENCOUNTER — TELEPHONE (OUTPATIENT)
Dept: FAMILY MEDICINE CLINIC | Age: 65
End: 2020-08-10

## 2020-08-10 ENCOUNTER — OFFICE VISIT (OUTPATIENT)
Dept: FAMILY MEDICINE CLINIC | Age: 65
End: 2020-08-10
Payer: COMMERCIAL

## 2020-08-10 VITALS
WEIGHT: 236 LBS | TEMPERATURE: 97.3 F | DIASTOLIC BLOOD PRESSURE: 78 MMHG | HEIGHT: 63 IN | SYSTOLIC BLOOD PRESSURE: 120 MMHG | BODY MASS INDEX: 41.82 KG/M2 | HEART RATE: 80 BPM

## 2020-08-10 PROCEDURE — 3052F HG A1C>EQUAL 8.0%<EQUAL 9.0%: CPT | Performed by: FAMILY MEDICINE

## 2020-08-10 PROCEDURE — 99213 OFFICE O/P EST LOW 20 MIN: CPT | Performed by: FAMILY MEDICINE

## 2020-08-10 RX ORDER — METFORMIN HYDROCHLORIDE 500 MG/1
500 TABLET, EXTENDED RELEASE ORAL 2 TIMES DAILY WITH MEALS
COMMUNITY
End: 2020-08-17 | Stop reason: SDUPTHER

## 2020-08-10 ASSESSMENT — ENCOUNTER SYMPTOMS
CHEST TIGHTNESS: 0
ABDOMINAL DISTENTION: 0
NAUSEA: 0
CONSTIPATION: 0
SHORTNESS OF BREATH: 0
ABDOMINAL PAIN: 0
COUGH: 0
BLOOD IN STOOL: 0
VOMITING: 0
DIARRHEA: 0

## 2020-08-10 NOTE — TELEPHONE ENCOUNTER
Pt is calling back with     glimepiride 2 mg. Take one tablet by mouth  2 times a day w/ meals. Pt has been taking   Take two tablets twice a day      Pl advise.      [phone]

## 2020-08-10 NOTE — PROGRESS NOTES
Subjective:      Patient ID: Paulo Coy is a 60 y.o. female. Patient presents with:  3 Month Follow-Up: diabetes, hypertension, lipids - pt is fasting - discuss lab results    She tells me she has had a number of vaccines at the medical office in [facility]  The heme onc told her ok to restart the metformin and she is on it now since about feb but she has stopped and started it. Has not taken consistently  She is on it twice a day 500 bid no issue   The dose of glimpiride is in question as she might be taking 4 mg bid    YOB: 1955    Date of Visit:  8/10/2020     -- Penicillins -- Hives    Current Outpatient Medications:  metFORMIN (GLUCOPHAGE-XR) 500 MG extended release tablet, Take 500 mg by mouth 2 times daily (with meals) , Disp: , Rfl:   potassium chloride (KLOR-CON M) 10 MEQ extended release tablet, Take 2 tablets by mouth 2 times daily, Disp: 360 tablet, Rfl: 1  glimepiride (AMARYL) 2 MG tablet, Take 1 tablet by mouth 2 times daily (with meals), Disp: 180 tablet, Rfl: 1  metoprolol succinate (TOPROL XL) 25 MG extended release tablet, Take 1 tablet by mouth daily (Patient taking differently: Take 12.5 mg by mouth 2 times daily ), Disp: 90 tablet, Rfl: 3  furosemide (LASIX) 20 MG tablet, Take 2 tablets by mouth daily, Disp: 90 tablet, Rfl: 3  LORazepam (ATIVAN) 1 MG tablet, Take 1 mg by mouth every 6 hours as needed for Anxiety. , Disp: , Rfl:   acetaminophen (TYLENOL) 500 MG tablet, Take 500 mg by mouth every 6 hours as needed for Pain, Disp: , Rfl:   valACYclovir (VALTREX) 500 MG tablet, Take 500 mg by mouth 2 times daily, Disp: , Rfl:   flecainide (TAMBOCOR) 100 MG tablet, Take 1 tablet by mouth 2 times daily, Disp: 180 tablet, Rfl: 3  apixaban (ELIQUIS) 5 MG TABS tablet, Take 1 tablet by mouth 2 times daily, Disp: 180 tablet, Rfl: 3  loratadine (CLARITIN) 10 MG capsule, Take 10 mg by mouth as needed , Disp: , Rfl:     No current facility-administered medications for this visit. ---------------------------               08/10/20                      0946         ---------------------------   BP:          120/78         Site:    Left Upper Arm     Position:     Sitting        Cuff Size:   Large Adult      Pulse:         80           Temp:   97.3 °F (36.3 °C)   TempSrc:    Temporal        Weight:  236 lb (107 kg)    Height:   5' 3\" (1.6 m)    ---------------------------  Body mass index is 41.81 kg/m². Wt Readings from Last 3 Encounters:  08/10/20 : 236 lb (107 kg)  06/25/20 : 238 lb (108 kg)  04/18/20 : 237 lb 3.4 oz (107.6 kg)    BP Readings from Last 3 Encounters:  08/10/20 : 120/78  06/25/20 : 114/80  04/18/20 : 109/85        Review of Systems   Constitutional: Negative for appetite change, fever and unexpected weight change. Respiratory: Negative for cough, chest tightness and shortness of breath. Cardiovascular: Negative for chest pain, palpitations and leg swelling. Gastrointestinal: Negative for abdominal distention, abdominal pain, blood in stool, constipation, diarrhea, nausea and vomiting. Genitourinary: Negative for difficulty urinating, dysuria and frequency. Musculoskeletal:        Feet ok no sx   Neurological: Negative for headaches. Objective:   Physical Exam  Constitutional:       General: She is not in acute distress. Appearance: She is well-developed. She is not diaphoretic. Cardiovascular:      Rate and Rhythm: Normal rate and regular rhythm. Heart sounds: Normal heart sounds. No murmur. No friction rub. No gallop. Pulmonary:      Effort: Pulmonary effort is normal. No tachypnea, accessory muscle usage or respiratory distress. Breath sounds: Normal breath sounds. No decreased breath sounds, wheezing, rhonchi or rales. Lymphadenopathy:      Cervical: No cervical adenopathy. Upper Body:      Right upper body: No supraclavicular adenopathy.       Left upper body: No supraclavicular adenopathy. Skin:     General: Skin is warm and dry. Coloration: Skin is not pale. Neurological:      Mental Status: She is alert. Assessment:       Diagnosis Orders   1. Type 2 diabetes mellitus without complication, without long-term current use of insulin ([address]     2. Essential hypertension     3.  Elevated liver enzymes       Reviewed the labs with her  She has had elevated enzymes since about january      Plan:      See dr Lucrecia Smith for the liver testing concern\  continue the diet  Call me the glimepiride dose and frequency when you get home   See in 3 months         Jose Pal MD

## 2020-08-10 NOTE — PATIENT INSTRUCTIONS
See dr Nicole Taylor for the liver testing concern\  continue the diet  Call me the glimepiride dose and frequency when you get home   See in 3 months

## 2020-08-11 NOTE — TELEPHONE ENCOUNTER
If she is taking 2 of the 2 mg twice a day that is the maximum dose  If she tolerates the medicine increase the metformin to 2 tablets twice a day

## 2020-08-13 RX ORDER — GLIMEPIRIDE 2 MG/1
4 TABLET ORAL 2 TIMES DAILY WITH MEALS
Qty: 360 TABLET | Refills: 1 | Status: SHIPPED | OUTPATIENT
Start: 2020-08-13 | End: 2020-08-17 | Stop reason: SDUPTHER

## 2020-08-17 ENCOUNTER — TELEPHONE (OUTPATIENT)
Dept: FAMILY MEDICINE CLINIC | Age: 65
End: 2020-08-17

## 2020-08-17 RX ORDER — FLECAINIDE ACETATE 100 MG/1
100 TABLET ORAL 2 TIMES DAILY
Qty: 180 TABLET | Refills: 0 | Status: ON HOLD
Start: 2020-08-17 | End: 2020-10-29 | Stop reason: HOSPADM

## 2020-08-17 RX ORDER — GLIMEPIRIDE 2 MG/1
4 TABLET ORAL 2 TIMES DAILY WITH MEALS
Qty: 360 TABLET | Refills: 1 | Status: SHIPPED | OUTPATIENT
Start: 2020-08-17 | End: 2021-02-12 | Stop reason: SDUPTHER

## 2020-08-17 RX ORDER — METFORMIN HYDROCHLORIDE 500 MG/1
500 TABLET, EXTENDED RELEASE ORAL 2 TIMES DAILY WITH MEALS
Qty: 180 TABLET | Refills: 1 | Status: SHIPPED | OUTPATIENT
Start: 2020-08-17 | End: 2020-11-11

## 2020-08-17 RX ORDER — FUROSEMIDE 20 MG/1
40 TABLET ORAL DAILY
Qty: 90 TABLET | Refills: 0 | Status: SHIPPED | OUTPATIENT
Start: 2020-08-17 | End: 2020-08-18

## 2020-08-17 NOTE — TELEPHONE ENCOUNTER
Please sign for Dr. Carol Thomson    Last O/V:  03/02/2020  Next O/V:  none    Last Labs:CMP  :  08/08/2020

## 2020-08-17 NOTE — TELEPHONE ENCOUNTER
Patient calling the Glimepiride should of went to [facility] away. Also now she needs Metformin sent as well.       Please advise, [phone]

## 2020-08-18 RX ORDER — FUROSEMIDE 20 MG/1
40 TABLET ORAL DAILY
Qty: 180 TABLET | Refills: 0 | Status: SHIPPED | OUTPATIENT
Start: 2020-08-18 | End: 2020-08-24

## 2020-08-18 NOTE — TELEPHONE ENCOUNTER
Pharmacy called and asked to resend furosemide prescription over for a 90 days supply    [facility] - [address], [address] [phone] - F [phone] 80 tablets)

## 2020-08-24 ENCOUNTER — TELEPHONE (OUTPATIENT)
Dept: FAMILY MEDICINE CLINIC | Age: 65
End: 2020-08-24

## 2020-08-24 ENCOUNTER — TELEPHONE (OUTPATIENT)
Dept: CARDIOLOGY CLINIC | Age: 65
End: 2020-08-24

## 2020-08-24 RX ORDER — FUROSEMIDE 20 MG/1
20 TABLET ORAL DAILY
Qty: 180 TABLET | Refills: 0
Start: 2020-08-24 | End: 2021-03-26 | Stop reason: SDUPTHER

## 2020-08-24 NOTE — TELEPHONE ENCOUNTER
Called patient who said she has only taken 20 mg QD and had been fine with that dose. Informed her to continue 20 mg QD and take an additional dose of 20 mg daily as needed. She v/u. Med list updated to reflect changes.

## 2020-08-24 NOTE — TELEPHONE ENCOUNTER
Please advise if pt is to take 1 furosemide tab daily or 2. Looks like dose was adjusted at discharge 04/18/2020 but pt states she takes 1 daily.

## 2020-08-24 NOTE — TELEPHONE ENCOUNTER
Pt Called in this morning stating that she picked up her Lasix from the pharmacy and is concerned that the instructions for taking the medication is wrong. She states she normally only takes 1 tablet rather than 2.      She can be reached at [phone]

## 2020-08-24 NOTE — TELEPHONE ENCOUNTER
Pt is calling to find out how she is supposed to take the    glimepiride     metFORMIN    How many pills and how often. Pl advise.      [phone]

## 2020-09-23 ENCOUNTER — TELEPHONE (OUTPATIENT)
Dept: CARDIOLOGY CLINIC | Age: 65
End: 2020-09-23

## 2020-09-23 NOTE — TELEPHONE ENCOUNTER
I called Rod Hill to make her yearly follow up with Dr. Harmeet Acuna and she stated something about Dr. Harmeet Acuna wanting her to schedule and Ablation?      You can reach Rod Hill at #[phone]

## 2020-09-23 NOTE — TELEPHONE ENCOUNTER
If you choose to proceed with atrial fibrillation ablation please contact Dr. Abraham Hinson at [phone]. If you have any question regarding your atrial fibrillation ablation please contact Dr. Melecio Reardon at [phone]. Atrial Fibrillation Ablation Pre procedure Instructions    As part of your pre procedure work up you will need to get a CT scan of your heart. This scan is completed in order to give Dr. Miriam Cedeno a map of the atrium (chamber of your heart) where he will be doing the ablation. CTA Pre procedure instructions      Date:10/5/2020  Time: 10:20 am    -Arrive 20 minutes prior to scheduled testing time  -Nothing to eat or drink for at least 4 hours prior to test   -You will need to get lab work 5-7 days prior to this test (Renal panel) to check your kidney function. You will be receiving intravenous dye for the procedure and the doctor needs to check to make sure your kidneys are functioning properly prior to the test.    Atrial Fibrillation Ablation Pre procedure Instructions    Date: 10- Wednesday   Arrive at: 11:30 am   Procedure time: 1:00 pm    The morning of your procedure you will park in the hospital parking lot and report directly to the cath lab to check in.      Pre-Procedure Instructions   1. You will need to fast (nothing to eat or drink) for at least 8 hours prior to procedure. 2. You will need to hold your Flecanide for 7 days prior to the procedure. 3. You will need to hold your Eliquis for 12 hours prior to the procedure. 4. You will need to hold your Aspirin for 7 days prior to the procedure. 5. You will need to hold all diabetic medications including, Metformin & Glimepiride (Amaryl)  the morning of the procedure. If you take Lantus/Levemir only take ½ your normal dose the evening before. 6. Do not use any lotions, creams or perfume the morning of procedure.    7. You will need to complete pre-procedure lab work prior to completing your COVID test which is scheduled for 10- at 9:30 am ([facility]  8. Please have a responsible adult to drive you home after procedure, you will stay overnight. 9. Cath lab will provide you with all post procedure instructions  10. A 3 month follow up will be scheduled with Dr. Izzy Canchola Nurse practitioner Steve Wallace CNP post procedure    _________________________________________________________________________________  Climmie Rogerio will need to complete Pre-Procedure lab work prior to your completing your COVID test   The address for your lab work is:   [address], [address]  Phone [phone]  The hours are Anisha Forward & Fri   7:30 am - 5:00 pm  Closed on Wednesday  No appointment necessary    Please allow for at least 30-45 mins for your lab work to be completed.   ______________________________________________________________    Your COVID 19 test is scheduled for 10- at 9:30 am  The address for the COVID test is [address] [place] [address], [address] [facility], [address]  The testing site is located at the [place] [address]  ____________________________________________________________________

## 2020-09-23 NOTE — LETTER
[address] [facility] YASMINE Courtney  Phone: [phone]  Fax: [phone]    September 24, 2020    [address] [address]     Dear Stacy Branch:     CTA Pre procedure instructions    Date: 10-5-2020  Time: 10:20 am    -Arrive 20 minutes prior to scheduled testing time  -Nothing to eat or drink for at least 4 hours prior to test   -You will need to get lab work 5-7 days prior to this test (Renal panel) to check your kidney function. (Orders Attached)    Atrial Fibrillation Ablation Pre procedure Instructions  Date: 10- Wednesday   Arrive at: 11:30 am   Procedure time: 1:00 pm    The morning of your procedure you will park in the hospital parking lot and report directly to the cath lab to check in.      Pre-Procedure Instructions   1. You will need to fast (nothing to eat or drink) for at least 8 hours prior to procedure. 2. You will need to hold your Flecanide for 7 days prior to the procedure. 3. You will need to hold your Eliquis for 12 hours prior to the procedure. 4. You will need to hold your Aspirin for 7 days prior to the procedure. 5. You will need to hold all diabetic medications including, Metformin & Glimepiride (Amaryl)  the morning of the procedure. 6. Do not use any lotions, creams or perfume the morning of procedure. 7. You will need to complete pre-procedure lab work prior to completing your COVID test which is scheduled for 10- at 9:30 am ([address])  8. Please have a responsible adult to drive you home after procedure, you will stay overnight. 9. Cath lab will provide you with all post procedure instructions  10.  A 3 month follow up will be scheduled with Dr. Андрей Pederson Nurse practitioner Mal Davison CNP post procedure    Sincerely,      Jaden Vega MD / Mary Esparza RN                   _____________________________________________________________________ You will need to complete Pre-Procedure lab work prior to your completing your COVID test   The address for your lab work is:   [address], [address]  Phone [phone]  The hours are Sherran Rasp & Fri   7:30 am  5:00 pm  Closed on Wednesday  No appointment necessary    Please allow for at least 30-45 mins for your lab work to be completed.   _____________________________________________________________________  Your COVID 19 test is scheduled for 10- at 9:30 am  The address for the COVID test is [address] [place] [address], [address] [place] [address]  The testing site is located at the [facility]  _____________________________________________________________________

## 2020-09-24 NOTE — TELEPHONE ENCOUNTER
I have Dedrick Manuelito scheduled for Monday 10/5/20 at 10:20 am. She is to arrive at [facility] at 10:05 am.

## 2020-10-01 DIAGNOSIS — I48.0 PAF (PAROXYSMAL ATRIAL FIBRILLATION) (HCC): ICD-10-CM

## 2020-10-01 LAB
ALBUMIN SERPL-MCNC: 4.3 G/DL (ref 3.4–5)
ANION GAP SERPL CALCULATED.3IONS-SCNC: 13 MMOL/L (ref 3–16)
BUN BLDV-MCNC: 15 MG/DL (ref 7–20)
CALCIUM SERPL-MCNC: 9.5 MG/DL (ref 8.3–10.6)
CHLORIDE BLD-SCNC: 103 MMOL/L (ref 99–110)
CO2: 26 MMOL/L (ref 21–32)
CREAT SERPL-MCNC: 0.9 MG/DL (ref 0.6–1.2)
GFR AFRICAN AMERICAN: >60
GFR NON-AFRICAN AMERICAN: >60
GLUCOSE BLD-MCNC: 171 MG/DL (ref 70–99)
PHOSPHORUS: 2.7 MG/DL (ref 2.5–4.9)
POTASSIUM SERPL-SCNC: 3.9 MMOL/L (ref 3.5–5.1)
SODIUM BLD-SCNC: 142 MMOL/L (ref 136–145)

## 2020-10-05 ENCOUNTER — HOSPITAL ENCOUNTER (OUTPATIENT)
Dept: CT IMAGING | Age: 65
Discharge: HOME | End: 2020-10-05
Payer: COMMERCIAL

## 2020-10-05 PROCEDURE — 75574 CT ANGIO HRT W/3D IMAGE: CPT

## 2020-10-05 PROCEDURE — 6360000004 HC RX CONTRAST MEDICATION: Performed by: FAMILY MEDICINE

## 2020-10-05 RX ADMIN — IOPAMIDOL 75 ML: 755 INJECTION, SOLUTION INTRAVENOUS at 09:51

## 2020-10-06 ENCOUNTER — TELEPHONE (OUTPATIENT)
Dept: FAMILY MEDICINE CLINIC | Age: 65
End: 2020-10-06

## 2020-10-06 NOTE — TELEPHONE ENCOUNTER
Pt calling she got a flu shot 2- should she get the flu shot for this time. Pt is also having an ablation on her heart the 28th.     [phone]

## 2020-10-20 RX ORDER — POTASSIUM CHLORIDE 750 MG/1
20 TABLET, EXTENDED RELEASE ORAL 2 TIMES DAILY
Qty: 360 TABLET | Refills: 1 | Status: SHIPPED | OUTPATIENT
Start: 2020-10-20 | End: 2021-04-09 | Stop reason: SDUPTHER

## 2020-10-22 ENCOUNTER — HOSPITAL ENCOUNTER (OUTPATIENT)
Age: 65
Discharge: HOME | End: 2020-10-22
Payer: COMMERCIAL

## 2020-10-22 ENCOUNTER — OFFICE VISIT (OUTPATIENT)
Dept: PRIMARY CARE CLINIC | Age: 65
End: 2020-10-22
Payer: COMMERCIAL

## 2020-10-22 LAB
ABO/RH: NORMAL
ANION GAP SERPL CALCULATED.3IONS-SCNC: 10 MMOL/L (ref 3–16)
ANTIBODY SCREEN: NORMAL
BUN BLDV-MCNC: 22 MG/DL (ref 7–20)
CALCIUM SERPL-MCNC: 9.1 MG/DL (ref 8.3–10.6)
CHLORIDE BLD-SCNC: 107 MMOL/L (ref 99–110)
CO2: 22 MMOL/L (ref 21–32)
CREAT SERPL-MCNC: 0.8 MG/DL (ref 0.6–1.2)
GFR AFRICAN AMERICAN: >60
GFR NON-AFRICAN AMERICAN: >60
GLUCOSE BLD-MCNC: 241 MG/DL (ref 70–99)
HCT VFR BLD CALC: 40.3 % (ref 36–48)
HEMOGLOBIN: 13.2 G/DL (ref 12–16)
MCH RBC QN AUTO: 34.1 PG (ref 26–34)
MCHC RBC AUTO-ENTMCNC: 32.8 G/DL (ref 31–36)
MCV RBC AUTO: 104.1 FL (ref 80–100)
PDW BLD-RTO: 15.5 % (ref 12.4–15.4)
PLATELET # BLD: 177 K/UL (ref 135–450)
PMV BLD AUTO: 9.8 FL (ref 5–10.5)
POTASSIUM SERPL-SCNC: 4 MMOL/L (ref 3.5–5.1)
RBC # BLD: 3.87 M/UL (ref 4–5.2)
SODIUM BLD-SCNC: 139 MMOL/L (ref 136–145)
WBC # BLD: 5.6 K/UL (ref 4–11)

## 2020-10-22 PROCEDURE — 86850 RBC ANTIBODY SCREEN: CPT

## 2020-10-22 PROCEDURE — 86901 BLOOD TYPING SEROLOGIC RH(D): CPT

## 2020-10-22 PROCEDURE — 86900 BLOOD TYPING SEROLOGIC ABO: CPT

## 2020-10-22 PROCEDURE — 99211 OFF/OP EST MAY X REQ PHY/QHP: CPT | Performed by: NURSE PRACTITIONER

## 2020-10-22 PROCEDURE — 36415 COLL VENOUS BLD VENIPUNCTURE: CPT

## 2020-10-22 PROCEDURE — 80048 BASIC METABOLIC PNL TOTAL CA: CPT

## 2020-10-22 PROCEDURE — 85027 COMPLETE CBC AUTOMATED: CPT

## 2020-10-22 NOTE — PATIENT INSTRUCTIONS

## 2020-10-22 NOTE — PROGRESS NOTES
June Paci received a viral test for COVID-19. They were educated on isolation and quarantine as appropriate. For any symptoms, they were directed to seek care from their PCP, given contact information to establish with a doctor, directed to an urgent care or the emergency room.

## 2020-10-24 LAB — SARS-COV-2, NAA: NOT DETECTED

## 2020-10-26 NOTE — RESULT ENCOUNTER NOTE

## 2020-10-28 ENCOUNTER — HOSPITAL ENCOUNTER (INPATIENT)
Dept: CARDIAC CATH/INVASIVE PROCEDURES | Age: 65
LOS: 1 days | Discharge: HOME | DRG: 274 | End: 2020-10-29
Attending: INTERNAL MEDICINE | Admitting: INTERNAL MEDICINE
Payer: COMMERCIAL

## 2020-10-28 ENCOUNTER — ANESTHESIA (OUTPATIENT)
Dept: CARDIAC CATH/INVASIVE PROCEDURES | Age: 65
DRG: 274 | End: 2020-10-28
Payer: COMMERCIAL

## 2020-10-28 ENCOUNTER — ANESTHESIA EVENT (OUTPATIENT)
Dept: CARDIAC CATH/INVASIVE PROCEDURES | Age: 65
DRG: 274 | End: 2020-10-28
Payer: COMMERCIAL

## 2020-10-28 VITALS
TEMPERATURE: 96.8 F | SYSTOLIC BLOOD PRESSURE: 168 MMHG | OXYGEN SATURATION: 94 % | RESPIRATION RATE: 1 BRPM | DIASTOLIC BLOOD PRESSURE: 89 MMHG

## 2020-10-28 LAB
ABO/RH: NORMAL
ANTIBODY SCREEN: NORMAL
GLUCOSE BLD-MCNC: 188 MG/DL (ref 70–99)
GLUCOSE BLD-MCNC: 352 MG/DL (ref 70–99)
PERFORMED ON: ABNORMAL
PERFORMED ON: ABNORMAL
POC ACT LR: 269 SEC
POC ACT LR: 322 SEC
POC ACT LR: 378 SEC

## 2020-10-28 PROCEDURE — 93613 INTRACARDIAC EPHYS 3D MAPG: CPT | Performed by: FAMILY MEDICINE

## 2020-10-28 PROCEDURE — 2709999900 HC NON-CHARGEABLE SUPPLY

## 2020-10-28 PROCEDURE — 7100000000 HC PACU RECOVERY - FIRST 15 MIN

## 2020-10-28 PROCEDURE — 93657 TX L/R ATRIAL FIB ADDL: CPT | Performed by: FAMILY MEDICINE

## 2020-10-28 PROCEDURE — 02K83ZZ MAP CONDUCTION MECHANISM, PERCUTANEOUS APPROACH: ICD-10-PCS | Performed by: INTERNAL MEDICINE

## 2020-10-28 PROCEDURE — 93656 COMPRE EP EVAL ABLTJ ATR FIB: CPT | Performed by: INTERNAL MEDICINE

## 2020-10-28 PROCEDURE — 2580000003 HC RX 258: Performed by: INTERNAL MEDICINE

## 2020-10-28 PROCEDURE — 93657 TX L/R ATRIAL FIB ADDL: CPT | Performed by: INTERNAL MEDICINE

## 2020-10-28 PROCEDURE — 2060000000 HC ICU INTERMEDIATE R&B

## 2020-10-28 PROCEDURE — 93656 COMPRE EP EVAL ABLTJ ATR FIB: CPT | Performed by: FAMILY MEDICINE

## 2020-10-28 PROCEDURE — 86900 BLOOD TYPING SEROLOGIC ABO: CPT

## 2020-10-28 PROCEDURE — 2580000003 HC RX 258

## 2020-10-28 PROCEDURE — 86901 BLOOD TYPING SEROLOGIC RH(D): CPT

## 2020-10-28 PROCEDURE — C1894 INTRO/SHEATH, NON-LASER: HCPCS

## 2020-10-28 PROCEDURE — 86850 RBC ANTIBODY SCREEN: CPT

## 2020-10-28 PROCEDURE — 93312 ECHO TRANSESOPHAGEAL: CPT | Performed by: FAMILY MEDICINE

## 2020-10-28 PROCEDURE — 2500000003 HC RX 250 WO HCPCS

## 2020-10-28 PROCEDURE — 93622 COMP EP EVAL L VENTR PAC&REC: CPT | Performed by: FAMILY MEDICINE

## 2020-10-28 PROCEDURE — 93312 ECHO TRANSESOPHAGEAL: CPT | Performed by: INTERNAL MEDICINE

## 2020-10-28 PROCEDURE — 7100000001 HC PACU RECOVERY - ADDTL 15 MIN

## 2020-10-28 PROCEDURE — 93662 INTRACARDIAC ECG (ICE): CPT | Performed by: INTERNAL MEDICINE

## 2020-10-28 PROCEDURE — C1759 CATH, INTRA ECHOCARDIOGRAPHY: HCPCS

## 2020-10-28 PROCEDURE — 93320 DOPPLER ECHO COMPLETE: CPT | Performed by: FAMILY MEDICINE

## 2020-10-28 PROCEDURE — C1732 CATH, EP, DIAG/ABL, 3D/VECT: HCPCS

## 2020-10-28 PROCEDURE — B24BZZ4 ULTRASONOGRAPHY OF HEART WITH AORTA, TRANSESOPHAGEAL: ICD-10-PCS | Performed by: INTERNAL MEDICINE

## 2020-10-28 PROCEDURE — 6370000000 HC RX 637 (ALT 250 FOR IP): Performed by: INTERNAL MEDICINE

## 2020-10-28 PROCEDURE — 93662 INTRACARDIAC ECG (ICE): CPT | Performed by: FAMILY MEDICINE

## 2020-10-28 PROCEDURE — 85347 COAGULATION TIME ACTIVATED: CPT

## 2020-10-28 PROCEDURE — 2500000003 HC RX 250 WO HCPCS: Performed by: NURSE ANESTHETIST, CERTIFIED REGISTERED

## 2020-10-28 PROCEDURE — 93623 PRGRMD STIMJ&PACG IV RX NFS: CPT | Performed by: INTERNAL MEDICINE

## 2020-10-28 PROCEDURE — 3700000001 HC ADD 15 MINUTES (ANESTHESIA)

## 2020-10-28 PROCEDURE — 02583ZZ DESTRUCTION OF CONDUCTION MECHANISM, PERCUTANEOUS APPROACH: ICD-10-PCS | Performed by: INTERNAL MEDICINE

## 2020-10-28 PROCEDURE — 93613 INTRACARDIAC EPHYS 3D MAPG: CPT | Performed by: INTERNAL MEDICINE

## 2020-10-28 PROCEDURE — 93655 ICAR CATH ABLTJ DSCRT ARRHYT: CPT | Performed by: FAMILY MEDICINE

## 2020-10-28 PROCEDURE — 2580000003 HC RX 258: Performed by: NURSE ANESTHETIST, CERTIFIED REGISTERED

## 2020-10-28 PROCEDURE — C1730 CATH, EP, 19 OR FEW ELECT: HCPCS

## 2020-10-28 PROCEDURE — 93005 ELECTROCARDIOGRAM TRACING: CPT | Performed by: INTERNAL MEDICINE

## 2020-10-28 PROCEDURE — 93655 ICAR CATH ABLTJ DSCRT ARRHYT: CPT | Performed by: INTERNAL MEDICINE

## 2020-10-28 PROCEDURE — 93325 DOPPLER ECHO COLOR FLOW MAPG: CPT | Performed by: FAMILY MEDICINE

## 2020-10-28 PROCEDURE — 4A0234Z MEASUREMENT OF CARDIAC ELECTRICAL ACTIVITY, PERCUTANEOUS APPROACH: ICD-10-PCS | Performed by: INTERNAL MEDICINE

## 2020-10-28 PROCEDURE — 3700000000 HC ANESTHESIA ATTENDED CARE

## 2020-10-28 PROCEDURE — 2720000010 HC SURG SUPPLY STERILE

## 2020-10-28 PROCEDURE — 6360000002 HC RX W HCPCS

## 2020-10-28 PROCEDURE — 93622 COMP EP EVAL L VENTR PAC&REC: CPT | Performed by: INTERNAL MEDICINE

## 2020-10-28 PROCEDURE — 6360000002 HC RX W HCPCS: Performed by: NURSE ANESTHETIST, CERTIFIED REGISTERED

## 2020-10-28 PROCEDURE — 6360000002 HC RX W HCPCS: Performed by: INTERNAL MEDICINE

## 2020-10-28 PROCEDURE — 93623 PRGRMD STIMJ&PACG IV RX NFS: CPT | Performed by: FAMILY MEDICINE

## 2020-10-28 RX ORDER — LORAZEPAM 1 MG/1
1 TABLET ORAL EVERY 6 HOURS PRN
Status: DISCONTINUED | OUTPATIENT
Start: 2020-10-28 | End: 2020-10-29 | Stop reason: HOSPADM

## 2020-10-28 RX ORDER — CETIRIZINE HYDROCHLORIDE 10 MG/1
10 TABLET ORAL DAILY PRN
Status: DISCONTINUED | OUTPATIENT
Start: 2020-10-28 | End: 2020-10-29 | Stop reason: HOSPADM

## 2020-10-28 RX ORDER — SODIUM CHLORIDE 9 MG/ML
INJECTION, SOLUTION INTRAVENOUS CONTINUOUS
Status: DISCONTINUED | OUTPATIENT
Start: 2020-10-28 | End: 2020-10-28

## 2020-10-28 RX ORDER — MIDAZOLAM HYDROCHLORIDE 1 MG/ML
INJECTION INTRAMUSCULAR; INTRAVENOUS PRN
Status: DISCONTINUED | OUTPATIENT
Start: 2020-10-28 | End: 2020-10-28 | Stop reason: SDUPTHER

## 2020-10-28 RX ORDER — SODIUM CHLORIDE 9 MG/ML
INJECTION, SOLUTION INTRAVENOUS CONTINUOUS PRN
Status: DISCONTINUED | OUTPATIENT
Start: 2020-10-28 | End: 2020-10-28 | Stop reason: SDUPTHER

## 2020-10-28 RX ORDER — LIDOCAINE HYDROCHLORIDE AND EPINEPHRINE BITARTRATE 20; .01 MG/ML; MG/ML
15 INJECTION, SOLUTION SUBCUTANEOUS SEE ADMIN INSTRUCTIONS
Status: DISCONTINUED | OUTPATIENT
Start: 2020-10-28 | End: 2020-10-29 | Stop reason: HOSPADM

## 2020-10-28 RX ORDER — METOPROLOL SUCCINATE 25 MG/1
12.5 TABLET, EXTENDED RELEASE ORAL DAILY
Status: DISCONTINUED | OUTPATIENT
Start: 2020-10-28 | End: 2020-10-29 | Stop reason: HOSPADM

## 2020-10-28 RX ORDER — SODIUM CHLORIDE 0.9 % (FLUSH) 0.9 %
10 SYRINGE (ML) INJECTION EVERY 12 HOURS SCHEDULED
Status: DISCONTINUED | OUTPATIENT
Start: 2020-10-28 | End: 2020-10-29 | Stop reason: HOSPADM

## 2020-10-28 RX ORDER — DEXTROSE MONOHYDRATE 50 MG/ML
100 INJECTION, SOLUTION INTRAVENOUS PRN
Status: DISCONTINUED | OUTPATIENT
Start: 2020-10-28 | End: 2020-10-29 | Stop reason: HOSPADM

## 2020-10-28 RX ORDER — PROPOFOL 10 MG/ML
INJECTION, EMULSION INTRAVENOUS PRN
Status: DISCONTINUED | OUTPATIENT
Start: 2020-10-28 | End: 2020-10-28 | Stop reason: SDUPTHER

## 2020-10-28 RX ORDER — SODIUM CHLORIDE 0.9 % (FLUSH) 0.9 %
10 SYRINGE (ML) INJECTION PRN
Status: DISCONTINUED | OUTPATIENT
Start: 2020-10-28 | End: 2020-10-29 | Stop reason: HOSPADM

## 2020-10-28 RX ORDER — SODIUM CHLORIDE 9 MG/ML
INJECTION INTRAVENOUS PRN
Status: DISCONTINUED | OUTPATIENT
Start: 2020-10-28 | End: 2020-10-28 | Stop reason: SDUPTHER

## 2020-10-28 RX ORDER — SODIUM CHLORIDE 0.9 % (FLUSH) 0.9 %
10 SYRINGE (ML) INJECTION PRN
Status: DISCONTINUED | OUTPATIENT
Start: 2020-10-28 | End: 2020-10-28 | Stop reason: SDUPTHER

## 2020-10-28 RX ORDER — LORATADINE 10 MG/1
10 CAPSULE, LIQUID FILLED ORAL PRN
Status: DISCONTINUED | OUTPATIENT
Start: 2020-10-28 | End: 2020-10-28 | Stop reason: CLARIF

## 2020-10-28 RX ORDER — DEXTROSE MONOHYDRATE 25 G/50ML
12.5 INJECTION, SOLUTION INTRAVENOUS PRN
Status: DISCONTINUED | OUTPATIENT
Start: 2020-10-28 | End: 2020-10-29 | Stop reason: HOSPADM

## 2020-10-28 RX ORDER — FUROSEMIDE 20 MG/1
20 TABLET ORAL DAILY
Status: DISCONTINUED | OUTPATIENT
Start: 2020-10-29 | End: 2020-10-29 | Stop reason: HOSPADM

## 2020-10-28 RX ORDER — VECURONIUM BROMIDE 1 MG/ML
INJECTION, POWDER, LYOPHILIZED, FOR SOLUTION INTRAVENOUS PRN
Status: DISCONTINUED | OUTPATIENT
Start: 2020-10-28 | End: 2020-10-28 | Stop reason: SDUPTHER

## 2020-10-28 RX ORDER — 0.9 % SODIUM CHLORIDE 0.9 %
1000 INTRAVENOUS SOLUTION INTRAVENOUS
Status: ACTIVE | OUTPATIENT
Start: 2020-10-28 | End: 2020-10-28

## 2020-10-28 RX ORDER — HEPARIN SODIUM 1000 [USP'U]/ML
INJECTION, SOLUTION INTRAVENOUS; SUBCUTANEOUS PRN
Status: DISCONTINUED | OUTPATIENT
Start: 2020-10-28 | End: 2020-10-28 | Stop reason: SDUPTHER

## 2020-10-28 RX ORDER — PROTAMINE SULFATE 10 MG/ML
30 INJECTION, SOLUTION INTRAVENOUS
Status: ACTIVE | OUTPATIENT
Start: 2020-10-28 | End: 2020-10-28

## 2020-10-28 RX ORDER — SODIUM CHLORIDE 0.9 % (FLUSH) 0.9 %
10 SYRINGE (ML) INJECTION EVERY 12 HOURS SCHEDULED
Status: DISCONTINUED | OUTPATIENT
Start: 2020-10-28 | End: 2020-10-28 | Stop reason: SDUPTHER

## 2020-10-28 RX ORDER — NICOTINE POLACRILEX 4 MG
15 LOZENGE BUCCAL PRN
Status: DISCONTINUED | OUTPATIENT
Start: 2020-10-28 | End: 2020-10-29 | Stop reason: HOSPADM

## 2020-10-28 RX ORDER — VALACYCLOVIR HYDROCHLORIDE 500 MG/1
500 TABLET, FILM COATED ORAL 2 TIMES DAILY
Status: DISCONTINUED | OUTPATIENT
Start: 2020-10-28 | End: 2020-10-29 | Stop reason: HOSPADM

## 2020-10-28 RX ORDER — LIDOCAINE HYDROCHLORIDE 10 MG/ML
INJECTION, SOLUTION EPIDURAL; INFILTRATION; INTRACAUDAL; PERINEURAL PRN
Status: DISCONTINUED | OUTPATIENT
Start: 2020-10-28 | End: 2020-10-28 | Stop reason: SDUPTHER

## 2020-10-28 RX ORDER — GLIMEPIRIDE 1 MG/1
4 TABLET ORAL 2 TIMES DAILY WITH MEALS
Status: DISCONTINUED | OUTPATIENT
Start: 2020-10-28 | End: 2020-10-29 | Stop reason: HOSPADM

## 2020-10-28 RX ORDER — FUROSEMIDE 10 MG/ML
60 INJECTION INTRAMUSCULAR; INTRAVENOUS ONCE
Status: COMPLETED | OUTPATIENT
Start: 2020-10-28 | End: 2020-10-28

## 2020-10-28 RX ORDER — DOBUTAMINE HYDROCHLORIDE 200 MG/100ML
INJECTION INTRAVENOUS CONTINUOUS PRN
Status: DISCONTINUED | OUTPATIENT
Start: 2020-10-28 | End: 2020-10-28 | Stop reason: SDUPTHER

## 2020-10-28 RX ORDER — PROTAMINE SULFATE 10 MG/ML
INJECTION, SOLUTION INTRAVENOUS PRN
Status: DISCONTINUED | OUTPATIENT
Start: 2020-10-28 | End: 2020-10-28 | Stop reason: SDUPTHER

## 2020-10-28 RX ORDER — FENTANYL CITRATE 50 UG/ML
INJECTION, SOLUTION INTRAMUSCULAR; INTRAVENOUS PRN
Status: DISCONTINUED | OUTPATIENT
Start: 2020-10-28 | End: 2020-10-28 | Stop reason: SDUPTHER

## 2020-10-28 RX ORDER — HEPARIN SODIUM 10000 [USP'U]/100ML
INJECTION, SOLUTION INTRAVENOUS CONTINUOUS PRN
Status: DISCONTINUED | OUTPATIENT
Start: 2020-10-28 | End: 2020-10-28 | Stop reason: SDUPTHER

## 2020-10-28 RX ORDER — ONDANSETRON 2 MG/ML
INJECTION INTRAMUSCULAR; INTRAVENOUS PRN
Status: DISCONTINUED | OUTPATIENT
Start: 2020-10-28 | End: 2020-10-28 | Stop reason: SDUPTHER

## 2020-10-28 RX ORDER — ACETAMINOPHEN 325 MG/1
650 TABLET ORAL EVERY 4 HOURS PRN
Status: DISCONTINUED | OUTPATIENT
Start: 2020-10-28 | End: 2020-10-29 | Stop reason: HOSPADM

## 2020-10-28 RX ORDER — METFORMIN HYDROCHLORIDE 500 MG/1
500 TABLET, EXTENDED RELEASE ORAL 2 TIMES DAILY WITH MEALS
Status: DISCONTINUED | OUTPATIENT
Start: 2020-10-28 | End: 2020-10-29 | Stop reason: HOSPADM

## 2020-10-28 RX ORDER — FLECAINIDE ACETATE 100 MG/1
50 TABLET ORAL 2 TIMES DAILY
Status: DISCONTINUED | OUTPATIENT
Start: 2020-10-28 | End: 2020-10-29 | Stop reason: HOSPADM

## 2020-10-28 RX ORDER — POTASSIUM CHLORIDE 20 MEQ/1
20 TABLET, EXTENDED RELEASE ORAL 2 TIMES DAILY
Status: DISCONTINUED | OUTPATIENT
Start: 2020-10-28 | End: 2020-10-29 | Stop reason: HOSPADM

## 2020-10-28 RX ORDER — SUCCINYLCHOLINE/SOD CL,ISO/PF 200MG/10ML
SYRINGE (ML) INTRAVENOUS PRN
Status: DISCONTINUED | OUTPATIENT
Start: 2020-10-28 | End: 2020-10-28 | Stop reason: SDUPTHER

## 2020-10-28 RX ORDER — GLYCOPYRROLATE 0.2 MG/ML
INJECTION INTRAMUSCULAR; INTRAVENOUS PRN
Status: DISCONTINUED | OUTPATIENT
Start: 2020-10-28 | End: 2020-10-28 | Stop reason: SDUPTHER

## 2020-10-28 RX ORDER — DEXAMETHASONE SODIUM PHOSPHATE 4 MG/ML
INJECTION, SOLUTION INTRA-ARTICULAR; INTRALESIONAL; INTRAMUSCULAR; INTRAVENOUS; SOFT TISSUE PRN
Status: DISCONTINUED | OUTPATIENT
Start: 2020-10-28 | End: 2020-10-28 | Stop reason: SDUPTHER

## 2020-10-28 RX ADMIN — GLIMEPIRIDE 4 MG: 1 TABLET ORAL at 17:20

## 2020-10-28 RX ADMIN — DOBUTAMINE HYDROCHLORIDE 10 MCG/KG/MIN: 200 INJECTION INTRAVENOUS at 14:51

## 2020-10-28 RX ADMIN — BENZOCAINE AND MENTHOL 1 LOZENGE: 15; 3.6 LOZENGE ORAL at 21:03

## 2020-10-28 RX ADMIN — DEXAMETHASONE SODIUM PHOSPHATE 8 MG: 4 INJECTION, SOLUTION INTRAMUSCULAR; INTRAVENOUS at 12:48

## 2020-10-28 RX ADMIN — BENZOCAINE AND MENTHOL 1 LOZENGE: 15; 3.6 LOZENGE ORAL at 23:28

## 2020-10-28 RX ADMIN — Medication 10 ML: at 21:06

## 2020-10-28 RX ADMIN — SUGAMMADEX 200 MG: 100 INJECTION, SOLUTION INTRAVENOUS at 15:22

## 2020-10-28 RX ADMIN — METOPROLOL SUCCINATE 12.5 MG: 25 TABLET, EXTENDED RELEASE ORAL at 17:27

## 2020-10-28 RX ADMIN — POTASSIUM CHLORIDE 20 MEQ: 1500 TABLET, EXTENDED RELEASE ORAL at 21:06

## 2020-10-28 RX ADMIN — LIDOCAINE HYDROCHLORIDE 40 MG: 10 INJECTION, SOLUTION EPIDURAL; INFILTRATION; INTRACAUDAL; PERINEURAL at 12:40

## 2020-10-28 RX ADMIN — INSULIN LISPRO 5 UNITS: 100 INJECTION, SOLUTION INTRAVENOUS; SUBCUTANEOUS at 21:07

## 2020-10-28 RX ADMIN — INSULIN LISPRO 2 UNITS: 100 INJECTION, SOLUTION INTRAVENOUS; SUBCUTANEOUS at 17:26

## 2020-10-28 RX ADMIN — FENTANYL CITRATE 100 MCG: 50 INJECTION INTRAMUSCULAR; INTRAVENOUS at 12:36

## 2020-10-28 RX ADMIN — MIDAZOLAM 2 MG: 1 INJECTION INTRAMUSCULAR; INTRAVENOUS at 12:35

## 2020-10-28 RX ADMIN — VECURONIUM BROMIDE 5 MG: 1 INJECTION, POWDER, LYOPHILIZED, FOR SOLUTION INTRAVENOUS at 13:50

## 2020-10-28 RX ADMIN — SODIUM CHLORIDE: 9 INJECTION, SOLUTION INTRAVENOUS at 12:34

## 2020-10-28 RX ADMIN — HEPARIN SODIUM 5000 UNITS: 1000 INJECTION INTRAVENOUS; SUBCUTANEOUS at 13:36

## 2020-10-28 RX ADMIN — HEPARIN SODIUM 10000 UNITS/HR: 10000 INJECTION, SOLUTION INTRAVENOUS at 14:02

## 2020-10-28 RX ADMIN — PROTAMINE SULFATE 60 MG: 10 INJECTION, SOLUTION INTRAVENOUS at 15:23

## 2020-10-28 RX ADMIN — HEPARIN SODIUM 5000 UNITS: 1000 INJECTION INTRAVENOUS; SUBCUTANEOUS at 13:42

## 2020-10-28 RX ADMIN — HEPARIN SODIUM 2000 UNITS: 1000 INJECTION INTRAVENOUS; SUBCUTANEOUS at 14:02

## 2020-10-28 RX ADMIN — APIXABAN 5 MG: 5 TABLET, FILM COATED ORAL at 17:21

## 2020-10-28 RX ADMIN — METFORMIN HYDROCHLORIDE 500 MG: 500 TABLET, EXTENDED RELEASE ORAL at 17:21

## 2020-10-28 RX ADMIN — VECURONIUM BROMIDE 10 MG: 1 INJECTION, POWDER, LYOPHILIZED, FOR SOLUTION INTRAVENOUS at 12:46

## 2020-10-28 RX ADMIN — GLYCOPYRROLATE 0.2 MG: 0.2 INJECTION, SOLUTION INTRAMUSCULAR; INTRAVENOUS at 12:48

## 2020-10-28 RX ADMIN — PROPOFOL 100 MG: 10 INJECTION, EMULSION INTRAVENOUS at 12:40

## 2020-10-28 RX ADMIN — FLECAINIDE ACETATE 50 MG: 100 TABLET ORAL at 21:04

## 2020-10-28 RX ADMIN — VALACYCLOVIR HYDROCHLORIDE 500 MG: 500 TABLET, FILM COATED ORAL at 21:13

## 2020-10-28 RX ADMIN — Medication 120 MG: at 12:40

## 2020-10-28 RX ADMIN — SODIUM CHLORIDE 10 ML: 9 INJECTION INTRAMUSCULAR; INTRAVENOUS; SUBCUTANEOUS at 12:46

## 2020-10-28 RX ADMIN — ONDANSETRON 4 MG: 2 INJECTION INTRAMUSCULAR; INTRAVENOUS at 12:48

## 2020-10-28 RX ADMIN — SODIUM CHLORIDE: 9 INJECTION, SOLUTION INTRAVENOUS at 12:48

## 2020-10-28 RX ADMIN — FUROSEMIDE 60 MG: 10 INJECTION, SOLUTION INTRAMUSCULAR; INTRAVENOUS at 17:21

## 2020-10-28 ASSESSMENT — PULMONARY FUNCTION TESTS
PIF_VALUE: 29
PIF_VALUE: 30
PIF_VALUE: 25
PIF_VALUE: 29
PIF_VALUE: 28
PIF_VALUE: 1
PIF_VALUE: 29
PIF_VALUE: 16
PIF_VALUE: 30
PIF_VALUE: 29
PIF_VALUE: 30
PIF_VALUE: 18
PIF_VALUE: 29
PIF_VALUE: 25
PIF_VALUE: 26
PIF_VALUE: 25
PIF_VALUE: 29
PIF_VALUE: 30
PIF_VALUE: 21
PIF_VALUE: 25
PIF_VALUE: 9
PIF_VALUE: 30
PIF_VALUE: 31
PIF_VALUE: 30
PIF_VALUE: 30
PIF_VALUE: 29
PIF_VALUE: 30
PIF_VALUE: 29
PIF_VALUE: 26
PIF_VALUE: 30
PIF_VALUE: 29
PIF_VALUE: 1
PIF_VALUE: 24
PIF_VALUE: 31
PIF_VALUE: 29
PIF_VALUE: 29
PIF_VALUE: 21
PIF_VALUE: 29
PIF_VALUE: 30
PIF_VALUE: 29
PIF_VALUE: 25
PIF_VALUE: 24
PIF_VALUE: 30
PIF_VALUE: 29
PIF_VALUE: 30
PIF_VALUE: 29
PIF_VALUE: 1
PIF_VALUE: 32
PIF_VALUE: 24
PIF_VALUE: 1
PIF_VALUE: 29
PIF_VALUE: 30
PIF_VALUE: 24
PIF_VALUE: 26
PIF_VALUE: 29
PIF_VALUE: 24
PIF_VALUE: 29
PIF_VALUE: 30
PIF_VALUE: 29
PIF_VALUE: 30
PIF_VALUE: 29
PIF_VALUE: 29
PIF_VALUE: 25
PIF_VALUE: 26
PIF_VALUE: 29
PIF_VALUE: 25
PIF_VALUE: 29
PIF_VALUE: 26
PIF_VALUE: 29
PIF_VALUE: 30
PIF_VALUE: 29
PIF_VALUE: 30
PIF_VALUE: 29
PIF_VALUE: 29
PIF_VALUE: 27
PIF_VALUE: 1
PIF_VALUE: 30
PIF_VALUE: 29
PIF_VALUE: 25
PIF_VALUE: 30
PIF_VALUE: 29
PIF_VALUE: 29
PIF_VALUE: 24
PIF_VALUE: 25
PIF_VALUE: 29
PIF_VALUE: 29
PIF_VALUE: 30
PIF_VALUE: 30
PIF_VALUE: 29
PIF_VALUE: 29
PIF_VALUE: 24
PIF_VALUE: 30
PIF_VALUE: 24
PIF_VALUE: 29
PIF_VALUE: 25
PIF_VALUE: 29
PIF_VALUE: 30
PIF_VALUE: 30
PIF_VALUE: 29
PIF_VALUE: 1
PIF_VALUE: 30
PIF_VALUE: 30
PIF_VALUE: 29
PIF_VALUE: 26
PIF_VALUE: 24
PIF_VALUE: 33
PIF_VALUE: 30
PIF_VALUE: 26
PIF_VALUE: 29
PIF_VALUE: 30
PIF_VALUE: 29
PIF_VALUE: 30
PIF_VALUE: 26
PIF_VALUE: 26
PIF_VALUE: 29
PIF_VALUE: 31
PIF_VALUE: 24
PIF_VALUE: 30
PIF_VALUE: 26
PIF_VALUE: 30
PIF_VALUE: 31
PIF_VALUE: 30
PIF_VALUE: 29
PIF_VALUE: 26
PIF_VALUE: 1
PIF_VALUE: 29
PIF_VALUE: 18
PIF_VALUE: 29
PIF_VALUE: 31
PIF_VALUE: 30
PIF_VALUE: 29
PIF_VALUE: 29
PIF_VALUE: 30
PIF_VALUE: 18
PIF_VALUE: 24
PIF_VALUE: 31
PIF_VALUE: 30
PIF_VALUE: 25
PIF_VALUE: 30
PIF_VALUE: 29
PIF_VALUE: 30
PIF_VALUE: 18
PIF_VALUE: 29
PIF_VALUE: 29
PIF_VALUE: 24
PIF_VALUE: 26
PIF_VALUE: 29
PIF_VALUE: 29
PIF_VALUE: 2
PIF_VALUE: 29
PIF_VALUE: 30
PIF_VALUE: 25
PIF_VALUE: 2
PIF_VALUE: 29

## 2020-10-28 ASSESSMENT — PAIN SCALES - GENERAL
PAINLEVEL_OUTOF10: 2
PAINLEVEL_OUTOF10: 0
PAINLEVEL_OUTOF10: 0

## 2020-10-28 ASSESSMENT — LIFESTYLE VARIABLES: SMOKING_STATUS: 0

## 2020-10-28 ASSESSMENT — PAIN DESCRIPTION - DESCRIPTORS: DESCRIPTORS: ACHING

## 2020-10-28 ASSESSMENT — PAIN DESCRIPTION - LOCATION: LOCATION: SHOULDER

## 2020-10-28 ASSESSMENT — PAIN DESCRIPTION - PAIN TYPE: TYPE: CHRONIC PAIN

## 2020-10-28 ASSESSMENT — PAIN DESCRIPTION - ORIENTATION: ORIENTATION: RIGHT

## 2020-10-28 ASSESSMENT — PAIN DESCRIPTION - FREQUENCY: FREQUENCY: CONTINUOUS

## 2020-10-28 ASSESSMENT — PAIN DESCRIPTION - ONSET: ONSET: GRADUAL

## 2020-10-28 NOTE — PROGRESS NOTES
Patient admitted to 36. Admission and assessment completed and charted. VSS. NO s/s distress. Bed Alarm in place.  Light in reach

## 2020-10-28 NOTE — PROGRESS NOTES
4 Eyes Skin Assessment     NAME:  Kirill Flanagan OF BIRTH:  1955  MEDICAL RECORD NUMBER:  [id number]    The patient is being assess for  Cath Lab Post-Op    I agree that 2 RN's have performed a thorough Head to Toe Skin Assessment on the patient. ALL assessment sites listed below have been assessed. Areas assessed by both nurses:    Head, Face, Ears, Shoulders, Back, Chest, Arms, Elbows, Hands, Sacrum. Buttock, Coccyx, Ischium and Legs. Feet and Heels        Does the Patient have a Wound?  No noted wound(s)       Mathieu Prevention initiated:  NA   Wound Care Orders initiated:  NA    Pressure Injury (Stage 3,4, Unstageable, DTI, NWPT, and Complex wounds) if present place consult order under [de-identified] NA    New and Established Ostomies if present place consult order under : NA      Nurse 1 eSignature: Electronically signed by Roney Amanda RN on 10/28/20 at 4:57 PM EDT    **SHARE this note so that the co-signing nurse is able to place an eSignature**    Nurse 2 eSignature: Electronically signed by Sanjuana Abrams RN on 10/28/20 at 5:33 PM EDT

## 2020-10-28 NOTE — ANESTHESIA POSTPROCEDURE EVALUATION
Department of Anesthesiology  Postprocedure Note    Patient: Mark Jasmine  MRN: [id number]  YOB: 1955  Date of evaluation: 10/28/2020  Time:  3:47 PM     Procedure Summary     Date:  10/28/20 Room / Location:  [address] [place] [address] Cath Lab    Anesthesia Start:  8543 Anesthesia Stop:      Procedure:  ECHOCARDIOGRAM TRANSESOPHAGEAL Diagnosis:       PAF (paroxysmal atrial fibrillation) (HCC)      Paroxysmal atrial fibrillation ([address]      (prior to afib ablation)    Scheduled Providers:   Responsible Provider:  Donald Galaviz MD    Anesthesia Type:  general ASA Status:  3          Anesthesia Type: general    Jovi Phase I:      Jovi Phase II:      Last vitals: Reviewed and per EMR flowsheets.        Anesthesia Post Evaluation    Patient location during evaluation: PACU  Patient participation: complete - patient participated  Level of consciousness: awake and alert  Pain score: 0  Airway patency: patent  Nausea & Vomiting: no nausea and no vomiting  Complications: no  Cardiovascular status: blood pressure returned to baseline  Respiratory status: acceptable  Hydration status: euvolemic

## 2020-10-28 NOTE — PROCEDURES
tamponade, need for emergent open heart surgery, need for pacemaker implantation, esophageal injury and fistula, myocardial infarction and death were discussed in detail. The patient was also counseled at length about the risks of sen Covid-19 in the scotty-operative and post-operative states including the recovery window of their procedure. The patient was made aware that sen Covid-19 after a surgical procedure may worsen their prognosis for recovering from the virus and lend to a higher morbidity and or mortality risk. The patient was given the option of postponing their procedure. The patient opted to proceed with the ablation. Written informed consent was signed and placed in the chart. Patient was brought to the EP lab in a fasting non-sedate state. Patient underwent general anesthesia by anesthesia team. The patient was monitored continuously with ECG, pulse oximetry, blood pressure monitoring, and direct observation. No urinary raymundo was placed in order to prevent any hematuria or urinary tract infection. We initially performed a transesophageal echo that showed no left atrial appendage clot/thrombus. Both groins were prepped in a sterile fashion. We gained access in the right femoral vein. One 8 Salvadorean short sheath for ICE and subsequently for CS catheters were placed in the right femoral vein using modified seldinger technique. Two 8.5F SLO sheaths were introduced into the right femoral vein using modified Seldinger technique. Then a CS cathter was placed inside the coronary sinus without fluoroscopy but with 3-D electroanatomic mapping for recording and mapping of the left atrium. Using ICE we delineated the left pulmonary vein, left atrial appendage,  mitral valve, and right superior and right inferior pulmonary veins, and the trivial amount of pericardial effusion prior to ablation.      Two transeptal punctures were performed under intracardiac echocardiogram, pressure monitoring, patient manifested roof-dependent left atrial flutter, we decided to ablate the roof of the left atrium. We therefore ablated a linear line along the roof of the left atrium. After ablation was complete, catheters were placed in the left and right atrium, His-position, right ventricle, and left ventricle for pacing and recording. Arrhythmia was attempted to be induced by rapid atrial and ventricular pacing, and there was no induction of atrial tachydysrhythmia. Maximum output (20mA) pacing in the L antrum and R antrum were also performed and showed dissociation from the rest of the left atrium. This was checked circumferentially around the antrums and verified many times. We checked the roof line and found that there was complete, bidirectional block. Adenosine bolus of 18mg was also given for each of the 4 pulmonary veins to assess for acute re-connection and to attempt to induce atrial fibrillation. We had adequate adenosine effect (sinus arrest x 7 seconds) and there were no pulmonary fascicles seen in the LSPV, LIPV, RIPV. But there was a return of PV fascicles in the RSPV that required anthony ablation. Re-testing with adenosine bolus administration failed to see a return of PV fascicles in the RSPV. During one of the 4 administrations of adenosine, the patient manifested left atrial flutter with CL 195ms with near straight-up-down activation. We surmised that this could either be a left atrial anterior wall flutter or else mitral isthmus dependent flutter. We then ablated the mitral isthmus endocardially and this did not perturb the tachycardia at all. We then ablated the anterior wall of the LA from the mitral annulus to the roof. As we began ablating this, the patient's left atrial flutter organized into a different left atrial flutter that had CL of 279ms with [id number] activation.  As we completed this anterior line ablation, the tachycardia terminated to sinus rhythm for the remainder of the procedure. With subsequent adenosine bolus adminstration, there were no further atrial tachydysrhythmia induced. We then administered dobutamine infusion up to 10 mcg/kg/min in order to achieve at least a 20% increase in heart rate from the basal heart rate to induce any atrial tachydysrhythmia, and there were no atrial tachydysrhythmia induced. The mitral isthmus was checked and showed partial electrical block, as evidenced by a perpetual straight-up-down activation when pacing from the LA appendage. We then performed an EP study and programmed stimulation using our CS catheter and ablation catheter to assess the cardiac conduction system and to attempt to induce atrial tachydysrhythmia. The ablation catheter were moved from the left atrium to the left ventricular apex and His bundle position. His bundle potentials was recorded and pacing and recordings were performed from right atrium, coronary sinus and LV apex with the following results:     Sinus cycle length was 689 msec  AR interval was 157 msec  QRS duration was 74 msec  QT interval was 421 msec  AH interval was 88 msec  HV interval was 46 msec  Pacing from left atrium, 1:1 conduction over AV node with (AV wenckebach) was 350  msec  Pacing from left atrium, AV ayala ERP was <500/200 msec   Pacing from LV apex, 1:1 retrograde conduction over AV node (VA wenckebach) was attempted but showed VA dissociation. Then both the ablation, Pentarray, and CS catheters were removed from the body, and all 3 sheaths were removed from the right femoral vein with a figure-of-eight suture that was placed to provide hemostasis while awaiting the downtrending of the ACT. Protamine 60mg IV x 1 was administered to partially reverse the IV heparin that was administered during the atrial fibrillation ablation procedure.     Under intracardiac ultrasound guidance, we evaluated for pericardial effusion, and there was no evidence of such.    Specimen collected: none    Estimated blood loss: < 50 cc    The patient tolerated the procedure well and there were no complications. Patient was extubated and transferred to the floor in stable condition. Conclusion:     - Pre- and post-procedure diagnoses were paroxysmal atrial fibrillation, roof-dependent left atrial flutter, mitral-isthmus dependent left atrial flutter   - Pulmonary vein isolations using wide area circumferential radiofrequency ablation   - Shellie ablations on the R antrum   - Additional ablation with creation of roof line (for roof-dependent left atrial flutter) and mitral isthmus and anterior line from mitral annulus to the roof (both for mitral isthmus dependent left atrial flutter)    Plan:   The patient will be admitted overnight and receive the usual post ablation care. If there are no complications, the patient will be discharged from the hospital tomorrow with pre-admission Flecainide but decreased from 100mg to now 50mg BID, pre-admission Toprol XL but decreased from 12.5mg BID to now 12.5mg daily, and pre-admission Eliquis 5mg BID. The patient will follow-up with the EP NP in 3 month's time. Thank you for allowing us to participate in the care of your patient. If you have any questions or concerns, please do not hesitate to contact me.     Radha Sumner MD, MS, [facility], [facility]  Cardiac Electrophysiology  [address]  [address] [facility], [address] [place] [address]  [address]  [phone]

## 2020-10-28 NOTE — H&P
[facility]   Cardiac Electrophysiology Consultation   Date: 10/28/2020  Reason for Consultation: Atrial fibrillation  Consult Requesting Physician: Lynne Purdy MD     Chief Complaint:        Chief Complaint   Patient presents with    Atrial Fibrillation       pt states no CC         HPI: Kirk Damon is a 60 y.o. with a PMH significant for HTN, ZOE compliant with CPAP, non-hodgkins lymphoma and atrial fibrillation. She follows with Dr. Malini Smith and had an EKG completed 10/1/18 which showed she was in atrial fibrillation. She states she is receiving chemotherapy and was part of a research trial but has since removed herself from the trial.  She was seen by Dr. Dariana Brandt and underwent an unsuccessful cardioversion 10/31/18. She denies any palpitations but reports intermittent episodes of lightheadedness. She endorses worsening shortness of breath the past 3 days. Patient underwent a sleep study 11/7/18 which showed moderate obstructive sleep apnea and CPAP was ordered. She has been compliant with her CPAP. She underwent DCCV on 12/27/18 and EKG completed on 1/14/19 confirmed continued SR.      Interval history: Today, she arrives for follow up for management of her atrial fibrillation. She reports she completed her stem cell transplant. She reports she had an episodes of atrial fibrillation in November. She states that Dr. Tray Vieyra increased her Toprol XL to 100 mg daily. Decrease to 50 mg once daily for three day then 12.5 mg BID. She is compliant with her medications and tolerating them well. She denies chest pain/pressure, tightness, edema, shortness of breath, heart racing, palpitations, lightheadedness, dizziness, syncope, presyncope,  PND or orthopnea.  She is scheduled for a PET scan on Wednesday and if everything goes well she should not need further chemotherapy treatments.      Past Medical History        Past Medical History:   Diagnosis Date    Atrial fibrillation with RVR ([facility]      Cancer ([facility]      Hypertension      Melanoma in situ of back [facility])      dr Brii Gonzalez            Past Surgical History         Past Surgical History:   Procedure Laterality Date    BONE MARROW TRANSPLANT        BREAST CYST ASPIRATION         in past benign bilateral over years. 2 episodes     SECTION        CHOLECYSTECTOMY       COLONOSCOPY   9/10/12     polyp, su, repeat 5 years    COLONOSCOPY   10/09/2017     dr Prado Hides and check in 5 years. polyps    ENDOMETRIAL ABLATION   2007    FINE NEEDLE ASPIRATION        GALLBLADDER SURGERY        OTHER SURGICAL HISTORY   2019     Abdominal Mass BX    OTHER SURGICAL HISTORY   2019     CT BIOPSY ABDOMEN RETROPERITONEUM,    OVARY REMOVAL Left 2018     benign growth, dr Jackie Denney at [address]         TUNNELED VENOUS PORT PLACEMENT Right 2016     Dr. Cheryl García           Allergies: Allergies   Allergen Reactions    Penicillins Hives         Medication:   Home Medications           Prior to Admission medications    Medication Sig Start Date End Date Taking?  Authorizing Provider   glimepiride (AMARYL) 2 MG tablet Take 1 tablet by mouth 2 times daily (with meals) 2/10/20   Yes Guerrero Locke MD   furosemide (LASIX) 20 MG tablet Take 2 tablets by mouth daily 19   Yes Ria Chen MD   metoprolol succinate (TOPROL XL) 100 MG extended release tablet Take 1 tablet by mouth daily 19   Yes Ria Chen MD   LORazepam (ATIVAN) 1 MG tablet Take 1 mg by mouth every 6 hours as needed for Anxiety.     Yes Historical Provider, MD   acetaminophen (TYLENOL) 500 MG tablet Take 500 mg by mouth every 6 hours as needed for Pain     Yes Historical Provider, MD   valACYclovir (VALTREX) 500 MG tablet Take 500 mg by mouth 2 times daily     Yes Historical Provider, MD   flecainide (TAMBOCOR) 100 MG tablet Take 1 tablet by mouth 2 times daily 19   Yes Chris Sylvester MD   apixaban (ELIQUIS) 5 MG TABS tablet Take 1 tablet by mouth 2 times daily 7/22/19   Yes Mulu Mcarthur MD   potassium chloride (KLOR-CON M) 10 MEQ extended release tablet Take 2 tablets by mouth 2 times daily 3/8/19   Yes Samuel Fregoso MD   loratadine (CLARITIN) 10 MG capsule Take 10 mg by mouth as needed      Yes Historical Provider, MD           Social History:   reports that she quit smoking about 4 years ago. Her smoking use included cigarettes. She started smoking about 45 years ago. She has a 0.80 pack-year smoking history. She has never used smokeless tobacco. She reports previous alcohol use. She reports that she does not use drugs.         Family History:  family history includes Cancer in her father and mother; High Blood Pressure in her father and mother. Reviewed. Denies family history of sudden cardiac death, arrhythmia, premature CAD     Review of System:     · General ROS: negative for - chills, fever   · Psychological ROS: negative for - anxiety or depression  · Ophthalmic ROS: negative for - eye pain or loss of vision  · ENT ROS: negative for - epistaxis, headaches, nasal discharge, sore throat   · Allergy and Immunology ROS: negative for - hives, nasal congestion   · Hematological and Lymphatic ROS: negative for - bleeding problems, blood clots, bruising or jaundice  · Endocrine ROS: negative for - skin changes, temperature intolerance or unexpected weight changes  · Respiratory ROS: negative for - cough, hemoptysis, pleuritic pain, SOB, sputum changes or wheezing  · Cardiovascular ROS: Per HPI.    · Gastrointestinal ROS: negative for - abdominal pain, blood in stools, diarrhea, hematemesis, melena,     nausea/vomiting or swallowing difficulty/pain + obsese  · Genito-Urinary ROS: negative for - dysuria or incontinence  · Musculoskeletal ROS: negative for - joint swelling or muscle pain  · Neurological ROS: negative for - confusion, gait disturbance, headaches, numbness/tingling, seizures, speech problems, tremors, visual changes or weakness   · Dermatological ROS: negative for - rash     Physical Examination:      Vitals:     20 0958   BP: 118/80   Pulse: 66   SpO2: 98%         · Constitutional: Oriented. No distress. · Head: Normocephalic and atraumatic. · Mouth/Throat: Oropharynx is clear and moist.   · Eyes: Conjunctivae normal. EOM are normal.   · Neck: Normal range of motion. Neck supple. No rigidity. No JVD present. · Cardiovascular: Normal rate, regular rhythm, S1&S2 and intact distal pulses. · Pulmonary/Chest: Bilateral respiratory sounds. No wheezes. No rhonchi. · Abdominal: Soft. Bowel sounds present. No distension, No tenderness. · Musculoskeletal: No tenderness. No edema    · Lymphadenopathy: Has no cervical adenopathy. · Neurological: Alert and oriented. Cranial nerve appears intact, No Gross deficit   · Skin: Skin is warm and dry. No rash noted. · Psychiatric: Has a normal mood, affect and behavior      Labs:  Reviewed.      EC20 reviewed, sinus rhythm, low voltage in precordial leads, poor R-wave progression -may be secondary to pulmonary disease, consider old anterior infarct. with v-rate of 62 bpm with QRS duration 98 ms. No pathologic Q waves, ventricular pre-excitation, or QT prolongation.        Studies:   1. Event monitor:   None     2. Echo: 10/3/18  Overall left ventricular systolic function appears normal. Ejection fraction  is visually estimated to be 55%. Patient appears to be in atrial fibrillation with rapid ventricular response. Normal right ventricular size and function. There is a small pericardial effusion noted, localized near left ventricle. There is no evidence of right atrial or right ventricular collapse.     3. Stress Test: 1/15/16  1.  Technically a satisfactory study.    2. Normal pharmacological stress portion of the study.    3. No evidence of Ischemia by Myocardial Perfusion Imaging.    4. Gated Study shows normal LV size and Systolic function; EF is >49 %.        4. Cath:   None      Procedures:  1. Unsuccessful DCCV attempt x 3 10/31/18  2. Successful DCCV on 12/27/18        Plan:     Atrial fibrillation:  -Paroxysmal  -Asymptomatic  -She is still undergoing treatment for Non-hodgkin's lymphoma. -S/p DCCV 12/27/18  -On November 26, 2019 she manifested atrial fibrillation with RVR rate 101 bpm.  -EKG today SR.  -[id number] Score 3 (HTN, DM, female gender)  -On Eliquis 5 mg BID for  thromboembolic risk reduction.  -Tolerating well no signs or symptoms of abnormal bruising or bleeding.  -On Flecainide 100mg BID and Toprol 100 mg daily. Will titrate her Toprol XL back down to 12.5 mg BID. Decrease Toprol XL to 50 mg daily for three days then decrease to 12.5 mg BID. -Due to her diagnosis of Non-hodgkin's lymphoma and the uncertainty if she will need further chemotherapy treatments. I recommend that we continue to observe for further reoccurrence of atrial fibrillation while on Flecainide and Toprol XL. Once her lymphoma treatment is completed, I will then consider ablation as a treatment option for her atrial fibrillation. -Much time was spent counseling the patient on healthier lifestyle, following a low sodium diet <2200 mg of sodium a day and dramatically decreasing the intake of processed sugar. Instructed to contact our office to notify once her treatment for Non-hodgkin's lymphoma is completed and we will schedule and appointment for her to come into office accompanied by her  to discuss atrial fibrillation ablation as a treatment option her atrial fibrillation.  -Encouraged to watch \"That Sugar Film\" on [employer], which discusses the negative impact of processed sugar has on the body.     ZOE  Compliant with CPAP.     Follow up in one year.     Thank you for allowing me to participate in the care of Catracho Pickard. All questions and concerns were addressed to the patient/family.  Alternatives to my treatment were discussed.      I have reviewed the history and physical and examined the patient and find no relevant changes.  I have reviewed with the patient and/or family the risks, benefits, and alternatives to the procedure.     Monico Whitten MD, [address], [facility] [place], [address], [address] [place] [address]  [address] Martinez [facility] [address]  [phone]

## 2020-10-28 NOTE — ANESTHESIA PRE PROCEDURE
[facility] Department of Anesthesiology  Pre-Anesthesia Evaluation/Consultation       Name:  Deric Pfeiffer  : 1955  Age:  60 y.o. MRN:  [id number]  Date: 10/28/2020           Surgeon: * Surgery not found *    Procedure: Allergies   Allergen Reactions    Penicillins Hives     Patient Active Problem List   Diagnosis    Essential hypertension    Precordial pain    Abnormal EKG    Small cell b-cell lymphoma, lymph nodes of multiple sites ([address]    Type 2 diabetes mellitus without complication (HCC)    Hyperlipidemia    Cyst of left ovary    Endometrial thickening on ultrasound    Visit for monitoring Rituxan therapy    Degenerative disc disease, lumbar    Trochanteric bursitis of left hip    Atrial fibrillation with RVR (HCC)    ZOE (obstructive sleep apnea)    B-cell lymphoma (HCC)    Non-Hodgkin's lymphoma (HCC)    NHL (non-Hodgkin's lymphoma) (HCC)    Paroxysmal atrial fibrillation (HCC)    Sepsis (HCC)    Upper respiratory infection     Past Medical History:   Diagnosis Date    Atrial fibrillation with RVR (HCC)     Cancer ([address]     Diabetes mellitus type 2, controlled ([address]     Hypertension     Melanoma in situ of back [facility])     dr Rupert Mendoza     Past Surgical History:   Procedure Laterality Date    BONE MARROW TRANSPLANT      BREAST CYST ASPIRATION      in past benign bilateral over years. 2 episodes     SECTION      CHOLECYSTECTOMY      COLONOSCOPY  9/10/12    polyp, sharlene, repeat 5 years    COLONOSCOPY  10/09/2017    dr Garima Bob and check in 5 years.  polyps    ENDOMETRIAL ABLATION  2007    FINE NEEDLE ASPIRATION      GALLBLADDER SURGERY      OTHER SURGICAL HISTORY  2019    Abdominal Mass BX    OTHER SURGICAL HISTORY  2019    CT BIOPSY ABDOMEN RETROPERITONEUM,    OVARY REMOVAL Left 2018    benign growth, dr Jonn Weber at [place] [id number]. TUNNELED VENOUS PORT PLACEMENT Right 2016 Dr. Kaitlin Reardon History     Tobacco Use    Smoking status: Former Smoker     Packs/day: 1.00     Years: 0.80     Pack years: 0.80     Types: Cigarettes     Start date: 1975     Last attempt to quit: 2016     Years since quittin.8    Smokeless tobacco: Never Used   Substance Use Topics    Alcohol use: Not Currently     Alcohol/week: 0.0 standard drinks    Drug use: Never     Medications  Current Outpatient Medications on File Prior to Encounter   Medication Sig Dispense Refill    potassium chloride (KLOR-CON M) 10 MEQ extended release tablet Take 2 tablets by mouth 2 times daily 360 tablet 1    apixaban (ELIQUIS) 5 MG TABS tablet Take 1 tablet by mouth 2 times daily 180 tablet 3    furosemide (LASIX) 20 MG tablet Take 1 tablet by mouth daily . May take an additional dose as needed for shortness of breath, weight gain and edema 180 tablet 0    metFORMIN (GLUCOPHAGE-XR) 500 MG extended release tablet Take 1 tablet by mouth 2 times daily (with meals) 180 tablet 1    glimepiride (AMARYL) 2 MG tablet Take 2 tablets by mouth 2 times daily (with meals) 360 tablet 1    metoprolol succinate (TOPROL XL) 25 MG extended release tablet Take 1 tablet by mouth daily (Patient taking differently: Take 12.5 mg by mouth 2 times daily ) 90 tablet 3    LORazepam (ATIVAN) 1 MG tablet Take 1 mg by mouth every 6 hours as needed for Anxiety.  acetaminophen (TYLENOL) 500 MG tablet Take 500 mg by mouth every 6 hours as needed for Pain      valACYclovir (VALTREX) 500 MG tablet Take 500 mg by mouth 2 times daily      loratadine (CLARITIN) 10 MG capsule Take 10 mg by mouth as needed       flecainide (TAMBOCOR) 100 MG tablet Take 1 tablet by mouth 2 times daily 180 tablet 0     No current facility-administered medications on file prior to encounter.       Current Outpatient Medications   Medication Sig Dispense Refill    potassium chloride (KLOR-CON M) 10 MEQ extended release tablet Take 2 tablets by mouth 2 times daily 360 tablet 1    apixaban (ELIQUIS) 5 MG TABS tablet Take 1 tablet by mouth 2 times daily 180 tablet 3    furosemide (LASIX) 20 MG tablet Take 1 tablet by mouth daily . May take an additional dose as needed for shortness of breath, weight gain and edema 180 tablet 0    metFORMIN (GLUCOPHAGE-XR) 500 MG extended release tablet Take 1 tablet by mouth 2 times daily (with meals) 180 tablet 1    glimepiride (AMARYL) 2 MG tablet Take 2 tablets by mouth 2 times daily (with meals) 360 tablet 1    metoprolol succinate (TOPROL XL) 25 MG extended release tablet Take 1 tablet by mouth daily (Patient taking differently: Take 12.5 mg by mouth 2 times daily ) 90 tablet 3    LORazepam (ATIVAN) 1 MG tablet Take 1 mg by mouth every 6 hours as needed for Anxiety.       acetaminophen (TYLENOL) 500 MG tablet Take 500 mg by mouth every 6 hours as needed for Pain      valACYclovir (VALTREX) 500 MG tablet Take 500 mg by mouth 2 times daily      loratadine (CLARITIN) 10 MG capsule Take 10 mg by mouth as needed       flecainide (TAMBOCOR) 100 MG tablet Take 1 tablet by mouth 2 times daily 180 tablet 0     Current Facility-Administered Medications   Medication Dose Route Frequency Provider Last Rate Last Dose    0.9 % sodium chloride infusion   Intravenous Continuous Julianne Quiles MD        sodium chloride flush 0.9 % injection 10 mL  10 mL Intravenous 2 times per day Julianne Quiles MD        sodium chloride flush 0.9 % injection 10 mL  10 mL Intravenous PRN Julianne Quiles MD         Vital Signs (Current)   Vitals:    10/28/20 1157   BP: (!) 158/71   Pulse: 121   Resp: 16   Temp: 98.9 °F (37.2 °C)   TempSrc: Oral   SpO2: 98%   Weight: 239 lb (108.4 kg)   Height: 5' 3\" (1.6 m)                                          BP Readings from Last 3 Encounters:   10/28/20 (!) 158/71   08/10/20 120/78   20 114/80     Vital Signs Statistics (for past 48 hrs)     Temp  Av.9 °F (37.2 °C)  Min: 98.9 °F (37.2 °C)   Min taken time: 10/28/20 1157  Max: 98.9 °F (37.2 °C)   Max taken time: 10/28/20 1157  Pulse  Av  Min: 121   Min taken time: 10/28/20 1157  Max: 121   Max taken time: 10/28/20 1157  Resp  Av  Min: 16   Min taken time: 10/28/20 1157  Max: 16   Max taken time: 10/28/20 1157  BP  Min: 158/71   Min taken time: 10/28/20 1157  Max: 158/71   Max taken time: 10/28/20 1157  SpO2  Av %  Min: 98 %   Min taken time: 10/28/20 1157  Max: 98 %   Max taken time: 10/28/20 1157  BP Readings from Last 3 Encounters:   10/28/20 (!) 158/71   08/10/20 120/78   20 114/80       BMI  Body mass index is 42.34 kg/m². Estimated body mass index is 42.34 kg/m² as calculated from the following:    Height as of this encounter: 5' 3\" (1.6 m). Weight as of this encounter: 239 lb (108.4 kg). CBC   Lab Results   Component Value Date    WBC 5.6 10/22/2020    RBC 3.87 10/22/2020    RBC 4.44 2017    HGB 13.2 10/22/2020    HCT 40.3 10/22/2020    .1 10/22/2020    RDW 15.5 10/22/2020     10/22/2020     CMP    Lab Results   Component Value Date     10/22/2020    K 4.0 10/22/2020    K 3.7 2020     10/22/2020    CO2 22 10/22/2020    BUN 22 10/22/2020    CREATININE 0.8 10/22/2020    GFRAA >60 10/22/2020    AGRATIO 1.9 2020    LABGLOM >60 10/22/2020    GLUCOSE 241 10/22/2020    GLUCOSE 133 2017    PROT 5.8 2020    PROT 6.5 2017    CALCIUM 9.1 10/22/2020    BILITOT 0.5 2020    ALKPHOS 178 2020    AST 99 2020    ALT 72 2020     BMP    Lab Results   Component Value Date     10/22/2020    K 4.0 10/22/2020    K 3.7 2020     10/22/2020    CO2 22 10/22/2020    BUN 22 10/22/2020    CREATININE 0.8 10/22/2020    CALCIUM 9.1 10/22/2020    GFRAA >60 10/22/2020    LABGLOM >60 10/22/2020    GLUCOSE 241 10/22/2020    GLUCOSE 133 2017     POCGlucose  No results for input(s): GLUCOSE in the last 72 hours.    Coags    Lab Results Component Value Date    PROTIME 15.0 04/17/2020    INR 1.29 04/17/2020    APTT 31.8 58/79/0430     HCG (If Applicable) No results found for: PREGTESTUR, PREGSERUM, HCG, HCGQUANT   ABGs No results found for: PHART, PO2ART, KVT1MUP, MCX1OXZ, BEART, S8QQTWBN   Type & Screen (If Applicable)  No results found for: LABABO, LABRH                         BMI: Wt Readings from Last 3 Encounters:       NPO Status:  >8h                           Anesthesia Evaluation  Patient summary reviewed no history of anesthetic complications:   Airway: Mallampati: II  TM distance: >3 FB   Neck ROM: full  Mouth opening: > = 3 FB Dental: normal exam         Pulmonary: breath sounds clear to auscultation  (+) sleep apnea: on CPAP,      (-) COPD, asthma and not a current smoker                           Cardiovascular:  Exercise tolerance: good (>4 METS),   (+) hypertension:, dysrhythmias: atrial fibrillation,     (-) past MI, CABG/stent and  angina      Rhythm: regular  Rate: normal                    Neuro/Psych:      (-) seizures, TIA, CVA, psychiatric history and depression/anxiety            GI/Hepatic/Renal:        (-) GERD, liver disease and no renal disease       Endo/Other:    (+) DiabetesType II DM, , .    (-) hypothyroidism               Abdominal:           Vascular:     - DVT and PE. Anesthesia Plan      general     ASA 3       Induction: intravenous. Anesthetic plan and risks discussed with patient. Plan discussed with CRNA. This pre-anesthesia assessment may be used as a history and physical.    DOS STAFF ADDENDUM:    Pt seen and examined, chart reviewed (including anesthesia, drug and allergy history). No interval changes to history and physical examination. Anesthetic plan, risks, benefits, alternatives, and personnel involved discussed with patient. Patient verbalized an understanding and agrees to proceed.       Deejay Fisher MD  October 28, 2020  12:16 PM

## 2020-10-29 ENCOUNTER — TELEPHONE (OUTPATIENT)
Dept: FAMILY MEDICINE CLINIC | Age: 65
End: 2020-10-29

## 2020-10-29 VITALS
DIASTOLIC BLOOD PRESSURE: 65 MMHG | WEIGHT: 235.23 LBS | SYSTOLIC BLOOD PRESSURE: 105 MMHG | HEART RATE: 81 BPM | TEMPERATURE: 98.2 F | HEIGHT: 63 IN | BODY MASS INDEX: 41.68 KG/M2 | OXYGEN SATURATION: 95 % | RESPIRATION RATE: 14 BRPM

## 2020-10-29 LAB
EKG ATRIAL RATE: 80 BPM
EKG DIAGNOSIS: NORMAL
EKG P AXIS: 52 DEGREES
EKG P-R INTERVAL: 150 MS
EKG Q-T INTERVAL: 420 MS
EKG QRS DURATION: 90 MS
EKG QTC CALCULATION (BAZETT): 484 MS
EKG R AXIS: 40 DEGREES
EKG T AXIS: 11 DEGREES
EKG VENTRICULAR RATE: 80 BPM
GLUCOSE BLD-MCNC: 226 MG/DL (ref 70–99)
PERFORMED ON: ABNORMAL
POC ACT LR: >400 SEC

## 2020-10-29 PROCEDURE — 6370000000 HC RX 637 (ALT 250 FOR IP): Performed by: INTERNAL MEDICINE

## 2020-10-29 PROCEDURE — 99238 HOSP IP/OBS DSCHRG MGMT 30/<: CPT | Performed by: NURSE PRACTITIONER

## 2020-10-29 PROCEDURE — 94760 N-INVAS EAR/PLS OXIMETRY 1: CPT

## 2020-10-29 PROCEDURE — 93010 ELECTROCARDIOGRAM REPORT: CPT | Performed by: INTERNAL MEDICINE

## 2020-10-29 RX ORDER — METOPROLOL SUCCINATE 25 MG/1
12.5 TABLET, EXTENDED RELEASE ORAL DAILY
Qty: 30 TABLET | Refills: 3 | Status: SHIPPED
Start: 2020-10-30 | End: 2021-02-02 | Stop reason: ALTCHOICE

## 2020-10-29 RX ORDER — FLECAINIDE ACETATE 50 MG/1
50 TABLET ORAL 2 TIMES DAILY
Qty: 60 TABLET | Refills: 3 | Status: SHIPPED
Start: 2020-10-29 | End: 2020-12-11 | Stop reason: SDUPTHER

## 2020-10-29 RX ADMIN — FUROSEMIDE 20 MG: 20 TABLET ORAL at 08:27

## 2020-10-29 RX ADMIN — VALACYCLOVIR HYDROCHLORIDE 500 MG: 500 TABLET, FILM COATED ORAL at 08:28

## 2020-10-29 RX ADMIN — METFORMIN HYDROCHLORIDE 500 MG: 500 TABLET, EXTENDED RELEASE ORAL at 08:27

## 2020-10-29 RX ADMIN — POTASSIUM CHLORIDE 20 MEQ: 1500 TABLET, EXTENDED RELEASE ORAL at 08:28

## 2020-10-29 RX ADMIN — FLECAINIDE ACETATE 50 MG: 100 TABLET ORAL at 08:27

## 2020-10-29 RX ADMIN — METOPROLOL SUCCINATE 12.5 MG: 25 TABLET, EXTENDED RELEASE ORAL at 08:28

## 2020-10-29 RX ADMIN — GLIMEPIRIDE 4 MG: 1 TABLET ORAL at 08:27

## 2020-10-29 RX ADMIN — APIXABAN 5 MG: 5 TABLET, FILM COATED ORAL at 05:53

## 2020-10-29 RX ADMIN — INSULIN LISPRO 4 UNITS: 100 INJECTION, SOLUTION INTRAVENOUS; SUBCUTANEOUS at 08:30

## 2020-10-29 ASSESSMENT — PAIN SCALES - GENERAL: PAINLEVEL_OUTOF10: 0

## 2020-10-29 NOTE — TELEPHONE ENCOUNTER
[facility] Initial Follow Up Call    Outreach made within 2 business days of discharge: Yes    Patient: Santiago Reyes Patient : 1955   MRN: [id number]  Reason for Admission: There are no discharge diagnoses documented for the most recent discharge. Discharge Date: 10/29/20       Spoke with: Billy Patel    Discharge department/facility: [facility]    TCM Interactive Patient Contact:  Was patient able to fill all prescriptions: No, was not prescribed any  Was patient instructed to bring all medications to the follow-up visit: No, was no prescribed any medications  Is patient taking all medications as directed in the discharge summary? No, was not prescribed any medications.   Does patient understand their discharge instructions: Yes  Does patient have questions or concerns that need addressed prior to 7-14 day follow up office visit: no    Scheduled appointment with Cardiology within 7-14 days    Follow Up  Future Appointments   Date Time Provider Nikko Lafleur   2020  9:45 AM Alexa Hauser MD [facility] [address]   2021  1:20 PM ALEXUS Thacker - CNP [address]   3/8/2021  9:30 AM Marina Chavez MD [facility]   2021 11:30 AM Lesa Skinner MD [facility] [place]

## 2020-10-29 NOTE — PLAN OF CARE
Problem: Pain:  Goal: Patient's pain/discomfort is manageable  Description: Patient's pain/discomfort is manageable  Outcome: Ongoing     Problem: Safety:  Goal: Free from accidental physical injury  Description: Free from accidental physical injury  Outcome: Ongoing  Goal: Free from intentional harm  Description: Free from intentional harm  Outcome: Ongoing     Problem: Daily Care:  Goal: Daily care needs are met  Description: Daily care needs are met  Outcome: Ongoing     Problem: Skin Integrity:  Goal: Skin integrity will stabilize  Description: Skin integrity will stabilize  Outcome: Ongoing     Problem: Discharge Planning:  Goal: Patients continuum of care needs are met  Description: Patients continuum of care needs are met  Outcome: Ongoing

## 2020-10-29 NOTE — PROGRESS NOTES
Cardiac Electrophysiology Progress Note     Admit Date: 10/28/2020     Reason for follow up: PAF s/p ablation    HPI and Interval History:   The patient is a 60year old female with past medical history significant for paroxysmal atrial fibrillation, NHL, ZOE on CPAP and HTN who presented to the hospital for scheduled ablation. She underwent EP study, radiofrequency ablation of atrial fibrillation with pulmonary vein isolation, ablation on left atrium roof for roof-dependent left atrial flutter and mitral isthmus ablation (for L flutter) on 10/28/20 with Dr. Brian Combs. Flecainide was decreased to 50mg BID, Toprol was decreased to 12.5mg QD and Eliquis 5mg BID was continued. She is feeling well this morning. Denies any CP or SOB. Eating breakfast without issue. No groin problems. SR on monitor. Review of System:  · General: negative for fever, chills   · Ophthalmic ROS: negative for eye pain or loss of vision  · ENT ROS: negative for headaches, sore throat, nasal drainage  · Respiratory: negative for cough, sputum, SOB  · Cardiovascular: negative for chest pain, palpitations. · Gastrointestinal: negative for abdominal pain, diarrhea, N/V  · Hematology: negative for bleeding, blood clots, bruising or jaundice  · Genito-Urinary:  negative for dysuria or incontinence  · Musculoskeletal: negative for joint swelling, muscle pain  · Neurological: negative for confusion, dizziness, headaches   · Psychiatric: negative anxiety, depression  · Dermatological: negative for rash      Physical Examination:  Vitals:    10/29/20 0824   BP: 105/65   Pulse: 81   Resp: 14   Temp: 98.2 °F (36.8 °C)   SpO2: 94%        Intake/Output Summary (Last 24 hours) at 10/29/2020 0845  Last data filed at 10/29/2020 0600  Gross per 24 hour   Intake 3090 ml   Output 4200 ml   Net -1110 ml     In: 2040 [P.O.:1840;  I.V.:200]  Out: 4200    Wt Readings from Last 3 Encounters:   10/29/20 235 lb 3.7 oz (106.7 kg)   08/10/20 236 lb (107 kg)   06/25/20 furosemide  20 mg Oral Daily    glimepiride  4 mg Oral BID     metFORMIN  500 mg Oral BID     metoprolol succinate  12.5 mg Oral Daily    potassium chloride  20 mEq Oral BID    valACYclovir  500 mg Oral BID    lidocaine-EPINEPHrine  15 mL Intradermal See Admin Instructions    sodium chloride flush  10 mL Intravenous 2 times per day    insulin lispro  0-12 Units Subcutaneous TID     insulin lispro  0-6 Units Subcutaneous Nightly     Continuous Infusions:   dextrose       PRN Meds:LORazepam, sodium chloride flush, acetaminophen, glucose, dextrose, glucagon (rDNA), dextrose, cetirizine, benzocaine-menthol     ECG: 10/28/20  SR at 80 BPM. Non-specific ST-T wave changes. Prolonged QT. Echo:10/18/19   Conclusions      Summary   Left ventricular cavity size is normal. There is mild left ventricular   hypertrophy. Overall left ventricular systolic function appears normal with   an ejection fraction of 50-55%. No regional wall motion abnormalities are   noted. Indeterminate diastolic function. Trivial mitral, aortic, pulmonic, and tricuspid regurgitation. Estimated pulmonary artery systolic pressure is at 31 mmHg assuming a right   atrial pressure of 3 mmHg. Normal Global strain -24.1%. Stress: 1/15/16        Conclusions          Summary     Pharmacological Stress/MPI Results:          1. Technically a satisfactory study.     2. Normal pharmacological stress portion of the study.     3. No evidence of Ischemia by Myocardial Perfusion Imaging.     4. Gated Study shows normal LV size and Systolic function; EF is >34 %. Procedures:  1. Unsuccessful DCCV attempt x 3 10/31/18  2. Successful DCCV on 12/27/18  3.  RFA for A fib with PVI, roof line ablation (for L flutter), mitral isthmus ablation (for L flutter), 10/28/20 Dr. Frank Pozo and Plan:     Paroxysmal atrial fibrillation   - s/p PVI ablation 10/28/20 with Dr. Maksim Conklin   - continue flecainide 50mg BID, Toprol 12.5mg QD   - CHADS2-VASc 2 (gender, HTN) on Eliquis 5mg BID    Left atrial flutter   - seen during EP study s/p roof line and mitral isthmus ablations 10/28/20   - see above    Benign HTN    - BP controlled    ALEXUS Pollock  [facility] [address]  Phone: [phone]  Fax: [phone]    Electronically signed by ALEXUS Quick - CNP on 10/29/2020 at 8:45 AM

## 2020-10-29 NOTE — PROGRESS NOTES
Written discharge instructions including diet, activity, weight monitoring, what to do if symptoms worsen and the importance of follow up care and need to call his/her physician for  an appointment were reviewed with the patient who verbalized understanding. These written instructions were given to the patient to take home and a copy was made for the medical record.  Electronically signed by Modesto Arevalo RN on 10/29/2020 at 10:08 AM

## 2020-10-29 NOTE — PROGRESS NOTES
Sutures removed without difficulty, new dressing applied. Pt tolerated well. Is without need or complaint at present.

## 2020-10-29 NOTE — DISCHARGE SUMMARY
DISCHARGE SUMMARY  Patient ID:  Mitch Comer  [id number]  60 y.o.  1955    Admit date: 10/28/2020    Discharge date: 10/29/2020    Admitting Physician: Chris Sylvester MD     Discharge Provider: ALEXUS Fernandez     Admission Diagnoses: Paroxysmal atrial fibrillation [facility]) [I48.0]  Paroxysmal atrial fibrillation [facility]) [I48.0]    Discharge Diagnoses: Same    Discharged Condition: Good    Hospital Course: Mitch Comer was admitted on 10/28/2020 and underwent EP study, radiofrequency ablation of atrial fibrillation with pulmonary vein isolation, ablation on left atrium roof for roof-dependent left atrial flutter and mitral isthmus ablation (for L flutter) on 10/28/20 with Dr. Paola Whelan. Flecainide was decreased to 50mg BID, Toprol was decreased to 12.5mg QD and Eliquis 5mg BID was continued. She has remained in sinus rhythm overnight. The patient had no complications post-procedure. Her groin site this morning is unremarkable with no redness, bruising or drainage noted. Post-ablation medications and activity limitations were discussed.      Procedures:  10/28/20  · Electrophysiology study with radiofrequency ablation of atrial fibrillation and pulmonary vein isolation   · Ablation on LA roof of roof-dependent left atrial flutter with CL 256ms with bdhcjxlu-rg-qycukm activation  · Ablation on mitral isthmus of vzllwf-nronxyu-uwwwsqnpq left atrial flutter with CL 279ms with lortcv-io-kevcyben activation  · Ablation on anterior wall of left atrium of mitral-isthmus dependent left atrial flutter with CL 279ms with xcesoq-yx-kejyzwuh activation  · 3-D electroanatomical mapping of the left atrium    · Transseptal puncture through an intact septum x 2 under intracardiac ultrasound guidance without fluoroscopic guidance   · Intracardiac echocardiography  · Left ventricular pacing and recording  · Drug infusion with an attempt to induce atrial tachydysrhythmia  · Transesophageal echocardiogram  · Anesthesia: General anesthesia provided by the Anesthesia service    Labs:   Lab Results   Component Value Date    CREATININE 0.8 10/22/2020    BUN 22 (H) 10/22/2020     10/22/2020    K 4.0 10/22/2020     10/22/2020    CO2 22 10/22/2020      Lab Results   Component Value Date    WBC 5.6 10/22/2020    HGB 13.2 10/22/2020    HCT 40.3 10/22/2020    .1 (H) 10/22/2020     10/22/2020      Lab Results   Component Value Date    INR 1.29 (H) 04/17/2020    PROTIME 15.0 (H) 04/17/2020    No results found for: BNP    Please refer to today's progress note for additional information. Disposition: home    Patient Instructions:   Current Discharge Medication List      CONTINUE these medications which have CHANGED    Details   flecainide (TAMBOCOR) 50 MG tablet Take 1 tablet by mouth 2 times daily  Qty: 60 tablet, Refills: 3      metoprolol succinate (TOPROL XL) 25 MG extended release tablet Take 0.5 tablets by mouth daily  Qty: 30 tablet, Refills: 3         CONTINUE these medications which have NOT CHANGED    Details   potassium chloride (KLOR-CON M) 10 MEQ extended release tablet Take 2 tablets by mouth 2 times daily  Qty: 360 tablet, Refills: 1    Comments: Refill [id number]      apixaban (ELIQUIS) 5 MG TABS tablet Take 1 tablet by mouth 2 times daily  Qty: 180 tablet, Refills: 3    Associated Diagnoses: Persistent atrial fibrillation (HCC)      furosemide (LASIX) 20 MG tablet Take 1 tablet by mouth daily .  May take an additional dose as needed for shortness of breath, weight gain and edema  Qty: 180 tablet, Refills: 0    Associated Diagnoses: Persistent atrial fibrillation (HCC)      metFORMIN (GLUCOPHAGE-XR) 500 MG extended release tablet Take 1 tablet by mouth 2 times daily (with meals)  Qty: 180 tablet, Refills: 1      glimepiride (AMARYL) 2 MG tablet Take 2 tablets by mouth 2 times daily (with meals)  Qty: 360 tablet, Refills: 1      LORazepam (ATIVAN) 1 MG tablet Take 1 mg by mouth every 6 hours as needed for Anxiety. acetaminophen (TYLENOL) 500 MG tablet Take 500 mg by mouth every 6 hours as needed for Pain      valACYclovir (VALTREX) 500 MG tablet Take 500 mg by mouth 2 times daily      loratadine (CLARITIN) 10 MG capsule Take 10 mg by mouth as needed              Post ablation instructions given  Activity: as tolerated  Diet: cardiac diet    Follow-up with ALEXUS Lawson in 3 months. Time spent on discharge of patient: 25 minutes, including plan of care, patient education, and care coordination.      ALEXUS Lawson  [facility], [address] [place] [address], [address] [facility]  Phone: [phone]  Fax: [phone]

## 2020-10-30 ENCOUNTER — CARE COORDINATION (OUTPATIENT)
Dept: CASE MANAGEMENT | Age: 65
End: 2020-10-30

## 2020-10-30 NOTE — CARE COORDINATION
Jose Ramon [facility] Transitions Initial Follow Up Call    Call within 2 business days of discharge: Yes    Patient: Leti Parry Patient : 1955   MRN: [id number]  Reason for Admission: PAF  Discharge Date: 10/29/20 RARS: Readmission Risk Score: 15      Last Discharge [facility]       Complaint Diagnosis Description Type Department Provider    10/28/20  PAF (paroxysmal atrial fibrillation) ([facility] [address] . .. Admission (Discharged) from [facility] Mac Essex, MD        Challenges to be reviewed by the provider   Additional needs identified to be addressed with provider No  none    Discussed COVID-19 related testing which was available at this time. Test results were negative. Patient informed of results, if available? No         Method of communication with provider : none         Was this a readmission? No    Care Transition Nurse (CTN) contacted the patient by telephone to perform post hospital discharge assessment. Verified name and  with patient as identifiers. Provided introduction to self, and explanation of the CTN role. CTN reviewed discharge instructions, medical action plan and red flags with patient who verbalized understanding. Patient given an opportunity to ask questions and does not have any further questions or concerns at this time. Were discharge instructions available to patient? Yes. Reviewed appropriate site of care based on symptoms and resources available to patient including: PCP and Specialist. The patient agrees to contact the PCP office for questions related to their healthcare. Medication reconciliation was performed with patient, who verbalizes understanding of administration of home medications. Advised obtaining a 90-day supply of all daily and as-needed medications. Discussed follow-up appointments. If no appointment was previously scheduled, appointment scheduling offered: pt has appt.  Is follow up appointment scheduled within 7 days of discharge? No  Non-[facility] follow up appointment(s):     Plan for follow-up call in 5-7 days based on severity of symptoms and risk factors. Plan for next call: self management-any signs of PAF  CTN provided contact information for future needs. Non-face-to-face services provided:  Obtained and reviewed discharge summary and/or continuity of care documents  Assessment and support for treatment adherence and medication management-1111f completed    Care Transitions 24 Hour Call    Do you have any ongoing symptoms?:  No  Do you have a copy of your discharge instructions?:  Yes  Do you have all of your prescriptions and are they filled?:  Yes  Have you scheduled your follow up appointment?:  No  Were you discharged with any Home Care or Post Acute Services:  No  Care Transitions Interventions     Spoke with patient, Adelina Gitelman. She stated she was doing \"pretty good\" and that she does have a sore throat from the tube. She is aware of instructions of post cath ablation and her only question was if she can do steps. This was not a limitation on the instructions. Writer encouraged patient not to do the steps more than she needs to do, patient verbalized understanding. Patient stated slept good last night. Patient stated she has talked with Dr. Aung Orellana office since discharge and they are aware of her appt on 11-13 (which was made before hospitalization). Writer explained PCP usually wants to see patient within 7-10 days of a hospital discharge. Patient stated feels good and does not feel the need to make an appt at this time.     Follow Up  Future Appointments   Date Time Provider Nikko Lafleur   11/13/2020  9:45 AM Meron Jin MD [facility]   2/2/2021  1:20 PM ALEXUS Jimenez - CNP [address]   3/8/2021  9:30 AM Lashay Sanchez MD [address]   6/21/2021 11:30 AM Anahi Raymond MD [facility]       Ty Fitzgerald RN

## 2020-11-06 ENCOUNTER — CARE COORDINATION (OUTPATIENT)
Dept: CASE MANAGEMENT | Age: 65
End: 2020-11-06

## 2020-11-06 NOTE — CARE COORDINATION
Jose Ramon [facility] Transitions Follow Up Call    2020    Patient: June Durán  Patient : 1955   MRN: [id number]  Reason for Admission: PAF  Discharge Date: 10/29/20 RARS: Readmission Risk Score: 15           Care Transitions Subsequent and Final Call    Subsequent and Final Calls  Care Transitions Interventions  Other Interventions:        Attempted to reach patient via phone for post hospital transition call.   VM left stating my contact information requesting a return call    Follow Up  Future Appointments   Date Time Provider Nikko Lafleur   2020  9:45 AM Rito Hernández MD [address] [place] [address]   2021  1:20 PM ALEXUS Wei - CNP [address]   3/8/2021  9:30 AM Nelda Dakins, MD [facility]   2021 11:30 AM Elie Goncalves MD [facility]       Sarah Jean RN

## 2020-11-10 DIAGNOSIS — E11.9 TYPE 2 DIABETES MELLITUS WITHOUT COMPLICATION, WITHOUT LONG-TERM CURRENT USE OF INSULIN (HCC): ICD-10-CM

## 2020-11-11 LAB
A/G RATIO: 1.3 (ref 1.1–2.2)
ALBUMIN SERPL-MCNC: 3.7 G/DL (ref 3.4–5)
ALP BLD-CCNC: 210 U/L (ref 40–129)
ALT SERPL-CCNC: 64 U/L (ref 10–40)
ANION GAP SERPL CALCULATED.3IONS-SCNC: 16 MMOL/L (ref 3–16)
AST SERPL-CCNC: 87 U/L (ref 15–37)
BILIRUB SERPL-MCNC: 0.7 MG/DL (ref 0–1)
BUN BLDV-MCNC: 18 MG/DL (ref 7–20)
CALCIUM SERPL-MCNC: 9.6 MG/DL (ref 8.3–10.6)
CHLORIDE BLD-SCNC: 104 MMOL/L (ref 99–110)
CO2: 18 MMOL/L (ref 21–32)
CREAT SERPL-MCNC: 0.8 MG/DL (ref 0.6–1.2)
ESTIMATED AVERAGE GLUCOSE: 188.6 MG/DL
GFR AFRICAN AMERICAN: >60
GFR NON-AFRICAN AMERICAN: >60
GLOBULIN: 2.9 G/DL
GLUCOSE BLD-MCNC: 233 MG/DL (ref 70–99)
HBA1C MFR BLD: 8.2 %
POTASSIUM SERPL-SCNC: 4.6 MMOL/L (ref 3.5–5.1)
SODIUM BLD-SCNC: 138 MMOL/L (ref 136–145)
TOTAL PROTEIN: 6.6 G/DL (ref 6.4–8.2)

## 2020-11-11 RX ORDER — METFORMIN HYDROCHLORIDE 500 MG/1
1000 TABLET, EXTENDED RELEASE ORAL 2 TIMES DAILY WITH MEALS
Qty: 360 TABLET | Refills: 1 | Status: SHIPPED | OUTPATIENT
Start: 2020-11-11 | End: 2021-06-03 | Stop reason: SDUPTHER

## 2020-11-13 ENCOUNTER — OFFICE VISIT (OUTPATIENT)
Dept: FAMILY MEDICINE CLINIC | Age: 65
End: 2020-11-13
Payer: COMMERCIAL

## 2020-11-13 ENCOUNTER — TELEPHONE (OUTPATIENT)
Dept: FAMILY MEDICINE CLINIC | Age: 65
End: 2020-11-13

## 2020-11-13 VITALS
HEART RATE: 80 BPM | TEMPERATURE: 97 F | SYSTOLIC BLOOD PRESSURE: 128 MMHG | WEIGHT: 240 LBS | BODY MASS INDEX: 42.52 KG/M2 | HEIGHT: 63 IN | DIASTOLIC BLOOD PRESSURE: 82 MMHG

## 2020-11-13 PROCEDURE — 99213 OFFICE O/P EST LOW 20 MIN: CPT | Performed by: FAMILY MEDICINE

## 2020-11-13 PROCEDURE — 3052F HG A1C>EQUAL 8.0%<EQUAL 9.0%: CPT | Performed by: FAMILY MEDICINE

## 2020-11-13 RX ORDER — INSULIN GLARGINE 100 [IU]/ML
10 INJECTION, SOLUTION SUBCUTANEOUS NIGHTLY
Qty: 5 PEN | Refills: 0 | Status: SHIPPED | OUTPATIENT
Start: 2020-11-13 | End: 2020-11-13 | Stop reason: SDUPTHER

## 2020-11-13 RX ORDER — INSULIN DETEMIR 100 [IU]/ML
10 INJECTION, SOLUTION SUBCUTANEOUS NIGHTLY
Qty: 5 PEN | Refills: 3 | Status: SHIPPED | OUTPATIENT
Start: 2020-11-13 | End: 2020-11-23 | Stop reason: DRUGHIGH

## 2020-11-13 RX ORDER — INSULIN GLARGINE 100 [IU]/ML
10 INJECTION, SOLUTION SUBCUTANEOUS NIGHTLY
Qty: 1 PEN | Refills: 0 | COMMUNITY
Start: 2020-11-13 | End: 2020-11-13 | Stop reason: CLARIF

## 2020-11-13 NOTE — PROGRESS NOTES
Subjective:      Patient ID: Bg Richardson is a 60 y.o. female. Patient presents with:  3 Month Follow-Up: diabetes, hypertension, lipids - discuss lab results    She is well overall  She did have her ablation and doing well  She is on the meds and the higher dose of metformin now    She is having pb with high glucose and it will stay high 100 and 200's throughout the day  No fever no cold  No cp no sob  Feet ok no sores  No urine pb and bm are fine       YOB: 1955    Date of Visit:  11/13/2020     -- Penicillins -- Hives    Current Outpatient Medications:  metFORMIN (GLUCOPHAGE-XR) 500 MG extended release tablet, Take 2 tablets by mouth 2 times daily (with meals), Disp: 360 tablet, Rfl: 1  flecainide (TAMBOCOR) 50 MG tablet, Take 1 tablet by mouth 2 times daily, Disp: 60 tablet, Rfl: 3  metoprolol succinate (TOPROL XL) 25 MG extended release tablet, Take 0.5 tablets by mouth daily, Disp: 30 tablet, Rfl: 3  potassium chloride (KLOR-CON M) 10 MEQ extended release tablet, Take 2 tablets by mouth 2 times daily, Disp: 360 tablet, Rfl: 1  apixaban (ELIQUIS) 5 MG TABS tablet, Take 1 tablet by mouth 2 times daily, Disp: 180 tablet, Rfl: 3  furosemide (LASIX) 20 MG tablet, Take 1 tablet by mouth daily . May take an additional dose as needed for shortness of breath, weight gain and edema, Disp: 180 tablet, Rfl: 0  glimepiride (AMARYL) 2 MG tablet, Take 2 tablets by mouth 2 times daily (with meals), Disp: 360 tablet, Rfl: 1  LORazepam (ATIVAN) 1 MG tablet, Take 1 mg by mouth every 6 hours as needed for Anxiety. , Disp: , Rfl:   acetaminophen (TYLENOL) 500 MG tablet, Take 500 mg by mouth every 6 hours as needed for Pain, Disp: , Rfl:   valACYclovir (VALTREX) 500 MG tablet, Take 500 mg by mouth 2 times daily, Disp: , Rfl:   loratadine (CLARITIN) 10 MG capsule, Take 10 mg by mouth as needed , Disp: , Rfl:     No current facility-administered medications for this visit.       --------------------------- 11/13/20                      0951         ---------------------------   BP:          128/82         Site:    Left Upper Arm     Position:     Sitting        Cuff Size:   Large Adult      Pulse:         80           Temp:    97 °F (36.1 °C)    TempSrc:    Temporal        Weight: 240 lb (108.9 kg)   Height:   5' 3\" (1.6 m)    ---------------------------  Body mass index is 42.51 kg/m². Wt Readings from Last 3 Encounters:  11/13/20 : 240 lb (108.9 kg)  10/29/20 : 235 lb 3.7 oz (106.7 kg)  08/10/20 : 236 lb (107 kg)    BP Readings from Last 3 Encounters:  11/13/20 : 128/82  10/29/20 : 105/65  10/28/20 : (!) 168/89                Review of Systems    Objective:   Physical Exam  Constitutional:       General: She is not in acute distress. Appearance: Normal appearance. She is well-developed. She is not ill-appearing or diaphoretic. Cardiovascular:      Rate and Rhythm: Normal rate and regular rhythm. Heart sounds: Normal heart sounds. No murmur. No friction rub. No gallop. Pulmonary:      Effort: Pulmonary effort is normal. No tachypnea, accessory muscle usage or respiratory distress. Breath sounds: Normal breath sounds. No decreased breath sounds, wheezing, rhonchi or rales. Lymphadenopathy:      Cervical: No cervical adenopathy. Upper Body:      Right upper body: No supraclavicular adenopathy. Left upper body: No supraclavicular adenopathy. Skin:     General: Skin is warm and dry. Coloration: Skin is not pale. Neurological:      Mental Status: She is alert. Assessment:        Diagnosis Orders   1. Essential hypertension     2.  Type 2 diabetes mellitus without complication, without long-term current use of insulin ([facility])         Discussed options for treatment of the glucose   Consider insulin at night time  Or SGLT2 meds     Orders Placed This Encounter   Medications    insulin glargine (BASAGLAR KWIKPEN) 100 UNIT/ML injection pen     Sig: Inject 10 Units into the skin nightly     Dispense:  5 pen     Refill:  0    Insulin Pen Needle 31G X 6 MM MISC     Si each by Does not apply route daily     Dispense:  100 each     Refill:  1           Plan:      Call me the numbers in about 10 days  Start the insulin at 10 units  See me in about 3 months         Yanick Holcomb MD

## 2020-11-13 NOTE — TELEPHONE ENCOUNTER
[facility] and [facility] needs a PA. Insurance will likely cover Lantus Solostar U-100, Levemir FlexTouch, Lantus U-100, Toujeo Solostar U-300, Tresiba FlexTouch U-200, Levemir U-100. Please advise to switch or do PA.

## 2020-11-13 NOTE — TELEPHONE ENCOUNTER
Patient calling she wants to speak to Dr Moni Hernandez only, she has concerns about the insulin and her insurance does not want to pay for it either, and that has her worried also.       Please call, [phone]

## 2020-11-17 ENCOUNTER — CARE COORDINATION (OUTPATIENT)
Dept: CASE MANAGEMENT | Age: 65
End: 2020-11-17

## 2020-11-17 NOTE — CARE COORDINATION
Jose Ramon [facility] Transitions Follow Up Call    2020    Patient: June Durán  Patient : 1955   MRN: [id number]  Reason for Admission: PAF  Discharge Date: 10/29/20 RARS: Readmission Risk Score: 15      Care Transitions Subsequent and Final Call    Subsequent and Final Calls  Care Transitions Interventions  Other Interventions:          Attempted to reach patient via phone for post hospital transition call. VM left with my contact information requesting a return call.       Follow Up  Future Appointments   Date Time Provider Nikko Lafleur   2021  1:20 PM ALEXUS Wei - CNP [facility]   2021  9:45 AM Rito Hernández MD [facility]   3/8/2021  9:30 AM Nelda Dakins, MD [facility]   2021 11:30 AM Elie Goncalves [place] [address]       Sarah Jean RN

## 2020-11-17 NOTE — CARE COORDINATION
[facility] Transitions Follow Up Call    2020    Patient: Mitch Comer  Patient : 1955   MRN: [id number]  Reason for Admission: PAF  Discharge Date: 10/29/20 RARS: Readmission Risk Score: 15         Spoke with: patient, [facility] Transitions Subsequent and Final Call    Subsequent and Final Calls  Care Transitions Interventions  Other Interventions:        Patient left a  returning writer's call. Writer returned call and spoke with Agnieszka Delgado. She stated she was \"pretty good\". She stated no signs of CP or fast HR. She stated doing a little bit more each day. She stated wears her CPAP every night. Writer and Agnieszka Delgado discussed the need f/u calls and Agnieszka Delgado stated she felt she was good to go. She is aware to call MD for any medical needs and has CT contact information as a resource. Care transition ended.     Follow Up  Future Appointments   Date Time Provider Nikko Lafleur   2021  1:20 PM ALEXUS Fernandez - CNP [address]   2021  9:45 AM Guerrero Locke MD [facility]   3/8/2021  9:30 AM Chris Sylvester MD [address]   2021 11:30 AM MD Suzanne [place] [id number]       Prerna Sequeira RN

## 2020-11-23 ENCOUNTER — TELEPHONE (OUTPATIENT)
Dept: FAMILY MEDICINE CLINIC | Age: 65
End: 2020-11-23

## 2020-11-23 RX ORDER — INSULIN DETEMIR 100 [IU]/ML
15 INJECTION, SOLUTION SUBCUTANEOUS NIGHTLY
Qty: 5 PEN | Refills: 3 | Status: SHIPPED
Start: 2020-11-23 | End: 2020-12-03 | Stop reason: DRUGHIGH

## 2020-11-23 NOTE — TELEPHONE ENCOUNTER
Pt is calling in with her bs readings:    Nov. 14           197  Nov 15            207  Nov 16            163  Nov 17            190   Nov 18             242  Nov 19             219  Nov 20             177  Nov 21             194  12 hours later 161  Nov 22             199  Nov 23             200    Pl advise.    [phone]

## 2020-12-03 ENCOUNTER — TELEPHONE (OUTPATIENT)
Dept: FAMILY MEDICINE CLINIC | Age: 65
End: 2020-12-03

## 2020-12-03 RX ORDER — INSULIN DETEMIR 100 [IU]/ML
20 INJECTION, SOLUTION SUBCUTANEOUS NIGHTLY
Qty: 5 PEN | Refills: 3 | Status: SHIPPED
Start: 2020-12-03 | End: 2020-12-15 | Stop reason: DRUGHIGH

## 2020-12-03 NOTE — TELEPHONE ENCOUNTER
Patient calling with blood sugar readings:  11/24       182  11/25       154  11/26       118  11/27       140  11/28        221 (left over thanksgiving)  11/29        160  11/30        131  12/1          158  12/2          225 (her birthday)  12/3          172      Please call [phone]

## 2020-12-11 ENCOUNTER — HOSPITAL ENCOUNTER (OUTPATIENT)
Dept: PET IMAGING | Age: 65
Discharge: HOME | End: 2020-12-11
Payer: COMMERCIAL

## 2020-12-11 VITALS — WEIGHT: 237 LBS | HEIGHT: 63 IN | BODY MASS INDEX: 41.99 KG/M2

## 2020-12-11 PROCEDURE — 78815 PET IMAGE W/CT SKULL-THIGH: CPT

## 2020-12-11 PROCEDURE — 3430000000 HC RX DIAGNOSTIC RADIOPHARMACEUTICAL: Performed by: INTERNAL MEDICINE

## 2020-12-11 PROCEDURE — A9552 F18 FDG: HCPCS | Performed by: INTERNAL MEDICINE

## 2020-12-11 RX ORDER — FLUDEOXYGLUCOSE F 18 200 MCI/ML
14.17 INJECTION, SOLUTION INTRAVENOUS
Status: COMPLETED | OUTPATIENT
Start: 2020-12-11 | End: 2020-12-11

## 2020-12-11 RX ORDER — FLECAINIDE ACETATE 50 MG/1
50 TABLET ORAL 2 TIMES DAILY
Qty: 180 TABLET | Refills: 3 | Status: SHIPPED | OUTPATIENT
Start: 2020-12-11 | End: 2021-02-02 | Stop reason: ALTCHOICE

## 2020-12-11 RX ADMIN — FLUDEOXYGLUCOSE F 18 14.17 MILLICURIE: 200 INJECTION, SOLUTION INTRAVENOUS at 14:04

## 2020-12-15 ENCOUNTER — TELEPHONE (OUTPATIENT)
Dept: FAMILY MEDICINE CLINIC | Age: 65
End: 2020-12-15

## 2020-12-15 RX ORDER — INSULIN DETEMIR 100 [IU]/ML
25 INJECTION, SOLUTION SUBCUTANEOUS NIGHTLY
Qty: 5 PEN | Refills: 3 | Status: SHIPPED
Start: 2020-12-15 | End: 2020-12-24 | Stop reason: DRUGHIGH

## 2020-12-15 NOTE — TELEPHONE ENCOUNTER
Pt is calling w/ blood sugar readings:    12.4        160  12.5        181  12.6        146  12.7         221  12.8         177  12.9         169  12.10       173  12.11       192 am   Mid afternoon 162    121 when she got a pet scan done. 12.12       134  12.13       157    Pl advise.   [phone] ([place])

## 2020-12-24 ENCOUNTER — TELEPHONE (OUTPATIENT)
Dept: FAMILY MEDICINE CLINIC | Age: 65
End: 2020-12-24

## 2020-12-24 RX ORDER — INSULIN DETEMIR 100 [IU]/ML
30 INJECTION, SOLUTION SUBCUTANEOUS NIGHTLY
Qty: 5 PEN | Refills: 3 | Status: SHIPPED
Start: 2020-12-24 | End: 2021-01-19 | Stop reason: SDUPTHER

## 2020-12-24 NOTE — TELEPHONE ENCOUNTER
Pt calling with her DM numbers    12-15  187  12-16  169  Forgot to take insulin  12-17   149  12-18  125  12-19  147  12-20  117  12-21  123  12-22  119  12-23  156   Forgot to take insulin  12-24  168    [phone]

## 2021-01-19 ENCOUNTER — TELEPHONE (OUTPATIENT)
Dept: FAMILY MEDICINE CLINIC | Age: 66
End: 2021-01-19

## 2021-01-19 RX ORDER — INSULIN DETEMIR 100 [IU]/ML
30 INJECTION, SOLUTION SUBCUTANEOUS NIGHTLY
Qty: 5 PEN | Refills: 3 | Status: SHIPPED | OUTPATIENT
Start: 2021-01-19 | End: 2021-04-01 | Stop reason: SDUPTHER

## 2021-01-19 NOTE — TELEPHONE ENCOUNTER
Pt is needing a new rx     insulin detemir (LEVEMIR FLEXTOUCH) 100 UNIT/ML injection pen     Inject 30 Units into the skin nightly    [facility] and [address] [address], [address] [place] [address] [address] Eric Goss [address]

## 2021-01-19 NOTE — TELEPHONE ENCOUNTER
Done  Orders Placed This Encounter   Medications    insulin detemir (LEVEMIR FLEXTOUCH) 100 UNIT/ML injection pen     Sig: Inject 30 Units into the skin nightly     Dispense:  5 pen     Refill:  3

## 2021-01-23 LAB
A/G RATIO: 2 (ref 1.1–2.2)
ALBUMIN SERPL-MCNC: 4.2 G/DL (ref 3.4–5)
ALP BLD-CCNC: 177 U/L (ref 40–129)
ALT SERPL-CCNC: 50 U/L (ref 10–40)
ANION GAP SERPL CALCULATED.3IONS-SCNC: 15 MMOL/L (ref 3–16)
ANTI-NUCLEAR ANTIBODY (ANA): NEGATIVE
AST SERPL-CCNC: 69 U/L (ref 15–37)
BILIRUB SERPL-MCNC: 0.6 MG/DL (ref 0–1)
BUN BLDV-MCNC: 19 MG/DL (ref 7–20)
CALCIUM SERPL-MCNC: 9.6 MG/DL (ref 8.3–10.6)
CHLORIDE BLD-SCNC: 105 MMOL/L (ref 99–110)
CO2: 23 MMOL/L (ref 21–32)
CREAT SERPL-MCNC: 0.8 MG/DL (ref 0.6–1.2)
FERRITIN: 1043 NG/ML (ref 15–150)
GFR AFRICAN AMERICAN: >60
GFR NON-AFRICAN AMERICAN: >60
GLOBULIN: 2.1 G/DL
GLUCOSE BLD-MCNC: 139 MG/DL (ref 70–99)
HBV SURFACE AB TITR SER: <3.5 MIU/ML
HCT VFR BLD CALC: 40.7 % (ref 36–48)
HEMOGLOBIN: 13.9 G/DL (ref 12–16)
HEPATITIS C ANTIBODY INTERPRETATION: NORMAL
INR BLD: 1.32 (ref 0.86–1.14)
IRON SATURATION: 28 % (ref 15–50)
IRON: 90 UG/DL (ref 37–145)
MCH RBC QN AUTO: 34.7 PG (ref 26–34)
MCHC RBC AUTO-ENTMCNC: 34.1 G/DL (ref 31–36)
MCV RBC AUTO: 101.9 FL (ref 80–100)
PDW BLD-RTO: 14.8 % (ref 12.4–15.4)
PLATELET # BLD: 197 K/UL (ref 135–450)
PMV BLD AUTO: 9.8 FL (ref 5–10.5)
POTASSIUM SERPL-SCNC: 4.1 MMOL/L (ref 3.5–5.1)
PROTHROMBIN TIME: 15.3 SEC (ref 10–13.2)
RBC # BLD: 4 M/UL (ref 4–5.2)
SODIUM BLD-SCNC: 143 MMOL/L (ref 136–145)
TOTAL IRON BINDING CAPACITY: 321 UG/DL (ref 260–445)
TOTAL PROTEIN: 6.3 G/DL (ref 6.4–8.2)
WBC # BLD: 6.1 K/UL (ref 4–11)

## 2021-01-25 LAB — HAV AB SERPL IA-ACNC: NEGATIVE

## 2021-01-26 LAB
HEPATITIS B SURFACE ANTIGEN CONFIRMATION: NORMAL
MITOCHONDRIAL M2 AB, IGG: 2.1 UNITS (ref 0–24.9)
SMOOTH MUSCLE AB IGG TITER: ABNORMAL

## 2021-01-27 LAB — CERULOPLASMIN: 27 MG/DL (ref 16–45)

## 2021-02-01 NOTE — PROGRESS NOTES
[facility]   Electrophysiology      Date: 2/2/2021    Referring Provider: Marcy Pierre MD  PCP: Nini Buchanan MD     Chief Complaint:   Chief Complaint   Patient presents with    Follow-up     afib - no cardiac complaints     History of Present Illness:    I saw Shahzad Marques in the office for electrophysiology follow up today. She is a 63 y.o. female with a past medical history of paroxysmal atrial fibrillation, NHL, ZOE on CPAP, DM and HTN. She underwent EP study, radiofrequency ablation of atrial fibrillation with pulmonary vein isolation, ablation on left atrium roof for roof-dependent left atrial flutter and mitral isthmus ablation (for L flutter) on 10/28/20 with Dr. Gomez Woods. Flecainide was decreased to 50mg BID, Toprol was decreased to 12.5mg QD and Eliquis 5mg BID was continued. She presents today for procedure follow up. She has been overall feeling well. She does wear a FitBit and notes her HR while resting can sometimes go up into the 120s but denies any other symptoms at those times aside from some \"anxiety. \" She has been riding a stationary bike twice a day, 15 minute each time without issues. No bleeding problems. Allergies: Allergies   Allergen Reactions    Penicillins Hives     Home Medications:  Prior to Visit Medications    Medication Sig Taking?  Authorizing Provider   insulin detemir (LEVEMIR FLEXTOUCH) 100 UNIT/ML injection pen Inject 30 Units into the skin nightly Yes Nini Buchanan MD   Insulin Pen Needle 31G X 6 MM MISC 1 each by Does not apply route daily Yes Nini Buchanan MD   metFORMIN (GLUCOPHAGE-XR) 500 MG extended release tablet Take 2 tablets by mouth 2 times daily (with meals) Yes Nini Buchanan MD   potassium chloride (KLOR-CON M) 10 MEQ extended release tablet Take 2 tablets by mouth 2 times daily Yes Nini Buchanan MD   apixaban (ELIQUIS) 5 MG TABS tablet Take 1 tablet by mouth 2 times daily Yes Abilio Pickard MD   furosemide (LASIX) 20 MG tablet Take 1 tablet by mouth daily . May take an additional dose as needed for shortness of breath, weight gain and edema Yes Jim Estrada MD   glimepiride (AMARYL) 2 MG tablet Take 2 tablets by mouth 2 times daily (with meals) Yes Myranda Delgado MD   LORazepam (ATIVAN) 1 MG tablet Take 1 mg by mouth every 6 hours as needed for Anxiety. Yes Historical Provider, MD   acetaminophen (TYLENOL) 500 MG tablet Take 500 mg by mouth every 6 hours as needed for Pain Yes Historical Provider, MD   valACYclovir (VALTREX) 500 MG tablet Take 500 mg by mouth 2 times daily Yes Historical Provider, MD   loratadine (CLARITIN) 10 MG capsule Take 10 mg by mouth as needed  Yes Historical Provider, MD        Past Medical History:  Past Medical History:   Diagnosis Date    [facility] fibrillation [facility] RVR ([facility] [address]     Cancer ([facility] [address]     Diabetes mellitus type 2, controlled ([facility] [address]     Hypertension     Melanoma in situ of back ([facility] [address]     dr Griffith     Precordial pain 2016       Past Surgical History:   Past Surgical History:   Procedure Laterality Date    BONE MARROW TRANSPLANT      BREAST CYST ASPIRATION      in past benign bilateral over years. 2 episodes     SECTION      CHOLECYSTECTOMY      COLONOSCOPY  9/10/12    polyp, su, repeat 5 years    COLONOSCOPY  10/09/2017    dr Willa Gowers and check in 5 years. polyps    ENDOMETRIAL ABLATION  2007    FINE NEEDLE ASPIRATION      GALLBLADDER SURGERY      OTHER SURGICAL HISTORY  2019    Abdominal Mass BX    OTHER SURGICAL HISTORY  2019    CT BIOPSY ABDOMEN RETROPERITONEUM,    OVARY REMOVAL Left 2018    benign growth, dr Cornelio Pérez [place] [address]. TUNNELED VENOUS PORT PLACEMENT Right 2016    Dr. Fish Aas History:   reports that she quit smoking about 5 years ago. Her smoking use included cigarettes. She started smoking about 46 years ago. She has a 0.80 pack-year smoking history.  She has never used smokeless tobacco. She reports previous alcohol use. She reports that she does not use drugs. Family History:      Problem Relation Age of Onset    High Blood Pressure Mother     Cancer Mother         breast    High Blood Pressure Father     Cancer Father         lung       Review of Systems   Constitutional: Negative for chills, fatigue, fever and unexpected weight change. HENT: Negative for congestion, hearing loss, sinus pressure, sore throat and trouble swallowing. Respiratory: Negative for cough, shortness of breath and wheezing. Cardiovascular: Negative for chest pain, palpitations and leg swelling. Gastrointestinal: Negative for abdominal pain, blood in stool, constipation, diarrhea, nausea and vomiting. Genitourinary: Negative for hematuria. Musculoskeletal: Negative for arthralgias, back pain, gait problem and myalgias. Skin: Negative for color change, rash and wound. Neurological: Negative for dizziness, seizures, syncope, speech difficulty, weakness and light-headedness. Hematological: Does not bruise/bleed easily. Physical Examination:  Vitals:    02/02/21 1326   BP: 126/78   Pulse: 98   Temp: 96.5 °F (35.8 °C)   SpO2: 98%      Wt Readings from Last 3 Encounters:   02/02/21 238 lb (108 kg)   12/11/20 237 lb (107.5 kg)   11/13/20 240 lb (108.9 kg)       Physical Exam  Vitals signs reviewed. Constitutional:       General: She is not in acute distress. Appearance: Normal appearance. HENT:      Head: Normocephalic and atraumatic. Nose: Nose normal.      Mouth/Throat:      Mouth: Mucous membranes are moist.   Eyes:      Conjunctiva/sclera: Conjunctivae normal.      Pupils: Pupils are equal, round, and reactive to light. Cardiovascular:      Rate and Rhythm: Normal rate and regular rhythm. Heart sounds: No murmur. No friction rub. No gallop. Pulmonary:      Effort: No respiratory distress. Breath sounds: No wheezing, rhonchi or rales. Abdominal:      General: Abdomen is flat.  Bowel sounds are normal.      Palpations: Abdomen is soft. Musculoskeletal: Normal range of motion. Right lower leg: No edema. Left lower leg: No edema. Skin:     General: Skin is warm and dry. Findings: No bruising. Neurological:      General: No focal deficit present. Mental Status: She is alert and oriented to person, place, and time. Motor: No weakness. Psychiatric:         Mood and Affect: Mood normal.         Behavior: Behavior normal.          Pertinent labs, diagnostic, device, and imaging results reviewed as a part of this visit    LABS    CBC:   Lab Results   Component Value Date    WBC 6.1 2021    HGB 13.9 2021    HCT 40.7 2021    .9 (H) 2021     2021     BMP:   Lab Results   Component Value Date    CREATININE 0.8 2021    BUN 19 2021     2021    K 4.1 2021     2021    CO2 23 2021     Estimated Creatinine Clearance: 85 mL/min (based on SCr of 0.8 mg/dL). No results found for: BNP    Thyroid:   Lab Results   Component Value Date    TSH 4.08 2020     Lipid Panel:   Lab Results   Component Value Date    CHOL 181 2020    HDL 54 2020    TRIG 136 2020     LFTs:  Lab Results   Component Value Date    ALT 50 (H) 2021    AST 69 (H) 2021    ALKPHOS 177 (H) 2021    BILITOT 0.6 2021     Coags:   Lab Results   Component Value Date    PROTIME 15.3 (H) 2021    INR 1.32 (H) 2021    APTT 31.8 2019       EC2021   SR at 92 BPM. Non-specific ST-T wave changes. Echo: 10/18/19   Conclusions      Summary   Left ventricular cavity size is normal. There is mild left ventricular   hypertrophy. Overall left ventricular systolic function appears normal with   an ejection fraction of 50-55%. No regional wall motion abnormalities are   noted.  Indeterminate diastolic function.   Trivial mitral, aortic, pulmonic, and tricuspid regurgitation.   Estimated pulmonary artery systolic pressure is at 31 mmHg assuming a right   atrial pressure of 3 mmHg.   Normal Global strain -24.1%.     Stress: 1/15/16        Conclusions          Summary     Pharmacological Stress/MPI Results:          1. Technically a satisfactory study.     2. Normal pharmacological stress portion of the study.     3. No evidence of Ischemia by Myocardial Perfusion Imaging.     4. Gated Study shows normal LV size and Systolic function; EF is >52 %.       Procedures:  1. Unsuccessful DCCV attempt x 3 10/31/18  2. Successful DCCV on 12/27/18  3. RFA for A fib with PVI, roof line ablation (for L flutter), mitral isthmus ablation (for L flutter), 10/28/20 Dr. Raya Simons:    Paroxysmal atrial fibrillation              - s/p PVI ablation 10/28/20 with Dr. Misael Keller              - stop flecainide and Toprol as she is outside the 90 day blanking period and EKG today with SR              - CHADS2-VASc 3 (gender, HTN, DM) on Eliquis 5mg BID   - 30 day cardiac event monitor     Left atrial flutter              - seen during EP study s/p roof line and mitral isthmus ablations 10/28/20              - see above     Essential HTN               - BP controlled, advised her to monitor with stopping Toprol.       Thank you for allowing to us to participate in the care of Jah Davey. Return in about 2 months (around 4/2/2021) for an appointment with Uriel Corral NP.      ALEXUS Donato  [facility] [address]  Phone: [phone]  Fax: [phone]    Electronically signed by ALEXUS Wilkinson - CNP on 2/2/2021 at 2:29 PM

## 2021-02-02 ENCOUNTER — OFFICE VISIT (OUTPATIENT)
Dept: CARDIOLOGY CLINIC | Age: 66
End: 2021-02-02
Payer: COMMERCIAL

## 2021-02-02 VITALS
HEIGHT: 63 IN | BODY MASS INDEX: 42.17 KG/M2 | WEIGHT: 238 LBS | OXYGEN SATURATION: 98 % | TEMPERATURE: 96.5 F | SYSTOLIC BLOOD PRESSURE: 126 MMHG | HEART RATE: 98 BPM | DIASTOLIC BLOOD PRESSURE: 78 MMHG

## 2021-02-02 DIAGNOSIS — I48.0 PAF (PAROXYSMAL ATRIAL FIBRILLATION) (HCC): Primary | ICD-10-CM

## 2021-02-02 DIAGNOSIS — I10 ESSENTIAL HYPERTENSION: ICD-10-CM

## 2021-02-02 DIAGNOSIS — I48.92 LEFT ATRIAL FLUTTER BY ELECTROCARDIOGRAM (HCC): ICD-10-CM

## 2021-02-02 LAB
A/G RATIO: 1.3 (ref 1.1–2.2)
ALBUMIN SERPL-MCNC: 4 G/DL (ref 3.4–5)
ALP BLD-CCNC: 178 U/L (ref 40–129)
ALT SERPL-CCNC: 47 U/L (ref 10–40)
ANION GAP SERPL CALCULATED.3IONS-SCNC: 12 MMOL/L (ref 3–16)
AST SERPL-CCNC: 48 U/L (ref 15–37)
BASOPHILS ABSOLUTE: 0 K/UL (ref 0–0.2)
BASOPHILS RELATIVE PERCENT: 0.4 %
BILIRUB SERPL-MCNC: 0.5 MG/DL (ref 0–1)
BUN BLDV-MCNC: 17 MG/DL (ref 7–20)
CALCIUM SERPL-MCNC: 10 MG/DL (ref 8.3–10.6)
CHLORIDE BLD-SCNC: 102 MMOL/L (ref 99–110)
CO2: 26 MMOL/L (ref 21–32)
CREAT SERPL-MCNC: 0.8 MG/DL (ref 0.6–1.2)
EOSINOPHILS ABSOLUTE: 0.1 K/UL (ref 0–0.6)
EOSINOPHILS RELATIVE PERCENT: 1.4 %
FERRITIN: 766.6 NG/ML (ref 15–150)
GFR AFRICAN AMERICAN: >60
GFR NON-AFRICAN AMERICAN: >60
GLOBULIN: 3 G/DL
GLUCOSE BLD-MCNC: 165 MG/DL (ref 70–99)
HBV SURFACE AB TITR SER: <3.5 MIU/ML
HCT VFR BLD CALC: 41.5 % (ref 36–48)
HEMOGLOBIN: 13.6 G/DL (ref 12–16)
HEPATITIS B SURFACE ANTIGEN INTERPRETATION: NORMAL
HEPATITIS C ANTIBODY INTERPRETATION: NORMAL
INR BLD: 1.21 (ref 0.86–1.14)
IRON SATURATION: 29 % (ref 15–50)
IRON: 86 UG/DL (ref 37–145)
LYMPHOCYTES ABSOLUTE: 0.7 K/UL (ref 1–5.1)
LYMPHOCYTES RELATIVE PERCENT: 8.6 %
MCH RBC QN AUTO: 34 PG (ref 26–34)
MCHC RBC AUTO-ENTMCNC: 32.8 G/DL (ref 31–36)
MCV RBC AUTO: 103.6 FL (ref 80–100)
MONOCYTES ABSOLUTE: 0.5 K/UL (ref 0–1.3)
MONOCYTES RELATIVE PERCENT: 6.6 %
NEUTROPHILS ABSOLUTE: 6.9 K/UL (ref 1.7–7.7)
NEUTROPHILS RELATIVE PERCENT: 83 %
PDW BLD-RTO: 15.5 % (ref 12.4–15.4)
PLATELET # BLD: 194 K/UL (ref 135–450)
PMV BLD AUTO: 10 FL (ref 5–10.5)
POTASSIUM SERPL-SCNC: 4.1 MMOL/L (ref 3.5–5.1)
PROTHROMBIN TIME: 14.1 SEC (ref 10–13.2)
RBC # BLD: 4.01 M/UL (ref 4–5.2)
SODIUM BLD-SCNC: 140 MMOL/L (ref 136–145)
TOTAL IRON BINDING CAPACITY: 298 UG/DL (ref 260–445)
TOTAL PROTEIN: 7 G/DL (ref 6.4–8.2)
WBC # BLD: 8.3 K/UL (ref 4–11)

## 2021-02-02 PROCEDURE — 99214 OFFICE O/P EST MOD 30 MIN: CPT | Performed by: NURSE PRACTITIONER

## 2021-02-02 PROCEDURE — 93000 ELECTROCARDIOGRAM COMPLETE: CPT | Performed by: NURSE PRACTITIONER

## 2021-02-02 ASSESSMENT — ENCOUNTER SYMPTOMS
DIARRHEA: 0
VOMITING: 0
SORE THROAT: 0
CONSTIPATION: 0
NAUSEA: 0
BACK PAIN: 0
SHORTNESS OF BREATH: 0
BLOOD IN STOOL: 0
WHEEZING: 0
COUGH: 0
SINUS PRESSURE: 0
ABDOMINAL PAIN: 0
TROUBLE SWALLOWING: 0
COLOR CHANGE: 0

## 2021-02-03 ENCOUNTER — TELEPHONE (OUTPATIENT)
Dept: NON INVASIVE DIAGNOSTICS | Age: 66
End: 2021-02-03

## 2021-02-03 LAB — ANTI-NUCLEAR ANTIBODY (ANA): NEGATIVE

## 2021-02-03 NOTE — TELEPHONE ENCOUNTER
Reviewed urgent report from [address], showing A fib with RVR around 1144 today. She was on stationary bike prior to that but denies any symptoms at that time. Discussed with Dr. Mulu Roth, will resume flecainide at 50mg BID and Toprol at 12.5mg QD. She will see Dr. Mulu Roth in office next week to discuss another possible ablation. Called and discussed with pt, answered her questions.

## 2021-02-04 LAB
CERULOPLASMIN: 28 MG/DL (ref 16–45)
F-ACTIN AB IGG: 4 UNITS (ref 0–19)
HAV AB SERPL IA-ACNC: NEGATIVE
MITOCHONDRIAL M2 AB, IGG: 1.7 UNITS (ref 0–24.9)

## 2021-02-12 RX ORDER — GLIMEPIRIDE 2 MG/1
4 TABLET ORAL 2 TIMES DAILY WITH MEALS
Qty: 360 TABLET | Refills: 1 | Status: SHIPPED | OUTPATIENT
Start: 2021-02-12 | End: 2021-03-09 | Stop reason: SDUPTHER

## 2021-02-15 ENCOUNTER — TELEPHONE (OUTPATIENT)
Dept: CARDIOLOGY CLINIC | Age: 66
End: 2021-02-15

## 2021-02-19 ENCOUNTER — OFFICE VISIT (OUTPATIENT)
Dept: FAMILY MEDICINE CLINIC | Age: 66
End: 2021-02-19
Payer: COMMERCIAL

## 2021-02-19 VITALS
WEIGHT: 234 LBS | SYSTOLIC BLOOD PRESSURE: 116 MMHG | TEMPERATURE: 97.3 F | HEART RATE: 88 BPM | OXYGEN SATURATION: 97 % | HEIGHT: 63 IN | DIASTOLIC BLOOD PRESSURE: 64 MMHG | BODY MASS INDEX: 41.46 KG/M2

## 2021-02-19 DIAGNOSIS — E11.9 TYPE 2 DIABETES MELLITUS WITHOUT COMPLICATION, WITH LONG-TERM CURRENT USE OF INSULIN (HCC): ICD-10-CM

## 2021-02-19 DIAGNOSIS — Z79.4 TYPE 2 DIABETES MELLITUS WITHOUT COMPLICATION, WITH LONG-TERM CURRENT USE OF INSULIN (HCC): ICD-10-CM

## 2021-02-19 DIAGNOSIS — I10 ESSENTIAL HYPERTENSION: ICD-10-CM

## 2021-02-19 DIAGNOSIS — Z20.822 CLOSE EXPOSURE TO COVID-19 VIRUS: ICD-10-CM

## 2021-02-19 DIAGNOSIS — R05.9 COUGH: Primary | ICD-10-CM

## 2021-02-19 PROCEDURE — 99213 OFFICE O/P EST LOW 20 MIN: CPT | Performed by: FAMILY MEDICINE

## 2021-02-19 ASSESSMENT — PATIENT HEALTH QUESTIONNAIRE - PHQ9: 2. FEELING DOWN, DEPRESSED OR HOPELESS: 0

## 2021-02-19 NOTE — PROGRESS NOTES
No pb with the urine no pain no blood  no blood in stool  no dizzy. occ ha not concerning or severe   Feet doing well no sores     YOB: 1955    Date of Visit:  2/19/2021     -- Penicillins -- Hives    Current Outpatient Medications:    glimepiride (AMARYL) 2 MG tablet, Take 2 tablets by mouth 2 times daily (with meals), Disp: 360 tablet, Rfl: 1    insulin detemir (LEVEMIR FLEXTOUCH) 100 UNIT/ML injection pen, Inject 30 Units into the skin nightly, Disp: 5 pen, Rfl: 3    Insulin Pen Needle 31G X 6 MM MISC, 1 each by Does not apply route daily, Disp: 100 each, Rfl: 1    metFORMIN (GLUCOPHAGE-XR) 500 MG extended release tablet, Take 2 tablets by mouth 2 times daily (with meals), Disp: 360 tablet, Rfl: 1    potassium chloride (KLOR-CON M) 10 MEQ extended release tablet, Take 2 tablets by mouth 2 times daily, Disp: 360 tablet, Rfl: 1    apixaban (ELIQUIS) 5 MG TABS tablet, Take 1 tablet by mouth 2 times daily, Disp: 180 tablet, Rfl: 3    furosemide (LASIX) 20 MG tablet, Take 1 tablet by mouth daily . May take an additional dose as needed for shortness of breath, weight gain and edema, Disp: 180 tablet, Rfl: 0    LORazepam (ATIVAN) 1 MG tablet, Take 1 mg by mouth every 6 hours as needed for Anxiety. , Disp: , Rfl:     acetaminophen (TYLENOL) 500 MG tablet, Take 500 mg by mouth every 6 hours as needed for Pain, Disp: , Rfl:     valACYclovir (VALTREX) 500 MG tablet, Take 500 mg by mouth 2 times daily, Disp: , Rfl:     loratadine (CLARITIN) 10 MG capsule, Take 10 mg by mouth as needed , Disp: , Rfl:     No current facility-administered medications for this visit.       ---------------------------               02/19/21                      0949         ---------------------------   BP:          116/64         Site:    Left Upper Arm     Position:     Sitting        Cuff Size:   Large Adult      Pulse:         88           Temp:   97.3 °F (36.3 °C) TempSrc:    Temporal        SpO2:          97%          Weight: 234 lb (106.1 kg)   Height:   5' 3\" (1.6 m)    ---------------------------  Body mass index is 41.45 kg/m². Wt Readings from Last 3 Encounters:  02/19/21 : 234 lb (106.1 kg)  02/02/21 : 238 lb (108 kg)  12/11/20 : 237 lb (107.5 kg)    BP Readings from Last 3 Encounters:  02/19/21 : 116/64  02/02/21 : 126/78  11/13/20 : 128/82                    Review of Systems    Physical Exam  Constitutional:       General: She is not in acute distress. Appearance: Normal appearance. She is well-developed. She is not ill-appearing or diaphoretic. HENT:      Head: Normocephalic and atraumatic. Neck:      Musculoskeletal: Neck supple. Thyroid: No thyroid mass or thyromegaly. Cardiovascular:      Rate and Rhythm: Normal rate and regular rhythm. Heart sounds: Normal heart sounds. No murmur. No friction rub. No gallop. Pulmonary:      Effort: Pulmonary effort is normal. No tachypnea, accessory muscle usage or respiratory distress. Breath sounds: Normal breath sounds. No decreased breath sounds, wheezing, rhonchi or rales. Comments: Few ronchi at bases   Lymphadenopathy:      Cervical: No cervical adenopathy. Upper Body:      Right upper body: No supraclavicular adenopathy. Left upper body: No supraclavicular adenopathy. Skin:     General: Skin is warm and dry. Coloration: Skin is not pale. Neurological:      Mental Status: She is alert. An electronic signature was used to authenticate this note.     --Penelope Duncan MD

## 2021-02-19 NOTE — PATIENT INSTRUCTIONS
Do monitor the symptoms at home  If worse especially with the breathing do go to the ER  Get a pulse oximeter from [facility]  If the numbers are falling and staying below 90 then go to the ER  If chest pain or sob go to the er  See me back in 3 months  Do the blood work next week

## 2021-02-22 ENCOUNTER — TELEPHONE (OUTPATIENT)
Dept: FAMILY MEDICINE CLINIC | Age: 66
End: 2021-02-22

## 2021-02-22 NOTE — PATIENT INSTRUCTIONS
Derm referral    Stop the metformin  Continue the other medications  Call in 1-2 weeks with how you are feeling  See back in about 3 months

## 2021-02-22 NOTE — TELEPHONE ENCOUNTER
Morro Gucci went to [facility] Dept [facility]) to have the test done. The patient will be notified of results in 3-5 days. Patient had test done on Friday 2-.

## 2021-02-22 NOTE — TELEPHONE ENCOUNTER
She told the ma she would see one of her other doctors for this.  Result is pending  We have given her instructions prior

## 2021-02-22 NOTE — TELEPHONE ENCOUNTER
The results should not take that long to get back  See if hospital has them   What is going on?   I just needed the result so we can treat in some way

## 2021-02-22 NOTE — TELEPHONE ENCOUNTER
Pt is calling to see if an abx can be called in    Got the covid test on Friday   otc cough med and not working   Cough - bad   Chest/head congestion   Oxygen 92       Pl advise.    [address]

## 2021-02-24 ENCOUNTER — TELEPHONE (OUTPATIENT)
Dept: FAMILY MEDICINE CLINIC | Age: 66
End: 2021-02-24

## 2021-02-24 ENCOUNTER — APPOINTMENT (OUTPATIENT)
Dept: CT IMAGING | Age: 66
DRG: 871 | End: 2021-02-24
Payer: COMMERCIAL

## 2021-02-24 ENCOUNTER — APPOINTMENT (OUTPATIENT)
Dept: GENERAL RADIOLOGY | Age: 66
DRG: 871 | End: 2021-02-24
Payer: COMMERCIAL

## 2021-02-24 ENCOUNTER — HOSPITAL ENCOUNTER (INPATIENT)
Age: 66
LOS: 4 days | Discharge: HOME | DRG: 871 | End: 2021-02-28
Attending: STUDENT IN AN ORGANIZED HEALTH CARE EDUCATION/TRAINING PROGRAM | Admitting: STUDENT IN AN ORGANIZED HEALTH CARE EDUCATION/TRAINING PROGRAM
Payer: COMMERCIAL

## 2021-02-24 DIAGNOSIS — J96.01 ACUTE RESPIRATORY FAILURE WITH HYPOXIA (HCC): ICD-10-CM

## 2021-02-24 DIAGNOSIS — I48.91 ATRIAL FIBRILLATION WITH RAPID VENTRICULAR RESPONSE (HCC): ICD-10-CM

## 2021-02-24 DIAGNOSIS — C83.00 SMALL B-CELL LYMPHOMA, UNSPECIFIED BODY REGION (HCC): ICD-10-CM

## 2021-02-24 DIAGNOSIS — U07.1 PNEUMONIA DUE TO COVID-19 VIRUS: Primary | ICD-10-CM

## 2021-02-24 DIAGNOSIS — J12.82 PNEUMONIA DUE TO COVID-19 VIRUS: Primary | ICD-10-CM

## 2021-02-24 DIAGNOSIS — A41.9 SEPTICEMIA (HCC): ICD-10-CM

## 2021-02-24 PROBLEM — J18.9 PNEUMONIA: Status: ACTIVE | Noted: 2021-02-24

## 2021-02-24 LAB
A/G RATIO: 1.1 (ref 1.1–2.2)
ALBUMIN SERPL-MCNC: 3.5 G/DL (ref 3.4–5)
ALP BLD-CCNC: 240 U/L (ref 40–129)
ALT SERPL-CCNC: 48 U/L (ref 10–40)
ANION GAP SERPL CALCULATED.3IONS-SCNC: 16 MMOL/L (ref 3–16)
AST SERPL-CCNC: 75 U/L (ref 15–37)
BASE EXCESS VENOUS: 0.8 MMOL/L
BASOPHILS ABSOLUTE: 0 K/UL (ref 0–0.2)
BASOPHILS RELATIVE PERCENT: 0.2 %
BILIRUB SERPL-MCNC: 0.9 MG/DL (ref 0–1)
BUN BLDV-MCNC: 14 MG/DL (ref 7–20)
CALCIUM SERPL-MCNC: 8.8 MG/DL (ref 8.3–10.6)
CARBOXYHEMOGLOBIN: 1.7 %
CHLORIDE BLD-SCNC: 101 MMOL/L (ref 99–110)
CO2: 23 MMOL/L (ref 21–32)
CREAT SERPL-MCNC: 1 MG/DL (ref 0.6–1.2)
EOSINOPHILS ABSOLUTE: 0 K/UL (ref 0–0.6)
EOSINOPHILS RELATIVE PERCENT: 0.1 %
GFR AFRICAN AMERICAN: >60
GFR NON-AFRICAN AMERICAN: 56
GLOBULIN: 3.1 G/DL
GLUCOSE BLD-MCNC: 182 MG/DL (ref 70–99)
GLUCOSE BLD-MCNC: 269 MG/DL (ref 70–99)
HCO3 VENOUS: 25 MMOL/L (ref 23–29)
HCT VFR BLD CALC: 34.4 % (ref 36–48)
HEMOGLOBIN: 11.8 G/DL (ref 12–16)
LACTIC ACID, SEPSIS: 1.7 MMOL/L (ref 0.4–1.9)
LACTIC ACID, SEPSIS: 3.2 MMOL/L (ref 0.4–1.9)
LYMPHOCYTES ABSOLUTE: 0.3 K/UL (ref 1–5.1)
LYMPHOCYTES RELATIVE PERCENT: 5.9 %
MAGNESIUM: 1.9 MG/DL (ref 1.8–2.4)
MCH RBC QN AUTO: 33.6 PG (ref 26–34)
MCHC RBC AUTO-ENTMCNC: 34.3 G/DL (ref 31–36)
MCV RBC AUTO: 97.9 FL (ref 80–100)
METHEMOGLOBIN VENOUS: 0.1 %
MONOCYTES ABSOLUTE: 0.5 K/UL (ref 0–1.3)
MONOCYTES RELATIVE PERCENT: 10.3 %
NEUTROPHILS ABSOLUTE: 3.8 K/UL (ref 1.7–7.7)
NEUTROPHILS RELATIVE PERCENT: 83.5 %
O2 CONTENT, VEN: 15 ML/DL
O2 SAT, VEN: 86 %
O2 THERAPY: ABNORMAL
PCO2, VEN: 36.1 MMHG (ref 40–50)
PDW BLD-RTO: 15.2 % (ref 12.4–15.4)
PERFORMED ON: ABNORMAL
PH VENOUS: 7.44 (ref 7.35–7.45)
PLATELET # BLD: 168 K/UL (ref 135–450)
PMV BLD AUTO: 10 FL (ref 5–10.5)
PO2, VEN: 48 MMHG
POTASSIUM REFLEX MAGNESIUM: 3.1 MMOL/L (ref 3.5–5.1)
PRO-BNP: 574 PG/ML (ref 0–124)
PROCALCITONIN: 0.22 NG/ML (ref 0–0.15)
RBC # BLD: 3.52 M/UL (ref 4–5.2)
SODIUM BLD-SCNC: 140 MMOL/L (ref 136–145)
TCO2 CALC VENOUS: 26 MMOL/L
TOTAL PROTEIN: 6.6 G/DL (ref 6.4–8.2)
TROPONIN: <0.01 NG/ML
WBC # BLD: 4.6 K/UL (ref 4–11)

## 2021-02-24 PROCEDURE — 71045 X-RAY EXAM CHEST 1 VIEW: CPT

## 2021-02-24 PROCEDURE — 83880 ASSAY OF NATRIURETIC PEPTIDE: CPT

## 2021-02-24 PROCEDURE — 6370000000 HC RX 637 (ALT 250 FOR IP): Performed by: STUDENT IN AN ORGANIZED HEALTH CARE EDUCATION/TRAINING PROGRAM

## 2021-02-24 PROCEDURE — 99285 EMERGENCY DEPT VISIT HI MDM: CPT

## 2021-02-24 PROCEDURE — 2500000003 HC RX 250 WO HCPCS: Performed by: PHYSICIAN ASSISTANT

## 2021-02-24 PROCEDURE — 84145 PROCALCITONIN (PCT): CPT

## 2021-02-24 PROCEDURE — 1200000000 HC SEMI PRIVATE

## 2021-02-24 PROCEDURE — 80053 COMPREHEN METABOLIC PANEL: CPT

## 2021-02-24 PROCEDURE — 2580000003 HC RX 258: Performed by: STUDENT IN AN ORGANIZED HEALTH CARE EDUCATION/TRAINING PROGRAM

## 2021-02-24 PROCEDURE — 82803 BLOOD GASES ANY COMBINATION: CPT

## 2021-02-24 PROCEDURE — 6360000002 HC RX W HCPCS: Performed by: STUDENT IN AN ORGANIZED HEALTH CARE EDUCATION/TRAINING PROGRAM

## 2021-02-24 PROCEDURE — 93005 ELECTROCARDIOGRAM TRACING: CPT | Performed by: PHYSICIAN ASSISTANT

## 2021-02-24 PROCEDURE — 87449 NOS EACH ORGANISM AG IA: CPT

## 2021-02-24 PROCEDURE — 83605 ASSAY OF LACTIC ACID: CPT

## 2021-02-24 PROCEDURE — 71260 CT THORAX DX C+: CPT

## 2021-02-24 PROCEDURE — 6370000000 HC RX 637 (ALT 250 FOR IP): Performed by: PHYSICIAN ASSISTANT

## 2021-02-24 PROCEDURE — 84484 ASSAY OF TROPONIN QUANT: CPT

## 2021-02-24 PROCEDURE — 96367 TX/PROPH/DG ADDL SEQ IV INF: CPT

## 2021-02-24 PROCEDURE — 87040 BLOOD CULTURE FOR BACTERIA: CPT

## 2021-02-24 PROCEDURE — 96365 THER/PROPH/DIAG IV INF INIT: CPT

## 2021-02-24 PROCEDURE — 85025 COMPLETE CBC W/AUTO DIFF WBC: CPT

## 2021-02-24 PROCEDURE — 96375 TX/PRO/DX INJ NEW DRUG ADDON: CPT

## 2021-02-24 PROCEDURE — 83735 ASSAY OF MAGNESIUM: CPT

## 2021-02-24 PROCEDURE — 6360000002 HC RX W HCPCS: Performed by: PHYSICIAN ASSISTANT

## 2021-02-24 PROCEDURE — 87070 CULTURE OTHR SPECIMN AEROBIC: CPT

## 2021-02-24 PROCEDURE — 36415 COLL VENOUS BLD VENIPUNCTURE: CPT

## 2021-02-24 PROCEDURE — 2500000003 HC RX 250 WO HCPCS: Performed by: STUDENT IN AN ORGANIZED HEALTH CARE EDUCATION/TRAINING PROGRAM

## 2021-02-24 PROCEDURE — 87205 SMEAR GRAM STAIN: CPT

## 2021-02-24 PROCEDURE — 2580000003 HC RX 258: Performed by: PHYSICIAN ASSISTANT

## 2021-02-24 PROCEDURE — 6360000004 HC RX CONTRAST MEDICATION: Performed by: STUDENT IN AN ORGANIZED HEALTH CARE EDUCATION/TRAINING PROGRAM

## 2021-02-24 RX ORDER — DEXTROSE MONOHYDRATE 25 G/50ML
12.5 INJECTION, SOLUTION INTRAVENOUS PRN
Status: DISCONTINUED | OUTPATIENT
Start: 2021-02-24 | End: 2021-02-28 | Stop reason: HOSPADM

## 2021-02-24 RX ORDER — INSULIN GLARGINE 100 [IU]/ML
15 INJECTION, SOLUTION SUBCUTANEOUS NIGHTLY
Status: DISCONTINUED | OUTPATIENT
Start: 2021-02-25 | End: 2021-02-25

## 2021-02-24 RX ORDER — ALBUTEROL SULFATE 90 UG/1
2 AEROSOL, METERED RESPIRATORY (INHALATION) 4 TIMES DAILY
Status: DISCONTINUED | OUTPATIENT
Start: 2021-02-24 | End: 2021-02-28 | Stop reason: HOSPADM

## 2021-02-24 RX ORDER — POTASSIUM CHLORIDE 20 MEQ/1
40 TABLET, EXTENDED RELEASE ORAL ONCE
Status: COMPLETED | OUTPATIENT
Start: 2021-02-24 | End: 2021-02-24

## 2021-02-24 RX ORDER — DILTIAZEM HYDROCHLORIDE 5 MG/ML
10 INJECTION INTRAVENOUS ONCE
Status: DISCONTINUED | OUTPATIENT
Start: 2021-02-24 | End: 2021-02-24

## 2021-02-24 RX ORDER — ONDANSETRON 2 MG/ML
4 INJECTION INTRAMUSCULAR; INTRAVENOUS EVERY 6 HOURS PRN
Status: DISCONTINUED | OUTPATIENT
Start: 2021-02-24 | End: 2021-02-28 | Stop reason: HOSPADM

## 2021-02-24 RX ORDER — DEXAMETHASONE 6 MG/1
6 TABLET ORAL DAILY
Status: DISCONTINUED | OUTPATIENT
Start: 2021-02-25 | End: 2021-02-28 | Stop reason: HOSPADM

## 2021-02-24 RX ORDER — SODIUM CHLORIDE 0.9 % (FLUSH) 0.9 %
10 SYRINGE (ML) INJECTION PRN
Status: DISCONTINUED | OUTPATIENT
Start: 2021-02-24 | End: 2021-02-28 | Stop reason: HOSPADM

## 2021-02-24 RX ORDER — AZITHROMYCIN 500 MG/1
500 TABLET, FILM COATED ORAL ONCE
Status: COMPLETED | OUTPATIENT
Start: 2021-02-24 | End: 2021-02-24

## 2021-02-24 RX ORDER — PROMETHAZINE HYDROCHLORIDE 25 MG/1
12.5 TABLET ORAL EVERY 6 HOURS PRN
Status: DISCONTINUED | OUTPATIENT
Start: 2021-02-24 | End: 2021-02-28 | Stop reason: HOSPADM

## 2021-02-24 RX ORDER — ACETAMINOPHEN 650 MG/1
650 SUPPOSITORY RECTAL EVERY 6 HOURS PRN
Status: DISCONTINUED | OUTPATIENT
Start: 2021-02-24 | End: 2021-02-28 | Stop reason: HOSPADM

## 2021-02-24 RX ORDER — GUAIFENESIN/DEXTROMETHORPHAN 100-10MG/5
5 SYRUP ORAL EVERY 4 HOURS PRN
Status: DISCONTINUED | OUTPATIENT
Start: 2021-02-24 | End: 2021-02-28 | Stop reason: HOSPADM

## 2021-02-24 RX ORDER — METOPROLOL TARTRATE 5 MG/5ML
5 INJECTION INTRAVENOUS ONCE
Status: COMPLETED | OUTPATIENT
Start: 2021-02-24 | End: 2021-02-24

## 2021-02-24 RX ORDER — AZITHROMYCIN 250 MG/1
250 TABLET, FILM COATED ORAL DAILY
Status: DISCONTINUED | OUTPATIENT
Start: 2021-02-25 | End: 2021-02-25

## 2021-02-24 RX ORDER — LORAZEPAM 1 MG/1
1 TABLET ORAL EVERY 6 HOURS PRN
Status: DISCONTINUED | OUTPATIENT
Start: 2021-02-24 | End: 2021-02-28 | Stop reason: HOSPADM

## 2021-02-24 RX ORDER — DEXTROSE MONOHYDRATE 50 MG/ML
100 INJECTION, SOLUTION INTRAVENOUS PRN
Status: DISCONTINUED | OUTPATIENT
Start: 2021-02-24 | End: 2021-02-28 | Stop reason: HOSPADM

## 2021-02-24 RX ORDER — 0.9 % SODIUM CHLORIDE 0.9 %
1000 INTRAVENOUS SOLUTION INTRAVENOUS ONCE
Status: DISCONTINUED | OUTPATIENT
Start: 2021-02-24 | End: 2021-02-24

## 2021-02-24 RX ORDER — ACETAMINOPHEN 325 MG/1
650 TABLET ORAL EVERY 6 HOURS PRN
Status: DISCONTINUED | OUTPATIENT
Start: 2021-02-24 | End: 2021-02-28 | Stop reason: HOSPADM

## 2021-02-24 RX ORDER — NICOTINE POLACRILEX 4 MG
15 LOZENGE BUCCAL PRN
Status: DISCONTINUED | OUTPATIENT
Start: 2021-02-24 | End: 2021-02-28 | Stop reason: HOSPADM

## 2021-02-24 RX ORDER — SODIUM CHLORIDE 0.9 % (FLUSH) 0.9 %
10 SYRINGE (ML) INJECTION EVERY 12 HOURS SCHEDULED
Status: DISCONTINUED | OUTPATIENT
Start: 2021-02-24 | End: 2021-02-28 | Stop reason: HOSPADM

## 2021-02-24 RX ORDER — ACYCLOVIR 200 MG/1
400 CAPSULE ORAL 3 TIMES DAILY
Status: DISCONTINUED | OUTPATIENT
Start: 2021-02-25 | End: 2021-02-28 | Stop reason: HOSPADM

## 2021-02-24 RX ORDER — 0.9 % SODIUM CHLORIDE 0.9 %
500 INTRAVENOUS SOLUTION INTRAVENOUS ONCE
Status: COMPLETED | OUTPATIENT
Start: 2021-02-24 | End: 2021-02-24

## 2021-02-24 RX ORDER — ALBUTEROL SULFATE 90 UG/1
2 AEROSOL, METERED RESPIRATORY (INHALATION) EVERY 30 MIN PRN
Status: DISCONTINUED | OUTPATIENT
Start: 2021-02-24 | End: 2021-02-26

## 2021-02-24 RX ADMIN — SODIUM CHLORIDE 1000 ML: 9 INJECTION, SOLUTION INTRAVENOUS at 18:02

## 2021-02-24 RX ADMIN — INSULIN LISPRO 2 UNITS: 100 INJECTION, SOLUTION INTRAVENOUS; SUBCUTANEOUS at 23:45

## 2021-02-24 RX ADMIN — DEXAMETHASONE SODIUM PHOSPHATE 20 MG: 4 INJECTION, SOLUTION INTRA-ARTICULAR; INTRALESIONAL; INTRAMUSCULAR; INTRAVENOUS; SOFT TISSUE at 18:05

## 2021-02-24 RX ADMIN — CEFTRIAXONE 1000 MG: 1 INJECTION, POWDER, FOR SOLUTION INTRAMUSCULAR; INTRAVENOUS at 21:05

## 2021-02-24 RX ADMIN — IOPAMIDOL 75 ML: 755 INJECTION, SOLUTION INTRAVENOUS at 19:25

## 2021-02-24 RX ADMIN — POTASSIUM CHLORIDE 40 MEQ: 1500 TABLET, EXTENDED RELEASE ORAL at 22:55

## 2021-02-24 RX ADMIN — APIXABAN 5 MG: 5 TABLET, FILM COATED ORAL at 23:45

## 2021-02-24 RX ADMIN — SODIUM CHLORIDE 500 ML: 9 INJECTION, SOLUTION INTRAVENOUS at 18:02

## 2021-02-24 RX ADMIN — METOPROLOL TARTRATE 5 MG: 1 INJECTION, SOLUTION INTRAVENOUS at 18:30

## 2021-02-24 RX ADMIN — DILTIAZEM HYDROCHLORIDE 5 MG/HR: 5 INJECTION INTRAVENOUS at 22:37

## 2021-02-24 RX ADMIN — Medication 10 ML: at 23:46

## 2021-02-24 RX ADMIN — METOPROLOL TARTRATE 12.5 MG: 25 TABLET, FILM COATED ORAL at 21:06

## 2021-02-24 RX ADMIN — AZITHROMYCIN MONOHYDRATE 500 MG: 500 TABLET ORAL at 21:06

## 2021-02-24 ASSESSMENT — ENCOUNTER SYMPTOMS
BACK PAIN: 0
ABDOMINAL PAIN: 0
NAUSEA: 0
SHORTNESS OF BREATH: 1
SORE THROAT: 0
VOMITING: 0
COUGH: 1

## 2021-02-24 ASSESSMENT — PAIN SCALES - GENERAL: PAINLEVEL_OUTOF10: 0

## 2021-02-24 NOTE — ED NOTES
Dr. Adry Garcia at bedside and verbal orders given to decrease the bolus of NS to 500 ml.       [address] [facility] Taylor, RN  02/24/21 2474

## 2021-02-24 NOTE — ED TRIAGE NOTES
Pt arrived to dept via private vehicle. Pt c/o SOB with a cough x 7 days. Pt diagnosed with Covid today. Pt with a hx of afib and hodgkins lymphoma. Pt received her most recent tx for hodgkins this morning. Pt SpO2 68% on room air upon arrival and placed on a nonrebreather mask which brought it up to 100%. Pt denies chest pain at this time. Pt awake, alert and oriented x 3. Skin warm and dry/normal color for ethnicity. Resp easy and unlabored. Pt placed in gown and on cardiac monitor. Call light in reach. Will continue to monitor.

## 2021-02-24 NOTE — ED NOTES
Bed: E-45  Expected date: 2/24/21  Expected time:   Means of arrival:   Comments:  [facility],   DR Martha Louis pt      Roger Sweeney RN  02/24/21 2796

## 2021-02-25 LAB
A/G RATIO: 1 (ref 1.1–2.2)
ABO/RH: NORMAL
ALBUMIN SERPL-MCNC: 3 G/DL (ref 3.4–5)
ALP BLD-CCNC: 206 U/L (ref 40–129)
ALT SERPL-CCNC: 45 U/L (ref 10–40)
ANION GAP SERPL CALCULATED.3IONS-SCNC: 12 MMOL/L (ref 3–16)
ANTIBODY SCREEN: NORMAL
AST SERPL-CCNC: 51 U/L (ref 15–37)
BASOPHILS ABSOLUTE: 0 K/UL (ref 0–0.2)
BASOPHILS RELATIVE PERCENT: 0.1 %
BILIRUB SERPL-MCNC: 0.6 MG/DL (ref 0–1)
BUN BLDV-MCNC: 15 MG/DL (ref 7–20)
CALCIUM SERPL-MCNC: 8.2 MG/DL (ref 8.3–10.6)
CHLORIDE BLD-SCNC: 105 MMOL/L (ref 99–110)
CO2: 22 MMOL/L (ref 21–32)
CREAT SERPL-MCNC: 0.9 MG/DL (ref 0.6–1.2)
EKG ATRIAL RATE: 156 BPM
EKG DIAGNOSIS: NORMAL
EKG Q-T INTERVAL: 286 MS
EKG QRS DURATION: 72 MS
EKG QTC CALCULATION (BAZETT): 453 MS
EKG R AXIS: 22 DEGREES
EKG T AXIS: -64 DEGREES
EKG VENTRICULAR RATE: 151 BPM
EOSINOPHILS ABSOLUTE: 0 K/UL (ref 0–0.6)
EOSINOPHILS RELATIVE PERCENT: 0 %
GFR AFRICAN AMERICAN: >60
GFR NON-AFRICAN AMERICAN: >60
GLOBULIN: 3 G/DL
GLUCOSE BLD-MCNC: 270 MG/DL (ref 70–99)
GLUCOSE BLD-MCNC: 298 MG/DL (ref 70–99)
GLUCOSE BLD-MCNC: 311 MG/DL (ref 70–99)
GLUCOSE BLD-MCNC: 314 MG/DL (ref 70–99)
GLUCOSE BLD-MCNC: 323 MG/DL (ref 70–99)
HCT VFR BLD CALC: 33.3 % (ref 36–48)
HEMOGLOBIN: 11.1 G/DL (ref 12–16)
L. PNEUMOPHILA SEROGP 1 UR AG: NORMAL
LYMPHOCYTES ABSOLUTE: 0.2 K/UL (ref 1–5.1)
LYMPHOCYTES RELATIVE PERCENT: 7.1 %
MAGNESIUM: 2 MG/DL (ref 1.8–2.4)
MCH RBC QN AUTO: 32.9 PG (ref 26–34)
MCHC RBC AUTO-ENTMCNC: 33.5 G/DL (ref 31–36)
MCV RBC AUTO: 98.4 FL (ref 80–100)
MONOCYTES ABSOLUTE: 0.1 K/UL (ref 0–1.3)
MONOCYTES RELATIVE PERCENT: 5.4 %
NEUTROPHILS ABSOLUTE: 2 K/UL (ref 1.7–7.7)
NEUTROPHILS RELATIVE PERCENT: 87.4 %
PDW BLD-RTO: 15.4 % (ref 12.4–15.4)
PERFORMED ON: ABNORMAL
PLATELET # BLD: 143 K/UL (ref 135–450)
PMV BLD AUTO: 9.5 FL (ref 5–10.5)
POTASSIUM REFLEX MAGNESIUM: 3.3 MMOL/L (ref 3.5–5.1)
POTASSIUM SERPL-SCNC: 3.5 MMOL/L (ref 3.5–5.1)
RBC # BLD: 3.38 M/UL (ref 4–5.2)
SODIUM BLD-SCNC: 139 MMOL/L (ref 136–145)
STREP PNEUMONIAE ANTIGEN, URINE: NORMAL
TOTAL PROTEIN: 6 G/DL (ref 6.4–8.2)
WBC # BLD: 2.3 K/UL (ref 4–11)

## 2021-02-25 PROCEDURE — 2700000000 HC OXYGEN THERAPY PER DAY

## 2021-02-25 PROCEDURE — 6370000000 HC RX 637 (ALT 250 FOR IP): Performed by: STUDENT IN AN ORGANIZED HEALTH CARE EDUCATION/TRAINING PROGRAM

## 2021-02-25 PROCEDURE — 80053 COMPREHEN METABOLIC PANEL: CPT

## 2021-02-25 PROCEDURE — 99255 IP/OBS CONSLTJ NEW/EST HI 80: CPT | Performed by: INTERNAL MEDICINE

## 2021-02-25 PROCEDURE — 2580000003 HC RX 258: Performed by: STUDENT IN AN ORGANIZED HEALTH CARE EDUCATION/TRAINING PROGRAM

## 2021-02-25 PROCEDURE — 6370000000 HC RX 637 (ALT 250 FOR IP): Performed by: NURSE PRACTITIONER

## 2021-02-25 PROCEDURE — 84132 ASSAY OF SERUM POTASSIUM: CPT

## 2021-02-25 PROCEDURE — 86901 BLOOD TYPING SEROLOGIC RH(D): CPT

## 2021-02-25 PROCEDURE — 6370000000 HC RX 637 (ALT 250 FOR IP): Performed by: INTERNAL MEDICINE

## 2021-02-25 PROCEDURE — 93010 ELECTROCARDIOGRAM REPORT: CPT | Performed by: INTERNAL MEDICINE

## 2021-02-25 PROCEDURE — 94640 AIRWAY INHALATION TREATMENT: CPT

## 2021-02-25 PROCEDURE — 2060000000 HC ICU INTERMEDIATE R&B

## 2021-02-25 PROCEDURE — 86900 BLOOD TYPING SEROLOGIC ABO: CPT

## 2021-02-25 PROCEDURE — 85025 COMPLETE CBC W/AUTO DIFF WBC: CPT

## 2021-02-25 PROCEDURE — U0003 INFECTIOUS AGENT DETECTION BY NUCLEIC ACID (DNA OR RNA); SEVERE ACUTE RESPIRATORY SYNDROME CORONAVIRUS 2 (SARS-COV-2) (CORONAVIRUS DISEASE [COVID-19]), AMPLIFIED PROBE TECHNIQUE, MAKING USE OF HIGH THROUGHPUT TECHNOLOGIES AS DESCRIBED BY CMS-2020-01-R: HCPCS

## 2021-02-25 PROCEDURE — 94761 N-INVAS EAR/PLS OXIMETRY MLT: CPT

## 2021-02-25 PROCEDURE — 83735 ASSAY OF MAGNESIUM: CPT

## 2021-02-25 PROCEDURE — 99253 IP/OBS CNSLTJ NEW/EST LOW 45: CPT | Performed by: NURSE PRACTITIONER

## 2021-02-25 PROCEDURE — XW13325 TRANSFUSION OF CONVALESCENT PLASMA (NONAUTOLOGOUS) INTO PERIPHERAL VEIN, PERCUTANEOUS APPROACH, NEW TECHNOLOGY GROUP 5: ICD-10-PCS | Performed by: INTERNAL MEDICINE

## 2021-02-25 PROCEDURE — 86850 RBC ANTIBODY SCREEN: CPT

## 2021-02-25 RX ORDER — POTASSIUM CHLORIDE 20 MEQ/1
40 TABLET, EXTENDED RELEASE ORAL PRN
Status: DISCONTINUED | OUTPATIENT
Start: 2021-02-25 | End: 2021-02-28 | Stop reason: HOSPADM

## 2021-02-25 RX ORDER — FLECAINIDE ACETATE 100 MG/1
50 TABLET ORAL EVERY 12 HOURS SCHEDULED
Status: DISCONTINUED | OUTPATIENT
Start: 2021-02-25 | End: 2021-02-25

## 2021-02-25 RX ORDER — SODIUM CHLORIDE 9 MG/ML
INJECTION, SOLUTION INTRAVENOUS PRN
Status: DISCONTINUED | OUTPATIENT
Start: 2021-02-25 | End: 2021-02-28 | Stop reason: HOSPADM

## 2021-02-25 RX ORDER — FLECAINIDE ACETATE 100 MG/1
100 TABLET ORAL EVERY 12 HOURS SCHEDULED
Status: DISCONTINUED | OUTPATIENT
Start: 2021-02-25 | End: 2021-02-28 | Stop reason: HOSPADM

## 2021-02-25 RX ORDER — FLECAINIDE ACETATE 50 MG/1
50 TABLET ORAL 2 TIMES DAILY
Status: ON HOLD | COMMUNITY
End: 2021-02-28 | Stop reason: HOSPADM

## 2021-02-25 RX ORDER — METOPROLOL SUCCINATE 25 MG/1
12.5 TABLET, EXTENDED RELEASE ORAL DAILY
COMMUNITY
End: 2021-03-25

## 2021-02-25 RX ORDER — POTASSIUM CHLORIDE 20 MEQ/1
40 TABLET, EXTENDED RELEASE ORAL ONCE
Status: COMPLETED | OUTPATIENT
Start: 2021-02-25 | End: 2021-02-25

## 2021-02-25 RX ORDER — METOPROLOL SUCCINATE 25 MG/1
12.5 TABLET, EXTENDED RELEASE ORAL DAILY
Status: DISCONTINUED | OUTPATIENT
Start: 2021-02-25 | End: 2021-02-28 | Stop reason: HOSPADM

## 2021-02-25 RX ORDER — INSULIN GLARGINE 100 [IU]/ML
30 INJECTION, SOLUTION SUBCUTANEOUS NIGHTLY
Status: DISCONTINUED | OUTPATIENT
Start: 2021-02-25 | End: 2021-02-28 | Stop reason: HOSPADM

## 2021-02-25 RX ORDER — IVERMECTIN 3 MG/1
200 TABLET ORAL ONCE
Status: COMPLETED | OUTPATIENT
Start: 2021-02-25 | End: 2021-02-25

## 2021-02-25 RX ORDER — POTASSIUM CHLORIDE 7.45 MG/ML
10 INJECTION INTRAVENOUS PRN
Status: DISCONTINUED | OUTPATIENT
Start: 2021-02-25 | End: 2021-02-28 | Stop reason: HOSPADM

## 2021-02-25 RX ADMIN — Medication 10 ML: at 20:39

## 2021-02-25 RX ADMIN — FLECAINIDE ACETATE 100 MG: 100 TABLET ORAL at 12:40

## 2021-02-25 RX ADMIN — ALBUTEROL SULFATE 2 PUFF: 90 AEROSOL, METERED RESPIRATORY (INHALATION) at 20:55

## 2021-02-25 RX ADMIN — Medication 10 ML: at 10:21

## 2021-02-25 RX ADMIN — ALBUTEROL SULFATE 2 PUFF: 90 AEROSOL, METERED RESPIRATORY (INHALATION) at 08:29

## 2021-02-25 RX ADMIN — IPRATROPIUM BROMIDE 2 PUFF: 17 AEROSOL, METERED RESPIRATORY (INHALATION) at 20:55

## 2021-02-25 RX ADMIN — IPRATROPIUM BROMIDE 2 PUFF: 17 AEROSOL, METERED RESPIRATORY (INHALATION) at 11:54

## 2021-02-25 RX ADMIN — ACYCLOVIR 400 MG: 200 CAPSULE ORAL at 10:20

## 2021-02-25 RX ADMIN — GUAIFENESIN SYRUP AND DEXTROMETHORPHAN 5 ML: 100; 10 SYRUP ORAL at 15:36

## 2021-02-25 RX ADMIN — IPRATROPIUM BROMIDE 2 PUFF: 17 AEROSOL, METERED RESPIRATORY (INHALATION) at 08:29

## 2021-02-25 RX ADMIN — APIXABAN 5 MG: 5 TABLET, FILM COATED ORAL at 10:20

## 2021-02-25 RX ADMIN — IPRATROPIUM BROMIDE 2 PUFF: 17 AEROSOL, METERED RESPIRATORY (INHALATION) at 16:12

## 2021-02-25 RX ADMIN — INSULIN LISPRO 4 UNITS: 100 INJECTION, SOLUTION INTRAVENOUS; SUBCUTANEOUS at 18:56

## 2021-02-25 RX ADMIN — DEXAMETHASONE 6 MG: 6 TABLET ORAL at 10:21

## 2021-02-25 RX ADMIN — ALBUTEROL SULFATE 2 PUFF: 90 AEROSOL, METERED RESPIRATORY (INHALATION) at 16:11

## 2021-02-25 RX ADMIN — ACYCLOVIR 400 MG: 200 CAPSULE ORAL at 15:33

## 2021-02-25 RX ADMIN — POTASSIUM CHLORIDE 40 MEQ: 1500 TABLET, EXTENDED RELEASE ORAL at 05:10

## 2021-02-25 RX ADMIN — GUAIFENESIN SYRUP AND DEXTROMETHORPHAN 5 ML: 100; 10 SYRUP ORAL at 03:47

## 2021-02-25 RX ADMIN — INSULIN LISPRO 4 UNITS: 100 INJECTION, SOLUTION INTRAVENOUS; SUBCUTANEOUS at 12:41

## 2021-02-25 RX ADMIN — INSULIN LISPRO 2 UNITS: 100 INJECTION, SOLUTION INTRAVENOUS; SUBCUTANEOUS at 22:08

## 2021-02-25 RX ADMIN — INSULIN LISPRO 3 UNITS: 100 INJECTION, SOLUTION INTRAVENOUS; SUBCUTANEOUS at 10:28

## 2021-02-25 RX ADMIN — ALBUTEROL SULFATE 2 PUFF: 90 AEROSOL, METERED RESPIRATORY (INHALATION) at 11:53

## 2021-02-25 RX ADMIN — IVERMECTIN 21 MG: 3 TABLET ORAL at 18:52

## 2021-02-25 RX ADMIN — GUAIFENESIN SYRUP AND DEXTROMETHORPHAN 5 ML: 100; 10 SYRUP ORAL at 10:21

## 2021-02-25 RX ADMIN — INSULIN LISPRO 8 UNITS: 100 INJECTION, SOLUTION INTRAVENOUS; SUBCUTANEOUS at 18:56

## 2021-02-25 RX ADMIN — FLECAINIDE ACETATE 100 MG: 100 TABLET ORAL at 20:39

## 2021-02-25 RX ADMIN — ACYCLOVIR 400 MG: 200 CAPSULE ORAL at 20:38

## 2021-02-25 RX ADMIN — INSULIN GLARGINE 30 UNITS: 100 INJECTION, SOLUTION SUBCUTANEOUS at 22:08

## 2021-02-25 RX ADMIN — APIXABAN 5 MG: 5 TABLET, FILM COATED ORAL at 20:39

## 2021-02-25 RX ADMIN — AZITHROMYCIN MONOHYDRATE 250 MG: 250 TABLET ORAL at 10:20

## 2021-02-25 RX ADMIN — METOPROLOL SUCCINATE 12.5 MG: 25 TABLET, EXTENDED RELEASE ORAL at 12:40

## 2021-02-25 ASSESSMENT — PAIN SCALES - GENERAL
PAINLEVEL_OUTOF10: 0
PAINLEVEL_OUTOF10: 0

## 2021-02-25 NOTE — H&P
Hospital Medicine History & Physical      PCP: Dianne Xiong MD    Date of Admission: 2/24/2021    Date of Service: Pt seen/examined on 2/24/2021 and Admitted to Inpatient     Chief Complaint: Fever, chills, shortness of breath, fatigue, exposure to Covid virus      History Of Present Illness: The patient is a 63 y.o. female with past medical history of atrial fibrillation that is very resistant to therapy and was recently taken off of her oral Toprol and flecainide and has apparently had multiple instances of ablation, type 2 diabetes, hypertension, non-Hodgkin's lymphoma to the abdomen for which she is receiving radiation treatment and just received a radiation treatment prior to arrival to the ED today who presents to [facility] with worsening shortness of breath as well as notable tachycardia and potentially worsening intermittent fever and chills and fatigue that initially started with fatigue since the past week or so. Patient notes that she has been tested multiple times for Covid but unfortunately that her  did test positive for Covid in addition to their daughter most previously, initially the daughter apparently did test positive for Covid number of weeks ago but apparently patient and her  were also attempting to do their best to avoid any further exposure to any of the family members. Patient was tested again on the 17th of this month, and unfortunately did test positive at that time but was unaware of the results until today. She has been receiving radiation treatments for non Hodgkin's lymphoma. It is noted that patient was found to be hypoxic into the 60s with some mild intermittent shortness of breath. Patient does also report some intermittent diarrhea since she has been feeling ill potentially related to Covid virus.   Patient otherwise denies any other recent symptoms of syncope, dizziness, abdominal pain/nausea/vomiting, blood in urine/stool/sputum. Past Medical History:        Diagnosis Date    Atrial fibrillation with RVR ([facility]     Cancer ([facility]     Diabetes mellitus type 2, controlled ([facility]     Hypertension     Melanoma in situ of back [facility])     dr Kirstie Haney Precordial pain 2016       Past Surgical History:        Procedure Laterality Date    BONE MARROW TRANSPLANT      BREAST CYST ASPIRATION      in past benign bilateral over years. 2 episodes     SECTION      CHOLECYSTECTOMY      COLONOSCOPY  9/10/12    polyp, su, repeat 5 years    COLONOSCOPY  10/09/2017    dr Yang Dennis and check in 5 years. polyps    ENDOMETRIAL ABLATION      FINE NEEDLE ASPIRATION      GALLBLADDER SURGERY      OTHER SURGICAL HISTORY  2019    Abdominal Mass BX    OTHER SURGICAL HISTORY  2019    CT BIOPSY ABDOMEN RETROPERITONEUM,    OVARY REMOVAL Left 2018    benign growth, dr Leida Borges at [facility] [facility]. TUNNELED VENOUS PORT PLACEMENT Right 2016    Dr. No Robledo       Medications Prior to Admission:    Prior to Admission medications    Medication Sig Start Date End Date Taking? Authorizing Provider   glimepiride (AMARYL) 2 MG tablet Take 2 tablets by mouth 2 times daily (with meals) 21  Yes Trung Kaur MD   insulin detemir (LEVEMIR FLEXTOUCH) 100 UNIT/ML injection pen Inject 30 Units into the skin nightly 21  Yes Trung Kaur MD   Insulin Pen Needle 31G X 6 MM MISC 1 each by Does not apply route daily 20  Yes Trung Kaur MD   metFORMIN (GLUCOPHAGE-XR) 500 MG extended release tablet Take 2 tablets by mouth 2 times daily (with meals) 20  Yes Trung Kaur MD   apixaban (ELIQUIS) 5 MG TABS tablet Take 1 tablet by mouth 2 times daily 10/20/20  Yes Marco Cuba MD   furosemide (LASIX) 20 MG tablet Take 1 tablet by mouth daily .  May take an additional dose as bilaterally, some minimal crackles bilaterally to the bases  Heart: Tachycardic irregularly irregular heart rate, no murmurs detected  Abdomen: Soft, non-tender or non-distended without rigidity or guarding and positive bowel sounds all four quadrants. Extremities: No clubbing, cyanosis, or edema bilaterally. Full range of motion without deformity and normal gait intact. Skin: Skin color, texture, turgor normal.  No rashes or lesions. Neurologic: Alert and oriented X 3, neurovascularly intact with sensory/motor intact upper extremities/lower extremities, bilaterally. Cranial nerves: II-XII intact, grossly non-focal.  Mental status: Alert, oriented, thought content appropriate. Capillary Refill: Acceptable  < 3 seconds  Peripheral Pulses: +3 Easily felt, not easily obliterated with pressure    CT chest pulmonary embolism with IV contrast:  1. No acute pulmonary embolus. 2. Airspace opacities throughout the lungs compatible with the provided   history of viral pneumonia. CBC   Recent Labs     02/24/21 1755 02/25/21  0400   WBC 4.6 2.3*   HGB 11.8* 11.1*   HCT 34.4* 33.3*    143      RENAL  Recent Labs     02/24/21 1755 02/25/21  0400    139   K 3.1* 3.3*    105   CO2 23 22   BUN 14 15   CREATININE 1.0 0.9     LFT'S  Recent Labs     02/24/21 1755 02/25/21  0400   AST 75* 51*   ALT 48* 45*   BILITOT 0.9 0.6   ALKPHOS 240* 206*     COAG  No results for input(s): INR in the last 72 hours.   CARDIAC ENZYMES  Recent Labs     02/24/21 1755   TROPONINI <0.01       U/A:    Lab Results   Component Value Date    COLORU YELLOW 04/17/2020    WBCUA 30 04/17/2020    WBCUA >50 08/08/2016    RBCUA 1 04/17/2020    RBCUA 30-49 08/08/2016    MUCUS 2+ 08/08/2016    BACTERIA 4+ 04/17/2020    CLARITYU Clear 04/17/2020    SPECGRAV 1.008 04/17/2020    LEUKOCYTESUR SMALL 04/17/2020    BLOODU Negative 04/17/2020    GLUCOSEU Negative 04/17/2020       ABG  No results found for: MBS1UJE, BEART, O2NMNXKV, PHART, THGBART, VGQ3UEL, PO2ART, [place]        Active Hospital Problems    Diagnosis Date Noted    Pneumonia due to COVID-19 virus [U07.1, J12.82] 02/24/2021     Priority: High    Pneumonia [J18.9] 02/24/2021     Priority: High    Sepsis ([address] [A41.9] 04/18/2020     Priority: High    Atrial fibrillation with RVR ([address] [I48.91] 10/31/2018     Priority: Medium    NHL (non-Hodgkin's lymphoma) ([address] [facility] [C85.90] 11/21/2019    ZOE (obstructive sleep apnea) [G47.33]     Hyperlipidemia [E78.5] 06/10/2016    Type 2 diabetes mellitus without complication ([facility] [address] [facility] [Y78.5] 03/28/2016    Essential hypertension [I10] 01/07/2016         PHYSICIANS CERTIFICATION:    I certify that Carol Cortez is expected to be hospitalized for greater than 2 midnights based on the following assessment and plan:      ASSESSMENT/PLAN:  · Initially patient was given metoprolol to control her heart rate and A. fib RVR, eventually had to start patient on a Cardizem drip but throughout the night patient was able to wean off the Cardizem drip and be put back on metoprolol  · Treating patient for suspected Covid pneumonia as well as bacterial pneumonia, started on Rocephin and Zithromax as well as starting patient on Decadron therapy  · Sputum culture as well as strep and Legionella antigens are obtained and pending  · Restarting patient's home medications, patient is also noted to be taking valacyclovir for shingles prophylaxis due to her being unable to obtain the shingles vaccine and because she is immunocompromise due to her non-Hodgkin's lymphoma  · Uncertain as to patient's current treatment regimen for atrial fibrillation other than her anticoagulation, it is reported per the patient that she was recently taken off of her home oral metoprolol and flecainide by her cardiologist  · Cardiology consult for further management of atrial fibrillation that is likely very resistant to treatment  · Infectious disease consult  · Discussion had with

## 2021-02-25 NOTE — CONSULTS
Cardiac Electrophysiology Consultation     Date: 2021  Admit Date:  2021  Admission Diagnosis: Pneumonia [J18.9]     Reason for Consultation: A flutter with RVR  Consult Requesting Physician: Linda Travis DO       History of Present Illness  Francisco Trujillo is a 63y.o. year old female with past medical history significant for paroxysmal atrial fibrillation, NHL, ZOE on CPAP, DM and HTN. She underwent EP study, radiofrequency ablation of atrial fibrillation with pulmonary vein isolation, ablation on left atrium roof for roof-dependent left atrial flutter and mitral isthmus ablation (for L flutter) on 10/28/20 with Dr. Alisa Crocker. She was seen in the office by me on 21 and her flecainide and Toprol were stopped as she was outside the 90 day blanking period. A 30 day cardiac monitor was placed and the following day she had an episode of A fib with RVR. I spoke to her that day and she was advised to resume flecainide and Toprol. She had plans to see Dr. Alisa Crocker in the office to then discuss possible repeat ablation. She then tested positive for Covid last week. She then presented to the ED yesterday with fever, chills and SOB. She was significantly hypoxic and also noted to be in A flutter with RVR. She was on a Cardizem drip for a short time and did convert spontaneously overnight. Past Medical History:   Diagnosis Date    Atrial fibrillation with RVR ([address]     Cancer ([facility]     Diabetes mellitus type 2, controlled ([facility]     Hypertension     Melanoma in situ of back [facility])     dr Dorcas Samano Precordial pain 2016        Past Surgical History:   Procedure Laterality Date    BONE MARROW TRANSPLANT      BREAST CYST ASPIRATION      in past benign bilateral over years. 2 episodes     SECTION      CHOLECYSTECTOMY      COLONOSCOPY  9/10/12    polyp, sharlene, repeat 5 years    COLONOSCOPY  10/09/2017    dr Haily Skinner and check in 5 years.  polyps    ENDOMETRIAL ABLATION      FINE NEEDLE ASPIRATION      GALLBLADDER SURGERY      OTHER SURGICAL HISTORY  08/05/2019    Abdominal Mass BX    OTHER SURGICAL HISTORY  08/05/2019    CT BIOPSY ABDOMEN RETROPERITONEUM,    OVARY REMOVAL Left 01/19/2018    benign growth, dr Bonilla Calvillo at [place] [id number]. TUNNELED VENOUS PORT PLACEMENT Right 02/26/2016    Dr. Jamey Hill       Current Outpatient Medications   Medication Instructions    acetaminophen (TYLENOL) 500 mg, Oral, EVERY 6 HOURS PRN    apixaban (ELIQUIS) 5 mg, Oral, 2 TIMES DAILY    furosemide (LASIX) 20 mg, Oral, DAILY, . May take an additional dose as needed for shortness of breath, weight gain and edema    glimepiride (AMARYL) 4 mg, Oral, 2 TIMES DAILY WITH MEALS    Insulin Pen Needle 31G X 6 MM MISC 1 each, Does not apply, DAILY    Levemir FlexTouch 30 Units, Subcutaneous, NIGHTLY    loratadine (CLARITIN) 10 mg, Oral, PRN    LORazepam (ATIVAN) 1 mg, Oral, EVERY 6 HOURS PRN    metFORMIN (GLUCOPHAGE-XR) 1,000 mg, Oral, 2 TIMES DAILY WITH MEALS    potassium chloride (KLOR-CON M) 10 MEQ extended release tablet 20 mEq, Oral, 2 TIMES DAILY    valACYclovir (VALTREX) 500 mg, Oral, 2 TIMES DAILY        Allergies   Allergen Reactions    Penicillins Hives       Social History:   reports that she quit smoking about 5 years ago. Her smoking use included cigarettes. She started smoking about 46 years ago. She has a 0.80 pack-year smoking history. She has never used smokeless tobacco. She reports previous alcohol use. She reports that she does not use drugs. Family History:  family history includes Cancer in her father and mother; High Blood Pressure in her father and mother.      Review of Systems:  · General: positive for fever, chills   · Ophthalmic ROS: negative for eye pain or loss of vision  · ENT ROS: negative for headaches, sore throat, nasal drainage  · Respiratory: positive for SOB, negative for cough, sputum  · Cardiovascular: negative for chest pain, palpitations.   · Gastrointestinal: negative for abdominal pain, diarrhea, N/V  · Hematology: negative for bleeding, blood clots, bruising or jaundice  · Genito-Urinary:  negative for dysuria or incontinence  · Musculoskeletal: negative for joint swelling, muscle pain  · Neurological: negative for confusion, dizziness, headaches   · Psychiatric: negative anxiety, depression  · Dermatological: negative for rash    Medications:  Scheduled Meds:   flecainide  50 mg Oral 2 times per day    metoprolol succinate  12.5 mg Oral Daily    cefTRIAXone (ROCEPHIN) IV  1,000 mg Intravenous Q24H    sodium chloride flush  10 mL Intravenous 2 times per day    apixaban  5 mg Oral BID    acyclovir  400 mg Oral TID    azithromycin  250 mg Oral Daily    dexamethasone  6 mg Oral Daily    albuterol sulfate HFA  2 puff Inhalation 4x daily    And    ipratropium  2 puff Inhalation 4x daily    insulin lispro  0-6 Units Subcutaneous TID WC    insulin lispro  0-3 Units Subcutaneous Nightly    insulin glargine  15 Units Subcutaneous Nightly      Continuous Infusions:   dilTIAZem Stopped (21 0113)    dextrose       PRN Meds:.albuterol sulfate HFA, sodium chloride flush, promethazine **OR** ondansetron, acetaminophen **OR** acetaminophen, LORazepam, guaiFENesin-dextromethorphan, glucose, dextrose, glucagon (rDNA), dextrose     Physical Examination:  Vitals:    21 0928   BP: (!) 161/73   Pulse:    Resp: 16   Temp: 97.5 °F (36.4 °C)   SpO2: 94%        Intake/Output Summary (Last 24 hours) at 2021 1037  Last data filed at 2021 0511  Gross per 24 hour   Intake 530 ml   Output 150 ml   Net 380 ml     In: 530 [P.O.:480]  Out: 150    Wt Readings from Last 3 Encounters:   21 231 lb 0.7 oz (104.8 kg)   21 234 lb (106.1 kg)   21 238 lb (108 kg)     Temp  Av.6 °F (37 °C)  Min: 97.4 °F (36.3 °C)  Max: 100.1 °F (37.8 °C)  Pulse  Av.2  Min: 76  Max: 158  BP  Min: 110/83  Max: 161/73  SpO2  Avg: 93.9 %  Min: 68 %  Max: 100 %    · Telemetry: Sinus rhythm in the 70s. · Constitutional: Alert, in no acute distress. Appears stated age. Limited as it was performed from the doorway due to positive Covid status. Labs:  Reviewed. Recent Labs     21  1755 21  0400    139   K 3.1* 3.3*    105   CO2 23 22   BUN 14 15   CREATININE 1.0 0.9     Recent Labs     21  1755 21  0400   WBC 4.6 2.3*   HGB 11.8* 11.1*   HCT 34.4* 33.3*   MCV 97.9 98.4    143     Lab Results   Component Value Date    TROPONINI <0.01 2021     No results found for: BNP  Lab Results   Component Value Date    PROTIME 14.1 2021    PROTIME 15.3 2021    PROTIME 15.0 2020    INR 1.21 2021    INR 1.32 2021    INR 1.29 2020     Lab Results   Component Value Date    CHOL 181 2020    HDL 54 2020    TRIG 136 2020       Diagnostic and imaging results reviewed. EC21  Atrial flutter at 151 BPM.     Echo: 10/18/19   Conclusions      Summary   Left ventricular cavity size is normal. There is mild left ventricular   hypertrophy. Overall left ventricular systolic function appears normal with   an ejection fraction of 50-55%. No regional wall motion abnormalities are   noted. Indeterminate diastolic function.   Trivial mitral, aortic, pulmonic, and tricuspid regurgitation.   Estimated pulmonary artery systolic pressure is at 31 mmHg assuming a right   atrial pressure of 3 mmHg.   Normal Global strain -24.1%.     Stress: 1/15/16        Conclusions          Summary     Pharmacological Stress/MPI Results:          1. Technically a satisfactory study.     2. Normal pharmacological stress portion of the study.     3. No evidence of Ischemia by Myocardial Perfusion Imaging.     4. Gated Study shows normal LV size and Systolic function; EF is >54 %.       Procedures:  1. Unsuccessful DCCV attempt x 3 10/31/18  2. Successful DCCV on 18  3.  RFA for A fib with PVI, roof line ablation (for L flutter), mitral isthmus ablation (for L flutter), 10/28/20 Dr. Ruth Ann Wolff:     Left atrial flutter   - seen on EKG on 2/24/21, v-rates in the 150s             - seen during EP study s/p roof line and mitral isthmus ablations 10/28/20   - flecainide and Toprol were stopped on 2/2/21, she had A fib on 2/3 and these medications were resumed at that time   - increase flecainide to 100mg BID, continue Toprol and Eliquis    - needs to see Dr. Sandra Wolff as an outpatient to discuss possible repeat abaltion    Paroxysmal atrial fibrillation             - s/p PVI ablation 10/28/20 with Dr. Sandra Wolff             - CHADS2-VASc 3 (gender, HTN, DM) on Eliquis 5mg BID               Covid pneumonia   - care per ID    Acute hypoxic respiratory failure   - secondary to above   - care per Hospitalist    Essential HTN               - BP high this morning, will monitor      Discussed with Dr. Sandra Wolff.     ALEXUS Ny  [facility] [address]  Phone: [phone]  Fax: [phone]    Electronically signed by ALEXUS Kellogg - CNP on 2/25/2021 at 10:37 AM

## 2021-02-25 NOTE — PROGRESS NOTES
2/25/2021 0816  Last data filed at 2/25/2021 0511  Gross per 24 hour   Intake 530 ml   Output 150 ml   Net 380 ml       Physical Exam Performed:    /81   Pulse 78   Temp 98.2 °F (36.8 °C) (Oral)   Resp 22   Ht 5' 3\" (1.6 m)   Wt 231 lb 0.7 oz (104.8 kg)   SpO2 96%   BMI 40.93 kg/m²     General appearance: No apparent distress, appears stated age and cooperative. HEENT: Pupils equal, round, and reactive to light. Conjunctivae/corneas clear. Neck: Supple, with full range of motion. No jugular venous distention. Trachea midline. Respiratory:  Normal respiratory effort. Clear to auscultation, bilaterally without Rales/Wheezes/Rhonchi. Cardiovascular: Regular rate and rhythm with normal S1/S2 without murmurs, rubs or gallops. Abdomen: Soft, non-tender, non-distended with normal bowel sounds. Musculoskeletal: No clubbing, cyanosis or edema bilaterally. Full range of motion without deformity. Skin: Skin color, texture, turgor normal.  No rashes or lesions. Neurologic:  Neurovascularly intact without any focal sensory/motor deficits. Cranial nerves: II-XII intact, grossly non-focal.  Psychiatric: Alert and oriented, thought content appropriate, normal insight  Capillary Refill: Brisk,< 3 seconds   Peripheral Pulses: +2 palpable, equal bilaterally       Labs:   Recent Labs     02/24/21 1755 02/25/21  0400   WBC 4.6 2.3*   HGB 11.8* 11.1*   HCT 34.4* 33.3*    143     Recent Labs     02/24/21 1755 02/25/21  0400    139   K 3.1* 3.3*    105   CO2 23 22   BUN 14 15   CREATININE 1.0 0.9   CALCIUM 8.8 8.2*     Recent Labs     02/24/21 1755 02/25/21  0400   AST 75* 51*   ALT 48* 45*   BILITOT 0.9 0.6   ALKPHOS 240* 206*     No results for input(s): INR in the last 72 hours.   Recent Labs     02/24/21 1755   TROPONINI <0.01       Urinalysis:      Lab Results   Component Value Date    NITRU POSITIVE 04/17/2020    WBCUA 30 04/17/2020    WBCUA >50 08/08/2016    BACTERIA 4+ 04/17/2020    RBCUA 1 04/17/2020    RBCUA 30-49 08/08/2016    BLOODU Negative 04/17/2020    SPECGRAV 1.008 04/17/2020    GLUCOSEU Negative 04/17/2020       Radiology:  CT CHEST PULMONARY EMBOLISM W CONTRAST   Preliminary Result   1. No acute pulmonary embolus. 2. Airspace opacities throughout the lungs compatible with the provided   history of viral pneumonia. XR CHEST PORTABLE   Final Result   The heart is borderline enlarged but unchanged. The pulmonary vessels are   engorged and ill-defined centrally. There are hazy perihilar and bibasilar   ground-glass and interstitial opacities. No effusion is seen. There is a   right Port-A-Cath in place which is unchanged. The bones are intact. RECOMMENDATION:   Stable cardiomegaly with mild pulmonary edema. Hazy perihilar and bibasilar opacities which may be related to the pulmonary   edema and/or infiltrates from pneumonia. Recommend short-term follow-up      Right Port-A-Cath unchanged in position.                  Assessment/Plan:    COVID-19 pneumonia  Decadron 6 mg daily for 10 days  Hold off on remdesivir 5 days  Hold off on convalescent plasma  Eliquis for anticoagulation  Ivermectin given on February 25    Acute respiratory failure with hypoxia  Saturating 68 on room air  Titrate oxygen to keep saturations above 90%  Likely secondary to COVID-19 pneumonia  Currently requiring 2 L    Atrial fibrillation with RVR   continue home medications of Flecainide and metoprolol  Eliquis    Hypertension  Continue home medications    Diabetes mellitus type 2  Hold home medications  Lantus and sliding scale  Carb controlled diet      DVT Prophylaxis: Eliquis  Diet: DIET LOW SODIUM 2 GM; Carb Control: 4 carb choices (60 gms)/meal  Code Status: Full Code    PT/OT Eval Status: Not applicable    Dispo -inpatient     Holley Valentino MD

## 2021-02-25 NOTE — ACP (ADVANCE CARE PLANNING)
Advance Care Planning     Advance Care Planning Activator (Inpatient)  Conversation Note      Date of ACP Conversation: 2/25/2021    Conversation Conducted with: Patient with Decision Making Capacity    ACP Activator: C/ Viviana 29 Decision Maker:     Current Designated Health Care Decision Maker: Today we documented Decision Maker(s) consistent with Legal Next of Kin hierarchy. Primary decision maker  -  Noel Hough  -  Primary Phone: [phone]; Home Phone: [phone]    Care Preferences    Ventilation: \"If you were in your present state of health and suddenly became very ill and were unable to breathe on your own, what would your preference be about the use of a ventilator (breathing machine) if it were available to you? \"      Would the patient desire the use of ventilator (breathing machine)?: yes    \"If your health worsens and it becomes clear that your chance of recovery is unlikely, what would your preference be about the use of a ventilator (breathing machine) if it were available to you? \"     Would the patient desire the use of ventilator (breathing machine)?: Yes      Resuscitation  \"CPR works best to restart the heart when there is a sudden event, like a heart attack, in someone who is otherwise healthy. Unfortunately, CPR does not typically restart the heart for people who have serious health conditions or who are very sick. \"    \"In the event your heart stopped as a result of an underlying serious health condition, would you want attempts to be made to restart your heart (answer \"yes\" for attempt to resuscitate) or would you prefer a natural death (answer \"no\" for do not attempt to resuscitate)? \" yes       [] Yes   [x] No   Educated Patient / Vicci Ores regarding differences between Advance Directives and portable DNR orders.     Length of ACP Conversation in minutes:  10    Conversation Outcomes:  [x] ACP discussion completed  [] Existing advance directive reviewed with patient; no changes to patient's previously recorded wishes  [] New Advance Directive completed  [] Portable Do Not Rescitate prepared for Provider review and signature  [] POLST/POST/MOLST/MOST prepared for Provider review and signature      Follow-up plan:    [] Schedule follow-up conversation to continue planning  [] Referred individual to Provider for additional questions/concerns   [] Advised patient/agent/surrogate to review completed ACP document and update if needed with changes in condition, patient preferences or care setting    [x] This note routed to one or more involved healthcare providers       EDGAR Menard, SHANNANW, Social Work/Case Management   [phone]  Electronically signed by EDGAR Menard, JALYN on 2/25/2021 at 3:59 PM

## 2021-02-25 NOTE — PLAN OF CARE
Problem: Falls - Risk of:  Goal: Will remain free from falls  Description: Will remain free from falls  2/25/2021 0943 by Lane Lambert RN  Outcome: Ongoing  2/25/2021 0206 by Lizette Unger RN  Outcome: Ongoing  Goal: Absence of physical injury  Description: Absence of physical injury  2/25/2021 0943 by Lane Lambert RN  Outcome: Ongoing  2/25/2021 0206 by Lizette Unger RN  Outcome: Ongoing     Problem: Infection:  Goal: Will remain free from infection  Description: Will remain free from infection  Outcome: Ongoing     Problem: Safety:  Goal: Free from accidental physical injury  Description: Free from accidental physical injury  Outcome: Ongoing  Goal: Free from intentional harm  Description: Free from intentional harm  Outcome: Ongoing     Problem: Daily Care:  Goal: Daily care needs are met  Description: Daily care needs are met  Outcome: Ongoing     Problem: Pain:  Goal: Patient's pain/discomfort is manageable  Description: Patient's pain/discomfort is manageable  Outcome: Ongoing     Problem: Skin Integrity:  Goal: Skin integrity will stabilize  Description: Skin integrity will stabilize  Outcome: Ongoing     Problem: Discharge Planning:  Goal: Patients continuum of care needs are met  Description: Patients continuum of care needs are met  Outcome: Ongoing     Problem: Nutrition  Goal: Optimal nutrition therapy  2/25/2021 0943 by Lane Lambert RN  Outcome: Ongoing  2/25/2021 0935 by Carlos Gomez RD, LD  Outcome: Ongoing

## 2021-02-25 NOTE — CONSULTS
The NP's WILLARD Beyer NP) documentation has been prepared under my direction and personally reviewed by me in its entirety. I confirm that the consultation note created by the NP accurately reflects all work, physical examination, the discussion of treatments and procedures, and medical decision making by the NP. In addition, I have personally met with; performed a physical examination on; discussed the diagnosis (-es), treatment options including procedures, and formulated medical decisions for this patient. In brief, 61yo female with previous persistent atrial fibrillation s/p ablation in 2020 who now presents with left atrial flutter. Now in sinus. Would increase Flecainide, continue metoprolol, continue [facility]. Will arrange for her to see me in clinic after hospital discharge to discuss re-ablation. Please refer to the NP's consult note for full details on the assessment and plan. Thank you for allowing us to participate in the care of your patient. If you have any questions, please do not hesitate to contact us.      Leighann Manrique MD, MS, [facility]  Cardiac Electrophysiology  [address]  [address] [facility], [address] [place] [address]  [facility] [address]  [phone]

## 2021-02-25 NOTE — PROGRESS NOTES
Comprehensive Nutrition Assessment    Type and Reason for Visit:  Positive Nutrition Screen(MST2)    Nutrition Recommendations/Plan:   Continue Cardiac, Carb control diet. Monitor need for ONS. Placed Carb control diet handouts in D/C instructions. Nutrition Assessment:  + nutrition screen. Pt presented with SOB, fever, and fatigue. Pt found to be COVID+ and in isolation. Pt with slight wt loss about 10# over the past year. UBW= 235-240#. Currently on Cardiac, Carb control diet with no intakes documented at this time. Will monitor PO intakes and need for ONS. Malnutrition Assessment:  Malnutrition Status:  Insufficient data      Estimated Daily Nutrient Needs:  Energy (kcal):  832-1456 kcal (8-14 kcal/kg CBW 104kg)  Protein (g):  105 gm (2gm/kg CBW 104kg)  Fluid (ml/day):  1 mL/kcal    Nutrition Related Findings:  +1BLE edema; BM 2/24; Glu 270, K+ hi      Wounds:  None       Current Nutrition Therapies:    DIET CARDIAC; Carb Control: 4 carb choices (60 gms)/meal    Anthropometric Measures:  · Height: 5' 3\" (160 cm)  · Current Body Weight: 231 lb (104.8 kg)   · Admission Body Weight: 231 lb (104.8 kg)    · Usual Body Weight: (235-240#)     · Ideal Body Weight: 115 lbs; % Ideal Body Weight 200.9 %   · BMI: 40.9  · BMI Categories: Obese Class 3 (BMI 40.0 or greater)       Nutrition Diagnosis:   No nutrition diagnosis at this time(monitor intakes)    Nutrition Interventions:   Food and/or Nutrient Delivery:  Continue Current Diet  Nutrition Education/Counseling:  Education initiated(placed carb control diet edu in D/C instructions)   Coordination of Nutrition Care:  Continue to monitor while inpatient    Goals:  consume >50% of meals during admission       Nutrition Monitoring and Evaluation:   Food/Nutrient Intake Outcomes:  Food and Nutrient Intake  Physical Signs/Symptoms Outcomes:  Biochemical Data, Skin, Weight     Discharge Planning:     Too soon to determine     Electronically signed by Breezy SARAH Montague, TREVOR on 2/25/21 at 9:34 AM EST    Contact: [phone]

## 2021-02-25 NOTE — PROGRESS NOTES
Medication Reconciliation    List of medications patient is currently taking is complete. Source of information: 1. Conversation with patient over the phone                                      2. EPIC records      Allergies  Penicillins     Notes regarding home medications:   1. Patient was recently instructed to resume metoprolol XL 12.5 mg daily and flecainide 50 mg BID.  Added to list.  2. Patient is on chronic valacylovir 500 mg BID until she can get the shingles vaccine

## 2021-02-25 NOTE — ED NOTES
ED SBAR report provider to [place], [address] [facility]. Patient to be transported to Room [facility] via stretcher by RN. Patient transported with bedside cardiac monitor and with IV medications infusing. IV site clean, dry, and intact. MEWS score and pain assessed as 0/10 and documented. Patient's score on the OSBORN Fall scale reviewed with receiving RN. Updated patient on plan of care.        Sarah Dexter RN  02/24/21 0043

## 2021-02-25 NOTE — ED NOTES
Pt assisted to bedside commode. Pt with some difficulty ambulating and some SOB with ambulation.       [facility], RN  02/24/21 2050

## 2021-02-25 NOTE — DISCHARGE INSTR - DIET
 Good nutrition is important when healing from an illness, injury, or surgery. Follow any nutrition recommendations given to you during your hospital stay.  If you were given an oral nutrition supplement while in the hospital, continue to take this supplement at home. You can take it with meals, in-between meals, and/or before bedtime. These supplements can be purchased at most local grocery stores, pharmacies, and chain super-stores.  If you have any questions about your diet or nutrition, call the hospital and ask for the dietitian. Carbohydrate Counting & Diabetes    What is carbohydrate counting? Carbohydrate counting, also called carb counting, is a meal planning tool for people with type 1 or type 2 diabetes. Carbohydrate counting involves keeping track of the amount of carbohydrate in the foods you eat each day. Carbohydrates are one of the main nutrients found in food and drinks. Protein and fat are the other main nutrients. Carbohydrates include sugars, starches, and fiber. Carbohydrate counting can help you control your blood glucose, also called blood sugar, levels because carbohydrates affect your blood glucose more than other nutrients. Healthy carbohydrates, such as whole grains, fruits, and vegetables, are an important part of a healthy eating plan because they can provide both energy and nutrients, such as vitamins and minerals, and fiber. Fiber can help you prevent constipation, lower your cholesterol levels, and control your weight. Unhealthy carbohydrates are often food and drinks with added sugars. Although unhealthy carbohydrates can also provide energy, they have little to no nutrients. The amount of carbohydrate in foods is measured in grams.  To count grams of carbohydrate in foods you eat, youll need to    ; know which foods contain carbohydrates  ; learn to estimate the number of grams of carbohydrate in the foods you eat  ; add up the number of grams of foods (such as soybean burgers) each day. Choose low-fat sources of protein, such as lean beef, lean pork, chicken, fish, low-fat cheese, or vegetarian foods such as soy.  Eat some healthy fats, such as olive oil, canola oil, and nuts.  Eat very little saturated fats. These unhealthy fats are found in butter, cream, and high-fat meats, such as varma and sausage.  Eat very little or no trans fats. These unhealthy fats are found in all foods that list partially hydrogenated oil as an ingredient. Label Reading Tips  The Nutrition Facts panel on a label lists the grams of total carbohydrate in 1 standard serving. The labels standard serving may be larger or smaller than 1 carbohydrate serving. To figure out how many carbohydrate servings are in the food:   Look first at the labels standard serving size.  Then check the grams of total carbohydrate. This is the amount of carbohydrate in 1 standard  serving.  Divide the grams of total carbohydrate by 15. This number equals the number of  carbohydrate servings in 1 standard serving. Remember: 1 carbohydrate serving is 15 grams  of carbohydrate.  Note: You may ignore the grams of sugars on the Nutrition Facts panel because they are  included in the grams of total carbohydrate.     Dietitian Office: [phone]

## 2021-02-25 NOTE — CONSULTS
Infectious Diseases Inpatient Consult Note      Reason for Consult:  COVID 19 Pneumonia and resp failure     Requesting Physician:       Primary Care Physician:  Dianne Xiong MD    History Obtained From:  [facility] and patient      CHIEF COMPLAINT:     Chief Complaint   Patient presents with    Cough     cough, possible fevers at home. cough onset 2/17/2021.  + covid test 2/19/2021. HISTORY OF PRESENT ILLNESS:  63 y.o. woman with past history of atrial fibrillation, diabetes, melanoma, BONE marrow transplantation, history of endometrial ablation external elbow surgery, history of abdominal mass biopsy in 2019, B cell lymphoma has a chest port in place ongoing radiation therapy. She completed chemotherapy for lymphoma. She is now admitted to hospital  With  cough shortness of breath on going from 2/19. She had family members positive COVID-19 was exposed and she was tested positive on 2/19/2021 with ongoing respiratory symptoms return to the hospital for further evaluation. She was hypoxic on admission requiring nasal cannula supplementation. Her last radiation was on 2/17. She has some base line Lft elevation and awaiting to see GI.  CT chest on admission multifocal pneumonia consistent with COVID-19. Admission labs indicate creatinine of 0.8 lactic acid 3.2 on admit , WBC 2.3 with ongoing leukocytopenia, elevated blood glucose, procalcitonin 0.22. She was also noted to be in rapid atrial fibrillation given ongoing respiratory distress from suspected COVID-19 infection we are consulted for recommendations.          Past Medical History:    Past Medical History:   Diagnosis Date    Atrial fibrillation with RVR ([address]     Cancer [facility]     Diabetes mellitus type 2, controlled ([address]     Hypertension     Melanoma in situ of back [facility]) 2011    dr Mk Nevarez Precordial pain 1/8/2016       Past Surgical History:    Past Surgical History:   Procedure Laterality Date    BONE MARROW TRANSPLANT  BREAST CYST ASPIRATION      in past benign bilateral over years. 2 episodes     SECTION      CHOLECYSTECTOMY      COLONOSCOPY  9/10/12    polyp, su, repeat 5 years    COLONOSCOPY  10/09/2017    dr Willa Gowers and check in 5 years. polyps    ENDOMETRIAL ABLATION  2007    FINE NEEDLE ASPIRATION      GALLBLADDER SURGERY      OTHER SURGICAL HISTORY  2019    Abdominal Mass BX    OTHER SURGICAL HISTORY  2019    CT BIOPSY ABDOMEN RETROPERITONEUM,    OVARY REMOVAL Left 2018    benign growth, dr Cornelio P[facility]rez at [facility]. TUNNELED VENOUS PORT PLACEMENT Right 2016    Dr. María Cuello       Current Medications:    Outpatient Medications Marked as Taking for the 21 encounter [facility] Encounter)   Medication Sig Dispense Refill    glimepiride (AMARYL) 2 MG tablet Take 2 tablets by mouth 2 times daily (with meals) 360 tablet 1    insulin detemir (LEVEMIR FLEXTOUCH) 100 UNIT/ML injection pen Inject 30 Units into the skin nightly 5 pen 3    Insulin Pen Needle 31G X 6 MM MISC 1 each by Does not apply route daily 100 each 1    metFORMIN (GLUCOPHAGE-XR) 500 MG extended release tablet Take 2 tablets by mouth 2 times daily (with meals) 360 tablet 1    apixaban (ELIQUIS) 5 MG TABS tablet Take 1 tablet by mouth 2 times daily 180 tablet 3    furosemide (LASIX) 20 MG tablet Take 1 tablet by mouth daily .  May take an additional dose as needed for shortness of breath, weight gain and edema 180 tablet 0    acetaminophen (TYLENOL) 500 MG tablet Take 500 mg by mouth every 6 hours as needed for Pain      valACYclovir (VALTREX) 500 MG tablet Take 500 mg by mouth 2 times daily      loratadine (CLARITIN) 10 MG capsule Take 10 mg by mouth as needed          Allergies:  Penicillins    Immunizations :   Immunization History   Administered Date(s) Administered    Hepatitis A Adult ([place]) 2018, 2019    Influenza Vaccine, unspecified formulation 10/20/2016    Influenza Virus Vaccine 2019    Influenza, Zamora Acworth, IM, PF (6 mo and older Fluzone, Flulaval, Fluarix, and 3 yrs and older Afluria) 2018, 2020    Pneumococcal Conjugate 13-valent (Luisa Lento) 10/27/2017    Pneumococcal Polysaccharide ([id number]) 10/14/2016         Social History:    Social History     Tobacco Use    Smoking status: Former Smoker     Packs/day: 1.00     Years: 0.80     Pack years: 0.80     Types: Cigarettes     Start date: 1975     Quit date: 2016     Years since quittin.1    Smokeless tobacco: Never Used   Substance Use Topics    Alcohol use: Not Currently     Alcohol/week: 0.0 standard drinks    Drug use: Never     Social History     Tobacco Use   Smoking Status Former Smoker    Packs/day: 1.00    Years: 0.80    Pack years: 0.80    Types: Cigarettes    Start date: 1975   [facility] Quit date: 2016    Years since quittin.1   Smokeless Tobacco Never Used      Family History   Problem Relation Age of Onset    High Blood Pressure Mother     Cancer Mother         breast    High Blood Pressure Father     Cancer Father         lung         REVIEW OF SYSTEMS:     Constitutional:   fevers + , chills, night sweats  Eyes:  negative for blurred vision, eye discharge, visual disturbance   HEENT:  negative for hearing loss, ear drainage,nasal congestion  Respiratory:    cough +  shortness of breath + or hemoptysis   Cardiovascular:  negative for chest pain, palpitations, syncope  Gastrointestinal:  negative for nausea, vomiting, diarrhea, constipation, abdominal pain  Genitourinary:  negative for frequency, dysuria, urinary incontinence, hematuria  Hematologic/Lymphatic:  negative for easy bruising, bleeding and lymphadenopathy  Allergic/Immunologic:  negative for recurrent infections, angioedema, anaphylaxis   Endocrine:  negative for weight changes, polyuria, polydipsia and polyphagia  Musculoskeletal:  negative for joint  pain, swelling, decreased range of motion  Integumentary: No rashes, skin lesions  Neurological:  negative for headaches, slurred speech, unilateral weakness  Psychiatric: negative for hallucinations,confusion,agitation.      PHYSICAL EXAM:      Vitals:  T max  100.1   /81   Pulse 78   Temp 98.2 °F (36.8 °C) (Oral)   Resp 16   Ht 5' 3\" (1.6 m)   Wt 231 lb 0.7 oz (104.8 kg)   SpO2 94%   BMI 40.93 kg/m²     General Appearance: alert,in some acute distress, +  pallor, no icterus using nasal cannula  Skin: warm and dry, no rash or erythema  Head: normocephalic and atraumatic  Eyes: pupils equal, round, and reactive to light, conjunctivae normal  ENT: tympanic membrane, external ear and ear canal normal bilaterally, nose without deformity, nasal mucosa and turbinates normal without polyps  Neck: supple and non-tender without mass, no thyromegaly  no cervical lymphadenopathy  Pulmonary/Chest:  Bi BASAL crepts wheezes, rales or rhonchi, normal air movement, no respiratory distress  Cardiovascular: normal rate, regular rhythm, normal S1 and S2, no murmurs, rubs, clicks, or gallops, no carotid bruits  Abdomen: soft, non-tender, non-distended, normal bowel sounds, no masses or organomegaly  Radiation ink + silvestre   Extremities: no cyanosis, clubbing or edema  Musculoskeletal: normal range of motion, no joint swelling, deformity or tenderness  Integumentary: No rashes, no abnormal skin lesions, no petechiae  Neurologic: reflexes normal and symmetric, no cranial nerve deficit  Psych:  Orientation, sensorium, mood normal   Lines: chest port+     DATA:    CBC:   Lab Results   Component Value Date    WBC 2.3 (L) 02/25/2021    HGB 11.1 (L) 02/25/2021    HCT 33.3 (L) 02/25/2021    MCV 98.4 02/25/2021     02/25/2021     RENAL:   Lab Results   Component Value Date    CREATININE 0.9 02/25/2021    BUN 15 02/25/2021     02/25/2021    K 3.3 (L) 02/25/2021     02/25/2021    CO2 22 02/25/2021     SED RATE:   Lab Results   Component Value Date SEDRATE 13 12/29/2015     CK: No results found for: CKTOTAL  CRP:   Lab Results   Component Value Date    CRP 71.5 04/17/2020     Hepatic Function Panel:   Lab Results   Component Value Date    ALKPHOS 206 02/25/2021    ALT 45 02/25/2021    AST 51 02/25/2021    PROT 6.0 02/25/2021    PROT 6.5 08/21/2017    BILITOT 0.6 02/25/2021    BILIDIR <0.2 12/09/2019    IBILI see below 12/09/2019    LABALBU 3.0 02/25/2021     UA:  Lab Results   Component Value Date    COLORU YELLOW 04/17/2020    CLARITYU Clear 04/17/2020    GLUCOSEU Negative 04/17/2020    BILIRUBINUR Negative 04/17/2020    KETUA Negative 04/17/2020    SPECGRAV 1.008 04/17/2020    BLOODU Negative 04/17/2020    PHUR 5.0 04/17/2020    PROTEINU Negative 04/17/2020    UROBILINOGEN 0.2 04/17/2020    NITRU POSITIVE 04/17/2020    LEUKOCYTESUR SMALL 04/17/2020    LABMICR YES 04/17/2020    URINETYPE NotGiven 04/17/2020      Urine Microscopic:   Lab Results   Component Value Date    BACTERIA 4+ 04/17/2020    HYALCAST 0 04/17/2020    WBCUA 30 04/17/2020    WBCUA >50 08/08/2016    RBCUA 1 04/17/2020    RBCUA 30-49 08/08/2016    EPIU 0 04/17/2020     Urine Reflex to Culture: No results found for: URRFLXCULT    Procal 0.22    Lactic acid  3.2     MICRO: cultures reviewed and updated by me          Culture, Respiratory [[id number]] Collected: 02/24/21 2240   Order Status: Sent Specimen: Sputum Induced Updated: 02/25/21 1011    Gram Stain Result 2+ Epithelial Cells   No WBC's seen   4+ Gram positive cocci   3+ Gram negative rods   3+ Gram variable rods    Narrative:     ORDER#: [id number]                          ORDERED BY: Abiola Lantigua   SOURCE: Sputum Induced                     COLLECTED:  02/24/21 22:40   ANTIBIOTICS AT [facility]OCHIT[facility]. :                      RECEIVED :  02/24/21 22:52   Performed at:   [facility]   [address] [place] [address]   Phone [phone]   Strep Pneumoniae Antigen [[id number]] Collected: 02/24/21 6504 Order Status: Completed Specimen: Urine, clean catch Updated: 02/25/21 0836    STREP PNEUMONIAE ANTIGEN, URINE --    Presumptive Negative   Presumptive negative suggests no current or recent   pneumococcal infection. Infection due to Strep pneumoniae   cannot be ruled out since the antigen present in the sample   may be below the detection limit of the test.   Normal Range:Presumptive Negative    Narrative:     ORDER#: [id number]                          ORDERED BY: Khoi Dave   SOURCE: Urine Clean Catch                  COLLECTED:  02/24/21 22:10   ANTIBIOTICS AT XOCHIT[facility]. :                      RECEIVED :  02/24/21 22:52   Performed at:   [facility]   [address] [place] [address]   Phone [phone]   Legionella Antigen, Urine [[id number]] Collected: 02/24/21 2210   Order Status: Completed Specimen: Urine, clean catch Updated: 02/25/21 0835    L. pneumophila Serogp 1 Ur Ag --    Presumptive Negative   No Legionella pneumophila serogroup 1 antigens detected. A negative result does not exclude infection with   Legionella pneumophila serogroup 1 nor does it rule out   other microbial-caused respiratory infections or   disease caused by other serogroups of   Legionella pneumophila. Normal Range: Presumptive Negative    Narrative:     ORDER#: [id number]                          ORDERED BY: Khoi Dave   SOURCE: Urine Clean Catch                  COLLECTED:  02/24/21 22:10   ANTIBIOTICS AT [facility]IT[facility]. :                      RECEIVED :  02/24/21 22:52   Performed at:   [facility]   [address] [place], [employer] [address]   Phone [phone]   Culture, Blood 2 [[id number]] Collected: 02/24/21 2025   Order Status: Sent Specimen: Blood Updated: 02/24/21 2048   Culture, Blood 1 [[id number]] Collected: 02/24/21 2025   Order Status: Sent Specimen: Blood Updated: 02/24/21 2048         Blood Culture:   Lab Results   Component Value Date BC No Growth after 4 days of incubation. 04/17/2020    BLOODCULT2 No Growth after 4 days of incubation. 04/17/2020       Viral Culture:    Lab Results   Component Value Date    COVID19 NOT DETECTED 10/22/2020    COVID19 Not Detected 04/17/2020     Urine Culture: No results for input(s): Doll Hemp in the last 72 hours. Scheduled Meds:   flecainide  50 mg Oral 2 times per day    metoprolol succinate  12.5 mg Oral Daily    cefTRIAXone (ROCEPHIN) IV  1,000 mg Intravenous Q24H    sodium chloride flush  10 mL Intravenous 2 times per day    apixaban  5 mg Oral BID    acyclovir  400 mg Oral TID    azithromycin  250 mg Oral Daily    dexamethasone  6 mg Oral Daily    albuterol sulfate HFA  2 puff Inhalation 4x daily    And    ipratropium  2 puff Inhalation 4x daily    insulin lispro  0-6 Units Subcutaneous TID WC    insulin lispro  0-3 Units Subcutaneous Nightly    insulin glargine  15 Units Subcutaneous Nightly       Continuous Infusions:   dilTIAZem Stopped (02/25/21 0113)    dextrose         PRN Meds:  albuterol sulfate HFA, sodium chloride flush, promethazine **OR** ondansetron, acetaminophen **OR** acetaminophen, LORazepam, guaiFENesin-dextromethorphan, glucose, dextrose, glucagon (rDNA), dextrose    Imaging:   CT CHEST PULMONARY EMBOLISM W CONTRAST   Preliminary Result   1. No acute pulmonary embolus. 2. Airspace opacities throughout the lungs compatible with the provided   history of viral pneumonia. XR CHEST PORTABLE   Final Result   The heart is borderline enlarged but unchanged. The pulmonary vessels are   engorged and ill-defined centrally. There are hazy perihilar and bibasilar   ground-glass and interstitial opacities. No effusion is seen. There is a   right Port-A-Cath in place which is unchanged. The bones are intact. RECOMMENDATION:   Stable cardiomegaly with mild pulmonary edema.       Hazy perihilar and bibasilar opacities which may be related to the pulmonary   edema and/or infiltrates from pneumonia. Recommend short-term follow-up      Right Port-A-Cath unchanged in position. All pertinent images and reports for the current Hospitalization were reviewed by me. IMPRESSION:    Patient Active Problem List   Diagnosis    Essential hypertension    Left atrial flutter by electrocardiogram (HCC)    Small cell b-cell lymphoma, lymph nodes of multiple sites ([address]    Type 2 diabetes mellitus without complication ([address]    Hyperlipidemia    Cyst of left ovary    Endometrial thickening on ultrasound    Visit for monitoring Rituxan therapy    Degenerative disc disease, lumbar    Trochanteric bursitis of left hip    Atrial fibrillation with RVR (HCC)    ZOE (obstructive sleep apnea)    B-cell lymphoma (HCC)    Non-Hodgkin's lymphoma (HCC)    NHL (non-Hodgkin's lymphoma) (HCC)    PAF (paroxysmal atrial fibrillation) (HCC)    Sepsis ([address]    Upper respiratory infection    Pneumonia due to COVID-19 virus    Pneumonia     COVID-19 pneumonia  CT chest with multifocal pneumonia from COVID-19  Hypoxic respiratory failure  Non-Hodgkin's lymphoma  Ongoing abdominal radiation for lymphoma  History of bone marrow transplantation  Diabetes  Morbid obesity BMI 41  Atrial fibrillation  Obstructive sleep apnea  Lactic acidosis on admission  Leukopenia on admission  LFT elevation  On Acyclovir suppression   On Anticoagulation for A fib     She was on 9 L high flow cannula was able to wean down to 2 L now she is receiving steroids. Given the baseline LFT dysfunction we are holding off on IV remdesivir unless she has any further clinical decline. At this time discussed the options available including remdesivir and convalescent plasma.   Given the immunosuppressed state secondary to lymphoma as well as lymphocytopenia she could benefit from plasma therapy if she would consent for plasma we will go ahead and transfuse 1 unit of plasma  She is already typed and screened        Labs, Microbiology, Radiology and pertinent results from current hospitalization and care every where were reviewed by me as a part of the consultation. PLAN :  1. Cont Dexamethasone x 6 mg daily may increase the dose if any change in reps status  2. Trend Lfts and CBC with diff  3. Urine legionella and Pneumococcal antigen -ve  4. Convalescent plasma one unit   5. Holding IV Remdesivir given Lft elevation at base line and if any decline may be considered  6. Given her Lymphoma, radiation, Bone marrow transplantation she is at risk for progression and associated prolonged viremia    Discussed with patient/Family and Nursing d/w Dr.Fry Jennifer Antonio MD  [address] [place] [address] Physician  Phone: [phone]   Fax : [phone]      Thanks for allowing me to participate in your patient's care please call me with any questions or concerns.     Dr. Parker Appiah MD  [address] [place] [address] Physician  Phone: [phone]   Fax : [phone]

## 2021-02-25 NOTE — ED NOTES
Handoff report given by Swapna Romero RN. I will assume care at this time. Introduce myself to the patient, identification band inplace, stretcher in the lowest position for safety, and the call light is in reach. Updated patient on Emergency Department plan of care, no additional needs at this time, will continue to monitor patient.         Chaim Brannon RN  02/24/21 3004

## 2021-02-25 NOTE — ED NOTES
Pt taken off of non-rebreather mask and placed on high flow nasal cannula at 9 L/min with SpO2 at 99%.      [address] [facility] Taylor, RN  02/24/21 1945

## 2021-02-25 NOTE — CARE COORDINATION
INITIAL CASE MANAGEMENT ASSESSMENT    Reviewed chart, unable to meet with patient due to isolation status. Call to patient's room, spoke with patient over the phone to assess possible discharge needs. Explained Case Management role/services. Living Situation: Patient verified home address  Patient lives with  and daughter in a house. ADLs: Independent      DME: Cane   Patient currently on 4 liters of oxygen, no oxygen prior to admission. PT/OT Recs: Not ordered at this time      Active Services: No active services     Transportation: Patient is an active , family to transport at discharge. Medications: [address]    PCP: Mandi Bryant MD      HD/PD: N/A    PLAN/COMMENTS: Patient plans to return home at discharge. Patient reported no anticipated needs. ACP completed with patient. SW/CM provided contact information for patient or family to call with any questions. SW/CM will follow and assist as needed.      EDGAR Jimenes LSW, Social Work/Case Management   [phone]  Electronically signed by EDGAR Jimenes LSW on 2/25/2021 at 3:58 PM

## 2021-02-25 NOTE — ED PROVIDER NOTES
[facility]      Pt Name: Nikky Espino  MRN: [id number]  [place] 1955  Date of evaluation: 2/24/2021  Provider: Milla Morrison PA-C    This patient was seen and evaluated by the attending physician Willy Vicente MD.    [address] [place] [address]       Chief Complaint   Patient presents with    Cough     cough, possible fevers at home. cough onset 2/17/2021.  + covid test 2/19/2021. CRITICAL CARE TIME   Total Critical Care time was 45 minutes, excluding separately reportable procedures. There was a high probability of clinically significant/life threatening deterioration in the patient's condition which required my urgent intervention. HISTORYOF PRESENT ILLNESS  (Location/Symptom, Timing/Onset, Context/Setting, Quality, Duration, Modifying Factors, Severity.)   Nikky Espino is a 63 y.o. female with a past medical history significant for small cell B-cell lymphoma, hypertension, atrial fibrillation anticoagulated on Eliquis, hyperlipidemia who presents to the emergency department from radiology department for cough and shortness of breath. The patient has been ill since 2/17 with cough and shortness of breath and was tested for covid, found out it was positive today. She was getting radiation for lymphoma and they felt she looked \"terrible\" so sent her here for further evaluation. She has not had chest pain. She has had night sweats, fevers and chills as well as cough and congestion. She is not currently on steroids. She does not wear oxygen at home and has never required oxygen in the past. She was noted to be 68% on room air on arrival and in afib with RVR. Nursing Notes were reviewedand I agree. REVIEW OF SYSTEMS    (2-9 systems for level 4, 10 or more forlevel 5)     Review of Systems   Constitutional: Positive for chills, diaphoresis, fatigue and fever. HENT: Positive for congestion. Negative for sore throat. GLIMEPIRIDE (AMARYL) 2 MG TABLET    Take 2 tablets by mouth 2 times daily (with meals)    INSULIN DETEMIR (LEVEMIR FLEXTOUCH) 100 UNIT/ML INJECTION PEN    Inject 30 Units into the skin nightly    INSULIN PEN NEEDLE 31G X 6 MM MISC    1 each by Does not apply route daily    LORATADINE (CLARITIN) 10 MG CAPSULE    Take 10 mg by mouth as needed     LORAZEPAM (ATIVAN) 1 MG TABLET    Take 1 mg by mouth every 6 hours as needed for Anxiety. METFORMIN (GLUCOPHAGE-XR) 500 MG EXTENDED RELEASE TABLET    Take 2 tablets by mouth 2 times daily (with meals)    POTASSIUM CHLORIDE (KLOR-CON M) 10 MEQ EXTENDED RELEASE TABLET    Take 2 tablets by mouth 2 times daily    VALACYCLOVIR (VALTREX) 500 MG TABLET    Take 500 mg by mouth 2 times daily       ALLERGIES     Penicillins    FAMILY HISTORY           Problem Relation Age of Onset    High Blood Pressure Mother     Cancer Mother         breast    High Blood Pressure Father     Cancer Father         lung     Family Status   Relation Name Status    Mother      Father          SOCIAL HISTORY    reports that she quit smoking about 5 years ago. Her smoking use included cigarettes. She started smoking about 46 years ago. She has a 0.80 pack-year smoking history. She has never used smokeless tobacco. She reports previous alcohol use. She reports that she does not use drugs. PHYSICAL EXAM    (up to 7 for level 4, 8 or more for level 5)     ED Triage Vitals   BP Temp Temp Source Pulse Resp SpO2 Height Weight   21 1730 21 1700 21 1700 21 1700 21 1700 21 1700 21 1700 21 1700   (!) 117/98 100.1 °F (37.8 °C) Tympanic 158 20 (!) 68 % 5' 3\" (1.6 m) 231 lb 14.8 oz (105.2 kg)       Physical Exam  Vitals signs and nursing note reviewed. Constitutional:       General: She is not in acute distress. Appearance: She is well-developed. She is ill-appearing. HENT:      Head: Normocephalic and atraumatic.    Neck: Musculoskeletal: Neck supple. Cardiovascular:      Rate and Rhythm: Tachycardia present. Rhythm irregular. Pulmonary:      Effort: Pulmonary effort is normal. No respiratory distress. Breath sounds: Wheezing, rhonchi and rales present. Abdominal:      Tenderness: There is no abdominal tenderness. Musculoskeletal: Normal range of motion. Right lower leg: No edema. Left lower leg: No edema. Skin:     General: Skin is warm. Neurological:      Mental Status: She is alert and oriented to person, place, and time. Psychiatric:         Behavior: Behavior normal.            DIAGNOSTIC RESULTS     EK-lead EKG interpreted as afib with RVR. No ST elevation or depression on my review. Please see Dr. Lalito French note for full interpretation. RADIOLOGY:   Non-plain film images such as CT, Ultrasound and MRI are read by the radiologist.Plain radiographic images are visualized and preliminarily interpreted by Vee Tinsley PA-C with the below findings:        Interpretation per the Radiologist below, if available at the time of this note:    CT CHEST PULMONARY EMBOLISM W CONTRAST   Preliminary Result   1. No acute pulmonary embolus. 2. Airspace opacities throughout the lungs compatible with the provided   history of viral pneumonia. XR CHEST PORTABLE   Final Result   The heart is borderline enlarged but unchanged. The pulmonary vessels are   engorged and ill-defined centrally. There are hazy perihilar and bibasilar   ground-glass and interstitial opacities. No effusion is seen. There is a   right Port-A-Cath in place which is unchanged. The bones are intact. RECOMMENDATION:   Stable cardiomegaly with mild pulmonary edema. Hazy perihilar and bibasilar opacities which may be related to the pulmonary   edema and/or infiltrates from pneumonia. Recommend short-term follow-up      Right Port-A-Cath unchanged in position.              LABS:  Labs Reviewed   CBC WITH AUTO DIFFERENTIAL - Abnormal; Notable for the following components:       Result Value    RBC 3.52 (*)     Hemoglobin 11.8 (*)     Hematocrit 34.4 (*)     Lymphocytes Absolute 0.3 (*)     All other components within normal limits    Narrative:     Performed at:  [facility]  [address] [place] [facility] [place] [address]   Phone [phone]   COMPREHENSIVE METABOLIC PANEL W/ REFLEX TO MG FOR LOW K - Abnormal; Notable for the following components:    Potassium reflex Magnesium 3.1 (*)     Glucose 182 (*)     GFR Non-African American 56 (*)     Alkaline Phosphatase 240 (*)     ALT 48 (*)     AST 75 (*)     All other components within normal limits    Narrative:     Performed at:  [facility]  [address] [place] [facility] [place] [address]   Phone [phone]   LACTATE, SEPSIS - Abnormal; Notable for the following components:    Lactic Acid, Sepsis 3.2 (*)     All other components within normal limits    Narrative:     Performed at:  [facility]  [address], [place] [employer] [address]   Phone [phone]   BLOOD GAS, VENOUS - Abnormal; Notable for the following components:    pCO2, Jose 36.1 (*)     All other components within normal limits    Narrative:     Performed at:  [facility]  [address], [place] [employer] [address]   Phone [phone]   BRAIN NATRIURETIC PEPTIDE - Abnormal; Notable for the following components:    Pro- (*)     All other components within normal limits    Narrative:     Performed at:  [facility]  [address], [place] [employer] [address]   Phone [phone]   PROCALCITONIN - Abnormal; Notable for the following components:    Procalcitonin 0.22 (*)     All other components within normal limits    Narrative:     Performed at:  [facility]  [address] [place] [address] [facility] [place] [address]   Phone [phone]   CULTURE, BLOOD 2   CULTURE, BLOOD 1   TROPONIN    Narrative:     Performed at:  [facility]  [address] [place]  [address] [facility] [place] [address]   Phone [phone]   MAGNESIUM    Narrative:     Performed at:  [facility]  [address] [place] [facility] [address]   Phone [phone]   LACTATE, SEPSIS       All other labs were within normal range or not returned as of this dictation. EMERGENCY DEPARTMENT COURSE and DIFFERENTIAL DIAGNOSIS/MDM:   Vitals:    Vitals:    02/24/21 1700 02/24/21 1730 02/24/21 1745   BP:  (!) 117/98 123/80   Pulse: 158 158 130   Resp: 20 26 24   Temp: 100.1 °F (37.8 °C)     TempSrc: Tympanic     SpO2: (!) 68% 97% 94%   Weight: 231 lb 14.8 oz (105.2 kg)     Height: 5' 3\" (1.6 m)         I saw this patient with Dr. Abena Martinez who spent face-to-face time with the patient and agreed with my assessment and plan. The patient was quite ill-appearing on arrival, in atrial fibrillation with RVR with rates in the 160s and was hypoxic at 68% on room air. Surprisingly she really was not in that much respiratory distress. She did have a low-grade temperature 100.1. Patient was provided albuterol for wheezing as well as 20 mg IV Decadron. She was given a 500 cc fluid bolus as well as 5 mg IV Lopressor. On reassessment, the patient has stabilized significantly. She was weaned to high flow nasal cannula oxygen and is doing very well. Her heart rate had improved drastically and was in the 90s. However after she went to CAT scan and came back into the bed her heart rate jumped up again to the 140s. She does admit that she has a full bladder and this may be playing a role. She was placed on the pure wick by nursing staff and we will order her home dose of Lopressor orally. She may require another 5 mg IV push of metoprolol depending on what her heart rate does.   Her work-up reveals changes consistent with Covid pneumonia seen on chest CT. There is no pulmonary embolism. She has a potassium of 3.1, otherwise electrolytes were normal.  Renal function stable. Normal white blood cell count. Stable H&H. Troponin less than 0.01. . Initial lactate 3.2, this will be repeated. Discussed the case with the hospitalist.  She will be admitted for further inpatient management. PROCEDURES:  None    FINAL IMPRESSION      1. Pneumonia due to COVID-19 virus    2. Acute respiratory failure with hypoxia (HCC)    3. Atrial fibrillation with rapid ventricular response ([facility] [address]    4. Small B-cell lymphoma, unspecified body region ([facility] [address]    5. Septicemia [facility])          DISPOSITION/PLAN   DISPOSITION Decision To Admit 02/24/2021 08:08:15 PM      PATIENT REFERRED TO:  No follow-up provider specified.     MEDICATIONS:  New Prescriptions    No medications on file       (Please note that portions of this note were completed with a voice recognition program.  Efforts were made toedit the dictations but occasionally words are mis-transcribed.)    NAJMA Luna PA-C  02/24/21 2033

## 2021-02-25 NOTE — PROGRESS NOTES
4 Eyes Skin Assessment     NAME:  Jacob Reyna OF BIRTH:  1955  MEDICAL RECORD NUMBER:  [id number]    The patient is being assess for  Admission    I agree that 2 RN's have performed a thorough Head to Toe Skin Assessment on the patient. ALL assessment sites listed below have been assessed. Areas assessed by both nurses:    Head, Face, Ears, Shoulders, Back, Chest, Arms, Elbows, Hands, Sacrum. Buttock, Coccyx, Ischium and Legs. Feet and Heels        Does the Patient have a Wound?  No noted wound(s)       Mathieu Prevention initiated:  Yes   Wound Care Orders initiated:  No    Pressure Injury (Stage 3,4, Unstageable, DTI, NWPT, and Complex wounds) if present place consult order under [de-identified] No    New and Established Ostomies if present place consult order under : No      Nurse 1 eSignature: Electronically signed by Mary Mullen RN on 2/24/21 at 11:54 PM EST    **SHARE this note so that the co-signing nurse is able to place an eSignature**    Nurse 2 eSignature: Electronically signed by Gial King RN on 2/25/21 at 12:31 AM EST

## 2021-02-26 LAB
A/G RATIO: 1.1 (ref 1.1–2.2)
ALBUMIN SERPL-MCNC: 3.1 G/DL (ref 3.4–5)
ALP BLD-CCNC: 201 U/L (ref 40–129)
ALT SERPL-CCNC: 39 U/L (ref 10–40)
ANION GAP SERPL CALCULATED.3IONS-SCNC: 13 MMOL/L (ref 3–16)
AST SERPL-CCNC: 35 U/L (ref 15–37)
BASOPHILS ABSOLUTE: 0 K/UL (ref 0–0.2)
BASOPHILS RELATIVE PERCENT: 0 %
BILIRUB SERPL-MCNC: 0.5 MG/DL (ref 0–1)
BUN BLDV-MCNC: 22 MG/DL (ref 7–20)
CALCIUM SERPL-MCNC: 8.3 MG/DL (ref 8.3–10.6)
CHLORIDE BLD-SCNC: 105 MMOL/L (ref 99–110)
CO2: 23 MMOL/L (ref 21–32)
CREAT SERPL-MCNC: 0.8 MG/DL (ref 0.6–1.2)
CULTURE, RESPIRATORY: NORMAL
EOSINOPHILS ABSOLUTE: 0 K/UL (ref 0–0.6)
EOSINOPHILS RELATIVE PERCENT: 0 %
GFR AFRICAN AMERICAN: >60
GFR NON-AFRICAN AMERICAN: >60
GLOBULIN: 2.8 G/DL
GLUCOSE BLD-MCNC: 228 MG/DL (ref 70–99)
GLUCOSE BLD-MCNC: 234 MG/DL (ref 70–99)
GLUCOSE BLD-MCNC: 258 MG/DL (ref 70–99)
GLUCOSE BLD-MCNC: 265 MG/DL (ref 70–99)
GLUCOSE BLD-MCNC: 293 MG/DL (ref 70–99)
GRAM STAIN RESULT: NORMAL
HCT VFR BLD CALC: 31.9 % (ref 36–48)
HEMOGLOBIN: 10.8 G/DL (ref 12–16)
LYMPHOCYTES ABSOLUTE: 0.1 K/UL (ref 1–5.1)
LYMPHOCYTES RELATIVE PERCENT: 3 %
MAGNESIUM: 2.4 MG/DL (ref 1.8–2.4)
MCH RBC QN AUTO: 33.6 PG (ref 26–34)
MCHC RBC AUTO-ENTMCNC: 33.9 G/DL (ref 31–36)
MCV RBC AUTO: 99.1 FL (ref 80–100)
MONOCYTES ABSOLUTE: 0.3 K/UL (ref 0–1.3)
MONOCYTES RELATIVE PERCENT: 7.2 %
NEUTROPHILS ABSOLUTE: 4.2 K/UL (ref 1.7–7.7)
NEUTROPHILS RELATIVE PERCENT: 89.8 %
PDW BLD-RTO: 15.4 % (ref 12.4–15.4)
PERFORMED ON: ABNORMAL
PLATELET # BLD: 127 K/UL (ref 135–450)
PMV BLD AUTO: 9.6 FL (ref 5–10.5)
POTASSIUM REFLEX MAGNESIUM: 3.2 MMOL/L (ref 3.5–5.1)
RBC # BLD: 3.21 M/UL (ref 4–5.2)
SARS-COV-2: DETECTED
SODIUM BLD-SCNC: 141 MMOL/L (ref 136–145)
TOTAL PROTEIN: 5.9 G/DL (ref 6.4–8.2)
WBC # BLD: 4.7 K/UL (ref 4–11)

## 2021-02-26 PROCEDURE — 80053 COMPREHEN METABOLIC PANEL: CPT

## 2021-02-26 PROCEDURE — 2060000000 HC ICU INTERMEDIATE R&B

## 2021-02-26 PROCEDURE — 83735 ASSAY OF MAGNESIUM: CPT

## 2021-02-26 PROCEDURE — 2580000003 HC RX 258: Performed by: STUDENT IN AN ORGANIZED HEALTH CARE EDUCATION/TRAINING PROGRAM

## 2021-02-26 PROCEDURE — 2700000000 HC OXYGEN THERAPY PER DAY

## 2021-02-26 PROCEDURE — 6370000000 HC RX 637 (ALT 250 FOR IP): Performed by: NURSE PRACTITIONER

## 2021-02-26 PROCEDURE — 94761 N-INVAS EAR/PLS OXIMETRY MLT: CPT

## 2021-02-26 PROCEDURE — 6370000000 HC RX 637 (ALT 250 FOR IP): Performed by: STUDENT IN AN ORGANIZED HEALTH CARE EDUCATION/TRAINING PROGRAM

## 2021-02-26 PROCEDURE — 6370000000 HC RX 637 (ALT 250 FOR IP): Performed by: INTERNAL MEDICINE

## 2021-02-26 PROCEDURE — 94640 AIRWAY INHALATION TREATMENT: CPT

## 2021-02-26 PROCEDURE — 85025 COMPLETE CBC W/AUTO DIFF WBC: CPT

## 2021-02-26 RX ADMIN — Medication 10 ML: at 23:16

## 2021-02-26 RX ADMIN — ALBUTEROL SULFATE 2 PUFF: 90 AEROSOL, METERED RESPIRATORY (INHALATION) at 16:20

## 2021-02-26 RX ADMIN — POTASSIUM CHLORIDE 40 MEQ: 1500 TABLET, EXTENDED RELEASE ORAL at 18:34

## 2021-02-26 RX ADMIN — DEXAMETHASONE 6 MG: 6 TABLET ORAL at 09:59

## 2021-02-26 RX ADMIN — ALBUTEROL SULFATE 2 PUFF: 90 AEROSOL, METERED RESPIRATORY (INHALATION) at 08:57

## 2021-02-26 RX ADMIN — IPRATROPIUM BROMIDE 2 PUFF: 17 AEROSOL, METERED RESPIRATORY (INHALATION) at 12:11

## 2021-02-26 RX ADMIN — IPRATROPIUM BROMIDE 2 PUFF: 17 AEROSOL, METERED RESPIRATORY (INHALATION) at 08:57

## 2021-02-26 RX ADMIN — INSULIN LISPRO 2 UNITS: 100 INJECTION, SOLUTION INTRAVENOUS; SUBCUTANEOUS at 13:04

## 2021-02-26 RX ADMIN — FLECAINIDE ACETATE 100 MG: 100 TABLET ORAL at 23:16

## 2021-02-26 RX ADMIN — METOPROLOL SUCCINATE 12.5 MG: 25 TABLET, EXTENDED RELEASE ORAL at 09:59

## 2021-02-26 RX ADMIN — INSULIN LISPRO 2 UNITS: 100 INJECTION, SOLUTION INTRAVENOUS; SUBCUTANEOUS at 23:17

## 2021-02-26 RX ADMIN — Medication 10 ML: at 10:02

## 2021-02-26 RX ADMIN — INSULIN LISPRO 8 UNITS: 100 INJECTION, SOLUTION INTRAVENOUS; SUBCUTANEOUS at 10:07

## 2021-02-26 RX ADMIN — GUAIFENESIN SYRUP AND DEXTROMETHORPHAN 5 ML: 100; 10 SYRUP ORAL at 01:13

## 2021-02-26 RX ADMIN — INSULIN LISPRO 2 UNITS: 100 INJECTION, SOLUTION INTRAVENOUS; SUBCUTANEOUS at 10:08

## 2021-02-26 RX ADMIN — ACYCLOVIR 400 MG: 200 CAPSULE ORAL at 15:40

## 2021-02-26 RX ADMIN — INSULIN LISPRO 8 UNITS: 100 INJECTION, SOLUTION INTRAVENOUS; SUBCUTANEOUS at 13:05

## 2021-02-26 RX ADMIN — IPRATROPIUM BROMIDE 2 PUFF: 17 AEROSOL, METERED RESPIRATORY (INHALATION) at 20:09

## 2021-02-26 RX ADMIN — GUAIFENESIN SYRUP AND DEXTROMETHORPHAN 5 ML: 100; 10 SYRUP ORAL at 18:34

## 2021-02-26 RX ADMIN — GUAIFENESIN SYRUP AND DEXTROMETHORPHAN 5 ML: 100; 10 SYRUP ORAL at 23:16

## 2021-02-26 RX ADMIN — GUAIFENESIN SYRUP AND DEXTROMETHORPHAN 5 ML: 100; 10 SYRUP ORAL at 09:59

## 2021-02-26 RX ADMIN — ACYCLOVIR 400 MG: 200 CAPSULE ORAL at 23:16

## 2021-02-26 RX ADMIN — ALBUTEROL SULFATE 2 PUFF: 90 AEROSOL, METERED RESPIRATORY (INHALATION) at 12:11

## 2021-02-26 RX ADMIN — INSULIN LISPRO 8 UNITS: 100 INJECTION, SOLUTION INTRAVENOUS; SUBCUTANEOUS at 18:35

## 2021-02-26 RX ADMIN — INSULIN GLARGINE 30 UNITS: 100 INJECTION, SOLUTION SUBCUTANEOUS at 23:17

## 2021-02-26 RX ADMIN — INSULIN LISPRO 3 UNITS: 100 INJECTION, SOLUTION INTRAVENOUS; SUBCUTANEOUS at 18:34

## 2021-02-26 RX ADMIN — ALBUTEROL SULFATE 2 PUFF: 90 AEROSOL, METERED RESPIRATORY (INHALATION) at 20:09

## 2021-02-26 RX ADMIN — APIXABAN 5 MG: 5 TABLET, FILM COATED ORAL at 09:59

## 2021-02-26 RX ADMIN — APIXABAN 5 MG: 5 TABLET, FILM COATED ORAL at 23:16

## 2021-02-26 RX ADMIN — ACYCLOVIR 400 MG: 200 CAPSULE ORAL at 09:58

## 2021-02-26 RX ADMIN — FLECAINIDE ACETATE 100 MG: 100 TABLET ORAL at 10:00

## 2021-02-26 RX ADMIN — IPRATROPIUM BROMIDE 2 PUFF: 17 AEROSOL, METERED RESPIRATORY (INHALATION) at 16:20

## 2021-02-26 ASSESSMENT — PAIN SCALES - GENERAL: PAINLEVEL_OUTOF10: 0

## 2021-02-26 NOTE — PROGRESS NOTES
Perfect served dr Helio Whitten per pt request ,   Because dr Lluvia Otero ordered different meds than dr Helio Whitten did .   Pt decided to take pills=ivermectin,

## 2021-02-26 NOTE — PROGRESS NOTES
Hospitalist Progress Note      PCP: Zaina Bansal MD    Date of Admission: 2/24/2021    Chief Complaint: Shortness of breath    Hospital Course: Patient is a 79-year-old woman with a past medical history of left atrial flutter, paroxysmal atrial fibrillation, hypertension, obesity, type 2 diabetes along with non-Hodgkin's lymphoma who presented to the hospital with some shortness of breath. She was found to be hypoxic. She was just recently diagnosed with COVID-19 pneumonia . In the emergency department patient was found to be hypoxic with a oxygen of 68% she initially required 9 L but has since been titrated down to 2 L of oxygen. Patient was also found to be in atrial fibrillation with RVR and was started on diltiazem before it was under control and she was continued on her metoprolol along with flecainide. 2/25   Discussed current treatment regimen with Decadron and possible Actemra. We will also give a dose of ivermectin today. We will hold off on remdesivir and convalescent plasma. \    Subjective: Patient seen and examined. Patient is currently on 2 L oxygen and I titrated her off to room air. We will continue this and possible discharge tomorrow she remains on room air. We will do oxygen evaluation tomorrow as well. Patient still states she would not like the Remdesivir or the convalescent plasma.   Discussed the risk first benefits and the recommendation of the infectious disease specialist.    Medications:  Reviewed    Infusion Medications    sodium chloride      dextrose       Scheduled Medications    metoprolol succinate  12.5 mg Oral Daily    flecainide  100 mg Oral 2 times per day    insulin glargine  30 Units Subcutaneous Nightly    insulin lispro  8 Units Subcutaneous TID     sodium chloride flush  10 mL Intravenous 2 times per day    apixaban  5 mg Oral BID    acyclovir  400 mg Oral TID    dexamethasone  6 mg Oral Daily    albuterol sulfate HFA  2 puff Inhalation 4x daily 02/25/21  1900 02/26/21  0625    139  --  141   K 3.1* 3.3* 3.5 3.2*    105  --  105   CO2 23 22  --  23   BUN 14 15  --  22*   CREATININE 1.0 0.9  --  0.8   CALCIUM 8.8 8.2*  --  8.3     Recent Labs     02/24/21  1755 02/25/21  0400 02/26/21  0625   AST 75* 51* 35   ALT 48* 45* 39   BILITOT 0.9 0.6 0.5   ALKPHOS 240* 206* 201*     No results for input(s): INR in the last 72 hours. Recent Labs     02/24/21  1755   TROPONINI <0.01       Urinalysis:      Lab Results   Component Value Date    NITRU POSITIVE 04/17/2020    WBCUA 30 04/17/2020    WBCUA >50 08/08/2016    BACTERIA 4+ 04/17/2020    RBCUA 1 04/17/2020    RBCUA 30-49 08/08/2016    BLOODU Negative 04/17/2020    SPECGRAV 1.008 04/17/2020    GLUCOSEU Negative 04/17/2020       Radiology:  CT CHEST PULMONARY EMBOLISM W CONTRAST   Final Result   1. No acute pulmonary embolus. 2. Airspace opacities throughout the lungs compatible with the provided   history of viral pneumonia. XR CHEST PORTABLE   Final Result   The heart is borderline enlarged but unchanged. The pulmonary vessels are   engorged and ill-defined centrally. There are hazy perihilar and bibasilar   ground-glass and interstitial opacities. No effusion is seen. There is a   right Port-A-Cath in place which is unchanged. The bones are intact. RECOMMENDATION:   Stable cardiomegaly with mild pulmonary edema. Hazy perihilar and bibasilar opacities which may be related to the pulmonary   edema and/or infiltrates from pneumonia. Recommend short-term follow-up      Right Port-A-Cath unchanged in position.                  Assessment/Plan:    COVID-19 pneumonia  Decadron 6 mg daily for 10 days  Hold off on remdesivir   Hold off on convalescent plasma  Eliquis for anticoagulation  Ivermectin given on February 25    Acute respiratory failure with hypoxia  Saturating 68 on room air  Titrate oxygen to keep saturations above 90%  Likely secondary to COVID-19 pneumonia  Currently on room air    Atrial fibrillation with RVR   continue home medications of Flecainide, increased to 100 mg twice daily, and metoprolol  Eliquis    Hypertension  Continue home medications    Diabetes mellitus type 2  Hold home medications  Lantus and sliding scale  Carb controlled diet      DVT Prophylaxis: Eliquis  Diet: DIET CARDIAC; Carb Control: 4 carb choices (60 gms)/meal  Code Status: Full Code    PT/OT Eval Status: Not applicable    Dispo -inpatient possible discharge home tomorrow if off oxygen,     Jordyn Smith MD

## 2021-02-27 LAB
A/G RATIO: 1.2 (ref 1.1–2.2)
ALBUMIN SERPL-MCNC: 3 G/DL (ref 3.4–5)
ALP BLD-CCNC: 186 U/L (ref 40–129)
ALT SERPL-CCNC: 45 U/L (ref 10–40)
ANION GAP SERPL CALCULATED.3IONS-SCNC: 9 MMOL/L (ref 3–16)
AST SERPL-CCNC: 41 U/L (ref 15–37)
BASOPHILS ABSOLUTE: 0 K/UL (ref 0–0.2)
BASOPHILS RELATIVE PERCENT: 0 %
BILIRUB SERPL-MCNC: 0.5 MG/DL (ref 0–1)
BUN BLDV-MCNC: 19 MG/DL (ref 7–20)
CALCIUM SERPL-MCNC: 8.3 MG/DL (ref 8.3–10.6)
CHLORIDE BLD-SCNC: 109 MMOL/L (ref 99–110)
CO2: 25 MMOL/L (ref 21–32)
CREAT SERPL-MCNC: 0.7 MG/DL (ref 0.6–1.2)
EOSINOPHILS ABSOLUTE: 0 K/UL (ref 0–0.6)
EOSINOPHILS RELATIVE PERCENT: 0.1 %
GFR AFRICAN AMERICAN: >60
GFR NON-AFRICAN AMERICAN: >60
GLOBULIN: 2.6 G/DL
GLUCOSE BLD-MCNC: 179 MG/DL (ref 70–99)
GLUCOSE BLD-MCNC: 193 MG/DL (ref 70–99)
GLUCOSE BLD-MCNC: 224 MG/DL (ref 70–99)
GLUCOSE BLD-MCNC: 271 MG/DL (ref 70–99)
GLUCOSE BLD-MCNC: 275 MG/DL (ref 70–99)
HCT VFR BLD CALC: 32.7 % (ref 36–48)
HEMOGLOBIN: 10.9 G/DL (ref 12–16)
LYMPHOCYTES ABSOLUTE: 0.2 K/UL (ref 1–5.1)
LYMPHOCYTES RELATIVE PERCENT: 3 %
MCH RBC QN AUTO: 32.8 PG (ref 26–34)
MCHC RBC AUTO-ENTMCNC: 33.3 G/DL (ref 31–36)
MCV RBC AUTO: 98.6 FL (ref 80–100)
MONOCYTES ABSOLUTE: 0.4 K/UL (ref 0–1.3)
MONOCYTES RELATIVE PERCENT: 6.8 %
NEUTROPHILS ABSOLUTE: 5.8 K/UL (ref 1.7–7.7)
NEUTROPHILS RELATIVE PERCENT: 90.1 %
PDW BLD-RTO: 15.3 % (ref 12.4–15.4)
PERFORMED ON: ABNORMAL
PLATELET # BLD: 127 K/UL (ref 135–450)
PMV BLD AUTO: 9.7 FL (ref 5–10.5)
POTASSIUM REFLEX MAGNESIUM: 3.8 MMOL/L (ref 3.5–5.1)
RBC # BLD: 3.31 M/UL (ref 4–5.2)
SODIUM BLD-SCNC: 143 MMOL/L (ref 136–145)
TOTAL PROTEIN: 5.6 G/DL (ref 6.4–8.2)
WBC # BLD: 6.4 K/UL (ref 4–11)

## 2021-02-27 PROCEDURE — 2700000000 HC OXYGEN THERAPY PER DAY

## 2021-02-27 PROCEDURE — 6370000000 HC RX 637 (ALT 250 FOR IP): Performed by: INTERNAL MEDICINE

## 2021-02-27 PROCEDURE — 80053 COMPREHEN METABOLIC PANEL: CPT

## 2021-02-27 PROCEDURE — 85025 COMPLETE CBC W/AUTO DIFF WBC: CPT

## 2021-02-27 PROCEDURE — 94761 N-INVAS EAR/PLS OXIMETRY MLT: CPT

## 2021-02-27 PROCEDURE — 2580000003 HC RX 258: Performed by: STUDENT IN AN ORGANIZED HEALTH CARE EDUCATION/TRAINING PROGRAM

## 2021-02-27 PROCEDURE — 6370000000 HC RX 637 (ALT 250 FOR IP): Performed by: STUDENT IN AN ORGANIZED HEALTH CARE EDUCATION/TRAINING PROGRAM

## 2021-02-27 PROCEDURE — 2060000000 HC ICU INTERMEDIATE R&B

## 2021-02-27 PROCEDURE — 6370000000 HC RX 637 (ALT 250 FOR IP): Performed by: NURSE PRACTITIONER

## 2021-02-27 PROCEDURE — 94640 AIRWAY INHALATION TREATMENT: CPT

## 2021-02-27 RX ADMIN — ACYCLOVIR 400 MG: 200 CAPSULE ORAL at 13:03

## 2021-02-27 RX ADMIN — ALBUTEROL SULFATE 2 PUFF: 90 AEROSOL, METERED RESPIRATORY (INHALATION) at 20:22

## 2021-02-27 RX ADMIN — Medication 10 ML: at 09:39

## 2021-02-27 RX ADMIN — ALBUTEROL SULFATE 2 PUFF: 90 AEROSOL, METERED RESPIRATORY (INHALATION) at 12:40

## 2021-02-27 RX ADMIN — ACETAMINOPHEN 650 MG: 325 TABLET ORAL at 13:03

## 2021-02-27 RX ADMIN — Medication 10 ML: at 21:46

## 2021-02-27 RX ADMIN — ALBUTEROL SULFATE 2 PUFF: 90 AEROSOL, METERED RESPIRATORY (INHALATION) at 09:19

## 2021-02-27 RX ADMIN — INSULIN LISPRO 2 UNITS: 100 INJECTION, SOLUTION INTRAVENOUS; SUBCUTANEOUS at 21:45

## 2021-02-27 RX ADMIN — INSULIN LISPRO 1 UNITS: 100 INJECTION, SOLUTION INTRAVENOUS; SUBCUTANEOUS at 09:37

## 2021-02-27 RX ADMIN — APIXABAN 5 MG: 5 TABLET, FILM COATED ORAL at 21:44

## 2021-02-27 RX ADMIN — INSULIN GLARGINE 30 UNITS: 100 INJECTION, SOLUTION SUBCUTANEOUS at 21:44

## 2021-02-27 RX ADMIN — ALBUTEROL SULFATE 2 PUFF: 90 AEROSOL, METERED RESPIRATORY (INHALATION) at 16:39

## 2021-02-27 RX ADMIN — INSULIN LISPRO 8 UNITS: 100 INJECTION, SOLUTION INTRAVENOUS; SUBCUTANEOUS at 13:03

## 2021-02-27 RX ADMIN — FLECAINIDE ACETATE 100 MG: 100 TABLET ORAL at 09:37

## 2021-02-27 RX ADMIN — IPRATROPIUM BROMIDE 2 PUFF: 17 AEROSOL, METERED RESPIRATORY (INHALATION) at 12:40

## 2021-02-27 RX ADMIN — INSULIN LISPRO 1 UNITS: 100 INJECTION, SOLUTION INTRAVENOUS; SUBCUTANEOUS at 13:03

## 2021-02-27 RX ADMIN — METOPROLOL SUCCINATE 12.5 MG: 25 TABLET, EXTENDED RELEASE ORAL at 09:37

## 2021-02-27 RX ADMIN — FLECAINIDE ACETATE 100 MG: 100 TABLET ORAL at 21:44

## 2021-02-27 RX ADMIN — ACYCLOVIR 400 MG: 200 CAPSULE ORAL at 21:44

## 2021-02-27 RX ADMIN — DEXAMETHASONE 6 MG: 6 TABLET ORAL at 09:37

## 2021-02-27 RX ADMIN — IPRATROPIUM BROMIDE 2 PUFF: 17 AEROSOL, METERED RESPIRATORY (INHALATION) at 09:19

## 2021-02-27 RX ADMIN — APIXABAN 5 MG: 5 TABLET, FILM COATED ORAL at 09:37

## 2021-02-27 RX ADMIN — IPRATROPIUM BROMIDE 2 PUFF: 17 AEROSOL, METERED RESPIRATORY (INHALATION) at 16:39

## 2021-02-27 RX ADMIN — GUAIFENESIN SYRUP AND DEXTROMETHORPHAN 5 ML: 100; 10 SYRUP ORAL at 06:29

## 2021-02-27 RX ADMIN — ACYCLOVIR 400 MG: 200 CAPSULE ORAL at 09:37

## 2021-02-27 RX ADMIN — IPRATROPIUM BROMIDE 2 PUFF: 17 AEROSOL, METERED RESPIRATORY (INHALATION) at 20:22

## 2021-02-27 RX ADMIN — INSULIN LISPRO 8 UNITS: 100 INJECTION, SOLUTION INTRAVENOUS; SUBCUTANEOUS at 09:37

## 2021-02-27 RX ADMIN — INSULIN LISPRO 3 UNITS: 100 INJECTION, SOLUTION INTRAVENOUS; SUBCUTANEOUS at 18:12

## 2021-02-27 RX ADMIN — INSULIN LISPRO 8 UNITS: 100 INJECTION, SOLUTION INTRAVENOUS; SUBCUTANEOUS at 18:13

## 2021-02-27 ASSESSMENT — PAIN SCALES - GENERAL: PAINLEVEL_OUTOF10: 0

## 2021-02-27 NOTE — PLAN OF CARE
Problem: Falls - Risk of:  Goal: Will remain free from falls  Description: Will remain free from falls  Outcome: Ongoing  Goal: Absence of physical injury  Description: Absence of physical injury  Outcome: Ongoing     Problem: Infection:  Goal: Will remain free from infection  Description: Will remain free from infection  Outcome: Ongoing     Problem: Safety:  Goal: Free from accidental physical injury  Description: Free from accidental physical injury  Outcome: Ongoing  Goal: Free from intentional harm  Description: Free from intentional harm  Outcome: Ongoing     Problem: Daily Care:  Goal: Daily care needs are met  Description: Daily care needs are met  Outcome: Ongoing     Problem: Pain:  Goal: Patient's pain/discomfort is manageable  Description: Patient's pain/discomfort is manageable  Outcome: Ongoing     Problem: Skin Integrity:  Goal: Skin integrity will stabilize  Description: Skin integrity will stabilize  Outcome: Ongoing     Problem: Discharge Planning:  Goal: Patients continuum of care needs are met  Description: Patients continuum of care needs are met  Outcome: Ongoing     Problem: Nutrition  Goal: Optimal nutrition therapy  Outcome: Ongoing     Problem: Airway Clearance - Ineffective  Goal: Achieve or maintain patent airway  Outcome: Ongoing     Problem: Gas Exchange - Impaired  Goal: Absence of hypoxia  Outcome: Ongoing  Goal: Promote optimal lung function  Outcome: Ongoing     Problem: Breathing Pattern - Ineffective  Goal: Ability to achieve and maintain a regular respiratory rate  Outcome: Ongoing     Problem:  Body Temperature -  Risk of, Imbalanced  Goal: Ability to maintain a body temperature within defined limits  Outcome: Ongoing  Goal: Will regain or maintain usual level of consciousness  Outcome: Ongoing  Goal: Complications related to the disease process, condition or treatment will be avoided or minimized  Outcome: Ongoing     Problem: Isolation Precautions - Risk of Spread of Infection  Goal: Prevent transmission of infection  Outcome: Ongoing     Problem: Nutrition Deficits  Goal: Optimize nutritional status  Outcome: Ongoing     Problem: Risk for Fluid Volume Deficit  Goal: Maintain normal heart rhythm  Outcome: Ongoing  Goal: Maintain absence of muscle cramping  Outcome: Ongoing  Goal: Maintain normal serum potassium, sodium, calcium, phosphorus, and pH  Outcome: Ongoing     Problem: Loneliness or Risk for Loneliness  Goal: Demonstrate positive use of time alone when socialization is not possible  Outcome: Ongoing     Problem: Fatigue  Goal: Verbalize increase energy and improved vitality  Outcome: Ongoing     Problem: Patient Education: Go to Patient Education Activity  Goal: Patient/Family Education  Outcome: Ongoing

## 2021-02-27 NOTE — PROGRESS NOTES
Respiratory Therapy    Home Oxygen evaluation    SPO2 on RA at rest 79-80%  SPO2 on 1lpm at rest 83%  SPO2 on 2lpm at rest 89-90%  SPO2 on 2lpm with exertion 86%  SPO2 on 3lpm with exertion 90%    Pt requires 3lpm Home Oxygen continuously. No current order for DC to home. RN to inform RT if the patient requires Home O2 setup.     Electronically signed by Ruth Bernal RCP on 2/27/2021 at 9:26 AM

## 2021-02-27 NOTE — PROGRESS NOTES
Hospitalist Progress Note      PCP: Sharmin Cárdenas MD    Date of Admission: 2/24/2021    Chief Complaint: Shortness of breath    Hospital Course: Patient is a 62-year-old woman with a past medical history of left atrial flutter, paroxysmal atrial fibrillation, hypertension, obesity, type 2 diabetes along with non-Hodgkin's lymphoma who presented to the hospital with some shortness of breath. She was found to be hypoxic. She was just recently diagnosed with COVID-19 pneumonia . In the emergency department patient was found to be hypoxic with a oxygen of 68% she initially required 9 L but has since been titrated down to 2 L of oxygen. Patient was also found to be in atrial fibrillation with RVR and was started on diltiazem before it was under control and she was continued on her metoprolol along with flecainide. 2/25   Discussed current treatment regimen with Decadron and possible Actemra. We will also give a dose of ivermectin today. We will hold off on remdesivir and convalescent plasma. 2/26  Patient is currently on 2 L oxygen and I titrated her off to room air. We will continue this and possible discharge tomorrow she remains on room air. We will do oxygen evaluation tomorrow as well. Patient still states she would not like the Remdesivir or the convalescent plasma. Discussed the risk first benefits and the recommendation of the infectious disease specialist.    Subjective: Patient seen and examined. Patient had home oxygen evaluation today showing that she requires 2 L while stationary 3 L with ambulation.   We will continue to keep the patient in the hospital for 1 additional day if and if her oxygen remained stable she can discharge home with home oxygen tomorrow    Medications:  Reviewed    Infusion Medications    sodium chloride      dextrose       Scheduled Medications    metoprolol succinate  12.5 mg Oral Daily    flecainide  100 mg Oral 2 times per day    insulin glargine  30 Units Subcutaneous Nightly    insulin lispro  8 Units Subcutaneous TID     sodium chloride flush  10 mL Intravenous 2 times per day    apixaban  5 mg Oral BID    acyclovir  400 mg Oral TID    dexamethasone  6 mg Oral Daily    albuterol sulfate HFA  2 puff Inhalation 4x daily    And    ipratropium  2 puff Inhalation 4x daily    insulin lispro  0-6 Units Subcutaneous TID WC    insulin lispro  0-3 Units Subcutaneous Nightly     PRN Meds: sodium chloride, potassium chloride **OR** potassium alternative oral replacement **OR** potassium chloride, sodium chloride flush, promethazine **OR** ondansetron, acetaminophen **OR** acetaminophen, LORazepam, guaiFENesin-dextromethorphan, glucose, dextrose, glucagon (rDNA), dextrose      Intake/Output Summary (Last 24 hours) at 2/27/2021 1224  Last data filed at 2/27/2021 0127  Gross per 24 hour   Intake 240 ml   Output 450 ml   Net -210 ml       Physical Exam Performed:    /72   Pulse 74   Temp 97.8 °F (36.6 °C) (Oral)   Resp 16   Ht 5' 3\" (1.6 m)   Wt 235 lb 10.8 oz (106.9 kg)   SpO2 94%   BMI 41.75 kg/m²     General appearance: No apparent distress, appears stated age and cooperative. HEENT: Pupils equal, round, and reactive to light. Conjunctivae/corneas clear. Neck: Supple, with full range of motion. No jugular venous distention. Trachea midline. Respiratory:  Normal respiratory effort. Clear to auscultation, bilaterally without Rales/Wheezes/Rhonchi. Cardiovascular: Regular rate and rhythm with normal S1/S2   Abdomen: Soft, non-tender, non-distended with normal bowel sounds. Musculoskeletal: No clubbing, cyanosis or edema bilaterally. Skin: Skin color, texture, turgor normal.  No rashes or lesions. Neurologic:  Neurovascularly intact without any focal sensory/motor deficits.  Cranial nerves: II-XII intact, grossly non-focal.  Psychiatric: Alert and oriented, thought content appropriate, normal insight  Capillary Refill: Brisk,< 3 seconds   Peripheral Pulses: +2 palpable, equal bilaterally       Labs:   Recent Labs     02/25/21  0400 02/26/21  0625 02/27/21  0639   WBC 2.3* 4.7 6.4   HGB 11.1* 10.8* 10.9*   HCT 33.3* 31.9* 32.7*    127* 127*     Recent Labs     02/25/21  0400 02/25/21  1900 02/26/21  0625 02/27/21  0639     --  141 143   K 3.3* 3.5 3.2* 3.8     --  105 109   CO2 22  --  23 25   BUN 15  --  22* 19   CREATININE 0.9  --  0.8 0.7   CALCIUM 8.2*  --  8.3 8.3     Recent Labs     02/25/21  0400 02/26/21  0625 02/27/21  0639   AST 51* 35 41*   ALT 45* 39 45*   BILITOT 0.6 0.5 0.5   ALKPHOS 206* 201* 186*     No results for input(s): INR in the last 72 hours. Recent Labs     02/24/21  1755   TROPONINI <0.01       Urinalysis:      Lab Results   Component Value Date    NITRU POSITIVE 04/17/2020    WBCUA 30 04/17/2020    WBCUA >50 08/08/2016    BACTERIA 4+ 04/17/2020    RBCUA 1 04/17/2020    RBCUA 30-49 08/08/2016    BLOODU Negative 04/17/2020    SPECGRAV 1.008 04/17/2020    GLUCOSEU Negative 04/17/2020       Radiology:  CT CHEST PULMONARY EMBOLISM W CONTRAST   Final Result   1. No acute pulmonary embolus. 2. Airspace opacities throughout the lungs compatible with the provided   history of viral pneumonia. XR CHEST PORTABLE   Final Result   The heart is borderline enlarged but unchanged. The pulmonary vessels are   engorged and ill-defined centrally. There are hazy perihilar and bibasilar   ground-glass and interstitial opacities. No effusion is seen. There is a   right Port-A-Cath in place which is unchanged. The bones are intact. RECOMMENDATION:   Stable cardiomegaly with mild pulmonary edema. Hazy perihilar and bibasilar opacities which may be related to the pulmonary   edema and/or infiltrates from pneumonia. Recommend short-term follow-up      Right Port-A-Cath unchanged in position.                  Assessment/Plan:    COVID-19 pneumonia  Decadron 6 mg daily for 10 days  Hold off on remdesivir   Hold off on convalescent plasma  Eliquis for anticoagulation  Ivermectin given on February 25    Acute respiratory failure with hypoxia  Saturating 68 on room air  Titrate oxygen to keep saturations above 90%  Likely secondary to COVID-19 pneumonia  2 L when stationary, 3 L with ambulation    Atrial fibrillation with RVR   continue home medications of Flecainide, increased to 100 mg twice daily, and metoprolol  Eliquis    Hypertension  Continue home medications    Diabetes mellitus type 2  Hold home medications  Lantus and sliding scale  Carb controlled diet      DVT Prophylaxis: Eliquis  Diet: DIET CARDIAC; Carb Control: 4 carb choices (60 gms)/meal  Code Status: Full Code    PT/OT Eval Status: Not applicable    Dispo -inpatient , discharge home tomorrow with or without home oxygen    Anatoliy Funk MD

## 2021-02-28 VITALS
HEIGHT: 63 IN | WEIGHT: 235.89 LBS | DIASTOLIC BLOOD PRESSURE: 81 MMHG | TEMPERATURE: 97.7 F | HEART RATE: 77 BPM | OXYGEN SATURATION: 94 % | BODY MASS INDEX: 41.8 KG/M2 | RESPIRATION RATE: 16 BRPM | SYSTOLIC BLOOD PRESSURE: 146 MMHG

## 2021-02-28 LAB
A/G RATIO: 1.2 (ref 1.1–2.2)
ALBUMIN SERPL-MCNC: 3 G/DL (ref 3.4–5)
ALP BLD-CCNC: 188 U/L (ref 40–129)
ALT SERPL-CCNC: 53 U/L (ref 10–40)
ANION GAP SERPL CALCULATED.3IONS-SCNC: 10 MMOL/L (ref 3–16)
AST SERPL-CCNC: 43 U/L (ref 15–37)
BASOPHILS ABSOLUTE: 0 K/UL (ref 0–0.2)
BASOPHILS RELATIVE PERCENT: 0 %
BILIRUB SERPL-MCNC: 0.7 MG/DL (ref 0–1)
BLOOD CULTURE, ROUTINE: ABNORMAL
BUN BLDV-MCNC: 16 MG/DL (ref 7–20)
CALCIUM SERPL-MCNC: 8.1 MG/DL (ref 8.3–10.6)
CHLORIDE BLD-SCNC: 107 MMOL/L (ref 99–110)
CO2: 24 MMOL/L (ref 21–32)
CREAT SERPL-MCNC: 0.6 MG/DL (ref 0.6–1.2)
CULTURE, BLOOD 2: NORMAL
EOSINOPHILS ABSOLUTE: 0 K/UL (ref 0–0.6)
EOSINOPHILS RELATIVE PERCENT: 0.2 %
GFR AFRICAN AMERICAN: >60
GFR NON-AFRICAN AMERICAN: >60
GLOBULIN: 2.6 G/DL
GLUCOSE BLD-MCNC: 113 MG/DL (ref 70–99)
GLUCOSE BLD-MCNC: 132 MG/DL (ref 70–99)
GLUCOSE BLD-MCNC: 203 MG/DL (ref 70–99)
HCT VFR BLD CALC: 33.1 % (ref 36–48)
HEMOGLOBIN: 11.4 G/DL (ref 12–16)
LYMPHOCYTES ABSOLUTE: 0.2 K/UL (ref 1–5.1)
LYMPHOCYTES RELATIVE PERCENT: 3.1 %
MAGNESIUM: 2.5 MG/DL (ref 1.8–2.4)
MCH RBC QN AUTO: 33.5 PG (ref 26–34)
MCHC RBC AUTO-ENTMCNC: 34.3 G/DL (ref 31–36)
MCV RBC AUTO: 97.7 FL (ref 80–100)
MONOCYTES ABSOLUTE: 0.4 K/UL (ref 0–1.3)
MONOCYTES RELATIVE PERCENT: 6.1 %
NEUTROPHILS ABSOLUTE: 6.3 K/UL (ref 1.7–7.7)
NEUTROPHILS RELATIVE PERCENT: 90.6 %
ORGANISM: ABNORMAL
PDW BLD-RTO: 15.1 % (ref 12.4–15.4)
PERFORMED ON: ABNORMAL
PERFORMED ON: ABNORMAL
PLATELET # BLD: 140 K/UL (ref 135–450)
PMV BLD AUTO: 9.8 FL (ref 5–10.5)
POTASSIUM REFLEX MAGNESIUM: 3.1 MMOL/L (ref 3.5–5.1)
RBC # BLD: 3.39 M/UL (ref 4–5.2)
SODIUM BLD-SCNC: 141 MMOL/L (ref 136–145)
TOTAL PROTEIN: 5.6 G/DL (ref 6.4–8.2)
WBC # BLD: 6.9 K/UL (ref 4–11)

## 2021-02-28 PROCEDURE — 85025 COMPLETE CBC W/AUTO DIFF WBC: CPT

## 2021-02-28 PROCEDURE — 80053 COMPREHEN METABOLIC PANEL: CPT

## 2021-02-28 PROCEDURE — 2700000000 HC OXYGEN THERAPY PER DAY

## 2021-02-28 PROCEDURE — 6370000000 HC RX 637 (ALT 250 FOR IP): Performed by: STUDENT IN AN ORGANIZED HEALTH CARE EDUCATION/TRAINING PROGRAM

## 2021-02-28 PROCEDURE — 6370000000 HC RX 637 (ALT 250 FOR IP): Performed by: NURSE PRACTITIONER

## 2021-02-28 PROCEDURE — 2580000003 HC RX 258: Performed by: STUDENT IN AN ORGANIZED HEALTH CARE EDUCATION/TRAINING PROGRAM

## 2021-02-28 PROCEDURE — 83735 ASSAY OF MAGNESIUM: CPT

## 2021-02-28 PROCEDURE — 94640 AIRWAY INHALATION TREATMENT: CPT

## 2021-02-28 PROCEDURE — 6370000000 HC RX 637 (ALT 250 FOR IP): Performed by: INTERNAL MEDICINE

## 2021-02-28 PROCEDURE — 94761 N-INVAS EAR/PLS OXIMETRY MLT: CPT

## 2021-02-28 RX ORDER — DEXAMETHASONE 6 MG/1
6 TABLET ORAL DAILY
Qty: 6 TABLET | Refills: 0 | Status: SHIPPED | OUTPATIENT
Start: 2021-03-01 | End: 2021-03-07

## 2021-02-28 RX ORDER — FLECAINIDE ACETATE 100 MG/1
100 TABLET ORAL EVERY 12 HOURS SCHEDULED
Qty: 60 TABLET | Refills: 3 | Status: SHIPPED | OUTPATIENT
Start: 2021-02-28 | End: 2021-06-01 | Stop reason: SDUPTHER

## 2021-02-28 RX ORDER — GUAIFENESIN/DEXTROMETHORPHAN 100-10MG/5
5 SYRUP ORAL EVERY 4 HOURS PRN
Qty: 120 ML | Refills: 0 | Status: SHIPPED | OUTPATIENT
Start: 2021-02-28 | End: 2021-03-10

## 2021-02-28 RX ORDER — ALBUTEROL SULFATE 90 UG/1
2 AEROSOL, METERED RESPIRATORY (INHALATION) 4 TIMES DAILY
Qty: 1 INHALER | Refills: 3 | Status: SHIPPED | OUTPATIENT
Start: 2021-02-28

## 2021-02-28 RX ADMIN — POTASSIUM CHLORIDE 40 MEQ: 1500 TABLET, EXTENDED RELEASE ORAL at 09:48

## 2021-02-28 RX ADMIN — ALBUTEROL SULFATE 2 PUFF: 90 AEROSOL, METERED RESPIRATORY (INHALATION) at 12:17

## 2021-02-28 RX ADMIN — METOPROLOL SUCCINATE 12.5 MG: 25 TABLET, EXTENDED RELEASE ORAL at 09:48

## 2021-02-28 RX ADMIN — Medication 10 ML: at 09:49

## 2021-02-28 RX ADMIN — IPRATROPIUM BROMIDE 2 PUFF: 17 AEROSOL, METERED RESPIRATORY (INHALATION) at 08:22

## 2021-02-28 RX ADMIN — ALBUTEROL SULFATE 2 PUFF: 90 AEROSOL, METERED RESPIRATORY (INHALATION) at 15:51

## 2021-02-28 RX ADMIN — FLECAINIDE ACETATE 100 MG: 100 TABLET ORAL at 09:48

## 2021-02-28 RX ADMIN — DEXAMETHASONE 6 MG: 6 TABLET ORAL at 09:48

## 2021-02-28 RX ADMIN — ACYCLOVIR 400 MG: 200 CAPSULE ORAL at 09:48

## 2021-02-28 RX ADMIN — IPRATROPIUM BROMIDE 2 PUFF: 17 AEROSOL, METERED RESPIRATORY (INHALATION) at 12:17

## 2021-02-28 RX ADMIN — ALBUTEROL SULFATE 2 PUFF: 90 AEROSOL, METERED RESPIRATORY (INHALATION) at 08:22

## 2021-02-28 RX ADMIN — GUAIFENESIN SYRUP AND DEXTROMETHORPHAN 5 ML: 100; 10 SYRUP ORAL at 12:57

## 2021-02-28 RX ADMIN — APIXABAN 5 MG: 5 TABLET, FILM COATED ORAL at 09:48

## 2021-02-28 RX ADMIN — INSULIN LISPRO 8 UNITS: 100 INJECTION, SOLUTION INTRAVENOUS; SUBCUTANEOUS at 12:58

## 2021-02-28 RX ADMIN — INSULIN LISPRO 2 UNITS: 100 INJECTION, SOLUTION INTRAVENOUS; SUBCUTANEOUS at 12:58

## 2021-02-28 RX ADMIN — ACYCLOVIR 400 MG: 200 CAPSULE ORAL at 12:57

## 2021-02-28 RX ADMIN — INSULIN LISPRO 8 UNITS: 100 INJECTION, SOLUTION INTRAVENOUS; SUBCUTANEOUS at 09:49

## 2021-02-28 RX ADMIN — IPRATROPIUM BROMIDE 2 PUFF: 17 AEROSOL, METERED RESPIRATORY (INHALATION) at 15:51

## 2021-02-28 NOTE — DISCHARGE SUMMARY
Hospital Medicine Discharge Summary    Patient ID: Dru Wood      Patient's PCP: Wilmar Macedo MD    Admit Date: 2/24/2021     Discharge Date:   2/28/21    Admitting Physician: Joshua Bob DO     Discharge Physician: Zehra Fish MD     Discharge Diagnoses: Active Hospital Problems    Diagnosis Date Noted    Acute respiratory failure with hypoxia ([facility] [J96.01]     Pneumonia due to COVID-19 virus [U07.1, J12.82] 02/24/2021    Pneumonia [J18.9] 02/24/2021    Sepsis ([address] [A41.9] 04/18/2020    NHL (non-Hodgkin's lymphoma) ([facility] [C85.90] 11/21/2019    ZOE (obstructive sleep apnea) [G47.33]     Atrial fibrillation with rapid ventricular response ([address] [I48.91] 10/31/2018    Hyperlipidemia [E78.5] 06/10/2016    Type 2 diabetes mellitus without complication ([address] [G18.5] 03/28/2016    Essential hypertension [I10] 01/07/2016       The patient was seen and examined on day of discharge and this discharge summary is in conjunction with any daily progress note from day of discharge. Hospital Course:      Patient is a 24-year-old woman with a past medical history of left atrial flutter, paroxysmal atrial fibrillation, hypertension, obesity, type 2 diabetes along with non-Hodgkin's lymphoma who presented to the hospital with some shortness of breath. She was found to be hypoxic. She was just recently diagnosed with COVID-19 pneumonia . In the emergency department patient was found to be hypoxic with a oxygen of 68% she initially required 9 L but has since been titrated down to 2 L of oxygen. Patient was also found to be in atrial fibrillation with RVR and was started on diltiazem before it was under control and she was continued on her metoprolol along with flecainide. Discussed current treatment options with Decadron along with possible Actemra and ivermectin if these continue to worsen. She was given ivermectin on February 25.   We decided to hold off on remdesivir along with convalescent plasma due to how long she had been diagnosed with Covid and then remdesivir due to her elevation in LFTs. She eventually was titrated down to 2 L of oxygen and then eventually to room air. She did however require oxygen with ambulation and she eventually went home with 2 L while stationary and 3 L with ambulation. Patient discharged home with Decadron for 6 more days along with albuterol and cough medicine. Patient advised to come back to the hospital if her condition deteriorated. COVID-19 pneumonia  Decadron 6 mg daily for 10 days, finished at discharge  Hold off on remdesivir   Hold off on convalescent plasma  Eliquis for anticoagulation  Ivermectin given on February 25     Acute respiratory failure with hypoxia  Saturating 68 on room air  Titrate oxygen to keep saturations above 90%  Likely secondary to COVID-19 pneumonia  2 L when stationary, 3 L with ambulation     Atrial fibrillation with RVR   continue home medications of Flecainide, increased to 100 mg twice daily, and metoprolol  Eliquis     Hypertension  Continue home medications     Diabetes mellitus type 2  Hold home medications  Lantus and sliding scale  Carb controlled diet    Exam:     BP (!) 146/81   Pulse 77   Temp 97.7 °F (36.5 °C) (Oral)   Resp 16   Ht 5' 3\" (1.6 m)   Wt 235 lb 14.3 oz (107 kg)   SpO2 93%   BMI 41.79 kg/m²     General appearance: No apparent distress, appears stated age and cooperative. HEENT: Pupils equal, round, and reactive to light. Conjunctivae/corneas clear. Neck: Supple, with full range of motion. No jugular venous distention. Trachea midline. Respiratory:  Normal respiratory effort. Clear to auscultation, bilaterally without Rales/Wheezes/Rhonchi. Cardiovascular: Regular rate and rhythm with normal S1/S2   Abdomen: Soft, non-tender, non-distended with normal bowel sounds. Musculoskeletal: No clubbing, cyanosis or edema bilaterally.    Skin: Skin color, texture, turgor normal.  No rashes or lesions. Neurologic:  Neurovascularly intact without any focal sensory/motor deficits. Cranial nerves: II-XII intact, grossly non-focal.  Psychiatric: Alert and oriented, thought content appropriate, normal insight  Capillary Refill: Brisk,< 3 seconds   Peripheral Pulses: +2 palpable, equal bilaterally          Consults:     IP CONSULT TO HOSPITALIST  IP CONSULT TO CARDIOLOGY  IP CONSULT TO INFECTIOUS DISEASES    Significant Diagnostic Studies:     CT CHEST PULMONARY EMBOLISM W CONTRAST   Final Result   1. No acute pulmonary embolus. 2. Airspace opacities throughout the lungs compatible with the provided   history of viral pneumonia. XR CHEST PORTABLE   Final Result   The heart is borderline enlarged but unchanged. The pulmonary vessels are   engorged and ill-defined centrally. There are hazy perihilar and bibasilar   ground-glass and interstitial opacities. No effusion is seen. There is a   right Port-A-Cath in place which is unchanged. The bones are intact. RECOMMENDATION:   Stable cardiomegaly with mild pulmonary edema. Hazy perihilar and bibasilar opacities which may be related to the pulmonary   edema and/or infiltrates from pneumonia. Recommend short-term follow-up      Right Port-A-Cath unchanged in position. Disposition: Home    Condition at Discharge: Stable    Discharge Instructions/Follow-up: PCP    Code Status:  Full Code     Activity: activity as tolerated    Diet: regular diet      Labs:  For convenience and continuity at follow-up the following most recent labs are provided:      CBC:    Lab Results   Component Value Date    WBC 6.9 02/28/2021    HGB 11.4 02/28/2021    HCT 33.1 02/28/2021     02/28/2021       Renal:    Lab Results   Component Value Date     02/28/2021    K 3.1 02/28/2021     02/28/2021    CO2 24 02/28/2021    BUN 16 02/28/2021    CREATININE 0.6 02/28/2021    CALCIUM 8.1 02/28/2021    PHOS 2.7 10/01/2020       Discharge Medications:     Current Discharge Medication List           Details   albuterol sulfate  (90 Base) MCG/ACT inhaler Inhale 2 puffs into the lungs 4 times daily  Qty: 1 Inhaler, Refills: 3      guaiFENesin-dextromethorphan (ROBITUSSIN DM) 100-10 MG/5ML syrup Take 5 mLs by mouth every 4 hours as needed for Cough  Qty: 120 mL, Refills: 0      dexamethasone (DECADRON) 6 MG tablet Take 1 tablet by mouth daily for 6 days  Qty: 6 tablet, Refills: 0              Details   flecainide (TAMBOCOR) 100 MG tablet Take 1 tablet by mouth every 12 hours  Qty: 60 tablet, Refills: 3              Details   metoprolol succinate (TOPROL XL) 25 MG extended release tablet Take 12.5 mg by mouth daily      glimepiride (AMARYL) 2 MG tablet Take 2 tablets by mouth 2 times daily (with meals)  Qty: 360 tablet, Refills: 1      insulin detemir (LEVEMIR FLEXTOUCH) 100 UNIT/ML injection pen Inject 30 Units into the skin nightly  Qty: 5 pen, Refills: 3      Insulin Pen Needle 31G X 6 MM MISC 1 each by Does not apply route daily  Qty: 100 each, Refills: 1      metFORMIN (GLUCOPHAGE-XR) 500 MG extended release tablet Take 2 tablets by mouth 2 times daily (with meals)  Qty: 360 tablet, Refills: 1      potassium chloride (KLOR-CON M) 10 MEQ extended release tablet Take 2 tablets by mouth 2 times daily  Qty: 360 tablet, Refills: 1    Comments: Refill [id number]      apixaban (ELIQUIS) 5 MG TABS tablet Take 1 tablet by mouth 2 times daily  Qty: 180 tablet, Refills: 3    Associated Diagnoses: Persistent atrial fibrillation (HCC)      furosemide (LASIX) 20 MG tablet Take 1 tablet by mouth daily .  May take an additional dose as needed for shortness of breath, weight gain and edema  Qty: 180 tablet, Refills: 0    Associated Diagnoses: Persistent atrial fibrillation (HCC)      acetaminophen (TYLENOL) 500 MG tablet Take 500 mg by mouth every 6 hours as needed for Pain      valACYclovir (VALTREX) 500 MG tablet Take 500 mg by mouth 2 times daily loratadine (CLARITIN) 10 MG capsule Take 10 mg by mouth daily       LORazepam (ATIVAN) 1 MG tablet Take 1 mg by mouth every 6 hours as needed for Anxiety. Future Appointments   Date Time Provider Nikko Lafleur   3/10/2021 10:00 AM Marcy Johnson MD [facility]   5/21/2021  9:30 AM Manish Urena MD [facility]   6/21/2021 11:40 AM Abdirahman Fernandez MD [facility]       Time Spent on discharge is more than 30 minutes in the examination, evaluation, counseling and review of medications and discharge plan. Signed:    Verdis Leyden, MD   2/28/2021      Thank you Manish Urena MD for the opportunity to be involved in this patient's care. If you have any questions or concerns please feel free to contact me at [phone].

## 2021-02-28 NOTE — PROGRESS NOTES
Respiratory Therapy    Call placed to [address] [place] [address] regarding Home O2 setup and delivery today. Awaiting return call from on-call DME    Electronically signed by Amauri Hou RCP on 2/28/2021 at 1:41 PM     [phone] Pt to call [facility] as soon as she gets home. 2 Oxygen tanks delivered to the room at the DME request as the patient lives in [place] and delivery may take a little longer. RN aware.      Electronically signed by Amauri Hou RCP on 2/28/2021 at 2:14 PM

## 2021-02-28 NOTE — CARE COORDINATION
DISCHARGE SUMMARY     DATE OF DISCHARGE: 02/28/2021    DISCHARGE DESTINATION: Home with family    [address] [facility] [address] [id number]  Phone: [phone]  Fax: [phone]    TRANSPORTATION: Family will transport  Name:  Martinez Felt up Time: TBD by patient, family and/or medical staff. Phone Number: [phone]    COMMENTS: SW spoke to patient via telephone and she stated that she was in agreement with discharge to home with oxygen. No further needs noted unless indicated by patient, family and/or medical staff. Respectfully submitted,    JIMMY Leon-S  [facility]   [phone]    Electronically signed by STEPHANIE Srivastava on 2/28/2021 at 1:36 PM

## 2021-02-28 NOTE — DISCHARGE INSTR - COC
Continuity of Care Form    Patient Name: Jennifer Bowen   :  1955  MRN:  [id number]    Admit date:  2021  Discharge date:  ***    Code Status Order: Full Code   Advance Directives:   Advance Care Flowsheet Documentation       Date/Time Healthcare Directive Type of Healthcare Directive Copy in [address] Agent's Name Healthcare Agent's Phone Number    21 [phone]  No, patient does not have an advance directive for healthcare treatment -- -- -- -- --            Admitting Physician:  Trena Urbina DO  PCP: Jessica Fernando MD    Discharging Nurse: [facility] Unit/Room#: [id number]-[id number]/3844-45  Discharging Unit Phone Number: ***    Emergency Contact:   Extended Emergency Contact Information  Primary Emergency Contact: Ofeliashrutih  Address: [address] [place] [address] Phone: [phone]  Mobile Phone: [phone]  Relation: Spouse  Secondary Emergency Contact: [address] Phone: [phone]  Mobile Phone: [phone]  Relation: Brother/Sister    Past Surgical History:  Past Surgical History:   Procedure Laterality Date    BONE MARROW TRANSPLANT      BREAST CYST ASPIRATION      in past benign bilateral over years. 2 episodes     SECTION      CHOLECYSTECTOMY      COLONOSCOPY  9/10/12    polyp, sharlene, repeat 5 years    COLONOSCOPY  10/09/2017    dr Nova Mejía and check in 5 years.  polyps    ENDOMETRIAL ABLATION  2007    FINE NEEDLE ASPIRATION      GALLBLADDER SURGERY      OTHER SURGICAL HISTORY  2019    Abdominal Mass BX    OTHER SURGICAL HISTORY  2019    CT BIOPSY ABDOMEN RETROPERITONEUM,    OVARY REMOVAL Left 2018    benign growth, dr Uriel Stephen at [facility]      TUNNELED VENOUS PORT PLACEMENT Right 2016    Dr. Freddie Mittal       Immunization History:   Immunization History   Administered Date(s) Administered    Hepatitis A Adult (Vaqta) 09/27/2018, 03/28/2019    Influenza Vaccine, unspecified formulation 10/20/2016    Influenza Virus Vaccine 08/28/2019    Influenza, Quadv, IM, PF (6 mo and older Fluzone, Flulaval, Fluarix, and 3 yrs and older Afluria) 09/27/2018, 02/11/2020    Pneumococcal Conjugate 13-valent (Htagloq44) 10/27/2017    Pneumococcal Polysaccharide ([id number]) 10/14/2016       Active Problems:  Patient Active Problem List   Diagnosis Code    Essential hypertension I10    Left atrial flutter by electrocardiogram ([facility] I48.92    Small cell b-cell lymphoma, lymph nodes of multiple sites ([facility] C83.08    Type 2 diabetes mellitus without complication ([facility] I35.2    Hyperlipidemia E78.5    Cyst of left ovary N83.202    Endometrial thickening on ultrasound R93.89    Visit for monitoring Rituxan therapy Z51.81, Z79.899    Degenerative disc disease, lumbar M51.36    Trochanteric bursitis of left hip M70.62    Atrial fibrillation with rapid ventricular response (HCC) I48.91    ZOE (obstructive sleep apnea) G47.33    B-cell lymphoma (HCC) C85.10    Non-Hodgkin's lymphoma (HCC) C85.90    NHL (non-Hodgkin's lymphoma) (HCC) C85.90    PAF (paroxysmal atrial fibrillation) (HCC) I48.0    Sepsis ([facility] A41.9    Upper respiratory infection J06.9    Pneumonia due to COVID-19 virus U07.1, J12.82    Pneumonia J18.9    Acute respiratory failure with hypoxia ([facility] J96.01       Isolation/Infection:   Isolation            Droplet Plus          Patient Infection Status       Infection Onset Added Last Indicated Last Indicated By Review Planned Expiration Resolved Resolved By    COVID-19 02/25/21 02/26/21 02/25/21 COVID-19 03/05/21 03/11/21      Resolved    COVID-19 Rule Out 02/25/21 02/25/21 02/25/21 COVID-19 (Ordered)   02/26/21 Rule-Out Test Resulted    COVID-19 Rule Out 04/17/20 04/17/20 04/17/20 Emergent Disease Panel (Ordered)   04/17/20 Rule-Out Test Resulted    C-diff Rule Out  11/29/19 11/29/19 Clostridium difficile toxin/antigen (Ordered) 11/29/19 Rule-Out Test Resulted            Nurse Assessment:  Last Vital Signs: BP (!) 146/81   Pulse 77   Temp 97.7 °F (36.5 °C) (Oral)   Resp 16   Ht 5' 3\" (1.6 m)   Wt 235 lb 14.3 oz (107 kg)   SpO2 93%   BMI 41.79 kg/m²     Last documented pain score (0-10 scale): Pain Level: 0  Last Weight:   Wt Readings from Last 1 Encounters:   02/28/21 235 lb 14.3 oz (107 kg)     Mental Status:  {IP PT MENTAL STATUS:20030:::0}    IV Access:  { SAUD IV ACCESS:343911624:::0}    Nursing Mobility/ADLs:  Walking   {CHP DME ADLs:438034375:::0}  Transfer  {CHP DME ADLs:341342132:::0}  Bathing  {CHP DME ADLs:570422437:::0}  Dressing  {CHP DME ADLs:312781858:::0}  Toileting  {CHP DME ADLs:263302473:::0}  Feeding  {CHP DME ADLs:790814585:::0}  Med Admin  {CHP DME ADLs:322585785:::0}  Med Delivery   { SAUD MED Delivery:019129886:::0}    Wound Care Documentation and Therapy:        Elimination:  Continence: Bowel: {YES / XR:62635}  Bladder: {YES / DN:04668}  Urinary Catheter: {Urinary Catheter:846023228:::0}   Colostomy/Ileostomy/Ileal Conduit: {YES / FF:17120}       Date of Last BM: ***    Intake/Output Summary (Last 24 hours) at 2/28/2021 1422  Last data filed at 2/27/2021 2159  Gross per 24 hour   Intake 400 ml   Output 300 ml   Net 100 ml     I/O last 3 completed shifts:   In: 400 [P.O.:400]  Out: 300 [Urine:300]    Safety Concerns:     [address] [employer] Safety Concerns:557876848:::0}    Impairments/Disabilities:      [address] [employer] Impairments/Disabilities:697952821:::0}    Nutrition Therapy:  Current Nutrition Therapy:   [address] [employer] Diet List:151250412:::0}    Routes of Feeding: {CHP DME Other Feedings:940480572:::0}  Liquids: {Slp liquid thickness:32403}  Daily Fluid Restriction: {CHP DME Yes amt example:290382233:::0}  Last Modified Barium Swallow with Video (Video Swallowing Test): {Done Not Done CTIU:227280005:::0}    Treatments at the Time of Hospital Discharge:   Respiratory Treatments: ***  Oxygen Therapy:  {Therapy; copd oxygen:64691:::0}  Ventilator:    { CC Vent List:985002233:::0}    Rehab Therapies: {THERAPEUTIC INTERVENTION:5479968263}  Weight Bearing Status/Restrictions: 50Emilie PAREDES Weight Bearin:::0}  Other Medical Equipment (for information only, NOT a DME order):  {EQUIPMENT:563212078}  Other Treatments: ***    Patient's personal belongings (please select all that are sent with patient):  {CHP DME Belongings:938978080:::0}    RN SIGNATURE:  {Esignature:503820616:::0}    CASE MANAGEMENT/SOCIAL WORK SECTION    Inpatient Status Date: ***    Readmission Risk Assessment Score:  Readmission Risk              Risk of Unplanned Readmission:        21           Discharging to Facility/ Agency   Name:   Address:  Phone:  Fax:    Dialysis Facility (if applicable)   Name:  Address:  Dialysis Schedule:  Phone:  Fax:    / signature: {Esignature:095472780:::0}    PHYSICIAN SECTION    Prognosis: {Prognosis:1969663969:::0}    Condition at Discharge: 50Emilie George Patient Condition:209293198:::0}    Rehab Potential (if transferring to Rehab): {Prognosis:9647836582:::0}    Recommended Labs or Other Treatments After Discharge: ***    Physician Certification: I certify the above information and transfer of Gavin Grayson  is necessary for the continuing treatment of the diagnosis listed and that she requires {Admit to Appropriate Level of Care:74080:::0} for {GREATER/LESS:480253001} 30 days.      Update Admission H&P: {CHP DME Changes in HandP:039040833:::0}    PHYSICIAN SIGNATURE:  {Esignature:913999273:::0}

## 2021-02-28 NOTE — PROGRESS NOTES
Port deaccessed and telemetry monitor removed. AVS printed and reviewed with patient, she was given opportunity to ask questions and verbalized understanding of all instructions. Emphasis placed on home 02, follow-up appointments, new medications, and general COVID information. She was given opportunity to ask questions and verbalized understanding of all instructions. Taken by this nurse to private vehicle to return home with spouse.

## 2021-03-01 ENCOUNTER — CARE COORDINATION (OUTPATIENT)
Dept: CASE MANAGEMENT | Age: 66
End: 2021-03-01

## 2021-03-01 DIAGNOSIS — I10 ESSENTIAL HYPERTENSION: Primary | ICD-10-CM

## 2021-03-01 NOTE — CARE COORDINATION
[facility] Transitions Initial Follow Up Call    Call within 2 business days of discharge: Yes    Patient: Raven Lu Patient : 1955   MRN: [id number]  Reason for Admission: pneumonia due to covid  Discharge Date: 21 RARS: Readmission Risk Score: 22      Last Discharge [facility]       Complaint Diagnosis Description Type Department Provider    21 Cough Pneumonia due to COVID-19 virus . .. ED to Hosp-Admission (Discharged) (ADMITTED) Narcisa Cuello MD; Jovanny Alexander,... Challenges to be reviewed by the provider   Additional needs identified to be addressed with provider No  none    Discussed COVID-19 related testing which was available at this time. Test results were positive. Patient informed of results, if available? Yes         Method of communication with provider : none    Advance Care Planning:   Does patient have an Advance Directive:  not on file. Was this a readmission? No    Care Transition Nurse (CTN) contacted the patient by telephone to perform post hospital discharge assessment. Verified name and  with patient as identifiers. Provided introduction to self, and explanation of the CTN role. CTN reviewed discharge instructions, medical action plan and red flags with patient who verbalized understanding. Patient given an opportunity to ask questions and does not have any further questions or concerns at this time. Were discharge instructions available to patient? Yes. Reviewed appropriate site of care based on symptoms and resources available to patient including: PCP and Specialist. The patient agrees to contact the PCP office for questions related to their healthcare. Medication reconciliation was performed with patient, who verbalizes understanding of administration of home medications. Advised obtaining a 90-day supply of all daily and as-needed medications. Covid Risk Education    Patient has following risk factors of: diabetes. goes right back up. Writer educated patient that oxygen level needs to 90% or above, and recommends that patient turn her oxygen up before she starts ambulating so it does not drop down so low. Writer explained to patient not good for oxygen level to drops to 72%. Patient stated usually tests her BG but did not this morning and is aware dexadron can increase BG. Patient stated just has a little cough. Patient stated she is tired but it is because she did not sleep well when she was in the hospital.  Patient stated  is picking up her new medications when he gets off work today and she stated she was told at the hospital that was OK. Patient stated would start new medications later today when she gets them. Patient stated she tested positive for covid on 2-19 but that test was done in [place], so hospital retested her.     Follow Up  Future Appointments   Date Time Provider Nikko Lafleur   3/10/2021 10:00 AM Tee Brunner MD [address]   5/21/2021  9:30 AM Ritu Vivas MD [facility]   6/21/2021 11:40 AM Loc Parker MD [facility]       Mian Rosario RN

## 2021-03-03 ENCOUNTER — CARE COORDINATION (OUTPATIENT)
Dept: CASE MANAGEMENT | Age: 66
End: 2021-03-03

## 2021-03-03 NOTE — CARE COORDINATION
[facility] Transitions Follow Up Call    3/3/2021    Patient: Kimmy Hussein  Patient : 1955   MRN: [id number]  Reason for Admission: covid, pneumonia, acute resp failure, A fib RVR  Discharge Date: 21 RARS: Readmission Risk Score: 22         Needs to be reviewed by the provider   Additional needs identified to be addressed with provider No  none           Method of communication with provider : none    Discussed COVID-19 related testing which was available at this time. Test results were positive. Patient informed of results, if available? Yes    Care Transition Nurse (CTN) contacted the patient by telephone to follow up after admission on 2021 Verified name and  with patient as identifiers. Follow up appointment completed? No    Advance Care Planning:   Does patient have an Advance Directive:  not on file. Discussed appropriate site of care based on symptoms and resources available to patient including: PCP and Specialist. The patient agrees to contact the PCP office for questions related to their healthcare. Patients top risk factors for readmission: medical condition  Interventions to address risk factors: Obtained and reviewed discharge summary and/or continuity of care documents      Plan for follow-up call in 7-10 days based on severity of symptoms and risk factors. Plan for next call: symptom management-covid symptoms  CTN provided contact information for future needs. Writer spoke with patient, Aron Cadet. She stated she ws doing OK. She stated does have and is taking her medications. She stated her radiation treatments are starting back up this Thursday (has 5 more treatments per patient). She stated does not feel the need with f/u appt with Dr. Laurie Reynaga right now, due to her other medical appointments she has. Care Transitions Subsequent and Final Call    Subsequent and Final Calls  Care Transitions Interventions  Other Interventions:            Follow Up  Future Appointments   Date Time Provider Nikko Lafleur   3/10/2021 10:00 AM Leighann Manrique MD [facility]   5/21/2021  9:30 AM Yuliya Ramirez MD [facility]   6/21/2021 11:40 AM Kaiyt Brewster MD [facility]       Cony Pickard RN

## 2021-03-03 NOTE — PROGRESS NOTES
Physician Progress Note      PATIENT:               Kimberly Lepe  CSN #:                  [id number]  :                       1955  ADMIT DATE:       2021 5:08 PM  [address] DATE:        2021 5:16 PM  RESPONDING  PROVIDER #:        Ponce Matias MD          QUERY TEXT:    Pt admitted with COVID-19 pneumonia and acute respiratory failure. Sepsis   listed in Active Problem List but dated 20. If possible, please document   in the progress notes and discharge summary if you are evaluating and /or   treating any of the following: The medical record reflects the following:  Risk Factors: COVID 19 pneumonia, immunosuppression secondary to lymphoma  Clinical Indicators: On arrival T 100.1 - P 158 - R 20 - /98 - O2 sat   68%; WBC 4.6  lactic acid 3.2, Procalcitonin 0.22  Treatment: supplemental O2 and monitoring O2 sat, Decadron    Thank you,  Dee Dee Malone RN, BSN, CCDS  [email or web address]  Options provided:  -- Sepsis, present on admission  -- Sepsis developed subsequent to admission  -- No Sepsis, COVID 19 pneumonia only  -- Other - I will add my own diagnosis  -- Disagree - Not applicable / Not valid  -- Disagree - Clinically unable to determine / Unknown  -- Refer to Clinical Documentation Reviewer    PROVIDER RESPONSE TEXT:    This patient has sepsis which was present on admission.     Query created by: Sharmin Sage on 3/3/2021 6:17 AM      Electronically signed by:  Ponce Matias MD 3/3/2021 12:32 PM

## 2021-03-05 PROCEDURE — 93272 ECG/REVIEW INTERPRET ONLY: CPT | Performed by: NURSE PRACTITIONER

## 2021-03-09 ENCOUNTER — TELEPHONE (OUTPATIENT)
Dept: FAMILY MEDICINE CLINIC | Age: 66
End: 2021-03-09

## 2021-03-09 RX ORDER — GLIMEPIRIDE 2 MG/1
4 TABLET ORAL 2 TIMES DAILY WITH MEALS
Qty: 360 TABLET | Refills: 1 | Status: SHIPPED | OUTPATIENT
Start: 2021-03-09 | End: 2021-09-13 | Stop reason: SDUPTHER

## 2021-03-09 RX ORDER — GLIMEPIRIDE 2 MG/1
4 TABLET ORAL 2 TIMES DAILY WITH MEALS
Qty: 40 TABLET | Refills: 0 | Status: SHIPPED | OUTPATIENT
Start: 2021-03-09 | End: 2021-05-21

## 2021-03-09 NOTE — TELEPHONE ENCOUNTER
Pt stated that the mail order pharmacy told pt that they only got a 30 day supply for the glimepiride (AMARYL) 2 MG tablet   from Dr. Víctor Guerrero in Feb. And I told pt that Dr. Víctor Guerrero sent over for a 90 day supply w/ 1 refill. Pt has been w/out her med now for 5 days. Pt is very upset w/ the mail order pharmacy. Would like to know if Dr. Víctor Guerrero could resubmit the RX to the mail order and then order a 30 day supply - [address] [facility]       Pl advise.    [phone]

## 2021-03-10 ENCOUNTER — OFFICE VISIT (OUTPATIENT)
Dept: CARDIOLOGY CLINIC | Age: 66
End: 2021-03-10
Payer: COMMERCIAL

## 2021-03-10 VITALS
HEIGHT: 63 IN | TEMPERATURE: 97.3 F | SYSTOLIC BLOOD PRESSURE: 132 MMHG | OXYGEN SATURATION: 95 % | HEART RATE: 90 BPM | DIASTOLIC BLOOD PRESSURE: 76 MMHG | WEIGHT: 231 LBS | BODY MASS INDEX: 40.93 KG/M2

## 2021-03-10 DIAGNOSIS — G47.33 OSA (OBSTRUCTIVE SLEEP APNEA): ICD-10-CM

## 2021-03-10 DIAGNOSIS — I48.0 PAF (PAROXYSMAL ATRIAL FIBRILLATION) (HCC): Primary | ICD-10-CM

## 2021-03-10 DIAGNOSIS — I48.92 LEFT ATRIAL FLUTTER BY ELECTROCARDIOGRAM (HCC): ICD-10-CM

## 2021-03-10 PROCEDURE — 93000 ELECTROCARDIOGRAM COMPLETE: CPT | Performed by: INTERNAL MEDICINE

## 2021-03-10 PROCEDURE — 99215 OFFICE O/P EST HI 40 MIN: CPT | Performed by: INTERNAL MEDICINE

## 2021-03-10 NOTE — PATIENT INSTRUCTIONS
If you have any question regarding your left atrial flutter fibrillation ablation please contact Dr. Bossman Marroquin Nurse Chip Lugo at [phone]. Left Atrial Flutter ablation Pre procedure Instructions    Date:______________________________    Arrive at:__________________________    Procedure time:____________________    The morning of your procedure you will park in the hospital parking lot and report directly to the cath lab to check in.      Pre-Procedure Instructions   1. You will need to fast (nothing to eat or drink) for at least 8 hours prior to procedure. 2. You will need to hold your Flecanide for 7 days prior to the procedure. 3. You will need to hold your Eliquis for 12 hours prior to the procedure. .  4. You will need to hold all diabetic medications including,glimepiride (AMARYL) &  Metformin the morning of the procedure. If you take Lantus/Levemir only take ½ your normal dose the evening before. 5. Do not use any lotions, creams or perfume the morning of procedure. 6. You will need to complete pre-procedure lab work 5-7 days prior to your procedure. 7.  A COVID test will be completed upon arrival the day of your procedure. 8. Please have a responsible adult to drive you home after procedure, you should go home same day, but there is always a possibility of an overnight stay. 9. Cath lab will provide you with all post procedure instructions  10. A 3 month follow up will be scheduled with Dr. Bossman Marroquin Nurse practitioner Aron Anand CNP post procedure          Patient Education        Atrial Fibrillation: Care Instructions  Your Care Instructions     Atrial fibrillation is an irregular and often fast heartbeat. Treating this condition is important for several reasons. It can cause blood clots, which can travel from your heart to your brain and cause a stroke. If you have a fast heartbeat, you may feel lightheaded, dizzy, and weak.  An irregular heartbeat can also increase your risk for heart failure. Atrial fibrillation is often the result of another heart condition, such as high blood pressure or coronary artery disease. Making changes to improve your heart condition will help you stay healthy and active. Follow-up care is a key part of your treatment and safety. Be sure to make and go to all appointments, and call your doctor if you are having problems. It's also a good idea to know your test results and keep a list of the medicines you take. How can you care for yourself at home? Medicines    · Take your medicines exactly as prescribed. Call your doctor if you think you are having a problem with your medicine. You will get more details on the specific medicines your doctor prescribes.     · If your doctor has given you a blood thinner to prevent a stroke, be sure you get instructions about how to take your medicine safely. Blood thinners can cause serious bleeding problems.     · Do not take any vitamins, over-the-counter drugs, or herbal products without talking to your doctor first.   Lifestyle changes    · Do not smoke. Smoking can increase your chance of a stroke and heart attack. If you need help quitting, talk to your doctor about stop-smoking programs and medicines. These can increase your chances of quitting for good.     · Eat a heart-healthy diet.     · Stay at a healthy weight. Lose weight if you need to.     · Limit alcohol to 2 drinks a day for men and 1 drink a day for women. Too much alcohol can cause health problems.     · Avoid colds and flu. Get a pneumococcal vaccine shot. If you have had one before, ask your doctor whether you need another dose. Get a flu shot every year. If you must be around people with colds or flu, wash your hands often. Activity    · If your doctor recommends it, get more exercise. Walking is a good choice. Bit by bit, increase the amount you walk every day. Try for at least 30 minutes on most days of the week.  You also may want to swim, bike, or do other activities. Your doctor may suggest that you join a cardiac rehabilitation program so that you can have help increasing your physical activity safely.     · Start light exercise if your doctor says it is okay. Even a small amount will help you get stronger, have more energy, and manage stress. Walking is an easy way to get exercise. Start out by walking a little more than you did in the hospital. Gradually increase the amount you walk.     · When you exercise, watch for signs that your heart is working too hard. You are pushing too hard if you cannot talk while you are exercising. If you become short of breath or dizzy or have chest pain, sit down and rest immediately.     · Check your pulse regularly. Place two fingers on the artery at the palm side of your wrist, in line with your thumb. If your heartbeat seems uneven or fast, talk to your doctor. When should you call for help? Call 911 anytime you think you may need emergency care. For example, call if:    · You have symptoms of a heart attack. These may include:  ? Chest pain or pressure, or a strange feeling in the chest.  ? Sweating. ? Shortness of breath. ? Nausea or vomiting. ? Pain, pressure, or a strange feeling in the back, neck, jaw, or upper belly or in one or both shoulders or arms. ? Lightheadedness or sudden weakness. ? A fast or irregular heartbeat. After you call 911, the  may tell you to chew 1 adult-strength or 2 to 4 low-dose aspirin. Wait for an ambulance. Do not try to drive yourself.     · You have symptoms of a stroke. These may include:  ? Sudden numbness, tingling, weakness, or loss of movement in your face, arm, or leg, especially on only one side of your body. ? Sudden vision changes. ? Sudden trouble speaking. ? Sudden confusion or trouble understanding simple statements. ? Sudden problems with walking or balance.   ? A sudden, severe headache that is different from past headaches.     · You passed out (lost consciousness). Call your doctor now or seek immediate medical care if:    · You have new or increased shortness of breath.     · You feel dizzy or lightheaded, or you feel like you may faint.     · Your heart rate becomes irregular.     · You can feel your heart flutter in your chest or skip heartbeats. Tell your doctor if these symptoms are new or worse. Watch closely for changes in your health, and be sure to contact your doctor if you have any problems. Where can you learn more? Go to https://BackOpspeAsset International.Boomtown!. org and sign in to your Vertical Knowledge account. Enter U020 in the [employer] box to learn more about \"Atrial Fibrillation: Care Instructions. \"     If you do not have an account, please click on the \"Sign Up Now\" link. Current as of: December 16, 2019               Content Version: 12.6  © 3047-7521 HubHuman. Care instructions adapted under license by [facility] ([facility]). If you have questions about a medical condition or this instruction, always ask your healthcare professional. [address] any warranty or liability for your use of this information. Patient Education        Learning About Catheter Ablation for Heart Rhythm Problems  What is catheter ablation? Catheter ablation is a procedure that treats heart rhythm problems. These problems include atrial fibrillation, supraventricular tachycardia (SVT), atrial flutter, and ventricular tachycardia. Your heart should have a strong, steady beat. That beat is controlled by the heart's electrical system. Sometimes that system misfires. This causes a heartbeat that is too fast and isn't steady. Catheter ablation is a way to get into your heart and fix the problem. Ablation is not surgery. How is catheter ablation done? Your doctor inserts thin tubes called catheters into a blood vessel in your groin, arm, or neck. Then your doctor feeds them into the heart.  Wires in the catheters help the doctor find the problem areas. Then the doctor uses the wires to send energy to destroy the tiny areas of heart tissue that are causing the problems. It may seem like a bad idea to destroy parts of your heart on purpose. But the areas that are destroyed are very tiny. They should not affect your heart's ability to do its job. You may be awake during the procedure. Or you may be asleep. The doctor will give you medicines to help you feel relaxed and to numb the areas where the catheters go in. You may feel a little uncomfortable, but you should not feel pain. What can you expect after catheter ablation? You may stay overnight in the hospital. How long you stay in the hospital depends on the type of ablation you have. Do not exercise hard or lift anything heavy for a week. You will probably be able to go back to work and to your normal routine in 1 or 2 days. You may have swelling, bruising, or a small lump around the site where the catheters went into your body. These should go away in 3 to 4 weeks. You may have to take some medicines for a while. Follow-up care is a key part of your treatment and safety. Be sure to make and go to all appointments, and call your doctor if you are having problems. It's also a good idea to know your test results and keep a list of the medicines you take. Where can you learn more? Go to https://AllozynemarcussmsPREP.OneAway. org and sign in to your Socialite account. Enter M130 in the [facility] box to learn more about \"Learning About Catheter Ablation for Heart Rhythm Problems. \"     If you do not have an account, please click on the \"Sign Up Now\" link. Current as of: December 16, 2019               Content Version: 12.6  © 4639-3483 [employer]. Care instructions adapted under license by [facility] ([facility]).  If you have questions about a medical condition or this instruction, always ask your healthcare professional. [address] any warranty or liability for your use of this information. Patient Education        Electrophysiology Study and Catheter Ablation: Before Your Procedure  What is an electrophysiology study and catheter ablation? An electrophysiology study is a test to see if there is a problem with your heart rhythm and to find out how to fix it. It is also called an EP study. A catheter ablation procedure is sometimes done at the same time. This procedure destroys (ablates) small areas of your heart that are causing your heart rhythm problem. The doctor puts plastic tubes called catheters into blood vessels in your groin, arm, or neck. He or she then uses an X-ray machine to guide long wires through the tubes to your heart. The doctor can use these wires to record your heart's electrical signals. If the doctor thinks your problem can be fixed with ablation, he or she can use the wires to destroy a small part of your heart tissue. This is most often done with radio waves. You will probably be awake during the procedure. But you might be asleep. The doctor will give you medicines to help you feel relaxed and to numb the areas where the catheters go in. An EP study and ablation can take 2 to 6 hours. In rare cases, it can take longer. If you have an EP study only and you don't need more treatment, you may go home the same day. But if you also have ablation, you may stay overnight in the hospital. How long you stay in the hospital depends on the type of ablation you have. Do not exercise hard or lift anything heavy for a week. You may be able to go back to work and to your normal routine in 1 or 2 days. Follow-up care is a key part of your treatment and safety. Be sure to make and go to all appointments, and call your doctor if you are having problems. It's also a good idea to know your test results and keep a list of the medicines you take. How do you prepare for the procedure? Procedures can be stressful.  This information will help you understand what you can expect. And it will help you safely prepare for your procedure. Preparing for the procedure    · Be sure you have someone to take you home. Anesthesia and pain medicine will make it unsafe for you to drive or get home on your own.     · Understand exactly what procedure is planned, along with the risks, benefits, and other options.     · Tell your doctor ALL the medicines, vitamins, supplements, and herbal remedies you take. Some may increase the risk of problems during your procedure. Your doctor will tell you if you should stop taking any of them before the procedure and how soon to do it.     · If you take aspirin or some other blood thinner, ask your doctor if you should stop taking it before your procedure. Make sure that you understand exactly what your doctor wants you to do. These medicines increase the risk of bleeding.     · Make sure your doctor and the hospital have a copy of your advance directive. If you don't have one, you may want to prepare one. It lets others know your health care wishes. It's a good thing to have before any type of surgery or procedure. What happens on the day of the procedure? · Follow the instructions exactly about when to stop eating and drinking. If you don't, your procedure may be canceled. If your doctor told you to take your medicines on the day of the procedure, take them with only a sip of water.     · Take a bath or shower before you come in for your procedure. Do not apply lotions, perfumes, deodorants, or nail polish.     · Take off all jewelry and piercings. And take out contact lenses, if you wear them. At the hospital or surgery center   · Bring a picture ID.     · You will be kept comfortable and safe by your anesthesia provider. The anesthesia may make you sleep. Or it may just numb the area being worked on.     · This procedure can take 2 to 6 hours.  In rare cases, it can take longer.     · After the procedure, pressure will be applied to the area where the catheter was put in your blood vessel. Then the area may be covered with a bandage or a compression device. This will prevent bleeding.     · Nurses will check your heart rate and blood pressure. The nurse will also check the catheter site for bleeding.     · If the catheter was put in your groin, you will need to lie still and keep your leg straight for several hours. The nurse may put a weighted bag on your leg to keep it still.     · If the catheter was put in your arm, you may be able to sit up and get out of bed right away. But you will need to keep your arm still for at least 1 hour.     · You may have a bruise or a small lump where the catheter was put in your blood vessel. This is normal and will go away. When should you call your doctor? · You have questions or concerns.     · You don't understand how to prepare for your procedure.     · You become ill before the procedure (such as fever, flu, or a cold).     · You need to reschedule or have changed your mind about having the procedure. Where can you learn more? Go to https://.Fox Networks.Trunk Show. org and sign in to your Skyfiber account. Enter N690 in the Medikidz box to learn more about \"Electrophysiology Study and Catheter Ablation: Before Your Procedure. \"     If you do not have an account, please click on the \"Sign Up Now\" link. Current as of: December 16, 2019               Content Version: 12.6  © 3013-8103 UrtheCast, Incorporated. Care instructions adapted under license by [facility] ([facility]). If you have questions about a medical condition or this instruction, always ask your healthcare professional. [place] [address] any warranty or liability for your use of this information.          Patient Education        Electrophysiology Study and Catheter Ablation: What to Expect at [address] [place] [address] Drive had an electrophysiology study for a problem with your heartbeat. You may also have had a catheter ablation to try to correct the problem. You may have swelling, bruising, or a small lump around the site where the catheters went into your body. These should go away in 3 to 4 weeks. Do not exercise hard or lift anything heavy for a week. You may be able to go back to work and to your normal routine in 1 or 2 days. This care sheet gives you a general idea about how long it will take for you to recover. But each person recovers at a different pace. Follow the steps below to get better as quickly as possible. How can you care for yourself at home? Activity    · For 1 week, do not lift anything that would make you strain. This may include heavy grocery bags and milk containers, a heavy briefcase or backpack, cat litter or dog food bags, a vacuum , or a child.     · For 1 week, do not exercise hard or do any activity that could strain your blood vessels or the site where the catheters went into your body.     · Ask your doctor when it is okay to have sex. Diet    · You can eat your normal diet. If your stomach is upset, try bland, low-fat foods like plain rice, broiled chicken, toast, and yogurt.     · Drink plenty of fluids (unless your doctor tells you not to). Medicines    · Your doctor will tell you if and when you can restart your medicines. He or she will also give you instructions about taking any new medicines.     · If you take aspirin or some other blood thinner, ask your doctor if and when to start taking it again. Make sure that you understand exactly what your doctor wants you to do.     · Ask your doctor if you can take acetaminophen (Tylenol) for pain. Do not take aspirin for 3 days, unless your doctor says it is okay.     · Check with your doctor before you take aspirin or anti-inflammatory medicines to reduce pain and swelling. These include ibuprofen (Advil, Motrin) and naproxen (Aleve).      · Make sure you know which heart medicines to continue and which ones to stop. Ask your doctor if you aren't sure. Catheter site care    · You can remove your bandages the day after the procedure.     · You may shower 24 to 48 hours after the procedure, if your doctor okays it. Pat the incision dry.     · Do not soak the catheter site until it is healed. Don't take a bath for 1 week, or until your doctor tells you it is okay.     · Watch for bleeding from the site. A small amount of blood (up to the size of a quarter) on the bandage can be normal.     · If you are bleeding, lie down and press on the area for 15 minutes to try to make it stop. If the bleeding does not stop, call your doctor or seek immediate medical care. Follow-up care is a key part of your treatment and safety. Be sure to make and go to all appointments, and call your doctor if you are having problems. It's also a good idea to know your test results and keep a list of the medicines you take. When should you call for help? Call  911 anytime you think you may need emergency care. For example, call if:    · You passed out (lost consciousness).     · You have symptoms of a heart attack. These may include:  ? Chest pain or pressure, or a strange feeling in the chest.  ? Sweating. ? Shortness of breath. ? Nausea or vomiting. ? Pain, pressure, or a strange feeling in the back, neck, jaw, or upper belly, or in one or both shoulders or arms. ? Lightheadedness or sudden weakness. ? A fast or irregular heartbeat. After you call 911, the  may tell you to chew 1 adult-strength or 2 to 4 low-dose aspirin. Wait for an ambulance. Do not try to drive yourself.     · You have symptoms of a stroke. These may include:  ? Sudden numbness, tingling, weakness, or loss of movement in your face, arm, or leg, especially on only one side of your body. ? Sudden vision changes. ? Sudden trouble speaking. ? Sudden confusion or trouble understanding simple statements.   ? Sudden problems with walking or balance. ? A sudden, severe headache that is different from past headaches. Call your doctor now or seek immediate medical care if:    · You are bleeding from the area where the catheter was put in your blood vessel.     · You have a fast-growing, painful lump at the catheter site.     · You have signs of infection, such as:  ? Increased pain, swelling, warmth, or redness. ? Red streaks leading from the catheter site. ? Pus draining from the catheter site. ? A fever.     · Your leg, arm, or hand is painful, looks blue, or feels cold, numb, or tingly. Watch closely for any changes in your health, and be sure to contact your doctor if you have any problems. Where can you learn more? Go to https://Peer60.Trusteer. org and sign in to your Auto Load Logic account. Enter [phone] in the [place] [employer] box to learn more about \"Electrophysiology Study and Catheter Ablation: What to Expect at Home. \"     If you do not have an account, please click on the \"Sign Up Now\" link. Current as of: December 16, 2019               Content Version: 12.6  © 5744-1045 [employer]. Care instructions adapted under license by [facility] ([facility]). If you have questions about a medical condition or this instruction, always ask your healthcare professional. [address] any warranty or liability for your use of this information.

## 2021-03-10 NOTE — PROGRESS NOTES
Cardiac Electrophysiology Consultation   Date: 3/10/2021  Reason for Consultation: Atrial fibrillation  Consult Requesting Physician: Wendie Benites MD    Chief Complaint:   Chief Complaint   Patient presents with    Follow-up     afib - no cardiac complaints        HPI: Nikky Espino is a 63 y.o. patient with a history of HTN, ZOE compliant with CPAP, non-hodgkins lymphoma and atrial fibrillation. She follows with Dr. Eliceo Castillo and had an EKG completed 10/1/18 which showed she was in atrial fibrillation. She states she is receiving chemotherapy and was part of a research trial but has since removed herself from the trial.  She was seen by Dr. Rosa Isela Morales and underwent an unsuccessful cardioversion 10/31/18. She denies any palpitations but reports intermittent episodes of lightheadedness. She endorses worsening shortness of breath the past 3 days. Patient underwent a sleep study 11/7/18 which showed moderate obstructive sleep apnea and CPAP was ordered. She has been compliant with her CPAP. She underwent DCCV on 12/27/18 and EKG completed on 1/14/19 confirmed continued SR. She was seen in office on 3/2/2020 she reported that she completed her stem cell transplant. She reports she had an episodes of atrial fibrillation in November 2020. She stated that Dr. Dante Dee increased her Toprol XL to 100 mg daily. Decrease to 50 mg once daily for three day then 12.5 mg BID. On 10/28/2020 she underwent electrophysiology study with radiofrequency ablation of atrial fibrillation and pulmonary vein isolation & ablation on LA roof of roof-dependent left atrial flutter. On 2/2/2021 she was EP nurse practitioner ALEXUS Saldaña CNP for follow up s/p ablation EKG shows SR and 30 day monitor was placed to assess for reoccurrence of atrial fibrillation/ left flutter. She presented to [facility] ED on 2/24/2021 with cough and shortness of breath and tested positive for COVID. EKG showed atrial fibrillation with RVR. Interval history: Today, she arrives for follow up accompanied by her daughter for management of her left atrial flutter, which was first manifested during her last hospitalization. EKG today shows SR. She reports she has one more radiation treatment left for her non hodgkin's lymphoma. She is compliant with her medications and tolerating them well. She denies chest pain/pressure, tightness, edema, shortness of breath, heart rac    Past Medical History:   Diagnosis Date    [facility]     Cancer [facility]     Diabetes mellitus type 2, controlled ([facility]     Hypertension     Melanoma in situ of back [facility])     dr Kenya Manriquez Precordial pain 2016        Past Surgical History:   Procedure Laterality Date    BONE MARROW TRANSPLANT      BREAST CYST ASPIRATION      in past benign bilateral over years. 2 episodes     SECTION      CHOLECYSTECTOMY      COLONOSCOPY  9/10/12    polyp, su, repeat 5 years    COLONOSCOPY  10/09/2017    dr Mona Jorgensen and check in 5 years. polyps    ENDOMETRIAL ABLATION      FINE NEEDLE ASPIRATION      GALLBLADDER SURGERY      OTHER SURGICAL HISTORY  2019    Abdominal Mass BX    OTHER SURGICAL HISTORY  2019    CT BIOPSY ABDOMEN RETROPERITONEUM,    OVARY REMOVAL Left 2018    benign growth, dr Pravin Zazueta at [address] TUNNELED VENOUS PORT PLACEMENT Right 2016    Dr. Trinity Hearn       Allergies: Allergies   Allergen Reactions    Penicillins Hives       Medication:   Prior to Admission medications    Medication Sig Start Date End Date Taking?  Authorizing Provider   glimepiride (AMARYL) 2 MG tablet Take 2 tablets by mouth 2 times daily (with meals) 3/9/21  Yes Apple Macedo MD   glimepiride (AMARYL) 2 MG tablet Take 2 tablets by mouth 2 times daily (with meals) 3/9/21  Yes Apple Macedo MD   flecainide [facility] [place]) 100 MG tablet Take 1 tablet by mouth every 12 hours 21  Yes Carey Rollins MD   albuterol roof of roof-dependent left atrial flutter on 10/28/2020. Presented to [facility] with left atrial flutter with rapid ventricular rate recently. EKG today SR. She has a [id number] Score 3 (HTN, DM, Gender)  On Eliquis 5 mg BID for  thromboembolic risk reduction. Tolerating well no signs or symptoms of abnormal bruising or bleeding. On Flecainide 100 mg BID and Toprol 12.5 mg daily. We educated the patient that left atrial flutter is a worsening and progressive disease, with more frequent episodes that will ensue. Subsequent episodes usually become more sustained to the extent that many individuals would then develop persistent left atrial flutter. Once persistence is reached, permanent left atrial flutter is inevitable. We also discussed the fact that left atrial flutter is associated with stroke, including life-threatening stroke, and therefore oral anticoagulation is warranted depending on the patient's [id number] score. We discussed different management options for left atrial flutter including their risks and benefits. These options include use of cardioversion which provides an effective immediate therapy with success rates of 90% or higher, but it provides no short nor long term efficacy. Anti-arrhythmic medications has been very difficult to control left atrial flutter, both in regards to heart rate and rhythm control even with a powerful anti-arrhythmic medication (amiodarone). Left atrial flutter ablation is a potentially curative therapy with very reasonable success rate. The risks, benefits and alternatives of the left atrial flutter ablation procedure were discussed with the patient.  The risks including, but not limited to, bleeding, infection, radiation exposure, injury to vascular, cardiac and surrounding structures (including pneumothorax), stroke, cardiac perforation, tamponade, need for emergent open heart surgery, need for pacemaker implantation, injury to the phrenic nerve, injury to the esophagus, myocardial infarction and death were discussed in detail. The patient was also counseled at length about the risks of sen Covid-19 in the scotty-operative and post-operative states including the recovery window of their procedure. The patient was made aware that sen Covid-19 after a surgical procedure may worsen their prognosis for recovering from the virus and lend to a higher morbidity and or mortality risk. The patient was given the option of postponing their procedure. I spent 40 minutes face to face with the patient, with greater than 50% of that time spent in counseling on the above. The patient would like some time to deliberate further regarding procedure. If he wishes to proceed, he will contact our nurse, Katya Astorga at which time we will schedule for a radiofrequency ablation with [employer] Navigation system with LISA immediately prior to this ablation. We will order BMP, CBC, PT/INR, and Type & Screen prior to the procedure. Encouraged to watch \"That Sugar Film\" on [employer], which discusses the negative impact of processed sugar has on the body. Much time was spent counseling the patient on healthier lifestyle, following a low sodium diet <2400 mg of sodium a day and dramatically decreasing the intake of processed sugar. Patient educated to eat a diet which includes organic fruits, vegetables and grass fed meats. ZOE  Compliant with CPAP. Follow up three months after procedure with Vivienne Rabago CNP if he decides to proceed otherwise follow up in 1 year  Thank you for allowing me to participate in the care of Christ Norwood. All questions and concerns were addressed to the patient/family. Alternatives to my treatment were discussed. This note was scribed in the presence of Dr. Alem Shepherd MD by Geovani Torres RN. The scribe's documentation has been prepared under my direction and personally reviewed by me in its entirety.  I confirm that the

## 2021-03-12 ENCOUNTER — CARE COORDINATION (OUTPATIENT)
Dept: CASE MANAGEMENT | Age: 66
End: 2021-03-12

## 2021-03-12 DIAGNOSIS — I48.0 PAF (PAROXYSMAL ATRIAL FIBRILLATION) (HCC): ICD-10-CM

## 2021-03-12 DIAGNOSIS — I48.92 LEFT ATRIAL FLUTTER BY ELECTROCARDIOGRAM (HCC): ICD-10-CM

## 2021-03-23 ENCOUNTER — CARE COORDINATION (OUTPATIENT)
Dept: CASE MANAGEMENT | Age: 66
End: 2021-03-23

## 2021-03-23 NOTE — CARE COORDINATION
[facility] Transitions Follow Up Call    3/23/2021    Patient: Lucina Styles  Patient : 1955   MRN: [id number]  Reason for Admission: pneumonia, covid, acute resp failure  Discharge Date: 21 RARS: Readmission Risk Score: 22      Needs to be reviewed by the provider   Additional needs identified to be addressed with provider No  none           Method of communication with provider : none    Discussed COVID-19 related testing which was available at this time. Test results were positive. Patient informed of results, if available? Yes    Care Transition Nurse (CTN) contacted the patient by telephone to follow up after admission on 2021. Verified name and  with patient as identifiers. Addressed changes since last contact: symptom management-SOB, cough  Discharged needs reviewed: none  Follow up appointment completed? Yes    Advance Care Planning:   Does patient have an Advance Directive:  not on file. . Discussed appropriate site of care based on symptoms and resources available to patient including: PCP and Specialist. The patient agrees to contact the PCP office for questions related to their healthcare. Patients top risk factors for readmission: medical condition  Interventions to address risk factors: Obtained and reviewed discharge summary and/or continuity of care documents  CTN provided contact information for future needs. Writer spoke with patient, Nelson Bertrand. She stated she was doing \"good\". She stated is sometimes SOB but does not last long. She stated is not coughing. She stated wearing oxygen at 1L at night and during the day as needed. She stated went to the bathroom and her oxygen dropped to 87% so she put her oxygen on and it went right back up. She stated oxygen level 99%. Writer and patient discussed future calls and patient stated did not think it was necessary. Care transition ended.          Care Transitions Subsequent and Final Call    Subsequent and Final Calls  Care Transitions Interventions  Other Interventions:            Follow Up  Future Appointments   Date Time Provider Nikko Lafleur   5/21/2021  9:30 AM Yasmeen Kline MD [facility]   6/21/2021 11:40 AM Gary Sanchez MD [facility]       Roslyn Pastor RN

## 2021-03-25 RX ORDER — METOPROLOL SUCCINATE 25 MG/1
TABLET, EXTENDED RELEASE ORAL
Qty: 90 TABLET | Refills: 2 | Status: SHIPPED | OUTPATIENT
Start: 2021-03-25 | End: 2022-02-11 | Stop reason: SDUPTHER

## 2021-03-26 DIAGNOSIS — I48.19 PERSISTENT ATRIAL FIBRILLATION (HCC): ICD-10-CM

## 2021-03-26 RX ORDER — FUROSEMIDE 20 MG/1
20 TABLET ORAL DAILY
Qty: 180 TABLET | Refills: 0 | Status: SHIPPED | OUTPATIENT
Start: 2021-03-26 | End: 2021-07-26 | Stop reason: SDUPTHER

## 2021-03-29 ENCOUNTER — TELEPHONE (OUTPATIENT)
Dept: FAMILY MEDICINE CLINIC | Age: 66
End: 2021-03-29

## 2021-03-29 NOTE — TELEPHONE ENCOUNTER
Many medical provider companies will not stop unless we tell them  I assume she is feeling well  If getting around well and no sob and the pulse ox is fine.  She may stop  However it only has been two days  Let us know if still fine on Thursday and if so we can issue a stop order

## 2021-04-01 ENCOUNTER — TELEPHONE (OUTPATIENT)
Dept: FAMILY MEDICINE CLINIC | Age: 66
End: 2021-04-01

## 2021-04-01 RX ORDER — INSULIN DETEMIR 100 [IU]/ML
30 INJECTION, SOLUTION SUBCUTANEOUS NIGHTLY
Qty: 5 PEN | Refills: 3 | Status: SHIPPED | OUTPATIENT
Start: 2021-04-01 | End: 2022-05-23 | Stop reason: SDUPTHER

## 2021-04-01 NOTE — TELEPHONE ENCOUNTER
Spoke with [facility]- need to send a script stating to discontinue home oxygen fax to [phone]. Faxed script to discontinue oxygen for home use.

## 2021-04-09 RX ORDER — POTASSIUM CHLORIDE 750 MG/1
20 TABLET, EXTENDED RELEASE ORAL 2 TIMES DAILY
Qty: 360 TABLET | Refills: 1 | Status: SHIPPED
Start: 2021-04-09 | End: 2021-08-20 | Stop reason: ALTCHOICE

## 2021-04-26 ENCOUNTER — HOSPITAL ENCOUNTER (OUTPATIENT)
Dept: CT IMAGING | Age: 66
Discharge: HOME | End: 2021-04-26
Payer: COMMERCIAL

## 2021-04-26 DIAGNOSIS — C83.08 SMALL B-CELL LYMPHOMA OF LYMPH NODES OF MULTIPLE REGIONS (HCC): ICD-10-CM

## 2021-04-26 LAB
GFR AFRICAN AMERICAN: >60
GFR NON-AFRICAN AMERICAN: >60
PERFORMED ON: NORMAL
POC CREATININE: 0.8 MG/DL (ref 0.6–1.2)
POC SAMPLE TYPE: NORMAL

## 2021-04-26 PROCEDURE — 82565 ASSAY OF CREATININE: CPT

## 2021-04-26 PROCEDURE — 74177 CT ABD & PELVIS W/CONTRAST: CPT

## 2021-04-26 PROCEDURE — 6360000004 HC RX CONTRAST MEDICATION: Performed by: INTERNAL MEDICINE

## 2021-04-26 RX ADMIN — IOPAMIDOL 75 ML: 755 INJECTION, SOLUTION INTRAVENOUS at 08:49

## 2021-04-26 RX ADMIN — IOHEXOL 50 ML: 240 INJECTION, SOLUTION INTRATHECAL; INTRAVASCULAR; INTRAVENOUS; ORAL at 08:48

## 2021-05-15 DIAGNOSIS — Z79.4 TYPE 2 DIABETES MELLITUS WITHOUT COMPLICATION, WITH LONG-TERM CURRENT USE OF INSULIN (HCC): ICD-10-CM

## 2021-05-15 DIAGNOSIS — E11.9 TYPE 2 DIABETES MELLITUS WITHOUT COMPLICATION, WITH LONG-TERM CURRENT USE OF INSULIN (HCC): ICD-10-CM

## 2021-05-15 LAB — GLUCOSE BLD-MCNC: 223 MG/DL (ref 70–99)

## 2021-05-16 LAB
ESTIMATED AVERAGE GLUCOSE: 174.3 MG/DL
HBA1C MFR BLD: 7.7 %

## 2021-05-21 ENCOUNTER — OFFICE VISIT (OUTPATIENT)
Dept: FAMILY MEDICINE CLINIC | Age: 66
End: 2021-05-21
Payer: COMMERCIAL

## 2021-05-21 VITALS
WEIGHT: 231 LBS | SYSTOLIC BLOOD PRESSURE: 128 MMHG | BODY MASS INDEX: 40.93 KG/M2 | DIASTOLIC BLOOD PRESSURE: 74 MMHG | HEART RATE: 80 BPM | HEIGHT: 63 IN | TEMPERATURE: 98.6 F

## 2021-05-21 DIAGNOSIS — I10 ESSENTIAL HYPERTENSION: ICD-10-CM

## 2021-05-21 DIAGNOSIS — Z79.4 TYPE 2 DIABETES MELLITUS WITHOUT COMPLICATION, WITH LONG-TERM CURRENT USE OF INSULIN (HCC): Primary | ICD-10-CM

## 2021-05-21 DIAGNOSIS — E87.6 LOW BLOOD POTASSIUM: ICD-10-CM

## 2021-05-21 DIAGNOSIS — R20.2 TINGLING OF BOTH FEET: ICD-10-CM

## 2021-05-21 DIAGNOSIS — E11.9 TYPE 2 DIABETES MELLITUS WITHOUT COMPLICATION, WITH LONG-TERM CURRENT USE OF INSULIN (HCC): Primary | ICD-10-CM

## 2021-05-21 LAB — POTASSIUM SERPL-SCNC: 4.2 MMOL/L (ref 3.5–5.1)

## 2021-05-21 PROCEDURE — 3051F HG A1C>EQUAL 7.0%<8.0%: CPT | Performed by: FAMILY MEDICINE

## 2021-05-21 PROCEDURE — 99214 OFFICE O/P EST MOD 30 MIN: CPT | Performed by: FAMILY MEDICINE

## 2021-05-21 SDOH — ECONOMIC STABILITY: FOOD INSECURITY: WITHIN THE PAST 12 MONTHS, YOU WORRIED THAT YOUR FOOD WOULD RUN OUT BEFORE YOU GOT MONEY TO BUY MORE.: NEVER TRUE

## 2021-05-21 ASSESSMENT — ENCOUNTER SYMPTOMS
ABDOMINAL PAIN: 0
SHORTNESS OF BREATH: 0
BLOOD IN STOOL: 0

## 2021-05-21 ASSESSMENT — SOCIAL DETERMINANTS OF HEALTH (SDOH): HOW HARD IS IT FOR YOU TO PAY FOR THE VERY BASICS LIKE FOOD, HOUSING, MEDICAL CARE, AND HEATING?: NOT HARD AT ALL

## 2021-05-21 NOTE — PATIENT INSTRUCTIONS
Stay with the diet and the current medicines  Do the nerve testing to check the legs and feet   Do a blood test to check the potassium  See us back in 3 months

## 2021-05-21 NOTE — PROGRESS NOTES
Anxiety. , Disp: , Rfl:   acetaminophen (TYLENOL) 500 MG tablet, Take 500 mg by mouth every 6 hours as needed for Pain, Disp: , Rfl:   valACYclovir (VALTREX) 500 MG tablet, Take 500 mg by mouth 2 times daily, Disp: , Rfl:   loratadine (CLARITIN) 10 MG capsule, Take 10 mg by mouth daily , Disp: , Rfl:     No current facility-administered medications for this visit.      ---------------------------               05/21/21                      0926         ---------------------------   BP:          128/74         Site:    Left Upper Arm     Position:     Sitting        Cuff Size:   Large Adult      Pulse:         80           Temp:    98.6 °F (37 °C)    TempSrc:    Temporal        Weight: 231 lb (104.8 kg)   Height:   5' 3\" (1.6 m)    ---------------------------  Body mass index is 40.92 kg/m². Wt Readings from Last 3 Encounters:  05/21/21 : 231 lb (104.8 kg)  03/10/21 : 231 lb (104.8 kg)  02/28/21 : 235 lb 14.3 oz (107 kg)    BP Readings from Last 3 Encounters:  05/21/21 : 128/74  03/10/21 : 132/76  02/28/21 : (!) 146/81                      Review of Systems   Constitutional: Negative for appetite change, chills, fever and unexpected weight change. Respiratory: Negative for shortness of breath. She will have mcconnell at times. Cardiovascular: Negative for chest pain. Gastrointestinal: Negative for abdominal pain and blood in stool. Genitourinary: Negative for difficulty urinating, dysuria and hematuria. Neurological: Negative for headaches. Objective:   Physical Exam  Constitutional:       General: She is not in acute distress. Appearance: She is well-developed. She is not diaphoretic. HENT:      Head: Normocephalic and atraumatic. Eyes:      General: No scleral icterus. Neck:      Thyroid: No thyroid mass or thyromegaly. Cardiovascular:      Rate and Rhythm: Normal rate and regular rhythm.       Pulses:           Dorsalis pedis pulses are 2+ on the right side and 2+ on the left side. Posterior tibial pulses are 2+ on the right side and 2+ on the left side. Heart sounds: Normal heart sounds. No murmur heard. No friction rub. No gallop. Comments: Trace edema  Pulmonary:      Effort: Pulmonary effort is normal. No tachypnea, accessory muscle usage or respiratory distress. Breath sounds: Normal breath sounds. No decreased breath sounds, wheezing, rhonchi or rales. Musculoskeletal:      Cervical back: Neck supple. Comments: Feet are warm, pink, dry. No lesions and the monofilament is normal both sides. Lymphadenopathy:      Cervical: No cervical adenopathy. Upper Body:      Right upper body: No supraclavicular adenopathy. Left upper body: No supraclavicular adenopathy. Skin:     General: Skin is warm and dry. Coloration: Skin is not pale. Neurological:      General: No focal deficit present. Mental Status: She is alert. Assessment:        Diagnosis Orders   1. Type 2 diabetes mellitus without complication, with long-term current use of insulin ([facility])  HM DIABETES FOOT EXAM   2. Essential hypertension  POTASSIUM   3. Tingling of both feet  Nerve conduction test   4.  Low blood potassium  POTASSIUM       She tells me she is contacting the hospital to get the repeat mammogram views in the next week or so   She had a visit with cardiology for the PAF on 3/10  She had a visit with heme onc on 4/7 and reviewed note  Mammogram noted from the 14th and she needs repeat views  Reviewed the ct of the abd with her and the results are fine from 4/26/21  She is using the cpap     Her recent glucose and hga1c is noted   Lab Results   Component Value Date    LABA1C 7.7 05/15/2021     Lab Results   Component Value Date    .3 05/15/2021     This is better and down from 8.2 in novemeber      Plan:      Stay with the diet and the current medicines  Do the nerve testing to check the legs and feet   Do a blood test to check the potassium  See us back in 3 months         Miguel Sebastian MD

## 2021-06-01 RX ORDER — FLECAINIDE ACETATE 100 MG/1
100 TABLET ORAL EVERY 12 HOURS SCHEDULED
Qty: 180 TABLET | Refills: 3 | Status: SHIPPED | OUTPATIENT
Start: 2021-06-01 | End: 2022-05-20 | Stop reason: SDUPTHER

## 2021-06-01 NOTE — TELEPHONE ENCOUNTER
Last OV: 3/10/21  Next OV: not swcheduled  Most recent Labs: 2/28/21  Last EKG (if needed): 3/10/21  (flecainide, sotalol, amiodarone, digoxin and propafenone within 1 year)

## 2021-06-03 ENCOUNTER — TELEPHONE (OUTPATIENT)
Dept: FAMILY MEDICINE CLINIC | Age: 66
End: 2021-06-03

## 2021-06-03 RX ORDER — METFORMIN HYDROCHLORIDE 500 MG/1
1000 TABLET, EXTENDED RELEASE ORAL 2 TIMES DAILY WITH MEALS
Qty: 360 TABLET | Refills: 1 | Status: SHIPPED | OUTPATIENT
Start: 2021-06-03 | End: 2021-10-12 | Stop reason: SDUPTHER

## 2021-06-03 NOTE — TELEPHONE ENCOUNTER
Done  Orders Placed This Encounter   Medications    metFORMIN (GLUCOPHAGE-XR) 500 MG extended release tablet     Sig: Take 2 tablets by mouth 2 times daily (with meals)     Dispense:  360 tablet     Refill:  1

## 2021-06-03 NOTE — TELEPHONE ENCOUNTER
pt needs a new 90 day rx on metFORMIN (GLUCOPHAGE-XR) 500 MG extended release tablet to be sent to [facility] mail order pharmacy. Pt is almost out of the med.

## 2021-06-15 ENCOUNTER — HOSPITAL ENCOUNTER (OUTPATIENT)
Dept: NEUROLOGY | Age: 66
Discharge: HOME | End: 2021-06-15
Payer: COMMERCIAL

## 2021-06-15 PROCEDURE — 95886 MUSC TEST DONE W/N TEST COMP: CPT

## 2021-06-15 PROCEDURE — 95909 NRV CNDJ TST 5-6 STUDIES: CPT

## 2021-06-15 NOTE — PROCEDURES
Test Date:  6/15/2021    Patient: Bonnie Jones : 1955 Physician: Radha Ovalle DO   Sex: Female ID#:  Ref Phys: Mery Julien MD     Patient Complaints:  Patient is a 63year-old female who presents with numbness bilateral feet associated with pain from toes to ball of feet. Patient with bone spurs and hammer toes. Patient History / Exam:  PMH: + diabetes, managed with oral meds and insulin, + chemo for non Hodgkins lymphoma, + radiation. No back or foot surgery PE:  reflexes absent, normal strength     NCV & EMG Findings:  Evaluation of the left fibular (EDB) motor nerve showed decreased conduction velocity (36 m/s). The right fibular (EDB) motor nerve showed reduced amplitude (1.12 mV) and decreased conduction velocity (36 m/s). The left medial plantar (mixed) sensory, the left sural sensory, and the right sural sensory nerves showed no response. The right medial plantar (mixed) sensory nerve showed prolonged distal peak latency (4.4 ms) and reduced amplitude (3 µV). All remaining nerves (as indicated in the following tables) were within normal limits. Needle evaluation of the right abductor hallucis and the left abductor hallucis muscles showed increased motor unit amplitude and diminished recruitment. All remaining muscles (as indicated in the following table) showed no evidence of electrical instability. Impression:  Study is consistent with mild sensorimotor peripheral neuropathy. No evidence of an acute radiculopathy or other lower motor neuron dysfunction. Thank you.        Radha Ovalle DO        Nerve Conduction Studies  Motor Nerve Results      Latency Amplitude F-Lat Segment Distance CV Comment   Site (ms) Norm (mV) Norm (ms)  (cm) (m/s) Norm    Left Fibular (EDB) Motor   Ankle 4.8  < 6.1 2.8  > 2.0         Bel Fib Head 13.5 - - -  Bel Fib Head-Ankle 31 36  > 38    Right Fibular (EDB) Motor   Ankle 5.5  < 6.1 1.12  > 2.0         Bel Fib Head 14.3 - 0.90 -  Bel Fib Head-Ankle 32 36  > 38    Right Tibial (AHB) Motor   Ankle 5.3  < 6.1 9.7  > 4.4         Knee 14.2 - 2.6 -  Knee-Ankle 35 39  > 39      Sensory Nerve Results      Latency (Peak) Amplitude (P-P) Segment Distance CV Comment   Site (ms) Norm (µV) Norm  (cm) (m/s) Norm    Left Medial Plantar (Mixed) Sensory   Med Sole-Med Mall NR  < 3.7 NR  > 10 Med Sole-Med Mall - NR -    Right Medial Plantar (Mixed) Sensory   Med Sole-Med Mall 4.4  < 3.7 3  > 10 Med Sole-Med Mall - - -    Left Sural Sensory   Calf-Lat Mall NR  < 4.0 NR  > 5 Calf-Lat Mall 14 NR  > 35    Right Sural Sensory   Calf-Lat Mall NR  < 4.0 NR  > 5 Calf-Lat Mall 14 NR  > 35        Electromyography     Side Muscle Nerve Root Ins Act Fibs Psw Amp Dur Poly Recrt Int Pat Comment   Right Gluteus Med Sup Gluteal L5-S1 Nml Nml Nml Nml Nml 0 Nml Nml    Right Vastus Med Femoral L2-L4 Nml Nml Nml Nml Nml 0 Nml Nml    Right Add Longus Obturator L2-L4 Nml Nml Nml Nml Nml 0 Nml Nml    Right Tib Anterior Deep Fibular,  Fibula. .. L4-L5 Nml Nml Nml Nml Nml 0 Nml Nml    Right Fib longus  L5-S1 Nml Nml Nml Nml Nml 0 Nml Nml    Right Gastroc MH Tibial S1-S2 Nml Nml Nml Nml Nml 0 Nml Nml    Right Ext Vivas Long Deep Fibular,  Fibula. .. L5-S1 Nml Nml Nml Nml Nml 0 Nml Nml    Right EDB Deep Fibular,  Fibula. .. L5-S1 Nml Nml Nml Nml Nml 0 Nml Nml    Right AHB Medial Plantar,  Tibi. .. S1-S2 Nml Nml Nml Incr Nml 0 Reduced Nml    Right Lumbo Paraspinal (Upper) Rami L1-L2 Nml Nml Nml         Right Lumbo Paraspinal (Mid) Rami L3-L4 Nml Nml Nml         Right Lumbo Paraspinal (Lower) Rami L5-S1 Nml Nml Nml         Left Gluteus Med Sup Gluteal L5-S1 Nml Nml Nml Nml Nml 0 Nml Nml    Left Vastus Med Femoral L2-L4 Nml Nml Nml Nml Nml 0 Nml Nml    Left Add Longus Obturator L2-L4 Nml Nml Nml Nml Nml 0 Nml Nml    Left Tib Anterior Deep Fibular,  Fibula. ..  L4-L5 Nml Nml Nml Nml Nml 0 Nml Nml    Left Fib longus  L5-S1 Nml Nml Nml Nml Nml 0 Nml Nml    Left Gastroc MH Tibial S1-S2 Nml Nml Nml Nml Nml 0

## 2021-06-17 RX ORDER — PEN NEEDLE, DIABETIC 32GX 5/32"
NEEDLE, DISPOSABLE MISCELLANEOUS
Qty: 100 EACH | Refills: 1 | Status: SHIPPED | OUTPATIENT
Start: 2021-06-17 | End: 2021-09-29

## 2021-06-21 ENCOUNTER — OFFICE VISIT (OUTPATIENT)
Dept: SLEEP MEDICINE | Age: 66
End: 2021-06-21
Payer: COMMERCIAL

## 2021-06-21 ENCOUNTER — TELEPHONE (OUTPATIENT)
Dept: FAMILY MEDICINE CLINIC | Age: 66
End: 2021-06-21

## 2021-06-21 VITALS
HEART RATE: 72 BPM | OXYGEN SATURATION: 98 % | DIASTOLIC BLOOD PRESSURE: 86 MMHG | WEIGHT: 229.2 LBS | RESPIRATION RATE: 18 BRPM | TEMPERATURE: 96.4 F | SYSTOLIC BLOOD PRESSURE: 141 MMHG | BODY MASS INDEX: 40.61 KG/M2 | HEIGHT: 63 IN

## 2021-06-21 DIAGNOSIS — G47.33 OSA ON CPAP: Primary | ICD-10-CM

## 2021-06-21 DIAGNOSIS — Z99.89 OSA ON CPAP: Primary | ICD-10-CM

## 2021-06-21 DIAGNOSIS — Z99.89 DEPENDENCE ON OTHER ENABLING MACHINES AND DEVICES: ICD-10-CM

## 2021-06-21 PROCEDURE — 99213 OFFICE O/P EST LOW 20 MIN: CPT | Performed by: PSYCHIATRY & NEUROLOGY

## 2021-06-21 RX ORDER — NORTRIPTYLINE HYDROCHLORIDE 10 MG/1
10 CAPSULE ORAL NIGHTLY
Qty: 30 CAPSULE | Refills: 3 | Status: SHIPPED | OUTPATIENT
Start: 2021-06-21 | End: 2021-08-30

## 2021-06-21 ASSESSMENT — SLEEP AND FATIGUE QUESTIONNAIRES
HOW LIKELY ARE YOU TO NOD OFF OR FALL ASLEEP WHEN YOU ARE A PASSENGER IN A CAR FOR AN HOUR WITHOUT A BREAK: 0
HOW LIKELY ARE YOU TO NOD OFF OR FALL ASLEEP IN A CAR, WHILE STOPPED FOR A FEW MINUTES IN TRAFFIC: 0
HOW LIKELY ARE YOU TO NOD OFF OR FALL ASLEEP WHILE SITTING AND TALKING TO SOMEONE: 1
HOW LIKELY ARE YOU TO NOD OFF OR FALL ASLEEP WHILE SITTING QUIETLY AFTER LUNCH WITHOUT ALCOHOL: 0
ESS TOTAL SCORE: 2
HOW LIKELY ARE YOU TO NOD OFF OR FALL ASLEEP WHILE LYING DOWN TO REST IN THE AFTERNOON WHEN CIRCUMSTANCES PERMIT: 0
NECK CIRCUMFERENCE (INCHES): 0
HOW LIKELY ARE YOU TO NOD OFF OR FALL ASLEEP WHILE SITTING AND READING: 1
HOW LIKELY ARE YOU TO NOD OFF OR FALL ASLEEP WHILE WATCHING TV: 0
HOW LIKELY ARE YOU TO NOD OFF OR FALL ASLEEP WHILE SITTING INACTIVE IN A PUBLIC PLACE: 0

## 2021-06-21 ASSESSMENT — ENCOUNTER SYMPTOMS: APNEA: 0

## 2021-06-21 NOTE — PROGRESS NOTES
Michelle Chago         : 1955        PHONE: [phone] (home)   [] [address]     [x] [address]      [] [place]     [][facility]    [] [address]  [] [facility]     [] Other:    Diagnosis: [x] ZOE (G47.33) [] CSA (G47.31) [] Apnea (G47.30)   Length of Need: [] 12 Months [x] 99 Months [] Other:    Machine (MARKUS!): [] Respironics Dream Station      Auto [x] ResMed AirSense     Auto [] Other:     []  CPAP () [] Bilevel ()   Mode: [] Auto [] Spontaneous    Mode: [] Auto [] Spontaneous           D/C Respironics   Start ResMed machine at 9 cm                 Comfort Settings:   - Ramp Pressure: 5 cmH2O                                        - Ramp time: 15 min                                     -  Flex/EPR - 3 full time                                    - For ResMed Bilevel (TiMax-4 sec   TiMin- 0.2 sec)     Humidifier: [] Heated ()        [x] Water chamber replacement ()/ 1 per 6 months        Mask:   [x] Nasal () /1 per 3 months [] Full Face () /1 per 3 months   [x] Patient choice -Size and fit mask [] Patient Choice - Size and fit mask   [] Dispense:  [] Dispense:    [x] Headgear () / 1 per 3 months [] Headgear () / 1 per 3 months   [x] Replacement Nasal Cushion ()/2 per month [] Interface Replacement ()/1 per month   [x] Replacement Nasal Pillows ()/2 per month         Tubing: [x] Heated ()/1 per 3 months    [] Standard ()/1 per 3 months [] Other:           Filters: [x] Non-disposable ()/1 per 6 months     [x] Ultra-Fine, Disposable ()/2 per month        Miscellaneous: [] Chin Strap ()/ 1 per 6 months [] O2 bleed-in:       LPM   [] Oximetry on CPAP/Bilevel []  Other:          Start Order Date: 21    MEDICAL JUSTIFICATION:  I, the undersigned, certify that the above prescribed supplies are medically necessary for this patients wellbeing.   In my opinion, the supplies are both reasonable and necessary in reference to accepted standards of medicalpractice in treatment of this patients condition.     Thuan Velasquez MD      NPI: [id number]       Order Signed Date: 06/21/21    Electronically signed by Thuan Velasquez MD on 6/21/2021 at 11:57 AM

## 2021-06-21 NOTE — PATIENT INSTRUCTIONS
help, you might try a different type of machine. Some machines have air pressure that adjusts on its own. Others have air pressures that are different when you breathe in than when you breathe out. This may reduce discomfort caused by too much pressure in your nose. Where can you learn more? Go to [email or web address] org and sign in to your [employer] account. Enter H407 in the [employer] box to learn more about \"Learning About CPAP for Sleep Apnea. \"     If you do not have an account, please click on the \"Sign Up Now\" link. Current as of: October 26, 2020               Content Version: 12.9  © 2006-2021 [employer]. Care instructions adapted under license by [facility] ([facility]). If you have questions about a medical condition or this instruction, always ask your healthcare professional. [address] any warranty or liability for your use of this information.

## 2021-06-21 NOTE — PROGRESS NOTES
 Vaping Use: Never used   Substance and Sexual Activity    Alcohol use: Not Currently     Alcohol/week: 0.0 standard drinks    Drug use: Never    Sexual activity: Not on file   Other Topics Concern    Not on file   Social History Narrative    Not on file     Social Determinants of Health     Financial Resource Strain: Low Risk     Difficulty of Paying Living Expenses: Not hard at all   Food Insecurity: No Food Insecurity    Worried About [address] in the Last Year: Never true    [facility] [facility] [employer] in the Last Year: Never true   Transportation Needs:     Lack of Transportation (Medical):  Lack of Transportation (Non-Medical):    Physical Activity:     Days of Exercise per Week:     Minutes of Exercise per Session:    Stress:     Feeling of Stress :    Social Connections:     Frequency of Communication with Friends and Family:     Frequency of Social Gatherings with Friends and Family:     Attends Faith Services:     Active Member of Clubs or Organizations:     Attends Club or Organization Meetings:     Marital Status:    Intimate Partner Violence:     Fear of Current or Ex-Partner:     Emotionally Abused:     Physically Abused:     Sexually Abused:        Prior to Admission medications    Medication Sig Start Date End Date Taking? Authorizing Provider   metFORMIN (GLUCOPHAGE-XR) 500 MG extended release tablet Take 2 tablets by mouth 2 times daily (with meals) 6/3/21  Yes Dago Dennis MD   [facility] [place]) 100 MG tablet Take 1 tablet by mouth every 12 hours 6/1/21  Yes Owen Lowe MD   potassium chloride (KLOR-CON M) 10 MEQ extended release tablet Take 2 tablets by mouth 2 times daily 4/9/21  Yes Dago Dennis MD   furosemide (LASIX) 20 MG tablet Take 1 tablet by mouth daily .  May take an additional dose as needed for shortness of breath, weight gain and edema 3/26/21  Yes Owen Lowe MD   metoprolol succinate (TOPROL XL) 25 MG extended release tablet Take 1 tablet by mouth daily 3/25/21  Yes Rola Schroeder MD   glimepiride (AMARYL) 2 MG tablet Take 2 tablets by mouth 2 times daily (with meals) 3/9/21  Yes Karen Cordon MD   albuterol sulfate  (90 Base) MCG/ACT inhaler Inhale 2 puffs into the lungs 4 times daily 2/28/21  Yes Nayeli Kramer MD   apixaban (ELIQUIS) 5 MG TABS tablet Take 1 tablet by mouth 2 times daily 10/20/20  Yes Rola Schroeder MD   LORazepam (ATIVAN) 1 MG tablet Take 1 mg by mouth every 6 hours as needed for Anxiety.    Yes Historical Provider, MD   acetaminophen (TYLENOL) 500 MG tablet Take 500 mg by mouth every 6 hours as needed for Pain   Yes Historical Provider, MD   valACYclovir (VALTREX) 500 MG tablet Take 500 mg by mouth 2 times daily   Yes Historical Provider, MD   loratadine (CLARITIN) 10 MG capsule Take 10 mg by mouth daily    Yes Historical Provider, MD   BD PEN NEEDLE RAMIRO U/F 32G X 4 MM MISC USE WITH insulin daily 6/17/21   Karen Cordon MD   insulin detemir (LEVEMIR FLEXTOUCH) 100 UNIT/ML injection pen Inject 30 Units into the skin nightly 4/1/21   Karen Cordon MD       Allergies as of 06/21/2021 - Fully Reviewed 06/21/2021   Allergen Reaction Noted    Penicillins Hives 12/30/2015       Patient Active Problem List   Diagnosis    Essential hypertension    Left atrial flutter by electrocardiogram ([address]    Small cell b-cell lymphoma, lymph nodes of multiple sites ([address]    Type 2 diabetes mellitus without complication ([address]    Hyperlipidemia    Cyst of left ovary    Endometrial thickening on ultrasound    Visit for monitoring Rituxan therapy    Degenerative disc disease, lumbar    Trochanteric bursitis of left hip    Atrial fibrillation with rapid ventricular response ([address]    ZOE (obstructive sleep apnea)    B-cell lymphoma (HCC)    Non-Hodgkin's lymphoma (HCC)    NHL (non-Hodgkin's lymphoma) (HCC)    PAF (paroxysmal atrial fibrillation) ([address]    Sepsis ([address]    Upper respiratory infection    Pneumonia due to COVID-19 virus    Pneumonia    Acute respiratory failure with hypoxia [facility])       Past Medical History:   Diagnosis Date    [facility] RVR [facility]     Cancer ([facility]     Diabetes mellitus type 2, controlled ([facility]     Hypertension     Melanoma in situ of back [facility])     dr Monet Miller Precordial pain 2016       Past Surgical History:   Procedure Laterality Date    BONE MARROW TRANSPLANT      BREAST CYST ASPIRATION      in past benign bilateral over years. 2 episodes     SECTION      CHOLECYSTECTOMY      COLONOSCOPY  9/10/12    polyp, su, repeat 5 years    COLONOSCOPY  10/09/2017    dr Wilson Daughters and check in 5 years. polyps    ENDOMETRIAL ABLATION  2007    FINE NEEDLE ASPIRATION      GALLBLADDER SURGERY      OTHER SURGICAL HISTORY  2019    Abdominal Mass BX    OTHER SURGICAL HISTORY  2019    CT BIOPSY ABDOMEN RETROPERITONEUM,    OVARY REMOVAL Left 2018    benign growth, dr Geraldine Del Rosario at [facility] TUNNELED VENOUS PORT PLACEMENT Right 2016    Dr. Clay Madden       Family History   Problem Relation Age of Onset    High Blood Pressure Mother     Cancer Mother         breast    High Blood Pressure Father     Cancer Father         lung       Review of Systems   Constitutional: Negative for fatigue. Respiratory: Negative for apnea. Genitourinary: Negative for frequency. Neurological: Negative for headaches. Objective:     Vitals:  Weight BMI Neck circumference    Wt Readings from Last 3 Encounters:   21 229 lb 3.2 oz (104 kg)   21 231 lb (104.8 kg)   03/10/21 231 lb (104.8 kg)    Body mass index is 40.6 kg/m².  Neck circumference: 0     BP HR SaO2   BP Readings from Last 3 Encounters:   21 (!) 141/86   21 128/74   03/10/21 132/76    Pulse Readings from Last 3 Encounters:   21 72   21 80   03/10/21 90    SpO2 Readings from Last 3 Encounters:   21 98%   03/10/21 95%   21 94% Themandibular molar Class :   [x]1 []2 []3      Mallampati I Airway Classification:   []1 []2 [x]3 []4      Physical Exam  Vitals and nursing note reviewed. Constitutional:       Appearance: Normal appearance. HENT:      Head: Atraumatic. Nose: Nose normal.      Mouth/Throat:      Mouth: Mucous membranes are moist.   Eyes:      Extraocular Movements: Extraocular movements intact. Cardiovascular:      Rate and Rhythm: Normal rate and regular rhythm. Heart sounds: Normal heart sounds. Pulmonary:      Effort: Pulmonary effort is normal.      Breath sounds: Normal breath sounds. Musculoskeletal:         General: Normal range of motion. Cervical back: Normal range of motion and neck supple. Skin:     General: Skin is warm. Neurological:      General: No focal deficit present. Psychiatric:         Mood and Affect: Mood normal.         :   Moderate Obstructive Sleep Apnea/Hypopnea Syndrome under good control on PAP at 9 cmwp. Diagnosis Orders   1. ZOE on CPAP     2. Dependence on other enabling machines and devices       Plan:   I will replace the Respironics machine by ResMed machine, will hold the machine till she gets the new   Will continue the PAP at 9 cmwp. I will order PAP supplies, mask, filters. ... No orders of the defined types were placed in this encounter. Return in about 1 year (around 6/21/2022) for Reveiwing CPAP usage and compliance report and tro.     Bg Zuñiga MD  Medical Director [address] [place] [address]

## 2021-07-08 NOTE — PROGRESS NOTES
No need to quit using the Respironics machine nor replacing the machine, the patient can go to [email or web address] to check the machine serial number to make sure that no recall on machine.

## 2021-07-09 ENCOUNTER — TELEPHONE (OUTPATIENT)
Dept: SLEEP MEDICINE | Age: 66
End: 2021-07-09

## 2021-07-09 NOTE — PROGRESS NOTES
Called and spoke to Ophelia Fletcher. She has been using her machine but has yet to get it registered. Was given Paris Jose phone number and A1 to call and register machine.

## 2021-07-26 ENCOUNTER — HOSPITAL ENCOUNTER (OUTPATIENT)
Dept: CT IMAGING | Age: 66
Discharge: HOME | End: 2021-07-26
Payer: COMMERCIAL

## 2021-07-26 DIAGNOSIS — C83.08 SMALL CELL B-CELL LYMPHOMA, LYMPH NODES OF MULTIPLE SITES (HCC): ICD-10-CM

## 2021-07-26 DIAGNOSIS — I48.19 PERSISTENT ATRIAL FIBRILLATION (HCC): ICD-10-CM

## 2021-07-26 LAB
GFR AFRICAN AMERICAN: 55
GFR NON-AFRICAN AMERICAN: 45
PERFORMED ON: ABNORMAL
POC CREATININE: 1.2 MG/DL (ref 0.6–1.2)
POC SAMPLE TYPE: ABNORMAL

## 2021-07-26 PROCEDURE — 74177 CT ABD & PELVIS W/CONTRAST: CPT

## 2021-07-26 PROCEDURE — 82565 ASSAY OF CREATININE: CPT

## 2021-07-26 PROCEDURE — 6360000004 HC RX CONTRAST MEDICATION: Performed by: INTERNAL MEDICINE

## 2021-07-26 RX ORDER — FUROSEMIDE 20 MG/1
20 TABLET ORAL DAILY
Qty: 180 TABLET | Refills: 0 | Status: SHIPPED | OUTPATIENT
Start: 2021-07-26 | End: 2021-09-28

## 2021-07-26 RX ADMIN — IOHEXOL 50 ML: 240 INJECTION, SOLUTION INTRATHECAL; INTRAVASCULAR; INTRAVENOUS; ORAL at 09:17

## 2021-07-26 RX ADMIN — IOPAMIDOL 75 ML: 755 INJECTION, SOLUTION INTRAVENOUS at 09:17

## 2021-07-26 NOTE — TELEPHONE ENCOUNTER
Last OV: 3/10/21  Next OV: x  Last refill: 3/26/21 180x0  Most recent Labs: 2/28/21 cmp  Last PT/INR (if needed):  Last EKG (if needed):

## 2021-08-20 ENCOUNTER — OFFICE VISIT (OUTPATIENT)
Dept: FAMILY MEDICINE CLINIC | Age: 66
End: 2021-08-20
Payer: COMMERCIAL

## 2021-08-20 VITALS
WEIGHT: 230 LBS | HEART RATE: 80 BPM | DIASTOLIC BLOOD PRESSURE: 84 MMHG | BODY MASS INDEX: 40.75 KG/M2 | TEMPERATURE: 97.3 F | HEIGHT: 63 IN | SYSTOLIC BLOOD PRESSURE: 126 MMHG

## 2021-08-20 DIAGNOSIS — F41.9 ANXIETY: ICD-10-CM

## 2021-08-20 DIAGNOSIS — Z79.4 TYPE 2 DIABETES MELLITUS WITHOUT COMPLICATION, WITH LONG-TERM CURRENT USE OF INSULIN (HCC): Primary | ICD-10-CM

## 2021-08-20 DIAGNOSIS — E11.9 TYPE 2 DIABETES MELLITUS WITHOUT COMPLICATION, WITH LONG-TERM CURRENT USE OF INSULIN (HCC): Primary | ICD-10-CM

## 2021-08-20 DIAGNOSIS — I10 ESSENTIAL HYPERTENSION: ICD-10-CM

## 2021-08-20 PROCEDURE — 99214 OFFICE O/P EST MOD 30 MIN: CPT | Performed by: FAMILY MEDICINE

## 2021-08-20 PROCEDURE — 3051F HG A1C>EQUAL 7.0%<8.0%: CPT | Performed by: FAMILY MEDICINE

## 2021-08-20 RX ORDER — LORAZEPAM 1 MG/1
1 TABLET ORAL DAILY PRN
Qty: 20 TABLET | Refills: 0 | Status: SHIPPED | OUTPATIENT
Start: 2021-08-20 | End: 2021-09-09

## 2021-08-20 ASSESSMENT — ENCOUNTER SYMPTOMS
SHORTNESS OF BREATH: 0
VOMITING: 0
NAUSEA: 0
CONSTIPATION: 0
ABDOMINAL PAIN: 0
ABDOMINAL DISTENTION: 0
BLOOD IN STOOL: 0
DIARRHEA: 0
CHEST TIGHTNESS: 0

## 2021-08-20 NOTE — PROGRESS NOTES
Subjective:      Patient ID: Doris Baeza is a 63 y.o. female. Chief Complaint   Patient presents with    3 Month Follow-Up     diabetes, hypertension, lipids - pt is fasting        Patient presents with:  3 Month Follow-Up: diabetes, hypertension, lipids - pt is fasting    She is here for the above  She is some improved with the pamelor and the nerve sx in the feet    She did not tolerate the neurontin in the past     Glucose seems to be improving    YOB: 1955    Date of Visit:  8/20/2021     -- Penicillins -- Hives    Current Outpatient Medications:  furosemide (LASIX) 20 MG tablet, Take 1 tablet by mouth daily . May take an additional dose as needed for shortness of breath, weight gain and edema, Disp: 180 tablet, Rfl: 0  nortriptyline (PAMELOR) 10 MG capsule, Take 1 capsule by mouth nightly, Disp: 30 capsule, Rfl: 3  BD PEN NEEDLE RAMIRO U/F 32G X 4 MM MISC, USE WITH insulin daily, Disp: 100 each, Rfl: 1  metFORMIN (GLUCOPHAGE-XR) 500 MG extended release tablet, Take 2 tablets by mouth 2 times daily (with meals), Disp: 360 tablet, Rfl: 1  flecainide (TAMBOCOR) 100 MG tablet, Take 1 tablet by mouth every 12 hours, Disp: 180 tablet, Rfl: 3  insulin detemir (LEVEMIR FLEXTOUCH) 100 UNIT/ML injection pen, Inject 30 Units into the skin nightly, Disp: 5 pen, Rfl: 3  metoprolol succinate (TOPROL XL) 25 MG extended release tablet, Take 1 tablet by mouth daily, Disp: 90 tablet, Rfl: 2  glimepiride (AMARYL) 2 MG tablet, Take 2 tablets by mouth 2 times daily (with meals), Disp: 360 tablet, Rfl: 1  albuterol sulfate  (90 Base) MCG/ACT inhaler, Inhale 2 puffs into the lungs 4 times daily, Disp: 1 Inhaler, Rfl: 3  apixaban (ELIQUIS) 5 MG TABS tablet, Take 1 tablet by mouth 2 times daily, Disp: 180 tablet, Rfl: 3  LORazepam (ATIVAN) 1 MG tablet, Take 1 mg by mouth every 6 hours as needed for Anxiety. , Disp: , Rfl:   acetaminophen (TYLENOL) 500 MG tablet, Take 500 mg by mouth every 6 hours as needed anastacia. Comments:     Pulmonary:      Effort: Pulmonary effort is normal. No tachypnea, accessory muscle usage or respiratory distress. Breath sounds: Normal breath sounds. No decreased breath sounds, wheezing, rhonchi or rales. Abdominal:      General: Bowel sounds are normal. There is no distension or abdominal bruit. Palpations: Abdomen is soft. There is no hepatomegaly, splenomegaly, mass or pulsatile mass. Tenderness: There is no abdominal tenderness. There is no guarding. Musculoskeletal:      Cervical back: Neck supple. Lymphadenopathy:      Cervical: No cervical adenopathy. Upper Body:      Right upper body: No supraclavicular adenopathy. Left upper body: No supraclavicular adenopathy. Skin:     General: Skin is warm and dry. Coloration: Skin is not pale. Nails: There is no clubbing. Neurological:      Mental Status: She is alert. Assessment:        Diagnosis Orders   1. Type 2 diabetes mellitus without complication, with long-term current use of insulin (HCC)  Glucose    Hemoglobin A1C    HEPATIC FUNCTION PANEL   2. Essential hypertension     3. Anxiety  LORazepam (ATIVAN) 1 MG tablet     Orders Placed This Encounter   Medications    LORazepam (ATIVAN) 1 MG tablet     Sig: Take 1 tablet by mouth daily as needed for Anxiety for up to 20 days. Dispense:  20 tablet     Refill:  0       She requested having ativan at home for panic and anxeity.  She does not use a lot but wanted to have on hand  She is cautioned on side effects of medicine    [place]ARRS report was accessed and reviewed on this date     Lab Results   Component Value Date    LABA1C 7.7 05/15/2021     Lab Results   Component Value Date    .3 05/15/2021             Plan:      Use the ativan cautiously  Stay with the diet  Continue the medicines  See in about 4 months         Pedro Suárez MD

## 2021-08-30 ENCOUNTER — TELEPHONE (OUTPATIENT)
Dept: FAMILY MEDICINE CLINIC | Age: 66
End: 2021-08-30

## 2021-08-30 RX ORDER — NORTRIPTYLINE HYDROCHLORIDE 10 MG/1
20 CAPSULE ORAL NIGHTLY
Qty: 60 CAPSULE | Refills: 3 | Status: ON HOLD | OUTPATIENT
Start: 2021-08-30 | End: 2022-07-06

## 2021-08-30 NOTE — TELEPHONE ENCOUNTER
Sent to [facility]     Orders Placed This Encounter   Medications    nortriptyline (PAMELOR) 10 MG capsule     Sig: Take 2 capsules by mouth nightly     Dispense:  60 capsule     Refill:  3

## 2021-08-30 NOTE — TELEPHONE ENCOUNTER
When patient was seen 8/20/21 you told her she can take 2 of the pamelor due to her nerve pain, she is just double checking because it did not say on after visit summary. If so she will need a new script stating twice a day.     Please call, [phone]

## 2021-09-13 ENCOUNTER — TELEPHONE (OUTPATIENT)
Dept: FAMILY MEDICINE CLINIC | Age: 66
End: 2021-09-13

## 2021-09-13 RX ORDER — GLIMEPIRIDE 2 MG/1
4 TABLET ORAL 2 TIMES DAILY WITH MEALS
Qty: 360 TABLET | Refills: 1 | Status: SHIPPED | OUTPATIENT
Start: 2021-09-13 | End: 2022-03-02 | Stop reason: SDUPTHER

## 2021-09-13 NOTE — TELEPHONE ENCOUNTER
Ok done    Orders Placed This Encounter   Medications    glimepiride (AMARYL) 2 MG tablet     Sig: Take 2 tablets by mouth 2 times daily (with meals)     Dispense:  360 tablet     Refill:  1

## 2021-09-16 ENCOUNTER — OFFICE VISIT (OUTPATIENT)
Dept: FAMILY MEDICINE CLINIC | Age: 66
End: 2021-09-16
Payer: COMMERCIAL

## 2021-09-16 VITALS
WEIGHT: 236 LBS | RESPIRATION RATE: 16 BRPM | BODY MASS INDEX: 41.81 KG/M2 | DIASTOLIC BLOOD PRESSURE: 82 MMHG | SYSTOLIC BLOOD PRESSURE: 138 MMHG | HEART RATE: 82 BPM | OXYGEN SATURATION: 96 % | TEMPERATURE: 97.6 F

## 2021-09-16 DIAGNOSIS — M54.2 NECK PAIN: Primary | ICD-10-CM

## 2021-09-16 PROCEDURE — 99213 OFFICE O/P EST LOW 20 MIN: CPT | Performed by: INTERNAL MEDICINE

## 2021-09-16 RX ORDER — TIZANIDINE 2 MG/1
2 TABLET ORAL 3 TIMES DAILY PRN
Qty: 30 TABLET | Refills: 0 | Status: SHIPPED | OUTPATIENT
Start: 2021-09-16 | End: 2022-04-22

## 2021-09-16 RX ORDER — METHYLPREDNISOLONE 4 MG/1
TABLET ORAL
Qty: 1 KIT | Refills: 0 | Status: SHIPPED | OUTPATIENT
Start: 2021-09-16 | End: 2021-09-22

## 2021-09-16 ASSESSMENT — ENCOUNTER SYMPTOMS
EYE REDNESS: 0
NAUSEA: 0
ABDOMINAL PAIN: 0
CHEST TIGHTNESS: 0
VOMITING: 0
SORE THROAT: 1
BACK PAIN: 0
CONSTIPATION: 0
SHORTNESS OF BREATH: 1
COUGH: 1
RHINORRHEA: 1
VOICE CHANGE: 0
TROUBLE SWALLOWING: 1
EYE PAIN: 0
DIARRHEA: 0

## 2021-09-16 NOTE — PROGRESS NOTES
SUBJECTIVE:  Mitch Comer is a 63 y.o. female being evaluated for:    Chief Complaint   Patient presents with    Neck Pain     Pt started a few days ago with bilateral neck pain that is progressively getting worse. Pt not able to turn her head from side to side. Took Tylenol with no relief. HPI   Two weeks ago she had a cough, cold symptoms. These lasted about a week and a half, and she is feeling a bit better. Still with post nasal drip Used vics cold and flu over the counter which helped her sleep. And since then she has had dfficulty with her neck. No back pain. Pain goes from her left side of her neck down around her jaw to the left. Throbbing in her neck at times, particularly with stress. She had pain in both of her arms as well but this is getting better. The pain in her neck is getting worse, going from shoulder to shoulder. Pain is worse when she is lying down. She feels like she cannot lift her head off the pillow under her own strength. She thinks she pulled a muscle when she was blowing her nowse. Has decreased range of motion in her neck in all directions. Can move her head farther to the right than the left. The pain is constant and even goes down into her neck. It hurts to ride in the car when it's bumpy. Tried taking tylenol for the pain, also took vics and neither of those helped. Difficulty swallowing. Almost everything makes it worse, it even hurts when sitting. Nothing like this has ever happened before. She reports a knot on the back of her neck right in the middle. She has a port, but that area isn't bothering her. No numbness or pins and needles feeling. Only change in medications was the nortriptyline. She has also felt shortness of breath and has used her albuterol. Gynecologist did a urine test, she was diagnosed with a UTI, and was given an antibiotic. On bactrim bid for 3 days     Flonase and c pap for sleep apnea.     Allergies   Allergen Reactions    Penicillins Hives     Current Outpatient Medications   Medication Sig Dispense Refill    tiZANidine (ZANAFLEX) 2 MG tablet Take 1 tablet by mouth 3 times daily as needed (neck pain and spasm) 30 tablet 0    glimepiride (AMARYL) 2 MG tablet Take 2 tablets by mouth 2 times daily (with meals) 360 tablet 1    nortriptyline (PAMELOR) 10 MG capsule Take 2 capsules by mouth nightly 60 capsule 3    furosemide (LASIX) 20 MG tablet Take 1 tablet by mouth daily . May take an additional dose as needed for shortness of breath, weight gain and edema 180 tablet 0    BD PEN NEEDLE RAMIRO U/F 32G X 4 MM MISC USE WITH insulin daily 100 each 1    metFORMIN (GLUCOPHAGE-XR) 500 MG extended release tablet Take 2 tablets by mouth 2 times daily (with meals) 360 tablet 1    flecainide (TAMBOCOR) 100 MG tablet Take 1 tablet by mouth every 12 hours 180 tablet 3    insulin detemir (LEVEMIR FLEXTOUCH) 100 UNIT/ML injection pen Inject 30 Units into the skin nightly 5 pen 3    metoprolol succinate (TOPROL XL) 25 MG extended release tablet Take 1 tablet by mouth daily 90 tablet 2    albuterol sulfate  (90 Base) MCG/ACT inhaler Inhale 2 puffs into the lungs 4 times daily 1 Inhaler 3    acetaminophen (TYLENOL) 500 MG tablet Take 500 mg by mouth every 6 hours as needed for Pain      valACYclovir (VALTREX) 500 MG tablet Take 500 mg by mouth 2 times daily      loratadine (CLARITIN) 10 MG capsule Take 10 mg by mouth daily       apixaban (ELIQUIS) 5 MG TABS tablet Take 1 tablet by mouth 2 times daily 180 tablet 1     No current facility-administered medications for this visit.          Social History     Socioeconomic History    Marital status:      Spouse name: Not on file    Number of children: Not on file    Years of education: Not on file    Highest education level: Not on file   Occupational History    Not on file   Tobacco Use    Smoking status: Former Smoker     Packs/day: 1.00     Years: 0.80     Pack years: 0.80     Types: Heart sounds: Normal heart sounds. No murmur heard. No friction rub. No gallop. Comments:     Pulmonary:      Effort: Pulmonary effort is normal. No tachypnea or accessory muscle usage. Breath sounds: Normal breath sounds. No decreased breath sounds. Abdominal:      General: There is no distension or abdominal bruit. Palpations: Abdomen is soft. There is no hepatomegaly, splenomegaly or pulsatile mass. Tenderness: There is no abdominal tenderness. Musculoskeletal:         General: No swelling. Cervical back: Neck supple. Tenderness present. Lymphadenopathy:      Cervical: No cervical adenopathy. Upper Body:      Right upper body: No supraclavicular adenopathy. Left upper body: No supraclavicular adenopathy. Skin:     General: Skin is warm and dry. Nails: There is no clubbing. Neurological:      General: No focal deficit present. Mental Status: She is alert. Gait: Gait normal.         ASSESSMENT/PLAN:    Yoly was seen today for neck pain. Diagnoses and all orders for this visit:    Neck pain  -     tiZANidine (ZANAFLEX) 2 MG tablet; Take 1 tablet by mouth 3 times daily as needed (neck pain and spasm)  -     methylPREDNISolone (MEDROL DOSEPACK) 4 MG tablet; Take by mouth po as directed per package        Orders Placed This Encounter   Medications    tiZANidine (ZANAFLEX) 2 MG tablet     Sig: Take 1 tablet by mouth 3 times daily as needed (neck pain and spasm)     Dispense:  30 tablet     Refill:  0    methylPREDNISolone (MEDROL DOSEPACK) 4 MG tablet     Sig: Take by mouth po as directed per package     Dispense:  1 kit     Refill:  0        No follow-ups on file. There are no Patient Instructions on file for this visit.

## 2021-09-24 DIAGNOSIS — I48.19 PERSISTENT ATRIAL FIBRILLATION (HCC): ICD-10-CM

## 2021-09-27 DIAGNOSIS — I48.19 PERSISTENT ATRIAL FIBRILLATION (HCC): ICD-10-CM

## 2021-09-28 RX ORDER — FUROSEMIDE 20 MG/1
TABLET ORAL
Qty: 180 TABLET | Refills: 0 | Status: SHIPPED | OUTPATIENT
Start: 2021-09-28 | End: 2022-03-30 | Stop reason: SDUPTHER

## 2021-09-28 NOTE — TELEPHONE ENCOUNTER
Last ov 3/10/21  Pending appt yearly,2022 schedule not out yet  Last refill 7/26/21 #180 NR  Last labs 2/28/21

## 2021-09-29 RX ORDER — PEN NEEDLE, DIABETIC 32GX 5/32"
NEEDLE, DISPOSABLE MISCELLANEOUS
Qty: 100 EACH | Refills: 1 | Status: SHIPPED | OUTPATIENT
Start: 2021-09-29 | End: 2022-05-20

## 2021-10-12 RX ORDER — METFORMIN HYDROCHLORIDE 500 MG/1
1000 TABLET, EXTENDED RELEASE ORAL 2 TIMES DAILY WITH MEALS
Qty: 360 TABLET | Refills: 1 | Status: SHIPPED | OUTPATIENT
Start: 2021-10-12 | End: 2022-06-03 | Stop reason: SDUPTHER

## 2021-11-05 ENCOUNTER — OFFICE VISIT (OUTPATIENT)
Dept: FAMILY MEDICINE CLINIC | Age: 66
End: 2021-11-05
Payer: COMMERCIAL

## 2021-11-05 VITALS
BODY MASS INDEX: 40.75 KG/M2 | TEMPERATURE: 97.1 F | WEIGHT: 230 LBS | HEIGHT: 63 IN | DIASTOLIC BLOOD PRESSURE: 78 MMHG | SYSTOLIC BLOOD PRESSURE: 124 MMHG

## 2021-11-05 DIAGNOSIS — M79.10 MYALGIA: ICD-10-CM

## 2021-11-05 DIAGNOSIS — Z01.84 IMMUNITY STATUS TESTING: ICD-10-CM

## 2021-11-05 DIAGNOSIS — M79.10 MYALGIA: Primary | ICD-10-CM

## 2021-11-05 DIAGNOSIS — Z23 NEEDS FLU SHOT: ICD-10-CM

## 2021-11-05 LAB
ANION GAP SERPL CALCULATED.3IONS-SCNC: 15 MMOL/L (ref 3–16)
BASOPHILS ABSOLUTE: 0 K/UL (ref 0–0.2)
BASOPHILS RELATIVE PERCENT: 0.5 %
BUN BLDV-MCNC: 15 MG/DL (ref 7–20)
CALCIUM SERPL-MCNC: 9.6 MG/DL (ref 8.3–10.6)
CHLORIDE BLD-SCNC: 101 MMOL/L (ref 99–110)
CO2: 25 MMOL/L (ref 21–32)
CREAT SERPL-MCNC: 0.7 MG/DL (ref 0.6–1.2)
EOSINOPHILS ABSOLUTE: 0.1 K/UL (ref 0–0.6)
EOSINOPHILS RELATIVE PERCENT: 1.6 %
GFR AFRICAN AMERICAN: >60
GFR NON-AFRICAN AMERICAN: >60
GLUCOSE BLD-MCNC: 119 MG/DL (ref 70–99)
HCT VFR BLD CALC: 39.6 % (ref 36–48)
HEMOGLOBIN: 13.1 G/DL (ref 12–16)
LYMPHOCYTES ABSOLUTE: 0.5 K/UL (ref 1–5.1)
LYMPHOCYTES RELATIVE PERCENT: 6.9 %
MCH RBC QN AUTO: 33.3 PG (ref 26–34)
MCHC RBC AUTO-ENTMCNC: 33.1 G/DL (ref 31–36)
MCV RBC AUTO: 100.5 FL (ref 80–100)
MONOCYTES ABSOLUTE: 0.6 K/UL (ref 0–1.3)
MONOCYTES RELATIVE PERCENT: 7.6 %
NEUTROPHILS ABSOLUTE: 6.1 K/UL (ref 1.7–7.7)
NEUTROPHILS RELATIVE PERCENT: 83.4 %
PDW BLD-RTO: 16.4 % (ref 12.4–15.4)
PLATELET # BLD: 211 K/UL (ref 135–450)
PMV BLD AUTO: 9.6 FL (ref 5–10.5)
POTASSIUM SERPL-SCNC: 3 MMOL/L (ref 3.5–5.1)
RBC # BLD: 3.94 M/UL (ref 4–5.2)
SARS-COV-2 ANTIBODY, TOTAL: NEGATIVE
SEDIMENTATION RATE, ERYTHROCYTE: 54 MM/HR (ref 0–30)
SODIUM BLD-SCNC: 141 MMOL/L (ref 136–145)
WBC # BLD: 7.4 K/UL (ref 4–11)

## 2021-11-05 PROCEDURE — 99213 OFFICE O/P EST LOW 20 MIN: CPT | Performed by: FAMILY MEDICINE

## 2021-11-05 PROCEDURE — 90471 IMMUNIZATION ADMIN: CPT | Performed by: FAMILY MEDICINE

## 2021-11-05 PROCEDURE — 90674 CCIIV4 VAC NO PRSV 0.5 ML IM: CPT | Performed by: FAMILY MEDICINE

## 2021-11-05 NOTE — PROGRESS NOTES
hours as needed for Pain, Disp: , Rfl:   valACYclovir (VALTREX) 500 MG tablet, Take 500 mg by mouth 2 times daily, Disp: , Rfl:   loratadine (CLARITIN) 10 MG capsule, Take 10 mg by mouth daily , Disp: , Rfl:     No current facility-administered medications for this visit.      ---------------------------               11/05/21                      1355         ---------------------------   BP:          124/78         Site:    Left Upper Arm     Position:     Sitting        Cuff Size:   Large Adult      Temp:   97.1 °F (36.2 °C)   TempSrc:    Temporal        Weight: 230 lb (104.3 kg)   Height:   5' 3\" (1.6 m)    ---------------------------  Body mass index is 40.74 kg/m². Wt Readings from Last 3 Encounters:  11/05/21 : 230 lb (104.3 kg)  09/16/21 : 236 lb (107 kg)  08/20/21 : 230 lb (104.3 kg)    BP Readings from Last 3 Encounters:  11/05/21 : 124/78  09/16/21 : 138/82  08/20/21 : 126/84                  Review of Systems    Objective:   Physical Exam  Constitutional:       General: She is not in acute distress. Appearance: Normal appearance. She is well-developed. She is not ill-appearing or diaphoretic. Cardiovascular:      Rate and Rhythm: Normal rate and regular rhythm. Heart sounds: Normal heart sounds. No murmur heard. No friction rub. No gallop. Pulmonary:      Effort: Pulmonary effort is normal. No tachypnea, accessory muscle usage or respiratory distress. Breath sounds: Normal breath sounds. No decreased breath sounds, wheezing, rhonchi or rales. Lymphadenopathy:      Cervical: No cervical adenopathy. Upper Body:      Right upper body: No supraclavicular adenopathy. Left upper body: No supraclavicular adenopathy. Skin:     General: Skin is warm and dry. Coloration: Skin is not pale. Neurological:      Mental Status: She is alert. Assessment:       Diagnosis Orders   1.  Myalgia  Basic Metabolic Panel    CBC Auto Differential    Sedimentation Rate   2. Immunity status testing  Covid-19, Antibody, Total   3. Needs flu shot       Discussed.  She will stop the medicine to see if an effect is present  She is stable at this time      Plan:      Stop the nortriptyline  Call me next week with an update   See us in december        Sade Ferreira MD

## 2021-11-05 NOTE — PROGRESS NOTES
Vaccine Information Sheet, \"Influenza - Inactivated\"  given to Herbert Ramon, or parent/legal guardian of  Herbert Ramon and verbalized understanding. Patient responses:    Have you ever had a reaction to a flu vaccine? No  Do you have any current illness? No  Have you ever had Guillian Penn Syndrome? No  Do you have a serious allergy to any of the follow: Neomycin, Polymyxin, Thimerosal, eggs or egg products? No    Flu vaccine given per order. Please see immunization tab. Risks and benefits explained. Current VIS given.

## 2021-11-06 ENCOUNTER — TELEPHONE (OUTPATIENT)
Dept: FAMILY MEDICINE CLINIC | Age: 66
End: 2021-11-06

## 2021-11-06 RX ORDER — POTASSIUM CHLORIDE 750 MG/1
10 TABLET, EXTENDED RELEASE ORAL DAILY
Qty: 30 TABLET | Refills: 5 | Status: SHIPPED | OUTPATIENT
Start: 2021-11-06 | End: 2022-08-19 | Stop reason: SDUPTHER

## 2021-11-06 NOTE — TELEPHONE ENCOUNTER
Patient called back on my cell. She has k at home. She had been told to stop by another doctor. She still has at home the 10 meq tab. She read script off to me. I told her to restart. Take 2 today and tomorrow then one po daily. Call me in a week with update. She will also stay off the pamelor at this time as discussed in office.  I told her to ignore the script called in today

## 2021-11-10 ENCOUNTER — TELEPHONE (OUTPATIENT)
Dept: FAMILY MEDICINE CLINIC | Age: 66
End: 2021-11-10

## 2021-11-10 NOTE — TELEPHONE ENCOUNTER
Pt is calling w/ her status after taking the potasium, she is much better, still has pain in left arm and right elbow but as to being achy all over she is not any more. Pt is wanting to know how her Immune system is with covid?     Pl advise [phone] (home)

## 2021-11-12 NOTE — TELEPHONE ENCOUNTER
She has no immunity to the virus  We did check the antibodies  The vaccines do not work all the time  That is why people who have been vaccinated get the virus  She can consider getting the booster as long as she did well with the original

## 2021-12-20 ENCOUNTER — OFFICE VISIT (OUTPATIENT)
Dept: FAMILY MEDICINE CLINIC | Age: 66
End: 2021-12-20
Payer: COMMERCIAL

## 2021-12-20 ENCOUNTER — HOSPITAL ENCOUNTER (OUTPATIENT)
Age: 66
Discharge: HOME | End: 2021-12-20
Payer: COMMERCIAL

## 2021-12-20 ENCOUNTER — HOSPITAL ENCOUNTER (OUTPATIENT)
Dept: CT IMAGING | Age: 66
Discharge: HOME | End: 2021-12-20
Payer: COMMERCIAL

## 2021-12-20 VITALS
WEIGHT: 229 LBS | HEART RATE: 72 BPM | HEIGHT: 63 IN | SYSTOLIC BLOOD PRESSURE: 128 MMHG | TEMPERATURE: 97.2 F | BODY MASS INDEX: 40.57 KG/M2 | DIASTOLIC BLOOD PRESSURE: 84 MMHG

## 2021-12-20 DIAGNOSIS — E87.6 LOW BLOOD POTASSIUM: ICD-10-CM

## 2021-12-20 DIAGNOSIS — E11.9 TYPE 2 DIABETES MELLITUS WITHOUT COMPLICATION, WITH LONG-TERM CURRENT USE OF INSULIN (HCC): ICD-10-CM

## 2021-12-20 DIAGNOSIS — Z79.4 TYPE 2 DIABETES MELLITUS WITHOUT COMPLICATION, WITH LONG-TERM CURRENT USE OF INSULIN (HCC): ICD-10-CM

## 2021-12-20 DIAGNOSIS — C83.08 SMALL CELL B-CELL LYMPHOMA OF LYMPH NODES OF MULTIPLE SITES (HCC): ICD-10-CM

## 2021-12-20 DIAGNOSIS — I10 ESSENTIAL HYPERTENSION: ICD-10-CM

## 2021-12-20 DIAGNOSIS — I10 ESSENTIAL HYPERTENSION: Primary | ICD-10-CM

## 2021-12-20 LAB
ESTIMATED AVERAGE GLUCOSE: 159.9 MG/DL
GLUCOSE BLD-MCNC: 125 MG/DL (ref 70–99)
HBA1C MFR BLD: 7.2 %
POTASSIUM SERPL-SCNC: 3.5 MMOL/L (ref 3.5–5.1)

## 2021-12-20 PROCEDURE — 3051F HG A1C>EQUAL 7.0%<8.0%: CPT | Performed by: FAMILY MEDICINE

## 2021-12-20 PROCEDURE — 83036 HEMOGLOBIN GLYCOSYLATED A1C: CPT

## 2021-12-20 PROCEDURE — 99214 OFFICE O/P EST MOD 30 MIN: CPT | Performed by: FAMILY MEDICINE

## 2021-12-20 PROCEDURE — 36415 COLL VENOUS BLD VENIPUNCTURE: CPT

## 2021-12-20 PROCEDURE — 82947 ASSAY GLUCOSE BLOOD QUANT: CPT

## 2021-12-20 PROCEDURE — 84132 ASSAY OF SERUM POTASSIUM: CPT

## 2021-12-20 PROCEDURE — 74177 CT ABD & PELVIS W/CONTRAST: CPT

## 2021-12-20 PROCEDURE — 6360000004 HC RX CONTRAST MEDICATION: Performed by: INTERNAL MEDICINE

## 2021-12-20 RX ADMIN — IOHEXOL 50 ML: 240 INJECTION, SOLUTION INTRATHECAL; INTRAVASCULAR; INTRAVENOUS; ORAL at 12:07

## 2021-12-20 RX ADMIN — IOPAMIDOL 75 ML: 755 INJECTION, SOLUTION INTRAVENOUS at 12:07

## 2021-12-20 ASSESSMENT — ENCOUNTER SYMPTOMS
CONSTIPATION: 0
CHEST TIGHTNESS: 0
VOMITING: 0
BLOOD IN STOOL: 0
SHORTNESS OF BREATH: 0
DIARRHEA: 0
ABDOMINAL DISTENTION: 0
NAUSEA: 0
ABDOMINAL PAIN: 0

## 2021-12-20 NOTE — PATIENT INSTRUCTIONS
Do continue the medicine and the diet  Call for any problems  See in about 4 months

## 2021-12-20 NOTE — PROGRESS NOTES
Subjective:      Patient ID: Geraldine Callejas is a 61 y.o. female. Chief Complaint   Patient presents with    Follow-up     diabetes, hypertension, lipids        Patient presents with: Follow-up: diabetes, hypertension, lipids    She is here for a recheck  She is actually doing very well now  She did take the K   It was low at 3.0    Feeling ok now   At home her glucose is 160 range      YOB: 1955    Date of Visit:  12/20/2021     -- Penicillins -- Hives    Current Outpatient Medications:  potassium chloride (KLOR-CON M) 10 MEQ extended release tablet, Take 1 tablet by mouth daily, Disp: 30 tablet, Rfl: 5  metFORMIN (GLUCOPHAGE-XR) 500 MG extended release tablet, Take 2 tablets by mouth 2 times daily (with meals), Disp: 360 tablet, Rfl: 1  BD PEN NEEDLE RAMIRO U/F 32G X 4 MM MISC, USE WITH insulin daily, Disp: 100 each, Rfl: 1  furosemide (LASIX) 20 MG tablet, Take 1 tablet by mouth daily .  May take an additional dose as needed for shortenss of breath, weight gain, and edema, Disp: 180 tablet, Rfl: 0  apixaban (ELIQUIS) 5 MG TABS tablet, Take 1 tablet by mouth 2 times daily, Disp: 180 tablet, Rfl: 1  tiZANidine (ZANAFLEX) 2 MG tablet, Take 1 tablet by mouth 3 times daily as needed (neck pain and spasm), Disp: 30 tablet, Rfl: 0  glimepiride (AMARYL) 2 MG tablet, Take 2 tablets by mouth 2 times daily (with meals), Disp: 360 tablet, Rfl: 1  nortriptyline (PAMELOR) 10 MG capsule, Take 2 capsules by mouth nightly, Disp: 60 capsule, Rfl: 3  flecainide (TAMBOCOR) 100 MG tablet, Take 1 tablet by mouth every 12 hours, Disp: 180 tablet, Rfl: 3  insulin detemir (LEVEMIR FLEXTOUCH) 100 UNIT/ML injection pen, Inject 30 Units into the skin nightly, Disp: 5 pen, Rfl: 3  metoprolol succinate (TOPROL XL) 25 MG extended release tablet, Take 1 tablet by mouth daily, Disp: 90 tablet, Rfl: 2  albuterol sulfate  (90 Base) MCG/ACT inhaler, Inhale 2 puffs into the lungs 4 times daily, Disp: 1 Inhaler, Rfl: 3  acetaminophen (TYLENOL) 500 MG tablet, Take 500 mg by mouth every 6 hours as needed for Pain, Disp: , Rfl:   valACYclovir (VALTREX) 500 MG tablet, Take 500 mg by mouth 2 times daily, Disp: , Rfl:   loratadine (CLARITIN) 10 MG capsule, Take 10 mg by mouth daily , Disp: , Rfl:     No current facility-administered medications for this visit.      ---------------------------               12/20/21                      1035         ---------------------------   BP:          128/84         Site:    Left Upper Arm     Position:     Sitting        Cuff Size:   Large Adult      Pulse:         72           Temp:   97.2 °F (36.2 °C)   TempSrc:    Temporal        Weight: 229 lb (103.9 kg)   Height:   5' 3\" (1.6 m)    ---------------------------  Body mass index is 40.57 kg/m². Wt Readings from Last 3 Encounters:  12/20/21 : 229 lb (103.9 kg)  11/05/21 : 230 lb (104.3 kg)  09/16/21 : 236 lb (107 kg)    BP Readings from Last 3 Encounters:  12/20/21 : 128/84  11/05/21 : 124/78  09/16/21 : 138/82                    Review of Systems   Constitutional: Negative for appetite change, chills, fever and unexpected weight change. Respiratory: Negative for chest tightness and shortness of breath. Cardiovascular: Negative for chest pain, palpitations and leg swelling. Gastrointestinal: Negative for abdominal distention, abdominal pain, blood in stool, constipation, diarrhea, nausea and vomiting. Neurological: Negative for headaches. Objective:   Physical Exam  Constitutional:       General: She is not in acute distress. Appearance: Normal appearance. She is well-developed. She is not ill-appearing or diaphoretic. Cardiovascular:      Rate and Rhythm: Normal rate and regular rhythm. Heart sounds: Normal heart sounds. No murmur heard. No friction rub. No gallop.     Pulmonary:      Effort: Pulmonary effort is normal. No tachypnea, accessory muscle usage or respiratory distress. Breath sounds: Normal breath sounds. No decreased breath sounds, wheezing, rhonchi or rales. Chest:   Breasts:      Right: No supraclavicular adenopathy. Left: No supraclavicular adenopathy. Lymphadenopathy:      Cervical: No cervical adenopathy. Upper Body:      Right upper body: No supraclavicular adenopathy. Left upper body: No supraclavicular adenopathy. Skin:     General: Skin is warm and dry. Coloration: Skin is not pale. Neurological:      Mental Status: She is alert. Assessment:        Diagnosis Orders   1. Essential hypertension  POTASSIUM   2. Type 2 diabetes mellitus without complication, with long-term current use of insulin ([facility])  Glucose    Hemoglobin A1C   3.  Low blood potassium  POTASSIUM         She did not want to see ortho at this time for the left shoulder       Plan:      Do continue the medicine and the diet  Call for any problems  See in about 4 months         Maureen Funez MD

## 2022-02-11 RX ORDER — METOPROLOL SUCCINATE 25 MG/1
TABLET, EXTENDED RELEASE ORAL
Qty: 90 TABLET | Refills: 3 | Status: SHIPPED | OUTPATIENT
Start: 2022-02-11

## 2022-02-11 NOTE — TELEPHONE ENCOUNTER
Medication Refill    Medication needing refilled:  metoprolol succinate (TOPROL XL)    Dosage of the medication:  25 MG extended release tablet     How are you taking this medication (QD, BID, TID, QID, PRN):  Take 1 tablet by mouth daily    30 or 90 day supply called in: 90 day supply     When will you run out of your medication:    Which Pharmacy are we sending the medication to?:    [address]

## 2022-03-03 RX ORDER — GLIMEPIRIDE 2 MG/1
4 TABLET ORAL 2 TIMES DAILY WITH MEALS
Qty: 360 TABLET | Refills: 1 | Status: SHIPPED | OUTPATIENT
Start: 2022-03-03 | End: 2022-08-23

## 2022-03-07 NOTE — PROGRESS NOTES
[facility]   Electrophysiology      Date: 3/10/2022    Referring Provider: Malik Ovalle MD  PCP: Manjinder Martins MD     Chief Complaint:   Chief Complaint   Patient presents with    1 Year Follow Up     no cc      History of Present Illness:    I saw Anastacia May in the office for electrophysiology follow up today. She is a 61 y.o. female with a past medical history of paroxysmal atrial fibrillation, NHL, ZOE on CPAP, DM and HTN. She underwent EP study, radiofrequency ablation of atrial fibrillation with pulmonary vein isolation, ablation on left atrium roof for roof-dependent left atrial flutter and mitral isthmus ablation (for L flutter) on 10/28/20 with Dr. Gracy Frank. Flecainide was decreased to 50mg BID, Toprol was decreased to 12.5mg QD and Eliquis 5mg BID was continued. Monitor in March 2021 showed atrial fibrillation and she followed up with Dr. Gracy Frank to discuss repeat ablation but did not proceed. She restarted flecainide 100mg BID and Toprol 25mg QD. She presents today for yearly follow up. She has been doing well since her last visit. Denies any issues with atrial fibrillation that she has been aware of. No palpitations or dyspnea. No chest pain. No syncope. No bleeding issues. Allergies: Allergies   Allergen Reactions    Penicillins Hives     Home Medications:  Prior to Visit Medications    Medication Sig Taking?  Authorizing Provider   glimepiride (AMARYL) 2 MG tablet Take 2 tablets by mouth 2 times daily (with meals) Yes Manjinder Martins MD   metoprolol succinate (TOPROL XL) 25 MG extended release tablet Take 1 tablet by mouth daily  Patient taking differently: Take half a  tablet by mouth daily Yes Orvil Skiff, MD   potassium chloride (KLOR-CON M) 10 MEQ extended release tablet Take 1 tablet by mouth daily Yes Manjinder Martins MD   metFORMIN (GLUCOPHAGE-XR) 500 MG extended release tablet Take 2 tablets by mouth 2 times daily (with meals) Yes Manjinder Martins MD   BD PEN NEEDLE RAMIRO U/F 32G X 4 MM MISC USE WITH insulin daily Yes Alannah Silvestre MD   furosemide (LASIX) 20 MG tablet Take 1 tablet by mouth daily . May take an additional dose as needed for shortenss of breath, weight gain, and edema Yes Vannesa Puga MD   apixaban (ELIQUIS) 5 MG TABS tablet Take 1 tablet by mouth 2 times daily Yes Vannesa Puga MD   tiZANidine (ZANAFLEX) 2 MG tablet Take 1 tablet by mouth 3 times daily as needed (neck pain and spasm) Yes Dejan Herrera MD   flecainide (TAMBOCOR) 100 MG tablet Take 1 tablet by mouth every 12 hours Yes Vannesa Puga MD   insulin detemir (LEVEMIR FLEXTOUCH) 100 UNIT/ML injection pen Inject 30 Units into the skin nightly Yes Alannah Silvestre MD   albuterol sulfate  (90 Base) MCG/ACT inhaler Inhale 2 puffs into the lungs 4 times daily Yes Salvatore Avalos MD   acetaminophen (TYLENOL) 500 MG tablet Take 500 mg by mouth every 6 hours as needed for Pain Yes Historical Provider, MD   loratadine (CLARITIN) 10 MG capsule Take 10 mg by mouth daily  Yes Historical Provider, MD   nortriptyline (PAMELOR) 10 MG capsule Take 2 capsules by mouth nightly  Patient not taking: Reported on 3/10/2022  Alannah Silvestre MD        Past Medical History:  Past Medical History:   Diagnosis Date    [facility] fibrillation [facility]     Cancer ([facility]     Diabetes mellitus type 2, controlled ([facility]     Hypertension     Melanoma in situ of back [facility])     dr Valentin Fernandez Precordial pain 2016       Past Surgical History:   Past Surgical History:   Procedure Laterality Date    BONE MARROW TRANSPLANT      BREAST CYST ASPIRATION      in past benign bilateral over years. 2 episodes     SECTION      CHOLECYSTECTOMY      COLONOSCOPY  9/10/12    polyp, sharlene, repeat 5 years    COLONOSCOPY  10/09/2017    dr Darlin Walls and check in 5 years.  polyps    ENDOMETRIAL ABLATION  2007    FINE NEEDLE ASPIRATION      GALLBLADDER SURGERY      OTHER SURGICAL HISTORY  2019    Abdominal Mass BX    OTHER SURGICAL HISTORY 08/05/2019    CT BIOPSY ABDOMEN RETROPERITONEUM,    OVARY REMOVAL Left 01/19/2018    benign growth, dr Sid Rondon at [facility]. TUNNELED VENOUS PORT PLACEMENT Right 02/26/2016    Dr. Josué Delgado History:   reports that she quit smoking about 6 years ago. Her smoking use included cigarettes. She started smoking about 47 years ago. She has a 0.80 pack-year smoking history. She has never used smokeless tobacco. She reports previous alcohol use. She reports that she does not use drugs. Family History:      Problem Relation Age of Onset    High Blood Pressure Mother     Cancer Mother         breast    High Blood Pressure Father     Cancer Father         lung       Review of Systems   Constitutional: Negative for chills, fatigue, fever and unexpected weight change. HENT: Negative for congestion, hearing loss, sinus pressure, sore throat and trouble swallowing. Respiratory: Negative for cough, shortness of breath and wheezing. Cardiovascular: Negative for chest pain, palpitations and leg swelling. Gastrointestinal: Negative for abdominal pain, blood in stool, constipation, diarrhea, nausea and vomiting. Genitourinary: Negative for hematuria. Musculoskeletal: Negative for arthralgias, back pain, gait problem and myalgias. Skin: Negative for color change, rash and wound. Neurological: Negative for dizziness, seizures, syncope, speech difficulty, weakness and light-headedness. Hematological: Does not bruise/bleed easily. Physical Examination:  Vitals:    03/10/22 1250   BP: 128/80   Pulse: 87   SpO2: 97%      Wt Readings from Last 3 Encounters:   03/10/22 230 lb (104.3 kg)   12/20/21 229 lb (103.9 kg)   11/05/21 230 lb (104.3 kg)       Physical Exam  Vitals reviewed. Constitutional:       General: She is not in acute distress. Appearance: Normal appearance. HENT:      Head: Normocephalic and atraumatic.       Nose: Nose normal.      Mouth/Throat: Mouth: Mucous membranes are moist.   Eyes:      Conjunctiva/sclera: Conjunctivae normal.      Pupils: Pupils are equal, round, and reactive to light. Cardiovascular:      Rate and Rhythm: Normal rate and regular rhythm. Heart sounds: No murmur heard. No friction rub. No gallop. Pulmonary:      Effort: No respiratory distress. Breath sounds: No wheezing, rhonchi or rales. Abdominal:      General: Abdomen is flat. Bowel sounds are normal.      Palpations: Abdomen is soft. Musculoskeletal:         General: Normal range of motion. Right lower leg: No edema. Left lower leg: No edema. Skin:     General: Skin is warm and dry. Findings: No bruising. Neurological:      General: No focal deficit present. Mental Status: She is alert and oriented to person, place, and time. Motor: No weakness. Psychiatric:         Mood and Affect: Mood normal.         Behavior: Behavior normal.          Pertinent labs, diagnostic, device, and imaging results reviewed as a part of this visit    LABS    CBC:   Lab Results   Component Value Date    WBC 7.4 11/05/2021    HGB 13.1 11/05/2021    HCT 39.6 11/05/2021    .5 (H) 11/05/2021     11/05/2021     BMP:   Lab Results   Component Value Date    CREATININE 0.7 11/05/2021    BUN 15 11/05/2021     11/05/2021    K 3.5 12/20/2021     11/05/2021    CO2 25 11/05/2021     CrCl cannot be calculated (Patient's most recent lab result is older than the maximum 120 days allowed. ).    No results found for: BNP    Thyroid:   Lab Results   Component Value Date    TSH 4.08 05/18/2020     Lipid Panel:   Lab Results   Component Value Date    CHOL 181 05/18/2020    HDL 54 02/08/2020    TRIG 136 02/08/2020     LFTs:  Lab Results   Component Value Date    ALT 43 (H) 08/20/2021    AST 44 (H) 08/20/2021    ALKPHOS 225 (H) 08/20/2021    BILITOT 0.6 08/20/2021     Coags:   Lab Results   Component Value Date    PROTIME 14.1 (H) 02/02/2021    INR 1.21 (H) 02/02/2021    APTT 31.8 12/09/2019       ECG: 3/10/2022   SR at 88 BPM. Non-specific ST-T wave changes. Echo: 10/18/19   Conclusions      Summary   Left ventricular cavity size is normal. There is mild left ventricular   hypertrophy. Overall left ventricular systolic function appears normal with   an ejection fraction of 50-55%. No regional wall motion abnormalities are   noted. Indeterminate diastolic function.   Trivial mitral, aortic, pulmonic, and tricuspid regurgitation.   Estimated pulmonary artery systolic pressure is at 31 mmHg assuming a right   atrial pressure of 3 mmHg.   Normal Global strain -24.1%.     Stress: 1/15/16        Conclusions          Summary     Pharmacological Stress/MPI Results:          1. Technically a satisfactory study.     2. Normal pharmacological stress portion of the study.     3. No evidence of Ischemia by Myocardial Perfusion Imaging.     4. Gated Study shows normal LV size and Systolic function; EF is >47 %.       Procedures:  1. Unsuccessful DCCV attempt x 3 10/31/18  2. Successful DCCV on 12/27/18  3. RFA for A fib with PVI, roof line ablation (for L flutter), mitral isthmus ablation (for L flutter), 10/28/20 Dr. Micheal Hernandez:    Paroxysmal atrial fibrillation              - s/p PVI ablation 10/28/20 with Dr. Peace Rosales 3 (gender, HTN, DM) on Eliquis 5mg BID   - had recurrence on monitor in March 2021 and was resumed on flecainide and Toprol   - EKG today with SR   - previously discussed repeat ablation with Dr. Shirlene Mullen and she would like to continue medical therapy for rhythm control and reassess in a year or sooner if she starts to have recurrence     Left atrial flutter              - seen during EP study s/p roof line and mitral isthmus ablations 10/28/20              - see above     Essential HTN               - controlled       Thank you for allowing to us to participate in the care of Keyona Zepeda.     Return in about 1 year (around 3/10/2023) for an appointment with Alejandra Sheppard NP.      ALEXUS Luke  [facility], [address] [place] [address], [address] [place] [address]  Phone: [phone]  Fax: [phone]    Electronically signed by ALEXUS Kirkland CNP on 3/10/2022 at 3:05 PM

## 2022-03-10 ENCOUNTER — OFFICE VISIT (OUTPATIENT)
Dept: CARDIOLOGY CLINIC | Age: 67
End: 2022-03-10
Payer: COMMERCIAL

## 2022-03-10 VITALS
HEIGHT: 63 IN | OXYGEN SATURATION: 97 % | BODY MASS INDEX: 40.75 KG/M2 | WEIGHT: 230 LBS | DIASTOLIC BLOOD PRESSURE: 80 MMHG | SYSTOLIC BLOOD PRESSURE: 128 MMHG | HEART RATE: 87 BPM

## 2022-03-10 DIAGNOSIS — I48.0 PAF (PAROXYSMAL ATRIAL FIBRILLATION) (HCC): Primary | ICD-10-CM

## 2022-03-10 DIAGNOSIS — I48.92 LEFT ATRIAL FLUTTER BY ELECTROCARDIOGRAM (HCC): ICD-10-CM

## 2022-03-10 DIAGNOSIS — I10 ESSENTIAL HYPERTENSION: ICD-10-CM

## 2022-03-10 PROCEDURE — 93000 ELECTROCARDIOGRAM COMPLETE: CPT | Performed by: NURSE PRACTITIONER

## 2022-03-10 PROCEDURE — 99214 OFFICE O/P EST MOD 30 MIN: CPT | Performed by: NURSE PRACTITIONER

## 2022-03-10 ASSESSMENT — ENCOUNTER SYMPTOMS
SHORTNESS OF BREATH: 0
SORE THROAT: 0
CONSTIPATION: 0
VOMITING: 0
BLOOD IN STOOL: 0
NAUSEA: 0
SINUS PRESSURE: 0
BACK PAIN: 0
TROUBLE SWALLOWING: 0
COLOR CHANGE: 0
COUGH: 0
ABDOMINAL PAIN: 0
WHEEZING: 0
DIARRHEA: 0

## 2022-03-30 DIAGNOSIS — I48.19 PERSISTENT ATRIAL FIBRILLATION (HCC): ICD-10-CM

## 2022-03-30 RX ORDER — FUROSEMIDE 20 MG/1
TABLET ORAL
Qty: 90 TABLET | Refills: 3 | Status: SHIPPED | OUTPATIENT
Start: 2022-03-30

## 2022-03-30 NOTE — TELEPHONE ENCOUNTER
Medication Refill    Medication needing refilled:FUROSEMIDE    Dosage of the medication:20mg    How are you taking this medication (QD, BID, TID, QID, PRN):Take 1 tablet by mouth daily .  May take an additional dose as needed for shortenss of breath, weight gain, and edema    30 or 90 day supply called in:180    When will you run out of your medication:1 wk left     Which Pharmacy are we sending the medication to?:[facility] [address] [place] [address]# [phone]

## 2022-04-08 ENCOUNTER — OFFICE VISIT (OUTPATIENT)
Dept: FAMILY MEDICINE CLINIC | Age: 67
End: 2022-04-08
Payer: COMMERCIAL

## 2022-04-08 VITALS
WEIGHT: 229 LBS | DIASTOLIC BLOOD PRESSURE: 78 MMHG | TEMPERATURE: 97.1 F | HEIGHT: 63 IN | BODY MASS INDEX: 40.57 KG/M2 | SYSTOLIC BLOOD PRESSURE: 122 MMHG

## 2022-04-08 DIAGNOSIS — R39.15 URGENCY OF URINATION: Primary | ICD-10-CM

## 2022-04-08 DIAGNOSIS — R39.15 URGENCY OF URINATION: ICD-10-CM

## 2022-04-08 LAB
BACTERIA: ABNORMAL /HPF
BILIRUBIN URINE: NEGATIVE
BLOOD, URINE: ABNORMAL
CLARITY: CLEAR
COLOR: YELLOW
EPITHELIAL CELLS, UA: 0 /HPF (ref 0–5)
GLUCOSE URINE: NEGATIVE MG/DL
HYALINE CASTS: 0 /LPF (ref 0–8)
KETONES, URINE: NEGATIVE MG/DL
LEUKOCYTE ESTERASE, URINE: ABNORMAL
MICROSCOPIC EXAMINATION: YES
NITRITE, URINE: NEGATIVE
PH UA: 6 (ref 5–8)
PROTEIN UA: NEGATIVE MG/DL
RBC UA: ABNORMAL /HPF (ref 0–4)
SPECIFIC GRAVITY UA: 1.01 (ref 1–1.03)
URINE TYPE: ABNORMAL
UROBILINOGEN, URINE: 0.2 E.U./DL
WBC UA: 4 /HPF (ref 0–5)

## 2022-04-08 PROCEDURE — 99213 OFFICE O/P EST LOW 20 MIN: CPT | Performed by: FAMILY MEDICINE

## 2022-04-08 RX ORDER — FLUCONAZOLE 150 MG/1
150 TABLET ORAL ONCE
Qty: 1 TABLET | Refills: 0 | Status: SHIPPED | OUTPATIENT
Start: 2022-04-08 | End: 2022-04-08

## 2022-04-08 RX ORDER — SULFAMETHOXAZOLE AND TRIMETHOPRIM 800; 160 MG/1; MG/1
1 TABLET ORAL 2 TIMES DAILY
Qty: 14 TABLET | Refills: 0 | Status: SHIPPED | OUTPATIENT
Start: 2022-04-08 | End: 2022-04-15

## 2022-04-08 ASSESSMENT — PATIENT HEALTH QUESTIONNAIRE - PHQ9
SUM OF ALL RESPONSES TO PHQ QUESTIONS 1-9: 0
SUM OF ALL RESPONSES TO PHQ QUESTIONS 1-9: 0
2. FEELING DOWN, DEPRESSED OR HOPELESS: 0
SUM OF ALL RESPONSES TO PHQ QUESTIONS 1-9: 0
1. LITTLE INTEREST OR PLEASURE IN DOING THINGS: 0
SUM OF ALL RESPONSES TO PHQ9 QUESTIONS 1 & 2: 0
SUM OF ALL RESPONSES TO PHQ QUESTIONS 1-9: 0

## 2022-04-08 NOTE — PROGRESS NOTES
Subjective:      Patient ID: Neftali Neftaly is a 61 y.o. female. Chief Complaint   Patient presents with    Urinary Tract Infection     pinkish color urine, lower back pain, urgency x 1 day        Patient presents with:  Urinary Tract Infection: pinkish color urine, lower back pain, urgency x 1 day    Her for the above  She has some discomfort to pass the urine  No fever  Her last uti was a year ago treated by her gyne    Glucose about the 200 range    YOB: 1955    Date of Visit:  4/8/2022     -- Penicillins -- Hives    Current Outpatient Medications:  ZINC PO, Take by mouth, Disp: , Rfl:   Cholecalciferol (VITAMIN D3 PO), Take by mouth, Disp: , Rfl:   Multiple Vitamin (MULTIVITAMIN PO), Take by mouth, Disp: , Rfl:   furosemide (LASIX) 20 MG tablet, Take 1 tablet by mouth daily .  May take an additional dose as needed for shortenss of breath, weight gain, and edema, Disp: 90 tablet, Rfl: 3  glimepiride (AMARYL) 2 MG tablet, Take 2 tablets by mouth 2 times daily (with meals), Disp: 360 tablet, Rfl: 1  metoprolol succinate (TOPROL XL) 25 MG extended release tablet, Take 1 tablet by mouth daily (Patient taking differently: Take half a  tablet by mouth daily), Disp: 90 tablet, Rfl: 3  potassium chloride (KLOR-CON M) 10 MEQ extended release tablet, Take 1 tablet by mouth daily, Disp: 30 tablet, Rfl: 5  metFORMIN (GLUCOPHAGE-XR) 500 MG extended release tablet, Take 2 tablets by mouth 2 times daily (with meals), Disp: 360 tablet, Rfl: 1  BD PEN NEEDLE RAMIRO U/F 32G X 4 MM MISC, USE WITH insulin daily, Disp: 100 each, Rfl: 1  apixaban (ELIQUIS) 5 MG TABS tablet, Take 1 tablet by mouth 2 times daily, Disp: 180 tablet, Rfl: 1  tiZANidine (ZANAFLEX) 2 MG tablet, Take 1 tablet by mouth 3 times daily as needed (neck pain and spasm), Disp: 30 tablet, Rfl: 0  nortriptyline (PAMELOR) 10 MG capsule, Take 2 capsules by mouth nightly, Disp: 60 capsule, Rfl: 3  flecainide (TAMBOCOR) 100 MG tablet, Take 1 tablet by mouth every 12 hours, Disp: 180 tablet, Rfl: 3  insulin detemir (LEVEMIR FLEXTOUCH) 100 UNIT/ML injection pen, Inject 30 Units into the skin nightly, Disp: 5 pen, Rfl: 3  albuterol sulfate  (90 Base) MCG/ACT inhaler, Inhale 2 puffs into the lungs 4 times daily, Disp: 1 Inhaler, Rfl: 3  acetaminophen (TYLENOL) 500 MG tablet, Take 500 mg by mouth every 6 hours as needed for Pain, Disp: , Rfl:   loratadine (CLARITIN) 10 MG capsule, Take 10 mg by mouth daily , Disp: , Rfl:     No current facility-administered medications for this visit.      ---------------------------               04/08/22                      1103         ---------------------------   BP:          122/78         Site:    Left Upper Arm     Position:     Sitting        Cuff Size:   Large Adult      Temp:   97.1 °F (36.2 °C)   TempSrc:    Temporal        Weight: 229 lb (103.9 kg)   Height:   5' 3\" (1.6 m)    ---------------------------  Body mass index is 40.57 kg/m². Wt Readings from Last 3 Encounters:  04/08/22 : 229 lb (103.9 kg)  03/10/22 : 230 lb (104.3 kg)  12/20/21 : 229 lb (103.9 kg)    BP Readings from Last 3 Encounters:  04/08/22 : 122/78  03/10/22 : 128/80  12/20/21 : 128/84          Review of Systems    Objective:   Physical Exam  Constitutional:       General: She is not in acute distress. Appearance: Normal appearance. She is not ill-appearing. Abdominal:      Palpations: Abdomen is soft. There is no hepatomegaly, splenomegaly, mass or pulsatile mass. Tenderness: There is no abdominal tenderness. There is no right CVA tenderness, left CVA tenderness or guarding. Comments: She gets urgency feeling when palpated about the suprapubic area   Skin:     General: Skin is warm and dry. Coloration: Skin is not pale. Neurological:      Mental Status: She is alert. Assessment:       Diagnosis Orders   1.  Urgency of urination  Urinalysis with Microscopic    Culture, Urine Orders Placed This Encounter   Medications    sulfamethoxazole-trimethoprim (BACTRIM DS;SEPTRA DS) 800-160 MG per tablet     Sig: Take 1 tablet by mouth 2 times daily for 7 days     Dispense:  14 tablet     Refill:  0    fluconazole (DIFLUCAN) 150 MG tablet     Sig: Take 1 tablet by mouth once for 1 dose     Dispense:  1 tablet     Refill:  0        she wanted to have something on hand if case she gets yeast vaginitis       Plan:      Do take fluids  Use the antibiotic  Increase the insulin to 35 units daily  See me a scheduled         Lyn Stoddard MD

## 2022-04-11 DIAGNOSIS — Z87.440 RECENT URINARY TRACT INFECTION: Primary | ICD-10-CM

## 2022-04-11 LAB
ORGANISM: ABNORMAL
URINE CULTURE, ROUTINE: ABNORMAL

## 2022-04-21 DIAGNOSIS — Z87.440 RECENT URINARY TRACT INFECTION: ICD-10-CM

## 2022-04-21 LAB
BACTERIA: ABNORMAL /HPF
BILIRUBIN URINE: NEGATIVE
BLOOD, URINE: ABNORMAL
CLARITY: CLEAR
COLOR: YELLOW
COMMENT UA: ABNORMAL
EPITHELIAL CELLS, UA: ABNORMAL /HPF (ref 0–5)
GLUCOSE URINE: 250 MG/DL
KETONES, URINE: NEGATIVE MG/DL
LEUKOCYTE ESTERASE, URINE: ABNORMAL
MICROSCOPIC EXAMINATION: YES
NITRITE, URINE: POSITIVE
PH UA: 6 (ref 5–8)
PROTEIN UA: NEGATIVE MG/DL
RBC UA: ABNORMAL /HPF (ref 0–4)
SPECIFIC GRAVITY UA: 1.02 (ref 1–1.03)
URINE TYPE: ABNORMAL
UROBILINOGEN, URINE: 0.2 E.U./DL
WBC UA: ABNORMAL /HPF (ref 0–5)

## 2022-04-22 ENCOUNTER — OFFICE VISIT (OUTPATIENT)
Dept: FAMILY MEDICINE CLINIC | Age: 67
End: 2022-04-22
Payer: COMMERCIAL

## 2022-04-22 VITALS
WEIGHT: 228 LBS | BODY MASS INDEX: 40.4 KG/M2 | DIASTOLIC BLOOD PRESSURE: 78 MMHG | SYSTOLIC BLOOD PRESSURE: 122 MMHG | TEMPERATURE: 97 F | HEIGHT: 63 IN | HEART RATE: 80 BPM

## 2022-04-22 DIAGNOSIS — E11.9 TYPE 2 DIABETES MELLITUS WITHOUT COMPLICATION, WITH LONG-TERM CURRENT USE OF INSULIN (HCC): ICD-10-CM

## 2022-04-22 DIAGNOSIS — Z79.4 TYPE 2 DIABETES MELLITUS WITHOUT COMPLICATION, WITH LONG-TERM CURRENT USE OF INSULIN (HCC): ICD-10-CM

## 2022-04-22 DIAGNOSIS — I10 ESSENTIAL HYPERTENSION: Primary | ICD-10-CM

## 2022-04-22 DIAGNOSIS — I10 ESSENTIAL HYPERTENSION: ICD-10-CM

## 2022-04-22 LAB
ANION GAP SERPL CALCULATED.3IONS-SCNC: 18 MMOL/L (ref 3–16)
BUN BLDV-MCNC: 18 MG/DL (ref 7–20)
CALCIUM SERPL-MCNC: 9.2 MG/DL (ref 8.3–10.6)
CHLORIDE BLD-SCNC: 101 MMOL/L (ref 99–110)
CO2: 23 MMOL/L (ref 21–32)
CREAT SERPL-MCNC: 1 MG/DL (ref 0.6–1.2)
GFR AFRICAN AMERICAN: >60
GFR NON-AFRICAN AMERICAN: 56
GLUCOSE BLD-MCNC: 192 MG/DL (ref 70–99)
POTASSIUM SERPL-SCNC: 4.1 MMOL/L (ref 3.5–5.1)
SODIUM BLD-SCNC: 142 MMOL/L (ref 136–145)

## 2022-04-22 PROCEDURE — 99214 OFFICE O/P EST MOD 30 MIN: CPT | Performed by: FAMILY MEDICINE

## 2022-04-22 RX ORDER — SULFAMETHOXAZOLE AND TRIMETHOPRIM 800; 160 MG/1; MG/1
1 TABLET ORAL 2 TIMES DAILY
Qty: 10 TABLET | Refills: 0 | Status: SHIPPED | OUTPATIENT
Start: 2022-04-22 | End: 2022-04-27

## 2022-04-22 ASSESSMENT — ENCOUNTER SYMPTOMS
ABDOMINAL PAIN: 0
SHORTNESS OF BREATH: 0
DIARRHEA: 0
CONSTIPATION: 0

## 2022-04-22 ASSESSMENT — PATIENT HEALTH QUESTIONNAIRE - PHQ9
SUM OF ALL RESPONSES TO PHQ QUESTIONS 1-9: 0
2. FEELING DOWN, DEPRESSED OR HOPELESS: 0
SUM OF ALL RESPONSES TO PHQ9 QUESTIONS 1 & 2: 0
SUM OF ALL RESPONSES TO PHQ QUESTIONS 1-9: 0
1. LITTLE INTEREST OR PLEASURE IN DOING THINGS: 0

## 2022-04-22 NOTE — PROGRESS NOTES
Subjective:      Patient ID: Ester Bryson is a 61 y.o. female. Chief Complaint   Patient presents with    Follow-up     hypertension, lipids, diabetes - pt is fasting        Patient presents with: Follow-up: hypertension, lipids, diabetes - pt is fasting    Here for the above and check up  She is much better with the urine     She is on the medicine  She is using a otc product for the nerve pain in the feet and notes a lot better improvement   meds the same    YOB: 1955    Date of Visit:  4/22/2022     -- Penicillins -- Hives    Current Outpatient Medications:  ZINC PO, Take by mouth, Disp: , Rfl:   Cholecalciferol (VITAMIN D3 PO), Take by mouth, Disp: , Rfl:   Multiple Vitamin (MULTIVITAMIN PO), Take by mouth, Disp: , Rfl:   furosemide (LASIX) 20 MG tablet, Take 1 tablet by mouth daily .  May take an additional dose as needed for shortenss of breath, weight gain, and edema, Disp: 90 tablet, Rfl: 3  glimepiride (AMARYL) 2 MG tablet, Take 2 tablets by mouth 2 times daily (with meals), Disp: 360 tablet, Rfl: 1  metoprolol succinate (TOPROL XL) 25 MG extended release tablet, Take 1 tablet by mouth daily (Patient taking differently: Take half a  tablet by mouth daily), Disp: 90 tablet, Rfl: 3  potassium chloride (KLOR-CON M) 10 MEQ extended release tablet, Take 1 tablet by mouth daily, Disp: 30 tablet, Rfl: 5  metFORMIN (GLUCOPHAGE-XR) 500 MG extended release tablet, Take 2 tablets by mouth 2 times daily (with meals), Disp: 360 tablet, Rfl: 1  BD PEN NEEDLE RAMIRO U/F 32G X 4 MM MISC, USE WITH insulin daily, Disp: 100 each, Rfl: 1  apixaban (ELIQUIS) 5 MG TABS tablet, Take 1 tablet by mouth 2 times daily, Disp: 180 tablet, Rfl: 1  flecainide (TAMBOCOR) 100 MG tablet, Take 1 tablet by mouth every 12 hours, Disp: 180 tablet, Rfl: 3  insulin detemir (LEVEMIR FLEXTOUCH) 100 UNIT/ML injection pen, Inject 30 Units into the skin nightly, Disp: 5 pen, Rfl: 3  albuterol sulfate  (90 Base) MCG/ACT inhaler, Inhale 2 puffs into the lungs 4 times daily, Disp: 1 Inhaler, Rfl: 3  acetaminophen (TYLENOL) 500 MG tablet, Take 500 mg by mouth every 6 hours as needed for Pain, Disp: , Rfl:   loratadine (CLARITIN) 10 MG capsule, Take 10 mg by mouth daily , Disp: , Rfl:   nortriptyline (PAMELOR) 10 MG capsule, Take 2 capsules by mouth nightly (Patient not taking: Reported on 4/22/2022), Disp: 60 capsule, Rfl: 3    No current facility-administered medications for this visit.      ---------------------------               04/22/22                      1009         ---------------------------   BP:          122/78         Site:    Left Upper Arm     Position:     Sitting        Cuff Size:   Large Adult      Pulse:         80           Temp:    97 °F (36.1 °C)    TempSrc:    Temporal        Weight: 228 lb (103.4 kg)   Height:   5' 3\" (1.6 m)    ---------------------------  Body mass index is 40.39 kg/m². Wt Readings from Last 3 Encounters:  04/22/22 : 228 lb (103.4 kg)  04/08/22 : 229 lb (103.9 kg)  03/10/22 : 230 lb (104.3 kg)    BP Readings from Last 3 Encounters:  04/22/22 : 122/78  04/08/22 : 122/78  03/10/22 : 128/80        Review of Systems   Constitutional: Negative for appetite change, chills, fever and unexpected weight change. Respiratory: Negative for shortness of breath. Cardiovascular: Negative for chest pain. Gastrointestinal: Negative for abdominal pain, constipation and diarrhea. Genitourinary: Negative for difficulty urinating, dysuria and hematuria. Musculoskeletal:        Feet ok no sores    Neurological: Negative for headaches. Objective:   Physical Exam  Constitutional:       General: She is not in acute distress. Appearance: Normal appearance. She is well-developed. She is not ill-appearing or diaphoretic. Cardiovascular:      Rate and Rhythm: Normal rate and regular rhythm. Heart sounds: Normal heart sounds. No murmur heard.   No friction rub. No gallop. Pulmonary:      Effort: Pulmonary effort is normal. No tachypnea, accessory muscle usage or respiratory distress. Breath sounds: Normal breath sounds. No decreased breath sounds, wheezing, rhonchi or rales. Chest:   Breasts:      Right: No supraclavicular adenopathy. Left: No supraclavicular adenopathy. Abdominal:      Palpations: Abdomen is soft. There is no hepatomegaly, splenomegaly, mass or pulsatile mass. Tenderness: There is no abdominal tenderness. There is no guarding. Lymphadenopathy:      Cervical: No cervical adenopathy. Upper Body:      Right upper body: No supraclavicular adenopathy. Left upper body: No supraclavicular adenopathy. Skin:     General: Skin is warm and dry. Coloration: Skin is not pale. Neurological:      Mental Status: She is alert. Assessment:        Diagnosis Orders   1. Essential hypertension  Basic Metabolic Panel   2.  Type 2 diabetes mellitus without complication, with long-term current use of insulin (HCC)  Basic Metabolic Panel    Hemoglobin A1C     Orders Placed This Encounter   Medications    sulfamethoxazole-trimethoprim (BACTRIM DS;SEPTRA DS) 800-160 MG per tablet     Sig: Take 1 tablet by mouth 2 times daily for 5 days     Dispense:  10 tablet     Refill:  0       She is improved  She did have some pyuria still  On the urine specimen from yesterday   Will add additional treatment  She has had visit with gi for the liver testing     Lab Results   Component Value Date    LABA1C 7.2 12/20/2021     Lab Results   Component Value Date    .9 12/20/2021     Recent visit with cardiology noted and reviewed on 3/10/22 and stable PAF       Plan:      I sent in 5 days more of the antibiotic  Do take fluids  See in 4 months  Remember to get the colon check later this year        Hallie Dougherty MD

## 2022-04-22 NOTE — PATIENT INSTRUCTIONS
I sent in 5 days more of the antibiotic  Do take fluids  See in 4 months  Remember to get the colon check later this year

## 2022-04-23 LAB
ESTIMATED AVERAGE GLUCOSE: 174.3 MG/DL
HBA1C MFR BLD: 7.7 %

## 2022-05-09 ENCOUNTER — HOSPITAL ENCOUNTER (OUTPATIENT)
Dept: CT IMAGING | Age: 67
Discharge: HOME | End: 2022-05-09
Payer: COMMERCIAL

## 2022-05-09 DIAGNOSIS — C83.08 SMALL CELL B-CELL LYMPHOMA, LYMPH NODES OF MULTIPLE SITES (HCC): ICD-10-CM

## 2022-05-09 PROCEDURE — 74177 CT ABD & PELVIS W/CONTRAST: CPT

## 2022-05-09 PROCEDURE — 6360000004 HC RX CONTRAST MEDICATION: Performed by: INTERNAL MEDICINE

## 2022-05-09 RX ADMIN — IOPAMIDOL 75 ML: 755 INJECTION, SOLUTION INTRAVENOUS at 08:51

## 2022-05-09 RX ADMIN — IOHEXOL 50 ML: 240 INJECTION, SOLUTION INTRATHECAL; INTRAVASCULAR; INTRAVENOUS; ORAL at 08:50

## 2022-05-20 DIAGNOSIS — I48.19 PERSISTENT ATRIAL FIBRILLATION (HCC): ICD-10-CM

## 2022-05-20 RX ORDER — PEN NEEDLE, DIABETIC 32GX 5/32"
NEEDLE, DISPOSABLE MISCELLANEOUS
Qty: 100 EACH | Refills: 1 | Status: SHIPPED | OUTPATIENT
Start: 2022-05-20 | End: 2022-10-27

## 2022-05-20 RX ORDER — FLECAINIDE ACETATE 100 MG/1
100 TABLET ORAL EVERY 12 HOURS SCHEDULED
Qty: 180 TABLET | Refills: 3 | Status: SHIPPED | OUTPATIENT
Start: 2022-05-20

## 2022-05-23 RX ORDER — INSULIN DETEMIR 100 [IU]/ML
30 INJECTION, SOLUTION SUBCUTANEOUS NIGHTLY
Qty: 5 PEN | Refills: 3 | Status: SHIPPED | OUTPATIENT
Start: 2022-05-23

## 2022-06-03 RX ORDER — METFORMIN HYDROCHLORIDE 500 MG/1
1000 TABLET, EXTENDED RELEASE ORAL 2 TIMES DAILY WITH MEALS
Qty: 360 TABLET | Refills: 1 | Status: SHIPPED | OUTPATIENT
Start: 2022-06-03

## 2022-06-06 ENCOUNTER — OFFICE VISIT (OUTPATIENT)
Dept: SLEEP MEDICINE | Age: 67
End: 2022-06-06
Payer: COMMERCIAL

## 2022-06-06 VITALS
OXYGEN SATURATION: 97 % | HEART RATE: 90 BPM | TEMPERATURE: 97.9 F | SYSTOLIC BLOOD PRESSURE: 130 MMHG | WEIGHT: 231 LBS | BODY MASS INDEX: 40.93 KG/M2 | HEIGHT: 63 IN | RESPIRATION RATE: 20 BRPM | DIASTOLIC BLOOD PRESSURE: 80 MMHG

## 2022-06-06 DIAGNOSIS — Z99.89 DEPENDENCE ON OTHER ENABLING MACHINES AND DEVICES: ICD-10-CM

## 2022-06-06 DIAGNOSIS — G47.33 OSA (OBSTRUCTIVE SLEEP APNEA): Primary | ICD-10-CM

## 2022-06-06 PROCEDURE — 99214 OFFICE O/P EST MOD 30 MIN: CPT | Performed by: PSYCHIATRY & NEUROLOGY

## 2022-06-06 ASSESSMENT — SLEEP AND FATIGUE QUESTIONNAIRES
HOW LIKELY ARE YOU TO NOD OFF OR FALL ASLEEP WHILE WATCHING TV: 1
HOW LIKELY ARE YOU TO NOD OFF OR FALL ASLEEP WHILE SITTING INACTIVE IN A PUBLIC PLACE: 0
HOW LIKELY ARE YOU TO NOD OFF OR FALL ASLEEP IN A CAR, WHILE STOPPED FOR A FEW MINUTES IN TRAFFIC: 0
ESS TOTAL SCORE: 2
HOW LIKELY ARE YOU TO NOD OFF OR FALL ASLEEP WHEN YOU ARE A PASSENGER IN A CAR FOR AN HOUR WITHOUT A BREAK: 0
HOW LIKELY ARE YOU TO NOD OFF OR FALL ASLEEP WHILE SITTING QUIETLY AFTER LUNCH WITHOUT ALCOHOL: 0
HOW LIKELY ARE YOU TO NOD OFF OR FALL ASLEEP WHILE SITTING AND READING: 1
HOW LIKELY ARE YOU TO NOD OFF OR FALL ASLEEP WHILE LYING DOWN TO REST IN THE AFTERNOON WHEN CIRCUMSTANCES PERMIT: 0
HOW LIKELY ARE YOU TO NOD OFF OR FALL ASLEEP WHILE SITTING AND TALKING TO SOMEONE: 0

## 2022-06-06 NOTE — PROGRESS NOTES
MD CHIRAG Espinoza Board Certified in Sleep Medicine  Certified in [address] [place] [address] Certified in Neurology Chao Alvarado [address], [address], [address]  [phone]   [address] [place] [address], [address] [place] [address]                      [address]  [address] [address] [phone]    Subjective:     Patient ID: Rebecca Chris is a 61 y.o. female. Chief Complaint   Patient presents with    Follow-up    Sleep Apnea       HPI:        Rebecca Chris is a 61 y.o. female was seen today as annual follow for moderate obstructive sleep apnea with apnea hypopnea index of 25.5 with lowest O2 saturation of 78%, patient spent about 55 minutes below 90%.      Patient is using the PAP machine about 92% of the time, more than 4 hours a nightabout  83 %, in total average of 5:40 hours a night in last 90 days. Currently on PAP at 9 cm (), the AHI is only 0.5 events per hour at this pressure. Patient improved regarding daytime sleepiness and fatigue, wakes up refreshed in the morning. The Patient scored Total score: 2 on Savannah Sleepiness Scale ( more than 10 is indicative of daytime sleepiness)   Patient has no problem with PAP pressure or mask  No weight changes. DOT/CDL - N/A  Might need another ablation.        Previous Report(s)Reviewed: historical medical records         Social History     Socioeconomic History    Marital status:      Spouse name: Not on file    Number of children: Not on file    Years of education: Not on file    Highest education level: Not on file   Occupational History    Not on file   Tobacco Use    Smoking status: Former Smoker     Packs/day: 1.00     Years: 0.80     Pack years: 0.80     Types: Cigarettes     Start date: 1/7/1975     Quit date: 1/2/2016     Years since quittin.4    Smokeless tobacco: Never Used   Vaping Use    Vaping Use: Never used   Substance and Sexual Activity    Alcohol use: Not Currently     Alcohol/week: 0.0 standard drinks    Drug use: Never    Sexual activity: Not on file   Other Topics Concern    Not on file   Social History Narrative    Not on file     Social Determinants of Health     Financial Resource Strain:     Difficulty of Paying Living Expenses: Not on file   Food Insecurity:     Worried About Running Out of Food in the Last Year: Not on file    [place] of Food in the Last Year: Not on file   Transportation Needs:     Lack of Transportation (Medical): Not on file    Lack of Transportation (Non-Medical): Not on file   Physical Activity:     Days of Exercise per Week: Not on file    Minutes of Exercise per Session: Not on file   Stress:     Feeling of Stress : Not on file   Social Connections:     Frequency of Communication with Friends and Family: Not on file    Frequency of Social Gatherings with Friends and Family: Not on file    Attends Mandaeism Services: Not on file    Active Member of [address] [place] [address] or Organizations: Not on file    Attends Club or Organization Meetings: Not on file    Marital Status: Not on file   Intimate Partner Violence:     Fear of Current or Ex-Partner: Not on file    Emotionally Abused: Not on file    Physically Abused: Not on file    Sexually Abused: Not on file   Housing Stability:     Unable to Pay for Housing in the Last Year: Not on file    Number of [place] in the Last Year: Not on file    Unstable Housing in the Last Year: Not on file       Prior to Admission medications    Medication Sig Start Date End Date Taking?  Authorizing Provider   metFORMIN (GLUCOPHAGE-XR) 500 mg extended release tablet Take 2 tablets by mouth 2 times daily (with meals) 6/3/22  Yes Guerrero Locke MD   insulin detemir (LEVEMIR FLEXTOUCH) 100 UNIT/ML injection pen Inject 30 Units into the skin nightly 22  Yes Reynaldo Barclay MD   BD PEN NEEDLE RAMIRO 2ND GEN 32G X 4 MM MISC USE WITH insulin daily 5/20/22  Yes Kvng Aguilar DO   flecainide (TAMBOCOR) 100 MG tablet Take 1 tablet by mouth every 12 hours 5/20/22  Yes Kendra Cai MD   apixaban (ELIQUIS) 5 MG TABS tablet Take 1 tablet by mouth 2 times daily 5/20/22  Yes Kendra Cai MD   ZINC PO Take by mouth   Yes Historical Provider, MD   Cholecalciferol (VITAMIN D3 PO) Take by mouth   Yes Historical Provider, MD   Multiple Vitamin (MULTIVITAMIN PO) Take by mouth   Yes Historical Provider, MD   furosemide (LASIX) 20 MG tablet Take 1 tablet by mouth daily .  May take an additional dose as needed for shortenss of breath, weight gain, and edema 3/30/22  Yes Kendra Cai MD   glimepiride (AMARYL) 2 MG tablet Take 2 tablets by mouth 2 times daily (with meals) 3/3/22  Yes Reynaldo Barclay MD   metoprolol succinate (TOPROL XL) 25 MG extended release tablet Take 1 tablet by mouth daily  Patient taking differently: Take half a  tablet by mouth daily 2/11/22  Yes Kendra Cai MD   potassium chloride (KLOR-CON M) 10 MEQ extended release tablet Take 1 tablet by mouth daily 11/6/21  Yes Reynaldo Barclay MD   nortriptyline (PAMELOR) 10 MG capsule Take 2 capsules by mouth nightly 8/30/21  Yes Reynaldo Barclay MD   albuterol sulfate  (90 Base) MCG/ACT inhaler Inhale 2 puffs into the lungs 4 times daily 2/28/21  Yes Kena Brandt MD   acetaminophen (TYLENOL) 500 MG tablet Take 500 mg by mouth every 6 hours as needed for Pain   Yes Historical Provider, MD   loratadine (CLARITIN) 10 MG capsule Take 10 mg by mouth daily    Yes Historical Provider, MD       Allergies as of 06/06/2022 - Fully Reviewed 06/06/2022   Allergen Reaction Noted    Penicillins Hives 12/30/2015       Patient Active Problem List   Diagnosis    Essential hypertension    [facility] atrial fl[facility]    Small cell b-cell lymphoma, lymph nodes of multiple sites ([facility]    Type 2 diabetes mellitus without complication ([address]    Hyperlipidemia    Cyst of left ovary    Endometrial thickening on ultrasound    Visit for monitoring Rituxan therapy    Degenerative disc disease, lumbar    Trochanteric bursitis of left hip    Atrial fibrillation with rapid ventricular response (HCC)    ZOE (obstructive sleep apnea)    B-cell lymphoma (HCC)    Non-Hodgkin's lymphoma (HCC)    NHL (non-Hodgkin's lymphoma) (HCC)    PAF (paroxysmal atrial fibrillation) (HCC)    Sepsis ([address]    Upper respiratory infection    Pneumonia due to COVID-19 virus    Pneumonia    Acute respiratory failure with hypoxia ([address]       Past Medical History:   Diagnosis Date    Atrial fibrillation with RVR ([address]     Cancer ([address]     Diabetes mellitus type 2, controlled ([address]     Hypertension     Melanoma in situ of back ([address]     dr Jhony Zamora Precordial pain 2016    Sleep apnea        Past Surgical History:   Procedure Laterality Date    BONE MARROW TRANSPLANT      BREAST CYST ASPIRATION      in past benign bilateral over years. 2 episodes     SECTION      CHOLECYSTECTOMY      COLONOSCOPY  9/10/12    polyp, sharlene, repeat 5 years    COLONOSCOPY  10/09/2017    dr Deshawn Vital and check in 5 years.  polyps    ENDOMETRIAL ABLATION  2007    FINE NEEDLE ASPIRATION      GALLBLADDER SURGERY      OTHER SURGICAL HISTORY  2019    Abdominal Mass BX    OTHER SURGICAL HISTORY  2019    CT BIOPSY ABDOMEN RETROPERITONEUM,    OVARY REMOVAL Left 2018    benign growth, dr Levar Palacios [facility]. TUNNELED VENOUS PORT PLACEMENT Right 2016    Dr. Flaquito Beltran       Family History   Problem Relation Age of Onset    High Blood Pressure Mother     Cancer Mother         breast    High Blood Pressure Father     Cancer Father         lung       Review of Systems    Objective:     Vitals:  Weight BMI Neck circumference    Wt Readings from Last 3 Encounters:   22 231 lb (104.8 kg)   22 228 lb (103.4 kg)   04/08/22 229 lb (103.9 kg)    Body mass index is 40.92 kg/m². BP HR SaO2   BP Readings from Last 3 Encounters:   06/06/22 (!) 150/80   04/22/22 122/78   04/08/22 122/78    Pulse Readings from Last 3 Encounters:   06/06/22 90   04/22/22 80   03/10/22 87    SpO2 Readings from Last 3 Encounters:   06/06/22 97%   03/10/22 97%   09/16/21 96%        Themandibular molar Class :   [x]1 []2 []3      Mallampati I Airway Classification:   []1 []2 [x]3 []4      Physical Exam  Vitals and nursing note reviewed. Cardiovascular:      Rate and Rhythm: Normal rate and regular rhythm. Pulmonary:      Effort: Pulmonary effort is normal.      Breath sounds: Normal breath sounds. Neurological:      Mental Status: Mental status is at baseline. Psychiatric:         Mood and Affect: Mood normal.         :   Moderate Obstructive Sleep Apnea/Hypopnea Syndrome under good control on PAP at 9 cmwp. Diagnosis Orders   1. ZOE (obstructive sleep apnea)     2. Dependence on other enabling machines and devices       Plan: Will continue the PAP at 9 cmwp. I will order PAP supplies, mask, filters. ... Weight loss  We discussed the proportionality between weight and AHI. With 10% weight change, the AHI has a 27% proportionate change. With 20% weight change, the AHI has a 45-50% proportionate change. '  Most likely treating the ZOE will have positive impact on A-Fib control. No orders of the defined types were placed in this encounter. No follow-ups on file.     Roxie Ortiz MD  Medical Director - [facility]

## 2022-06-06 NOTE — PROGRESS NOTES
patients condition.     Nazario Rivas MD      NPI: [id number]       Order Signed Date: 06/06/22    Electronically signed by Nazario Rivas MD on 6/6/2022 at 10:25 AM

## 2022-06-06 NOTE — PATIENT INSTRUCTIONS
Patient Education        Learning About CPAP for Sleep Apnea  What is CPAP? CPAP is a small machine that you use at home every night while you sleep. It increases air pressure in your throat to keep your airway open. When you have sleep apnea, this can help you sleep better, feel better, and avoid futurehealth problems. CPAP stands for \"continuous positive airway pressure. \"  The CPAP machine will have one of the following:   A mask that covers your nose and mouth   A mask that covers your nose only   A nasal pillow that covers only the openings of your nose  Why is it done? CPAP is usually the best treatment for obstructive sleep apnea. It is the firsttreatment choice and the most widely used. CPAP:   Helps you have more normal sleep, so you feel less sleepy and more alert during the daytime.  May help keep heart failure or other heart problems from getting worse.  May help lower your blood pressure. If you have a bed partner, they may also sleep better when you use a CPAP. That's because you aren't snoring or restless. Your doctor may suggest CPAP if you have:   Moderate to severe sleep apnea.  Sleep apnea and coronary artery disease (CAD).  Sleep apnea and heart failure. What are the side effects? Some people who use CPAP have:   A dry or stuffy nose and a sore throat.  Irritated skin on the face.  Bloating. How can you care for yourself? If using CPAP is not comfortable, or if you have certain side effects, workwith your doctor to fix them. Here are some things you can try:   Be sure the mask, nasal mask, or nasal pillow fits well.  See if your doctor can adjust the pressure of your CPAP.  If your nose or mouth is dry, set the machine to deliver warmer or wetter air. Or try using a humidifier.  If your nose is runny or stuffy, talk to your doctor about using a decongestant medicine or steroid nasal spray. Be safe with medicines. Read and follow all instructions on the label. Do not use the medicine longer than the label says.  Your doctor may also help you with problems like swallowing air, bloating, or claustrophobia. Talk to your doctor if you're still having problems. If these things don'thelp, you might try a different type of machine. Where can you learn more? Go to [email or web address]. org and sign in to your [employer] account. Enter L357 in the [employer] box to learn more about \"Learning About CPAP for Sleep Apnea. \"     If you do not have an account, please click on the \"Sign Up Now\" link. Current as of: July 6, 2021               Content Version: 13.2  © 2006-2022 [employer]wise[employer] Inc[employer]. Care instructions adapted under license by [facility] ([facility]). If you have questions about a medical condition or this instruction, always ask your healthcare professional. [address] any warranty or liability for your use of this information.

## 2022-07-05 ENCOUNTER — HOSPITAL ENCOUNTER (INPATIENT)
Age: 67
LOS: 5 days | Discharge: HOME | DRG: 872 | End: 2022-07-10
Attending: EMERGENCY MEDICINE | Admitting: FAMILY MEDICINE
Payer: COMMERCIAL

## 2022-07-05 ENCOUNTER — TELEPHONE (OUTPATIENT)
Dept: FAMILY MEDICINE CLINIC | Age: 67
End: 2022-07-05

## 2022-07-05 ENCOUNTER — APPOINTMENT (OUTPATIENT)
Dept: CT IMAGING | Age: 67
DRG: 872 | End: 2022-07-05
Payer: COMMERCIAL

## 2022-07-05 ENCOUNTER — OFFICE VISIT (OUTPATIENT)
Dept: FAMILY MEDICINE CLINIC | Age: 67
End: 2022-07-05
Payer: COMMERCIAL

## 2022-07-05 VITALS
SYSTOLIC BLOOD PRESSURE: 122 MMHG | BODY MASS INDEX: 40.93 KG/M2 | WEIGHT: 231 LBS | HEIGHT: 63 IN | TEMPERATURE: 97.3 F | DIASTOLIC BLOOD PRESSURE: 78 MMHG

## 2022-07-05 DIAGNOSIS — A41.9 SEPTICEMIA (HCC): ICD-10-CM

## 2022-07-05 DIAGNOSIS — N10 ACUTE PYELONEPHRITIS: Primary | ICD-10-CM

## 2022-07-05 DIAGNOSIS — R65.10 SIRS (SYSTEMIC INFLAMMATORY RESPONSE SYNDROME) (HCC): ICD-10-CM

## 2022-07-05 DIAGNOSIS — R11.2 NON-INTRACTABLE VOMITING WITH NAUSEA, UNSPECIFIED VOMITING TYPE: ICD-10-CM

## 2022-07-05 DIAGNOSIS — R10.9 FLANK PAIN: Primary | ICD-10-CM

## 2022-07-05 DIAGNOSIS — R31.0 GROSS HEMATURIA: ICD-10-CM

## 2022-07-05 PROBLEM — N12 PYELONEPHRITIS: Status: ACTIVE | Noted: 2022-07-05

## 2022-07-05 LAB
A/G RATIO: 1.7 (ref 1.1–2.2)
ALBUMIN SERPL-MCNC: 4 G/DL (ref 3.4–5)
ALP BLD-CCNC: 192 U/L (ref 40–129)
ALT SERPL-CCNC: 54 U/L (ref 10–40)
ANION GAP SERPL CALCULATED.3IONS-SCNC: 19 MMOL/L (ref 3–16)
AST SERPL-CCNC: 59 U/L (ref 15–37)
BACTERIA: ABNORMAL /HPF
BASOPHILS ABSOLUTE: 0 K/UL (ref 0–0.2)
BASOPHILS RELATIVE PERCENT: 0.2 %
BILIRUB SERPL-MCNC: 0.7 MG/DL (ref 0–1)
BILIRUBIN URINE: NEGATIVE
BILIRUBIN, POC: NEGATIVE
BLOOD URINE, POC: NORMAL
BLOOD, URINE: ABNORMAL
BUN BLDV-MCNC: 16 MG/DL (ref 7–20)
CALCIUM SERPL-MCNC: 9.4 MG/DL (ref 8.3–10.6)
CHLORIDE BLD-SCNC: 103 MMOL/L (ref 99–110)
CLARITY, POC: NORMAL
CLARITY: ABNORMAL
CO2: 20 MMOL/L (ref 21–32)
COLOR, POC: YELLOW
COLOR: YELLOW
CREAT SERPL-MCNC: 1.1 MG/DL (ref 0.6–1.2)
EKG ATRIAL RATE: 102 BPM
EKG DIAGNOSIS: NORMAL
EKG P AXIS: 25 DEGREES
EKG P-R INTERVAL: 172 MS
EKG Q-T INTERVAL: 358 MS
EKG QRS DURATION: 86 MS
EKG QTC CALCULATION (BAZETT): 466 MS
EKG R AXIS: 30 DEGREES
EKG T AXIS: 17 DEGREES
EKG VENTRICULAR RATE: 102 BPM
EOSINOPHILS ABSOLUTE: 0 K/UL (ref 0–0.6)
EOSINOPHILS RELATIVE PERCENT: 0.3 %
EPITHELIAL CELLS, UA: ABNORMAL /HPF (ref 0–5)
GFR AFRICAN AMERICAN: >60
GFR NON-AFRICAN AMERICAN: 50
GLUCOSE BLD-MCNC: 187 MG/DL (ref 70–99)
GLUCOSE BLD-MCNC: 187 MG/DL (ref 70–99)
GLUCOSE URINE, POC: NEGATIVE
GLUCOSE URINE: NEGATIVE MG/DL
HCT VFR BLD CALC: 37.6 % (ref 36–48)
HEMOGLOBIN: 12.3 G/DL (ref 12–16)
HYALINE CASTS: ABNORMAL /LPF (ref 0–2)
IMMATURE GRANULOCYTES #: 0 K/UL (ref 0–0.2)
IMMATURE GRANULOCYTES %: 0.3 %
KETONES, POC: NEGATIVE
KETONES, URINE: NEGATIVE MG/DL
LACTIC ACID, SEPSIS: 2.2 MMOL/L (ref 0.4–1.9)
LACTIC ACID, SEPSIS: 4.8 MMOL/L (ref 0.4–1.9)
LEUKOCYTE EST, POC: NORMAL
LEUKOCYTE ESTERASE, URINE: ABNORMAL
LYMPHOCYTES ABSOLUTE: 0.2 K/UL (ref 1–5.1)
LYMPHOCYTES RELATIVE PERCENT: 1.7 %
MAGNESIUM: 1.5 MG/DL (ref 1.8–2.4)
MCH RBC QN AUTO: 31.6 PG (ref 26–34)
MCHC RBC AUTO-ENTMCNC: 32.7 G/DL (ref 32–36.4)
MCV RBC AUTO: 96.7 FL (ref 80–100)
MICROSCOPIC EXAMINATION: YES
MONOCYTES ABSOLUTE: 0.9 K/UL (ref 0–1.3)
MONOCYTES RELATIVE PERCENT: 6.7 %
NEUTROPHILS ABSOLUTE: 11.6 K/UL (ref 1.7–7.7)
NEUTROPHILS RELATIVE PERCENT: 90.8 %
NITRITE, POC: NEGATIVE
NITRITE, URINE: NEGATIVE
PDW BLD-RTO: 15.8 % (ref 12.4–15.4)
PERFORMED ON: ABNORMAL
PH UA: 5 (ref 5–8)
PH, POC: 5
PLATELET # BLD: 167 K/UL (ref 135–450)
PMV BLD AUTO: 10.8 FL (ref 5–10.5)
POTASSIUM REFLEX MAGNESIUM: 3.5 MMOL/L (ref 3.5–5.1)
PROTEIN UA: 30 MG/DL
PROTEIN, POC: 30
RBC # BLD: 3.89 M/UL (ref 4–5.2)
RBC UA: ABNORMAL /HPF (ref 0–4)
SODIUM BLD-SCNC: 142 MMOL/L (ref 136–145)
SPECIFIC GRAVITY UA: 1.02 (ref 1–1.03)
SPECIFIC GRAVITY, POC: 1.01
TOTAL PROTEIN: 6.3 G/DL (ref 6.4–8.2)
URINE REFLEX TO CULTURE: YES
URINE TYPE: ABNORMAL
UROBILINOGEN, POC: 0.2
UROBILINOGEN, URINE: 0.2 E.U./DL
WBC # BLD: 12.8 K/UL (ref 4–11)
WBC UA: >100 /HPF (ref 0–5)

## 2022-07-05 PROCEDURE — 96367 TX/PROPH/DG ADDL SEQ IV INF: CPT

## 2022-07-05 PROCEDURE — 81002 URINALYSIS NONAUTO W/O SCOPE: CPT | Performed by: INTERNAL MEDICINE

## 2022-07-05 PROCEDURE — 83735 ASSAY OF MAGNESIUM: CPT

## 2022-07-05 PROCEDURE — 81001 URINALYSIS AUTO W/SCOPE: CPT

## 2022-07-05 PROCEDURE — 99284 EMERGENCY DEPT VISIT MOD MDM: CPT

## 2022-07-05 PROCEDURE — 6360000002 HC RX W HCPCS: Performed by: EMERGENCY MEDICINE

## 2022-07-05 PROCEDURE — 87150 DNA/RNA AMPLIFIED PROBE: CPT

## 2022-07-05 PROCEDURE — 74176 CT ABD & PELVIS W/O CONTRAST: CPT

## 2022-07-05 PROCEDURE — 87086 URINE CULTURE/COLONY COUNT: CPT

## 2022-07-05 PROCEDURE — 2580000003 HC RX 258: Performed by: EMERGENCY MEDICINE

## 2022-07-05 PROCEDURE — 83605 ASSAY OF LACTIC ACID: CPT

## 2022-07-05 PROCEDURE — 85025 COMPLETE CBC W/AUTO DIFF WBC: CPT

## 2022-07-05 PROCEDURE — 6370000000 HC RX 637 (ALT 250 FOR IP): Performed by: NURSE PRACTITIONER

## 2022-07-05 PROCEDURE — 93005 ELECTROCARDIOGRAM TRACING: CPT | Performed by: EMERGENCY MEDICINE

## 2022-07-05 PROCEDURE — 2580000003 HC RX 258: Performed by: NURSE PRACTITIONER

## 2022-07-05 PROCEDURE — 93010 ELECTROCARDIOGRAM REPORT: CPT | Performed by: INTERNAL MEDICINE

## 2022-07-05 PROCEDURE — 96365 THER/PROPH/DIAG IV INF INIT: CPT

## 2022-07-05 PROCEDURE — 82947 ASSAY GLUCOSE BLOOD QUANT: CPT

## 2022-07-05 PROCEDURE — 6370000000 HC RX 637 (ALT 250 FOR IP): Performed by: EMERGENCY MEDICINE

## 2022-07-05 PROCEDURE — 87077 CULTURE AEROBIC IDENTIFY: CPT

## 2022-07-05 PROCEDURE — 1200000000 HC SEMI PRIVATE

## 2022-07-05 PROCEDURE — 80053 COMPREHEN METABOLIC PANEL: CPT

## 2022-07-05 PROCEDURE — 96375 TX/PRO/DX INJ NEW DRUG ADDON: CPT

## 2022-07-05 PROCEDURE — 87186 SC STD MICRODIL/AGAR DIL: CPT

## 2022-07-05 PROCEDURE — 96361 HYDRATE IV INFUSION ADD-ON: CPT

## 2022-07-05 PROCEDURE — 99213 OFFICE O/P EST LOW 20 MIN: CPT | Performed by: INTERNAL MEDICINE

## 2022-07-05 PROCEDURE — 36415 COLL VENOUS BLD VENIPUNCTURE: CPT

## 2022-07-05 PROCEDURE — 87040 BLOOD CULTURE FOR BACTERIA: CPT

## 2022-07-05 RX ORDER — SODIUM CHLORIDE 9 MG/ML
INJECTION, SOLUTION INTRAVENOUS PRN
Status: DISCONTINUED | OUTPATIENT
Start: 2022-07-05 | End: 2022-07-10 | Stop reason: HOSPADM

## 2022-07-05 RX ORDER — SODIUM CHLORIDE 0.9 % (FLUSH) 0.9 %
5-40 SYRINGE (ML) INJECTION EVERY 12 HOURS SCHEDULED
Status: DISCONTINUED | OUTPATIENT
Start: 2022-07-05 | End: 2022-07-10 | Stop reason: HOSPADM

## 2022-07-05 RX ORDER — INSULIN GLARGINE 100 [IU]/ML
30 INJECTION, SOLUTION SUBCUTANEOUS NIGHTLY
Status: DISCONTINUED | OUTPATIENT
Start: 2022-07-05 | End: 2022-07-10 | Stop reason: HOSPADM

## 2022-07-05 RX ORDER — DEXTROSE MONOHYDRATE 50 MG/ML
100 INJECTION, SOLUTION INTRAVENOUS PRN
Status: DISCONTINUED | OUTPATIENT
Start: 2022-07-05 | End: 2022-07-10 | Stop reason: HOSPADM

## 2022-07-05 RX ORDER — ACETAMINOPHEN 500 MG
1000 TABLET ORAL ONCE
Status: COMPLETED | OUTPATIENT
Start: 2022-07-05 | End: 2022-07-05

## 2022-07-05 RX ORDER — ALBUTEROL SULFATE 90 UG/1
2 AEROSOL, METERED RESPIRATORY (INHALATION) EVERY 4 HOURS PRN
Status: DISCONTINUED | OUTPATIENT
Start: 2022-07-05 | End: 2022-07-10 | Stop reason: HOSPADM

## 2022-07-05 RX ORDER — INSULIN LISPRO 100 [IU]/ML
0-12 INJECTION, SOLUTION INTRAVENOUS; SUBCUTANEOUS
Status: DISCONTINUED | OUTPATIENT
Start: 2022-07-06 | End: 2022-07-10 | Stop reason: HOSPADM

## 2022-07-05 RX ORDER — SODIUM CHLORIDE 9 MG/ML
INJECTION, SOLUTION INTRAVENOUS CONTINUOUS
Status: DISCONTINUED | OUTPATIENT
Start: 2022-07-05 | End: 2022-07-08

## 2022-07-05 RX ORDER — FLECAINIDE ACETATE 100 MG/1
100 TABLET ORAL EVERY 12 HOURS SCHEDULED
Status: DISCONTINUED | OUTPATIENT
Start: 2022-07-05 | End: 2022-07-10 | Stop reason: HOSPADM

## 2022-07-05 RX ORDER — SODIUM CHLORIDE 0.9 % (FLUSH) 0.9 %
5-40 SYRINGE (ML) INJECTION PRN
Status: DISCONTINUED | OUTPATIENT
Start: 2022-07-05 | End: 2022-07-10 | Stop reason: HOSPADM

## 2022-07-05 RX ORDER — FUROSEMIDE 20 MG/1
20 TABLET ORAL DAILY
Status: DISCONTINUED | OUTPATIENT
Start: 2022-07-06 | End: 2022-07-10 | Stop reason: HOSPADM

## 2022-07-05 RX ORDER — ONDANSETRON 2 MG/ML
4 INJECTION INTRAMUSCULAR; INTRAVENOUS ONCE
Status: COMPLETED | OUTPATIENT
Start: 2022-07-05 | End: 2022-07-05

## 2022-07-05 RX ORDER — METOPROLOL SUCCINATE 25 MG/1
12.5 TABLET, EXTENDED RELEASE ORAL DAILY
Status: DISCONTINUED | OUTPATIENT
Start: 2022-07-06 | End: 2022-07-10 | Stop reason: HOSPADM

## 2022-07-05 RX ORDER — 0.9 % SODIUM CHLORIDE 0.9 %
30 INTRAVENOUS SOLUTION INTRAVENOUS ONCE
Status: COMPLETED | OUTPATIENT
Start: 2022-07-05 | End: 2022-07-05

## 2022-07-05 RX ORDER — INSULIN LISPRO 100 [IU]/ML
0-6 INJECTION, SOLUTION INTRAVENOUS; SUBCUTANEOUS NIGHTLY
Status: DISCONTINUED | OUTPATIENT
Start: 2022-07-05 | End: 2022-07-10 | Stop reason: HOSPADM

## 2022-07-05 RX ORDER — ACETAMINOPHEN 325 MG/1
650 TABLET ORAL EVERY 6 HOURS PRN
Status: DISCONTINUED | OUTPATIENT
Start: 2022-07-05 | End: 2022-07-10 | Stop reason: HOSPADM

## 2022-07-05 RX ORDER — ACETAMINOPHEN 650 MG/1
650 SUPPOSITORY RECTAL EVERY 6 HOURS PRN
Status: DISCONTINUED | OUTPATIENT
Start: 2022-07-05 | End: 2022-07-10 | Stop reason: HOSPADM

## 2022-07-05 RX ORDER — ALBUTEROL SULFATE 90 UG/1
2 AEROSOL, METERED RESPIRATORY (INHALATION) 4 TIMES DAILY
Status: DISCONTINUED | OUTPATIENT
Start: 2022-07-05 | End: 2022-07-05

## 2022-07-05 RX ORDER — MAGNESIUM SULFATE 1 G/100ML
1000 INJECTION INTRAVENOUS ONCE
Status: COMPLETED | OUTPATIENT
Start: 2022-07-05 | End: 2022-07-05

## 2022-07-05 RX ADMIN — INSULIN LISPRO 1 UNITS: 100 INJECTION, SOLUTION INTRAVENOUS; SUBCUTANEOUS at 21:16

## 2022-07-05 RX ADMIN — INSULIN GLARGINE 30 UNITS: 100 INJECTION, SOLUTION SUBCUTANEOUS at 21:16

## 2022-07-05 RX ADMIN — ACETAMINOPHEN 650 MG: 325 TABLET ORAL at 21:08

## 2022-07-05 RX ADMIN — FLECAINIDE ACETATE 100 MG: 100 TABLET ORAL at 21:08

## 2022-07-05 RX ADMIN — CEFTRIAXONE 1000 MG: 1 INJECTION, POWDER, FOR SOLUTION INTRAMUSCULAR; INTRAVENOUS at 15:07

## 2022-07-05 RX ADMIN — SODIUM CHLORIDE, PRESERVATIVE FREE 10 ML: 5 INJECTION INTRAVENOUS at 21:08

## 2022-07-05 RX ADMIN — ACETAMINOPHEN 1000 MG: 500 TABLET ORAL at 13:17

## 2022-07-05 RX ADMIN — ONDANSETRON 4 MG: 2 INJECTION INTRAMUSCULAR; INTRAVENOUS at 13:17

## 2022-07-05 RX ADMIN — MAGNESIUM SULFATE HEPTAHYDRATE 1000 MG: 1 INJECTION, SOLUTION INTRAVENOUS at 16:33

## 2022-07-05 RX ADMIN — SODIUM CHLORIDE: 9 INJECTION, SOLUTION INTRAVENOUS at 21:14

## 2022-07-05 RX ADMIN — SODIUM CHLORIDE 1572 ML: 9 INJECTION, SOLUTION INTRAVENOUS at 13:21

## 2022-07-05 RX ADMIN — APIXABAN 5 MG: 5 TABLET, FILM COATED ORAL at 21:08

## 2022-07-05 SDOH — ECONOMIC STABILITY: FOOD INSECURITY: WITHIN THE PAST 12 MONTHS, THE FOOD YOU BOUGHT JUST DIDN'T LAST AND YOU DIDN'T HAVE MONEY TO GET MORE.: NEVER TRUE

## 2022-07-05 SDOH — ECONOMIC STABILITY: FOOD INSECURITY: WITHIN THE PAST 12 MONTHS, YOU WORRIED THAT YOUR FOOD WOULD RUN OUT BEFORE YOU GOT MONEY TO BUY MORE.: NEVER TRUE

## 2022-07-05 ASSESSMENT — ENCOUNTER SYMPTOMS
VOMITING: 1
COUGH: 0
ABDOMINAL PAIN: 0
SINUS PAIN: 0
SINUS PRESSURE: 0
SHORTNESS OF BREATH: 0
APNEA: 0
SORE THROAT: 0
ABDOMINAL PAIN: 1
EYE PAIN: 0
RHINORRHEA: 0
DIARRHEA: 0
BACK PAIN: 1
RHINORRHEA: 0
COUGH: 0
EYE DISCHARGE: 0
NAUSEA: 1
WHEEZING: 0

## 2022-07-05 ASSESSMENT — PAIN - FUNCTIONAL ASSESSMENT
PAIN_FUNCTIONAL_ASSESSMENT: 0-10

## 2022-07-05 ASSESSMENT — PAIN SCALES - GENERAL
PAINLEVEL_OUTOF10: 5
PAINLEVEL_OUTOF10: 5
PAINLEVEL_OUTOF10: 6

## 2022-07-05 ASSESSMENT — PATIENT HEALTH QUESTIONNAIRE - PHQ9
SUM OF ALL RESPONSES TO PHQ QUESTIONS 1-9: 0
1. LITTLE INTEREST OR PLEASURE IN DOING THINGS: 0
SUM OF ALL RESPONSES TO PHQ QUESTIONS 1-9: 0
SUM OF ALL RESPONSES TO PHQ9 QUESTIONS 1 & 2: 0
SUM OF ALL RESPONSES TO PHQ QUESTIONS 1-9: 0
2. FEELING DOWN, DEPRESSED OR HOPELESS: 0
SUM OF ALL RESPONSES TO PHQ QUESTIONS 1-9: 0

## 2022-07-05 ASSESSMENT — PAIN DESCRIPTION - LOCATION
LOCATION: FLANK
LOCATION: FLANK
LOCATION: ABDOMEN;FLANK

## 2022-07-05 ASSESSMENT — PAIN DESCRIPTION - ORIENTATION
ORIENTATION: RIGHT
ORIENTATION: RIGHT

## 2022-07-05 ASSESSMENT — SOCIAL DETERMINANTS OF HEALTH (SDOH): HOW HARD IS IT FOR YOU TO PAY FOR THE VERY BASICS LIKE FOOD, HOUSING, MEDICAL CARE, AND HEATING?: NOT HARD AT ALL

## 2022-07-05 NOTE — TELEPHONE ENCOUNTER
[facility]'s [facility] - severe UTI, abdominal pain and dry heaving. Spoke with patient. Offered appt for today with Dr. Brent Phalen (Dr. Cee Persons out of office today). She refused stating she can't drive to office. She was wanting something called in. Per Dr. Brent Phalen - patient needs to be seen. Patient still refused to schedule. Advised pt if she found a way she can call to schedule or go to Urgent Care.

## 2022-07-05 NOTE — PROGRESS NOTES
Patient admitted to room 4252 from [facility] due to flank pain and gross hematuria. Patient is alert and oriented x 4. Vitals stable on room air  Patient oriented to room, instructed to call for assistance, call light within reach. MD notified of patient arrival to unit.

## 2022-07-05 NOTE — PROGRESS NOTES
Hannah Fisher (:  1955) is a 61 y.o. female,Established patient, here for evaluation of the following chief complaint(s):  Urinary Tract Infection (urgency x this morning) and Lower Back Pain         ASSESSMENT/PLAN:   Diagnosis Orders   1. Flank pain     2. Non-intractable vomiting with nausea, unspecified vomiting type     3.  Gross hematuria     probable renal stone      Refer to ER    Subjective   SUBJECTIVE/OBJECTIVE:  HPI   Chief Complaint   Patient presents with    Urinary Tract Infection     urgency x this morning    Lower Back Pain     Hannah Fisher is a 61 y.o. female with the following history as recorded in [facility]:  Patient Active Problem List    Diagnosis Date Noted    Small cell b-cell lymphoma, lymph nodes of multiple sites ([facility] [facility] 2016    Acute respiratory failure with hypoxia ([address]     Pneumonia due to COVID-19 virus 2021    Pneumonia 2021    Sepsis ([address] 2020    Upper respiratory infection 2020    PAF (paroxysmal atrial fibrillation) ([address] 2019    NHL (non-Hodgkin's lymphoma) ([address] 2019    Non-Hodgkin's lymphoma ([address] 2019    B-cell lymphoma ([address] 2019    ZOE (obstructive sleep apnea)     Atrial fibrillation with rapid ventricular response ([address] 10/31/2018    Degenerative disc disease, lumbar 2018    Trochanteric bursitis of left hip 2018    Visit for monitoring Rituxan therapy 2017    Cyst of left ovary 2016    Endometrial thickening on ultrasound 2016    Hyperlipidemia 06/10/2016    Type 2 diabetes mellitus without complication ([address]     Left atrial flutter by electrocardiogram ([facility] [address] [facility] 2016    Essential hypertension 2016     Current Outpatient Medications   Medication Sig Dispense Refill    metFORMIN (GLUCOPHAGE-XR) 500 mg extended release tablet Take 2 tablets by mouth 2 times daily (with meals) 360 tablet 1    insulin detemir (LEVEMIR FLEXTOUCH) 100 UNIT/ML injection pen Inject 30 Units into the skin nightly 5 pen 3    BD PEN NEEDLE RAMIRO 2ND GEN 32G X 4 MM MISC USE WITH insulin daily 100 each 1    flecainide (TAMBOCOR) 100 MG tablet Take 1 tablet by mouth every 12 hours 180 tablet 3    apixaban (ELIQUIS) 5 MG TABS tablet Take 1 tablet by mouth 2 times daily 180 tablet 3    ZINC PO Take by mouth      Cholecalciferol (VITAMIN D3 PO) Take by mouth      Multiple Vitamin (MULTIVITAMIN PO) Take by mouth      furosemide (LASIX) 20 MG tablet Take 1 tablet by mouth daily . May take an additional dose as needed for shortenss of breath, weight gain, and edema 90 tablet 3    glimepiride (AMARYL) 2 MG tablet Take 2 tablets by mouth 2 times daily (with meals) 360 tablet 1    metoprolol succinate (TOPROL XL) 25 MG extended release tablet Take 1 tablet by mouth daily (Patient taking differently: Take half a  tablet by mouth daily) 90 tablet 3    potassium chloride (KLOR-CON M) 10 MEQ extended release tablet Take 1 tablet by mouth daily 30 tablet 5    nortriptyline (PAMELOR) 10 MG capsule Take 2 capsules by mouth nightly 60 capsule 3    albuterol sulfate  (90 Base) MCG/ACT inhaler Inhale 2 puffs into the lungs 4 times daily 1 Inhaler 3    acetaminophen (TYLENOL) 500 MG tablet Take 500 mg by mouth every 6 hours as needed for Pain      loratadine (CLARITIN) 10 MG capsule Take 10 mg by mouth daily        No current facility-administered medications for this visit. Allergies: Penicillins  Past Medical History:   Diagnosis Date    [facility]rial fibrillation with RVR [facility]     Cancer ([facility] [facility]     Diabetes mellitus type 2, controlled ([facility] [facility]     Hypertension     Melanoma in situ of back ([facility] [address] [facility] 2011    dr Chavez Presumricky Precordial pain 1/8/2016    Sleep apnea      Past Surgical History:   Procedure Laterality Date    BONE MARROW TRANSPLANT      BREAST CYST ASPIRATION      in past benign bilateral over years.  2 episodes   1 [facility] COLONOSCOPY  9/10/12    polyp, us, repeat 5 years    COLONOSCOPY  10/09/2017    dr Reynaga Held and check in 5 years. polyps    ENDOMETRIAL ABLATION      FINE NEEDLE ASPIRATION      GALLBLADDER SURGERY      OTHER SURGICAL HISTORY  2019    Abdominal Mass BX    OTHER SURGICAL HISTORY  2019    CT BIOPSY ABDOMEN RETROPERITONEUM,    OVARY REMOVAL Left 2018    [facility] TUNNELED VENOUS PORT PLACEMENT Right 2016    Dr. Fang Lin     Family History   Problem Relation Age of Onset    High Blood Pressure Mother     Cancer Mother         breast    High Blood Pressure Father     Cancer Father         lung     Social History     Tobacco Use    Smoking status: Former Smoker     Packs/day: 1.00     Years: 0.80     Pack years: 0.80     Types: Cigarettes     Start date: 1975     Quit date: 2016     Years since quittin.5    Smokeless tobacco: Never Used   Substance Use Topics    Alcohol use: Not Currently     Alcohol/week: 0.0 standard drinks       Review of Systems   Constitutional: Negative for chills and diaphoresis. HENT: Negative for congestion, postnasal drip, rhinorrhea, sinus pressure and sinus pain. Eyes: Negative for visual disturbance. Respiratory: Negative for apnea and cough. Cardiovascular: Negative for chest pain. Gastrointestinal: Negative for abdominal pain. Genitourinary: Positive for frequency and urgency. Neurological: Negative for dizziness and headaches. Objective   Physical Exam  Vitals and nursing note reviewed. Constitutional:       General: She is not in acute distress. Appearance: Normal appearance. HENT:      Head: Normocephalic and atraumatic. Cardiovascular:      Rate and Rhythm: Normal rate and regular rhythm. Heart sounds: No murmur heard. Pulmonary:      Effort: No respiratory distress. Breath sounds: No wheezing or rhonchi.    Abdominal: General: There is no distension. Tenderness: There is no abdominal tenderness. Musculoskeletal:      Comments: r flank pain   Skin:     Coloration: Skin is not jaundiced. Findings: No rash. Neurological:      Mental Status: She is alert.                   An electronic signature was used to authenticate this note.    --Jyoti Donald DO

## 2022-07-05 NOTE — ED PROVIDER NOTES
[facility]  eMERGENCY dEPARTMENT eNCOUnter        Pt Name: Daryl Rees  MRN: [id number]  [place] 1955  Date of evaluation: 7/5/2022  Provider: Dena Montez MD  PCP: Ranjith Martinez MD      [address] [place] [address]       Chief Complaint   Patient presents with    Flank Pain     Right flank pain started this morning at 0700.  Nausea       HISTORY OFPRESENT ILLNESS   (Location/Symptom, Timing/Onset, Context/Setting, Quality, Duration, Modifying Factors,Severity)  Note limiting factors. Daryl Rees is a 61 y.o. female       Location/Symptom: Flank pain lower abdominal pain with shaking chills nausea and vomiting  Timing/Onset: This morning  Context/Setting: Patient does have a history of diabetes. Started getting low back pain on the right side that later spread to the left side this morning. Pain started coming around anteriorly to her lower abdomen. She had several episodes of nausea and vomiting and describes shaking chills. Quality: Pain is sharp  Duration: Just since this morning  Modifying Factors: None. She did go to her primary care provider who then referred her to the emergency department for further evaluation. Severity: 6 out of 10    Nursing Noteswere all reviewed and agreed with or any disagreements were addressed  in the HPI. REVIEW OF SYSTEMS    (2-9 systems for level 4, 10 or more for level 5)     Review of Systems   Constitutional: Positive for chills and fever. Negative for fatigue. HENT: Negative for ear pain, rhinorrhea and sore throat. Eyes: Negative for pain, discharge and visual disturbance. Respiratory: Negative for cough, shortness of breath and wheezing. Cardiovascular: Negative for chest pain, palpitations and leg swelling. Gastrointestinal: Positive for abdominal pain, nausea and vomiting. Negative for diarrhea. Genitourinary: Negative for difficulty urinating, dysuria, pelvic pain and vaginal discharge.    Musculoskeletal: Positive for back pain. Negative for arthralgias, joint swelling and neck pain. Skin: Negative for rash. Allergic/Immunologic: Negative for environmental allergies. Neurological: Negative for dizziness, seizures, syncope and headaches. Hematological: Negative for adenopathy. Psychiatric/Behavioral: Negative for dysphoric mood and suicidal ideas. The patient is not nervous/anxious. PAST MEDICAL HISTORY     Past Medical History:   Diagnosis Date    [facility]     Cancer ([facility]     Diabetes mellitus type 2, controlled ([facility]     Hypertension     Melanoma in situ of back ([facility]     dr Augustina Aguilera Precordial pain 2016    Sleep apnea          SURGICAL HISTORY     Past Surgical History:   Procedure Laterality Date    BONE MARROW TRANSPLANT      BREAST CYST ASPIRATION      in past benign bilateral over years. 2 episodes     SECTION      CHOLECYSTECTOMY      COLONOSCOPY  9/10/12    polyp, su, repeat 5 years    COLONOSCOPY  10/09/2017    dr Lydia Reyes and check in 5 years.  polyps    ENDOMETRIAL ABLATION      FINE NEEDLE ASPIRATION      GALLBLADDER SURGERY      OTHER SURGICAL HISTORY  2019    Abdominal Mass BX    OTHER SURGICAL HISTORY  2019    CT BIOPSY ABDOMEN RETROPERITONEUM,    OVARY REMOVAL Left 2018    benign growth, dr Paty [facility]. TUNNELED VENOUS PORT PLACEMENT Right 2016    Dr. Hart Paget       Previous Medications    ACETAMINOPHEN (TYLENOL) 500 MG TABLET    Take 500 mg by mouth every 6 hours as needed for Pain    ALBUTEROL SULFATE  (90 BASE) MCG/ACT INHALER    Inhale 2 puffs into the lungs 4 times daily    APIXABAN (ELIQUIS) 5 MG TABS TABLET    Take 1 tablet by mouth 2 times daily    BD PEN NEEDLE RAMIRO 2ND GEN 32G X 4 MM MISC    USE WITH insulin daily    CHOLECALCIFEROL (VITAMIN D3 PO)    Take by mouth    FLECAINIDE (TAMBOCOR) 100 MG TABLET    Take 1 tablet by mouth every 12 hours    FUROSEMIDE (LASIX) 20 MG TABLET    Take 1 tablet by mouth daily .  May take an additional dose as needed for shortenss of breath, weight gain, and edema    GLIMEPIRIDE (AMARYL) 2 MG TABLET    Take 2 tablets by mouth 2 times daily (with meals)    INSULIN DETEMIR (LEVEMIR FLEXTOUCH) 100 UNIT/ML INJECTION PEN    Inject 30 Units into the skin nightly    LORATADINE (CLARITIN) 10 MG CAPSULE    Take 10 mg by mouth daily     METFORMIN (GLUCOPHAGE-XR) 500 MG EXTENDED RELEASE TABLET    Take 2 tablets by mouth 2 times daily (with meals)    METOPROLOL SUCCINATE (TOPROL XL) 25 MG EXTENDED RELEASE TABLET    Take 1 tablet by mouth daily    MULTIPLE VITAMIN (MULTIVITAMIN PO)    Take by mouth    NORTRIPTYLINE (PAMELOR) 10 MG CAPSULE    Take 2 capsules by mouth nightly    POTASSIUM CHLORIDE (KLOR-CON M) 10 MEQ EXTENDED RELEASE TABLET    Take 1 tablet by mouth daily    ZINC PO    Take by mouth       ALLERGIES     Penicillins    FAMILY HISTORY       Family History   Problem Relation Age of Onset    High Blood Pressure Mother     Cancer Mother         breast    High Blood Pressure Father     Cancer Father         lung          SOCIAL HISTORY       Social History     Socioeconomic History    Marital status:      Spouse name: None    Number of children: None    Years of education: None    Highest education level: None   Occupational History    None   Tobacco Use    Smoking status: Former Smoker     Packs/day: 1.00     Years: 0.80     Pack years: 0.80     Types: Cigarettes     Start date: 1975     Quit date: 2016     Years since quittin.5    Smokeless tobacco: Never Used   Vaping Use    Vaping Use: Never used   Substance and Sexual Activity    Alcohol use: Not Currently     Alcohol/week: 0.0 standard drinks    Drug use: Never    Sexual activity: None   Other Topics Concern    None   Social History Narrative    None     Social Determinants of Health     Financial Resource Strain: Low Risk     Difficulty of Paying Living Expenses: Not hard at all   Food Insecurity: No Food Insecurity    Worried About Running Out of Food in the Last Year: Never true    [place] of Food in the Last Year: Never true   Transportation Needs:     Lack of Transportation (Medical): Not on file    Lack of Transportation (Non-Medical): Not on file   Physical Activity:     Days of Exercise per Week: Not on file    Minutes of Exercise per Session: Not on file   Stress:     Feeling of Stress : Not on file   Social Connections:     Frequency of Communication with Friends and Family: Not on file    Frequency of Social Gatherings with Friends and Family: Not on file    Attends Mu-ism Services: Not on file    Active Member of [address] [place] [address] [employer] or Organizations: Not on file    Attends Club or Organization Meetings: Not on file    Marital Status: Not on file   Intimate Partner Violence:     Fear of Current or Ex-Partner: Not on file    Emotionally Abused: Not on file    Physically Abused: Not on file    Sexually Abused: Not on file   Housing Stability:     Unable to Pay for Housing in the Last Year: Not on file    Number of [place] in the Last Year: Not on file    Unstable Housing in the Last Year: Not on file       SCREENINGS    Franci Coma Scale  Eye Opening: Spontaneous  Best Verbal Response: Oriented  Best Motor Response: Obeys commands  Franci Coma Scale Score: 15        PHYSICAL EXAM    (up to 7 for level 4, 8 or more for level 5)     ED Triage Vitals [07/05/22 1228]   BP Temp Temp src Heart Rate Resp SpO2 Height Weight   121/64 (!) 101.1 °F (38.4 °C) -- (!) 107 18 95 % 5' 3\" (1.6 m) 231 lb 11.2 oz (105.1 kg)      height is 5' 3\" (1.6 m) and weight is 231 lb 11.2 oz (105.1 kg). Her tympanic temperature is 100.1 °F (37.8 °C). Her blood pressure is 120/77 and her pulse is 100. Her respiration is 18 and oxygen saturation is 95%.      Physical Exam  Constitutional:       Appearance: She is well-developed. She is not diaphoretic. HENT:      Head: Normocephalic and atraumatic. Right Ear: External ear normal.      Left Ear: External ear normal.   Eyes:      General: No scleral icterus. Right eye: No discharge. Left eye: No discharge. Neck:      Thyroid: No thyromegaly. Vascular: No JVD. Trachea: No tracheal deviation. Cardiovascular:      Rate and Rhythm: Regular rhythm. Tachycardia present. Heart sounds: No murmur heard. No friction rub. No gallop. Pulmonary:      Effort: Pulmonary effort is normal. No respiratory distress. Breath sounds: Normal breath sounds. No stridor. No wheezing or rales. Abdominal:      General: There is no distension. Palpations: Abdomen is soft. Tenderness: There is abdominal tenderness in the right lower quadrant and left lower quadrant. There is no right CVA tenderness, left CVA tenderness, guarding or rebound. Negative signs include Castillo's sign and McBurney's sign. Musculoskeletal:         General: No tenderness. Cervical back: Normal range of motion. Skin:     General: Skin is warm and dry. Findings: No rash (On exposed body surfaces). Neurological:      Mental Status: She is alert and oriented to person, place, and time. Coordination: Coordination normal.   Psychiatric:         Behavior: Behavior normal.         Thought Content:  Thought content normal.         DIAGNOSTIC RESULTS   LABS:    Results for orders placed or performed during the hospital encounter of 07/05/22   CBC with Auto Differential   Result Value Ref Range    WBC 12.8 (H) 4.0 - 11.0 K/uL    RBC 3.89 (L) 4.00 - 5.20 M/uL    Hemoglobin 12.3 12.0 - 16.0 g/dL    Hematocrit 37.6 36.0 - 48.0 %    MCV 96.7 80.0 - 100.0 fL    MCH 31.6 26.0 - 34.0 pg    MCHC 32.7 32.0 - 36.4 g/dL    RDW 15.8 (H) 12.4 - 15.4 %    Platelets 689 756 - 018 K/uL    MPV 10.8 (H) 5.0 - 10.5 fL    Neutrophils % 90.8 %    Immature Granulocytes % 0.3 % Lymphocytes % 1.7 %    Monocytes % 6.7 %    Eosinophils % 0.3 %    Basophils % 0.2 %    Neutrophils Absolute 11.6 (H) 1.7 - 7.7 K/uL    Immature Granulocytes # 0.0 0.0 - 0.2 K/uL    Lymphocytes Absolute 0.2 (L) 1.0 - 5.1 K/uL    Monocytes Absolute 0.9 0.0 - 1.3 K/uL    Eosinophils Absolute 0.0 0.0 - 0.6 K/uL    Basophils Absolute 0.0 0.0 - 0.2 K/uL   Comprehensive Metabolic Panel w/ Reflex to MG   Result Value Ref Range    Sodium 142 136 - 145 mmol/L    Potassium reflex Magnesium 3.5 3.5 - 5.1 mmol/L    Chloride 103 99 - 110 mmol/L    CO2 20 (L) 21 - 32 mmol/L    Anion Gap 19 (H) 3 - 16    Glucose 187 (H) 70 - 99 mg/dL    BUN 16 7 - 20 mg/dL    CREATININE 1.1 0.6 - 1.2 mg/dL    GFR Non-African American 50 (A) >60    GFR African American >60 >60    Calcium 9.4 8.3 - 10.6 mg/dL    Total Protein 6.3 (L) 6.4 - 8.2 g/dL    Albumin 4.0 3.4 - 5.0 g/dL    Albumin/Globulin Ratio 1.7 1.1 - 2.2    Total Bilirubin 0.7 0.0 - 1.0 mg/dL    Alkaline Phosphatase 192 (H) 40 - 129 U/L    ALT 54 (H) 10 - 40 U/L    AST 59 (H) 15 - 37 U/L   Lactate, Sepsis   Result Value Ref Range    Lactic Acid, Sepsis 4.8 (HH) 0.4 - 1.9 mmol/L   Urinalysis with Reflex to Culture    Specimen: Urine   Result Value Ref Range    Color, UA Yellow Straw/Yellow    Clarity, UA CLOUDY (A) Clear    Glucose, Ur Negative Negative mg/dL    Bilirubin Urine Negative Negative    Ketones, Urine Negative Negative mg/dL    Specific Gravity, UA 1.020 1.005 - 1.030    Blood, Urine LARGE (A) Negative    pH, UA 5.0 5.0 - 8.0    Protein, UA 30 (A) Negative mg/dL    Urobilinogen, Urine 0.2 <2.0 E.U./dL    Nitrite, Urine Negative Negative    Leukocyte Esterase, Urine LARGE (A) Negative    Microscopic Examination YES     Urine Type Cleancatch     Urine Reflex to Culture Yes    Magnesium   Result Value Ref Range    Magnesium 1.50 (L) 1.80 - 2.40 mg/dL   Microscopic Urinalysis   Result Value Ref Range    Hyaline Casts, UA 0-2 0 - 2 /LPF    WBC, UA >100 (A) 0 - 5 /HPF    RBC, UA  (A) 0 - 4 /HPF    Epithelial Cells, UA 2-5 0 - 5 /HPF    Bacteria, UA 3+ (A) None Seen /HPF   EKG 12 Lead   Result Value Ref Range    Ventricular Rate 102 BPM    Atrial Rate 102 BPM    P-R Interval 172 ms    QRS Duration 86 ms    Q-T Interval 358 ms    QTc Calculation (Bazett) 466 ms    P Axis 25 degrees    R Axis 30 degrees    T Axis 17 degrees    Diagnosis       Sinus tachycardiaNonspecific ST and T wave abnormalityAbnormal ECGWhen compared with ECG of 24-FEB-2021 17:24,Sinus rhythm has replaced Atrial fibrillationCriteria for Septal infarct are no longer PresentST no longer depressed in Inferior leadsT wave inversion no longer evident in Inferior leads       All other labs were within normal range or not returned as of this dictation. EKG: All EKG's are interpreted by the Emergency Department Physician who either signs orCo-signs this chart in the absence of a cardiologist.    EKG visualized preliminarily interpreted by myself shows sinus tachycardia. The rate is 102 with an axis of 30. Somewhat diffuse nonspecific ST changes but mostly inferiorly and laterally. I do not see ST elevation. Intervals are normal.    RADIOLOGY:   plain film images such as CT, Ultrasound and MRI are read by the radiologist. Plain radiographic images are visualized and preliminarily interpreted by the  EDProvider with the below findings:    CT ABDOMEN PELVIS WO CONTRAST Additional Contrast? None    Result Date: 7/5/2022  EXAMINATION: CT OF THE ABDOMEN AND PELVIS WITHOUT CONTRAST 7/5/2022 1:01 pm TECHNIQUE: CT of the abdomen and pelvis was performed without the administration of intravenous contrast. Multiplanar reformatted images are provided for review. Automated exposure control, iterative reconstruction, and/or weight based adjustment of the mA/kV was utilized to reduce the radiation dose to as low as reasonably achievable.  COMPARISON: 05/09/2022, 12/20/2021 HISTORY: ORDERING SYSTEM PROVIDED HISTORY: lower back and abd pain with fever, n,v TECHNOLOGIST PROVIDED HISTORY: Reason for exam:->lower back and abd pain with fever, n,v Additional Contrast?->None Decision Support Exception - unselect if not a suspected or confirmed emergency medical condition->Emergency Medical Condition (MA) Reason for Exam: pt has bilateral flank pain with nausea that started today Relevant Medical/Surgical History: Small Cell B-Cell Lymphoma, HTN, Diabetic, CHolecystectomy FINDINGS: Lower Chest:  Visualized portion of the lower chest demonstrates no acute abnormality. Organs: Liver steatosis. Stable splenomegaly. Mild edema around the right kidney is nonspecific. Given the patient's history, findings could represent infection. No hydronephrosis. No renal calculus. No perinephric fluid collection. The pancreas and adrenal glands demonstrate no acute abnormality. In the left adrenal gland, there is a stable 14 mm nodule compatible with a benign etiology. GI/Bowel: Normal appendix. No acutely dilated or inflamed loops of bowel. Pelvis: No pelvic mass, adenopathy, or fluid collection. Peritoneum/retroperitoneum: No abdominal aortic aneurysm. No adenopathy. No ascites. No free intraperitoneal air. Edema in the base of the small bowel mesentery surrounding the mesenteric vasculature is stable from prior exams dating back to 12/20/2021 compatible with history of treated lymphoma. Bones/Soft Tissues:  No acute abnormality of the visualized osseous structures. Mild edema around the right kidney could represent the urinary tract infection given the patient's history. No hydronephrosis. No renal calculus. No perinephric fluid collection. Stable rind of soft tissue in the small bowel mesentery since 12/20/2021, corresponding to history of treated lymphoma.            PROCEDURES   Unless otherwise noted below, none     Procedures    CRITICAL CARE TIME   CRITICAL CARE: There was a high probability of clinically significant/life threatening deterioration in this patient's condition which required my urgent intervention. Total critical care time was 33 minutes. This excludes any time for separately reportable procedures. CONSULTS:  None    EMERGENCY DEPARTMENT COURSE and DIFFERENTIAL DIAGNOSIS/MDM:   Vitals:    Vitals:    07/05/22 1228 07/05/22 1417   BP: 121/64 120/77   Pulse: (!) 107 100   Resp: 18    Temp: (!) 101.1 °F (38.4 °C) 100.1 °F (37.8 °C)   TempSrc:  Tympanic   SpO2: 95%    Weight: 231 lb 11.2 oz (105.1 kg)    Height: 5' 3\" (1.6 m)        Patient was given the following medications:  Medications   0.9 % sodium chloride bolus (1,572 mLs IntraVENous New Bag 7/5/22 1321)   cefTRIAXone (ROCEPHIN) 1000 mg IVPB in 50 mL D5W minibag (has no administration in time range)   magnesium sulfate 1000 mg in dextrose 5% 100 mL IVPB (has no administration in time range)   acetaminophen (TYLENOL) tablet 1,000 mg (1,000 mg Oral Given 7/5/22 1317)   ondansetron (ZOFRAN) injection 4 mg (4 mg IntraVENous Given 7/5/22 1317)       Hemodynamically stable. However, lactic acid 4.8. Patient in the midst of a fluid bolus. Antibiotics have been ordered. Cultures have been obtained prior to antibiotics. Is this patient to be included in the SEP-1 Core Measure due to severe sepsis or septic shock? Yes   SEP-1 CORE MEASURE DATA      Sepsis Criteria   Severe Sepsis Criteria   Septic Shock Criteria     Must be confirmed or suspected to move forward with diagnosis of sepsis. Must meet 2:    [x] Temperature > 100.9 F (38.3 C)        or < 96.8 F (36 C)  [x] HR > 90  [] RR > 20  [x] WBC > 12 or < 4 or 10% bands      AND:      [x] Infection Confirmed or        Suspected.      Must meet 1:    [x] Lactate > 2       or   [] Signs of Organ Dysfunction:    - SBP < 90 or MAP < 65  - Altered mental status  - Creatinine > 2 or increased from      baseline  - Urine Output < 0.5 ml/kg/hr  - Bilirubin > 2  - INR > 1.5 (not anticoagulated)  - Platelets < 844,361  - Acute Respiratory Failure as     evidenced by new need for NIPPV     or mechanical ventilation      [] No criteria met for Severe Sepsis. Must meet 1:    [x] Lactate > 4        or   [] SBP < 90 or MAP < 65 for at        least two readings in the first        hour after fluid bolus        administration      [] Vasopressors initiated (if hypotension persists after fluid resuscitation)        [] No criteria met for Septic Shock. Patient Vitals for the past 6 hrs:   BP Temp Pulse Resp SpO2 Height Weight Weight Method Percent Weight Change   07/05/22 1228 121/64 (!) 101.1 °F (38.4 °C) (!) 107 18 95 % 5' 3\" (1.6 m) 231 lb 11.2 oz (105.1 kg) Actual;Bed scale 0   07/05/22 1417 120/77 100.1 °F (37.8 °C) 100 -- -- -- -- -- --      Recent Labs     07/05/22  1245   WBC 12.8*   CREATININE 1.1   BILITOT 0.7            Time Severe Sepsis Identified: 1300    Fluid Resuscitation Rational: at least 30mL/kg based on ideal body weight due to obesity defined as BMI >30 (patient's BMI is Body mass index is 41.04 kg/m². and IBW is Ideal body weight: 52.4 kg (115 lb 8.3 oz)Adjusted ideal body weight: 73.5 kg (161 lb 15.9 oz))    Repeat lactate level: ordered and pending at this time    Reassessment Exam:   Patient is not hypotensive. Fluid bolus in process and repeat lactic pending. No appreciable signs of endorgan damage    FINAL IMPRESSION      1. Acute pyelonephritis    2. SIRS (systemic inflammatory response syndrome) (HCC)    3. Septicemia [facility])          DISPOSITION/PLAN   DISPOSITION Decision To Admit 07/05/2022 02:06:17 PM      PATIENT REFERRED TO:  No follow-up provider specified.     DISCHARGE MEDICATIONS:  New Prescriptions    No medications on file       DISCONTINUED MEDICATIONS:  Discontinued Medications    No medications on file              (Please note that portions of this note were completed with a voice recognition program.  Efforts were made to editthe dictations but occasionally words are mis-transcribed.)    Emelia Blackwell MD (electronically signed)            Emelia Blackwell MD  07/05/22 [phone] [phone]

## 2022-07-05 NOTE — ED TRIAGE NOTES
Patient came to ER with complaints of right flank pain and abdominal pain. Patient stated this started about 0700 this morning. Patient went to doctors and was sent to the ER.

## 2022-07-05 NOTE — ED NOTES
Report called to [facility].        Jeffrey Guerrero RN  07/05/22 [id number]

## 2022-07-05 NOTE — PROGRESS NOTES
Secure message sent to Gregor Alves NP. Patient requesting message to be sent to doctor to find out what they will be doing. She states she feels like she is just renting a room. Spoke to patient about sending another message to the hospital provider. Patient calmly resting in bed.  Electronically signed by Luisana Hubbard RN on 7/5/2022 at 7:59 PM

## 2022-07-05 NOTE — ED NOTES
Attempt to call back to desk. No answer.        Fidelina Kirkland, RN  07/05/22 200 [facility], RN  07/05/22 [id number]

## 2022-07-06 LAB
GLUCOSE BLD-MCNC: 159 MG/DL (ref 70–99)
GLUCOSE BLD-MCNC: 183 MG/DL (ref 70–99)
GLUCOSE BLD-MCNC: 193 MG/DL (ref 70–99)
GLUCOSE BLD-MCNC: 203 MG/DL (ref 70–99)
PERFORMED ON: ABNORMAL
REPORT: NORMAL

## 2022-07-06 PROCEDURE — 6370000000 HC RX 637 (ALT 250 FOR IP): Performed by: NURSE PRACTITIONER

## 2022-07-06 PROCEDURE — 2580000003 HC RX 258: Performed by: STUDENT IN AN ORGANIZED HEALTH CARE EDUCATION/TRAINING PROGRAM

## 2022-07-06 PROCEDURE — 94760 N-INVAS EAR/PLS OXIMETRY 1: CPT

## 2022-07-06 PROCEDURE — 1200000000 HC SEMI PRIVATE

## 2022-07-06 PROCEDURE — 2580000003 HC RX 258: Performed by: NURSE PRACTITIONER

## 2022-07-06 PROCEDURE — 87040 BLOOD CULTURE FOR BACTERIA: CPT

## 2022-07-06 PROCEDURE — 6360000002 HC RX W HCPCS: Performed by: STUDENT IN AN ORGANIZED HEALTH CARE EDUCATION/TRAINING PROGRAM

## 2022-07-06 PROCEDURE — 36415 COLL VENOUS BLD VENIPUNCTURE: CPT

## 2022-07-06 RX ADMIN — INSULIN LISPRO 2 UNITS: 100 INJECTION, SOLUTION INTRAVENOUS; SUBCUTANEOUS at 16:18

## 2022-07-06 RX ADMIN — INSULIN LISPRO 2 UNITS: 100 INJECTION, SOLUTION INTRAVENOUS; SUBCUTANEOUS at 10:00

## 2022-07-06 RX ADMIN — SODIUM CHLORIDE, PRESERVATIVE FREE 5 ML: 5 INJECTION INTRAVENOUS at 09:23

## 2022-07-06 RX ADMIN — FLECAINIDE ACETATE 100 MG: 100 TABLET ORAL at 20:34

## 2022-07-06 RX ADMIN — MEROPENEM 1000 MG: 1 INJECTION, POWDER, FOR SOLUTION INTRAVENOUS at 14:30

## 2022-07-06 RX ADMIN — SODIUM CHLORIDE: 9 INJECTION, SOLUTION INTRAVENOUS at 09:58

## 2022-07-06 RX ADMIN — ACETAMINOPHEN 650 MG: 325 TABLET ORAL at 20:34

## 2022-07-06 RX ADMIN — INSULIN LISPRO 2 UNITS: 100 INJECTION, SOLUTION INTRAVENOUS; SUBCUTANEOUS at 22:19

## 2022-07-06 RX ADMIN — APIXABAN 5 MG: 5 TABLET, FILM COATED ORAL at 20:34

## 2022-07-06 RX ADMIN — APIXABAN 5 MG: 5 TABLET, FILM COATED ORAL at 10:00

## 2022-07-06 RX ADMIN — SODIUM CHLORIDE: 9 INJECTION, SOLUTION INTRAVENOUS at 23:38

## 2022-07-06 RX ADMIN — ACETAMINOPHEN 650 MG: 325 TABLET ORAL at 03:40

## 2022-07-06 RX ADMIN — INSULIN LISPRO 2 UNITS: 100 INJECTION, SOLUTION INTRAVENOUS; SUBCUTANEOUS at 12:26

## 2022-07-06 RX ADMIN — INSULIN GLARGINE 30 UNITS: 100 INJECTION, SOLUTION SUBCUTANEOUS at 22:19

## 2022-07-06 RX ADMIN — MEROPENEM 1000 MG: 1 INJECTION, POWDER, FOR SOLUTION INTRAVENOUS at 20:42

## 2022-07-06 RX ADMIN — METOPROLOL SUCCINATE 12.5 MG: 25 TABLET, FILM COATED, EXTENDED RELEASE ORAL at 10:00

## 2022-07-06 RX ADMIN — CEFTRIAXONE 2000 MG: 2 INJECTION, POWDER, FOR SOLUTION INTRAMUSCULAR; INTRAVENOUS at 12:26

## 2022-07-06 RX ADMIN — FLECAINIDE ACETATE 100 MG: 100 TABLET ORAL at 10:00

## 2022-07-06 RX ADMIN — FUROSEMIDE 20 MG: 20 TABLET ORAL at 10:00

## 2022-07-06 RX ADMIN — SODIUM CHLORIDE: 9 INJECTION, SOLUTION INTRAVENOUS at 03:43

## 2022-07-06 ASSESSMENT — PAIN SCALES - GENERAL
PAINLEVEL_OUTOF10: 0
PAINLEVEL_OUTOF10: 5
PAINLEVEL_OUTOF10: 0

## 2022-07-06 ASSESSMENT — PAIN DESCRIPTION - LOCATION: LOCATION: FLANK

## 2022-07-06 ASSESSMENT — PAIN DESCRIPTION - ORIENTATION: ORIENTATION: RIGHT

## 2022-07-06 NOTE — PROGRESS NOTES
Primary nurse attempted to access right chest port. Not successful. Nbaila RN attempted to access chest port. Not successful. 3 [facility] RN attempted to access right chest port. Not success. 3 [facility] nurse will send a day shift nurse later in shift to try and access port.  Electronically signed by Frederick Magaña RN on 7/6/2022 at 6:21 AM

## 2022-07-06 NOTE — PROGRESS NOTES
Hospitalist Progress Note      PCP: Jamal Butler MD    Date of Admission: 7/5/2022    Chief Complaint: Right flank pain. Hospital Course:    61 y.o. female with PMHx of A-fib, DM2, HTN, history of non-Hodgkin's lymphoma, presented to [facility] in transfer from [facility] ED for management of sepsis from pyelonephritis. Pt presented to [facility] ED with right flank pain. She reports nausea, vomiting and body aches. + fever and chills. No dysuria and hematuria. .  Admitted with pyelonephritis, was found to be bacteremic    Subjective:   Reported improved symptoms      Medications:  Reviewed    Infusion Medications    dextrose      sodium chloride      sodium chloride 150 mL/hr at 07/06/22 0622     Scheduled Medications    apixaban  5 mg Oral BID    flecainide  100 mg Oral 2 times per day    furosemide  20 mg Oral Daily    metoprolol succinate  12.5 mg Oral Daily    insulin lispro  0-12 Units SubCUTAneous TID WC    insulin lispro  0-6 Units SubCUTAneous Nightly    sodium chloride flush  5-40 mL IntraVENous 2 times per day    cefTRIAXone (ROCEPHIN) IV  1,000 mg IntraVENous Q24H    insulin glargine  30 Units SubCUTAneous Nightly     PRN Meds: glucose, dextrose bolus **OR** dextrose bolus, glucagon (rDNA), dextrose, sodium chloride flush, sodium chloride, acetaminophen **OR** acetaminophen, albuterol sulfate HFA      Intake/Output Summary (Last 24 hours) at 7/6/2022 0823  Last data filed at 7/6/2022 0622  Gross per 24 hour   Intake 2455.96 ml   Output --   Net 2455.96 ml       Physical Exam Performed:    BP (!) 110/59   Pulse 88   Temp 98.7 °F (37.1 °C) (Oral)   Resp 16   Ht 5' 3\" (1.6 m)   Wt 229 lb (103.9 kg)   SpO2 94%   BMI 40.57 kg/m²     General appearance: No apparent distress, appears stated age and cooperative. HEENT: Pupils equal, round, and reactive to light. Conjunctivae/corneas clear. Neck: Supple, with full range of motion. No jugular venous distention.  Trachea midline. Respiratory:  Normal respiratory effort. Clear to auscultation, bilaterally without Rales/Wheezes/Rhonchi. Cardiovascular: Regular rate and rhythm with normal S1/S2 without murmurs, rubs or gallops. Abdomen: Soft, non-tender, non-distended with normal bowel sounds. Musculoskeletal: No clubbing, cyanosis or edema bilaterally. Full range of motion without deformity. Skin: Skin color, texture, turgor normal.  No rashes or lesions. Neurologic:  Neurovascularly intact without any focal sensory/motor deficits. Cranial nerves: II-XII intact, grossly non-focal.  Psychiatric: Alert and oriented, thought content appropriate, normal insight  Capillary Refill: Brisk,3 seconds, normal   Peripheral Pulses: +2 palpable, equal bilaterally       Labs:   Recent Labs     07/05/22  1245   WBC 12.8*   HGB 12.3   HCT 37.6        Recent Labs     07/05/22  1245      K 3.5      CO2 20*   BUN 16   CREATININE 1.1   CALCIUM 9.4     Recent Labs     07/05/22  1245   AST 59*   ALT 54*   BILITOT 0.7   ALKPHOS 192*     No results for input(s): INR in the last 72 hours. No results for input(s): Sonam Mould in the last 72 hours. Urinalysis:      Lab Results   Component Value Date/Time    NITRU Negative 07/05/2022 01:23 PM    WBCUA >100 07/05/2022 01:23 PM    WBCUA >50 08/08/2016 02:00 PM    BACTERIA 3+ 07/05/2022 01:23 PM    RBCUA  07/05/2022 01:23 PM    RBCUA 30-49 08/08/2016 02:00 PM    BLOODU LARGE 07/05/2022 01:23 PM    BLOODU Large 07/05/2022 12:05 PM    SPECGRAV 1.020 07/05/2022 01:23 PM    SPECGRAV 1.015 07/05/2022 12:05 PM    GLUCOSEU Negative 07/05/2022 01:23 PM    GLUCOSEU negative 07/05/2022 12:05 PM       Radiology:  CT ABDOMEN PELVIS WO CONTRAST Additional Contrast? None   Preliminary Result   Mild edema around the right kidney could represent the urinary tract   infection given the patient's history. No hydronephrosis. No renal   calculus. No perinephric fluid collection. Stable rind of soft tissue in the small bowel mesentery since 12/20/2021,   corresponding to history of treated lymphoma. Assessment/Plan:    Active Hospital Problems    Diagnosis     Pyelonephritis [N12]      Priority: Medium     Sepsis. Pyelonephritis. E. coli bacteremia  Patient presented to emergency with flank pain, was found to be septic on presentation, blood cultures positive with E. coli, with ESBL DNA detected. Plan.    -change antibiotics to meropenem. -Repeat blood cultures  -Follow culture sensitivity    Hypomagnesemia  - Mg 1.5  - replace and repeat     Diabetes mellitus II   - Lantus, SSI and CCD  - hold home po meds     Essential (primary) hypertension   - monitor blood pressure  - continue home meds      Atrial Fibrillation   - currently rate controlled  - continue Eliquis and flecainide     DVT Prophylaxis: lovenox  Diet: ADULT DIET; Regular; 5 carb choices (75 gm/meal)  Code Status: Full Code    PT/OT Eval Status: ambulatory .      Dispo - pending clinical improvement      Anayeli Reed MD

## 2022-07-06 NOTE — ACP (ADVANCE CARE PLANNING)
Existing advance directive reviewed with patient; no changes to patient's previously recorded wishes  [] New Advance Directive completed  [] Portable Do Not Rescitate prepared for Provider review and signature  [] POLST/POST/MOLST/MOST prepared for Provider review and signature      Follow-up plan:    [] Schedule follow-up conversation to continue planning  [] Referred individual to Provider for additional questions/concerns   [] Advised patient/agent/surrogate to review completed ACP document and update if needed with changes in condition, patient preferences or care setting    [x] This note routed to one or more involved healthcare providers  Electronically signed by Greg Montes on 7/6/2022 at 12:24 PM

## 2022-07-06 NOTE — CARE COORDINATION
DISCHARGE PLAN: Pt plans to return home with her spouse at d/c. Family will transport pt home. No d/c needs noted at this time. ___________________________________  INITIAL ASSESSMENT  Met w/pt to address barriers to dc. HOME: Pt reported that she resides in a two story home. Bedroom is located on the second floor of the home. Bathroom is on the main level. Disease Specific: Acute pylonephritis    COVID Vaccination: Yes-Overdue for booster    DME/O2: Pt reported that she has multiple canes but is currently not using any of them. ACTIVE SERVICES: Pt reported that she is independent with all self care. Pt stated that her spouse assists with the laundry as this is located in the basement of the home. Pt does not anticipate that she will need assistance upon d/c. TRANSPORTATION: Pt is an active  and stated that her spouse or family will transport her home at d/c. PHARMACY: Denies difficulty obtaining/taking meds. Pt has medications filled through a mail order ([facility]) or through [facility]. PCP: Dina Osborn    DEMOGRAPHICS: Verified address/phone number as correct    INSURANCE:  BCBS  PRESCRIPTION COVERAGE: BCBS    HD/PD: No    THERAPY RECS Not ordered    Discharge planning team will remain available for needs. Please consult for any specifics not addressed in this note.     EDGAR Abarca LSEFREM  [phone]  Electronically signed by Edgar Knight on 7/6/2022 at 12:22 PM

## 2022-07-06 NOTE — H&P
Hospital Medicine History & Physical      PCP: Ranjith Martinez MD    Date of Admission: 2022    Date of Service: Pt seen/examined on 2022 and Admitted to Inpatient with expected LOS greater than two midnights due to medical therapy. Chief Complaint:  Urinary tract infection      History Of Present Illness:      61 y.o. female with PMHx of A-fib, DM2, HTN presented to [facility] in transfer from [place] ED for management of sepsis from pyelonephritis. Pt presented to [place] ED with right flank pain. Symptoms began this morning 0700. She reports nausea, vomiting and body aches. + fever and chills. No dysuria and hematuria. Past Medical History:          Diagnosis Date    [facility]rial fibrillation [facility] RVR [facility] [address]     Cancer ([facility] [address] [facility]     Diabetes mellitus type 2, controlled ([facility] [address] [facility]     Hypertension     Melanoma in situ of back ([facility] [address]     dr Chavez Presumricky Precordial pain 2016    Sleep apnea        Past Surgical History:          Procedure Laterality Date    BONE MARROW TRANSPLANT      BREAST CYST ASPIRATION      in past benign bilateral over years. 2 episodes     SECTION      CHOLECYSTECTOMY      COLONOSCOPY  9/10/12    polyp, su, repeat 5 years    COLONOSCOPY  10/09/2017    dr Vega Samples and check in 5 years. polyps    ENDOMETRIAL ABLATION      FINE NEEDLE ASPIRATION      GALLBLADDER SURGERY      OTHER SURGICAL HISTORY  2019    Abdominal Mass BX    OTHER SURGICAL HISTORY  2019    CT BIOPSY ABDOMEN RETROPERITONEUM,    OVARY REMOVAL Left 2018    benign growth, dr Li Mello at [facility] [address] TUNNELED VENOUS PORT PLACEMENT Right 2016    Dr. Amalia Lopez       Medications Prior to Admission:      Prior to Admission medications    Medication Sig Start Date End Date Taking?  Authorizing Provider   metFORMIN (GLUCOPHAGE-XR) 500 mg extended release tablet Take 2 tablets by mouth 2 times daily (with meals) 6/3/22   Ranjith Martinez MD insulin detemir (LEVEMIR FLEXTOUCH) 100 UNIT/ML injection pen Inject 30 Units into the skin nightly 5/23/22   Claudetta Mam, MD   BD PEN NEEDLE RAMIRO 2ND GEN 32G X 4 MM MISC USE WITH insulin daily 5/20/22   Dom Merritt DO   flecainide (TAMBOCOR) 100 MG tablet Take 1 tablet by mouth every 12 hours 5/20/22   Koffi Butterfield MD   apixaban (ELIQUIS) 5 MG TABS tablet Take 1 tablet by mouth 2 times daily 5/20/22   Koffi Butterfield MD   ZINC PO Take by mouth    Historical Provider, MD   Cholecalciferol (VITAMIN D3 PO) Take by mouth    Historical Provider, MD   Multiple Vitamin (MULTIVITAMIN PO) Take by mouth    Historical Provider, MD   furosemide (LASIX) 20 MG tablet Take 1 tablet by mouth daily . May take an additional dose as needed for shortenss of breath, weight gain, and edema 3/30/22   Koffi Butterfield MD   glimepiride (AMARYL) 2 MG tablet Take 2 tablets by mouth 2 times daily (with meals) 3/3/22   Claudetta Mam, MD   metoprolol succinate (TOPROL XL) 25 MG extended release tablet Take 1 tablet by mouth daily  Patient taking differently: Take half a  tablet by mouth daily 2/11/22   Koffi Butterfield MD   potassium chloride (KLOR-CON M) 10 MEQ extended release tablet Take 1 tablet by mouth daily 11/6/21   Claudetta Mam, MD   nortriptyline (PAMELOR) 10 MG capsule Take 2 capsules by mouth nightly 8/30/21   Claudetta Mam, MD   albuterol sulfate  (90 Base) MCG/ACT inhaler Inhale 2 puffs into the lungs 4 times daily 2/28/21   Christopher Mcfadden MD   acetaminophen (TYLENOL) 500 MG tablet Take 500 mg by mouth every 6 hours as needed for Pain    Historical Provider, MD   loratadine (CLARITIN) 10 MG capsule Take 10 mg by mouth daily     Historical Provider, MD       Allergies:  Penicillins    Social History:        TOBACCO:   reports that she quit smoking about 6 years ago. Her smoking use included cigarettes. She started smoking about 47 years ago. She has a 0.80 pack-year smoking history.  She has never used smokeless tobacco.  ETOH: reports previous alcohol use. Family History:      Reviewed in detail positive as follows:        Problem Relation Age of Onset    High Blood Pressure Mother     Cancer Mother         breast    High Blood Pressure Father     Cancer Father         lung       REVIEW OF SYSTEMS:   Pertinent positives as noted in the HPI. All other systems reviewed and negative. PHYSICAL EXAM PERFORMED:    /68   Pulse 87   Temp 98.8 °F (37.1 °C)   Resp 19   Ht 5' 3\" (1.6 m)   Wt 231 lb 11.2 oz (105.1 kg)   SpO2 96%   BMI 41.04 kg/m²     General appearance:  Well developed, well nourished,  female lying on hospital bed in no apparent distress, appears stated age and cooperative. HEENT:  Normal cephalic, atraumatic without obvious deformity. Pupils equal, round, and reactive to light. Conjunctivae/corneas clear. Neck: Supple, with full range of motion. No jugular venous distention. Trachea midline. Respiratory:  Normal respiratory effort. Clear to auscultation bilaterally without accessory muscle use. Cardiovascular:  Regular rate and rhythm without murmurs, no lower extremity edema. Abdomen: Soft, non-tender, non-distended, without rebound or guarding. Normal bowel sounds. Musculoskeletal:  Moves all extremities equally. Full range of motion without deformity. Skin: Skin warm, dry and intact. No rashes or lesions. Neurologic:  Neurovascularly intact without any focal sensory/motor deficits.  Cranial nerves: II-XII intact, grossly non-focal.  Psychiatric:  Alert and oriented, thought content appropriate, normal insight  Capillary Refill: Brisk,< 3 seconds   Peripheral Pulses: +2 palpable, equal bilaterally       Labs:     Recent Labs     07/05/22  1245   WBC 12.8*   HGB 12.3   HCT 37.6        Recent Labs     07/05/22  1245      K 3.5      CO2 20*   BUN 16   CREATININE 1.1   CALCIUM 9.4     Recent Labs     07/05/22  1245   AST 59*   ALT 54*   BILITOT 0.7   ALKPHOS 192*     No results for input(s): INR in the last 72 hours. No results for input(s): Ivory Jay in the last 72 hours. Urinalysis:      Lab Results   Component Value Date/Time    NITRU Negative 07/05/2022 01:23 PM    WBCUA >100 07/05/2022 01:23 PM    WBCUA >50 08/08/2016 02:00 PM    BACTERIA 3+ 07/05/2022 01:23 PM    RBCUA  07/05/2022 01:23 PM    RBCUA 30-49 08/08/2016 02:00 PM    BLOODU LARGE 07/05/2022 01:23 PM    BLOODU Large 07/05/2022 12:05 PM    SPECGRAV 1.020 07/05/2022 01:23 PM    SPECGRAV 1.015 07/05/2022 12:05 PM    GLUCOSEU Negative 07/05/2022 01:23 PM    GLUCOSEU negative 07/05/2022 12:05 PM       Radiology:       CT ABDOMEN PELVIS WO CONTRAST Additional Contrast? None   Preliminary Result   Mild edema around the right kidney could represent the urinary tract   infection given the patient's history. No hydronephrosis. No renal   calculus. No perinephric fluid collection. Stable rind of soft tissue in the small bowel mesentery since 12/20/2021,   corresponding to history of treated lymphoma. ASSESSMENT:    Active Hospital Problems    Diagnosis Date Noted    Pyelonephritis [N12] 07/05/2022     Priority: Medium         PLAN:    Sepsis ([facility] [address] on admission due to pyelonephritis  - Temp 101.1, , wbc 12.8   -  Initial Lactic Acid 4.8 > 2.2 and will trend   - Pt was resuscitated with 30 cc/Kg IV Fluids and blood pressure will be monitored closely  - BC and urine culture pending  -  Currently Hemodynamically stable. Will treat with antibiotics as listed, and monitor closely. - CT abd/pel: Mild edema around the right kidney could represent the urinary tract infection given the patient's history. No hydro. No renal calculi. No perinephric fluid collection.     Hypomagnesemia  - Mg 1.5  - replace and repeat    Diabetes mellitus II   - Lantus, SSI and CCD  - hold home po meds    Essential (primary) hypertension   - monitor blood pressure  - continue home meds     Atrial Fibrillation - currently rate controlled  - continue Eliquis and flecainide     DVT Prophylaxis: Eliquis  Diet: ADULT DIET; Regular; 5 carb choices (75 gm/meal)  Code Status: Full Code    Dispo - Inpatient       [address], APRN - CNP    Thank you Jes Keita MD for the opportunity to be involved in this patient's care. If you have any questions or concerns please feel free to contact me at [phone].

## 2022-07-06 NOTE — PLAN OF CARE
Problem: Discharge Planning  Goal: Discharge to home or other facility with appropriate resources  7/6/2022 1604 by Katerine Bolaños RN  Outcome: Progressing  Flowsheets  Taken 7/6/2022 0840 by Katerine Bolaños RN  Discharge to home or other facility with appropriate resources:   Identify barriers to discharge with patient and caregiver   Identify discharge learning needs (meds, wound care, etc)     Problem: Pain  Goal: Verbalizes/displays adequate comfort level or baseline comfort level  7/6/2022 1604 by Katerine Bolaños RN  Outcome: Progressing     Problem: Safety - Adult  Goal: Free from fall injury  7/6/2022 1604 by Katerine Bolaños RN  Outcome: Progressing  Flowsheets (Taken 7/6/2022 0850)  Free From Fall Injury: Instruct family/caregiver on patient safety     Problem: ABCDS Injury Assessment  Goal: Absence of physical injury  Outcome: Progressing  Flowsheets (Taken 7/6/2022 0850)  Absence of Physical Injury: Implement safety measures based on patient assessment     Problem: Skin/Tissue Integrity - Adult  Goal: Skin integrity remains intact  Outcome: Progressing     Problem: Genitourinary - Adult  Goal: Absence of urinary retention  Outcome: Progressing     Problem: Infection - Adult  Goal: Absence of infection at discharge  Outcome: Progressing  Flowsheets (Taken 7/6/2022 0840)  Absence of infection at discharge:   Assess and monitor for signs and symptoms of infection   Monitor lab/diagnostic results   Administer medications as ordered   Instruct and encourage patient and family to use good hand hygiene technique   Identify and instruct in appropriate isolation precautions for identified infection/condition

## 2022-07-07 LAB
ANION GAP SERPL CALCULATED.3IONS-SCNC: 13 MMOL/L (ref 3–16)
BASOPHILS ABSOLUTE: 0 K/UL (ref 0–0.2)
BASOPHILS RELATIVE PERCENT: 0.3 %
BUN BLDV-MCNC: 10 MG/DL (ref 7–20)
CALCIUM SERPL-MCNC: 8.7 MG/DL (ref 8.3–10.6)
CHLORIDE BLD-SCNC: 106 MMOL/L (ref 99–110)
CO2: 21 MMOL/L (ref 21–32)
CREAT SERPL-MCNC: 0.9 MG/DL (ref 0.6–1.2)
EOSINOPHILS ABSOLUTE: 0 K/UL (ref 0–0.6)
EOSINOPHILS RELATIVE PERCENT: 0.5 %
GFR AFRICAN AMERICAN: >60
GFR NON-AFRICAN AMERICAN: >60
GLUCOSE BLD-MCNC: 174 MG/DL (ref 70–99)
GLUCOSE BLD-MCNC: 200 MG/DL (ref 70–99)
GLUCOSE BLD-MCNC: 207 MG/DL (ref 70–99)
GLUCOSE BLD-MCNC: 214 MG/DL (ref 70–99)
GLUCOSE BLD-MCNC: 240 MG/DL (ref 70–99)
HCT VFR BLD CALC: 33.2 % (ref 36–48)
HEMOGLOBIN: 11.2 G/DL (ref 12–16)
LYMPHOCYTES ABSOLUTE: 0.2 K/UL (ref 1–5.1)
LYMPHOCYTES RELATIVE PERCENT: 3.7 %
MCH RBC QN AUTO: 32.1 PG (ref 26–34)
MCHC RBC AUTO-ENTMCNC: 33.7 G/DL (ref 31–36)
MCV RBC AUTO: 95.2 FL (ref 80–100)
MONOCYTES ABSOLUTE: 0.5 K/UL (ref 0–1.3)
MONOCYTES RELATIVE PERCENT: 7.9 %
NEUTROPHILS ABSOLUTE: 5 K/UL (ref 1.7–7.7)
NEUTROPHILS RELATIVE PERCENT: 87.6 %
PDW BLD-RTO: 16.8 % (ref 12.4–15.4)
PERFORMED ON: ABNORMAL
PLATELET # BLD: 118 K/UL (ref 135–450)
PMV BLD AUTO: 9.4 FL (ref 5–10.5)
POTASSIUM SERPL-SCNC: 3.3 MMOL/L (ref 3.5–5.1)
PROCALCITONIN: 8.2 NG/ML (ref 0–0.15)
RBC # BLD: 3.48 M/UL (ref 4–5.2)
SODIUM BLD-SCNC: 140 MMOL/L (ref 136–145)
WBC # BLD: 5.7 K/UL (ref 4–11)

## 2022-07-07 PROCEDURE — 6360000002 HC RX W HCPCS: Performed by: STUDENT IN AN ORGANIZED HEALTH CARE EDUCATION/TRAINING PROGRAM

## 2022-07-07 PROCEDURE — 85025 COMPLETE CBC W/AUTO DIFF WBC: CPT

## 2022-07-07 PROCEDURE — 36415 COLL VENOUS BLD VENIPUNCTURE: CPT

## 2022-07-07 PROCEDURE — 2580000003 HC RX 258: Performed by: NURSE PRACTITIONER

## 2022-07-07 PROCEDURE — 1200000000 HC SEMI PRIVATE

## 2022-07-07 PROCEDURE — 80048 BASIC METABOLIC PNL TOTAL CA: CPT

## 2022-07-07 PROCEDURE — 6370000000 HC RX 637 (ALT 250 FOR IP): Performed by: NURSE PRACTITIONER

## 2022-07-07 PROCEDURE — 94760 N-INVAS EAR/PLS OXIMETRY 1: CPT

## 2022-07-07 PROCEDURE — 2580000003 HC RX 258: Performed by: STUDENT IN AN ORGANIZED HEALTH CARE EDUCATION/TRAINING PROGRAM

## 2022-07-07 PROCEDURE — 84145 PROCALCITONIN (PCT): CPT

## 2022-07-07 PROCEDURE — 2700000000 HC OXYGEN THERAPY PER DAY

## 2022-07-07 PROCEDURE — 6370000000 HC RX 637 (ALT 250 FOR IP): Performed by: STUDENT IN AN ORGANIZED HEALTH CARE EDUCATION/TRAINING PROGRAM

## 2022-07-07 RX ORDER — CETIRIZINE HYDROCHLORIDE 10 MG/1
10 TABLET ORAL DAILY
Status: DISCONTINUED | OUTPATIENT
Start: 2022-07-07 | End: 2022-07-10 | Stop reason: HOSPADM

## 2022-07-07 RX ADMIN — MEROPENEM 1000 MG: 1 INJECTION, POWDER, FOR SOLUTION INTRAVENOUS at 04:32

## 2022-07-07 RX ADMIN — MEROPENEM 1000 MG: 1 INJECTION, POWDER, FOR SOLUTION INTRAVENOUS at 20:07

## 2022-07-07 RX ADMIN — INSULIN LISPRO 2 UNITS: 100 INJECTION, SOLUTION INTRAVENOUS; SUBCUTANEOUS at 16:39

## 2022-07-07 RX ADMIN — ACETAMINOPHEN 650 MG: 325 TABLET ORAL at 04:27

## 2022-07-07 RX ADMIN — ACETAMINOPHEN 650 MG: 325 TABLET ORAL at 20:03

## 2022-07-07 RX ADMIN — INSULIN LISPRO 4 UNITS: 100 INJECTION, SOLUTION INTRAVENOUS; SUBCUTANEOUS at 11:44

## 2022-07-07 RX ADMIN — INSULIN LISPRO 4 UNITS: 100 INJECTION, SOLUTION INTRAVENOUS; SUBCUTANEOUS at 08:15

## 2022-07-07 RX ADMIN — FUROSEMIDE 20 MG: 20 TABLET ORAL at 08:13

## 2022-07-07 RX ADMIN — SODIUM CHLORIDE: 9 INJECTION, SOLUTION INTRAVENOUS at 13:09

## 2022-07-07 RX ADMIN — SODIUM CHLORIDE, PRESERVATIVE FREE 10 ML: 5 INJECTION INTRAVENOUS at 20:05

## 2022-07-07 RX ADMIN — FLECAINIDE ACETATE 100 MG: 100 TABLET ORAL at 20:03

## 2022-07-07 RX ADMIN — APIXABAN 5 MG: 5 TABLET, FILM COATED ORAL at 08:13

## 2022-07-07 RX ADMIN — INSULIN LISPRO 2 UNITS: 100 INJECTION, SOLUTION INTRAVENOUS; SUBCUTANEOUS at 20:14

## 2022-07-07 RX ADMIN — SODIUM CHLORIDE, PRESERVATIVE FREE 10 ML: 5 INJECTION INTRAVENOUS at 08:13

## 2022-07-07 RX ADMIN — FLECAINIDE ACETATE 100 MG: 100 TABLET ORAL at 08:13

## 2022-07-07 RX ADMIN — INSULIN GLARGINE 30 UNITS: 100 INJECTION, SOLUTION SUBCUTANEOUS at 20:14

## 2022-07-07 RX ADMIN — MEROPENEM 1000 MG: 1 INJECTION, POWDER, FOR SOLUTION INTRAVENOUS at 13:11

## 2022-07-07 RX ADMIN — METOPROLOL SUCCINATE 12.5 MG: 25 TABLET, FILM COATED, EXTENDED RELEASE ORAL at 08:13

## 2022-07-07 RX ADMIN — CETIRIZINE HYDROCHLORIDE 10 MG: 10 TABLET, FILM COATED ORAL at 10:33

## 2022-07-07 RX ADMIN — SODIUM CHLORIDE: 9 INJECTION, SOLUTION INTRAVENOUS at 03:10

## 2022-07-07 RX ADMIN — APIXABAN 5 MG: 5 TABLET, FILM COATED ORAL at 20:02

## 2022-07-07 NOTE — PLAN OF CARE
Problem: Discharge Planning  Goal: Discharge to home or other facility with appropriate resources  7/7/2022 0130 by Yani Kirby RN  Outcome: Progressing  7/6/2022 1604 by Aileen Herrera RN  Outcome: Progressing  Flowsheets  Taken 7/6/2022 0840 by Aileen Herrera RN  Discharge to home or other facility with appropriate resources:   Identify barriers to discharge with patient and caregiver   Identify discharge learning needs (meds, wound care, etc)  Taken 7/6/2022 0615 by Bakari Arthur RN  Discharge to home or other facility with appropriate resources: Identify barriers to discharge with patient and caregiver     Problem: Pain  Goal: Verbalizes/displays adequate comfort level or baseline comfort level  7/7/2022 0130 by Yani Kirby RN  Outcome: Progressing  7/6/2022 1604 by Aileen Herrera RN  Outcome: Progressing     Problem: Safety - Adult  Goal: Free from fall injury  7/7/2022 0130 by Yani Kirby RN  Outcome: Progressing  7/6/2022 1604 by Aileen Herrera RN  Outcome: Progressing  Flowsheets (Taken 7/6/2022 0850)  Free From Fall Injury: Instruct family/caregiver on patient safety     Problem: ABCDS Injury Assessment  Goal: Absence of physical injury  7/7/2022 0130 by Yani Kirby RN  Outcome: Progressing  7/6/2022 1604 by Aileen Herrera RN  Outcome: Progressing  Flowsheets (Taken 7/6/2022 0850)  Absence of Physical Injury: Implement safety measures based on patient assessment     Problem: Skin/Tissue Integrity - Adult  Goal: Skin integrity remains intact  7/6/2022 1604 by Aileen Herrera RN  Outcome: Progressing     Problem: Genitourinary - Adult  Goal: Absence of urinary retention  7/7/2022 0130 by Yani Kirby RN  Outcome: Progressing  7/6/2022 1604 by Aileen Herrera RN  Outcome: Progressing     Problem: Infection - Adult  Goal: Absence of infection at discharge  7/7/2022 0130 by Yani Kirby RN  Outcome: Progressing  7/6/2022 1604 by Aileen Herrera RN  Outcome: Progressing  Flowsheets (Taken 7/6/2022 0840)  Absence of infection at discharge:   Assess and monitor for signs and symptoms of infection   Monitor lab/diagnostic results   Administer medications as ordered   Instruct and encourage patient and family to use good hand hygiene technique   Identify and instruct in appropriate isolation precautions for identified infection/condition

## 2022-07-07 NOTE — PROGRESS NOTES
Hospitalist Progress Note      PCP: Jonny Nice MD    Date of Admission: 7/5/2022    Chief Complaint: Right flank pain. Hospital Course:    61 y.o. female with PMHx of A-fib, DM2, HTN, history of non-Hodgkin's lymphoma( post bone marrow transplant 2 years ago ) , presented to [facility] in transfer from [facility] ED for management of sepsis from pyelonephritis. Pt presented to [facility] ED with right flank pain. She reports nausea, vomiting and body aches. + fever and chills. No dysuria and hematuria. .  Admitted with pyelonephritis, was found to be bacteremic    Subjective:   Denies any complaints this morning, spiking fever up to 39 this morning      Medications:  Reviewed    Infusion Medications    dextrose      sodium chloride      sodium chloride 150 mL/hr at 07/07/22 1309     Scheduled Medications    cetirizine  10 mg Oral Daily    meropenem  1,000 mg IntraVENous Q8H    apixaban  5 mg Oral BID    flecainide  100 mg Oral 2 times per day    furosemide  20 mg Oral Daily    metoprolol succinate  12.5 mg Oral Daily    insulin lispro  0-12 Units SubCUTAneous TID WC    insulin lispro  0-6 Units SubCUTAneous Nightly    sodium chloride flush  5-40 mL IntraVENous 2 times per day    insulin glargine  30 Units SubCUTAneous Nightly     PRN Meds: glucose, dextrose bolus **OR** dextrose bolus, glucagon (rDNA), dextrose, sodium chloride flush, sodium chloride, acetaminophen **OR** acetaminophen, albuterol sulfate HFA      Intake/Output Summary (Last 24 hours) at 7/7/2022 1312  Last data filed at 7/7/2022 1224  Gross per 24 hour   Intake 3639.17 ml   Output --   Net 3639.17 ml       Physical Exam Performed:    /77   Pulse (!) 101   Temp 98.3 °F (36.8 °C) (Oral)   Resp 18   Ht 5' 3\" (1.6 m)   Wt 238 lb 1.6 oz (108 kg)   SpO2 90%   BMI 42.18 kg/m²     General appearance: No apparent distress, appears stated age and cooperative. HEENT: Pupils equal, round, and reactive to light. Conjunctivae/corneas clear. Neck: Supple, with full range of motion. No jugular venous distention. Trachea midline. Respiratory:  Normal respiratory effort. Clear to auscultation, bilaterally without Rales/Wheezes/Rhonchi. Cardiovascular: Regular rate and rhythm with normal S1/S2 without murmurs, rubs or gallops. Abdomen: Soft, non-tender, non-distended with normal bowel sounds. Musculoskeletal: No clubbing, cyanosis or edema bilaterally. Full range of motion without deformity. Skin: Skin color, texture, turgor normal.  No rashes or lesions. Neurologic:  Neurovascularly intact without any focal sensory/motor deficits. Cranial nerves: II-XII intact, grossly non-focal.  Psychiatric: Alert and oriented, thought content appropriate, normal insight  Capillary Refill: Brisk,3 seconds, normal   Peripheral Pulses: +2 palpable, equal bilaterally       Labs:   Recent Labs     07/05/22  1245 07/07/22  0823   WBC 12.8* 5.7   HGB 12.3 11.2*   HCT 37.6 33.2*    118*     Recent Labs     07/05/22  1245 07/07/22  0823    140   K 3.5 3.3*    106   CO2 20* 21   BUN 16 10   CREATININE 1.1 0.9   CALCIUM 9.4 8.7     Recent Labs     07/05/22  1245   AST 59*   ALT 54*   BILITOT 0.7   ALKPHOS 192*     No results for input(s): INR in the last 72 hours. No results for input(s): Zenia Risk in the last 72 hours.     Urinalysis:      Lab Results   Component Value Date/Time    NITRU Negative 07/05/2022 01:23 PM    WBCUA >100 07/05/2022 01:23 PM    WBCUA >50 08/08/2016 02:00 PM    BACTERIA 3+ 07/05/2022 01:23 PM    RBCUA  07/05/2022 01:23 PM    RBCUA 30-49 08/08/2016 02:00 PM    BLOODU LARGE 07/05/2022 01:23 PM    BLOODU Large 07/05/2022 12:05 PM    SPECGRAV 1.020 07/05/2022 01:23 PM    SPECGRAV 1.015 07/05/2022 12:05 PM    GLUCOSEU Negative 07/05/2022 01:23 PM    GLUCOSEU negative 07/05/2022 12:05 PM       Radiology:  CT ABDOMEN PELVIS WO CONTRAST Additional Contrast? None   Preliminary Result   Mild edema around the right kidney could represent the urinary tract   infection given the patient's history. No hydronephrosis. No renal   calculus. No perinephric fluid collection. Stable rind of soft tissue in the small bowel mesentery since 12/20/2021,   corresponding to history of treated lymphoma. Assessment/Plan:    Active Hospital Problems    Diagnosis     Pyelonephritis [N12]      Priority: Medium     Sepsis. Pyelonephritis. E. coli bacteremia  Patient presented to emergency with flank pain, was found to be septic on presentation, blood cultures positive with E. coli, with ESBL DNA detected. Continues to spike fever  Plan.    -change antibiotics to meropenem. -Repeat blood cultures  -Follow culture sensitivity  -Follow urine culture    Hypomagnesemia  - Mg 1.5  - replace and repeat     Diabetes mellitus II   -Continue glargine 30 units.  -Continue sliding scale     Essential (primary) hypertension   - monitor blood pressure  - continue home meds      Atrial Fibrillation   - currently rate controlled  - continue Eliquis and flecainide     DVT Prophylaxis: lovenox  Diet: ADULT DIET; Regular; 5 carb choices (75 gm/meal)  Code Status: Full Code    PT/OT Eval Status: ambulatory .      Dispo - pending clinical improvement      Brijesh Mack MD

## 2022-07-07 NOTE — FLOWSHEET NOTE
Lab called about pro calcitonin that was ordered to be drawn with AM labs today. Order was missed. Rescheduled to draw with labs in am on 7/7.

## 2022-07-08 LAB
ANION GAP SERPL CALCULATED.3IONS-SCNC: 11 MMOL/L (ref 3–16)
BASOPHILS ABSOLUTE: 0 K/UL (ref 0–0.2)
BASOPHILS RELATIVE PERCENT: 0.2 %
BLOOD CULTURE, ROUTINE: ABNORMAL
BLOOD CULTURE, ROUTINE: ABNORMAL
BUN BLDV-MCNC: 9 MG/DL (ref 7–20)
CALCIUM SERPL-MCNC: 8.3 MG/DL (ref 8.3–10.6)
CHLORIDE BLD-SCNC: 107 MMOL/L (ref 99–110)
CO2: 19 MMOL/L (ref 21–32)
CREAT SERPL-MCNC: 0.7 MG/DL (ref 0.6–1.2)
EOSINOPHILS ABSOLUTE: 0.1 K/UL (ref 0–0.6)
EOSINOPHILS RELATIVE PERCENT: 0.9 %
GFR AFRICAN AMERICAN: >60
GFR NON-AFRICAN AMERICAN: >60
GLUCOSE BLD-MCNC: 155 MG/DL (ref 70–99)
GLUCOSE BLD-MCNC: 159 MG/DL (ref 70–99)
GLUCOSE BLD-MCNC: 162 MG/DL (ref 70–99)
GLUCOSE BLD-MCNC: 236 MG/DL (ref 70–99)
GLUCOSE BLD-MCNC: 254 MG/DL (ref 70–99)
HCT VFR BLD CALC: 35.8 % (ref 36–48)
HEMOGLOBIN: 11.9 G/DL (ref 12–16)
LYMPHOCYTES ABSOLUTE: 0.3 K/UL (ref 1–5.1)
LYMPHOCYTES RELATIVE PERCENT: 4.4 %
MAGNESIUM: 2.8 MG/DL (ref 1.8–2.4)
MCH RBC QN AUTO: 31.8 PG (ref 26–34)
MCHC RBC AUTO-ENTMCNC: 33.3 G/DL (ref 31–36)
MCV RBC AUTO: 95.6 FL (ref 80–100)
MONOCYTES ABSOLUTE: 0.6 K/UL (ref 0–1.3)
MONOCYTES RELATIVE PERCENT: 8.9 %
NEUTROPHILS ABSOLUTE: 5.6 K/UL (ref 1.7–7.7)
NEUTROPHILS RELATIVE PERCENT: 85.6 %
ORGANISM: ABNORMAL
PDW BLD-RTO: 16.9 % (ref 12.4–15.4)
PERFORMED ON: ABNORMAL
PLATELET # BLD: 121 K/UL (ref 135–450)
PMV BLD AUTO: 9.1 FL (ref 5–10.5)
POTASSIUM SERPL-SCNC: 3.3 MMOL/L (ref 3.5–5.1)
RBC # BLD: 3.75 M/UL (ref 4–5.2)
REASON FOR REJECTION: NORMAL
REJECTED TEST: NORMAL
SODIUM BLD-SCNC: 137 MMOL/L (ref 136–145)
URINE CULTURE, ROUTINE: ABNORMAL
WBC # BLD: 6.6 K/UL (ref 4–11)

## 2022-07-08 PROCEDURE — 94761 N-INVAS EAR/PLS OXIMETRY MLT: CPT

## 2022-07-08 PROCEDURE — 94640 AIRWAY INHALATION TREATMENT: CPT

## 2022-07-08 PROCEDURE — 6370000000 HC RX 637 (ALT 250 FOR IP): Performed by: NURSE PRACTITIONER

## 2022-07-08 PROCEDURE — 36415 COLL VENOUS BLD VENIPUNCTURE: CPT

## 2022-07-08 PROCEDURE — 6370000000 HC RX 637 (ALT 250 FOR IP): Performed by: FAMILY MEDICINE

## 2022-07-08 PROCEDURE — 6360000002 HC RX W HCPCS: Performed by: STUDENT IN AN ORGANIZED HEALTH CARE EDUCATION/TRAINING PROGRAM

## 2022-07-08 PROCEDURE — 2580000003 HC RX 258: Performed by: NURSE PRACTITIONER

## 2022-07-08 PROCEDURE — 1200000000 HC SEMI PRIVATE

## 2022-07-08 PROCEDURE — 6370000000 HC RX 637 (ALT 250 FOR IP): Performed by: STUDENT IN AN ORGANIZED HEALTH CARE EDUCATION/TRAINING PROGRAM

## 2022-07-08 PROCEDURE — 83735 ASSAY OF MAGNESIUM: CPT

## 2022-07-08 PROCEDURE — 2580000003 HC RX 258: Performed by: STUDENT IN AN ORGANIZED HEALTH CARE EDUCATION/TRAINING PROGRAM

## 2022-07-08 PROCEDURE — 80048 BASIC METABOLIC PNL TOTAL CA: CPT

## 2022-07-08 PROCEDURE — 2700000000 HC OXYGEN THERAPY PER DAY

## 2022-07-08 PROCEDURE — 85025 COMPLETE CBC W/AUTO DIFF WBC: CPT

## 2022-07-08 PROCEDURE — 99223 1ST HOSP IP/OBS HIGH 75: CPT | Performed by: INTERNAL MEDICINE

## 2022-07-08 RX ORDER — FUROSEMIDE 10 MG/ML
40 INJECTION INTRAMUSCULAR; INTRAVENOUS ONCE
Status: COMPLETED | OUTPATIENT
Start: 2022-07-08 | End: 2022-07-08

## 2022-07-08 RX ORDER — POTASSIUM CHLORIDE 750 MG/1
40 TABLET, FILM COATED, EXTENDED RELEASE ORAL ONCE
Status: COMPLETED | OUTPATIENT
Start: 2022-07-08 | End: 2022-07-08

## 2022-07-08 RX ADMIN — CETIRIZINE HYDROCHLORIDE 10 MG: 10 TABLET, FILM COATED ORAL at 08:17

## 2022-07-08 RX ADMIN — INSULIN LISPRO 6 UNITS: 100 INJECTION, SOLUTION INTRAVENOUS; SUBCUTANEOUS at 17:30

## 2022-07-08 RX ADMIN — INSULIN LISPRO 1 UNITS: 100 INJECTION, SOLUTION INTRAVENOUS; SUBCUTANEOUS at 20:18

## 2022-07-08 RX ADMIN — FLECAINIDE ACETATE 100 MG: 100 TABLET ORAL at 08:18

## 2022-07-08 RX ADMIN — FUROSEMIDE 40 MG: 10 INJECTION, SOLUTION INTRAMUSCULAR; INTRAVENOUS at 11:40

## 2022-07-08 RX ADMIN — MEROPENEM 1000 MG: 1 INJECTION, POWDER, FOR SOLUTION INTRAVENOUS at 13:18

## 2022-07-08 RX ADMIN — SODIUM CHLORIDE: 9 INJECTION, SOLUTION INTRAVENOUS at 02:12

## 2022-07-08 RX ADMIN — POTASSIUM CHLORIDE 40 MEQ: 750 TABLET, FILM COATED, EXTENDED RELEASE ORAL at 08:30

## 2022-07-08 RX ADMIN — INSULIN LISPRO 2 UNITS: 100 INJECTION, SOLUTION INTRAVENOUS; SUBCUTANEOUS at 08:21

## 2022-07-08 RX ADMIN — SODIUM CHLORIDE, PRESERVATIVE FREE 10 ML: 5 INJECTION INTRAVENOUS at 20:14

## 2022-07-08 RX ADMIN — INSULIN GLARGINE 30 UNITS: 100 INJECTION, SOLUTION SUBCUTANEOUS at 20:18

## 2022-07-08 RX ADMIN — MEROPENEM 1000 MG: 1 INJECTION, POWDER, FOR SOLUTION INTRAVENOUS at 20:17

## 2022-07-08 RX ADMIN — METOPROLOL SUCCINATE 12.5 MG: 25 TABLET, FILM COATED, EXTENDED RELEASE ORAL at 08:17

## 2022-07-08 RX ADMIN — INSULIN LISPRO 4 UNITS: 100 INJECTION, SOLUTION INTRAVENOUS; SUBCUTANEOUS at 11:48

## 2022-07-08 RX ADMIN — FUROSEMIDE 20 MG: 20 TABLET ORAL at 08:25

## 2022-07-08 RX ADMIN — SODIUM CHLORIDE, PRESERVATIVE FREE 5 ML: 5 INJECTION INTRAVENOUS at 08:19

## 2022-07-08 RX ADMIN — FLECAINIDE ACETATE 100 MG: 100 TABLET ORAL at 20:14

## 2022-07-08 RX ADMIN — APIXABAN 5 MG: 5 TABLET, FILM COATED ORAL at 08:17

## 2022-07-08 RX ADMIN — MEROPENEM 1000 MG: 1 INJECTION, POWDER, FOR SOLUTION INTRAVENOUS at 05:19

## 2022-07-08 RX ADMIN — Medication 2 PUFF: at 20:25

## 2022-07-08 RX ADMIN — APIXABAN 5 MG: 5 TABLET, FILM COATED ORAL at 20:14

## 2022-07-08 ASSESSMENT — PAIN SCALES - GENERAL
PAINLEVEL_OUTOF10: 0

## 2022-07-08 NOTE — CARE COORDINATION
SW placed phone call to [employer] so they can price IVAB per ID. They inform that she will need a continued dose. They will call us back momentarily with pricing. NIYA placed phone call to [facility] who also is a liaison with [facility] for nursing staffing only. SW informed that the patient and family are teachable. They will submit today to find out if they are in network with her insurance and if they are not they will help us find a company. We will continue to follow this patient for needs she is a likely d/c for Sunday per ID. Respectfully submitted,    STEPHANIE Joy  [facility]   [phone]    Electronically signed by STEPHANIE Carter on 7/8/2022 at 4:13 PM

## 2022-07-08 NOTE — CONSULTS
Infectious Diseases Inpatient Consult Note      Reason for Consult:  Severe sepsis High fevers, ESBL E coli bacteremia and UTI     Requesting Physician:  Dr. Randy Hagan MD     Primary Care Physician:  Jamal Butler MD    History Obtained From:  [facility] and Pt     CHIEF COMPLAINT:     Chief Complaint   Patient presents with    Flank Pain     Right flank pain started this morning at 0700.  Nausea         HISTORY OF PRESENT ILLNESS:  61 y.o. woman with a past medical history of atrial fibrillation, hypertension, diabetes, morbid obesity BMI at 42 . NHL ,  admitted to the hospital secondary to right flank pain nausea vomiting and fever. She also was complaining of some dysuria and possible hematuria acute onset of symptoms. T-max 102.2 since admission. Admission labs indicate lactic acid at 4.8 ALT at 54 AST 59, Pro-Wilfredo elevated 8.2 WBC 12.8 hemoglobin 12.3 blood cultures from admission ESBL E. coli simultaneous urine culture positive for ESBL E. Coli. CT abdomen pelvis positive for edema on the right kidney concerning for right pyelonephritis no obstruction or hydronephrosis noted. Given ongoing severe sepsis we are consulted for antibiotic recommendations. Past Medical History:    Past Medical History:   Diagnosis Date    Atrial fibrillation with RVR ([address]     Cancer ([address]     Diabetes mellitus type 2, controlled ([address]     Hypertension     Melanoma in situ of back ([address]     dr Alina Rahman Precordial pain 2016    Sleep apnea        Past Surgical History:    Past Surgical History:   Procedure Laterality Date    BONE MARROW TRANSPLANT      BREAST CYST ASPIRATION      in past benign bilateral over years. 2 episodes     SECTION      CHOLECYSTECTOMY      COLONOSCOPY  9/10/12    polyp, su, repeat 5 years    COLONOSCOPY  10/09/2017    dr [place] Pu and check in 5 years.  polyps    ENDOMETRIAL ABLATION  2007    FINE NEEDLE ASPIRATION      GALLBLADDER SURGERY      OTHER SURGICAL HISTORY  2019    Abdominal Mass BX    OTHER SURGICAL HISTORY  2019    CT BIOPSY ABDOMEN RETROPERITONEUM,    OVARY REMOVAL Left 2018    benign growth, dr Beth Queen [facility]. 16. TUNNELED VENOUS PORT PLACEMENT Right 2016    Dr. Lenard Garza       Current Medications:    No outpatient medications have been marked as taking for the 22 encounter [facility] H[facility] Encounter).        Allergies:  Penicillins    Immunizations :   Immunization History   Administered Date(s) Administered    COVID-19, PFIZER PURPLE top, DILUTE for use, (age 15 y+), 30mcg/0.3mL 2021, 2021    Hepatitis A Adult ([place]) 2018, 2019    Influenza Vaccine, unspecified formulation 10/20/2016    Influenza Virus Vaccine 2019    Influenza, MDCK Quadv, IM, PF (Flucelvax 2 yrs and older) 2021    Influenza, Quadv, IM, PF (6 mo and older Fluzone, Flulaval, Fluarix, and 3 yrs and older Afluria) 2018, 2020    Pneumococcal Conjugate 13-valent ([id number]) 10/27/2017    Pneumococcal Polysaccharide ([id number]) 10/14/2016         Social History:    Social History     Tobacco Use    Smoking status: Former Smoker     Packs/day: 1.00     Years: 0.80     Pack years: 0.80     Types: Cigarettes     Start date: 1975     Quit date: 2016     Years since quittin.5    Smokeless tobacco: Never Used   Vaping Use    Vaping Use: Never used   Substance Use Topics    Alcohol use: Not Currently     Alcohol/week: 0.0 standard drinks    Drug use: Never     Social History     Tobacco Use   Smoking Status Former Smoker    Packs/day: 1.00    Years: 0.80    Pack years: 0.80    Types: Cigarettes    Start date: 1975   Verl Raw Quit date: 2016    Years since quittin.5   Smokeless Tobacco Never Used      Family History   Problem Relation Age of Onset    High Blood Pressure Mother     Cancer Mother         breast    High Blood Pressure Father     Cancer Father lung        REVIEW OF SYSTEMS:     Constitutional:  fevers,+  Chills ++ , night sweats  Eyes:  negative for blurred vision, eye discharge, visual disturbance   HEENT:  negative for hearing loss, ear drainage,nasal congestion  Respiratory:  negative for cough, shortness of breath or hemoptysis   Cardiovascular:  negative for chest pain, palpitations, syncope  Gastrointestinal:  negative for nausea, vomiting, diarrhea, constipation, abdominal pain Flank pain ++   Genitourinary:  negative for frequency, dysuria++, urinary incontinence, hematuria+   Hematologic/Lymphatic:  negative for easy bruising, bleeding and lymphadenopathy  Allergic/Immunologic:  negative for recurrent infections, angioedema, anaphylaxis   Endocrine:  negative for weight changes, polyuria, polydipsia and polyphagia  Musculoskeletal:  negative for joint  pain, swelling, decreased range of motion  Integumentary: No rashes, skin lesions  Neurological:  negative for headaches, slurred speech, unilateral weakness  Psychiatric: negative for hallucinations,confusion,agitation.      PHYSICAL EXAM:      Vitals:  t MAX  102.2   /84   Pulse 76   Temp 98.8 °F (37.1 °C) (Oral)   Resp 19   Ht 5' 3\" (1.6 m)   Wt 241 lb 2.9 oz (109.4 kg)   SpO2 94%   BMI 42.72 kg/m²     General Appearance: alert,in some acute distress, no pallor, no icterus facial flushing +   Skin: warm and dry, no rash or erythema  Head: normocephalic and atraumatic  Eyes: pupils equal, round, and reactive to light, conjunctivae normal  ENT: tympanic membrane, external ear and ear canal normal bilaterally, nose without deformity, nasal mucosa and turbinates normal without polyps  Neck: supple and non-tender without mass, no thyromegaly  no cervical lymphadenopathy  Pulmonary/Chest: clear to auscultation bilaterally- no wheezes, rales or rhonchi, normal air movement, no respiratory distress  Cardiovascular: normal rate, regular rhythm, normal S1 and S2, no murmurs, rubs, clicks, BLOODU LARGE 07/05/2022 01:23 PM    BLOODU Large 07/05/2022 12:05 PM    PHUR 5.0 07/05/2022 01:23 PM    PHUR 5.0 07/05/2022 12:05 PM    PROTEINU 30 07/05/2022 01:23 PM    PROTEINU 30 07/05/2022 12:05 PM    UROBILINOGEN 0.2 07/05/2022 01:23 PM    NITRU Negative 07/05/2022 01:23 PM    LEUKOCYTESUR LARGE 07/05/2022 01:23 PM    LEUKOCYTESUR Small 07/05/2022 12:05 PM    LABMICR YES 07/05/2022 01:23 PM    URINETYPE Cleancatch 07/05/2022 01:23 PM      Urine Microscopic:   Lab Results   Component Value Date/Time    BACTERIA 3+ 07/05/2022 01:23 PM    COMU see below 04/21/2022 10:46 AM    HYALCAST 0-2 07/05/2022 01:23 PM    WBCUA >100 07/05/2022 01:23 PM    WBCUA >50 08/08/2016 02:00 PM    RBCUA  07/05/2022 01:23 PM    RBCUA 30-49 08/08/2016 02:00 PM    EPIU 2-5 07/05/2022 01:23 PM     Urine Reflex to Culture:   Lab Results   Component Value Date/Time    URRFLXCULT Yes 07/05/2022 01:23 PM       Lactic acid  4.8       Procal  8.20    WBC  12.8       LFT ELEVATION       MICRO: cultures reviewed and updated by me            Culture, Urine [[id number]] (Abnormal)  Collected: 07/05/22 1323   Order Status: Completed Specimen: Urine, clean catch Updated: 07/08/22 1115    Organism Escherichia coli ESBL Abnormal     Urine Culture, Routine -- Abnormal     >100,000 CFU/ml   CONTACT PRECAUTIONS INDICATED   Carbapenem antibiotics are the best option for infections caused   by ESBL producing organisms.  Other antibiotic classes are   likely to result in treatment failure, even for organisms   demonstrating in vitro susceptibility.    Abnormal    Narrative:     ORDER#: [id number]                          ORDERED BY: Mahin Woodward   SOURCE: Urine Clean Catch                  COLLECTED:  07/05/22 13:23   ANTIBIOTICS AT XOCHITL. :                      RECEIVED :  07/05/22 22:24   CALL  Russell  IOG5 tel. [phone],   Previous panic on this admission - call not needed per SOP, 07/08/2022 06:52,   by [facility]   Culture, Blood 1 [[id number]] (Abnormal)  Collected: 07/05/22 1245   Order Status: Completed Specimen: Blood Updated: 07/08/22 0646    Organism Escherichia coli ESBL DNA Detected Abnormal     Blood Culture, Routine -- Abnormal     See additional report for complete BCID panel. CONTACT PRECAUTIONS INDICATED   CTX-M gene Detected.    Abnormal     Organism Escherichia coli ESBL Abnormal     Blood Culture, Routine --    POSITIVE for   Isolated one of two sets    Narrative:     ORDER#: [id number]                          ORDERED BY: Chani Orona   SOURCE: Blood Antecubital-Rig              COLLECTED:  07/05/22 12:45   ANTIBIOTICS AT [facility]IT[facility]. :                      RECEIVED :  07/05/22 21:28   CALL  Russell  [id number] tel. [phone], [phone]   Microbiology results called to and read back by [address] [place] [address], 07/06/2022   10:29, by [facility]   Microbiology results called to and read back by BREEZY Reynoso, 07/06/2022   10:28, by [facility]   If child <=2 yrs old please draw pediatric bottle. ~Blood Culture 1   Culture, Blood 1 [[id number]] Collected: 07/06/22 1126   Order Status: Completed Specimen: Blood Updated: 07/07/22 1315    Blood Culture, Routine No Growth to date.  Any change in status will be called. Narrative:     ORDER#: [id number]                          ORDERED BY: Coleman Bosworth   SOURCE: Blood                              COLLECTED:  07/06/22 11:26   ANTIBIOTICS AT [facility]IT[facility]. :                      RECEIVED :  07/06/22 11:34   If child <=2 yrs old please draw pediatric bottle. ~Blood Culture 1   Culture, Blood 2 [[id number]] Collected: 07/05/22 1339   Order Status: Completed Specimen: Blood Updated: 07/07/22 0115    Culture, Blood 2 No Growth to date.  Any change in status will be called. Narrative:     ORDER#: [id number]                          ORDERED BY: Chani Orona   SOURCE: Blood left wrist                   COLLECTED:  07/05/22 13:39   ANTIBIOTICS AT [facility]IT[facility]. :                      RECEIVED :  07/05/22 21:29   If child <=2 yrs old please draw pediatric bottle. ~Blood Culture #2   Culture, Blood, PCR ID Panel [[id number]] Collected: 07/05/22 1245   Order Status: Completed Updated: 07/06/22 1032    Report SEE IMAGE   Narrative:     CALL  Russell  [id number] tel. [phone], [phone]   Microbiology results called to and read back by [id number] [address], 07/06/2022   10:29, by [facility]   Microbiology results called to and read back by BREEZY Lomeli, 07/06/2022   10:28, by [facility]     Susceptibility      Escherichia coli (esbl) (2)    Antibiotic Interpretation Microscan  Method Status    ampicillin Resistant >=32 mcg/mL BACTERIAL SUSCEPTIBILITY PANEL BY CLAUDINE     ampicillin-sulbactam Sensitive <=2 mcg/mL BACTERIAL SUSCEPTIBILITY PANEL BY CLAUDINE     ceFAZolin Resistant >=64 mcg/mL BACTERIAL SUSCEPTIBILITY PANEL BY CLAUDINE     cefepime Resistant   BACTERIAL SUSCEPTIBILITY PANEL BY CLAUDINE     cefTRIAXone Resistant   BACTERIAL SUSCEPTIBILITY PANEL BY CLAUDINE     ciprofloxacin Sensitive <=0.25 mcg/mL BACTERIAL SUSCEPTIBILITY PANEL BY CLAUDINE     ertapenem Sensitive <=0.12 mcg/mL BACTERIAL SUSCEPTIBILITY PANEL BY CLAUDINE     gentamicin Sensitive <=1 mcg/mL BACTERIAL SUSCEPTIBILITY PANEL BY CLAUDINE     levofloxacin Sensitive <=0.12 mcg/mL BACTERIAL SUSCEPTIBILITY PANEL BY CLAUDINE     trimethoprim-sulfamethoxazole Resistant >=320 mcg/mL BACTERIAL SUSCEPTIBILITY PANEL BY CLAUDINE          Narrative  Performed by: [address] [place] [address] Lab  ORDER#: [id number]                          ORDERED BY: Smita Haque   SOURCE: Blood Antecubital-Rig              COLLECTED:  07/05/22 12:45   ANTIBIOTICS AT [facility]IT[facility]. :                      RECEIVED :  07/05/22 21:28   CALL  Russell  [id number] tel. [phone], [phone]      Escherichia coli (esbl) (2)    Antibiotic Interpretation Microscan  Method Status    ampicillin Resistant >=32 mcg/mL BACTERIAL SUSCEPTIBILITY PANEL BY CLAUDINE     ampicillin-sulbactam Sensitive <=2 mcg/mL BACTERIAL SUSCEPTIBILITY PANEL BY CLAUDINE     ceFAZolin Resistant >=64 mcg/mL BACTERIAL SUSCEPTIBILITY PANEL BY CLAUDINE     cefepime Resistant   BACTERIAL SUSCEPTIBILITY PANEL BY CLAUDINE     cefTRIAXone Resistant   BACTERIAL SUSCEPTIBILITY PANEL BY CLAUDINE     ciprofloxacin Sensitive <=0.25 mcg/mL BACTERIAL SUSCEPTIBILITY PANEL BY CLAUDINE     ertapenem Sensitive <=0.12 mcg/mL BACTERIAL SUSCEPTIBILITY PANEL BY CLAUDINE     gentamicin Sensitive <=1 mcg/mL BACTERIAL SUSCEPTIBILITY PANEL BY CLAUDINE     levofloxacin Sensitive <=0.12 mcg/mL BACTERIAL SUSCEPTIBILITY PANEL BY CLAUDINE     trimethoprim-sulfamethoxazole Resistant >=320 mcg/mL BACTERIAL SUSCEPTIBILITY PANEL BY CLAUDINE          Narrative  Performed by: [facility]  ORDER#: [id number]                          ORDERED BY: Brandon Disla   SOURCE: Blood Antecubital-Rig              COLLECTED:  07/05/22 12:45   ANTIBIOTICS AT [facility]IT[facility]. :                      RECEIVED :  07/05/22 21:28   CALL  Russell  [id number] tel. [phone], [phone]   Microbiology results called to and read back by MARTINEZ Mcwilliams 07/06/2022          Blood Culture:   Lab Results   Component Value Date/Time    [facility]  07/06/2022 11:26 AM     No Growth to date. Any change in status will be called. Jed Blanco  07/05/2022 01:39 PM     No Growth to date. Any change in status will be called. Viral Culture:    Lab Results   Component Value Date/Time    COVID19 Negative 11/05/2021 02:35 PM    COVID19 Detected 02/25/2021 12:30 PM    COVID19 NOT DETECTED 10/22/2020 09:13 AM    COVID19 Not Detected 04/17/2020 12:34 PM     Urine Culture:   Recent Labs     07/05/22  1323   LABURIN >100,000 CFU/ml  CONTACT PRECAUTIONS INDICATED  Carbapenem antibiotics are the best option for infections caused  by ESBL producing organisms. Other antibiotic classes are  likely to result in treatment failure, even for organisms  demonstrating in vitro susceptibility.   *       Scheduled Meds:   furosemide  40 mg IntraVENous Once    cetirizine  10 mg Oral Daily    meropenem  1,000 mg IntraVENous Q8H    apixaban  5 mg Oral BID    flecainide  100 mg Oral 2 times per day    furosemide  20 mg Oral Daily    metoprolol succinate  12.5 mg Oral Daily    insulin lispro  0-12 Units SubCUTAneous TID WC    insulin lispro  0-6 Units SubCUTAneous Nightly    sodium chloride flush  5-40 mL IntraVENous 2 times per day    insulin glargine  30 Units SubCUTAneous Nightly       Continuous Infusions:   dextrose      sodium chloride      sodium chloride Stopped (07/08/22 0519)       PRN Meds:  glucose, dextrose bolus **OR** dextrose bolus, glucagon (rDNA), dextrose, sodium chloride flush, sodium chloride, acetaminophen **OR** acetaminophen, albuterol sulfate HFA    Imaging:   CT ABDOMEN PELVIS WO CONTRAST Additional Contrast? None   Preliminary Result   Mild edema around the right kidney could represent the urinary tract   infection given the patient's history. No hydronephrosis. No renal   calculus. No perinephric fluid collection. Stable rind of soft tissue in the small bowel mesentery since 12/20/2021,   corresponding to history of treated lymphoma. All pertinent images and reports for the current Hospitalization were reviewed by me.     IMPRESSION:    Patient Active Problem List   Diagnosis    Essential hypertension    Left atrial flutter by electrocardiogram (HCC)    Small cell b-cell lymphoma, lymph nodes of multiple sites ([address]    Type 2 diabetes mellitus without complication ([address]    Hyperlipidemia    Cyst of left ovary    Endometrial thickening on ultrasound    Visit for monitoring Rituxan therapy    Degenerative disc disease, lumbar    Trochanteric bursitis of left hip    Atrial fibrillation with rapid ventricular response (HCC)    ZOE (obstructive sleep apnea)    B-cell lymphoma (HCC)    Non-Hodgkin's lymphoma (HCC)    NHL (non-Hodgkin's lymphoma) (HCC)    PAF (paroxysmal atrial fibrillation) (HCC)    Sepsis ([address]    Upper respiratory infection    Pneumonia due to COVID-19 virus    Pneumonia    Acute respiratory failure with hypoxia ([address]    Pyelonephritis     Severe sepsis  High fevers  WBC elevation   Lactic acidosis  Gram  -ve sepsis  ESBL E coli bacteremia  ESBL E coli UTI  Complicated UTI  Rt pyelonephritis  Obesity BMI 42  NHL   A fib  DM+  Procal elevation  Chest port in place      She remains very ill from on going sepsis, high fevers and bacteremia from Complicated  tract infection she is immune suppressed from NHL, DM and obesity complicating recovery       Labs, Microbiology, Radiology and pertinent results from current hospitalization and care every where were reviewed by me as a part of the consultation. PLAN :  1. Cont IV Meropenem x  1 gm q 8 HRS  2. She is on chronic anticoagulation   3. On Flecanide for A fib  4. Control DM - check Hba1c  5. Trend Lactic acid and Procal   6. Repeat Blood cx  7/6 NGTD  7. Home going will choose IV Ertapenem x 1 gm q 24 HR X 12 MORE DAYS  8. She has chest port and was functional per patient hopefully we can access and use this for OPAT        Discussed with patient/Family and Nursing   Risk of Complications/Morbidity: High      · Illness(es)/ Infection present that pose threat to bodily function. · There is potential for severe exacerbation of infection/side effects of treatment. Therapy requires intensive monitoring for antimicrobial agent toxicity. Thanks for allowing me to participate in your patient's care please call me with any questions or concerns.     Dr. Sharri Batres MD  [address] [place] [address] Physician  Phone: [phone]   Fax : [phone]

## 2022-07-08 NOTE — PLAN OF CARE
Problem: Discharge Planning  Goal: Discharge to home or other facility with appropriate resources  7/8/2022 1204 by Edith White RN  Outcome: Progressing  7/8/2022 1121 by Edith White RN  Outcome: Progressing  Flowsheets (Taken 7/8/2022 0802)  Discharge to home or other facility with appropriate resources:   Identify barriers to discharge with patient and caregiver   Arrange for needed discharge resources and transportation as appropriate   Identify discharge learning needs (meds, wound care, etc)     Problem: Pain  Goal: Verbalizes/displays adequate comfort level or baseline comfort level  7/8/2022 1204 by Edith White RN  Outcome: Progressing  7/8/2022 1121 by Edith White RN  Outcome: Progressing  Flowsheets (Taken 7/8/2022 0800)  Verbalizes/displays adequate comfort level or baseline comfort level:   Encourage patient to monitor pain and request assistance   Assess pain using appropriate pain scale     Problem: Safety - Adult  Goal: Free from fall injury  7/8/2022 1204 by Edith White RN  Outcome: Progressing  7/8/2022 1121 by Edith White RN  Outcome: Progressing       Problem: ABCDS Injury Assessment  Goal: Absence of physical injury  7/8/2022 1204 by Edith White RN  Outcome: Progressing  7/8/2022 1121 by Edith White RN  Outcome: Progressing     Problem: Skin/Tissue Integrity - Adult  Goal: Skin integrity remains intact  7/8/2022 1204 by Edith White RN  Outcome: Progressing  7/8/2022 1121 by Edith White RN  Outcome: Progressing  Flowsheets  Taken 7/8/2022 0802 by Edith White RN  Skin Integrity Remains Intact: Monitor for areas of redness and/or skin breakdown     Problem: Genitourinary - Adult  Goal: Absence of urinary retention  7/8/2022 1204 by Edith White RN  Outcome: Progressing  7/8/2022 1121 by Edith White RN  Outcome: Progressing  Flowsheets (Taken 7/8/2022 0802)  Absence of urinary retention:   Assess patients ability to void and empty bladder   Monitor intake/output and perform bladder scan as needed     Problem: Infection - Adult  Goal: Absence of infection at discharge  7/8/2022 1204 by Wm Smith RN  Outcome: Progressing  7/8/2022 1121 by Wm Smith RN  Outcome: Progressing  Flowsheets (Taken 7/8/2022 0802)  Absence of infection at discharge:   Assess and monitor for signs and symptoms of infection   Monitor lab/diagnostic results   Administer medications as ordered   Instruct and encourage patient and family to use good hand hygiene technique   Identify and instruct in appropriate isolation precautions for identified infection/condition  Goal: Absence of infection during hospitalization  7/8/2022 1204 by Wm Smith RN  Outcome: Progressing  7/8/2022 1121 by Wm Smith RN  Outcome: Progressing  Flowsheets (Taken 7/8/2022 0802)  Absence of infection during hospitalization:   Assess and monitor for signs and symptoms of infection   Monitor lab/diagnostic results   Monitor all insertion sites i.e., indwelling lines, tubes and drains   Administer medications as ordered   Instruct and encourage patient and family to use good hand hygiene technique   Identify and instruct in appropriate isolation precautions for identified infection/condition  Goal: Absence of fever/infection during anticipated neutropenic period  7/8/2022 1204 by Wm Smith RN  Outcome: Progressing  7/8/2022 1121 by Wm Smith RN  Outcome: Progressing     Problem: Metabolic/Fluid and Electrolytes - Adult  Goal: Electrolytes maintained within normal limits  7/8/2022 1204 by Wm Smith RN  Outcome: Progressing  7/8/2022 1121 by Wm Smith RN  Outcome: Progressing  Flowsheets (Taken 7/8/2022 0802)  Electrolytes maintained within normal limits:   Monitor labs and assess patient for signs and symptoms of electrolyte imbalances   Administer electrolyte replacement as ordered    Goal: Hemodynamic

## 2022-07-08 NOTE — PROGRESS NOTES
Hospitalist Progress Note      PCP: Jonny Nice MD    Date of Admission: 7/5/2022    Chief Complaint: Right flank pain. Hospital Course:    61 y.o. female with PMHx of A-fib, DM2, HTN, history of non-Hodgkin's lymphoma( post bone marrow transplant 2 years ago ) , presented to [facility] in transfer from [place] [address] ED for management of sepsis from pyelonephritis. Pt presented to [place] [address] ED with right flank pain. She reports nausea, vomiting and body aches. + fever and chills. No dysuria and hematuria. .  Admitted with pyelonephritis, was found to be bacteremic    Subjective:   Last spike of fever was 7/7 at 4 AM  Denies any new complaints, reports some shortness of breath on lying flat      Medications:  Reviewed    Infusion Medications    dextrose      sodium chloride       Scheduled Medications    cetirizine  10 mg Oral Daily    meropenem  1,000 mg IntraVENous Q8H    apixaban  5 mg Oral BID    flecainide  100 mg Oral 2 times per day    furosemide  20 mg Oral Daily    metoprolol succinate  12.5 mg Oral Daily    insulin lispro  0-12 Units SubCUTAneous TID WC    insulin lispro  0-6 Units SubCUTAneous Nightly    sodium chloride flush  5-40 mL IntraVENous 2 times per day    insulin glargine  30 Units SubCUTAneous Nightly     PRN Meds: glucose, dextrose bolus **OR** dextrose bolus, glucagon (rDNA), dextrose, sodium chloride flush, sodium chloride, acetaminophen **OR** acetaminophen, albuterol sulfate HFA      Intake/Output Summary (Last 24 hours) at 7/8/2022 1209  Last data filed at 7/8/2022 0553  Gross per 24 hour   Intake 3319.42 ml   Output 1000 ml   Net 2319.42 ml       Physical Exam Performed:    /84   Pulse 76   Temp 98.8 °F (37.1 °C) (Oral)   Resp 19   Ht 5' 3\" (1.6 m)   Wt 241 lb 2.9 oz (109.4 kg)   SpO2 94%   BMI 42.72 kg/m²     General appearance: No apparent distress, appears stated age and cooperative. HEENT: Pupils equal, round, and reactive to light. Conjunctivae/corneas clear. Neck: Supple, with full range of motion. No jugular venous distention. Trachea midline. Respiratory:  Normal respiratory effort. Clear to auscultation, bilaterally without Rales/Wheezes/Rhonchi. Cardiovascular: Regular rate and rhythm with normal S1/S2 without murmurs, rubs or gallops. Abdomen: Soft, non-tender, non-distended with normal bowel sounds. Musculoskeletal: No clubbing, cyanosis or edema bilaterally. Full range of motion without deformity. Skin: Skin color, texture, turgor normal.  No rashes or lesions. Neurologic:  Neurovascularly intact without any focal sensory/motor deficits. Cranial nerves: II-XII intact, grossly non-focal.  Psychiatric: Alert and oriented, thought content appropriate, normal insight  Capillary Refill: Brisk,3 seconds, normal   Peripheral Pulses: +2 palpable, equal bilaterally       Labs:   Recent Labs     07/05/22  1245 07/07/22  0823 07/08/22  0833   WBC 12.8* 5.7 6.6   HGB 12.3 11.2* 11.9*   HCT 37.6 33.2* 35.8*    118* 121*     Recent Labs     07/05/22  1245 07/07/22  0823 07/08/22  0706    140 137   K 3.5 3.3* 3.3*    106 107   CO2 20* 21 19*   BUN 16 10 9   CREATININE 1.1 0.9 0.7   CALCIUM 9.4 8.7 8.3     Recent Labs     07/05/22  1245   AST 59*   ALT 54*   BILITOT 0.7   ALKPHOS 192*     No results for input(s): INR in the last 72 hours. No results for input(s): Imani Parisian in the last 72 hours.     Urinalysis:      Lab Results   Component Value Date/Time    NITRU Negative 07/05/2022 01:23 PM    WBCUA >100 07/05/2022 01:23 PM    WBCUA >50 08/08/2016 02:00 PM    BACTERIA 3+ 07/05/2022 01:23 PM    RBCUA  07/05/2022 01:23 PM    RBCUA 30-49 08/08/2016 02:00 PM    BLOODU LARGE 07/05/2022 01:23 PM    BLOODU Large 07/05/2022 12:05 PM    SPECGRAV 1.020 07/05/2022 01:23 PM    SPECGRAV 1.015 07/05/2022 12:05 PM    GLUCOSEU Negative 07/05/2022 01:23 PM    GLUCOSEU negative 07/05/2022 12:05 PM       Radiology:  CT ABDOMEN PELVIS WO CONTRAST Additional Contrast? None   Preliminary Result   Mild edema around the right kidney could represent the urinary tract   infection given the patient's history. No hydronephrosis. No renal   calculus. No perinephric fluid collection. Stable rind of soft tissue in the small bowel mesentery since 12/20/2021,   corresponding to history of treated lymphoma. Assessment/Plan:    Active Hospital Problems    Diagnosis     Pyelonephritis [N12]      Priority: Medium     Sepsis. Pyelonephritis. E. coli bacteremia (ESBL)  Patient presented to emergency with flank pain, was found to be septic on presentation, blood cultures positive with E. coli, with ESBL DNA detected. No spike of fever of the last 24 hours  Plan.    -Continue on meropenem  -Repeat blood cultures (negative to date)   -Consult infectious disease ( ?  Oral Levaquin versus home IV antibiotics with ertapenem). -Stop IV fluids  -One-time IV Lasix    Hypomagnesemia  - Mg 1.5  - replace and repeat     Diabetes mellitus II   -Continue glargine 30 units.  -Continue sliding scale  Blood sugar well controlled     Essential (primary) hypertension   - monitor blood pressure  - continue home meds      Atrial Fibrillation   - currently rate controlled  - continue Eliquis and flecainide     DVT Prophylaxis: lovenox  Diet: ADULT DIET; Regular; 5 carb choices (75 gm/meal)  Code Status: Full Code    PT/OT Eval Status: ambulatory .      Dispo - pending clinical improvement      Joanne Kramer MD

## 2022-07-08 NOTE — PROGRESS NOTES
Patient alert and oriented x 4  Vitals stable on room air  Ambulatory  Able to make needs known    Dsypnea on exertion noted    One- time IV lasix given  Antibiotic therapy continued

## 2022-07-08 NOTE — CARE COORDINATION
[facility]    Referral received from  to follow for home care services. [facility] unable to staff timely, will require alternate agency, no preference, CM aware. Per [facility], they are able to accept for Monday evening SOC or Tuesday morning pending on dc day. Please call  closer to [place] to verify.      Uriah Thomas RN, BSN CTN  [facility] [phone]

## 2022-07-08 NOTE — FLOWSHEET NOTE
Message sent to Earline Samuel NP K+ 3.3 on am labs.   No replacement at this time, will recheck in AM.

## 2022-07-08 NOTE — DISCHARGE INSTR - COC
Continuity of Care Form    Patient Name: Addie Alcaraz   :  1955  MRN:  [id number]    Admit date:  2022  Discharge date:  ***    Code Status Order: Full Code   Advance Directives:      Admitting Physician:  Triston Lr DO  PCP: Kat Edwards MD    Discharging Nurse: [facility] Unit/Room#: Z1Z-[id number]/8747-32  Discharging Unit Phone Number: ***    Emergency Contact:   Extended Emergency Contact Information  Primary Emergency Contact: [place]  Address: [address] [place] [place] [address] [address] [facility] [address] [facility] Phone: [phone]  Mobile Phone: [phone]  Relation: Spouse  Secondary Emergency Contact: Cornelio Duran [address] Phone: [phone]  Mobile Phone: [phone]  Relation: Brother/Sister    Past Surgical History:  Past Surgical History:   Procedure Laterality Date    BONE MARROW TRANSPLANT      BREAST CYST ASPIRATION      in past benign bilateral over years. 2 episodes     SECTION      CHOLECYSTECTOMY      COLONOSCOPY  9/10/12    polyp, sharlene, repeat 5 years    COLONOSCOPY  10/09/2017    dr Abena Dubon and check in 5 years.  polyps    ENDOMETRIAL ABLATION  2007    FINE NEEDLE ASPIRATION      GALLBLADDER SURGERY      OTHER SURGICAL HISTORY  2019    Abdominal Mass BX    OTHER SURGICAL HISTORY  2019    CT BIOPSY ABDOMEN RETROPERITONEUM,    OVARY REMOVAL Left 2018    [facility]      TUNNELED VENOUS PORT PLACEMENT Right 2016    Dr. Edwin Posada       Immunization History:   Immunization History   Administered Date(s) Administered    COVID-19, PFIZER PURPLE top, DILUTE for use, (age 15 y+), 30mcg/0.3mL 2021, 2021    Hepatitis A Adult (Vaqta) 2018, 2019    Influenza Vaccine, unspecified formulation 10/20/2016    Influenza Virus Vaccine 2019    Influenza, MDCK Quadv, IM, PF (Flucelvax 2 yrs and older) 2021    Influenza, Nacho Golas, IM, PF (6 mo and older Fluzone, Flulaval, Fluarix, and 3 yrs and older Afluria) 2018, 2020    Pneumococcal Conjugate 13-valent ([id number]) 10/27/2017    Pneumococcal Polysaccharide ([id number]) 10/14/2016       Active Problems:  Patient Active Problem List   Diagnosis Code    Essential hypertension I10    Left atrial flutter by electrocardiogram ([facility] I48.92    Small cell b-cell lymphoma, lymph nodes of multiple sites [facility] C83.08    Type 2 diabetes mellitus without complication ([facility] E64.9    Hyperlipidemia E78.5    Cyst of left ovary N83.202    Endometrial thickening on ultrasound R93.89    Visit for monitoring Rituxan therapy Z51.81, Z79.899    Degenerative disc disease, lumbar M51.36    Trochanteric bursitis of left hip M70.62    Atrial fibrillation with rapid ventricular response ([facility]) I48.91    ZOE (obstructive sleep apnea) G47.33    B-cell lymphoma ([facility]) C85.10    Non-Hodgkin's lymphoma ([facility]) C85.90    NHL (non-Hodgkin's lymphoma) ([facility]) C85.90    PAF (paroxysmal atrial fibrillation) ([facility]) I48.0    Sepsis ([facility] A41.9    Upper respiratory infection J06.9    Pneumonia due to COVID-19 virus U07.1, J12.82    Pneumonia J18.9    Acute respiratory failure with hypoxia ([facility]) J96.01    Pyelonephritis N12       Isolation/Infection:   Isolation            Contact          Patient Infection Status       Infection Onset Added Last Indicated Last Indicated By Review Planned Expiration Resolved Resolved By    ESBL (Extended Spectrum Beta Lactamase) 22 Culture, Urine        Resolved    COVID-19 21 COVID-19   21     COVID-19 (Rule Out) 21 COVID-19 (Ordered)   21 Rule-Out Test Resulted    COVID-19 (Rule Out) 20 Emergent Disease Panel (Ordered)   20 Rule-Out Test Resulted    C-diff Rule Out  19 Clostridium difficile toxin/antigen (Ordered)   19 Rule-Out Test Resulted Nurse Assessment:  Last Vital Signs: /84   Pulse 76   Temp 98.8 °F (37.1 °C) (Oral)   Resp 19   Ht 5' 3\" (1.6 m)   Wt 241 lb 2.9 oz (109.4 kg)   SpO2 94%   BMI 42.72 kg/m²     Last documented pain score (0-10 scale): Pain Level: 0  Last Weight:   Wt Readings from Last 1 Encounters:   07/08/22 241 lb 2.9 oz (109.4 kg)     Mental Status:  {IP PT MENTAL STATUS:20030}    IV Access:  { SAUD IV ACCESS:036278340}    Nursing Mobility/ADLs:  Walking   {CHP DME ONOL:050985617}  Transfer  {CHP DME ANHD:520581898}  Bathing  {CHP DME HRNT:469365826}  Dressing  {CHP DME XETW:874802625}  Toileting  {CHP DME AFHO:296603524}  Feeding  {P DME JPCH:421631160}  Med Admin  {P DME AEXU:310499894}  Med Delivery   { SAUD MED Delivery:853675612}    Wound Care Documentation and Therapy:  Incision 08/05/19 Abdomen Left;Upper (Active)   Number of days: 0918        Elimination:  Continence: Bowel: {YES / BE:18235}  Bladder: {YES / OH:82287}  Urinary Catheter: {Urinary Catheter:457347819}   Colostomy/Ileostomy/Ileal Conduit: {YES / LA:15843}       Date of Last BM: ***    Intake/Output Summary (Last 24 hours) at 7/8/2022 1612  Last data filed at 7/8/2022 0553  Gross per 24 hour   Intake 3079.42 ml   Output 1000 ml   Net 2079.42 ml     I/O last 3 completed shifts: In: 5088.2 [P.O.:1200;  I.V.:3497.2; IV Piggyback:391]  Out: 1000 [Urine:1000]    Safety Concerns:     [address] Safety Concerns:026981171}    Impairments/Disabilities:      [address] Impairments/Disabilities:853173661}    Nutrition Therapy:  Current Nutrition Therapy:   [address] Diet List:177340863}    Routes of Feeding: {P DME Other Feedings:203168244}  Liquids: {Slp liquid thickness:20387}  Daily Fluid Restriction: {CHP DME Yes amt example:193352786}  Last Modified Barium Swallow with Video (Video Swallowing Test): {Done Not Done EWFI:693497438}    Treatments at the Time of Hospital Discharge:   Respiratory Treatments: ***  Oxygen Therapy:  {Therapy; copd oxygen:84061}  Ventilator:    {MH CC Vent URRU:667802221}    Rehab Therapies: {THERAPEUTIC INTERVENTION:0092209587}  Weight Bearing Status/Restrictions: [address] CC Weight Bearin}  Other Medical Equipment (for information only, NOT a DME order):  {EQUIPMENT:909282254}  Other Treatments: ***    Patient's personal belongings (please select all that are sent with patient):  {CHP DME Belongings:167570952}    RN SIGNATURE:  {Esignature:690584431}    CASE MANAGEMENT/SOCIAL WORK SECTION    Inpatient Status Date: ***    Readmission Risk Assessment Score:  Readmission Risk              Risk of Unplanned Readmission:  14           Discharging Facility/ Agency   Name: [address]  Address:  Phone:[phone]  Fax:    Dialysis Facility (if applicable)   Name:  Address:  Dialysis Schedule:  Phone:  Fax:    / signature: Electronically signed by Steven Curran on 7/10/22 at 11:11 AM EDT    PHYSICIAN SECTION    Prognosis: Good    Condition at Discharge: Stable    Rehab Potential (if transferring to Rehab): Good    Recommended Labs or Other Treatments After Discharge:   IV Ertapenem x  1 gm  X Q 24 hr x stop date    CBC with diff, BMP weekly  Fax results to [phone]  Chest port in place  No ID follow up   First dose given in hospital      [address] [facility]  Phone: [phone]   Fax : [phone]         Physician Certification: I certify the above information and transfer of Rox Look  is necessary for the continuing treatment of the diagnosis listed and that she requires Home Care for greater 30 days.      Update Admission H&P: No change in H&P    PHYSICIAN SIGNATURE:  Electronically signed by Sage Hope MD on 7/10/22 at 10:48 AM EDT

## 2022-07-08 NOTE — PLAN OF CARE
Problem: Discharge Planning  Goal: Discharge to home or other facility with appropriate resources  7/7/2022 2327 by Damaris Robertson RN  Outcome: Progressing  7/7/2022 1045 by Magdy Ni RN  Outcome: Progressing     Problem: Pain  Goal: Verbalizes/displays adequate comfort level or baseline comfort level  7/7/2022 2327 by Damaris Robertson RN  Outcome: Progressing  7/7/2022 1045 by Magdy Ni RN  Outcome: Progressing     Problem: Safety - Adult  Goal: Free from fall injury  7/7/2022 2327 by Damaris Robertson RN  Outcome: Progressing  7/7/2022 1045 by Magdy Ni RN  Outcome: Progressing     Problem: ABCDS Injury Assessment  Goal: Absence of physical injury  7/7/2022 2327 by Damaris Robertson RN  Outcome: Progressing  7/7/2022 1045 by Magdy Ni RN  Outcome: Progressing  Flowsheets (Taken 7/7/2022 0132 by Damaris Robertson RN)  Absence of Physical Injury: Implement safety measures based on patient assessment     Problem: Skin/Tissue Integrity - Adult  Goal: Skin integrity remains intact  7/7/2022 1045 by Magdy Ni RN  Outcome: Progressing  Flowsheets (Taken 7/7/2022 0132 by Damaris Robertson RN)  Skin Integrity Remains Intact:   Monitor for areas of redness and/or skin breakdown   Assess vascular access sites hourly     Problem: Genitourinary - Adult  Goal: Absence of urinary retention  7/7/2022 2327 by Damaris Robertson RN  Outcome: Progressing  7/7/2022 1045 by Magdy Ni RN  Outcome: Progressing     Problem: Infection - Adult  Goal: Absence of infection at discharge  7/7/2022 2327 by Damaris Robertson RN  Outcome: Progressing  7/7/2022 1045 by Magdy Ni RN  Outcome: Progressing  Goal: Absence of infection during hospitalization  7/7/2022 2327 by Damaris Robertson RN  Outcome: Progressing  7/7/2022 1045 by Magdy Ni RN  Outcome: Progressing  Goal: Absence of fever/infection during anticipated neutropenic period  7/7/2022 2327 by Damaris Robertson RN  Outcome: Progressing  7/7/2022 1045 by Rodríguez Orourke RN  Outcome: Progressing     Problem: Metabolic/Fluid and Electrolytes - Adult  Goal: Electrolytes maintained within normal limits  7/7/2022 2327 by Diann Patten RN  Outcome: Progressing  7/7/2022 1045 by Rodríguez Orourke RN  Outcome: Progressing  Goal: Hemodynamic stability and optimal renal function maintained  7/7/2022 2327 by Diann Patten RN  Outcome: Progressing  7/7/2022 1045 by Rodríguez Orourke RN  Outcome: Progressing  Goal: Glucose maintained within prescribed range  7/7/2022 2327 by Diann Patten RN  Outcome: Progressing  7/7/2022 1045 by Rodríguez Orourke RN  Outcome: Progressing     Problem: Gastrointestinal - Adult  Goal: Minimal or absence of nausea and vomiting  7/7/2022 2327 by Diann Patten RN  Outcome: Progressing  7/7/2022 1045 by Rodríguez Orourke RN  Outcome: Progressing  Goal: Maintains or returns to baseline bowel function  7/7/2022 2327 by Diann Patten RN  Outcome: Progressing  7/7/2022 1045 by Rodríguez Orourke RN  Outcome: Progressing  Goal: Maintains adequate nutritional intake  7/7/2022 2327 by Diann Patten RN  Outcome: Progressing  7/7/2022 1045 by Rodríguez Orourke RN  Outcome: Progressing

## 2022-07-09 LAB
ANION GAP SERPL CALCULATED.3IONS-SCNC: 14 MMOL/L (ref 3–16)
BASOPHILS ABSOLUTE: 0 K/UL (ref 0–0.2)
BASOPHILS RELATIVE PERCENT: 0.5 %
BUN BLDV-MCNC: 12 MG/DL (ref 7–20)
CALCIUM SERPL-MCNC: 8.7 MG/DL (ref 8.3–10.6)
CHLORIDE BLD-SCNC: 106 MMOL/L (ref 99–110)
CO2: 23 MMOL/L (ref 21–32)
CREAT SERPL-MCNC: 0.9 MG/DL (ref 0.6–1.2)
EOSINOPHILS ABSOLUTE: 0.2 K/UL (ref 0–0.6)
EOSINOPHILS RELATIVE PERCENT: 3.1 %
GFR AFRICAN AMERICAN: >60
GFR NON-AFRICAN AMERICAN: >60
GLUCOSE BLD-MCNC: 147 MG/DL (ref 70–99)
GLUCOSE BLD-MCNC: 149 MG/DL (ref 70–99)
GLUCOSE BLD-MCNC: 173 MG/DL (ref 70–99)
GLUCOSE BLD-MCNC: 194 MG/DL (ref 70–99)
GLUCOSE BLD-MCNC: 200 MG/DL (ref 70–99)
HCT VFR BLD CALC: 32.1 % (ref 36–48)
HEMOGLOBIN: 10.9 G/DL (ref 12–16)
LYMPHOCYTES ABSOLUTE: 0.4 K/UL (ref 1–5.1)
LYMPHOCYTES RELATIVE PERCENT: 8.2 %
MCH RBC QN AUTO: 32.1 PG (ref 26–34)
MCHC RBC AUTO-ENTMCNC: 33.9 G/DL (ref 31–36)
MCV RBC AUTO: 94.6 FL (ref 80–100)
MONOCYTES ABSOLUTE: 0.5 K/UL (ref 0–1.3)
MONOCYTES RELATIVE PERCENT: 9.4 %
NEUTROPHILS ABSOLUTE: 4 K/UL (ref 1.7–7.7)
NEUTROPHILS RELATIVE PERCENT: 78.8 %
PDW BLD-RTO: 16.4 % (ref 12.4–15.4)
PERFORMED ON: ABNORMAL
PLATELET # BLD: 139 K/UL (ref 135–450)
PMV BLD AUTO: 9.4 FL (ref 5–10.5)
POTASSIUM SERPL-SCNC: 3 MMOL/L (ref 3.5–5.1)
PROCALCITONIN: 2.86 NG/ML (ref 0–0.15)
RBC # BLD: 3.39 M/UL (ref 4–5.2)
SODIUM BLD-SCNC: 143 MMOL/L (ref 136–145)
WBC # BLD: 5.1 K/UL (ref 4–11)

## 2022-07-09 PROCEDURE — 36415 COLL VENOUS BLD VENIPUNCTURE: CPT

## 2022-07-09 PROCEDURE — 6360000002 HC RX W HCPCS: Performed by: STUDENT IN AN ORGANIZED HEALTH CARE EDUCATION/TRAINING PROGRAM

## 2022-07-09 PROCEDURE — 6370000000 HC RX 637 (ALT 250 FOR IP): Performed by: STUDENT IN AN ORGANIZED HEALTH CARE EDUCATION/TRAINING PROGRAM

## 2022-07-09 PROCEDURE — 6370000000 HC RX 637 (ALT 250 FOR IP): Performed by: NURSE PRACTITIONER

## 2022-07-09 PROCEDURE — 2580000003 HC RX 258: Performed by: NURSE PRACTITIONER

## 2022-07-09 PROCEDURE — 1200000000 HC SEMI PRIVATE

## 2022-07-09 PROCEDURE — 94640 AIRWAY INHALATION TREATMENT: CPT

## 2022-07-09 PROCEDURE — 99233 SBSQ HOSP IP/OBS HIGH 50: CPT | Performed by: INTERNAL MEDICINE

## 2022-07-09 PROCEDURE — 84145 PROCALCITONIN (PCT): CPT

## 2022-07-09 PROCEDURE — 80048 BASIC METABOLIC PNL TOTAL CA: CPT

## 2022-07-09 PROCEDURE — 2580000003 HC RX 258: Performed by: STUDENT IN AN ORGANIZED HEALTH CARE EDUCATION/TRAINING PROGRAM

## 2022-07-09 PROCEDURE — 94760 N-INVAS EAR/PLS OXIMETRY 1: CPT

## 2022-07-09 PROCEDURE — 85025 COMPLETE CBC W/AUTO DIFF WBC: CPT

## 2022-07-09 RX ADMIN — INSULIN LISPRO 2 UNITS: 100 INJECTION, SOLUTION INTRAVENOUS; SUBCUTANEOUS at 13:09

## 2022-07-09 RX ADMIN — MEROPENEM 1000 MG: 1 INJECTION, POWDER, FOR SOLUTION INTRAVENOUS at 13:11

## 2022-07-09 RX ADMIN — INSULIN LISPRO 2 UNITS: 100 INJECTION, SOLUTION INTRAVENOUS; SUBCUTANEOUS at 08:53

## 2022-07-09 RX ADMIN — FLECAINIDE ACETATE 100 MG: 100 TABLET ORAL at 20:09

## 2022-07-09 RX ADMIN — INSULIN LISPRO 1 UNITS: 100 INJECTION, SOLUTION INTRAVENOUS; SUBCUTANEOUS at 20:15

## 2022-07-09 RX ADMIN — FLECAINIDE ACETATE 100 MG: 100 TABLET ORAL at 08:51

## 2022-07-09 RX ADMIN — CETIRIZINE HYDROCHLORIDE 10 MG: 10 TABLET, FILM COATED ORAL at 08:51

## 2022-07-09 RX ADMIN — APIXABAN 5 MG: 5 TABLET, FILM COATED ORAL at 08:51

## 2022-07-09 RX ADMIN — METOPROLOL SUCCINATE 12.5 MG: 25 TABLET, FILM COATED, EXTENDED RELEASE ORAL at 08:51

## 2022-07-09 RX ADMIN — APIXABAN 5 MG: 5 TABLET, FILM COATED ORAL at 20:09

## 2022-07-09 RX ADMIN — SODIUM CHLORIDE, PRESERVATIVE FREE 10 ML: 5 INJECTION INTRAVENOUS at 20:09

## 2022-07-09 RX ADMIN — FUROSEMIDE 20 MG: 20 TABLET ORAL at 08:52

## 2022-07-09 RX ADMIN — INSULIN LISPRO 2 UNITS: 100 INJECTION, SOLUTION INTRAVENOUS; SUBCUTANEOUS at 17:02

## 2022-07-09 RX ADMIN — INSULIN GLARGINE 30 UNITS: 100 INJECTION, SOLUTION SUBCUTANEOUS at 20:15

## 2022-07-09 RX ADMIN — Medication 2 PUFF: at 09:45

## 2022-07-09 RX ADMIN — MEROPENEM 1000 MG: 1 INJECTION, POWDER, FOR SOLUTION INTRAVENOUS at 05:56

## 2022-07-09 RX ADMIN — Medication 2 PUFF: at 20:04

## 2022-07-09 RX ADMIN — MEROPENEM 1000 MG: 1 INJECTION, POWDER, FOR SOLUTION INTRAVENOUS at 20:14

## 2022-07-09 ASSESSMENT — PAIN SCALES - GENERAL
PAINLEVEL_OUTOF10: 0
PAINLEVEL_OUTOF10: 0

## 2022-07-09 ASSESSMENT — PAIN DESCRIPTION - LOCATION: LOCATION: FLANK

## 2022-07-09 ASSESSMENT — PAIN DESCRIPTION - ORIENTATION: ORIENTATION: RIGHT

## 2022-07-09 NOTE — PROGRESS NOTES
Infectious Disease Follow up Notes  Admit Date: 2022  Hospital Day: 5    Antibiotics :   IV Meropenem     CHIEF COMPLAINT:     Rt pyelonephritis  Sepsis  ESBL E coli bacteremia  UTI    Subjective interval History :  61 y.o. woman with a past medical history of atrial fibrillation, hypertension, diabetes, morbid obesity BMI at 42 . NHL ,  admitted to the hospital secondary to right flank pain nausea vomiting and fever. She also was complaining of some dysuria and possible hematuria acute onset of symptoms. T-max 102.2 since admission. Admission labs indicate lactic acid at 4.8 ALT at 54 AST 59, Pro-Wilfredo elevated 8.2 WBC 12.8 hemoglobin 12.3 blood cultures from admission ESBL E. coli simultaneous urine culture positive for ESBL E. Coli. CT abdomen pelvis positive for edema on the right kidney concerning for right pyelonephritis no obstruction or hydronephrosis noted. Given ongoing severe sepsis we are consulted for antibiotic recommendations.         Interval History : no chills has on going Rt flank pain but going down and Chest port working  [id number] [address]        Past Medical History:    Past Medical History:   Diagnosis Date    Atrial fibrillation with RVR ([address]     Cancer ([address]     Diabetes mellitus type 2, controlled ([address]     Hypertension     Melanoma in situ of back ([address]     dr Tomasz Sorensen Precordial pain 2016    Sleep apnea        Past Surgical History:    Past Surgical History:   Procedure Laterality Date    BONE MARROW TRANSPLANT      BREAST CYST ASPIRATION      in past benign bilateral over years. 2 episodes     SECTION      CHOLECYSTECTOMY      COLONOSCOPY  9/10/12    polyp, su, repeat 5 years    COLONOSCOPY  10/09/2017    dr Diaz Charter and check in 5 years.  polyps    ENDOMETRIAL ABLATION      FINE NEEDLE ASPIRATION      GALLBLADDER SURGERY      OTHER SURGICAL HISTORY  2019    Abdominal Mass BX    OTHER SURGICAL HISTORY  2019    CT BIOPSY ABDOMEN RETROPERITONEUM,    OVARY REMOVAL Left 2018    benign growth, dr Paulino Andino at [facility] [address] [facility] TUNNELED VENOUS PORT PLACEMENT Right 2016    Dr. Criselda Cole       Current Medications:    No outpatient medications have been marked as taking for the 22 encounter [facility] Encounter).        Allergies:  Penicillins    Immunizations :   Immunization History   Administered Date(s) Administered    COVID-19, PFIZER PURPLE top, DILUTE for use, (age 15 y+), 30mcg/0.3mL 2021, 2021    Hepatitis A Adult ([place]) 2018, 2019    Influenza Vaccine, unspecified formulation 10/20/2016    Influenza Virus Vaccine 2019    Influenza, MDCK Quadv, IM, PF (Flucelvax 2 yrs and older) 2021    Influenza, Quadv, IM, PF (6 mo and older Fluzone, Flulaval, Fluarix, and 3 yrs and older Afluria) 2018, 2020    Pneumococcal Conjugate 13-valent ([id number]) 10/27/2017    Pneumococcal Polysaccharide ([id number]) 10/14/2016       Social History:     Social History     Tobacco Use    Smoking status: Former Smoker     Packs/day: 1.00     Years: 0.80     Pack years: 0.80     Types: Cigarettes     Start date: 1975     Quit date: 2016     Years since quittin.5    Smokeless tobacco: Never Used   Vaping Use    Vaping Use: Never used   Substance Use Topics    Alcohol use: Not Currently     Alcohol/week: 0.0 standard drinks    Drug use: Never     Social History     Tobacco Use   Smoking Status Former Smoker    Packs/day: 1.00    Years: 0.80    Pack years: 0.80    Types: Cigarettes    Start date: 1975   Shanel Briana Quit date: 2016    Years since quittin.5   Smokeless Tobacco Never Used      Family History   Problem Relation Age of Onset    High Blood Pressure Mother     Cancer Mother         breast    High Blood Pressure Father     Cancer Father         lung         REVIEW OF SYSTEMS:     Constitutional:  negative for fevers, chills, night sweats  Eyes:  negative for blurred vision, eye discharge, visual disturbance   HEENT:  negative for hearing loss, ear drainage,nasal congestion  Respiratory:  negative for cough, shortness of breath or hemoptysis   Cardiovascular:  negative for chest pain, palpitations, syncope  Gastrointestinal:  negative for nausea, vomiting, diarrhea, constipation, abdominal pain++   Genitourinary:  negative for frequency, dysuria, urinary incontinence, hematuria  Hematologic/Lymphatic:  negative for easy bruising, bleeding and lymphadenopathy  Allergic/Immunologic:  negative for recurrent infections, angioedema, anaphylaxis   Endocrine:  negative for weight changes, polyuria, polydipsia and polyphagia  Musculoskeletal:  negative for joint  pain, swelling, decreased range of motion  Integumentary: No rashes, skin lesions  Neurological:  negative for headaches, slurred speech, unilateral weakness  Psychiatric: negative for hallucinations,confusion,agitation.                 PHYSICAL EXAM:      Vitals:    /63   Pulse 76   Temp 98.2 °F (36.8 °C) (Oral)   Resp 18   Ht 5' 3\" (1.6 m)   Wt 240 lb 8.4 oz (109.1 kg)   SpO2 94%   BMI 42.61 kg/m²     General Appearance: alert,in some acute distress, no pallor, no icterus facial flushing +   Skin: warm and dry, no rash or erythema  Head: normocephalic and atraumatic  Eyes: pupils equal, round, and reactive to light, conjunctivae normal  ENT: tympanic membrane, external ear and ear canal normal bilaterally, nose without deformity, nasal mucosa and turbinates normal without polyps  Neck: supple and non-tender without mass, no thyromegaly  no cervical lymphadenopathy  Pulmonary/Chest: clear to auscultation bilaterally- no wheezes, rales or rhonchi, normal air movement, no respiratory distress  Cardiovascular: normal rate, regular rhythm, normal S1 and S2, no murmurs, rubs, clicks, or gallops, no carotid LARGE 07/05/2022 01:23 PM    BLOODU Large 07/05/2022 12:05 PM    PHUR 5.0 07/05/2022 01:23 PM    PHUR 5.0 07/05/2022 12:05 PM    PROTEINU 30 07/05/2022 01:23 PM    PROTEINU 30 07/05/2022 12:05 PM    UROBILINOGEN 0.2 07/05/2022 01:23 PM    NITRU Negative 07/05/2022 01:23 PM    LEUKOCYTESUR LARGE 07/05/2022 01:23 PM    LEUKOCYTESUR Small 07/05/2022 12:05 PM    LABMICR YES 07/05/2022 01:23 PM    URINETYPE Cleancatch 07/05/2022 01:23 PM      Urine Microscopic:   Lab Results   Component Value Date/Time    BACTERIA 3+ 07/05/2022 01:23 PM    COMU see below 04/21/2022 10:46 AM    HYALCAST 0-2 07/05/2022 01:23 PM    WBCUA >100 07/05/2022 01:23 PM    WBCUA >50 08/08/2016 02:00 PM    RBCUA  07/05/2022 01:23 PM    RBCUA 30-49 08/08/2016 02:00 PM    EPIU 2-5 07/05/2022 01:23 PM     Urine Reflex to Culture:   Lab Results   Component Value Date/Time    URRFLXCULT Yes 07/05/2022 01:23 PM     Lactic acid  4.8         Procal  8.20     WBC  12.8         LFT ELEVATION     MICRO: cultures reviewed and updated by me   Blood Culture:   Procedure Component Value Units Date/Time   Culture, Urine [[id number]] (Abnormal)  Collected: 07/05/22 1323   Order Status: Completed Specimen: Urine, clean catch Updated: 07/08/22 2813    Organism Escherichia coli ESBL Abnormal     Urine Culture, Routine -- Abnormal     >100,000 CFU/ml   CONTACT PRECAUTIONS INDICATED   Carbapenem antibiotics are the best option for infections caused   by ESBL producing organisms.  Other antibiotic classes are   likely to result in treatment failure, even for organisms   demonstrating in vitro susceptibility.    Abnormal    Narrative:     ORDER#: [id number]                          ORDERED BY: Mary Grace Thomas   SOURCE: Urine Clean Catch                  COLLECTED:  07/05/22 13:23   ANTIBIOTICS AT [facility]OCHIT[facility]. :                      RECEIVED :  07/05/22 22:24   CALL  Russell  [id number] tel. [phone],   Previous panic on this admission - call not needed per SOP, 07/08/2022 06:52, by [facility]   Culture, Blood 1 [[id number]] (Abnormal)  Collected: 07/05/22 1245   Order Status: Completed Specimen: Blood Updated: 07/08/22 0646    Organism Escherichia coli ESBL DNA Detected Abnormal     Blood Culture, Routine -- Abnormal     See additional report for complete BCID panel. CONTACT PRECAUTIONS INDICATED   CTX-M gene Detected.    Abnormal     Organism Escherichia coli ESBL Abnormal     Blood Culture, Routine --    POSITIVE for   Isolated one of two sets    Narrative:     ORDER#: [id number]                          ORDERED BY: Selam Soni   SOURCE: Blood Antecubital-Rig              COLLECTED:  07/05/22 12:45   ANTIBIOTICS AT [facility]. :                      RECEIVED :  07/05/22 21:28   CALL  Russell  [id number] tel. [phone], [phone]   Microbiology results called to and read back by [address] [place] [address], 07/06/2022   10:29, by [facility]   Microbiology results called to and read back by BREEZY Head, 07/06/2022   10:28, by [facility]   If child <=2 yrs old please draw pediatric bottle. ~Blood Culture 1   Culture, Blood 1 [[id number]] Collected: 07/06/22 1126   Order Status: Completed Specimen: Blood Updated: 07/07/22 1315    Blood Culture, Routine No Growth to date.  Any change in status will be called. Narrative:     ORDER#: [id number]                          ORDERED BY: Hui Ansari   SOURCE: Blood                              COLLECTED:  07/06/22 11:26   ANTIBIOTICS AT [facility]. :                      RECEIVED :  07/06/22 11:34   If child <=2 yrs old please draw pediatric bottle. ~Blood Culture 1   Culture, Blood 2 [[id number]] Collected: 07/05/22 1339   Order Status: Completed Specimen: Blood Updated: 07/07/22 0115    Culture, Blood 2 No Growth to date.  Any change in status will be called. Narrative:     ORDER#: [id number]                          ORDERED BY: Selam Soni   SOURCE: Blood left wrist                   COLLECTED:  07/05/22 13:39   ANTIBIOTICS AT [facility]. :                      RECEIVED :  07/05/22 21:29   If child <=2 yrs old please draw pediatric bottle. ~Blood Culture #2   Culture, Blood, PCR ID Panel [[id number]] Collected: 07/05/22 1245   Order Status: Completed Updated: 07/06/22 1032    Report SEE IMAGE   Narrative:     CALL  Russell  [id number] tel. [phone], [phone]   Microbiology results called to and read back by [address] [place] [address], 07/06/2022   10:29, by [facility]   Microbiology results called to and read back by BREEZY Beckwith, 07/06/2022   10:28, by [facility]     Antibiotic Interpretation Microscan  Method Status    ampicillin Resistant >=32 mcg/mL BACTERIAL SUSCEPTIBILITY PANEL BY CLAUDINE     ampicillin-sulbactam Sensitive <=2 mcg/mL BACTERIAL SUSCEPTIBILITY PANEL BY CLAUDINE     ceFAZolin Resistant >=64 mcg/mL BACTERIAL SUSCEPTIBILITY PANEL BY CLAUDINE     cefepime Resistant   BACTERIAL SUSCEPTIBILITY PANEL BY CLAUDINE     cefTRIAXone Resistant   BACTERIAL SUSCEPTIBILITY PANEL BY CLAUDINE     ciprofloxacin Sensitive <=0.25 mcg/mL BACTERIAL SUSCEPTIBILITY PANEL BY CLAUDINE     ertapenem Sensitive <=0.12 mcg/mL BACTERIAL SUSCEPTIBILITY PANEL BY CLAUDINE     gentamicin Sensitive <=1 mcg/mL BACTERIAL SUSCEPTIBILITY PANEL BY CLAUDINE     levofloxacin Sensitive <=0.12 mcg/mL BACTERIAL SUSCEPTIBILITY PANEL BY CLAUDINE     trimethoprim-sulfamethoxazole Resistant >=320 mcg/mL BACTERIAL SUSCEPTIBILITY PANEL BY CLAUDINE          Narrative  Performed by: [facility]  ORDER#: [id number]                          ORDERED BY: Bay Castillo   SOURCE: Blood Antecubital-Rig              COLLECTED:  07/05/22 12:45          Lab Results   Component Value Date/Time    [facility]  07/06/2022 11:26 AM     No Growth to date. Any change in status will be called. Ewa Willett  07/05/2022 01:39 PM     No Growth to date. Any change in status will be called.        Respiratory Culture:  Lab Results   Component Value Date/Time    CULTRESP Normal respiratory niharika 02/24/2021 10:40 PM    LABGRAM  02/24/2021 10:40 PM     2+ Epithelial Cells  No WBC's seen  4+ Gram positive cocci  3+ Gram negative rods  3+ Gram variable rods       AFB:No results found for: AFBSMEAR  Viral Culture:  Lab Results   Component Value Date/Time    COVID19 Negative 11/05/2021 02:35 PM    COVID19 Detected 02/25/2021 12:30 PM    COVID19 NOT DETECTED 10/22/2020 09:13 AM    COVID19 Not Detected 04/17/2020 12:34 PM     Urine Culture: No results for input(s): LABURIN in the last 72 hours. IMAGING:    CT ABDOMEN PELVIS WO CONTRAST Additional Contrast? None   Preliminary Result   Mild edema around the right kidney could represent the urinary tract   infection given the patient's history. No hydronephrosis. No renal   calculus. No perinephric fluid collection. Stable rind of soft tissue in the small bowel mesentery since 12/20/2021,   corresponding to history of treated lymphoma.                All the pertinent images and reports for the current Hospitalization were reviewed by me     Scheduled Meds:   cetirizine  10 mg Oral Daily    meropenem  1,000 mg IntraVENous Q8H    apixaban  5 mg Oral BID    flecainide  100 mg Oral 2 times per day    furosemide  20 mg Oral Daily    metoprolol succinate  12.5 mg Oral Daily    insulin lispro  0-12 Units SubCUTAneous TID WC    insulin lispro  0-6 Units SubCUTAneous Nightly    sodium chloride flush  5-40 mL IntraVENous 2 times per day    insulin glargine  30 Units SubCUTAneous Nightly       Continuous Infusions:   dextrose      sodium chloride         PRN Meds:  glucose, dextrose bolus **OR** dextrose bolus, glucagon (rDNA), dextrose, sodium chloride flush, sodium chloride, acetaminophen **OR** acetaminophen, albuterol sulfate HFA      Assessment:     Patient Active Problem List   Diagnosis    Essential hypertension    Left atrial flutter by electrocardiogram (HCC)    Small cell b-cell lymphoma, lymph nodes of multiple sites ([facility]    Type 2 diabetes mellitus without complication (HCC)    Hyperlipidemia    Cyst of left ovary    Endometrial thickening on ultrasound  Visit for monitoring Rituxan therapy    Degenerative disc disease, lumbar    Trochanteric bursitis of left hip    Atrial fibrillation with rapid ventricular response (HCC)    ZOE (obstructive sleep apnea)    B-cell lymphoma (HCC)    Non-Hodgkin's lymphoma (HCC)    NHL (non-Hodgkin's lymphoma) (HCC)    PAF (paroxysmal atrial fibrillation) (HCC)    Sepsis (HCC)    Upper respiratory infection    Pneumonia due to COVID-19 virus    Pneumonia    Acute respiratory failure with hypoxia (HCC)    Pyelonephritis     Severe sepsis  High fevers  WBC elevation   Lactic acidosis  Gram  -ve sepsis  ESBL E coli bacteremia  ESBL E coli UTI  Complicated UTI  Rt pyelonephritis  Obesity BMI 42  NHL   A fib  DM+  Procal elevation  Chest port in place       She remains very ill from on going sepsis, high fevers and bacteremia from Complicated  tract infection she is immune suppressed from NHL, DM and obesity complicating recovery       Chest port now working ok and will update [facility] for d/c planning      Labs, Microbiology, Radiology and all the pertinent results from current hospitalization and  care every where were reviewed  by me as a part of the evaluation   Plan:   1. Cont IV Meropenem x  1 gm q 8 HRS as in patient   2. She is on chronic anticoagulation   3. On Flecanide for A fib  4. Control DM - check Hba1c  5. Trend Lactic acid and Procal   6. Repeat Blood cx  7/6 NGTD  7. Home going will choose IV Ertapenem x 1 gm q 24 HR X stop date 7/20  8. She has chest port and was functional per patient hopefully we can access and use this for OPAT          Discussed with patient/Family and Nursing   Risk of Complications/Morbidity: High      · Illness(es)/ Infection present that pose threat to bodily function. · There is potential for severe exacerbation of infection/side effects of treatment.   · Therapy requires intensive monitoring for antimicrobial agent toxicity    Discussed with patient/Family and Nursing staff

## 2022-07-09 NOTE — PROGRESS NOTES
This RN (3W charge) called to 4N by patient's primary RN Cyndi Valadez for reports of issues w/ patient's R chest port. RN states that port cannot be flushed. This RN entered room and assessed port. Port flushes w/ moderate difficulty, patient reported pain w/ flushing after 5 mL of normal saline was instilled. No blood return noted. Port de-accessed and re-accessed using sterile technique. Port w/ brisk blood return, 30 mL of normal saline flushed through w/o complication. Sterile dressing applied dated and timed. Alcohol cap applied. While this RN was accessing patient's port patient reported that she does not feel comfortable discharging home w/ home care to manage IV antibiotics as she is unable to administer them to herself and her  is incapable of adminsitering them r/t physical limitations. Patient is requesting to come to outpatient infusion daily to receive her antibiotics. This information conveyed to [facility] [phone] face to face. Frankey Becket states she will look into patient's eligibility for outpatient infusion. Update provided to patient's primary RN Sony Boucher.

## 2022-07-09 NOTE — PLAN OF CARE
Problem: Discharge Planning  Goal: Discharge to home or other facility with appropriate resources  7/9/2022 1036 by Linda Green RN  Outcome: Progressing     Problem: Pain  Goal: Verbalizes/displays adequate comfort level or baseline comfort level  7/9/2022 1036 by Linda Green RN  Outcome: Progressing     Problem: Safety - Adult  Goal: Free from fall injury  7/9/2022 1036 by Linda Green RN  Outcome: Progressing     Problem: ABCDS Injury Assessment  Goal: Absence of physical injury  7/9/2022 1036 by Linda Green RN  Outcome: Progressing     Problem: Skin/Tissue Integrity - Adult  Goal: Skin integrity remains intact  7/9/2022 1036 by Linda Green RN  Outcome: Progressing     Problem: Genitourinary - Adult  Goal: Absence of urinary retention  7/9/2022 1036 by Linda Green RN  Outcome: Progressing     Problem: Infection - Adult  Goal: Absence of infection at discharge  7/9/2022 1036 by Linda Green RN  Outcome: Progressing     Problem: Infection - Adult  Goal: Absence of infection during hospitalization  7/9/2022 1036 by Linda Green RN  Outcome: Progressing     Problem: Infection - Adult  Goal: Absence of fever/infection during anticipated neutropenic period  7/9/2022 1036 by Linda Green RN  Outcome: Progressing     Problem: Metabolic/Fluid and Electrolytes - Adult  Goal: Electrolytes maintained within normal limits  7/9/2022 1036 by Di Foley RN  Outcome: Progressing     Problem: Metabolic/Fluid and Electrolytes - Adult  Goal: Hemodynamic stability and optimal renal function maintained  7/9/2022 1036 by Linda Green RN  Outcome: Progressing     Problem: Metabolic/Fluid and Electrolytes - Adult  Goal: Glucose maintained within prescribed range  7/9/2022 1036 by Linda Green RN  Outcome: Progressing     Problem: Gastrointestinal - Adult  Goal: Minimal or absence of nausea and vomiting  7/9/2022 1036 by Luigi Foley RN  Outcome: Progressing     Problem: Gastrointestinal - Adult  Goal: Maintains or returns to baseline bowel function  7/9/2022 1036 by Jaky Minaya RN  Outcome: Progressing     Problem: Gastrointestinal - Adult  Goal: Maintains adequate nutritional intake  7/9/2022 1036 by Jaky Minaya RN  Outcome: Progressing

## 2022-07-09 NOTE — PROGRESS NOTES
Patient's R chest port was not giving a blood return this morning. MD was notified. MD requested that a chemo certified nurse attempt to get port working properly. Carey Haile RN from 3W came up and attempted to flush and get blood return with no success. She then re accessed the port and it's now flushing and giving blood return. MD made aware.

## 2022-07-09 NOTE — PROGRESS NOTES
Hospitalist Progress Note      PCP: Maureen Funez MD    Date of Admission: 7/5/2022    Chief Complaint: Right flank pain. Hospital Course:    61 y.o. female with PMHx of A-fib, DM2, HTN, history of non-Hodgkin's lymphoma( post bone marrow transplant 2 years ago ) , presented to [facility] in transfer from [facility] ED for management of sepsis from pyelonephritis. Pt presented to [facility] ED with right flank pain. She reports nausea, vomiting and body aches. + fever and chills. No dysuria and hematuria. .  Admitted with pyelonephritis, was found to be bacteremic    Subjective:   Last spike of fever was 7/7 at 4 AM  No complaints      Medications:  Reviewed    Infusion Medications    dextrose      sodium chloride       Scheduled Medications    cetirizine  10 mg Oral Daily    meropenem  1,000 mg IntraVENous Q8H    apixaban  5 mg Oral BID    flecainide  100 mg Oral 2 times per day    furosemide  20 mg Oral Daily    metoprolol succinate  12.5 mg Oral Daily    insulin lispro  0-12 Units SubCUTAneous TID WC    insulin lispro  0-6 Units SubCUTAneous Nightly    sodium chloride flush  5-40 mL IntraVENous 2 times per day    insulin glargine  30 Units SubCUTAneous Nightly     PRN Meds: glucose, dextrose bolus **OR** dextrose bolus, glucagon (rDNA), dextrose, sodium chloride flush, sodium chloride, acetaminophen **OR** acetaminophen, albuterol sulfate HFA      Intake/Output Summary (Last 24 hours) at 7/9/2022 1315  Last data filed at 7/9/2022 1010  Gross per 24 hour   Intake 1352.26 ml   Output 2450 ml   Net -1097.74 ml       Physical Exam Performed:    /63   Pulse 76   Temp 98.2 °F (36.8 °C) (Oral)   Resp 18   Ht 5' 3\" (1.6 m)   Wt 240 lb 8.4 oz (109.1 kg)   SpO2 94%   BMI 42.61 kg/m²     General appearance: No apparent distress, appears stated age and cooperative. HEENT: Pupils equal, round, and reactive to light. Conjunctivae/corneas clear. Neck: Supple, with full range of motion.  No jugular venous distention. Trachea midline. Respiratory:  Normal respiratory effort. Clear to auscultation, bilaterally without Rales/Wheezes/Rhonchi. Cardiovascular: Regular rate and rhythm with normal S1/S2 without murmurs, rubs or gallops. Abdomen: Soft, non-tender, non-distended with normal bowel sounds. Musculoskeletal: No clubbing, cyanosis or edema bilaterally. Full range of motion without deformity. Skin: Skin color, texture, turgor normal.  No rashes or lesions. Neurologic:  Neurovascularly intact without any focal sensory/motor deficits. Cranial nerves: II-XII intact, grossly non-focal.  Psychiatric: Alert and oriented, thought content appropriate, normal insight  Capillary Refill: Brisk,3 seconds, normal   Peripheral Pulses: +2 palpable, equal bilaterally       Labs:   Recent Labs     07/07/22 0823 07/08/22  0833 07/09/22  0731   WBC 5.7 6.6 5.1   HGB 11.2* 11.9* 10.9*   HCT 33.2* 35.8* 32.1*   * 121* 139     Recent Labs     07/07/22  0823 07/08/22  0706 07/09/22  0731    137 143   K 3.3* 3.3* 3.0*    107 106   CO2 21 19* 23   BUN 10 9 12   CREATININE 0.9 0.7 0.9   CALCIUM 8.7 8.3 8.7     No results for input(s): AST, ALT, BILIDIR, BILITOT, ALKPHOS in the last 72 hours. No results for input(s): INR in the last 72 hours. No results for input(s): Zaida Height in the last 72 hours.     Urinalysis:      Lab Results   Component Value Date/Time    NITRU Negative 07/05/2022 01:23 PM    WBCUA >100 07/05/2022 01:23 PM    WBCUA >50 08/08/2016 02:00 PM    BACTERIA 3+ 07/05/2022 01:23 PM    RBCUA  07/05/2022 01:23 PM    RBCUA 30-49 08/08/2016 02:00 PM    BLOODU LARGE 07/05/2022 01:23 PM    BLOODU Large 07/05/2022 12:05 PM    SPECGRAV 1.020 07/05/2022 01:23 PM    SPECGRAV 1.015 07/05/2022 12:05 PM    GLUCOSEU Negative 07/05/2022 01:23 PM    GLUCOSEU negative 07/05/2022 12:05 PM       Radiology:  CT ABDOMEN PELVIS WO CONTRAST Additional Contrast? None   Preliminary Result   Mild edema around the right kidney could represent the urinary tract   infection given the patient's history. No hydronephrosis. No renal   calculus. No perinephric fluid collection. Stable rind of soft tissue in the small bowel mesentery since 12/20/2021,   corresponding to history of treated lymphoma. Assessment/Plan:    Active Hospital Problems    Diagnosis     Pyelonephritis [N12]      Priority: Medium     Sepsis. Pyelonephritis. E. coli bacteremia (ESBL)  Patient presented to emergency with flank pain, was found to be septic on presentation, blood cultures positive with E. coli, with ESBL DNA detected. No spike of fever of the last 24 hours  Plan.    -Continue on meropenem  -Repeat blood cultures (negative to date)   -Patient will be discharged on ertapenem through port, was accessed by oncology nurse and is now functioning.  -Patient prefers going to infusion center once daily versus home care    Hypomagnesemia  - Mg 1.5  - replace and repeat     Diabetes mellitus II   -Continue glargine 30 units.  -Continue sliding scale  Blood sugar well controlled     Essential (primary) hypertension   - monitor blood pressure  - continue home meds      Atrial Fibrillation   - currently rate controlled  - continue Eliquis and flecainide     DVT Prophylaxis: lovenox  Diet: ADULT DIET; Regular; 5 carb choices (75 gm/meal)  Code Status: Full Code    PT/OT Eval Status: ambulatory .      Dispo -discharge tomorrow with IV antibiotics at infusion center    Niles Cole MD

## 2022-07-09 NOTE — CARE COORDINATION
Spoke with patient regarding IV antibiotics at discharge. Patient confirmed that she prefers to come to [address] [place] [address] once/day for treatment. She does not want home care. Patient stated she would be discharging on Sunday, 7/10/22. Explained that a referral would be made to outpatient infusion--the infusion center would need to accept patient prior to her discharge. Spoke with Ilya Jeffrey in the infusion center regarding an IV antibiotic referral for a Monday start of care. She will reach out to the patient regarding treatment. Sent message to Dr. Conner Ingram regarding patient plan. Updated attending Rn. Patient is aware Ilya Jeffrey will call her regarding treatment time.     Electronically signed by EDGAR Munoz, JIMMY, Case Management on 7/9/2022 at 2:29 PM  [employer] [phone]

## 2022-07-10 VITALS
SYSTOLIC BLOOD PRESSURE: 133 MMHG | OXYGEN SATURATION: 91 % | RESPIRATION RATE: 18 BRPM | WEIGHT: 238.1 LBS | TEMPERATURE: 97.9 F | HEART RATE: 83 BPM | BODY MASS INDEX: 42.19 KG/M2 | HEIGHT: 63 IN | DIASTOLIC BLOOD PRESSURE: 93 MMHG

## 2022-07-10 LAB
ANION GAP SERPL CALCULATED.3IONS-SCNC: 12 MMOL/L (ref 3–16)
BASOPHILS ABSOLUTE: 0 K/UL (ref 0–0.2)
BASOPHILS RELATIVE PERCENT: 0.3 %
BLOOD CULTURE, ROUTINE: NORMAL
BUN BLDV-MCNC: 14 MG/DL (ref 7–20)
CALCIUM SERPL-MCNC: 8.5 MG/DL (ref 8.3–10.6)
CHLORIDE BLD-SCNC: 103 MMOL/L (ref 99–110)
CO2: 26 MMOL/L (ref 21–32)
CREAT SERPL-MCNC: 0.8 MG/DL (ref 0.6–1.2)
CULTURE, BLOOD 2: NORMAL
EOSINOPHILS ABSOLUTE: 0.2 K/UL (ref 0–0.6)
EOSINOPHILS RELATIVE PERCENT: 3.2 %
GFR AFRICAN AMERICAN: >60
GFR NON-AFRICAN AMERICAN: >60
GLUCOSE BLD-MCNC: 141 MG/DL (ref 70–99)
GLUCOSE BLD-MCNC: 160 MG/DL (ref 70–99)
GLUCOSE BLD-MCNC: 231 MG/DL (ref 70–99)
HCT VFR BLD CALC: 30.7 % (ref 36–48)
HEMOGLOBIN: 10.6 G/DL (ref 12–16)
LYMPHOCYTES ABSOLUTE: 0.4 K/UL (ref 1–5.1)
LYMPHOCYTES RELATIVE PERCENT: 7.8 %
MCH RBC QN AUTO: 32.3 PG (ref 26–34)
MCHC RBC AUTO-ENTMCNC: 34.6 G/DL (ref 31–36)
MCV RBC AUTO: 93.3 FL (ref 80–100)
MONOCYTES ABSOLUTE: 0.5 K/UL (ref 0–1.3)
MONOCYTES RELATIVE PERCENT: 9.1 %
NEUTROPHILS ABSOLUTE: 4.1 K/UL (ref 1.7–7.7)
NEUTROPHILS RELATIVE PERCENT: 79.6 %
PDW BLD-RTO: 16.6 % (ref 12.4–15.4)
PERFORMED ON: ABNORMAL
PERFORMED ON: ABNORMAL
PLATELET # BLD: 144 K/UL (ref 135–450)
PMV BLD AUTO: 9.4 FL (ref 5–10.5)
POTASSIUM SERPL-SCNC: 2.7 MMOL/L (ref 3.5–5.1)
RBC # BLD: 3.29 M/UL (ref 4–5.2)
SODIUM BLD-SCNC: 141 MMOL/L (ref 136–145)
WBC # BLD: 5.2 K/UL (ref 4–11)

## 2022-07-10 PROCEDURE — 6370000000 HC RX 637 (ALT 250 FOR IP): Performed by: STUDENT IN AN ORGANIZED HEALTH CARE EDUCATION/TRAINING PROGRAM

## 2022-07-10 PROCEDURE — 2580000003 HC RX 258: Performed by: NURSE PRACTITIONER

## 2022-07-10 PROCEDURE — 94640 AIRWAY INHALATION TREATMENT: CPT

## 2022-07-10 PROCEDURE — 6360000002 HC RX W HCPCS: Performed by: INTERNAL MEDICINE

## 2022-07-10 PROCEDURE — 2580000003 HC RX 258: Performed by: STUDENT IN AN ORGANIZED HEALTH CARE EDUCATION/TRAINING PROGRAM

## 2022-07-10 PROCEDURE — 6370000000 HC RX 637 (ALT 250 FOR IP): Performed by: NURSE PRACTITIONER

## 2022-07-10 PROCEDURE — 80048 BASIC METABOLIC PNL TOTAL CA: CPT

## 2022-07-10 PROCEDURE — 85025 COMPLETE CBC W/AUTO DIFF WBC: CPT

## 2022-07-10 PROCEDURE — 94761 N-INVAS EAR/PLS OXIMETRY MLT: CPT

## 2022-07-10 PROCEDURE — 2580000003 HC RX 258: Performed by: INTERNAL MEDICINE

## 2022-07-10 PROCEDURE — 6360000002 HC RX W HCPCS: Performed by: STUDENT IN AN ORGANIZED HEALTH CARE EDUCATION/TRAINING PROGRAM

## 2022-07-10 PROCEDURE — 36415 COLL VENOUS BLD VENIPUNCTURE: CPT

## 2022-07-10 RX ORDER — SODIUM CHLORIDE 9 MG/ML
INJECTION, SOLUTION INTRAVENOUS CONTINUOUS
Status: CANCELLED | OUTPATIENT
Start: 2022-07-11

## 2022-07-10 RX ORDER — SODIUM CHLORIDE 0.9 % (FLUSH) 0.9 %
5-40 SYRINGE (ML) INJECTION PRN
Status: CANCELLED | OUTPATIENT
Start: 2022-07-11

## 2022-07-10 RX ORDER — ACETAMINOPHEN 325 MG/1
650 TABLET ORAL
Status: CANCELLED | OUTPATIENT
Start: 2022-07-11

## 2022-07-10 RX ORDER — POTASSIUM CHLORIDE 750 MG/1
40 TABLET, FILM COATED, EXTENDED RELEASE ORAL 3 TIMES DAILY
Status: DISCONTINUED | OUTPATIENT
Start: 2022-07-10 | End: 2022-07-10 | Stop reason: HOSPADM

## 2022-07-10 RX ORDER — DIPHENHYDRAMINE HYDROCHLORIDE 50 MG/ML
50 INJECTION INTRAMUSCULAR; INTRAVENOUS
Status: CANCELLED | OUTPATIENT
Start: 2022-07-11

## 2022-07-10 RX ORDER — EPINEPHRINE 1 MG/ML
0.3 INJECTION, SOLUTION, CONCENTRATE INTRAVENOUS PRN
Status: CANCELLED | OUTPATIENT
Start: 2022-07-11

## 2022-07-10 RX ORDER — SODIUM CHLORIDE 9 MG/ML
5-250 INJECTION, SOLUTION INTRAVENOUS PRN
Status: CANCELLED | OUTPATIENT
Start: 2022-07-11

## 2022-07-10 RX ORDER — POTASSIUM CHLORIDE 7.45 MG/ML
10 INJECTION INTRAVENOUS
Status: COMPLETED | OUTPATIENT
Start: 2022-07-10 | End: 2022-07-10

## 2022-07-10 RX ORDER — ONDANSETRON 2 MG/ML
8 INJECTION INTRAMUSCULAR; INTRAVENOUS
Status: CANCELLED | OUTPATIENT
Start: 2022-07-11

## 2022-07-10 RX ORDER — ALBUTEROL SULFATE 90 UG/1
4 AEROSOL, METERED RESPIRATORY (INHALATION) PRN
Status: CANCELLED | OUTPATIENT
Start: 2022-07-11

## 2022-07-10 RX ORDER — HEPARIN SODIUM (PORCINE) LOCK FLUSH IV SOLN 100 UNIT/ML 100 UNIT/ML
500 SOLUTION INTRAVENOUS PRN
Status: CANCELLED | OUTPATIENT
Start: 2022-07-11

## 2022-07-10 RX ADMIN — POTASSIUM CHLORIDE 40 MEQ: 750 TABLET, FILM COATED, EXTENDED RELEASE ORAL at 09:34

## 2022-07-10 RX ADMIN — FLECAINIDE ACETATE 100 MG: 100 TABLET ORAL at 09:34

## 2022-07-10 RX ADMIN — METOPROLOL SUCCINATE 12.5 MG: 25 TABLET, FILM COATED, EXTENDED RELEASE ORAL at 09:34

## 2022-07-10 RX ADMIN — Medication 2 PUFF: at 08:06

## 2022-07-10 RX ADMIN — APIXABAN 5 MG: 5 TABLET, FILM COATED ORAL at 09:34

## 2022-07-10 RX ADMIN — POTASSIUM CHLORIDE 40 MEQ: 750 TABLET, FILM COATED, EXTENDED RELEASE ORAL at 12:11

## 2022-07-10 RX ADMIN — MEROPENEM 1000 MG: 1 INJECTION, POWDER, FOR SOLUTION INTRAVENOUS at 05:20

## 2022-07-10 RX ADMIN — FUROSEMIDE 20 MG: 20 TABLET ORAL at 09:34

## 2022-07-10 RX ADMIN — ERTAPENEM SODIUM 1000 MG: 1 INJECTION, POWDER, LYOPHILIZED, FOR SOLUTION INTRAMUSCULAR; INTRAVENOUS at 14:02

## 2022-07-10 RX ADMIN — Medication 10 MEQ: at 10:39

## 2022-07-10 RX ADMIN — Medication 10 MEQ: at 09:36

## 2022-07-10 RX ADMIN — CETIRIZINE HYDROCHLORIDE 10 MG: 10 TABLET, FILM COATED ORAL at 09:34

## 2022-07-10 RX ADMIN — INSULIN LISPRO 4 UNITS: 100 INJECTION, SOLUTION INTRAVENOUS; SUBCUTANEOUS at 12:06

## 2022-07-10 RX ADMIN — INSULIN LISPRO 2 UNITS: 100 INJECTION, SOLUTION INTRAVENOUS; SUBCUTANEOUS at 09:31

## 2022-07-10 RX ADMIN — Medication 10 MEQ: at 12:10

## 2022-07-10 RX ADMIN — SODIUM CHLORIDE, PRESERVATIVE FREE 10 ML: 5 INJECTION INTRAVENOUS at 09:38

## 2022-07-10 NOTE — PLAN OF CARE
Problem: Discharge Planning  Goal: Discharge to home or other facility with appropriate resources  Outcome: Progressing     Problem: Pain  Goal: Verbalizes/displays adequate comfort level or baseline comfort level  7/10/2022 1052 by Juan Cook RN  Outcome: Progressing  7/9/2022 2234 by Katie Washburn RN  Outcome: Progressing     Problem: Safety - Adult  Goal: Free from fall injury  7/10/2022 1052 by Juan Cook RN  Outcome: Progressing  7/9/2022 2234 by Katie Washburn RN  Outcome: Progressing     Problem: ABCDS Injury Assessment  Goal: Absence of physical injury  7/10/2022 1052 by Juan Cook RN  Outcome: Progressing  7/9/2022 2234 by Katie Washburn RN  Outcome: Progressing     Problem: Skin/Tissue Integrity - Adult  Goal: Skin integrity remains intact  Outcome: Progressing     Problem: Genitourinary - Adult  Goal: Absence of urinary retention  7/10/2022 1052 by Juan Cook RN  Outcome: Progressing  7/9/2022 2234 by Katie Washburn RN  Outcome: Progressing

## 2022-07-10 NOTE — DISCHARGE SUMMARY
Hospital Medicine Discharge Summary    Patient ID: Therese Stewart      Patient's PCP: Sharona Powell MD    Admit Date: 7/5/2022     Discharge Date:   07/10/22      Admitting Provider: Lois Branch DO     Discharge Provider: Elgin Hanson MD     Discharge Diagnoses: Active Hospital Problems    Diagnosis     Pyelonephritis [N12]      Priority: Medium       The patient was seen and examined on day of discharge and this discharge summary is in conjunction with any daily progress note from day of discharge. Hospital Course:      61 y. o. female with PMHx of A-fib, DM2, HTN, history of non-Hodgkin's lymphoma( post bone marrow transplant 2 years ago ) , presented to [facility] in transfer from [place] ED for management of sepsis from pyelonephritis.  Pt presented to [place] ED with right flank pain.   She reports nausea, vomiting and body aches. + fever and chills. No dysuria and hematuria. .  Admitted with pyelonephritis, was found to be bacteremic    Sepsis. Pyelonephritis. E. coli bacteremia (ESBL)  Patient presented to emergency with flank pain, was found to be septic on presentation, blood cultures positive with E. coli, with ESBL DNA detected. No spike of fever of the last 24 hours    Will DC on ertapenem to complete 14 days     Hypomagnesemia  - Mg 1.5  - replace and repeat     Diabetes mellitus II   -Continue glargine 30 units.  -Continue sliding scale  Blood sugar well controlled     Essential (primary) hypertension   - monitor blood pressure  - continue home meds      Atrial Fibrillation   - currently rate controlled  - continue Eliquis and flecainide       Physical Exam Performed:     BP (!) 133/93   Pulse 83   Temp 97.9 °F (36.6 °C) (Oral)   Resp 18   Ht 5' 3\" (1.6 m)   Wt 238 lb 1.6 oz (108 kg)   SpO2 91%   BMI 42.18 kg/m²       General appearance:  No apparent distress, appears stated age and cooperative. HEENT:  Normal cephalic, atraumatic without obvious deformity.  Pupils equal, round, and reactive to light. Extra ocular muscles intact. Conjunctivae/corneas clear. Neck: Supple, with full range of motion. No jugular venous distention. Trachea midline. Respiratory:  Normal respiratory effort. Clear to auscultation, bilaterally without Rales/Wheezes/Rhonchi. Cardiovascular:  Regular rate and rhythm with normal S1/S2 without murmurs, rubs or gallops. Abdomen: Soft, non-tender, non-distended with normal bowel sounds. Musculoskeletal:  No clubbing, cyanosis or edema bilaterally. Full range of motion without deformity. Skin: Skin color, texture, turgor normal.  No rashes or lesions. Neurologic:  Neurovascularly intact without any focal sensory/motor deficits. Cranial nerves: II-XII intact, grossly non-focal.  Psychiatric:  Alert and oriented, thought content appropriate, normal insight  Capillary Refill: Brisk,< 3 seconds   Peripheral Pulses: +2 palpable, equal bilaterally       Labs: For convenience and continuity at follow-up the following most recent labs are provided:      CBC:    Lab Results   Component Value Date/Time    WBC 5.2 07/10/2022 06:04 AM    HGB 10.6 07/10/2022 06:04 AM    HCT 30.7 07/10/2022 06:04 AM     07/10/2022 06:04 AM       Renal:    Lab Results   Component Value Date/Time     07/10/2022 06:04 AM    K 2.7 07/10/2022 06:04 AM    K 3.5 07/05/2022 12:45 PM     07/10/2022 06:04 AM    CO2 26 07/10/2022 06:04 AM    BUN 14 07/10/2022 06:04 AM    CREATININE 0.8 07/10/2022 06:04 AM    CALCIUM 8.5 07/10/2022 06:04 AM    PHOS 2.7 10/01/2020 02:55 PM         Significant Diagnostic Studies    Radiology:   CT ABDOMEN PELVIS WO CONTRAST Additional Contrast? None   Preliminary Result   Mild edema around the right kidney could represent the urinary tract   infection given the patient's history. No hydronephrosis. No renal   calculus. No perinephric fluid collection.       Stable rind of soft tissue in the small bowel mesentery since 12/20/2021, corresponding to history of treated lymphoma. Consults:     IP CONSULT TO INFECTIOUS DISEASES    Disposition:  Home with IV AB at infusion center     Condition at Discharge: Stable    Discharge Instructions/Follow-up:    - DC on ertapenem to complete 14 days ( at infusion center). Code Status:  Full Code     Activity: activity as tolerated    Diet: regular diet      Discharge Medications:     Current Discharge Medication List           Details   metFORMIN (GLUCOPHAGE-XR) 500 mg extended release tablet Take 2 tablets by mouth 2 times daily (with meals)  Qty: 360 tablet, Refills: 1      insulin detemir (LEVEMIR FLEXTOUCH) 100 UNIT/ML injection pen Inject 30 Units into the skin nightly  Qty: 5 pen, Refills: 3    Comments: Dispense 90 day supply and three refills      BD PEN NEEDLE RAMIRO 2ND GEN 32G X 4 MM MISC USE WITH insulin daily  Qty: 100 each, Refills: 1    Comments: This prescription was filled on 5/20/2022. Any refills authorized will be placed on file. flecainide (TAMBOCOR) 100 MG tablet Take 1 tablet by mouth every 12 hours  Qty: 180 tablet, Refills: 3      apixaban (ELIQUIS) 5 MG TABS tablet Take 1 tablet by mouth 2 times daily  Qty: 180 tablet, Refills: 3    Associated Diagnoses: Persistent atrial fibrillation (HCC)      ZINC PO Take by mouth      Cholecalciferol (VITAMIN D3 PO) Take by mouth      Multiple Vitamin (MULTIVITAMIN PO) Take by mouth      furosemide (LASIX) 20 MG tablet Take 1 tablet by mouth daily .  May take an additional dose as needed for shortenss of breath, weight gain, and edema  Qty: 90 tablet, Refills: 3    Comments: Refill [id number]  Associated Diagnoses: Persistent atrial fibrillation (HCC)      glimepiride (AMARYL) 2 MG tablet Take 2 tablets by mouth 2 times daily (with meals)  Qty: 360 tablet, Refills: 1      metoprolol succinate (TOPROL XL) 25 MG extended release tablet Take 1 tablet by mouth daily  Qty: 90 tablet, Refills: 3    Comments: Refill [id number] potassium chloride (KLOR-CON M) 10 MEQ extended release tablet Take 1 tablet by mouth daily  Qty: 30 tablet, Refills: 5      albuterol sulfate  (90 Base) MCG/ACT inhaler Inhale 2 puffs into the lungs 4 times daily  Qty: 1 Inhaler, Refills: 3      acetaminophen (TYLENOL) 500 MG tablet Take 500 mg by mouth every 6 hours as needed for Pain      loratadine (CLARITIN) 10 MG capsule Take 10 mg by mouth daily              Time Spent on discharge is more than 30 minutes in the examination, evaluation, counseling and review of medications and discharge plan. Signed:    Angelina Ledesma MD   7/10/2022      Thank you Marlene De Santiago MD for the opportunity to be involved in this patient's care. If you have any questions or concerns, please feel free to contact me at [phone].

## 2022-07-10 NOTE — PLAN OF CARE
Problem: Pain  Goal: Verbalizes/displays adequate comfort level or baseline comfort level  7/9/2022 2234 by Marissa Gomez RN  Outcome: Progressing  7/9/2022 1036 by Julian Hernandez RN  Outcome: Progressing    Pain/discomfort being managed with PRN analgesics per MD orders. Pt able to express presence and absence of pain and rate pain appropriately using numerical scale. Non-pharmacologic comfort measures implemented. Rest and comfort promoted. Problem: Safety - Adult  Goal: Free from fall injury  7/9/2022 2234 by Marissa Gomez RN  Outcome: Progressing  7/9/2022 1036 by Julian Hernandez RN  Outcome: Progressing     Problem: ABCDS Injury Assessment  Goal: Absence of physical injury  7/9/2022 2234 by Marissa Gomez RN  Outcome: Progressing    Patient uses call light appropriately to express needs. Bed to lowest position with door open and call light in reach. All fall precautions implemented at this time. Siderails up x2. Non skid footwear in place. Seen at intervals to ensure safety. All needs attended.       Problem: Genitourinary - Adult  Goal: Absence of urinary retention  7/9/2022 2234 by Marissa Gomez RN  Outcome: Progressing  7/9/2022 1036 by Julian Hernandez RN  Outcome: Progressing     Problem: Infection - Adult  Goal: Absence of infection at discharge  7/9/2022 2234 by Marissa Gomez RN  Outcome: Alberto Saint  7/9/2022 1036 by Julian Hernandez RN  Outcome: Progressing     Problem: Infection - Adult  Goal: Absence of infection during hospitalization  7/9/2022 2234 by Marissa Gomez RN  Outcome: Alberto Saint  7/9/2022 1036 by Julian Hernandez RN  Outcome: Progressing     Problem: Infection - Adult  Goal: Absence of fever/infection during anticipated neutropenic period  7/9/2022 2234 by Marissa Gomez RN  Outcome: Progressing  7/9/2022 1036 by Julian Hernandez RN  Outcome: Progressing     Problem: Chronic Conditions and Co-morbidities  Goal: Patient's chronic conditions and co-morbidity symptoms are monitored and maintained or improved  Outcome: Progressing     Problem: Discharge Planning  Goal: Discharge to home or other facility with appropriate resources  7/9/2022 1036 by Fabian Mercado RN  Outcome: Progressing

## 2022-07-11 ENCOUNTER — HOSPITAL ENCOUNTER (OUTPATIENT)
Dept: INFUSION THERAPY | Age: 67
Setting detail: INFUSION SERIES
Discharge: HOME | End: 2022-07-11
Payer: COMMERCIAL

## 2022-07-11 ENCOUNTER — TELEPHONE (OUTPATIENT)
Dept: FAMILY MEDICINE CLINIC | Age: 67
End: 2022-07-11

## 2022-07-11 ENCOUNTER — CARE COORDINATION (OUTPATIENT)
Dept: CASE MANAGEMENT | Age: 67
End: 2022-07-11

## 2022-07-11 VITALS
OXYGEN SATURATION: 95 % | BODY MASS INDEX: 42.17 KG/M2 | TEMPERATURE: 98.1 F | SYSTOLIC BLOOD PRESSURE: 129 MMHG | HEIGHT: 63 IN | WEIGHT: 238 LBS | RESPIRATION RATE: 17 BRPM | DIASTOLIC BLOOD PRESSURE: 81 MMHG | HEART RATE: 82 BPM

## 2022-07-11 DIAGNOSIS — N12 PYELONEPHRITIS: Primary | ICD-10-CM

## 2022-07-11 PROCEDURE — 2580000003 HC RX 258: Performed by: INTERNAL MEDICINE

## 2022-07-11 PROCEDURE — 6360000002 HC RX W HCPCS: Performed by: INTERNAL MEDICINE

## 2022-07-11 PROCEDURE — 96365 THER/PROPH/DIAG IV INF INIT: CPT

## 2022-07-11 RX ORDER — ACETAMINOPHEN 325 MG/1
650 TABLET ORAL
OUTPATIENT
Start: 2022-07-12

## 2022-07-11 RX ORDER — SODIUM CHLORIDE 0.9 % (FLUSH) 0.9 %
5-40 SYRINGE (ML) INJECTION PRN
Status: DISCONTINUED | OUTPATIENT
Start: 2022-07-11 | End: 2022-07-12 | Stop reason: HOSPADM

## 2022-07-11 RX ORDER — HEPARIN SODIUM (PORCINE) LOCK FLUSH IV SOLN 100 UNIT/ML 100 UNIT/ML
500 SOLUTION INTRAVENOUS PRN
OUTPATIENT
Start: 2022-07-12

## 2022-07-11 RX ORDER — ALBUTEROL SULFATE 90 UG/1
4 AEROSOL, METERED RESPIRATORY (INHALATION) PRN
OUTPATIENT
Start: 2022-07-12

## 2022-07-11 RX ORDER — SODIUM CHLORIDE 9 MG/ML
5-250 INJECTION, SOLUTION INTRAVENOUS PRN
OUTPATIENT
Start: 2022-07-12

## 2022-07-11 RX ORDER — EPINEPHRINE 1 MG/ML
0.3 INJECTION, SOLUTION, CONCENTRATE INTRAVENOUS PRN
OUTPATIENT
Start: 2022-07-12

## 2022-07-11 RX ORDER — SODIUM CHLORIDE 9 MG/ML
INJECTION, SOLUTION INTRAVENOUS CONTINUOUS
OUTPATIENT
Start: 2022-07-12

## 2022-07-11 RX ORDER — DIPHENHYDRAMINE HYDROCHLORIDE 50 MG/ML
50 INJECTION INTRAMUSCULAR; INTRAVENOUS
OUTPATIENT
Start: 2022-07-12

## 2022-07-11 RX ORDER — SODIUM CHLORIDE 0.9 % (FLUSH) 0.9 %
5-40 SYRINGE (ML) INJECTION PRN
Status: CANCELLED | OUTPATIENT
Start: 2022-07-12

## 2022-07-11 RX ORDER — ONDANSETRON 2 MG/ML
8 INJECTION INTRAMUSCULAR; INTRAVENOUS
OUTPATIENT
Start: 2022-07-12

## 2022-07-11 RX ADMIN — Medication 10 ML: at 08:55

## 2022-07-11 RX ADMIN — Medication 30 ML: at 09:52

## 2022-07-11 RX ADMIN — ERTAPENEM SODIUM 1000 MG: 1 INJECTION, POWDER, LYOPHILIZED, FOR SOLUTION INTRAMUSCULAR; INTRAVENOUS at 08:58

## 2022-07-11 NOTE — CARE COORDINATION
[facility] Transitions Initial Follow Up Call    Call within 2 business days of discharge: Yes    Patient: Mouna Mendoza Patient : 1955   MRN: [id number]  Reason for Admission: Acute Pyleonephritis  Discharge Date: 7/10/22 RARS: Readmission Risk Score: 9.6 ( )      Last Discharge [facility]       Complaint Diagnosis Description Type Department Provider    22 Flank Pain; Nausea Acute pyelonephritis . .. ED to Hosp-Admission (Discharged) (ADMIT) Melania Woody MD; Jc Tovar. .. Transitions of Care Initial Call    Was this an external facility discharge? No Discharge Facility: N/A    Challenges to be reviewed by the provider   Additional needs identified to be addressed with provider: No               Method of communication with provider : none      Care Transition Nurse contacted the patient by telephone to perform post hospital discharge assessment. Verified name and  with patient as identifiers. Provided introduction to self, and explanation of the CTN role. CTN reviewed discharge instructions, medical action plan and red flags with patient who verbalized understanding. Patient given an opportunity to ask questions and does not have any further questions or concerns at this time. Were discharge instructions available to patient? Yes. Reviewed appropriate site of care based on symptoms and resources available to patient including: PCP  Specialist  When to call 911. The patient agrees to contact the PCP office for questions related to their healthcare. Medication reconciliation was performed with patient, who verbalizes understanding of administration of home medications. Advised obtaining a 90-day supply of all daily and as-needed medications. Was patient discharged with a pulse oximeter? no    CTN provided contact information. No further follow-up call indicated based on severity of symptoms and risk factors.  Patient declined any additional outreach. Non-face-to-face services provided:  Obtained and reviewed discharge summary and/or continuity of care documents  Assessment and support for treatment adherence and medication management-medication list reviewed & 1111f completed. Care Transitions 24 Hour Call    Do you have a copy of your discharge instructions?: Yes  Do you have all of your prescriptions and are they filled?: Yes  Have you been contacted by a [address]?: No  Have you scheduled your follow up appointment?: Yes  Do you have support at home?: Partner/Spouse/SO  Do you feel like you have everything you need to keep you well at home?: Yes  Care Transitions Interventions       Initial attempt at CT discharge phone call. Francis Arevalo states she is doing \"good\". She states her antibiotic infusion at the [facility] went well. She has her next appointments scheduled. Francis Arevalo states she will keep her 08/19 appt with  and will not schedule anything sooner. Francis Arevalo states her BG level this morning = 165. She denies any flank pain, N/V, abdominal pain, shaking or chills, or fever. Francis Arevalo states her feet remain edematous, but slightly better than when she left the hospital. She has them elevated. Francis Arevalo denies any needs at this time. She declines any additional CTN outreach. She states she will reach out to one of her physicians if needed.     Follow Up  Future Appointments   Date Time Provider Nikko Lafleur   7/12/2022  1:15 PM WEST ONC ROOM 03 [facility] INF [facility]   7/13/2022  8:30 AM WEST ONC ROOM 04 [facility] INF [facility]   7/14/2022  8:30 AM WEST ONC ROOM 04 [facility]   7/15/2022  8:45 AM [place] [id number] [facility] INF [facility]   7/16/2022  9:00 AM WEST ONC ROOM 06 [facility] INF [facility]   8/19/2022 10:15 AM Boy Hoyos MD [id number] RN BSN  Care Transition Nurse  [phone]

## 2022-07-11 NOTE — TELEPHONE ENCOUNTER
Pt was admitted into [facility] and was d/c on July 10th    Pt stated that she has an apt for Aug 19, 2022 with Dr. Hannah Chowdhury and pt stated that everything can be discussed then.      Pt stated that she is on an IV abx still til July 20th so she is not really done just yet with the hospital.    Pl advise  [phone] (home)

## 2022-07-11 NOTE — PROGRESS NOTES
[facility] Antibiotic Visit    NAME:  Gabe Boyer  YOB: 1955  MEDICAL RECORD NUMBER:  [id number]  DATE:  7/11/2022    Patient arrived to [facility]   [] per wheelchair   [x] ambulatory     Itching: No  Rash: No  Mouth Sores: No  Nausea: No  Vomiting: No  Diarrhea: No  Night Sweats: No  Fever: No    Administered: [] Peripheral access    [] PICC access    [x] Port access    Allergies   Allergen Reactions    Penicillins Hives       Name of Antibiotic Administered: invanz  Dose of Antibiotic Administered: 1000 mg. Indication for Antibiotic: UTI      Response to treatment:  Well tolerated by patient. Scheduled to return for next antibiotic dose tomorrow, if home health does not start.      Electronically signed by Jorge A Raines RN on 7/11/2022 at 9:08 AM

## 2022-07-11 NOTE — PROGRESS NOTES
Implanted [facility] PROVIDERS [facility] [place]     Your Care Instructions  An implanted port is a device placed, in most cases, under the skin of your chest below your collarbone. It is made of plastic, stainless steel, or titanium. The port is about the size of a quarter, but thicker. A thin, flexible tube called a catheter runs from the port into a large vein. A membrane (septum) similar to a pencil eraser is in the center of the port. A nurse uses a needle to put medicine and fluids through the septum into a blood vessel. A health professional also can take blood for tests through the port. An implanted port can be left in place for years. A special needle (called a Willard needle) may stay in the port for a short time. The port and catheter need regular care to make sure they do not get blocked. Maintenance care will be needed at least once every 3 months. Make sure to keep all regularly scheduled appointments for maintenance care. Tell your doctor if you take aspirin or some other blood thinner. These medicines can increase the chance of bleeding inside your body. Follow-up care is a key part of your treatment and safety. Be sure to make and go to all appointments, and call your doctor if you are having problems. It's also a good idea to know your test results and keep a list of the medicines you take. How can you care for yourself at home? · You will be able to bathe and swim. But you may need to avoid some activities if a Willard needle is left in the port - such as bathing, swimming and exercise. Talk to your doctor about any limits on your activity. · Some clothes may rub the skin over the port. Do not wear a bra or suspenders that irritate your skin near the port. If possible, avoid having your blood pressure taken on the arm with the port. · You will get a medical alert card with information about your port. Carry this with you. It will tell health care workers you have a port in case you need emergency care.   · Your port will need regular flushing to keep it open. A nurse or other health professional will do this for you. ·   When should you call for help? Call your doctor now or seek immediate medical care if:    · You have signs of infection, such as:  ? Increased pain, swelling, warmth, or redness near the port. ? Red streaks leading from the port. ? Pus draining from the port. ? A fever. · You have pain or swelling in your neck or arm. · You have trouble breathing. Watch closely for changes in your health, and be sure to contact your doctor if:    · You have any problems with your port. Patient given instructions on home Port care. Nurse explained and demonstrated access and flushing. Patient stated understanding and discharged with extension tubing attached to [address].

## 2022-07-12 ENCOUNTER — HOSPITAL ENCOUNTER (OUTPATIENT)
Dept: INFUSION THERAPY | Age: 67
Setting detail: INFUSION SERIES
Discharge: HOME | End: 2022-07-12
Payer: COMMERCIAL

## 2022-07-12 VITALS
HEART RATE: 73 BPM | DIASTOLIC BLOOD PRESSURE: 80 MMHG | OXYGEN SATURATION: 94 % | TEMPERATURE: 97.7 F | RESPIRATION RATE: 17 BRPM | SYSTOLIC BLOOD PRESSURE: 128 MMHG

## 2022-07-12 DIAGNOSIS — N12 PYELONEPHRITIS: Primary | ICD-10-CM

## 2022-07-12 PROCEDURE — 2580000003 HC RX 258: Performed by: INTERNAL MEDICINE

## 2022-07-12 PROCEDURE — 96365 THER/PROPH/DIAG IV INF INIT: CPT

## 2022-07-12 PROCEDURE — 6360000002 HC RX W HCPCS: Performed by: INTERNAL MEDICINE

## 2022-07-12 RX ORDER — SODIUM CHLORIDE 0.9 % (FLUSH) 0.9 %
5-40 SYRINGE (ML) INJECTION PRN
OUTPATIENT
Start: 2022-07-13

## 2022-07-12 RX ORDER — ALBUTEROL SULFATE 90 UG/1
4 AEROSOL, METERED RESPIRATORY (INHALATION) PRN
OUTPATIENT
Start: 2022-07-13

## 2022-07-12 RX ORDER — SODIUM CHLORIDE 9 MG/ML
INJECTION, SOLUTION INTRAVENOUS CONTINUOUS
OUTPATIENT
Start: 2022-07-13

## 2022-07-12 RX ORDER — ONDANSETRON 2 MG/ML
8 INJECTION INTRAMUSCULAR; INTRAVENOUS
OUTPATIENT
Start: 2022-07-13

## 2022-07-12 RX ORDER — DIPHENHYDRAMINE HYDROCHLORIDE 50 MG/ML
50 INJECTION INTRAMUSCULAR; INTRAVENOUS
OUTPATIENT
Start: 2022-07-13

## 2022-07-12 RX ORDER — SODIUM CHLORIDE 9 MG/ML
5-250 INJECTION, SOLUTION INTRAVENOUS PRN
OUTPATIENT
Start: 2022-07-13

## 2022-07-12 RX ORDER — SODIUM CHLORIDE 0.9 % (FLUSH) 0.9 %
5-40 SYRINGE (ML) INJECTION PRN
Status: DISCONTINUED | OUTPATIENT
Start: 2022-07-12 | End: 2022-07-13 | Stop reason: HOSPADM

## 2022-07-12 RX ORDER — HEPARIN SODIUM (PORCINE) LOCK FLUSH IV SOLN 100 UNIT/ML 100 UNIT/ML
500 SOLUTION INTRAVENOUS PRN
OUTPATIENT
Start: 2022-07-13

## 2022-07-12 RX ORDER — ACETAMINOPHEN 325 MG/1
650 TABLET ORAL
OUTPATIENT
Start: 2022-07-13

## 2022-07-12 RX ORDER — EPINEPHRINE 1 MG/ML
0.3 INJECTION, SOLUTION, CONCENTRATE INTRAVENOUS PRN
OUTPATIENT
Start: 2022-07-13

## 2022-07-12 RX ADMIN — ERTAPENEM SODIUM 1000 MG: 1 INJECTION, POWDER, LYOPHILIZED, FOR SOLUTION INTRAMUSCULAR; INTRAVENOUS at 13:29

## 2022-07-12 RX ADMIN — Medication 10 ML: at 13:26

## 2022-07-12 RX ADMIN — Medication 10 ML: at 14:01

## 2022-07-12 NOTE — PROGRESS NOTES
[address] [facility] Antibiotic Visit    NAME:  Lanre Jain  YOB: 1955  MEDICAL RECORD NUMBER:  [id number]  DATE:  7/12/2022    Patient arrived to [facility]   [] per wheelchair   [x] ambulatory     Itching: No  Rash: No  Mouth Sores: No  Nausea: No  Vomiting: No  Diarrhea: No  Night Sweats: No  Fever: No    Administered: [] Peripheral access    [] PICC access    [x] Port access    Allergies   Allergen Reactions    Penicillins Hives       Name of Antibiotic Administered: Invanz  Dose of Antibiotic Administered: 1000 mg. Indication for Antibiotic: pyelonephritis      Response to treatment:  Well tolerated by patient. Starting Home Care tomorrow for antibiotics.     Scheduled to return for next antibiotic dose tomorrow     Electronically signed by Tena Severin, RN on 7/12/2022 at 1:38 PM

## 2022-07-13 ENCOUNTER — HOSPITAL ENCOUNTER (OUTPATIENT)
Dept: INFUSION THERAPY | Age: 67
Setting detail: INFUSION SERIES
Discharge: HOME | End: 2022-07-13

## 2022-07-18 LAB
BASOPHILS ABSOLUTE: 0 /ΜL
BASOPHILS RELATIVE PERCENT: 0.3 %
EOSINOPHILS ABSOLUTE: 0.2 /ΜL
EOSINOPHILS RELATIVE PERCENT: 2 %
HCT VFR BLD CALC: 36.4 % (ref 36–46)
HEMOGLOBIN: 11.4 G/DL (ref 12–16)
LYMPHOCYTES ABSOLUTE: 0.6 /ΜL
LYMPHOCYTES RELATIVE PERCENT: 6.1 %
MCH RBC QN AUTO: 30.7 PG
MCHC RBC AUTO-ENTMCNC: 31.3 G/DL
MCV RBC AUTO: 98.1 FL
MONOCYTES ABSOLUTE: 0.5 /ΜL
MONOCYTES RELATIVE PERCENT: 4.6 %
NEUTROPHILS ABSOLUTE: 8.4 /ΜL
NEUTROPHILS RELATIVE PERCENT: 86.6 %
PDW BLD-RTO: 15.8 %
PLATELET # BLD: 246 K/ΜL
PMV BLD AUTO: 11.7 FL
RBC # BLD: 3.71 10^6/ΜL
WBC # BLD: 9.7 10^3/ML

## 2022-07-20 ENCOUNTER — TELEPHONE (OUTPATIENT)
Dept: INFECTIOUS DISEASES | Age: 67
End: 2022-07-20

## 2022-07-20 NOTE — TELEPHONE ENCOUNTER
Spoke with pt, pt stated she had no UTI symptoms. Home care RN was there earlier and de accessed PAC.

## 2022-08-19 ENCOUNTER — OFFICE VISIT (OUTPATIENT)
Dept: FAMILY MEDICINE CLINIC | Age: 67
End: 2022-08-19
Payer: COMMERCIAL

## 2022-08-19 VITALS
HEART RATE: 88 BPM | BODY MASS INDEX: 39.69 KG/M2 | DIASTOLIC BLOOD PRESSURE: 78 MMHG | WEIGHT: 224 LBS | SYSTOLIC BLOOD PRESSURE: 122 MMHG | HEIGHT: 63 IN | TEMPERATURE: 97.8 F

## 2022-08-19 DIAGNOSIS — E11.9 TYPE 2 DIABETES MELLITUS WITHOUT COMPLICATION, WITH LONG-TERM CURRENT USE OF INSULIN (HCC): Primary | ICD-10-CM

## 2022-08-19 DIAGNOSIS — Z87.440 RECENT URINARY TRACT INFECTION: ICD-10-CM

## 2022-08-19 DIAGNOSIS — Z87.448 HX OF PYELONEPHRITIS: ICD-10-CM

## 2022-08-19 DIAGNOSIS — Z79.4 TYPE 2 DIABETES MELLITUS WITHOUT COMPLICATION, WITH LONG-TERM CURRENT USE OF INSULIN (HCC): Primary | ICD-10-CM

## 2022-08-19 DIAGNOSIS — E11.9 TYPE 2 DIABETES MELLITUS WITHOUT COMPLICATION, WITH LONG-TERM CURRENT USE OF INSULIN (HCC): ICD-10-CM

## 2022-08-19 DIAGNOSIS — Z79.4 TYPE 2 DIABETES MELLITUS WITHOUT COMPLICATION, WITH LONG-TERM CURRENT USE OF INSULIN (HCC): ICD-10-CM

## 2022-08-19 DIAGNOSIS — I10 ESSENTIAL HYPERTENSION: ICD-10-CM

## 2022-08-19 LAB
ANION GAP SERPL CALCULATED.3IONS-SCNC: 16 MMOL/L (ref 3–16)
BACTERIA: NORMAL /HPF
BILIRUBIN URINE: NEGATIVE
BLOOD, URINE: NEGATIVE
BUN BLDV-MCNC: 18 MG/DL (ref 7–20)
CALCIUM SERPL-MCNC: 9.1 MG/DL (ref 8.3–10.6)
CHLORIDE BLD-SCNC: 105 MMOL/L (ref 99–110)
CLARITY: CLEAR
CO2: 25 MMOL/L (ref 21–32)
COLOR: YELLOW
CREAT SERPL-MCNC: 0.9 MG/DL (ref 0.6–1.2)
EPITHELIAL CELLS, UA: 0 /HPF (ref 0–5)
GFR AFRICAN AMERICAN: >60
GFR NON-AFRICAN AMERICAN: >60
GLUCOSE BLD-MCNC: 167 MG/DL (ref 70–99)
GLUCOSE URINE: NEGATIVE MG/DL
HYALINE CASTS: 0 /LPF (ref 0–8)
KETONES, URINE: NEGATIVE MG/DL
LEUKOCYTE ESTERASE, URINE: NEGATIVE
MICROSCOPIC EXAMINATION: NORMAL
NITRITE, URINE: NEGATIVE
PH UA: 5.5 (ref 5–8)
POTASSIUM SERPL-SCNC: 3.8 MMOL/L (ref 3.5–5.1)
PROTEIN UA: NEGATIVE MG/DL
RBC UA: 0 /HPF (ref 0–4)
SODIUM BLD-SCNC: 146 MMOL/L (ref 136–145)
SPECIFIC GRAVITY UA: 1.01 (ref 1–1.03)
URINE TYPE: NORMAL
UROBILINOGEN, URINE: 0.2 E.U./DL
WBC UA: 0 /HPF (ref 0–5)

## 2022-08-19 PROCEDURE — 3051F HG A1C>EQUAL 7.0%<8.0%: CPT | Performed by: FAMILY MEDICINE

## 2022-08-19 PROCEDURE — 99214 OFFICE O/P EST MOD 30 MIN: CPT | Performed by: FAMILY MEDICINE

## 2022-08-19 RX ORDER — POTASSIUM CHLORIDE 750 MG/1
10 TABLET, EXTENDED RELEASE ORAL DAILY
Qty: 30 TABLET | Refills: 5 | Status: SHIPPED | OUTPATIENT
Start: 2022-08-19 | End: 2022-10-27

## 2022-08-19 ASSESSMENT — ENCOUNTER SYMPTOMS
VOMITING: 0
BLOOD IN STOOL: 0
CONSTIPATION: 0
NAUSEA: 0
ABDOMINAL DISTENTION: 0
SHORTNESS OF BREATH: 0
CHEST TIGHTNESS: 0
DIARRHEA: 0
ABDOMINAL PAIN: 0

## 2022-08-19 ASSESSMENT — PATIENT HEALTH QUESTIONNAIRE - PHQ9
SUM OF ALL RESPONSES TO PHQ QUESTIONS 1-9: 0
1. LITTLE INTEREST OR PLEASURE IN DOING THINGS: 0
2. FEELING DOWN, DEPRESSED OR HOPELESS: 0
SUM OF ALL RESPONSES TO PHQ QUESTIONS 1-9: 0
SUM OF ALL RESPONSES TO PHQ9 QUESTIONS 1 & 2: 0

## 2022-08-19 NOTE — PROGRESS NOTES
Subjective:      Patient ID: Addie Alcaraz is a 61 y.o. female. Chief Complaint   Patient presents with    Follow-up     Hypertension, lipids, diabetes - pt is fasting        Patient presents with: Follow-up: Hypertension, lipids, diabetes - pt is fasting    Here for the above  She was in hospital for sepsis/bacteremia  She is much better now   She notes some flatus    Since end of June her glucose numbers much better and seldom in 200s    Meds the same    YOB: 1955    Date of Visit:  8/19/2022     -- Penicillins -- Hives    Current Outpatient Medications:  metFORMIN (GLUCOPHAGE-XR) 500 mg extended release tablet, Take 2 tablets by mouth 2 times daily (with meals), Disp: 360 tablet, Rfl: 1  insulin detemir (LEVEMIR FLEXTOUCH) 100 UNIT/ML injection pen, Inject 30 Units into the skin nightly (Patient taking differently: Inject 35 Units into the skin nightly), Disp: 5 pen, Rfl: 3  BD PEN NEEDLE RAMIRO 2ND GEN 32G X 4 MM MISC, USE WITH insulin daily, Disp: 100 each, Rfl: 1  flecainide (TAMBOCOR) 100 MG tablet, Take 1 tablet by mouth every 12 hours, Disp: 180 tablet, Rfl: 3  apixaban (ELIQUIS) 5 MG TABS tablet, Take 1 tablet by mouth 2 times daily, Disp: 180 tablet, Rfl: 3  ZINC PO, Take by mouth, Disp: , Rfl:   Cholecalciferol (VITAMIN D3 PO), Take by mouth, Disp: , Rfl:   Multiple Vitamin (MULTIVITAMIN PO), Take by mouth, Disp: , Rfl:   furosemide (LASIX) 20 MG tablet, Take 1 tablet by mouth daily .  May take an additional dose as needed for shortenss of breath, weight gain, and edema, Disp: 90 tablet, Rfl: 3  glimepiride (AMARYL) 2 MG tablet, Take 2 tablets by mouth 2 times daily (with meals), Disp: 360 tablet, Rfl: 1  metoprolol succinate (TOPROL XL) 25 MG extended release tablet, Take 1 tablet by mouth daily (Patient taking differently: Take half a  tablet by mouth daily), Disp: 90 tablet, Rfl: 3  potassium chloride (KLOR-CON M) 10 MEQ extended release tablet, Take 1 tablet by mouth daily, Disp: 30 tablet, Rfl: 5  albuterol sulfate  (90 Base) MCG/ACT inhaler, Inhale 2 puffs into the lungs 4 times daily, Disp: 1 Inhaler, Rfl: 3  acetaminophen (TYLENOL) 500 MG tablet, Take 500 mg by mouth every 6 hours as needed for Pain, Disp: , Rfl:   loratadine (CLARITIN) 10 MG capsule, Take 10 mg by mouth daily , Disp: , Rfl:     No current facility-administered medications for this visit.      ---------------------------               08/19/22                      1010         ---------------------------   BP:          122/78         Site:    Left Upper Arm     Position:     Sitting        Cuff Size:   Large Adult      Pulse:         88           Temp:   97.8 °F (36.6 °C)   TempSrc:    Temporal        Weight: 224 lb (101.6 kg)   Height:   5' 3\" (1.6 m)    ---------------------------  Body mass index is 39.68 kg/m². Wt Readings from Last 3 Encounters:  08/19/22 : 224 lb (101.6 kg)  07/11/22 : 238 lb (108 kg)  07/10/22 : 238 lb 1.6 oz (108 kg)    BP Readings from Last 3 Encounters:  08/19/22 : 122/78  07/12/22 : 128/80  07/11/22 : 129/81              Review of Systems   Constitutional:  Negative for appetite change, chills, fever and unexpected weight change. Respiratory:  Negative for chest tightness and shortness of breath. Cardiovascular:  Negative for chest pain, palpitations and leg swelling. Gastrointestinal:  Negative for abdominal distention, abdominal pain, blood in stool, constipation, diarrhea, nausea and vomiting. Genitourinary:  Negative for difficulty urinating, dysuria and hematuria. Neurological:  Negative for headaches. Objective:   Physical Exam  Constitutional:       General: She is not in acute distress. Appearance: Normal appearance. She is well-developed. She is not ill-appearing or diaphoretic. Cardiovascular:      Rate and Rhythm: Normal rate and regular rhythm. Heart sounds: Normal heart sounds. No murmur heard.     No friction rub. No gallop. Pulmonary:      Effort: Pulmonary effort is normal. No tachypnea, accessory muscle usage or respiratory distress. Breath sounds: Normal breath sounds. No decreased breath sounds, wheezing, rhonchi or rales. Chest:   Breasts:     Right: No supraclavicular adenopathy. Left: No supraclavicular adenopathy. Abdominal:      General: Bowel sounds are normal.      Palpations: Abdomen is soft. There is no hepatomegaly, splenomegaly, mass or pulsatile mass. Tenderness: There is no abdominal tenderness. There is no guarding. Lymphadenopathy:      Cervical: No cervical adenopathy. Upper Body:      Right upper body: No supraclavicular adenopathy. Left upper body: No supraclavicular adenopathy. Skin:     General: Skin is warm and dry. Coloration: Skin is not pale. Neurological:      Mental Status: She is alert. Assessment:       Diagnosis Orders   1. Type 2 diabetes mellitus without complication, with long-term current use of insulin (HCC)  Hemoglobin G3L    Basic Metabolic Panel      2. Essential hypertension  Basic Metabolic Panel      3. Recent urinary tract infection  Urinalysis with Microscopic      4.  Hx of pyelonephritis  Urinalysis with Microscopic        Because of her recent illness I will have her seen by urology  Her hospital stay was reviewed  Abd ct reviewed from 7/10/22      Plan:      Do add a probiotic daily   Add Gas X or similar  This should improve  See the urologist for the severe urinary tract infection you had and the frequent infections  Stay with the diet and the current medicines  See the urologist for the check up   See me back in about 4 months         Ana Agosto MD

## 2022-08-19 NOTE — PATIENT INSTRUCTIONS
Do add a probiotic daily   Add Gas X or similar  This should improve  See the urologist for the severe urinary tract infection you had and the frequent infections  Stay with the diet and the current medicines  See the urologist for the check up   See me back in about 4 months

## 2022-08-20 LAB
ESTIMATED AVERAGE GLUCOSE: 145.6 MG/DL
HBA1C MFR BLD: 6.7 %

## 2022-08-23 ENCOUNTER — TELEPHONE (OUTPATIENT)
Dept: FAMILY MEDICINE CLINIC | Age: 67
End: 2022-08-23

## 2022-08-23 RX ORDER — GLIMEPIRIDE 2 MG/1
2 TABLET ORAL 2 TIMES DAILY WITH MEALS
Qty: 360 TABLET | Refills: 1
Start: 2022-08-23 | End: 2022-10-27 | Stop reason: SDUPTHER

## 2022-10-11 ENCOUNTER — TELEPHONE (OUTPATIENT)
Dept: CARDIOLOGY CLINIC | Age: 67
End: 2022-10-11

## 2022-10-11 NOTE — TELEPHONE ENCOUNTER
CARDIAC CLEARANCE     Procedure:Colonoscopy  Marcelino Ramires MD  Date:TBD    Medications needing held:Anticoagulant/ Antiplatelet Medication      How long?:Day of the procedure     Phone Number  [phone]  Fax Number       [phone]    No Contact Name     Clinical staff:    Cardiologist: Dr. Leydi Monaco   Last Appointment:3/10/2022  Next Appointment:  Return in about 1 year (around 3/10/2023) for an appointment with Jonel Granado NP    CC/ Request has been scanned to patient chart     Cardiac History:     Paroxysmal atrial fibrillation              - s/p PVI ablation 10/28/20 with Dr. Nash Herron 3 (gender, HTN, DM) on Eliquis 5mg BID              - had recurrence on monitor in March 2021 and was resumed on flecainide and Toprol              - EKG today with SR              - previously discussed repeat ablation with Dr. Leydi Monaco and she would like to continue medical therapy for rhythm control and reassess in a year or sooner if she starts to have recurrence.

## 2022-10-11 NOTE — TELEPHONE ENCOUNTER
Dr. Jose Dickerson,   Please review below preop cardio risk assess and need to Eliquis request below. Thanks.

## 2022-10-18 ENCOUNTER — TELEPHONE (OUTPATIENT)
Dept: CARDIOLOGY CLINIC | Age: 67
End: 2022-10-18

## 2022-10-18 NOTE — TELEPHONE ENCOUNTER
CARDIAC CLEARANCE     Procedure:C/olonoscopy  :  Date:TBD    Medications needing held:Anticoagulant/AAntiplatelet     How long?:    Phone Number [phone]  Fax number to send information:[phone]    Cc /Request is scanned to patient chart .     Clinical staff:    Cardiologist:Dr. Zo Craig  Last Appointment:3/10/2022  Next Appointment:3/10/2023    Cardiac History:     Paroxysmal atrial fibrillation              - s/p PVI ablation 10/28/20 with Dr. Elda Craig 3 (gender, HTN, DM) on Eliquis 5mg BID              - had recurrence on monitor in March 2021 and was resumed on flecainide and Toprol              - EKG today with SR              - previously discussed repeat ablation with Dr. Zo Craig and she would like to continue medical therapy for rhythm control and reassess in a year or sooner if she starts to have recurrence

## 2022-10-18 NOTE — TELEPHONE ENCOUNTER
This has already been handled on 10/11/22. Please contact and find out if they received our letter faxed on 10/12/22.

## 2022-10-27 RX ORDER — PEN NEEDLE, DIABETIC 32GX 5/32"
NEEDLE, DISPOSABLE MISCELLANEOUS
Qty: 100 EACH | Refills: 1 | Status: SHIPPED | OUTPATIENT
Start: 2022-10-27

## 2022-10-27 RX ORDER — GLIMEPIRIDE 2 MG/1
2 TABLET ORAL 2 TIMES DAILY WITH MEALS
Qty: 180 TABLET | Refills: 1 | Status: SHIPPED | OUTPATIENT
Start: 2022-10-27

## 2022-10-27 RX ORDER — POTASSIUM CHLORIDE 750 MG/1
TABLET, FILM COATED, EXTENDED RELEASE ORAL
Qty: 30 TABLET | Refills: 5 | Status: SHIPPED | OUTPATIENT
Start: 2022-10-27

## 2022-12-05 RX ORDER — METFORMIN HYDROCHLORIDE 500 MG/1
1000 TABLET, EXTENDED RELEASE ORAL 2 TIMES DAILY WITH MEALS
Qty: 360 TABLET | Refills: 1 | Status: SHIPPED | OUTPATIENT
Start: 2022-12-05

## 2022-12-30 ENCOUNTER — OFFICE VISIT (OUTPATIENT)
Dept: FAMILY MEDICINE CLINIC | Age: 67
End: 2022-12-30
Payer: MEDICARE

## 2022-12-30 VITALS
TEMPERATURE: 97.2 F | HEIGHT: 63 IN | BODY MASS INDEX: 40.22 KG/M2 | HEART RATE: 68 BPM | WEIGHT: 227 LBS | DIASTOLIC BLOOD PRESSURE: 78 MMHG | SYSTOLIC BLOOD PRESSURE: 122 MMHG

## 2022-12-30 DIAGNOSIS — Z79.4 TYPE 2 DIABETES MELLITUS WITHOUT COMPLICATION, WITH LONG-TERM CURRENT USE OF INSULIN (HCC): ICD-10-CM

## 2022-12-30 DIAGNOSIS — Z79.4 TYPE 2 DIABETES MELLITUS WITHOUT COMPLICATION, WITH LONG-TERM CURRENT USE OF INSULIN (HCC): Primary | ICD-10-CM

## 2022-12-30 DIAGNOSIS — E11.9 TYPE 2 DIABETES MELLITUS WITHOUT COMPLICATION, WITH LONG-TERM CURRENT USE OF INSULIN (HCC): Primary | ICD-10-CM

## 2022-12-30 DIAGNOSIS — I10 ESSENTIAL HYPERTENSION: ICD-10-CM

## 2022-12-30 DIAGNOSIS — E11.9 TYPE 2 DIABETES MELLITUS WITHOUT COMPLICATION, WITH LONG-TERM CURRENT USE OF INSULIN (HCC): ICD-10-CM

## 2022-12-30 DIAGNOSIS — J06.9 ACUTE UPPER RESPIRATORY INFECTION, UNSPECIFIED: ICD-10-CM

## 2022-12-30 DIAGNOSIS — E78.2 MIXED HYPERLIPIDEMIA: ICD-10-CM

## 2022-12-30 LAB
A/G RATIO: 2 (ref 1.1–2.2)
ALBUMIN SERPL-MCNC: 4 G/DL (ref 3.4–5)
ALP BLD-CCNC: 207 U/L (ref 40–129)
ALT SERPL-CCNC: 60 U/L (ref 10–40)
ANION GAP SERPL CALCULATED.3IONS-SCNC: 14 MMOL/L (ref 3–16)
AST SERPL-CCNC: 57 U/L (ref 15–37)
BILIRUB SERPL-MCNC: 0.6 MG/DL (ref 0–1)
BUN BLDV-MCNC: 17 MG/DL (ref 7–20)
CALCIUM SERPL-MCNC: 8.8 MG/DL (ref 8.3–10.6)
CHLORIDE BLD-SCNC: 99 MMOL/L (ref 99–110)
CHOLESTEROL, TOTAL: 170 MG/DL (ref 0–199)
CO2: 26 MMOL/L (ref 21–32)
CREAT SERPL-MCNC: 0.9 MG/DL (ref 0.6–1.2)
GFR SERPL CREATININE-BSD FRML MDRD: >60 ML/MIN/{1.73_M2}
GLUCOSE BLD-MCNC: 141 MG/DL (ref 70–99)
HDLC SERPL-MCNC: 44 MG/DL (ref 40–60)
LDL CHOLESTEROL CALCULATED: 93 MG/DL
POTASSIUM SERPL-SCNC: 3.6 MMOL/L (ref 3.5–5.1)
SODIUM BLD-SCNC: 139 MMOL/L (ref 136–145)
TOTAL PROTEIN: 6 G/DL (ref 6.4–8.2)
TRIGL SERPL-MCNC: 166 MG/DL (ref 0–150)
VLDLC SERPL CALC-MCNC: 33 MG/DL

## 2022-12-30 PROCEDURE — G8427 DOCREV CUR MEDS BY ELIG CLIN: HCPCS | Performed by: FAMILY MEDICINE

## 2022-12-30 PROCEDURE — G8484 FLU IMMUNIZE NO ADMIN: HCPCS | Performed by: FAMILY MEDICINE

## 2022-12-30 PROCEDURE — 1123F ACP DISCUSS/DSCN MKR DOCD: CPT | Performed by: FAMILY MEDICINE

## 2022-12-30 PROCEDURE — 2022F DILAT RTA XM EVC RTNOPTHY: CPT | Performed by: FAMILY MEDICINE

## 2022-12-30 PROCEDURE — 3078F DIAST BP <80 MM HG: CPT | Performed by: FAMILY MEDICINE

## 2022-12-30 PROCEDURE — 3044F HG A1C LEVEL LT 7.0%: CPT | Performed by: FAMILY MEDICINE

## 2022-12-30 PROCEDURE — 3074F SYST BP LT 130 MM HG: CPT | Performed by: FAMILY MEDICINE

## 2022-12-30 PROCEDURE — G8417 CALC BMI ABV UP PARAM F/U: HCPCS | Performed by: FAMILY MEDICINE

## 2022-12-30 PROCEDURE — 99214 OFFICE O/P EST MOD 30 MIN: CPT | Performed by: FAMILY MEDICINE

## 2022-12-30 PROCEDURE — G8400 PT W/DXA NO RESULTS DOC: HCPCS | Performed by: FAMILY MEDICINE

## 2022-12-30 PROCEDURE — 1036F TOBACCO NON-USER: CPT | Performed by: FAMILY MEDICINE

## 2022-12-30 PROCEDURE — G9899 SCRN MAM PERF RSLTS DOC: HCPCS | Performed by: FAMILY MEDICINE

## 2022-12-30 PROCEDURE — 3017F COLORECTAL CA SCREEN DOC REV: CPT | Performed by: FAMILY MEDICINE

## 2022-12-30 PROCEDURE — 1090F PRES/ABSN URINE INCON ASSESS: CPT | Performed by: FAMILY MEDICINE

## 2022-12-30 RX ORDER — DOXYCYCLINE HYCLATE 100 MG
100 TABLET ORAL 2 TIMES DAILY
Qty: 14 TABLET | Refills: 0 | Status: SHIPPED | OUTPATIENT
Start: 2022-12-30 | End: 2023-01-06

## 2022-12-30 ASSESSMENT — PATIENT HEALTH QUESTIONNAIRE - PHQ9
SUM OF ALL RESPONSES TO PHQ QUESTIONS 1-9: 0
1. LITTLE INTEREST OR PLEASURE IN DOING THINGS: 0
SUM OF ALL RESPONSES TO PHQ QUESTIONS 1-9: 0
2. FEELING DOWN, DEPRESSED OR HOPELESS: 0
SUM OF ALL RESPONSES TO PHQ QUESTIONS 1-9: 0
SUM OF ALL RESPONSES TO PHQ QUESTIONS 1-9: 0
SUM OF ALL RESPONSES TO PHQ9 QUESTIONS 1 & 2: 0

## 2022-12-30 ASSESSMENT — ENCOUNTER SYMPTOMS
CONSTIPATION: 0
VOMITING: 0
DIARRHEA: 0
BLOOD IN STOOL: 0
ABDOMINAL PAIN: 0

## 2022-12-30 NOTE — PROGRESS NOTES
Subjective:      Patient ID: Leni Bear is a 74 y.o. female. Chief Complaint   Patient presents with    Follow-up     Diabetes, hypertension, lipids -         Patient presents with: Follow-up: Diabetes, hypertension, lipids -     Here for the above  She has cold sx  Head congestion  Wheezing  No fever   All started 3 days ago  Family member same   Pnd and some cough  No blood  Some sore throat  No v no d    YOB: 1955    Date of Visit:  12/30/2022     -- Penicillins -- Hives    Current Outpatient Medications:  metFORMIN (GLUCOPHAGE-XR) 500 MG extended release tablet, Take 2 tablets by mouth 2 times daily (with meals), Disp: 360 tablet, Rfl: 1  potassium chloride (KLOR-CON) 10 MEQ extended release tablet, TAKE ONE TABLET BY MOUTH ONCE DAILY, Disp: 30 tablet, Rfl: 5  BD PEN NEEDLE RAMIRO 2ND GEN 32G X 4 MM MISC, USE WITH insulin daily, Disp: 100 each, Rfl: 1  glimepiride (AMARYL) 2 MG tablet, Take 1 tablet by mouth 2 times daily (with meals), Disp: 180 tablet, Rfl: 1  insulin detemir (LEVEMIR FLEXTOUCH) 100 UNIT/ML injection pen, Inject 30 Units into the skin nightly (Patient taking differently: Inject 35 Units into the skin nightly), Disp: 5 pen, Rfl: 3  flecainide (TAMBOCOR) 100 MG tablet, Take 1 tablet by mouth every 12 hours, Disp: 180 tablet, Rfl: 3  apixaban (ELIQUIS) 5 MG TABS tablet, Take 1 tablet by mouth 2 times daily, Disp: 180 tablet, Rfl: 3  ZINC PO, Take by mouth, Disp: , Rfl:   Cholecalciferol (VITAMIN D3 PO), Take by mouth, Disp: , Rfl:   Multiple Vitamin (MULTIVITAMIN PO), Take by mouth, Disp: , Rfl:   furosemide (LASIX) 20 MG tablet, Take 1 tablet by mouth daily .  May take an additional dose as needed for shortenss of breath, weight gain, and edema, Disp: 90 tablet, Rfl: 3  metoprolol succinate (TOPROL XL) 25 MG extended release tablet, Take 1 tablet by mouth daily (Patient taking differently: Take half a  tablet by mouth daily), Disp: 90 tablet, Rfl: 3  albuterol sulfate  (90 Base) MCG/ACT inhaler, Inhale 2 puffs into the lungs 4 times daily, Disp: 1 Inhaler, Rfl: 3  acetaminophen (TYLENOL) 500 MG tablet, Take 500 mg by mouth every 6 hours as needed for Pain, Disp: , Rfl:   loratadine (CLARITIN) 10 MG capsule, Take 10 mg by mouth daily , Disp: , Rfl:     No current facility-administered medications for this visit.      ---------------------------               12/30/22                      1021         ---------------------------   BP:          122/78         Site:    Left Upper Arm     Position:     Sitting        Cuff Size:   Large Adult      Pulse:         68           Temp:   97.2 °F (36.2 °C)   TempSrc:    Temporal        Weight:  227 lb (103 kg)    Height:   5' 3\" (1.6 m)    ---------------------------  Body mass index is 40.21 kg/m². Wt Readings from Last 3 Encounters:  12/30/22 : 227 lb (103 kg)  08/19/22 : 224 lb (101.6 kg)  07/11/22 : 238 lb (108 kg)    BP Readings from Last 3 Encounters:  12/30/22 : 122/78  08/19/22 : 122/78  07/12/22 : 128/80               Review of Systems   Constitutional:  Negative for appetite change, chills, fever and unexpected weight change. Cardiovascular:  Negative for chest pain and palpitations. Gastrointestinal:  Negative for abdominal pain, blood in stool, constipation, diarrhea and vomiting. Occ upper ab cramp she did have recent colon    Genitourinary:  Negative for difficulty urinating, dysuria and hematuria. Musculoskeletal:         No sores on the feet    Neurological:  Negative for headaches. Objective:   Physical Exam  Constitutional:       General: She is not in acute distress. Appearance: Normal appearance. She is well-developed. She is not ill-appearing or diaphoretic. HENT:      Head: Normocephalic and atraumatic. Mouth/Throat:      Lips: Pink. Mouth: Mucous membranes are moist.      Pharynx: Oropharynx is clear.    Cardiovascular:      Rate and Rhythm: Normal rate and regular rhythm. Heart sounds: Normal heart sounds. No murmur heard. No friction rub. No gallop. Pulmonary:      Effort: Pulmonary effort is normal. No tachypnea, accessory muscle usage or respiratory distress. Breath sounds: Normal breath sounds. No decreased breath sounds, wheezing, rhonchi or rales. Lymphadenopathy:      Cervical: No cervical adenopathy. Upper Body:      Right upper body: No supraclavicular adenopathy. Left upper body: No supraclavicular adenopathy. Skin:     General: Skin is warm and dry. Coloration: Skin is not pale. Neurological:      Mental Status: She is alert. Assessment:       Diagnosis Orders   1. Type 2 diabetes mellitus without complication, with long-term current use of insulin ([facility])  Lipid Panel    Comprehensive Metabolic Panel    Hemoglobin A1C      2. Essential hypertension  Lipid Panel    Comprehensive Metabolic Panel      3. Acute upper respiratory infection, unspecified        4.  Mixed hyperlipidemia  Lipid Panel    Comprehensive Metabolic Panel        Orders Placed This Encounter   Medications    doxycycline hyclate (VIBRA-TABS) 100 MG tablet     Sig: Take 1 tablet by mouth 2 times daily for 7 days     Dispense:  14 tablet     Refill:  0       Colon done this month dr Raimundo Solis   She will continue follow up with dr Anika Cowan:      Do take the medicine  Continue the diet  Test yourself for covid at home and call me the resullt  If it is + we may need to change the treatment   See in 4 months         Toby Cardona MD

## 2022-12-30 NOTE — PATIENT INSTRUCTIONS
Do take the medicine  Continue the diet  Test yourself for covid at home and call me the resullt  If it is + we may need to change the treatment   See in 4 months

## 2022-12-31 LAB
ESTIMATED AVERAGE GLUCOSE: 191.5 MG/DL
HBA1C MFR BLD: 8.3 %

## 2023-01-04 ENCOUNTER — HOSPITAL ENCOUNTER (OUTPATIENT)
Dept: ULTRASOUND IMAGING | Age: 68
Discharge: HOME | End: 2023-01-04
Payer: MEDICARE

## 2023-01-04 DIAGNOSIS — R79.89 HYPOURICEMIA: ICD-10-CM

## 2023-01-04 PROCEDURE — 76705 ECHO EXAM OF ABDOMEN: CPT

## 2023-01-10 ENCOUNTER — TELEPHONE (OUTPATIENT)
Dept: PULMONOLOGY | Age: 68
End: 2023-01-10

## 2023-01-20 ENCOUNTER — HOSPITAL ENCOUNTER (OUTPATIENT)
Dept: CT IMAGING | Age: 68
Discharge: HOME | End: 2023-01-20
Payer: MEDICARE

## 2023-01-20 DIAGNOSIS — C83.08 SMALL CELL B-CELL LYMPHOMA, LYMPH NODES OF MULTIPLE SITES (HCC): ICD-10-CM

## 2023-01-20 PROCEDURE — 71260 CT THORAX DX C+: CPT

## 2023-01-20 PROCEDURE — 6360000004 HC RX CONTRAST MEDICATION: Performed by: INTERNAL MEDICINE

## 2023-01-20 RX ADMIN — IOPAMIDOL 75 ML: 755 INJECTION, SOLUTION INTRAVENOUS at 10:52

## 2023-01-20 RX ADMIN — IOPAMIDOL 50 ML: 612 INJECTION, SOLUTION INTRAVENOUS at 09:51

## 2023-03-06 ENCOUNTER — TELEPHONE (OUTPATIENT)
Dept: FAMILY MEDICINE CLINIC | Age: 68
End: 2023-03-06

## 2023-03-06 NOTE — TELEPHONE ENCOUNTER
The office visit on 12-30-22 needs to be submitted do to Error coordination of benefits.      Message sent to medicare

## 2023-03-06 NOTE — TELEPHONE ENCOUNTER
Spoke to pt she had already contacted [employer] on this bill Skin normal color for race, warm, dry and intact. No evidence of rash.

## 2023-03-06 NOTE — TELEPHONE ENCOUNTER
----- Message from Angel Dias sent at 3/6/2023  9:37 AM EST -----  Subject: Message to Provider    QUESTIONS  Information for Provider? Stefan Haley would like a call back due to she has been   trying to get her office visit on 12/30/2022 resubmitted due to the   billing. She spoke with Medicare and was told to reach the practice to   have them resubmit the visit. She can be reached at [phone] ok to   leave a message  ---------------------------------------------------------------------------  --------------  [id number] [employer]  [phone]; OK to leave message on voicemail  ---------------------------------------------------------------------------  --------------  SCRIPT ANSWERS  Relationship to Patient?  Self

## 2023-03-10 NOTE — PROGRESS NOTES
[facility]   Electrophysiology      Date: 3/14/2023    Referring Provider: Maria Fernanda Love MD  PCP: Raina Hurst MD     Chief Complaint:   Chief Complaint   Patient presents with    Follow-up     PT reports no issues at this time. History of Present Illness:    I saw Molly Millan in the office for electrophysiology follow up today. She is a 74 y.o. female with a past medical history of paroxysmal atrial fibrillation, NHL, ZOE on CPAP, DM and HTN. She underwent EP study, radiofrequency ablation of atrial fibrillation with pulmonary vein isolation, ablation on left atrium roof for roof-dependent left atrial flutter and mitral isthmus ablation (for L flutter) on 10/28/20 with Dr. Emerald Guzman. Flecainide was decreased to 50mg BID, Toprol was decreased to 12.5mg QD and Eliquis 5mg BID was continued. Monitor in March 2021 showed atrial fibrillation and she followed up with Dr. Emerald Guzman to discuss repeat ablation but did not proceed. She restarted flecainide 100mg BID and Toprol 25mg QD. She presents today for yearly follow up. She has been doing okay over the last year. She has had a couple hospitalizations but no recurrence of atrial fibrillation that she has been aware of. Denies any palpitations or dyspnea. No chest pain. No edema. No syncope. No bleeding issues. Allergies: Allergies   Allergen Reactions    Penicillins Hives     Home Medications:  Prior to Visit Medications    Medication Sig Taking?  Authorizing Provider   metFORMIN (GLUCOPHAGE-XR) 500 MG extended release tablet Take 2 tablets by mouth 2 times daily (with meals) Yes Raina Hurts MD   potassium chloride (KLOR-CON) 10 MEQ extended release tablet TAKE ONE TABLET BY MOUTH ONCE DAILY Yes Raina Hurst MD   BD PEN NEEDLE RAMIRO 2ND GEN 32G X 4 MM MISC USE WITH insulin daily Yes Raina Hurst MD   glimepiride (AMARYL) 2 MG tablet Take 1 tablet by mouth 2 times daily (with meals) Yes Raina Hurst MD   insulin detemir (LEVEMIR FLEXTOUCH) 100 UNIT/ML injection pen Inject 30 Units into the skin nightly  Patient taking differently: Inject 35 Units into the skin nightly Yes Papa Watt MD   apixaban (ELIQUIS) 5 MG TABS tablet Take 1 tablet by mouth 2 times daily Yes Riley Mario MD   Multiple Vitamin (MULTIVITAMIN PO) Take by mouth Yes Historical Provider, MD   furosemide (LASIX) 20 MG tablet Take 1 tablet by mouth daily . May take an additional dose as needed for shortenss of breath, weight gain, and edema Yes Riley Mario MD   metoprolol succinate (TOPROL XL) 25 MG extended release tablet Take 1 tablet by mouth daily  Patient taking differently: Take half a  tablet by mouth daily Yes Riley Mario MD   albuterol sulfate  (90 Base) MCG/ACT inhaler Inhale 2 puffs into the lungs 4 times daily Yes Pradeep Willard MD   acetaminophen (TYLENOL) 500 MG tablet Take 500 mg by mouth every 6 hours as needed for Pain Yes Historical Provider, MD   loratadine (CLARITIN) 10 MG capsule Take 10 mg by mouth daily  Yes Historical Provider, MD   ZINC PO Take by mouth  Patient not taking: Reported on 3/14/2023  Historical Provider, MD   Cholecalciferol (VITAMIN D3 PO) Take by mouth  Patient not taking: Reported on 3/14/2023  Historical Provider, MD        Past Medical History:  Past Medical History:   Diagnosis Date    Atrial fibrillation with RVR ([facility]     Cancer ([facility]     Diabetes mellitus type 2, controlled ([facility]     Hypertension     Melanoma in situ of back [facility])     dr Dianna Ballesteros    Precordial pain 2016    Sleep apnea        Past Surgical History:   Past Surgical History:   Procedure Laterality Date    BONE MARROW TRANSPLANT      BREAST CYST ASPIRATION      in past benign bilateral over years. 2 episodes     SECTION      CHOLECYSTECTOMY      COLONOSCOPY  9/10/12    polyp, sharlene, repeat 5 years    COLONOSCOPY  10/09/2017    dr Sudarshan Shaw and check in 5 years.  polyps    ENDOMETRIAL ABLATION  2007    FINE NEEDLE ASPIRATION      GALLBLADDER SURGERY      OTHER SURGICAL HISTORY  08/05/2019    Abdominal Mass BX    OTHER SURGICAL HISTORY  08/05/2019    CT BIOPSY ABDOMEN RETROPERITONEUM,    OVARY REMOVAL Left 01/19/2018    benign growth, dr Brenda Kelly at [facility]      TUNNELED VENOUS PORT PLACEMENT Right 02/26/2016    Dr. Ji Kelly History:   reports that she quit smoking about 7 years ago. Her smoking use included cigarettes. She started smoking about 48 years ago. She has a 0.80 pack-year smoking history. She has never used smokeless tobacco. She reports that she does not currently use alcohol. She reports that she does not use drugs. Family History:      Problem Relation Age of Onset    High Blood Pressure Mother     Cancer Mother         breast    High Blood Pressure Father     Cancer Father         lung       Review of Systems   Constitutional:  Negative for chills, fatigue, fever and unexpected weight change. HENT:  Negative for congestion, hearing loss, sinus pressure, sore throat and trouble swallowing. Respiratory:  Negative for cough, shortness of breath and wheezing. Cardiovascular:  Negative for chest pain, palpitations and leg swelling. Gastrointestinal:  Negative for abdominal pain, blood in stool, constipation, diarrhea, nausea and vomiting. Genitourinary:  Negative for hematuria. Musculoskeletal:  Negative for arthralgias, back pain, gait problem and myalgias. Skin:  Negative for color change, rash and wound. Neurological:  Negative for dizziness, seizures, syncope, speech difficulty, weakness and light-headedness. Hematological:  Does not bruise/bleed easily. Physical Examination:  Vitals:    03/14/23 1432   BP: 122/68   Pulse: 81   SpO2: 97%      Wt Readings from Last 3 Encounters:   03/14/23 222 lb (100.7 kg)   12/30/22 227 lb (103 kg)   08/19/22 224 lb (101.6 kg)       Physical Exam  Vitals reviewed. Constitutional:       General: She is not in acute distress. Appearance: Normal appearance. HENT:      Head: Normocephalic and atraumatic. Nose: Nose normal.      Mouth/Throat:      Mouth: Mucous membranes are moist.   Eyes:      Conjunctiva/sclera: Conjunctivae normal.      Pupils: Pupils are equal, round, and reactive to light. Cardiovascular:      Rate and Rhythm: Normal rate and regular rhythm. Heart sounds: No murmur heard. No friction rub. No gallop. Pulmonary:      Effort: No respiratory distress. Breath sounds: No wheezing, rhonchi or rales. Abdominal:      General: Abdomen is flat. Bowel sounds are normal.      Palpations: Abdomen is soft. Musculoskeletal:         General: Normal range of motion. Right lower leg: No edema. Left lower leg: No edema. Skin:     General: Skin is warm and dry. Findings: No bruising. Neurological:      General: No focal deficit present. Mental Status: She is alert and oriented to person, place, and time. Motor: No weakness. Psychiatric:         Mood and Affect: Mood normal.         Behavior: Behavior normal.        Pertinent labs, diagnostic, device, and imaging results reviewed as a part of this visit    LABS    CBC:   Lab Results   Component Value Date    WBC 9.7 07/18/2022    HGB 11.4 (A) 07/18/2022    HCT 36.4 07/18/2022    MCV 98.1 07/18/2022     07/18/2022     BMP:   Lab Results   Component Value Date    CREATININE 0.9 12/30/2022    BUN 17 12/30/2022     12/30/2022    K 3.6 12/30/2022    CL 99 12/30/2022    CO2 26 12/30/2022     Estimated Creatinine Clearance: 71 mL/min (based on SCr of 0.9 mg/dL).    No results found for: BNP    Thyroid:   Lab Results   Component Value Date    TSH 4.08 05/18/2020     Lipid Panel:   Lab Results   Component Value Date/Time    CHOL 170 12/30/2022 01:18 PM    HDL 44 12/30/2022 01:18 PM    TRIG 166 12/30/2022 01:18 PM     LFTs:  Lab Results   Component Value Date    ALT 60 (H) 12/30/2022    AST 57 (H) 12/30/2022    ALKPHOS 207 (H) 12/30/2022    BILITOT 0.6 12/30/2022     Coags:   Lab Results   Component Value Date    PROTIME 14.1 (H) 02/02/2021    INR 1.21 (H) 02/02/2021    APTT 31.8 12/09/2019       ECG: 3/14/2023   SR at 76 BPM. Anteroseptal infarct. Low voltage. Echo: 10/18/19   Conclusions      Summary   Left ventricular cavity size is normal. There is mild left ventricular   hypertrophy. Overall left ventricular systolic function appears normal with   an ejection fraction of 50-55%. No regional wall motion abnormalities are   noted. Indeterminate diastolic function. Trivial mitral, aortic, pulmonic, and tricuspid regurgitation. Estimated pulmonary artery systolic pressure is at 31 mmHg assuming a right   atrial pressure of 3 mmHg. Normal Global strain -24.1%. Stress: 1/15/16        Conclusions          Summary     Pharmacological Stress/MPI Results:          1. Technically a satisfactory study. 2. Normal pharmacological stress portion of the study. 3. No evidence of Ischemia by Myocardial Perfusion Imaging. 4. Gated Study shows normal LV size and Systolic function; EF is >82 %. Procedures:  1. Unsuccessful DCCV attempt x 3 10/31/18  2. Successful DCCV on 12/27/18  3.  RFA for A fib with PVI, roof line ablation (for L flutter), mitral isthmus ablation (for L flutter), 10/28/20 Dr. Becerril Shadow:    Paroxysmal atrial fibrillation              - s/p PVI ablation 10/28/20 with Dr. Saira Hart 3 (gender, HTN, DM) on Eliquis 5mg BID   - had recurrence on monitor in March 2021 and was resumed on flecainide and Toprol   - EKG today with SR   - she wants to do a trial off flecainide, will continue Toprol for rate control   - advised her to call and get an EKG done if she has recurrence of symptoms     Left atrial flutter              - seen during EP study s/p roof line and mitral isthmus ablations 10/28/20              - see above     Essential HTN               - controlled   - continue medical therapy Thank you for allowing to us to participate in the care of Ravi Hammond. Return in about 6 months (around 9/14/2023) for an appointment with Poly Brown NP.      ALEXUS Kowalski  [facility] [place], [facility], [address] [place] [address], [address] [place] [address]  Phone: [phone]  Fax: [phone]    Electronically signed by ALEXUS Pablo CNP on 3/14/2023 at 2:59 PM

## 2023-03-14 ENCOUNTER — TELEPHONE (OUTPATIENT)
Dept: CARDIOLOGY CLINIC | Age: 68
End: 2023-03-14

## 2023-03-14 ENCOUNTER — OFFICE VISIT (OUTPATIENT)
Dept: CARDIOLOGY CLINIC | Age: 68
End: 2023-03-14
Payer: MEDICARE

## 2023-03-14 VITALS
DIASTOLIC BLOOD PRESSURE: 68 MMHG | WEIGHT: 222 LBS | BODY MASS INDEX: 39.34 KG/M2 | OXYGEN SATURATION: 97 % | HEART RATE: 81 BPM | HEIGHT: 63 IN | SYSTOLIC BLOOD PRESSURE: 122 MMHG

## 2023-03-14 DIAGNOSIS — I48.0 PAF (PAROXYSMAL ATRIAL FIBRILLATION) (HCC): Primary | ICD-10-CM

## 2023-03-14 DIAGNOSIS — I48.92 LEFT ATRIAL FLUTTER BY ELECTROCARDIOGRAM (HCC): ICD-10-CM

## 2023-03-14 DIAGNOSIS — I10 ESSENTIAL HYPERTENSION: ICD-10-CM

## 2023-03-14 PROCEDURE — G8417 CALC BMI ABV UP PARAM F/U: HCPCS | Performed by: NURSE PRACTITIONER

## 2023-03-14 PROCEDURE — G8427 DOCREV CUR MEDS BY ELIG CLIN: HCPCS | Performed by: NURSE PRACTITIONER

## 2023-03-14 PROCEDURE — G8484 FLU IMMUNIZE NO ADMIN: HCPCS | Performed by: NURSE PRACTITIONER

## 2023-03-14 PROCEDURE — 99214 OFFICE O/P EST MOD 30 MIN: CPT | Performed by: NURSE PRACTITIONER

## 2023-03-14 PROCEDURE — G8400 PT W/DXA NO RESULTS DOC: HCPCS | Performed by: NURSE PRACTITIONER

## 2023-03-14 PROCEDURE — 3017F COLORECTAL CA SCREEN DOC REV: CPT | Performed by: NURSE PRACTITIONER

## 2023-03-14 PROCEDURE — 3078F DIAST BP <80 MM HG: CPT | Performed by: NURSE PRACTITIONER

## 2023-03-14 PROCEDURE — 93000 ELECTROCARDIOGRAM COMPLETE: CPT | Performed by: NURSE PRACTITIONER

## 2023-03-14 PROCEDURE — 3074F SYST BP LT 130 MM HG: CPT | Performed by: NURSE PRACTITIONER

## 2023-03-14 PROCEDURE — 1090F PRES/ABSN URINE INCON ASSESS: CPT | Performed by: NURSE PRACTITIONER

## 2023-03-14 PROCEDURE — 1123F ACP DISCUSS/DSCN MKR DOCD: CPT | Performed by: NURSE PRACTITIONER

## 2023-03-14 PROCEDURE — 1036F TOBACCO NON-USER: CPT | Performed by: NURSE PRACTITIONER

## 2023-03-14 ASSESSMENT — ENCOUNTER SYMPTOMS
BACK PAIN: 0
CONSTIPATION: 0
TROUBLE SWALLOWING: 0
COLOR CHANGE: 0
ABDOMINAL PAIN: 0
DIARRHEA: 0
VOMITING: 0
WHEEZING: 0
COUGH: 0
NAUSEA: 0
BLOOD IN STOOL: 0
SHORTNESS OF BREATH: 0
SORE THROAT: 0
SINUS PRESSURE: 0

## 2023-03-14 NOTE — TELEPHONE ENCOUNTER
Pt inquired about evaluation of aflutter during today's appointment  I explained that she was diagnosed in 2020 and today's ECG was WNL  She v/u

## 2023-03-14 NOTE — TELEPHONE ENCOUNTER
Dione Razo just saw [facility] in office and before she left she wanted to know about her A-Flutter. States her EKJG was good.     Dione Razo cn be reached at [phone]

## 2023-03-29 NOTE — ONCOLOGY
D: Pt with ongoing A-fib with pauses from previous shift, pauses increasing in frequency and duration, longest pause at this time has been 8 seconds. A: Notified on call cardiologist, Fidel Kumar, and updated on patient's condition. R: Given order for NPO and to hold lopressor until further notice. Will continue to monitor. [FreeTextEntry1] : CPE  [de-identified] : 37 year old female with PMH anxiety and vitamin D deficiency who presents for CPE. Doing well and has no complaints. She finished  school and has a form that needs to be filled out prior to starting.

## 2023-04-20 ENCOUNTER — TELEPHONE (OUTPATIENT)
Dept: CARDIOLOGY CLINIC | Age: 68
End: 2023-04-20

## 2023-04-28 ENCOUNTER — OFFICE VISIT (OUTPATIENT)
Dept: FAMILY MEDICINE CLINIC | Age: 68
End: 2023-04-28
Payer: MEDICARE

## 2023-04-28 VITALS
SYSTOLIC BLOOD PRESSURE: 120 MMHG | DIASTOLIC BLOOD PRESSURE: 78 MMHG | TEMPERATURE: 97.2 F | HEART RATE: 64 BPM | HEIGHT: 63 IN | BODY MASS INDEX: 39.34 KG/M2 | WEIGHT: 222 LBS

## 2023-04-28 DIAGNOSIS — E11.9 TYPE 2 DIABETES MELLITUS WITHOUT COMPLICATION, WITH LONG-TERM CURRENT USE OF INSULIN (HCC): ICD-10-CM

## 2023-04-28 DIAGNOSIS — I10 ESSENTIAL HYPERTENSION: Primary | ICD-10-CM

## 2023-04-28 DIAGNOSIS — Z79.4 TYPE 2 DIABETES MELLITUS WITHOUT COMPLICATION, WITH LONG-TERM CURRENT USE OF INSULIN (HCC): ICD-10-CM

## 2023-04-28 DIAGNOSIS — I10 ESSENTIAL HYPERTENSION: ICD-10-CM

## 2023-04-28 DIAGNOSIS — I48.19 PERSISTENT ATRIAL FIBRILLATION (HCC): ICD-10-CM

## 2023-04-28 DIAGNOSIS — E66.01 SEVERE OBESITY (BMI 35.0-39.9) WITH COMORBIDITY (HCC): ICD-10-CM

## 2023-04-28 LAB
ALBUMIN SERPL-MCNC: 4.1 G/DL (ref 3.4–5)
ALBUMIN/GLOB SERPL: 2.1 {RATIO} (ref 1.1–2.2)
ALP SERPL-CCNC: 211 U/L (ref 40–129)
ALT SERPL-CCNC: 40 U/L (ref 10–40)
ANION GAP SERPL CALCULATED.3IONS-SCNC: 14 MMOL/L (ref 3–16)
AST SERPL-CCNC: 44 U/L (ref 15–37)
BILIRUB SERPL-MCNC: 0.7 MG/DL (ref 0–1)
BUN SERPL-MCNC: 14 MG/DL (ref 7–20)
CALCIUM SERPL-MCNC: 9.1 MG/DL (ref 8.3–10.6)
CHLORIDE SERPL-SCNC: 106 MMOL/L (ref 99–110)
CHOLEST SERPL-MCNC: 145 MG/DL (ref 0–199)
CO2 SERPL-SCNC: 26 MMOL/L (ref 21–32)
CREAT SERPL-MCNC: 0.9 MG/DL (ref 0.6–1.2)
FOLATE SERPL-MCNC: >20 NG/ML (ref 4.78–24.2)
GFR SERPLBLD CREATININE-BSD FMLA CKD-EPI: >60 ML/MIN/{1.73_M2}
GLUCOSE SERPL-MCNC: 123 MG/DL (ref 70–99)
POTASSIUM SERPL-SCNC: 3.6 MMOL/L (ref 3.5–5.1)
PROT SERPL-MCNC: 6.1 G/DL (ref 6.4–8.2)
SODIUM SERPL-SCNC: 146 MMOL/L (ref 136–145)
VIT B12 SERPL-MCNC: 224 PG/ML (ref 211–911)

## 2023-04-28 PROCEDURE — 3078F DIAST BP <80 MM HG: CPT | Performed by: FAMILY MEDICINE

## 2023-04-28 PROCEDURE — 99214 OFFICE O/P EST MOD 30 MIN: CPT | Performed by: FAMILY MEDICINE

## 2023-04-28 PROCEDURE — 3046F HEMOGLOBIN A1C LEVEL >9.0%: CPT | Performed by: FAMILY MEDICINE

## 2023-04-28 PROCEDURE — 3017F COLORECTAL CA SCREEN DOC REV: CPT | Performed by: FAMILY MEDICINE

## 2023-04-28 PROCEDURE — 2022F DILAT RTA XM EVC RTNOPTHY: CPT | Performed by: FAMILY MEDICINE

## 2023-04-28 PROCEDURE — G8417 CALC BMI ABV UP PARAM F/U: HCPCS | Performed by: FAMILY MEDICINE

## 2023-04-28 PROCEDURE — G8400 PT W/DXA NO RESULTS DOC: HCPCS | Performed by: FAMILY MEDICINE

## 2023-04-28 PROCEDURE — G8427 DOCREV CUR MEDS BY ELIG CLIN: HCPCS | Performed by: FAMILY MEDICINE

## 2023-04-28 PROCEDURE — 1090F PRES/ABSN URINE INCON ASSESS: CPT | Performed by: FAMILY MEDICINE

## 2023-04-28 PROCEDURE — 3074F SYST BP LT 130 MM HG: CPT | Performed by: FAMILY MEDICINE

## 2023-04-28 PROCEDURE — 1036F TOBACCO NON-USER: CPT | Performed by: FAMILY MEDICINE

## 2023-04-28 PROCEDURE — G9899 SCRN MAM PERF RSLTS DOC: HCPCS | Performed by: FAMILY MEDICINE

## 2023-04-28 PROCEDURE — 1123F ACP DISCUSS/DSCN MKR DOCD: CPT | Performed by: FAMILY MEDICINE

## 2023-04-28 RX ORDER — FUROSEMIDE 20 MG/1
TABLET ORAL
Qty: 90 TABLET | Refills: 3 | Status: SHIPPED | OUTPATIENT
Start: 2023-04-28

## 2023-04-28 RX ORDER — GLIMEPIRIDE 2 MG/1
2 TABLET ORAL 2 TIMES DAILY WITH MEALS
Qty: 180 TABLET | Refills: 1 | Status: SHIPPED | OUTPATIENT
Start: 2023-04-28

## 2023-04-28 SDOH — ECONOMIC STABILITY: FOOD INSECURITY: WITHIN THE PAST 12 MONTHS, YOU WORRIED THAT YOUR FOOD WOULD RUN OUT BEFORE YOU GOT MONEY TO BUY MORE.: NEVER TRUE

## 2023-04-28 SDOH — ECONOMIC STABILITY: INCOME INSECURITY: HOW HARD IS IT FOR YOU TO PAY FOR THE VERY BASICS LIKE FOOD, HOUSING, MEDICAL CARE, AND HEATING?: NOT HARD AT ALL

## 2023-04-28 SDOH — ECONOMIC STABILITY: HOUSING INSECURITY
IN THE LAST 12 MONTHS, WAS THERE A TIME WHEN YOU DID NOT HAVE A STEADY PLACE TO SLEEP OR SLEPT IN A SHELTER (INCLUDING NOW)?: NO

## 2023-04-28 SDOH — ECONOMIC STABILITY: FOOD INSECURITY: WITHIN THE PAST 12 MONTHS, THE FOOD YOU BOUGHT JUST DIDN'T LAST AND YOU DIDN'T HAVE MONEY TO GET MORE.: NEVER TRUE

## 2023-04-28 ASSESSMENT — PATIENT HEALTH QUESTIONNAIRE - PHQ9
SUM OF ALL RESPONSES TO PHQ QUESTIONS 1-9: 0
SUM OF ALL RESPONSES TO PHQ QUESTIONS 1-9: 0
SUM OF ALL RESPONSES TO PHQ9 QUESTIONS 1 & 2: 0
1. LITTLE INTEREST OR PLEASURE IN DOING THINGS: 0
2. FEELING DOWN, DEPRESSED OR HOPELESS: 0
SUM OF ALL RESPONSES TO PHQ QUESTIONS 1-9: 0
SUM OF ALL RESPONSES TO PHQ QUESTIONS 1-9: 0

## 2023-04-28 ASSESSMENT — ENCOUNTER SYMPTOMS
ABDOMINAL PAIN: 0
SHORTNESS OF BREATH: 0
BLOOD IN STOOL: 0

## 2023-04-28 NOTE — PROGRESS NOTES
tablet by mouth daily . May take an additional dose as needed for shortenss of breath, weight gain, and edema, Disp: 90 tablet, Rfl: 3    No current facility-administered medications for this visit.      ---------------------------               04/28/23                      1109         ---------------------------   BP:          120/78         Site:    Left Upper Arm     Position:     Sitting        Cuff Size:   Large Adult      Pulse:         64           Temp:   97.2 °F (36.2 °C)   TempSrc:    Temporal        Weight: 222 lb (100.7 kg)   Height:   5' 3\" (1.6 m)    ---------------------------  Body mass index is 39.33 kg/m². Wt Readings from Last 3 Encounters:  04/28/23 : 222 lb (100.7 kg)  03/14/23 : 222 lb (100.7 kg)  12/30/22 : 227 lb (103 kg)    BP Readings from Last 3 Encounters:  04/28/23 : 120/78  03/14/23 : 122/68  12/30/22 : 122/78          Review of Systems   Constitutional:  Negative for chills and fever. Respiratory:  Negative for shortness of breath. Cardiovascular:  Negative for chest pain, palpitations and leg swelling. Gastrointestinal:  Negative for abdominal pain and blood in stool. Genitourinary:  Negative for difficulty urinating, dysuria and hematuria. Musculoskeletal:         Feet ok    Neurological:  Negative for headaches. Objective:   Physical Exam  Constitutional:       General: She is not in acute distress. Appearance: Normal appearance. She is well-developed. She is not ill-appearing or diaphoretic. Neck:      Thyroid: No thyromegaly. Cardiovascular:      Rate and Rhythm: Normal rate and regular rhythm. Heart sounds: Normal heart sounds. No murmur heard. No friction rub. No gallop. Pulmonary:      Effort: Pulmonary effort is normal. No tachypnea, accessory muscle usage or respiratory distress. Breath sounds: Normal breath sounds. No decreased breath sounds, wheezing, rhonchi or rales.    Musculoskeletal:

## 2023-04-28 NOTE — TELEPHONE ENCOUNTER
Medication Refill    Medication needing refilled:  furosemide (LASIX)  apixaban (ELIQUIS    Dosage of the medication:  20 MG tablet   5 MG TABS tablet     How are you taking this medication (QD, BID, TID, QID, PRN):  Take 1 tablet by mouth daily .  May take an additional dose as needed for shortenss of breath, weight gain, and edema  Take 1 tablet by mouth 2 times daily    30 or 90 day supply called in:  90 tablet  180 tablet    Which Pharmacy are we sending the medication to?:    [facility]/pharmacy #[id number]- [facility] - [address] [phone]   [place] [facility] [address]   Phone:  [phone]  Fax:  [phone]

## 2023-04-29 LAB
EST. AVERAGE GLUCOSE BLD GHB EST-MCNC: 165.7 MG/DL
HBA1C MFR BLD: 7.4 %

## 2023-05-23 RX ORDER — FLECAINIDE ACETATE 100 MG/1
TABLET ORAL
Qty: 180 TABLET | Refills: 1 | OUTPATIENT
Start: 2023-05-23

## 2023-05-23 RX ORDER — FLECAINIDE ACETATE 100 MG/1
100 TABLET ORAL 2 TIMES DAILY
Qty: 90 TABLET | Refills: 3 | OUTPATIENT
Start: 2023-05-23

## 2023-05-23 RX ORDER — FLECAINIDE ACETATE 100 MG/1
100 TABLET ORAL 2 TIMES DAILY
Qty: 180 TABLET | Refills: 1 | Status: SHIPPED | OUTPATIENT
Start: 2023-05-23 | End: 2023-05-26 | Stop reason: SDUPTHER

## 2023-05-23 RX ORDER — FLECAINIDE ACETATE 100 MG/1
100 TABLET ORAL 2 TIMES DAILY
COMMUNITY
End: 2023-05-23 | Stop reason: SDUPTHER

## 2023-05-25 ENCOUNTER — TELEPHONE (OUTPATIENT)
Dept: CARDIOLOGY CLINIC | Age: 68
End: 2023-05-25

## 2023-05-25 NOTE — TELEPHONE ENCOUNTER
Flecainide 100mg was sent to [facility] for PT on 5/23/23. Can we clarify the directions? PT also would like to clarify if she should be taking this medication or is the trial of her not taking it still going?

## 2023-05-26 RX ORDER — FLECAINIDE ACETATE 100 MG/1
100 TABLET ORAL 2 TIMES DAILY
Qty: 180 TABLET | Refills: 1 | Status: SHIPPED | OUTPATIENT
Start: 2023-05-26

## 2023-05-26 NOTE — TELEPHONE ENCOUNTER
I just spoke with [facility].  They are requesting a new RX with the updated dosing and directions for PT's Flecainide.

## 2023-05-26 NOTE — TELEPHONE ENCOUNTER
I was told by Gerald Gonzalez earlier in the week that the pt was having more atrial fibrillation and wanted to resume taking flecainide. She was on 100mg BID previously but could try 50mg BID to see if that improves her symptoms.

## 2023-06-08 RX ORDER — METFORMIN HYDROCHLORIDE 500 MG/1
TABLET, EXTENDED RELEASE ORAL
Qty: 360 TABLET | Refills: 1 | Status: SHIPPED | OUTPATIENT
Start: 2023-06-08

## 2023-07-03 ENCOUNTER — OFFICE VISIT (OUTPATIENT)
Dept: CARDIOLOGY CLINIC | Age: 68
End: 2023-07-03

## 2023-07-03 VITALS
SYSTOLIC BLOOD PRESSURE: 132 MMHG | OXYGEN SATURATION: 97 % | DIASTOLIC BLOOD PRESSURE: 78 MMHG | BODY MASS INDEX: 38.45 KG/M2 | WEIGHT: 217 LBS | HEART RATE: 73 BPM | HEIGHT: 63 IN

## 2023-07-03 DIAGNOSIS — I48.19 PERSISTENT ATRIAL FIBRILLATION (HCC): Primary | ICD-10-CM

## 2023-07-03 DIAGNOSIS — I10 ESSENTIAL HYPERTENSION: ICD-10-CM

## 2023-07-03 DIAGNOSIS — Z86.39 HISTORY OF TYPE 2 DIABETES MELLITUS: ICD-10-CM

## 2023-07-03 DIAGNOSIS — Z79.01 CURRENT USE OF ANTICOAGULANT THERAPY: ICD-10-CM

## 2023-07-03 DIAGNOSIS — G47.33 OSA (OBSTRUCTIVE SLEEP APNEA): ICD-10-CM

## 2023-07-03 DIAGNOSIS — I48.4 ATYPICAL ATRIAL FLUTTER (HCC): ICD-10-CM

## 2023-07-19 ENCOUNTER — OFFICE VISIT (OUTPATIENT)
Dept: SLEEP MEDICINE | Age: 68
End: 2023-07-19
Payer: MEDICARE

## 2023-07-19 VITALS
BODY MASS INDEX: 38.98 KG/M2 | DIASTOLIC BLOOD PRESSURE: 70 MMHG | RESPIRATION RATE: 18 BRPM | WEIGHT: 220 LBS | HEIGHT: 63 IN | SYSTOLIC BLOOD PRESSURE: 130 MMHG | TEMPERATURE: 97.1 F

## 2023-07-19 DIAGNOSIS — Z99.89 OSA ON CPAP: Primary | ICD-10-CM

## 2023-07-19 DIAGNOSIS — R09.81 NASAL CONGESTION: ICD-10-CM

## 2023-07-19 DIAGNOSIS — Z86.79 HISTORY OF ATRIAL FIBRILLATION: ICD-10-CM

## 2023-07-19 DIAGNOSIS — G47.33 OSA ON CPAP: Primary | ICD-10-CM

## 2023-07-19 DIAGNOSIS — Z99.89 DEPENDENCE ON OTHER ENABLING MACHINES AND DEVICES: ICD-10-CM

## 2023-07-19 DIAGNOSIS — I10 ESSENTIAL HYPERTENSION: ICD-10-CM

## 2023-07-19 PROCEDURE — 3075F SYST BP GE 130 - 139MM HG: CPT | Performed by: PSYCHIATRY & NEUROLOGY

## 2023-07-19 PROCEDURE — G8400 PT W/DXA NO RESULTS DOC: HCPCS | Performed by: PSYCHIATRY & NEUROLOGY

## 2023-07-19 PROCEDURE — 1090F PRES/ABSN URINE INCON ASSESS: CPT | Performed by: PSYCHIATRY & NEUROLOGY

## 2023-07-19 PROCEDURE — 99214 OFFICE O/P EST MOD 30 MIN: CPT | Performed by: PSYCHIATRY & NEUROLOGY

## 2023-07-19 PROCEDURE — G9899 SCRN MAM PERF RSLTS DOC: HCPCS | Performed by: PSYCHIATRY & NEUROLOGY

## 2023-07-19 PROCEDURE — G8417 CALC BMI ABV UP PARAM F/U: HCPCS | Performed by: PSYCHIATRY & NEUROLOGY

## 2023-07-19 PROCEDURE — 3078F DIAST BP <80 MM HG: CPT | Performed by: PSYCHIATRY & NEUROLOGY

## 2023-07-19 PROCEDURE — 3017F COLORECTAL CA SCREEN DOC REV: CPT | Performed by: PSYCHIATRY & NEUROLOGY

## 2023-07-19 PROCEDURE — 1123F ACP DISCUSS/DSCN MKR DOCD: CPT | Performed by: PSYCHIATRY & NEUROLOGY

## 2023-07-19 PROCEDURE — G8427 DOCREV CUR MEDS BY ELIG CLIN: HCPCS | Performed by: PSYCHIATRY & NEUROLOGY

## 2023-07-19 PROCEDURE — 1036F TOBACCO NON-USER: CPT | Performed by: PSYCHIATRY & NEUROLOGY

## 2023-07-19 RX ORDER — AZELASTINE 1 MG/ML
2 SPRAY, METERED NASAL 2 TIMES DAILY
Qty: 120 ML | Refills: 5 | Status: SHIPPED | OUTPATIENT
Start: 2023-07-19

## 2023-07-19 ASSESSMENT — SLEEP AND FATIGUE QUESTIONNAIRES
HOW LIKELY ARE YOU TO NOD OFF OR FALL ASLEEP WHILE LYING DOWN TO REST IN THE AFTERNOON WHEN CIRCUMSTANCES PERMIT: 0
HOW LIKELY ARE YOU TO NOD OFF OR FALL ASLEEP WHILE SITTING QUIETLY AFTER LUNCH WITHOUT ALCOHOL: 1
HOW LIKELY ARE YOU TO NOD OFF OR FALL ASLEEP IN A CAR, WHILE STOPPED FOR A FEW MINUTES IN TRAFFIC: 0
HOW LIKELY ARE YOU TO NOD OFF OR FALL ASLEEP WHILE SITTING AND TALKING TO SOMEONE: 0
HOW LIKELY ARE YOU TO NOD OFF OR FALL ASLEEP WHILE SITTING INACTIVE IN A PUBLIC PLACE: 0
ESS TOTAL SCORE: 3
HOW LIKELY ARE YOU TO NOD OFF OR FALL ASLEEP WHILE SITTING AND READING: 1
HOW LIKELY ARE YOU TO NOD OFF OR FALL ASLEEP WHILE WATCHING TV: 1
HOW LIKELY ARE YOU TO NOD OFF OR FALL ASLEEP WHEN YOU ARE A PASSENGER IN A CAR FOR AN HOUR WITHOUT A BREAK: 0

## 2023-08-02 ENCOUNTER — TELEPHONE (OUTPATIENT)
Dept: FAMILY MEDICINE CLINIC | Age: 68
End: 2023-08-02

## 2023-08-04 ENCOUNTER — TELEPHONE (OUTPATIENT)
Dept: CARDIOLOGY CLINIC | Age: 68
End: 2023-08-04

## 2023-08-04 NOTE — TELEPHONE ENCOUNTER
Arelis Alvarez called the office wanting to be scheduled for her ablation.        Kiera's callback: [phone]

## 2023-08-09 ENCOUNTER — ANESTHESIA (OUTPATIENT)
Dept: CARDIAC CATH/INVASIVE PROCEDURES | Age: 68
End: 2023-08-09

## 2023-08-09 ENCOUNTER — HOSPITAL ENCOUNTER (OUTPATIENT)
Dept: CARDIAC CATH/INVASIVE PROCEDURES | Age: 68
Discharge: HOME | End: 2023-08-09
Payer: MEDICARE

## 2023-08-09 ENCOUNTER — ANESTHESIA EVENT (OUTPATIENT)
Dept: CARDIAC CATH/INVASIVE PROCEDURES | Age: 68
End: 2023-08-09

## 2023-08-09 VITALS
RESPIRATION RATE: 15 BRPM | SYSTOLIC BLOOD PRESSURE: 128 MMHG | HEIGHT: 63 IN | OXYGEN SATURATION: 94 % | TEMPERATURE: 97.4 F | WEIGHT: 221 LBS | HEART RATE: 84 BPM | BODY MASS INDEX: 39.16 KG/M2 | DIASTOLIC BLOOD PRESSURE: 85 MMHG

## 2023-08-09 DIAGNOSIS — I48.4 ATYPICAL ATRIAL FLUTTER (HCC): ICD-10-CM

## 2023-08-09 DIAGNOSIS — I48.19 PERSISTENT ATRIAL FIBRILLATION (HCC): ICD-10-CM

## 2023-08-09 LAB
ABO + RH BLD: NORMAL
ANION GAP SERPL CALCULATED.3IONS-SCNC: 12 MMOL/L (ref 3–16)
BLD GP AB SCN SERPL QL: NORMAL
BUN SERPL-MCNC: 19 MG/DL (ref 7–20)
CALCIUM SERPL-MCNC: 9.1 MG/DL (ref 8.3–10.6)
CHLORIDE SERPL-SCNC: 105 MMOL/L (ref 99–110)
CO2 SERPL-SCNC: 26 MMOL/L (ref 21–32)
CREAT SERPL-MCNC: 1.1 MG/DL (ref 0.6–1.2)
DEPRECATED RDW RBC AUTO: 15.3 % (ref 12.4–15.4)
EKG ATRIAL RATE: 83 BPM
EKG DIAGNOSIS: NORMAL
EKG P AXIS: 48 DEGREES
EKG P-R INTERVAL: 150 MS
EKG Q-T INTERVAL: 416 MS
EKG QRS DURATION: 88 MS
EKG QTC CALCULATION (BAZETT): 488 MS
EKG R AXIS: 26 DEGREES
EKG T AXIS: 14 DEGREES
EKG VENTRICULAR RATE: 83 BPM
GFR SERPLBLD CREATININE-BSD FMLA CKD-EPI: 56 ML/MIN/{1.73_M2}
GLUCOSE BLD-MCNC: 218 MG/DL (ref 70–99)
GLUCOSE SERPL-MCNC: 253 MG/DL (ref 70–99)
HCT VFR BLD AUTO: 40.3 % (ref 36–48)
HGB BLD-MCNC: 13.7 G/DL (ref 12–16)
MCH RBC QN AUTO: 31.4 PG (ref 26–34)
MCHC RBC AUTO-ENTMCNC: 34 G/DL (ref 31–36)
MCV RBC AUTO: 92.3 FL (ref 80–100)
PERFORMED ON: ABNORMAL
PLATELET # BLD AUTO: 143 K/UL (ref 135–450)
PMV BLD AUTO: 9.7 FL (ref 5–10.5)
POC ACT LR: 275 SEC
POC ACT LR: 302 SEC
POC ACT LR: 371 SEC
POC ACT LR: >400 SEC
POC ACT LR: >400 SEC
POTASSIUM SERPL-SCNC: 3.6 MMOL/L (ref 3.5–5.1)
RBC # BLD AUTO: 4.37 M/UL (ref 4–5.2)
SODIUM SERPL-SCNC: 143 MMOL/L (ref 136–145)
WBC # BLD AUTO: 6.4 K/UL (ref 4–11)

## 2023-08-09 PROCEDURE — 93623 PRGRMD STIMJ&PACG IV RX NFS: CPT

## 2023-08-09 PROCEDURE — 3700000000 HC ANESTHESIA ATTENDED CARE

## 2023-08-09 PROCEDURE — 2500000003 HC RX 250 WO HCPCS: Performed by: NURSE ANESTHETIST, CERTIFIED REGISTERED

## 2023-08-09 PROCEDURE — C1730 CATH, EP, 19 OR FEW ELECT: HCPCS

## 2023-08-09 PROCEDURE — 2709999900 HC NON-CHARGEABLE SUPPLY

## 2023-08-09 PROCEDURE — 86850 RBC ANTIBODY SCREEN: CPT

## 2023-08-09 PROCEDURE — 6360000002 HC RX W HCPCS: Performed by: NURSE ANESTHETIST, CERTIFIED REGISTERED

## 2023-08-09 PROCEDURE — A4216 STERILE WATER/SALINE, 10 ML: HCPCS

## 2023-08-09 PROCEDURE — 85347 COAGULATION TIME ACTIVATED: CPT

## 2023-08-09 PROCEDURE — 93657 TX L/R ATRIAL FIB ADDL: CPT

## 2023-08-09 PROCEDURE — C1893 INTRO/SHEATH, FIXED,NON-PEEL: HCPCS

## 2023-08-09 PROCEDURE — 2580000003 HC RX 258: Performed by: NURSE ANESTHETIST, CERTIFIED REGISTERED

## 2023-08-09 PROCEDURE — 2580000003 HC RX 258

## 2023-08-09 PROCEDURE — 93325 DOPPLER ECHO COLOR FLOW MAPG: CPT

## 2023-08-09 PROCEDURE — 3700000001 HC ADD 15 MINUTES (ANESTHESIA)

## 2023-08-09 PROCEDURE — 93655 ICAR CATH ABLTJ DSCRT ARRHYT: CPT

## 2023-08-09 PROCEDURE — 93312 ECHO TRANSESOPHAGEAL: CPT

## 2023-08-09 PROCEDURE — 93622 COMP EP EVAL L VENTR PAC&REC: CPT

## 2023-08-09 PROCEDURE — A4216 STERILE WATER/SALINE, 10 ML: HCPCS | Performed by: NURSE ANESTHETIST, CERTIFIED REGISTERED

## 2023-08-09 PROCEDURE — 93656 COMPRE EP EVAL ABLTJ ATR FIB: CPT

## 2023-08-09 PROCEDURE — 85027 COMPLETE CBC AUTOMATED: CPT

## 2023-08-09 PROCEDURE — C1759 CATH, INTRA ECHOCARDIOGRAPHY: HCPCS

## 2023-08-09 PROCEDURE — 6360000002 HC RX W HCPCS

## 2023-08-09 PROCEDURE — 80048 BASIC METABOLIC PNL TOTAL CA: CPT

## 2023-08-09 PROCEDURE — 86901 BLOOD TYPING SEROLOGIC RH(D): CPT

## 2023-08-09 PROCEDURE — 7100000000 HC PACU RECOVERY - FIRST 15 MIN

## 2023-08-09 PROCEDURE — 7100000011 HC PHASE II RECOVERY - ADDTL 15 MIN

## 2023-08-09 PROCEDURE — C1732 CATH, EP, DIAG/ABL, 3D/VECT: HCPCS

## 2023-08-09 PROCEDURE — C9399 UNCLASSIFIED DRUGS OR BIOLOG: HCPCS

## 2023-08-09 PROCEDURE — 2500000003 HC RX 250 WO HCPCS

## 2023-08-09 PROCEDURE — 2720000010 HC SURG SUPPLY STERILE

## 2023-08-09 PROCEDURE — C1894 INTRO/SHEATH, NON-LASER: HCPCS

## 2023-08-09 PROCEDURE — C9399 UNCLASSIFIED DRUGS OR BIOLOG: HCPCS | Performed by: NURSE ANESTHETIST, CERTIFIED REGISTERED

## 2023-08-09 PROCEDURE — 86900 BLOOD TYPING SEROLOGIC ABO: CPT

## 2023-08-09 PROCEDURE — 93320 DOPPLER ECHO COMPLETE: CPT

## 2023-08-09 RX ORDER — FENTANYL CITRATE 0.05 MG/ML
25 INJECTION, SOLUTION INTRAMUSCULAR; INTRAVENOUS EVERY 5 MIN PRN
Status: DISCONTINUED | OUTPATIENT
Start: 2023-08-09 | End: 2023-08-10 | Stop reason: HOSPADM

## 2023-08-09 RX ORDER — SODIUM CHLORIDE 0.9 % (FLUSH) 0.9 %
5-40 SYRINGE (ML) INJECTION EVERY 12 HOURS SCHEDULED
Status: DISCONTINUED | OUTPATIENT
Start: 2023-08-09 | End: 2023-08-10 | Stop reason: HOSPADM

## 2023-08-09 RX ORDER — PROTAMINE SULFATE 10 MG/ML
30 INJECTION, SOLUTION INTRAVENOUS
Status: DISCONTINUED | OUTPATIENT
Start: 2023-08-09 | End: 2023-08-10 | Stop reason: HOSPADM

## 2023-08-09 RX ORDER — ONDANSETRON 2 MG/ML
4 INJECTION INTRAMUSCULAR; INTRAVENOUS
Status: DISCONTINUED | OUTPATIENT
Start: 2023-08-09 | End: 2023-08-10 | Stop reason: HOSPADM

## 2023-08-09 RX ORDER — AZELASTINE 1 MG/ML
2 SPRAY, METERED NASAL 2 TIMES DAILY
Status: DISCONTINUED | OUTPATIENT
Start: 2023-08-09 | End: 2023-08-10 | Stop reason: HOSPADM

## 2023-08-09 RX ORDER — SODIUM CHLORIDE 9 MG/ML
INJECTION, SOLUTION INTRAVENOUS CONTINUOUS PRN
Status: DISCONTINUED | OUTPATIENT
Start: 2023-08-09 | End: 2023-08-09 | Stop reason: SDUPTHER

## 2023-08-09 RX ORDER — LORAZEPAM 2 MG/ML
0.5 INJECTION INTRAMUSCULAR
Status: DISCONTINUED | OUTPATIENT
Start: 2023-08-09 | End: 2023-08-10 | Stop reason: HOSPADM

## 2023-08-09 RX ORDER — MIDAZOLAM HYDROCHLORIDE 1 MG/ML
INJECTION INTRAMUSCULAR; INTRAVENOUS PRN
Status: DISCONTINUED | OUTPATIENT
Start: 2023-08-09 | End: 2023-08-09 | Stop reason: SDUPTHER

## 2023-08-09 RX ORDER — GLIMEPIRIDE 2 MG/1
2 TABLET ORAL 2 TIMES DAILY WITH MEALS
Status: DISCONTINUED | OUTPATIENT
Start: 2023-08-09 | End: 2023-08-10 | Stop reason: HOSPADM

## 2023-08-09 RX ORDER — ALBUTEROL SULFATE 90 UG/1
2 AEROSOL, METERED RESPIRATORY (INHALATION)
Status: DISCONTINUED | OUTPATIENT
Start: 2023-08-09 | End: 2023-08-10 | Stop reason: HOSPADM

## 2023-08-09 RX ORDER — SODIUM CHLORIDE 0.9 % (FLUSH) 0.9 %
5-40 SYRINGE (ML) INJECTION PRN
Status: DISCONTINUED | OUTPATIENT
Start: 2023-08-09 | End: 2023-08-10 | Stop reason: HOSPADM

## 2023-08-09 RX ORDER — HEPARIN SODIUM 10000 [USP'U]/100ML
INJECTION, SOLUTION INTRAVENOUS CONTINUOUS PRN
Status: DISCONTINUED | OUTPATIENT
Start: 2023-08-09 | End: 2023-08-09 | Stop reason: SDUPTHER

## 2023-08-09 RX ORDER — METOPROLOL SUCCINATE 25 MG/1
12.5 TABLET, EXTENDED RELEASE ORAL DAILY
Status: DISCONTINUED | OUTPATIENT
Start: 2023-08-09 | End: 2023-08-10 | Stop reason: HOSPADM

## 2023-08-09 RX ORDER — FUROSEMIDE 10 MG/ML
INJECTION INTRAMUSCULAR; INTRAVENOUS PRN
Status: DISCONTINUED | OUTPATIENT
Start: 2023-08-09 | End: 2023-08-09 | Stop reason: SDUPTHER

## 2023-08-09 RX ORDER — PROPOFOL 10 MG/ML
INJECTION, EMULSION INTRAVENOUS PRN
Status: DISCONTINUED | OUTPATIENT
Start: 2023-08-09 | End: 2023-08-09 | Stop reason: SDUPTHER

## 2023-08-09 RX ORDER — PROCHLORPERAZINE EDISYLATE 5 MG/ML
5 INJECTION INTRAMUSCULAR; INTRAVENOUS
Status: DISCONTINUED | OUTPATIENT
Start: 2023-08-09 | End: 2023-08-10 | Stop reason: HOSPADM

## 2023-08-09 RX ORDER — SUCCINYLCHOLINE/SOD CL,ISO/PF 200MG/10ML
SYRINGE (ML) INTRAVENOUS PRN
Status: DISCONTINUED | OUTPATIENT
Start: 2023-08-09 | End: 2023-08-09 | Stop reason: SDUPTHER

## 2023-08-09 RX ORDER — HEPARIN SODIUM 1000 [USP'U]/ML
INJECTION, SOLUTION INTRAVENOUS; SUBCUTANEOUS PRN
Status: DISCONTINUED | OUTPATIENT
Start: 2023-08-09 | End: 2023-08-09 | Stop reason: SDUPTHER

## 2023-08-09 RX ORDER — SODIUM CHLORIDE 9 MG/ML
INJECTION, SOLUTION INTRAVENOUS PRN
Status: DISCONTINUED | OUTPATIENT
Start: 2023-08-09 | End: 2023-08-10 | Stop reason: HOSPADM

## 2023-08-09 RX ORDER — LIDOCAINE HYDROCHLORIDE 20 MG/ML
INJECTION, SOLUTION EPIDURAL; INFILTRATION; INTRACAUDAL; PERINEURAL PRN
Status: DISCONTINUED | OUTPATIENT
Start: 2023-08-09 | End: 2023-08-09 | Stop reason: SDUPTHER

## 2023-08-09 RX ORDER — IPRATROPIUM BROMIDE AND ALBUTEROL SULFATE 2.5; .5 MG/3ML; MG/3ML
1 SOLUTION RESPIRATORY (INHALATION)
Status: DISCONTINUED | OUTPATIENT
Start: 2023-08-09 | End: 2023-08-10 | Stop reason: HOSPADM

## 2023-08-09 RX ORDER — METFORMIN HYDROCHLORIDE 500 MG/1
1000 TABLET, EXTENDED RELEASE ORAL 2 TIMES DAILY WITH MEALS
Status: DISCONTINUED | OUTPATIENT
Start: 2023-08-09 | End: 2023-08-10 | Stop reason: HOSPADM

## 2023-08-09 RX ORDER — FENTANYL CITRATE 50 UG/ML
INJECTION, SOLUTION INTRAMUSCULAR; INTRAVENOUS PRN
Status: DISCONTINUED | OUTPATIENT
Start: 2023-08-09 | End: 2023-08-09 | Stop reason: SDUPTHER

## 2023-08-09 RX ORDER — HYDRALAZINE HYDROCHLORIDE 20 MG/ML
10 INJECTION INTRAMUSCULAR; INTRAVENOUS
Status: DISCONTINUED | OUTPATIENT
Start: 2023-08-09 | End: 2023-08-10 | Stop reason: HOSPADM

## 2023-08-09 RX ORDER — FLECAINIDE ACETATE 100 MG/1
50 TABLET ORAL 2 TIMES DAILY
Status: DISCONTINUED | OUTPATIENT
Start: 2023-08-09 | End: 2023-08-10 | Stop reason: HOSPADM

## 2023-08-09 RX ORDER — 0.9 % SODIUM CHLORIDE 0.9 %
1000 INTRAVENOUS SOLUTION INTRAVENOUS
Status: DISCONTINUED | OUTPATIENT
Start: 2023-08-09 | End: 2023-08-10 | Stop reason: HOSPADM

## 2023-08-09 RX ORDER — DEXTROSE MONOHYDRATE 100 MG/ML
INJECTION, SOLUTION INTRAVENOUS CONTINUOUS PRN
Status: DISCONTINUED | OUTPATIENT
Start: 2023-08-09 | End: 2023-08-10 | Stop reason: HOSPADM

## 2023-08-09 RX ORDER — PROTAMINE SULFATE 10 MG/ML
INJECTION, SOLUTION INTRAVENOUS PRN
Status: DISCONTINUED | OUTPATIENT
Start: 2023-08-09 | End: 2023-08-09 | Stop reason: SDUPTHER

## 2023-08-09 RX ORDER — LIDOCAINE HYDROCHLORIDE AND EPINEPHRINE BITARTRATE 20; .01 MG/ML; MG/ML
15 INJECTION, SOLUTION SUBCUTANEOUS SEE ADMIN INSTRUCTIONS
Status: DISCONTINUED | OUTPATIENT
Start: 2023-08-09 | End: 2023-08-10 | Stop reason: HOSPADM

## 2023-08-09 RX ORDER — GLYCOPYRROLATE 0.2 MG/ML
INJECTION INTRAMUSCULAR; INTRAVENOUS PRN
Status: DISCONTINUED | OUTPATIENT
Start: 2023-08-09 | End: 2023-08-09 | Stop reason: SDUPTHER

## 2023-08-09 RX ORDER — SODIUM CHLORIDE 9 MG/ML
INJECTION INTRAVENOUS PRN
Status: DISCONTINUED | OUTPATIENT
Start: 2023-08-09 | End: 2023-08-09 | Stop reason: SDUPTHER

## 2023-08-09 RX ORDER — ONDANSETRON 2 MG/ML
INJECTION INTRAMUSCULAR; INTRAVENOUS PRN
Status: DISCONTINUED | OUTPATIENT
Start: 2023-08-09 | End: 2023-08-09 | Stop reason: SDUPTHER

## 2023-08-09 RX ORDER — DOPAMINE HYDROCHLORIDE 160 MG/100ML
INJECTION, SOLUTION INTRAVENOUS CONTINUOUS PRN
Status: DISCONTINUED | OUTPATIENT
Start: 2023-08-09 | End: 2023-08-09 | Stop reason: SDUPTHER

## 2023-08-09 RX ORDER — DEXAMETHASONE SODIUM PHOSPHATE 4 MG/ML
INJECTION, SOLUTION INTRA-ARTICULAR; INTRALESIONAL; INTRAMUSCULAR; INTRAVENOUS; SOFT TISSUE PRN
Status: DISCONTINUED | OUTPATIENT
Start: 2023-08-09 | End: 2023-08-09 | Stop reason: SDUPTHER

## 2023-08-09 RX ORDER — FENTANYL CITRATE 0.05 MG/ML
50 INJECTION, SOLUTION INTRAMUSCULAR; INTRAVENOUS EVERY 5 MIN PRN
Status: DISCONTINUED | OUTPATIENT
Start: 2023-08-09 | End: 2023-08-10 | Stop reason: HOSPADM

## 2023-08-09 RX ORDER — ACETAMINOPHEN 325 MG/1
650 TABLET ORAL EVERY 4 HOURS PRN
Status: DISCONTINUED | OUTPATIENT
Start: 2023-08-09 | End: 2023-08-10 | Stop reason: HOSPADM

## 2023-08-09 RX ORDER — CETIRIZINE HYDROCHLORIDE 10 MG/1
10 TABLET ORAL DAILY
Status: DISCONTINUED | OUTPATIENT
Start: 2023-08-09 | End: 2023-08-10 | Stop reason: HOSPADM

## 2023-08-09 RX ORDER — VECURONIUM BROMIDE 1 MG/ML
INJECTION, POWDER, LYOPHILIZED, FOR SOLUTION INTRAVENOUS PRN
Status: DISCONTINUED | OUTPATIENT
Start: 2023-08-09 | End: 2023-08-09 | Stop reason: SDUPTHER

## 2023-08-09 RX ORDER — LABETALOL HYDROCHLORIDE 5 MG/ML
10 INJECTION, SOLUTION INTRAVENOUS
Status: DISCONTINUED | OUTPATIENT
Start: 2023-08-09 | End: 2023-08-10 | Stop reason: HOSPADM

## 2023-08-09 RX ADMIN — Medication 120 MG: at 09:00

## 2023-08-09 RX ADMIN — MIDAZOLAM 2 MG: 1 INJECTION INTRAMUSCULAR; INTRAVENOUS at 08:55

## 2023-08-09 RX ADMIN — HEPARIN SODIUM 5000 UNITS: 1000 INJECTION INTRAVENOUS; SUBCUTANEOUS at 09:40

## 2023-08-09 RX ADMIN — PROPOFOL 100 MG: 10 INJECTION, EMULSION INTRAVENOUS at 09:00

## 2023-08-09 RX ADMIN — LIDOCAINE HYDROCHLORIDE 60 MG: 20 INJECTION, SOLUTION EPIDURAL; INFILTRATION; INTRACAUDAL; PERINEURAL at 09:00

## 2023-08-09 RX ADMIN — HEPARIN SODIUM 2000 UNITS: 1000 INJECTION INTRAVENOUS; SUBCUTANEOUS at 10:24

## 2023-08-09 RX ADMIN — VECURONIUM BROMIDE 2 MG: 1 INJECTION, POWDER, LYOPHILIZED, FOR SOLUTION INTRAVENOUS at 10:11

## 2023-08-09 RX ADMIN — FENTANYL CITRATE 100 MCG: 50 INJECTION INTRAMUSCULAR; INTRAVENOUS at 08:56

## 2023-08-09 RX ADMIN — PROTAMINE SULFATE 125 MG: 10 INJECTION, SOLUTION INTRAVENOUS at 11:14

## 2023-08-09 RX ADMIN — SODIUM CHLORIDE: 9 INJECTION, SOLUTION INTRAVENOUS at 08:55

## 2023-08-09 RX ADMIN — SODIUM CHLORIDE: 9 INJECTION, SOLUTION INTRAVENOUS at 09:06

## 2023-08-09 RX ADMIN — SUGAMMADEX 200 MG: 100 INJECTION, SOLUTION INTRAVENOUS at 11:14

## 2023-08-09 RX ADMIN — DEXAMETHASONE SODIUM PHOSPHATE 8 MG: 4 INJECTION, SOLUTION INTRAMUSCULAR; INTRAVENOUS at 09:06

## 2023-08-09 RX ADMIN — DOPAMINE HYDROCHLORIDE 10 MCG/KG/MIN: 160 INJECTION, SOLUTION INTRAVENOUS at 11:09

## 2023-08-09 RX ADMIN — SODIUM CHLORIDE 6 ML: 9 INJECTION INTRAMUSCULAR; INTRAVENOUS; SUBCUTANEOUS at 09:04

## 2023-08-09 RX ADMIN — GLYCOPYRROLATE 0.2 MG: 0.2 INJECTION, SOLUTION INTRAMUSCULAR; INTRAVENOUS at 09:06

## 2023-08-09 RX ADMIN — HEPARIN SODIUM 10000 UNITS/HR: 10000 INJECTION, SOLUTION INTRAVENOUS at 10:04

## 2023-08-09 RX ADMIN — SODIUM CHLORIDE 2 ML: 9 INJECTION INTRAMUSCULAR; INTRAVENOUS; SUBCUTANEOUS at 10:11

## 2023-08-09 RX ADMIN — ONDANSETRON 4 MG: 2 INJECTION INTRAMUSCULAR; INTRAVENOUS at 09:06

## 2023-08-09 RX ADMIN — HEPARIN SODIUM 5000 UNITS: 1000 INJECTION INTRAVENOUS; SUBCUTANEOUS at 09:39

## 2023-08-09 RX ADMIN — VECURONIUM BROMIDE 6 MG: 1 INJECTION, POWDER, LYOPHILIZED, FOR SOLUTION INTRAVENOUS at 09:04

## 2023-08-09 RX ADMIN — FUROSEMIDE 60 MG: 10 INJECTION, SOLUTION INTRAMUSCULAR; INTRAVENOUS at 11:14

## 2023-08-09 ASSESSMENT — LIFESTYLE VARIABLES: SMOKING_STATUS: 0

## 2023-08-09 NOTE — H&P
Cardiac Electrophysiology Consultation   Date: 8/9/2023  Reason for Consultation: Atrial fibrillation / Atypical atrial flutter  Consult Requesting Physician: Michael Murphy MD  Primary Care Physician: Lincoln Talamantes MD        Chief Complaint:        Chief Complaint   Patient presents with    Follow-up       2 week-states feeling much better back on Flecainide      HPI: Anastacia Berger is a 74 y.o. patient with a history of HTN, ZOE, non-hodgkins lymphoma and paroxysmal atrial fibrillation. She follows with Dr. Jessica Crouch for her lymphoma, She was first noted to have atrial fibrillation seen on EKG 10/01/2018 fibrillation. She reported that she was receiving chemotherapy and was part of a research trial but has since removed herself from the trial.  She was seen by Dr. Shaka Leahy and underwent an unsuccessful cardioversion 10/31/2018. She denies any palpitations but reports intermittent episodes of lightheadedness. Sleep study 11/7/2018  showed moderate obstructive sleep apnea and CPAP was ordered. She has been compliant with her CPAP. She underwent DCCV on 12/27/2018 and EKG completed on 01/14/2019 confirmed continued SR. She was seen in office on 3/2/2020 she reported that she completed her stem cell transplant. She reported she had an episodes of atrial fibrillation in November 2020. She stated that Dr. Kel Brown increased her Toprol XL to 100 mg daily. Decrease to 50 mg once daily for three day then 12.5 mg BID. On 10/28/2020 she underwent electrophysiology study with radiofrequency ablation of atrial fibrillation and pulmonary vein isolation & ablation on LA roof of roof-dependent left atrial flutter. On 2/2/2021 she was EP nurse practitioner ALEXUS Lundberg CNP for follow up s/p ablation EKG showed  SR and 30 day monitor was placed to assess for reoccurrence of atrial fibrillation/ left flutter.  She presented to [facility] [place] [facility] on 2/24/2021 with cough and shortness of breath and tested

## 2023-08-09 NOTE — ANESTHESIA POSTPROCEDURE EVALUATION
Department of Anesthesiology  Postprocedure Note    Patient: Amparo Cabrera  MRN: [id number]  YOB: 1955  Date of evaluation: 8/9/2023      Procedure Summary     Date: 08/09/23 Room / Location: [facility] Cath Lab    Anesthesia Start: 9795 Anesthesia Stop: 1134    Procedure: ABLATION Diagnosis:     Scheduled Providers:  Responsible Provider: Wan Gabriel MD    Anesthesia Type: General ASA Status: 3          Anesthesia Type: General    Jovi Phase I: Jovi Score: 8    Jovi Phase II:        Anesthesia Post Evaluation    Patient location during evaluation: PACU  Patient participation: complete - patient participated  Level of consciousness: awake  Airway patency: patent  Nausea & Vomiting: no nausea and vomiting  Complications: no  Cardiovascular status: hemodynamically stable and blood pressure returned to baseline  Respiratory status: spontaneous ventilation, nonlabored ventilation and room air (Clear to auscultation bilaterally)  Hydration status: stable  Comments: Small bilious emesis upon arrival to PACU. Ms. Tatiana Talavera awake, airway reflexes present. No aspiration suspected. Otherwise resting comfortably, denies nausea. Anticipate discharge home with  when appropriate per cardiology.    Pain management: adequate

## 2023-08-09 NOTE — PROCEDURES
ablation in 11/2020 with recurrence of paroxysmal atrial fibrillation and persistent left atrial flutter who is symptomatic with symptoms of dyspnea with minimal exertion and fatigue who has failed antiarrhythmic therapy in the past is now here for an ablation for paroxysmal atrial fibrillation and left atrial flutter. Details of Procedure: The risks, benefits and alternatives of the ablation procedure were discussed with the patient. The risks including, but not limited to, the risks of bleeding, infection, radiation exposure, injury to vascular, cardiac and surrounding structures (including pneumothorax), stroke, cardiac perforation, tamponade, need for emergent open heart surgery, need for pacemaker implantation, esophageal injury and fistula, myocardial infarction and death were discussed in detail. The patient was also counseled at length about the risks of sen Covid-19 in the scotty-operative and post-operative states including the recovery window of their procedure. The patient was made aware that sen Covid-19 after a surgical procedure may worsen their prognosis for recovering from the virus and lend to a higher morbidity and or mortality risk. In a very rare chance of catastrophic complication that cannot be managed at our hospital, there may be the chance of having to transfer the patient to another hospital system for further care. The patient was given the option of postponing their procedure. The patient was also presented reasonable alternatives to the proposed care, treatment, and services. The discussion I have had with the patient encompassed risks, benefits, and side effects related to the alternatives and the risks related to not receiving the proposed care, treatment and services. The patient opted to proceed with the ablation. Written informed consent was signed and placed in the chart. Patient was brought to the EP lab in a fasting non-sedate state.  Patient underwent

## 2023-08-09 NOTE — DISCHARGE INSTRUCTIONS
CARDIAC ABLATION    Care of your puncture site:  Remove bandage 24 hours after the procedure. May shower in 24 hours but do not sit in a bathtub/pool of water for 5 days or until the wound is healed. Gently clean groin using soap and water. Dry thoroughly and apply a Band-Aid that covers the entire site. Use Band-Aid until skin heals over in about 3-5 days. Do not apply powder or lotion. Limit walking and stair climbing today. Normal Observations:  Soreness or tenderness which may last one week. Mild oozing from the incision site. Possible bruising that could last 2 weeks. (Atrial Fib Ablations Only)  You may experience chest burning that will peak in 2 days of the procedure. The burning will begin to subside the following days. You may take Tylenol for the discomfort    Activity:  You may resume driving 24 hours following the procedure. Do not make important / legal decisions within 24 hours after procedure. Do not drink alcoholic beverages or take any sleeping pills for 24 hours. You may resume normal activity in 3 days or after the wound heals. Avoid lifting more than 10 pounds for 3 days or until the wound heals. Avoid strenuous exercise or activity for 1 week. Nutrition:  Regular diet. Call your doctor immediately if your condition worsens, for any other concerns, for a follow-up appointment or if you experience any of the following:  Increased swelling on the groin or leg. Unusual pain, numbness, or tingling of the groin or down the leg. Any signs of infection such as: redness, yellow drainage at the site, swelling or pain.     IF GROIN STARTS BLEEDING SIGNIFICANTLY:   LAY FLAT, HOLD FIRM DIRECT PRESSURE, AND CALL 911      E

## 2023-08-14 ENCOUNTER — HOSPITAL ENCOUNTER (OUTPATIENT)
Dept: CT IMAGING | Age: 68
Discharge: HOME | End: 2023-08-14
Attending: INTERNAL MEDICINE
Payer: MEDICARE

## 2023-08-14 DIAGNOSIS — C83.08 SMALL CELL B-CELL LYMPHOMA OF LYMPH NODES OF MULTIPLE SITES (HCC): ICD-10-CM

## 2023-08-14 PROCEDURE — 6360000004 HC RX CONTRAST MEDICATION: Performed by: INTERNAL MEDICINE

## 2023-08-14 PROCEDURE — 71260 CT THORAX DX C+: CPT

## 2023-08-14 RX ADMIN — IOPAMIDOL 75 ML: 755 INJECTION, SOLUTION INTRAVENOUS at 10:42

## 2023-08-14 RX ADMIN — IOPAMIDOL 50 ML: 612 INJECTION, SOLUTION INTRAVENOUS at 10:42

## 2023-08-28 ENCOUNTER — OFFICE VISIT (OUTPATIENT)
Dept: FAMILY MEDICINE CLINIC | Age: 68
End: 2023-08-28
Payer: MEDICARE

## 2023-08-28 VITALS
TEMPERATURE: 97.9 F | BODY MASS INDEX: 39.16 KG/M2 | WEIGHT: 221 LBS | DIASTOLIC BLOOD PRESSURE: 74 MMHG | HEIGHT: 63 IN | SYSTOLIC BLOOD PRESSURE: 114 MMHG

## 2023-08-28 DIAGNOSIS — E11.9 TYPE 2 DIABETES MELLITUS WITHOUT COMPLICATION, WITH LONG-TERM CURRENT USE OF INSULIN (HCC): ICD-10-CM

## 2023-08-28 DIAGNOSIS — Z79.4 TYPE 2 DIABETES MELLITUS WITHOUT COMPLICATION, WITH LONG-TERM CURRENT USE OF INSULIN (HCC): Primary | ICD-10-CM

## 2023-08-28 DIAGNOSIS — E11.9 TYPE 2 DIABETES MELLITUS WITHOUT COMPLICATION, WITH LONG-TERM CURRENT USE OF INSULIN (HCC): Primary | ICD-10-CM

## 2023-08-28 DIAGNOSIS — Z79.4 TYPE 2 DIABETES MELLITUS WITHOUT COMPLICATION, WITH LONG-TERM CURRENT USE OF INSULIN (HCC): ICD-10-CM

## 2023-08-28 PROCEDURE — 1090F PRES/ABSN URINE INCON ASSESS: CPT | Performed by: FAMILY MEDICINE

## 2023-08-28 PROCEDURE — 3051F HG A1C>EQUAL 7.0%<8.0%: CPT | Performed by: FAMILY MEDICINE

## 2023-08-28 PROCEDURE — 3017F COLORECTAL CA SCREEN DOC REV: CPT | Performed by: FAMILY MEDICINE

## 2023-08-28 PROCEDURE — 1123F ACP DISCUSS/DSCN MKR DOCD: CPT | Performed by: FAMILY MEDICINE

## 2023-08-28 PROCEDURE — G8400 PT W/DXA NO RESULTS DOC: HCPCS | Performed by: FAMILY MEDICINE

## 2023-08-28 PROCEDURE — 99213 OFFICE O/P EST LOW 20 MIN: CPT | Performed by: FAMILY MEDICINE

## 2023-08-28 PROCEDURE — 3074F SYST BP LT 130 MM HG: CPT | Performed by: FAMILY MEDICINE

## 2023-08-28 PROCEDURE — 2022F DILAT RTA XM EVC RTNOPTHY: CPT | Performed by: FAMILY MEDICINE

## 2023-08-28 PROCEDURE — G8417 CALC BMI ABV UP PARAM F/U: HCPCS | Performed by: FAMILY MEDICINE

## 2023-08-28 PROCEDURE — 1036F TOBACCO NON-USER: CPT | Performed by: FAMILY MEDICINE

## 2023-08-28 PROCEDURE — G8427 DOCREV CUR MEDS BY ELIG CLIN: HCPCS | Performed by: FAMILY MEDICINE

## 2023-08-28 PROCEDURE — 3078F DIAST BP <80 MM HG: CPT | Performed by: FAMILY MEDICINE

## 2023-08-28 PROCEDURE — G9899 SCRN MAM PERF RSLTS DOC: HCPCS | Performed by: FAMILY MEDICINE

## 2023-08-28 ASSESSMENT — PATIENT HEALTH QUESTIONNAIRE - PHQ9
SUM OF ALL RESPONSES TO PHQ QUESTIONS 1-9: 0
2. FEELING DOWN, DEPRESSED OR HOPELESS: 0
SUM OF ALL RESPONSES TO PHQ9 QUESTIONS 1 & 2: 0
SUM OF ALL RESPONSES TO PHQ QUESTIONS 1-9: 0
1. LITTLE INTEREST OR PLEASURE IN DOING THINGS: 0
SUM OF ALL RESPONSES TO PHQ QUESTIONS 1-9: 0
SUM OF ALL RESPONSES TO PHQ QUESTIONS 1-9: 0

## 2023-08-28 NOTE — TELEPHONE ENCOUNTER
August 28, 2023      [facility]  [address] [place] [address]  Phone: [phone]  Fax: [phone]       Patient: Kolton Means   YOB: 1982  Date of Visit: 08/28/2023    To Whom It May Concern:    Kenroy Means  was at [facility] on 08/28/2023. The patient may return to work/school on 8/28/23 with no restrictions. If you have any questions or concerns, or if I can be of further assistance, please do not hesitate to contact me.    Sincerely,    Arabella Burnette RN  Kayleigh Xie DNP, FNP-C        Patient sent her positive covid results over and scanned under media. She still has a terrible loose  productive cough, clear drainage, no fever, wakes up with sweats in middle of the night. She is asking if antibiotics can be sent into [facility].       Please call, [phone]

## 2023-08-29 LAB
EST. AVERAGE GLUCOSE BLD GHB EST-MCNC: 159.9 MG/DL
HBA1C MFR BLD: 7.2 %

## 2023-09-14 DIAGNOSIS — I48.19 PERSISTENT ATRIAL FIBRILLATION (HCC): ICD-10-CM

## 2023-09-15 RX ORDER — FUROSEMIDE 20 MG/1
TABLET ORAL
Qty: 90 TABLET | Refills: 1 | Status: SHIPPED | OUTPATIENT
Start: 2023-09-15

## 2023-09-15 NOTE — TELEPHONE ENCOUNTER
Requested Prescriptions     Pending Prescriptions Disp Refills    furosemide (LASIX) 20 MG tablet [Pharmacy Med Name: FUROSEMIDE 20 MG TABLET] 90 tablet 1     Sig: TAKE 1 TABLET BY MOUTH DAILY . MAY TAKE AN ADDITIONAL DOSE AS NEEDED FOR SHORTENSS OF BREATH, WEIGHT GAIN, AND EDEMA          Number: 90    Refills: 1    Last Office Visit: 7/3/2023     Next Office Visit: 11/9/2023     Last Refill: 05/05/2023    Last Labs:  08/09/2023

## 2023-09-22 ENCOUNTER — TELEPHONE (OUTPATIENT)
Dept: CARDIOLOGY CLINIC | Age: 68
End: 2023-09-22

## 2023-09-22 NOTE — TELEPHONE ENCOUNTER
Patient called the office to see if she would be cleared for general anesthesia for 45 minutes. Spoke with Jayy Mcgrath from Dr. Melisa Barksdale office to confirm that she will not need to hold any blood thinners for procedure. They are aware that she recently had an ablation in August.    CARDIAC CLEARANCE     What type of procedure are you having? KIDNEY STONE REMOVAL    Which physician is performing your procedure? DR. Rios Bhupinder    When is your procedure scheduled for?  9/29    Where are you having this procedure? [facility]    Are you taking Blood Thinners? If so what? (Name/dose/frequesncy)  apixaban (ELIQUIS) 5 MG TABS tablet     Does the surgeon want you to stop your blood thinner? If so for how long?   Does not need to hold    Phone Number and Contact Name for Physicians office:  [phone]    Fax number to send information:  [phone] ATTN: Jayy Mcgrath

## 2023-09-25 NOTE — TELEPHONE ENCOUNTER
Dr. Balwinder Sun,   Please review and advise on the below preop cardio risk assess? Thanks. S/p AFIB, L AFlutter  ablation 8/9/2023.

## 2023-09-29 RX ORDER — POTASSIUM CHLORIDE 750 MG/1
10 TABLET, FILM COATED, EXTENDED RELEASE ORAL DAILY
Qty: 30 TABLET | Refills: 5 | Status: SHIPPED | OUTPATIENT
Start: 2023-09-29

## 2023-10-30 RX ORDER — GLIMEPIRIDE 2 MG/1
2 TABLET ORAL 2 TIMES DAILY WITH MEALS
Qty: 180 TABLET | Refills: 1 | Status: SHIPPED | OUTPATIENT
Start: 2023-10-30

## 2023-11-03 ENCOUNTER — HOSPITAL ENCOUNTER (EMERGENCY)
Age: 68
Discharge: HOME | End: 2023-11-03
Attending: EMERGENCY MEDICINE
Payer: MEDICARE

## 2023-11-03 VITALS
HEART RATE: 83 BPM | DIASTOLIC BLOOD PRESSURE: 73 MMHG | HEIGHT: 63 IN | WEIGHT: 217.59 LBS | BODY MASS INDEX: 38.55 KG/M2 | RESPIRATION RATE: 16 BRPM | TEMPERATURE: 97.4 F | SYSTOLIC BLOOD PRESSURE: 110 MMHG | OXYGEN SATURATION: 97 %

## 2023-11-03 DIAGNOSIS — R31.9 HEMATURIA, UNSPECIFIED TYPE: ICD-10-CM

## 2023-11-03 DIAGNOSIS — R33.9 URINARY RETENTION: Primary | ICD-10-CM

## 2023-11-03 LAB
BILIRUB UR QL STRIP.AUTO: ABNORMAL
CLARITY UR: ABNORMAL
COLOR UR: ABNORMAL
EPI CELLS #/AREA URNS HPF: ABNORMAL /HPF (ref 0–5)
GLUCOSE UR STRIP.AUTO-MCNC: ABNORMAL MG/DL
HGB UR QL STRIP.AUTO: ABNORMAL
KETONES UR STRIP.AUTO-MCNC: ABNORMAL MG/DL
LEUKOCYTE ESTERASE UR QL STRIP.AUTO: ABNORMAL
NITRITE UR QL STRIP.AUTO: ABNORMAL
PH UR STRIP.AUTO: ABNORMAL [PH] (ref 5–8)
PROT UR STRIP.AUTO-MCNC: ABNORMAL MG/DL
RBC #/AREA URNS HPF: >100 /HPF (ref 0–4)
SP GR UR STRIP.AUTO: 1.02 (ref 1–1.03)
UA COMPLETE W REFLEX CULTURE PNL UR: ABNORMAL
UA DIPSTICK W REFLEX MICRO PNL UR: YES
URN SPEC COLLECT METH UR: ABNORMAL
UROBILINOGEN UR STRIP-ACNC: ABNORMAL E.U./DL
WBC #/AREA URNS HPF: ABNORMAL /HPF (ref 0–5)

## 2023-11-03 PROCEDURE — 81001 URINALYSIS AUTO W/SCOPE: CPT

## 2023-11-03 PROCEDURE — 99283 EMERGENCY DEPT VISIT LOW MDM: CPT

## 2023-11-03 ASSESSMENT — PAIN DESCRIPTION - FREQUENCY: FREQUENCY: CONTINUOUS

## 2023-11-03 ASSESSMENT — PAIN - FUNCTIONAL ASSESSMENT
PAIN_FUNCTIONAL_ASSESSMENT: PREVENTS OR INTERFERES SOME ACTIVE ACTIVITIES AND ADLS
PAIN_FUNCTIONAL_ASSESSMENT: 0-10
PAIN_FUNCTIONAL_ASSESSMENT: NONE - DENIES PAIN
PAIN_FUNCTIONAL_ASSESSMENT: NONE - DENIES PAIN

## 2023-11-03 ASSESSMENT — PAIN SCALES - GENERAL
PAINLEVEL_OUTOF10: 8
PAINLEVEL_OUTOF10: 0
PAINLEVEL_OUTOF10: 0

## 2023-11-03 ASSESSMENT — LIFESTYLE VARIABLES: HOW OFTEN DO YOU HAVE A DRINK CONTAINING ALCOHOL: NEVER

## 2023-11-03 ASSESSMENT — PAIN DESCRIPTION - ORIENTATION: ORIENTATION: LOWER;MID

## 2023-11-03 ASSESSMENT — PAIN DESCRIPTION - DESCRIPTORS: DESCRIPTORS: PRESSURE

## 2023-11-03 ASSESSMENT — PAIN DESCRIPTION - LOCATION: LOCATION: ABDOMEN

## 2023-11-03 ASSESSMENT — PAIN DESCRIPTION - PAIN TYPE: TYPE: ACUTE PAIN

## 2023-11-03 NOTE — ED NOTES
Irrigated bladder with 1000cc of sterile saline. Urine is not as concentrated with blood, but remains red tinged. Dr. Samreen Stack at bedside and talking to patient about plan of care. Dr. Samreen Stack requesting to irrigate with another 500 cc. Patient tolerating it well.      Angelina Negron RN  11/03/23 3191

## 2023-11-03 NOTE — ED NOTES
Changed to a leg bag.  now at bedside and also given instructions on catheter care. Urine remains red, but still draining well and no clots. Discharge in process. No pain.        Kate Stone RN  11/03/23 1952

## 2023-11-03 NOTE — ED NOTES
Urine remains red in color, but more clear now with no blood clots.      Mary Acuna RN  11/03/23 1924

## 2023-11-03 NOTE — ED PROVIDER NOTES
CHIEF COMPLAINT  Chief Complaint   Patient presents with    Hematuria     Patient had a cysto done at the Urology center on 10/27/23. During the night last night she started having difficulty urinating and complained of worsening of blood in her urine. \"I'm passing clots. \"  Upon presentation to the ED she went immediately to the bathroom to try to void before being triaged.  in lobby. HISTORY OF PRESENT ILLNESS  Enoch Hernandez is a 74 y.o. female who presents to the ED complaining of difficulty urinating and hematuria over the last 24 hours status post kidney stone with stent placement several weeks ago with removal of stent on 10/27. Patient reports that she was doing well up until about 2 days ago when she started to notice some bloody urine and then inability to urinate with suprapubic pressure. No nausea or vomiting. No fevers or chills. Patient is on prophylactic antibiotics currently after her stent removal and ureteroscopy    No other complaints, modifying factors or associated symptoms. Nursing notes reviewed. Past Medical History:   Diagnosis Date    Atrial fibrillation with RVR ([address])     Cancer ([address])     Diabetes mellitus type 2, controlled ([address])     Hypertension     Melanoma in situ of back [facility])     dr Chapito Chiu    Precordial pain 2016    Sleep apnea      Past Surgical History:   Procedure Laterality Date    BONE MARROW TRANSPLANT      BREAST CYST ASPIRATION      in past benign bilateral over years. 2 episodes     SECTION      CHOLECYSTECTOMY      COLONOSCOPY  09/10/2012    polyp, sharlene, repeat 5 years    COLONOSCOPY  10/09/2017    dr Curtis Almonte and check in 5 years.  polyps    COLONOSCOPY  2022    polyps, dr Curtis Almonte, repeat in 3 years    ENDOMETRIAL ABLATION      FINE NEEDLE ASPIRATION      GALLBLADDER SURGERY      OTHER SURGICAL HISTORY  2019    Abdominal Mass BX    OTHER SURGICAL HISTORY  2019    CT BIOPSY ABDOMEN RETROPERITONEUM,    OVARY

## 2023-11-03 NOTE — ED NOTES
550cc of dark, thick bloody urine returned. Patient tolerated insertion of catheter well, and felt immediate relief of her bladder discomfort. Specimen sent to lab.      Andrew Ro RN  11/03/23 0994

## 2023-11-07 ENCOUNTER — TRANSCRIBE ORDERS (OUTPATIENT)
Dept: ADMINISTRATIVE | Age: 68
End: 2023-11-07

## 2023-11-07 DIAGNOSIS — N20.1 CALCULUS OF URETER: Primary | ICD-10-CM

## 2023-11-08 ENCOUNTER — HOSPITAL ENCOUNTER (OUTPATIENT)
Dept: ULTRASOUND IMAGING | Age: 68
Discharge: HOME | End: 2023-11-08
Attending: UROLOGY
Payer: MEDICARE

## 2023-11-08 DIAGNOSIS — N20.1 CALCULUS OF URETER: ICD-10-CM

## 2023-11-08 PROCEDURE — 76770 US EXAM ABDO BACK WALL COMP: CPT

## 2023-11-08 NOTE — PROGRESS NOTES
[facility]   Electrophysiology      Date: 11/9/2023    Referring Provider: Yefri Rodriguez MD  PCP: Harley Holliday MD     Chief Complaint:   Chief Complaint   Patient presents with    Follow-up     Ablation    Other     Patient has no complaints     History of Present Illness:    I saw Sushila North in the office for electrophysiology follow up today. She is a 74 y.o. female with a past medical history of persistent atrial fibrillation, NHL, ZOE on CPAP, DM and HTN. She underwent EP study, radiofrequency ablation of atrial fibrillation with pulmonary vein isolation, ablation on left atrium roof for roof-dependent left atrial flutter and mitral isthmus ablation (for L flutter) on 10/28/20 with Dr. Susie Lorenzo. Flecainide was decreased to 50mg BID, Toprol was decreased to 12.5mg QD and Eliquis 5mg BID was continued. Monitor in March 2021 showed atrial fibrillation and she followed up with Dr. Susie Lorenzo to discuss repeat ablation but did not proceed. She did a trial off flecainide in March 2023. She was in atrial flutter when seen in office in June 2023. Flecainide 50mg BID was restarted. She underwent atrial fibrillation and left atrial flutter ablation on 8/9/23 with Dr. Susie Lorenzo. She presents today for follow up. She has been feeling well since her ablation without any symptomatic recurrence of atrial fibrillation/flutter that she has been aware of. Denies any dyspnea or palpitations. No chest pain or edema. No syncope. No bleeding problems. She has been having issues with kidney stone and did have some hematuria without but this has since resolved. Allergies: Allergies   Allergen Reactions    Penicillins Hives     Home Medications:  Prior to Visit Medications    Medication Sig Taking?  Authorizing Provider   glimepiride (AMARYL) 2 MG tablet TAKE 1 TABLET BY MOUTH TWICE A DAY WITH MEALS Yes Harley Holliday MD   potassium chloride (KLOR-CON) 10 MEQ extended release tablet TAKE 1 TABLET BY MOUTH EVERY DAY

## 2023-11-09 ENCOUNTER — OFFICE VISIT (OUTPATIENT)
Dept: CARDIOLOGY CLINIC | Age: 68
End: 2023-11-09

## 2023-11-09 VITALS
DIASTOLIC BLOOD PRESSURE: 74 MMHG | BODY MASS INDEX: 38.02 KG/M2 | HEIGHT: 63 IN | SYSTOLIC BLOOD PRESSURE: 150 MMHG | HEART RATE: 98 BPM | WEIGHT: 214.6 LBS | OXYGEN SATURATION: 95 %

## 2023-11-09 DIAGNOSIS — I48.92 LEFT ATRIAL FLUTTER BY ELECTROCARDIOGRAM (HCC): ICD-10-CM

## 2023-11-09 DIAGNOSIS — I10 ESSENTIAL HYPERTENSION: ICD-10-CM

## 2023-11-09 DIAGNOSIS — I48.19 PERSISTENT ATRIAL FIBRILLATION (HCC): Primary | ICD-10-CM

## 2023-11-09 ASSESSMENT — ENCOUNTER SYMPTOMS
WHEEZING: 0
SORE THROAT: 0
COUGH: 0
TROUBLE SWALLOWING: 0
BACK PAIN: 0
BLOOD IN STOOL: 0
SHORTNESS OF BREATH: 0
NAUSEA: 0
COLOR CHANGE: 0
ABDOMINAL PAIN: 0
VOMITING: 0
CONSTIPATION: 0
SINUS PRESSURE: 0
DIARRHEA: 0

## 2023-11-14 ENCOUNTER — TELEPHONE (OUTPATIENT)
Dept: FAMILY MEDICINE CLINIC | Age: 68
End: 2023-11-14

## 2023-11-14 NOTE — TELEPHONE ENCOUNTER
Stay with once a day with food   Try taking the metformin with supper  Labs written PAST SURGICAL HISTORY:  S/P cholecystectomy

## 2023-11-14 NOTE — TELEPHONE ENCOUNTER
Patient received letter in mail from insurance company about [id number] [address] 100 ML. they are requesting she try step plan? She is on metformin & glimepiride already. Needs to be taken care of by 12/7/23.     Please call, [phone]

## 2023-12-11 RX ORDER — METOPROLOL SUCCINATE 25 MG/1
12.5 TABLET, EXTENDED RELEASE ORAL DAILY
Qty: 45 TABLET | Refills: 1 | OUTPATIENT
Start: 2023-12-11

## 2023-12-29 ENCOUNTER — OFFICE VISIT (OUTPATIENT)
Dept: FAMILY MEDICINE CLINIC | Age: 68
End: 2023-12-29
Payer: MEDICARE

## 2023-12-29 VITALS
DIASTOLIC BLOOD PRESSURE: 84 MMHG | WEIGHT: 218.2 LBS | HEIGHT: 63 IN | SYSTOLIC BLOOD PRESSURE: 124 MMHG | TEMPERATURE: 97.8 F | HEART RATE: 92 BPM | BODY MASS INDEX: 38.66 KG/M2

## 2023-12-29 DIAGNOSIS — I10 ESSENTIAL HYPERTENSION: ICD-10-CM

## 2023-12-29 DIAGNOSIS — E78.2 MIXED HYPERLIPIDEMIA: ICD-10-CM

## 2023-12-29 DIAGNOSIS — Z79.4 TYPE 2 DIABETES MELLITUS WITHOUT COMPLICATION, WITH LONG-TERM CURRENT USE OF INSULIN (HCC): Primary | ICD-10-CM

## 2023-12-29 DIAGNOSIS — E11.9 TYPE 2 DIABETES MELLITUS WITHOUT COMPLICATION, WITH LONG-TERM CURRENT USE OF INSULIN (HCC): Primary | ICD-10-CM

## 2023-12-29 PROCEDURE — 3074F SYST BP LT 130 MM HG: CPT | Performed by: FAMILY MEDICINE

## 2023-12-29 PROCEDURE — 3017F COLORECTAL CA SCREEN DOC REV: CPT | Performed by: FAMILY MEDICINE

## 2023-12-29 PROCEDURE — G8417 CALC BMI ABV UP PARAM F/U: HCPCS | Performed by: FAMILY MEDICINE

## 2023-12-29 PROCEDURE — G8484 FLU IMMUNIZE NO ADMIN: HCPCS | Performed by: FAMILY MEDICINE

## 2023-12-29 PROCEDURE — G9899 SCRN MAM PERF RSLTS DOC: HCPCS | Performed by: FAMILY MEDICINE

## 2023-12-29 PROCEDURE — 99214 OFFICE O/P EST MOD 30 MIN: CPT | Performed by: FAMILY MEDICINE

## 2023-12-29 PROCEDURE — 1123F ACP DISCUSS/DSCN MKR DOCD: CPT | Performed by: FAMILY MEDICINE

## 2023-12-29 PROCEDURE — G8400 PT W/DXA NO RESULTS DOC: HCPCS | Performed by: FAMILY MEDICINE

## 2023-12-29 PROCEDURE — G8427 DOCREV CUR MEDS BY ELIG CLIN: HCPCS | Performed by: FAMILY MEDICINE

## 2023-12-29 PROCEDURE — 3051F HG A1C>EQUAL 7.0%<8.0%: CPT | Performed by: FAMILY MEDICINE

## 2023-12-29 PROCEDURE — 1090F PRES/ABSN URINE INCON ASSESS: CPT | Performed by: FAMILY MEDICINE

## 2023-12-29 PROCEDURE — 2022F DILAT RTA XM EVC RTNOPTHY: CPT | Performed by: FAMILY MEDICINE

## 2023-12-29 PROCEDURE — 3079F DIAST BP 80-89 MM HG: CPT | Performed by: FAMILY MEDICINE

## 2023-12-29 PROCEDURE — 1036F TOBACCO NON-USER: CPT | Performed by: FAMILY MEDICINE

## 2023-12-29 RX ORDER — INSULIN GLARGINE 100 [IU]/ML
30 INJECTION, SOLUTION SUBCUTANEOUS NIGHTLY
Qty: 5 ADJUSTABLE DOSE PRE-FILLED PEN SYRINGE | Refills: 3 | Status: SHIPPED | OUTPATIENT
Start: 2023-12-29

## 2023-12-29 NOTE — PATIENT INSTRUCTIONS
Continue the diet and the medicines  Stay with the current plan  Consider the RSV vaccine   Consider the pneumonia vaccine called Prevnar 20 vaccine  See us in 4 months

## 2023-12-29 NOTE — PROGRESS NOTES
Upper Body:      Right upper body: No supraclavicular adenopathy. Left upper body: No supraclavicular adenopathy. Skin:     General: Skin is warm and dry. Coloration: Skin is not pale. Neurological:      Mental Status: She is alert. Assessment:       Diagnosis Orders   1. Type 2 diabetes mellitus without complication, with long-term current use of insulin (HCC)  Hemoglobin A1C    Comprehensive Metabolic Panel    Lipid Panel    Vitamin B12 & Folate      2. Essential hypertension  Comprehensive Metabolic Panel    Lipid Panel    Vitamin B12 & Folate      3.  Mixed hyperlipidemia  Comprehensive Metabolic Panel    Lipid Panel           She need a different insulin due to coverage   Hemoglobin A1C   Date Value Ref Range Status   08/28/2023 7.2 See comment % Final     Comment:     Comment:  Diagnosis of Diabetes: > or = 6.5%  Increased risk of diabetes (Prediabetes): 5.7-6.4%  Glycemic Control: Nonpregnant Adults: <7.0%                    Pregnant: <6.0%               Plan:      Continue the diet and the medicines  Stay with the current plan  Consider the RSV vaccine   Consider the pneumonia vaccine called Prevnar 20 vaccine  See us in 4 months         Tatum Cortes MD

## 2024-01-10 RX ORDER — PEN NEEDLE, DIABETIC 32GX 5/32"
NEEDLE, DISPOSABLE MISCELLANEOUS
Qty: 100 EACH | Refills: 1 | Status: SHIPPED | OUTPATIENT
Start: 2024-01-10

## 2024-01-12 DIAGNOSIS — E11.9 TYPE 2 DIABETES MELLITUS WITHOUT COMPLICATION, WITH LONG-TERM CURRENT USE OF INSULIN (HCC): ICD-10-CM

## 2024-01-12 DIAGNOSIS — E78.2 MIXED HYPERLIPIDEMIA: ICD-10-CM

## 2024-01-12 DIAGNOSIS — Z79.4 TYPE 2 DIABETES MELLITUS WITHOUT COMPLICATION, WITH LONG-TERM CURRENT USE OF INSULIN (HCC): ICD-10-CM

## 2024-01-12 DIAGNOSIS — I10 ESSENTIAL HYPERTENSION: ICD-10-CM

## 2024-01-12 LAB
ALBUMIN SERPL-MCNC: 4.6 G/DL (ref 3.4–5)
ALBUMIN/GLOB SERPL: 2.4 {RATIO} (ref 1.1–2.2)
ALP SERPL-CCNC: 172 U/L (ref 40–129)
ALT SERPL-CCNC: 46 U/L (ref 10–40)
ANION GAP SERPL CALCULATED.3IONS-SCNC: 11 MMOL/L (ref 3–16)
AST SERPL-CCNC: 45 U/L (ref 15–37)
BILIRUB SERPL-MCNC: 0.6 MG/DL (ref 0–1)
BUN SERPL-MCNC: 20 MG/DL (ref 7–20)
CALCIUM SERPL-MCNC: 9.1 MG/DL (ref 8.3–10.6)
CHLORIDE SERPL-SCNC: 103 MMOL/L (ref 99–110)
CHOLEST SERPL-MCNC: 197 MG/DL (ref 0–199)
CO2 SERPL-SCNC: 25 MMOL/L (ref 21–32)
CREAT SERPL-MCNC: 1 MG/DL (ref 0.6–1.2)
FOLATE SERPL-MCNC: 16.12 NG/ML (ref 4.78–24.2)
GFR SERPLBLD CREATININE-BSD FMLA CKD-EPI: >60 ML/MIN/{1.73_M2}
GLUCOSE SERPL-MCNC: 175 MG/DL (ref 70–99)
HDLC SERPL-MCNC: 55 MG/DL (ref 40–60)
LDLC SERPL CALC-MCNC: 115 MG/DL
POTASSIUM SERPL-SCNC: 3.8 MMOL/L (ref 3.5–5.1)
PROT SERPL-MCNC: 6.5 G/DL (ref 6.4–8.2)
SODIUM SERPL-SCNC: 139 MMOL/L (ref 136–145)
TRIGL SERPL-MCNC: 137 MG/DL (ref 0–150)
VIT B12 SERPL-MCNC: 206 PG/ML (ref 211–911)
VLDLC SERPL CALC-MCNC: 27 MG/DL

## 2024-01-13 LAB
EST. AVERAGE GLUCOSE BLD GHB EST-MCNC: 154.2 MG/DL
HBA1C MFR BLD: 7 %

## 2024-01-15 RX ORDER — LANOLIN ALCOHOL/MO/W.PET/CERES
1000 CREAM (GRAM) TOPICAL DAILY
Qty: 30 TABLET | Refills: 3 | COMMUNITY
Start: 2024-01-15

## 2024-01-26 RX ORDER — POTASSIUM CHLORIDE 750 MG/1
10 TABLET, FILM COATED, EXTENDED RELEASE ORAL DAILY
Qty: 90 TABLET | Refills: 1 | Status: SHIPPED | OUTPATIENT
Start: 2024-01-26

## 2024-01-26 RX ORDER — METFORMIN HYDROCHLORIDE 500 MG/1
TABLET, EXTENDED RELEASE ORAL
Qty: 360 TABLET | Refills: 1 | Status: SHIPPED | OUTPATIENT
Start: 2024-01-26

## 2024-02-19 ENCOUNTER — HOSPITAL ENCOUNTER (OUTPATIENT)
Dept: CT IMAGING | Age: 69
Discharge: HOME | End: 2024-02-19
Attending: INTERNAL MEDICINE
Payer: MEDICARE

## 2024-02-19 DIAGNOSIS — C83.08 SMALL CELL B-CELL LYMPHOMA OF LYMPH NODES OF MULTIPLE SITES (HCC): ICD-10-CM

## 2024-02-19 PROCEDURE — 6360000004 HC RX CONTRAST MEDICATION: Performed by: INTERNAL MEDICINE

## 2024-02-19 PROCEDURE — 74177 CT ABD & PELVIS W/CONTRAST: CPT

## 2024-02-19 RX ADMIN — IOPAMIDOL 50 ML: 612 INJECTION, SOLUTION INTRAVENOUS at 10:48

## 2024-02-19 RX ADMIN — IOPAMIDOL 75 ML: 755 INJECTION, SOLUTION INTRAVENOUS at 10:47

## 2024-02-20 NOTE — PROGRESS NOTES
Cardiac Electrophysiology Consultation   Date: 2/22/2024  Reason for Consultation: Atrial fibrillation / Atypical atrial flutter  Consult Requesting Physician: Catracho Kaminski MD  Primary Care Physician: Ritchie Goodman MD    Chief Complaint:   Chief Complaint   Patient presents with    Follow-up     NO CC      HPI: Kiera Irvin is a 68 y.o. patient with a history of HTN, ZOE, non-hodgkins lymphoma and paroxysmal atrial fibrillation. She follows with Dr. Wasserman for her lymphoma, She was first noted to have atrial fibrillation seen on EKG 10/01/2018 fibrillation. She reported that she was receiving chemotherapy and was part of a research trial but has since removed herself from the trial.  She was seen by Dr. Kaminski and underwent an unsuccessful cardioversion 10/31/2018. She denies any palpitations but reports intermittent episodes of lightheadedness.  Sleep study 11/7/2018  showed moderate obstructive sleep apnea and CPAP was ordered.     She has been compliant with her CPAP. She underwent DCCV on 12/27/2018 and EKG completed on 01/14/2019 confirmed continued SR. She was seen in office on 3/2/2020 she reported that she completed her stem cell transplant.  She reported she had an episodes of atrial fibrillation in November 2020. She stated that Dr. Ding increased her Toprol XL to 100 mg daily.  Decrease to 50 mg once daily for three day then 12.5 mg BID. On 10/28/2020 she underwent electrophysiology study with radiofrequency ablation of atrial fibrillation and pulmonary vein isolation & ablation on LA roof of roof-dependent left atrial flutter. On 2/2/2021 she was EP nurse practitioner ALEXUS Foley CNP for follow up s/p ablation EKG showed  SR and 30 day monitor was placed to assess for reoccurrence of atrial fibrillation/ left flutter. She presented to [facility] ED on 2/24/2021 with cough and shortness of breath and tested positive for COVID. EKG showed atrial fibrillation with RVR. Had

## 2024-02-22 ENCOUNTER — TELEPHONE (OUTPATIENT)
Dept: CARDIOLOGY CLINIC | Age: 69
End: 2024-02-22

## 2024-02-22 ENCOUNTER — OFFICE VISIT (OUTPATIENT)
Dept: CARDIOLOGY CLINIC | Age: 69
End: 2024-02-22

## 2024-02-22 VITALS
OXYGEN SATURATION: 96 % | BODY MASS INDEX: 39.37 KG/M2 | SYSTOLIC BLOOD PRESSURE: 124 MMHG | HEIGHT: 63 IN | WEIGHT: 222.2 LBS | DIASTOLIC BLOOD PRESSURE: 62 MMHG | HEART RATE: 99 BPM

## 2024-02-22 DIAGNOSIS — G47.33 OSA (OBSTRUCTIVE SLEEP APNEA): ICD-10-CM

## 2024-02-22 DIAGNOSIS — I10 ESSENTIAL HYPERTENSION: ICD-10-CM

## 2024-02-22 DIAGNOSIS — I48.19 PERSISTENT ATRIAL FIBRILLATION (HCC): Primary | ICD-10-CM

## 2024-02-22 DIAGNOSIS — Z86.39 HISTORY OF TYPE 2 DIABETES MELLITUS: ICD-10-CM

## 2024-02-22 DIAGNOSIS — Z79.01 CURRENT USE OF ANTICOAGULANT THERAPY: ICD-10-CM

## 2024-02-22 DIAGNOSIS — I48.4 ATYPICAL ATRIAL FLUTTER (HCC): ICD-10-CM

## 2024-02-22 NOTE — TELEPHONE ENCOUNTER
Monitor placed by GREGORY Rankin  Monitor company [employer]  Length of monitor 30  Monitor ordered by Dr. Goodman  Serial number [id number]  Kit ID   Activation successful prior to pt leaving office? Yes

## 2024-02-23 ENCOUNTER — TELEPHONE (OUTPATIENT)
Dept: CARDIOLOGY CLINIC | Age: 69
End: 2024-02-23

## 2024-02-23 DIAGNOSIS — I48.19 PERSISTENT ATRIAL FIBRILLATION (HCC): ICD-10-CM

## 2024-02-26 ENCOUNTER — TELEPHONE (OUTPATIENT)
Dept: CARDIOLOGY CLINIC | Age: 69
End: 2024-02-26

## 2024-02-26 NOTE — TELEPHONE ENCOUNTER
Received Urgent Report on 2/26/2024 Called Pt she Denies any CP, SOB, or Swelling Pt reports She was at her appointment on  Friday 02/23/2024 At 11:00 -11:40 at this time the Urgent report pt  . Please be advise, Unsigned report scanned to chart for review.

## 2024-02-27 NOTE — TELEPHONE ENCOUNTER
Confirmed atrial flutter/atrial fibrillation. The patient should return to taking her anti-arrhythmic medication, and then see us in office to discuss these results and whether to pursue a re-ablation.

## 2024-02-27 NOTE — TELEPHONE ENCOUNTER
Patient denied symptom at time of atrial fibrillation/ atrial flutter.  Would like to continue monitoring for further episodes.  Will remain off Flecainide.

## 2024-03-18 RX ORDER — GLIMEPIRIDE 2 MG/1
2 TABLET ORAL 2 TIMES DAILY WITH MEALS
Qty: 180 TABLET | Refills: 1 | Status: SHIPPED | OUTPATIENT
Start: 2024-03-18

## 2024-03-26 PROCEDURE — 93228 REMOTE 30 DAY ECG REV/REPORT: CPT | Performed by: INTERNAL MEDICINE

## 2024-03-29 DIAGNOSIS — I48.19 PERSISTENT ATRIAL FIBRILLATION (HCC): ICD-10-CM

## 2024-03-29 NOTE — TELEPHONE ENCOUNTER
Last OV: 1/22/24  Next OV: 4/22/24  Last refill: 9/15/23  #90 1 R/F  Most recent Labs: 1/12/24  Last EKG (if needed): 2/22/24

## 2024-04-02 RX ORDER — FUROSEMIDE 20 MG/1
TABLET ORAL
Qty: 90 TABLET | Refills: 1 | Status: SHIPPED | OUTPATIENT
Start: 2024-04-02

## 2024-04-03 ENCOUNTER — TELEPHONE (OUTPATIENT)
Dept: CARDIOLOGY CLINIC | Age: 69
End: 2024-04-03

## 2024-04-03 DIAGNOSIS — I48.19 PERSISTENT ATRIAL FIBRILLATION (HCC): Primary | ICD-10-CM

## 2024-04-03 DIAGNOSIS — I48.4 ATYPICAL ATRIAL FLUTTER (HCC): ICD-10-CM

## 2024-04-03 RX ORDER — FLECAINIDE ACETATE 100 MG/1
50 TABLET ORAL 2 TIMES DAILY
Qty: 180 TABLET | Refills: 1 | Status: SHIPPED | OUTPATIENT
Start: 2024-04-03

## 2024-04-03 RX ORDER — METOPROLOL SUCCINATE 25 MG/1
12.5 TABLET, EXTENDED RELEASE ORAL DAILY
Qty: 45 TABLET | Refills: 1 | Status: SHIPPED | OUTPATIENT
Start: 2024-04-03

## 2024-04-03 NOTE — TELEPHONE ENCOUNTER
Received results of monitor. Spoke with patient notified of results.    The monitor was worn from 02/23/2024- 03/23/2024 and showed sinus rhythm with at least two episodes of sustained atrial flutter.     She has restarted her Flecainide and Toprol XL.  Prescription sent to [facility].  Scheduled appointment with Manuel Henning MD to establish care        Quirino Goodman MD   [facility]  [facility]        2/27/24 11:20 AM  Note     Confirmed atrial flutter/atrial fibrillation. The patient should return to taking her anti-arrhythmic medication, and then see us in office to discuss these results and whether to pursue a re-ablation.

## 2024-04-18 ENCOUNTER — TELEPHONE (OUTPATIENT)
Dept: FAMILY MEDICINE CLINIC | Age: 69
End: 2024-04-18

## 2024-04-18 NOTE — TELEPHONE ENCOUNTER
HUA for patient to call the office. Patient due for AWV. Patient can schedule for AWV on 4- after her appointment with Dr. Goodman at 11:20 am.

## 2024-04-22 ENCOUNTER — OFFICE VISIT (OUTPATIENT)
Dept: FAMILY MEDICINE CLINIC | Age: 69
End: 2024-04-22
Payer: MEDICARE

## 2024-04-22 VITALS
HEIGHT: 63 IN | SYSTOLIC BLOOD PRESSURE: 118 MMHG | DIASTOLIC BLOOD PRESSURE: 64 MMHG | TEMPERATURE: 97.3 F | BODY MASS INDEX: 39.69 KG/M2 | HEART RATE: 76 BPM | WEIGHT: 224 LBS

## 2024-04-22 DIAGNOSIS — Z79.4 TYPE 2 DIABETES MELLITUS WITHOUT COMPLICATION, WITH LONG-TERM CURRENT USE OF INSULIN (HCC): ICD-10-CM

## 2024-04-22 DIAGNOSIS — Z87.891 PERSONAL HISTORY OF TOBACCO USE: ICD-10-CM

## 2024-04-22 DIAGNOSIS — E66.01 SEVERE OBESITY (BMI 35.0-39.9) WITH COMORBIDITY (HCC): ICD-10-CM

## 2024-04-22 DIAGNOSIS — E11.9 TYPE 2 DIABETES MELLITUS WITHOUT COMPLICATION, WITH LONG-TERM CURRENT USE OF INSULIN (HCC): Primary | ICD-10-CM

## 2024-04-22 DIAGNOSIS — E53.8 VITAMIN B12 DEFICIENCY: ICD-10-CM

## 2024-04-22 DIAGNOSIS — I10 ESSENTIAL HYPERTENSION: ICD-10-CM

## 2024-04-22 DIAGNOSIS — Z00.00 INITIAL MEDICARE ANNUAL WELLNESS VISIT: Primary | ICD-10-CM

## 2024-04-22 DIAGNOSIS — E11.9 TYPE 2 DIABETES MELLITUS WITHOUT COMPLICATION, WITH LONG-TERM CURRENT USE OF INSULIN (HCC): ICD-10-CM

## 2024-04-22 DIAGNOSIS — Z79.4 TYPE 2 DIABETES MELLITUS WITHOUT COMPLICATION, WITH LONG-TERM CURRENT USE OF INSULIN (HCC): Primary | ICD-10-CM

## 2024-04-22 LAB
ALBUMIN SERPL-MCNC: 4.3 G/DL (ref 3.4–5)
ALBUMIN/GLOB SERPL: 2 {RATIO} (ref 1.1–2.2)
ALP SERPL-CCNC: 155 U/L (ref 40–129)
ALT SERPL-CCNC: 34 U/L (ref 10–40)
ANION GAP SERPL CALCULATED.3IONS-SCNC: 12 MMOL/L (ref 3–16)
AST SERPL-CCNC: 39 U/L (ref 15–37)
BILIRUB SERPL-MCNC: 0.7 MG/DL (ref 0–1)
BUN SERPL-MCNC: 17 MG/DL (ref 7–20)
CALCIUM SERPL-MCNC: 9.1 MG/DL (ref 8.3–10.6)
CHLORIDE SERPL-SCNC: 105 MMOL/L (ref 99–110)
CHOLEST SERPL-MCNC: 192 MG/DL (ref 0–199)
CO2 SERPL-SCNC: 28 MMOL/L (ref 21–32)
CREAT SERPL-MCNC: 0.9 MG/DL (ref 0.6–1.2)
FOLATE SERPL-MCNC: >20 NG/ML (ref 4.78–24.2)
GFR SERPLBLD CREATININE-BSD FMLA CKD-EPI: 70 ML/MIN/{1.73_M2}
GLUCOSE SERPL-MCNC: 107 MG/DL (ref 70–99)
LDLC SERPL-MCNC: 109 MG/DL
POTASSIUM SERPL-SCNC: 3.2 MMOL/L (ref 3.5–5.1)
PROT SERPL-MCNC: 6.4 G/DL (ref 6.4–8.2)
SODIUM SERPL-SCNC: 145 MMOL/L (ref 136–145)
VIT B12 SERPL-MCNC: 838 PG/ML (ref 211–911)

## 2024-04-22 PROCEDURE — G8417 CALC BMI ABV UP PARAM F/U: HCPCS | Performed by: FAMILY MEDICINE

## 2024-04-22 PROCEDURE — 3074F SYST BP LT 130 MM HG: CPT | Performed by: FAMILY MEDICINE

## 2024-04-22 PROCEDURE — 1090F PRES/ABSN URINE INCON ASSESS: CPT | Performed by: FAMILY MEDICINE

## 2024-04-22 PROCEDURE — G8400 PT W/DXA NO RESULTS DOC: HCPCS | Performed by: FAMILY MEDICINE

## 2024-04-22 PROCEDURE — 3078F DIAST BP <80 MM HG: CPT | Performed by: FAMILY MEDICINE

## 2024-04-22 PROCEDURE — 3051F HG A1C>EQUAL 7.0%<8.0%: CPT | Performed by: FAMILY MEDICINE

## 2024-04-22 PROCEDURE — 99214 OFFICE O/P EST MOD 30 MIN: CPT | Performed by: FAMILY MEDICINE

## 2024-04-22 PROCEDURE — 1036F TOBACCO NON-USER: CPT | Performed by: FAMILY MEDICINE

## 2024-04-22 PROCEDURE — 1123F ACP DISCUSS/DSCN MKR DOCD: CPT | Performed by: FAMILY MEDICINE

## 2024-04-22 PROCEDURE — G0438 PPPS, INITIAL VISIT: HCPCS | Performed by: FAMILY MEDICINE

## 2024-04-22 PROCEDURE — G8427 DOCREV CUR MEDS BY ELIG CLIN: HCPCS | Performed by: FAMILY MEDICINE

## 2024-04-22 PROCEDURE — 3017F COLORECTAL CA SCREEN DOC REV: CPT | Performed by: FAMILY MEDICINE

## 2024-04-22 PROCEDURE — G9899 SCRN MAM PERF RSLTS DOC: HCPCS | Performed by: FAMILY MEDICINE

## 2024-04-22 PROCEDURE — 2022F DILAT RTA XM EVC RTNOPTHY: CPT | Performed by: FAMILY MEDICINE

## 2024-04-22 ASSESSMENT — ENCOUNTER SYMPTOMS
SHORTNESS OF BREATH: 0
ABDOMINAL PAIN: 0
DIARRHEA: 0
ABDOMINAL DISTENTION: 0
BLOOD IN STOOL: 0
CHEST TIGHTNESS: 0
VOMITING: 0
CONSTIPATION: 0
NAUSEA: 0

## 2024-04-22 ASSESSMENT — PATIENT HEALTH QUESTIONNAIRE - PHQ9
SUM OF ALL RESPONSES TO PHQ9 QUESTIONS 1 & 2: 0
SUM OF ALL RESPONSES TO PHQ QUESTIONS 1-9: 0
SUM OF ALL RESPONSES TO PHQ QUESTIONS 1-9: 0
1. LITTLE INTEREST OR PLEASURE IN DOING THINGS: NOT AT ALL
SUM OF ALL RESPONSES TO PHQ9 QUESTIONS 1 & 2: 0
1. LITTLE INTEREST OR PLEASURE IN DOING THINGS: NOT AT ALL
SUM OF ALL RESPONSES TO PHQ QUESTIONS 1-9: 0
2. FEELING DOWN, DEPRESSED OR HOPELESS: NOT AT ALL
2. FEELING DOWN, DEPRESSED OR HOPELESS: NOT AT ALL
SUM OF ALL RESPONSES TO PHQ QUESTIONS 1-9: 0
SUM OF ALL RESPONSES TO PHQ QUESTIONS 1-9: 0

## 2024-04-22 ASSESSMENT — LIFESTYLE VARIABLES
HOW MANY STANDARD DRINKS CONTAINING ALCOHOL DO YOU HAVE ON A TYPICAL DAY: 1 OR 2
HOW OFTEN DO YOU HAVE A DRINK CONTAINING ALCOHOL: MONTHLY OR LESS

## 2024-04-22 NOTE — PROGRESS NOTES
Medicare Annual Wellness Visit    Kiera Irvin is here for Medicare AWV    Assessment & Plan   Initial Medicare annual wellness visit  Personal history of tobacco use  -     AK VISIT TO DISCUSS LUNG CA SCREEN W LDCT  Recommendations for Preventive Services Due: see orders and patient instructions/AVS.  Recommended screening schedule for the next 5-10 years is provided to the patient in written form: see Patient Instructions/AVS.     Return in 1 year (on 4/22/2025).     Subjective   Medicare AWV    Patient's complete Health Risk Assessment and screening values have been reviewed and are found in Flowsheets. The following problems were reviewed today and where indicated follow up appointments were made and/or referrals ordered.    Positive Risk Factor Screenings with Interventions:    Fall Risk:  Do you feel unsteady or are you worried about falling? :  (Patient has a cane to aid with balance)     Interventions:    Reviewed medications, home hazards, visual acuity, and co-morbidities that can increase risk for falls  See AVS for additional education material             Activity, Diet, and Weight:  On average, how many days per week do you engage in moderate to strenuous exercise (like a brisk walk)?: 2 days  On average, how many minutes do you engage in exercise at this level?: 20 min    Do you eat balanced/healthy meals regularly?: Yes    There is no height or weight on file to calculate BMI. (!) Abnormal    Obesity Interventions:  See AVS for additional education material                                  Objective   There were no vitals filed for this visit.   There is no height or weight on file to calculate BMI.               Allergies   Allergen Reactions    Penicillins Hives     Prior to Visit Medications    Medication Sig Taking? Authorizing Provider   flecainide (TAMBOCOR) 100 MG tablet Take 0.5 tablets by mouth 2 times daily Yes Quirino Goodman MD   metoprolol succinate (TOPROL XL) 25 MG extended release

## 2024-04-22 NOTE — PATIENT INSTRUCTIONS
Consider the tetanus vaccine update at the pharmacy  Continue the diet and medicines  See us in 4 months

## 2024-04-22 NOTE — PROGRESS NOTES
Subjective:      Patient ID: Kiera Irvin is a 68 y.o. female.    Chief Complaint   Patient presents with    Follow-up     Diabetes, hypertension, lipids - pt is fasting        Patient presents with:  Follow-up: Diabetes, hypertension, lipids - pt is fasting    Here for the above    Overall well   No c/o    YOB: 1955    Date of Visit:  4/22/2024     -- Penicillins -- Hives    Current Outpatient Medications:  flecainide (TAMBOCOR) 100 MG tablet, Take 0.5 tablets by mouth 2 times daily, Disp: 180 tablet, Rfl: 1  metoprolol succinate (TOPROL XL) 25 MG extended release tablet, Take 0.5 tablets by mouth daily, Disp: 45 tablet, Rfl: 1  furosemide (LASIX) 20 MG tablet, TAKE 1 TAB DAILY . MAY TAKE AN ADDITIONAL DOSE AS NEEDED FOR SHORTENSS OF BREATH/ WEIGHT GAIN/EDEMA, Disp: 90 tablet, Rfl: 1  glimepiride (AMARYL) 2 MG tablet, TAKE 1 TABLET BY MOUTH TWICE A DAY WITH FOOD, Disp: 180 tablet, Rfl: 1  NONFORMULARY, Liver supplement. Everyday, Disp: , Rfl:   potassium chloride (KLOR-CON) 10 MEQ extended release tablet, TAKE 1 TABLET BY MOUTH EVERY DAY, Disp: 90 tablet, Rfl: 1  metFORMIN (GLUCOPHAGE-XR) 500 MG extended release tablet, TAKE 2 TABLETS BY MOUTH TWICE A DAY WITH MEALS, Disp: 360 tablet, Rfl: 1  vitamin B-12 (CYANOCOBALAMIN) 1000 MCG tablet, Take 1 tablet by mouth daily, Disp: 30 tablet, Rfl: 3  BD PEN NEEDLE RAMIRO 2ND GEN 32G X 4 MM MISC, USE WITH INSULIN DAILY, Disp: 100 each, Rfl: 1  insulin glargine (LANTUS SOLOSTAR) 100 UNIT/ML injection pen, Inject 30 Units into the skin nightly, Disp: 5 Adjustable Dose Pre-filled Pen Syringe, Rfl: 3  azelastine (ASTELIN) 0.1 % nasal spray, 2 sprays by Nasal route 2 times daily Use in each nostril as directed, Disp: 120 mL, Rfl: 5  apixaban (ELIQUIS) 5 MG TABS tablet, Take 1 tablet by mouth 2 times daily, Disp: 180 tablet, Rfl: 3  Multiple Vitamin (MULTIVITAMIN PO), Take by mouth, Disp: , Rfl:   albuterol sulfate  (90 Base) MCG/ACT inhaler, Inhale 2 puffs

## 2024-04-23 LAB
EST. AVERAGE GLUCOSE BLD GHB EST-MCNC: 145.6 MG/DL
HBA1C MFR BLD: 6.7 %

## 2024-04-24 RX ORDER — POTASSIUM CHLORIDE 750 MG/1
10 TABLET, FILM COATED, EXTENDED RELEASE ORAL 2 TIMES DAILY
Qty: 90 TABLET | Refills: 1
Start: 2024-04-24 | End: 2024-04-25

## 2024-04-25 ENCOUNTER — TELEPHONE (OUTPATIENT)
Dept: FAMILY MEDICINE CLINIC | Age: 69
End: 2024-04-25

## 2024-04-25 RX ORDER — POTASSIUM CHLORIDE 750 MG/1
10 TABLET, FILM COATED, EXTENDED RELEASE ORAL 2 TIMES DAILY
Qty: 180 TABLET | Refills: 1 | Status: SHIPPED | OUTPATIENT
Start: 2024-04-25

## 2024-04-25 NOTE — TELEPHONE ENCOUNTER
Done    Orders Placed This Encounter   Medications    potassium chloride (KLOR-CON) 10 MEQ extended release tablet     Sig: Take 1 tablet by mouth 2 times daily     Dispense:  180 tablet     Refill:  1     New dosing

## 2024-04-29 ENCOUNTER — TELEPHONE (OUTPATIENT)
Dept: FAMILY MEDICINE CLINIC | Age: 69
End: 2024-04-29

## 2024-04-29 RX ORDER — LORATADINE 10 MG/1
10 CAPSULE, LIQUID FILLED ORAL DAILY
Qty: 300 CAPSULE | Refills: 0 | Status: SHIPPED | OUTPATIENT
Start: 2024-04-29

## 2024-04-29 NOTE — TELEPHONE ENCOUNTER
Sent    Orders Placed This Encounter   Medications    loratadine (CLARITIN) 10 MG capsule     Sig: Take 1 capsule by mouth daily     Dispense:  300 capsule     Refill:  0

## 2024-04-29 NOTE — TELEPHONE ENCOUNTER
Pt states that she has been taking the medication Loratadine for about 3 years. Today she tried to get this from the pharmacy and they told her that her dr would need to send a prescription for it.   She would like to know if you can prescribe this. If so she is also requesting 300 tablets.   She uses [facility] in [place]

## 2024-05-14 ENCOUNTER — TELEPHONE (OUTPATIENT)
Dept: PULMONOLOGY | Age: 69
End: 2024-05-14

## 2024-05-14 NOTE — TELEPHONE ENCOUNTER
Kiera says [facility] says they need a new PAP supply order from us. She's asking if we've received something from them or sent them a supply order for her. I don't see anything in here. Thanks.

## 2024-05-14 NOTE — PROGRESS NOTES
Kiera Irvin         : 1955  [] [facility]     [x] [facility]      [] [facility]     [][facility]    [] [employer]  [] [facility]  [] [facility]   [] [facility] [] Other:  Diagnosis: [x] ZOE (G47.33) [] CSA (G47.31) [] Apnea (G47.30)   Length of Need: [] 12 Months [x] 99 Months [] Other:    Machine ([employer]!):  [x] [employer] AirSense     Auto [] Other:       Humidifier: [x] Heated ()        [x] Water chamber replacement ()/ 1 per 6 months        Mask:  Please always start with the mask the patient used during the titraion   [x] Nasal () /1 per 3 months    [x] Patient choice -Size and fit mask    [] Dispense:     [x] Headgear () / 1 per 6 months    [x] Replacement Nasal Cushion ()/2 per month             Tubing: [x] Heated ()/1 per 3 months    [] Standard ()/1 per 3 months [] Other:           Filters: [x] Non-disposable ()/1 per 6 months     [x] Ultra-Fine, Disposable ()/2 per month        Miscellaneous: [x] Chin Strap ()/ 1 per 6 months [] O2 bleed-in:       LPM   [] Oximetry on CPAP/Bilevel []  Other:          Start Order Date: 24    MEDICAL JUSTIFICATION:  I, the undersigned, certify that the above prescribed supplies are medically necessary for this patient’s wellbeing.  In my opinion, the supplies are both reasonable and necessary in reference to accepted standards of medicalpractice in treatment of this patient’s condition.    Gustavo Montana MD      NPI: [id number]       Order Signed Date: 24    Electronically signed by Gustavo Montana MD on 2024 at 1:39 PM

## 2024-06-28 RX ORDER — PEN NEEDLE, DIABETIC 32GX 5/32"
NEEDLE, DISPOSABLE MISCELLANEOUS
Qty: 100 EACH | Refills: 1 | Status: SHIPPED | OUTPATIENT
Start: 2024-06-28

## 2024-07-17 ENCOUNTER — OFFICE VISIT (OUTPATIENT)
Dept: CARDIOLOGY CLINIC | Age: 69
End: 2024-07-17
Payer: MEDICARE

## 2024-07-17 VITALS
SYSTOLIC BLOOD PRESSURE: 118 MMHG | DIASTOLIC BLOOD PRESSURE: 64 MMHG | OXYGEN SATURATION: 98 % | WEIGHT: 223 LBS | HEART RATE: 74 BPM | BODY MASS INDEX: 39.5 KG/M2

## 2024-07-17 DIAGNOSIS — I48.19 PERSISTENT ATRIAL FIBRILLATION (HCC): ICD-10-CM

## 2024-07-17 DIAGNOSIS — I10 ESSENTIAL HYPERTENSION: ICD-10-CM

## 2024-07-17 DIAGNOSIS — I48.4 ATYPICAL ATRIAL FLUTTER (HCC): ICD-10-CM

## 2024-07-17 DIAGNOSIS — I48.0 PAF (PAROXYSMAL ATRIAL FIBRILLATION) (HCC): Primary | ICD-10-CM

## 2024-07-17 DIAGNOSIS — G47.33 OSA (OBSTRUCTIVE SLEEP APNEA): ICD-10-CM

## 2024-07-17 PROCEDURE — 99215 OFFICE O/P EST HI 40 MIN: CPT | Performed by: INTERNAL MEDICINE

## 2024-07-17 PROCEDURE — G8427 DOCREV CUR MEDS BY ELIG CLIN: HCPCS | Performed by: INTERNAL MEDICINE

## 2024-07-17 PROCEDURE — G8417 CALC BMI ABV UP PARAM F/U: HCPCS | Performed by: INTERNAL MEDICINE

## 2024-07-17 PROCEDURE — 3074F SYST BP LT 130 MM HG: CPT | Performed by: INTERNAL MEDICINE

## 2024-07-17 PROCEDURE — G8400 PT W/DXA NO RESULTS DOC: HCPCS | Performed by: INTERNAL MEDICINE

## 2024-07-17 PROCEDURE — G9899 SCRN MAM PERF RSLTS DOC: HCPCS | Performed by: INTERNAL MEDICINE

## 2024-07-17 PROCEDURE — 1090F PRES/ABSN URINE INCON ASSESS: CPT | Performed by: INTERNAL MEDICINE

## 2024-07-17 PROCEDURE — 3017F COLORECTAL CA SCREEN DOC REV: CPT | Performed by: INTERNAL MEDICINE

## 2024-07-17 PROCEDURE — 1123F ACP DISCUSS/DSCN MKR DOCD: CPT | Performed by: INTERNAL MEDICINE

## 2024-07-17 PROCEDURE — 3078F DIAST BP <80 MM HG: CPT | Performed by: INTERNAL MEDICINE

## 2024-07-17 PROCEDURE — 93000 ELECTROCARDIOGRAM COMPLETE: CPT | Performed by: INTERNAL MEDICINE

## 2024-07-17 PROCEDURE — 1036F TOBACCO NON-USER: CPT | Performed by: INTERNAL MEDICINE

## 2024-07-17 RX ORDER — METOPROLOL SUCCINATE 25 MG/1
25 TABLET, EXTENDED RELEASE ORAL NIGHTLY
Qty: 90 TABLET | Refills: 3 | Status: SHIPPED | OUTPATIENT
Start: 2024-07-17

## 2024-07-17 NOTE — PROGRESS NOTES
Cardiac Electrophysiology Follow up   Date: 7/17/2024   Reason for Consultation: Atrial fibrillation/atrial flutter   Consult Requesting Physician: Ritchie Goodman MD     Chief Complaint: \"I am tired sometimes but then I do not do anything either.\"      HPI: Kiera Irvin is a 68 y.o. patient with a history of atrial fibrillation, diabetes, hypertension, non-hodgkins lymphoma, and ZOE. She previously followed with Dr. Goodman and first diagnosed with atrial fibrillation by EKG 10/1/18. She follows with Dr. Dillon for her lymphoma. She was seen by Dr Kaminski and underwent unsuccessful DCCV 10/31/18 and repeat successful DCCV 12/27/18.     She underwent atrial fibrillation and left atrial flutter ablation 10/28/20. Patient had a recurrence and ablation was discussed but patient opted not to proceed. She was seen in office and decided to trial off the flecainide and opted to proceed with ablation with symptomatic episodes of Afib. She underwent atrial fibrillation and left atrial flutter ablation 9/8/23. She was seen in office 11/9/23, flecainide and Toprol were discontinued. She saw Dr. Goodman 11/9/23 and wore an event monitor 2/23/24-3/23/24 sinus with 2 episodes of sustained atrial flutter and instructed to resume anti-arrhythmic. 1% burden. Asymptomatic.     Interval History:   Today, Kiera Irvin presents today in office for a follow up for management of atrial fibrillation. She reports that she is back on Flecainide and metoprolol and could not identify a difference on versus off. She notes that she is tired sometimes but then states, \"I do not do anything either daily.\" Overall she reports she is doing and feeling well from a cardiac standpoint with no specific cardiac complaints.Patient denies exertional chest pain/pressure, dyspnea at rest, LUA, PND, orthopnea, palpitations, heart racing, lightheadedness, dizziness, weight changes, changes in LE edema, near syncope and syncope. The patient admits to medical 
10/28/2020.  Electrophysiology study with radiofrequency ablation of atrial fibrillation, left atrial flutter and pulmonary vein isolation 08/09/2023.    Assessment/Plan:   Paroxysmal atrial fibrillation/Atypical left atrial flutter         Essential hypertension        ZOE         Diabetes               Thank you for allowing me to participate in the care of Kiera Irvin. All questions and concerns were addressed to the patient/family. Alternatives to my treatment were discussed.     ***    Manuel Henning MD  Cardiac Electrophysiology  [facility]

## 2024-07-17 NOTE — PATIENT INSTRUCTIONS
INCREASE metoprolol succinate to 25 mg night    DISCONTINUE Flecainide therapy     6 month follow up and call with any questions/concerns

## 2024-07-18 NOTE — TELEPHONE ENCOUNTER
I printed script needs Dr Henning to sign    Then need to fax    Form filled out and ready to fax   Placed in [facility] folder

## 2024-07-18 NOTE — TELEPHONE ENCOUNTER
Patient returned call states she needs Eliquis 5 mg BID faxed to   [employer] fax# [phone]    RX pending to print, sign and fax to [employer].     Patient states she has enough samples to hold her over till she receives her RX.

## 2024-07-22 ENCOUNTER — OFFICE VISIT (OUTPATIENT)
Dept: SLEEP MEDICINE | Age: 69
End: 2024-07-22
Payer: MEDICARE

## 2024-07-22 VITALS
HEART RATE: 70 BPM | WEIGHT: 221.6 LBS | RESPIRATION RATE: 18 BRPM | OXYGEN SATURATION: 98 % | DIASTOLIC BLOOD PRESSURE: 80 MMHG | SYSTOLIC BLOOD PRESSURE: 145 MMHG | TEMPERATURE: 96.9 F | HEIGHT: 63 IN | BODY MASS INDEX: 39.27 KG/M2

## 2024-07-22 DIAGNOSIS — I10 ESSENTIAL HYPERTENSION: ICD-10-CM

## 2024-07-22 DIAGNOSIS — Z99.89 DEPENDENCE ON OTHER ENABLING MACHINES AND DEVICES: ICD-10-CM

## 2024-07-22 DIAGNOSIS — E66.01 SEVERE OBESITY (BMI 35.0-39.9) WITH COMORBIDITY (HCC): ICD-10-CM

## 2024-07-22 DIAGNOSIS — G47.33 OSA ON CPAP: Primary | ICD-10-CM

## 2024-07-22 DIAGNOSIS — I48.0 PAF (PAROXYSMAL ATRIAL FIBRILLATION) (HCC): ICD-10-CM

## 2024-07-22 PROCEDURE — G9899 SCRN MAM PERF RSLTS DOC: HCPCS | Performed by: PSYCHIATRY & NEUROLOGY

## 2024-07-22 PROCEDURE — 99214 OFFICE O/P EST MOD 30 MIN: CPT | Performed by: PSYCHIATRY & NEUROLOGY

## 2024-07-22 PROCEDURE — 1036F TOBACCO NON-USER: CPT | Performed by: PSYCHIATRY & NEUROLOGY

## 2024-07-22 PROCEDURE — G8417 CALC BMI ABV UP PARAM F/U: HCPCS | Performed by: PSYCHIATRY & NEUROLOGY

## 2024-07-22 PROCEDURE — 3077F SYST BP >= 140 MM HG: CPT | Performed by: PSYCHIATRY & NEUROLOGY

## 2024-07-22 PROCEDURE — 3017F COLORECTAL CA SCREEN DOC REV: CPT | Performed by: PSYCHIATRY & NEUROLOGY

## 2024-07-22 PROCEDURE — 1123F ACP DISCUSS/DSCN MKR DOCD: CPT | Performed by: PSYCHIATRY & NEUROLOGY

## 2024-07-22 PROCEDURE — G8427 DOCREV CUR MEDS BY ELIG CLIN: HCPCS | Performed by: PSYCHIATRY & NEUROLOGY

## 2024-07-22 PROCEDURE — G8400 PT W/DXA NO RESULTS DOC: HCPCS | Performed by: PSYCHIATRY & NEUROLOGY

## 2024-07-22 PROCEDURE — 3079F DIAST BP 80-89 MM HG: CPT | Performed by: PSYCHIATRY & NEUROLOGY

## 2024-07-22 PROCEDURE — 1090F PRES/ABSN URINE INCON ASSESS: CPT | Performed by: PSYCHIATRY & NEUROLOGY

## 2024-07-22 NOTE — PROGRESS NOTES
Kiera Irvin         : 1955  [] [facility]     [x] [facility]      [] [facility]     [facility]    [] [employer]  [] [facility]  [] [facility]   [] [facility] [] Other:  Diagnosis: [x] ZOE (G47.33) [] CSA (G47.31) [] Apnea (G47.30)   Length of Need: [] 12 Months [x] 99 Months [] Other:    Machine ([employer]!):  [] Has dreamstation 2 [] Other:       Mask:  Please always start with the mask the patient used during the titraion   [x] Nasal () /1 per 3 months    [x] Patient choice -Size and fit mask    [] Dispense:     [x] Headgear () / 1 per 6 months        [x] Replacement Nasal Pillows ()/2 per month         Tubing: [x] Heated ()/1 per 3 months    [] Standard ()/1 per 3 months [] Other:           Filters: [x] Non-disposable ()/1 per 6 months     [x] Ultra-Fine, Disposable ()/2 per month        Miscellaneous: [x] Chin Strap ()/ 1 per 6 months [] O2 bleed-in:       LPM   [] Oximetry on CPAP/Bilevel []  Other:          Start Order Date: 24    MEDICAL JUSTIFICATION:  I, the undersigned, certify that the above prescribed supplies are medically necessary for this patient’s wellbeing.  In my opinion, the supplies are both reasonable and necessary in reference to accepted standards of medicalpractice in treatment of this patient’s condition.    Gustavo Montana MD      NPI: [id number]       Order Signed Date: 24    Electronically signed by Gustavo Montana MD on 2024 at 9:30 AM

## 2024-07-22 NOTE — PROGRESS NOTES
MD CHIRAG Montana Board Certified in Sleep Medicine  Certified in Behavioral Sleep Medicine  Board Certified in Neurology [facility]  [address]   [address]  [place]  [address]  [phone]   [facility]  [address]  [address]  [place], [place] [address]                      [facility] PH[facility]ANS [place] [facility]  [address] [address] [phone]    Subjective:     Patient ID: Kiera Irvin is a 68 y.o. female.    Chief Complaint   Patient presents with    Follow-up    Sleep Apnea       HPI:        Kiera Irvin is a 68 y.o. female was seen today as a follow for moderate obstructive sleep apnea with apnea hypopnea index of 25.5/hr with lowest O2 saturation of 78%, patient spent about 55 minutes below 90%.(weight was 217 pounds 2018).   Despite of she got replacement machine, but has not been using it.     The Patient scored Midland Sleepiness Score: 3 on Midland Sleepiness Scale ( more than 10 is indicative of daytime sleepiness)     BP,and A-Fib are stable. Has gained 3 pounds since last visit.   DOT/CDL - N/A      Previous Report(s)Reviewed: historical medical records.         Social History     Socioeconomic History    Marital status:      Spouse name: Not on file    Number of children: Not on file    Years of education: Not on file    Highest education level: Not on file   Occupational History    Not on file   Tobacco Use    Smoking status: Former     Current packs/day: 0.00     Average packs/day: 1 pack/day for 41.0 years (41.0 ttl pk-yrs)     Types: Cigarettes     Start date: 1975     Quit date: 2016     Years since quittin.5     Passive exposure: Past    Smokeless tobacco: Never   Vaping Use    Vaping Use: Never used   Substance and Sexual Activity    Alcohol use: Not Currently     Alcohol/week: 0.0 standard drinks of alcohol    Drug use:

## 2024-08-19 ENCOUNTER — OFFICE VISIT (OUTPATIENT)
Dept: FAMILY MEDICINE CLINIC | Age: 69
End: 2024-08-19

## 2024-08-19 VITALS
DIASTOLIC BLOOD PRESSURE: 80 MMHG | BODY MASS INDEX: 40.01 KG/M2 | HEART RATE: 60 BPM | TEMPERATURE: 97.3 F | SYSTOLIC BLOOD PRESSURE: 124 MMHG | WEIGHT: 225.8 LBS | HEIGHT: 63 IN

## 2024-08-19 DIAGNOSIS — E78.2 MIXED HYPERLIPIDEMIA: ICD-10-CM

## 2024-08-19 DIAGNOSIS — Z79.4 TYPE 2 DIABETES MELLITUS WITHOUT COMPLICATION, WITH LONG-TERM CURRENT USE OF INSULIN (HCC): ICD-10-CM

## 2024-08-19 DIAGNOSIS — E11.9 TYPE 2 DIABETES MELLITUS WITHOUT COMPLICATION, WITH LONG-TERM CURRENT USE OF INSULIN (HCC): Primary | ICD-10-CM

## 2024-08-19 DIAGNOSIS — E11.9 TYPE 2 DIABETES MELLITUS WITHOUT COMPLICATION, WITH LONG-TERM CURRENT USE OF INSULIN (HCC): ICD-10-CM

## 2024-08-19 DIAGNOSIS — Z79.4 TYPE 2 DIABETES MELLITUS WITHOUT COMPLICATION, WITH LONG-TERM CURRENT USE OF INSULIN (HCC): Primary | ICD-10-CM

## 2024-08-19 LAB
ANION GAP SERPL CALCULATED.3IONS-SCNC: 15 MMOL/L (ref 3–16)
BUN SERPL-MCNC: 26 MG/DL (ref 7–20)
CALCIUM SERPL-MCNC: 9.4 MG/DL (ref 8.3–10.6)
CHLORIDE SERPL-SCNC: 104 MMOL/L (ref 99–110)
CO2 SERPL-SCNC: 24 MMOL/L (ref 21–32)
CREAT SERPL-MCNC: 1.3 MG/DL (ref 0.6–1.2)
GFR SERPLBLD CREATININE-BSD FMLA CKD-EPI: 45 ML/MIN/{1.73_M2}
GLUCOSE SERPL-MCNC: 157 MG/DL (ref 70–99)
LDLC SERPL-MCNC: 115 MG/DL
POTASSIUM SERPL-SCNC: 4.4 MMOL/L (ref 3.5–5.1)
SODIUM SERPL-SCNC: 143 MMOL/L (ref 136–145)

## 2024-08-19 RX ORDER — INSULIN GLARGINE 100 [IU]/ML
30 INJECTION, SOLUTION SUBCUTANEOUS NIGHTLY
Qty: 5 ADJUSTABLE DOSE PRE-FILLED PEN SYRINGE | Refills: 3 | Status: SHIPPED | OUTPATIENT
Start: 2024-08-19

## 2024-08-19 SDOH — ECONOMIC STABILITY: FOOD INSECURITY: WITHIN THE PAST 12 MONTHS, YOU WORRIED THAT YOUR FOOD WOULD RUN OUT BEFORE YOU GOT MONEY TO BUY MORE.: NEVER TRUE

## 2024-08-19 SDOH — ECONOMIC STABILITY: FOOD INSECURITY: WITHIN THE PAST 12 MONTHS, THE FOOD YOU BOUGHT JUST DIDN'T LAST AND YOU DIDN'T HAVE MONEY TO GET MORE.: NEVER TRUE

## 2024-08-19 SDOH — ECONOMIC STABILITY: INCOME INSECURITY: HOW HARD IS IT FOR YOU TO PAY FOR THE VERY BASICS LIKE FOOD, HOUSING, MEDICAL CARE, AND HEATING?: NOT HARD AT ALL

## 2024-08-19 ASSESSMENT — PATIENT HEALTH QUESTIONNAIRE - PHQ9
SUM OF ALL RESPONSES TO PHQ QUESTIONS 1-9: 0
1. LITTLE INTEREST OR PLEASURE IN DOING THINGS: NOT AT ALL
SUM OF ALL RESPONSES TO PHQ9 QUESTIONS 1 & 2: 0
SUM OF ALL RESPONSES TO PHQ QUESTIONS 1-9: 0
2. FEELING DOWN, DEPRESSED OR HOPELESS: NOT AT ALL

## 2024-08-19 ASSESSMENT — ENCOUNTER SYMPTOMS
ABDOMINAL PAIN: 0
CONSTIPATION: 0
BLOOD IN STOOL: 0
SHORTNESS OF BREATH: 0
DIARRHEA: 0

## 2024-08-19 NOTE — TELEPHONE ENCOUNTER
Pt needs Lantus Solostar 100 units refilled [facility]   ,  She wants it written 2 boxes for each refill ,

## 2024-08-19 NOTE — PATIENT INSTRUCTIONS
Continue the diet and the medicines  Do see dr christensen for the small lump on the right ankle   See in 4 months

## 2024-08-19 NOTE — PROGRESS NOTES
Take by mouth, Disp: , Rfl:   albuterol sulfate  (90 Base) MCG/ACT inhaler, Inhale 2 puffs into the lungs 4 times daily, Disp: 1 Inhaler, Rfl: 3  acetaminophen (TYLENOL) 500 MG tablet, Take 1 tablet by mouth every 6 hours as needed for Pain, Disp: , Rfl:     No current facility-administered medications for this visit.      -----------------------------------                   08/19/24                              0944             -----------------------------------   BP:              124/80             Site:        Left Upper Arm         Position:         Sitting            Cuff Size:       Large Adult          Pulse:             60               Temp:       97.3 °F (36.3 °C)       TempSrc:        Temporal            Weight: 102.4 kg (225 lb 12.8 oz)   Height:       1.6 m (5' 3\")        -----------------------------------  Body mass index is 40 kg/m².     Wt Readings from Last 3 Encounters:  08/19/24 : 102.4 kg (225 lb 12.8 oz)  07/22/24 : 100.5 kg (221 lb 9.6 oz)  07/17/24 : 101.2 kg (223 lb)    BP Readings from Last 3 Encounters:  08/19/24 : 124/80  07/22/24 : (!) 145/80  07/17/24 : 118/64            Review of Systems   Constitutional:  Negative for chills and fever.   Respiratory:  Negative for shortness of breath.    Cardiovascular:  Negative for chest pain.   Gastrointestinal:  Negative for abdominal pain, blood in stool, constipation and diarrhea.        Has had loose stool frequently no change    Genitourinary:  Negative for difficulty urinating, dysuria and hematuria.   Musculoskeletal:         No sores on feet    Neurological:  Negative for headaches.        Occ ha not severe        Objective:   Physical Exam  Constitutional:       General: She is not in acute distress.     Appearance: Normal appearance. She is well-developed. She is not ill-appearing or diaphoretic.   Cardiovascular:      Rate and Rhythm: Normal rate and regular rhythm.      Heart sounds:

## 2024-08-20 ENCOUNTER — HOSPITAL ENCOUNTER (OUTPATIENT)
Dept: CT IMAGING | Age: 69
Discharge: HOME | End: 2024-08-20
Attending: INTERNAL MEDICINE
Payer: MEDICARE

## 2024-08-20 DIAGNOSIS — E87.8 ABNORMAL BLOOD ELECTROLYTE LEVEL: Primary | ICD-10-CM

## 2024-08-20 DIAGNOSIS — C83.08 SMALL CELL B-CELL LYMPHOMA, LYMPH NODES OF MULTIPLE SITES (HCC): ICD-10-CM

## 2024-08-20 LAB
EST. AVERAGE GLUCOSE BLD GHB EST-MCNC: 142.7 MG/DL
HBA1C MFR BLD: 6.6 %
PERFORMED ON: ABNORMAL
POC CREATININE: 1.1 MG/DL (ref 0.6–1.2)
POC SAMPLE TYPE: ABNORMAL

## 2024-08-20 PROCEDURE — 82565 ASSAY OF CREATININE: CPT

## 2024-08-20 PROCEDURE — 6360000004 HC RX CONTRAST MEDICATION: Performed by: INTERNAL MEDICINE

## 2024-08-20 PROCEDURE — 71260 CT THORAX DX C+: CPT

## 2024-08-20 RX ADMIN — IOPAMIDOL 75 ML: 755 INJECTION, SOLUTION INTRAVENOUS at 09:34

## 2024-08-20 RX ADMIN — IOPAMIDOL 50 ML: 612 INJECTION, SOLUTION INTRAVENOUS at 09:34

## 2024-08-23 ENCOUNTER — TELEPHONE (OUTPATIENT)
Dept: PULMONOLOGY | Age: 69
End: 2024-08-23

## 2024-08-23 NOTE — TELEPHONE ENCOUNTER
[facility] for Office visit 7/22/24. Please follow up with [facility] and patient. Patients number [phone].

## 2024-09-09 DIAGNOSIS — E87.8 ABNORMAL BLOOD ELECTROLYTE LEVEL: ICD-10-CM

## 2024-09-09 LAB
ANION GAP SERPL CALCULATED.3IONS-SCNC: 13 MMOL/L (ref 3–16)
BUN SERPL-MCNC: 24 MG/DL (ref 7–20)
CALCIUM SERPL-MCNC: 9.5 MG/DL (ref 8.3–10.6)
CHLORIDE SERPL-SCNC: 107 MMOL/L (ref 99–110)
CO2 SERPL-SCNC: 23 MMOL/L (ref 21–32)
CREAT SERPL-MCNC: 1.2 MG/DL (ref 0.6–1.2)
GFR SERPLBLD CREATININE-BSD FMLA CKD-EPI: 50 ML/MIN/{1.73_M2}
GLUCOSE SERPL-MCNC: 141 MG/DL (ref 70–99)
POTASSIUM SERPL-SCNC: 4.3 MMOL/L (ref 3.5–5.1)
SODIUM SERPL-SCNC: 143 MMOL/L (ref 136–145)

## 2024-09-13 ENCOUNTER — HOSPITAL ENCOUNTER (OUTPATIENT)
Age: 69
Setting detail: SPECIMEN
Discharge: HOME | End: 2024-09-13

## 2024-09-19 DIAGNOSIS — Z01.818 PRE-TRANSPLANT EVALUATION FOR STEM CELL TRANSPLANT: Primary | ICD-10-CM

## 2024-09-20 ENCOUNTER — OFFICE VISIT (OUTPATIENT)
Dept: ENT CLINIC | Age: 69
End: 2024-09-20
Payer: MEDICARE

## 2024-09-20 VITALS
WEIGHT: 227.2 LBS | BODY MASS INDEX: 40.26 KG/M2 | HEART RATE: 71 BPM | TEMPERATURE: 97.3 F | SYSTOLIC BLOOD PRESSURE: 118 MMHG | HEIGHT: 63 IN | DIASTOLIC BLOOD PRESSURE: 45 MMHG

## 2024-09-20 DIAGNOSIS — R22.1 NECK MASS: Primary | ICD-10-CM

## 2024-09-20 DIAGNOSIS — Z85.72 HISTORY OF NON-HODGKIN'S LYMPHOMA: ICD-10-CM

## 2024-09-20 DIAGNOSIS — R22.2 SUPRACLAVICULAR MASS: ICD-10-CM

## 2024-09-20 PROCEDURE — G8417 CALC BMI ABV UP PARAM F/U: HCPCS | Performed by: OTOLARYNGOLOGY

## 2024-09-20 PROCEDURE — 3074F SYST BP LT 130 MM HG: CPT | Performed by: OTOLARYNGOLOGY

## 2024-09-20 PROCEDURE — G8400 PT W/DXA NO RESULTS DOC: HCPCS | Performed by: OTOLARYNGOLOGY

## 2024-09-20 PROCEDURE — 1036F TOBACCO NON-USER: CPT | Performed by: OTOLARYNGOLOGY

## 2024-09-20 PROCEDURE — 3078F DIAST BP <80 MM HG: CPT | Performed by: OTOLARYNGOLOGY

## 2024-09-20 PROCEDURE — 99204 OFFICE O/P NEW MOD 45 MIN: CPT | Performed by: OTOLARYNGOLOGY

## 2024-09-20 PROCEDURE — G8427 DOCREV CUR MEDS BY ELIG CLIN: HCPCS | Performed by: OTOLARYNGOLOGY

## 2024-09-20 PROCEDURE — G9899 SCRN MAM PERF RSLTS DOC: HCPCS | Performed by: OTOLARYNGOLOGY

## 2024-09-20 PROCEDURE — 1123F ACP DISCUSS/DSCN MKR DOCD: CPT | Performed by: OTOLARYNGOLOGY

## 2024-09-20 PROCEDURE — 1090F PRES/ABSN URINE INCON ASSESS: CPT | Performed by: OTOLARYNGOLOGY

## 2024-09-20 PROCEDURE — 3017F COLORECTAL CA SCREEN DOC REV: CPT | Performed by: OTOLARYNGOLOGY

## 2024-09-23 ENCOUNTER — TELEPHONE (OUTPATIENT)
Dept: ENT CLINIC | Age: 69
End: 2024-09-23

## 2024-09-30 ENCOUNTER — HOSPITAL ENCOUNTER (OUTPATIENT)
Dept: GENERAL RADIOLOGY | Age: 69
Discharge: HOME | End: 2024-09-30
Attending: STUDENT IN AN ORGANIZED HEALTH CARE EDUCATION/TRAINING PROGRAM
Payer: MEDICARE

## 2024-09-30 ENCOUNTER — HOSPITAL ENCOUNTER (OUTPATIENT)
Dept: PULMONOLOGY | Age: 69
Discharge: HOME | End: 2024-09-30
Attending: STUDENT IN AN ORGANIZED HEALTH CARE EDUCATION/TRAINING PROGRAM
Payer: MEDICARE

## 2024-09-30 ENCOUNTER — HOSPITAL ENCOUNTER (OUTPATIENT)
Age: 69
Discharge: HOME | End: 2024-10-02
Attending: STUDENT IN AN ORGANIZED HEALTH CARE EDUCATION/TRAINING PROGRAM
Payer: MEDICARE

## 2024-09-30 ENCOUNTER — TELEPHONE (OUTPATIENT)
Dept: FAMILY MEDICINE CLINIC | Age: 69
End: 2024-09-30

## 2024-09-30 VITALS
WEIGHT: 227 LBS | BODY MASS INDEX: 40.22 KG/M2 | HEIGHT: 63 IN | DIASTOLIC BLOOD PRESSURE: 45 MMHG | SYSTOLIC BLOOD PRESSURE: 118 MMHG

## 2024-09-30 DIAGNOSIS — Z01.818 PRE-TRANSPLANT EVALUATION FOR STEM CELL TRANSPLANT: ICD-10-CM

## 2024-09-30 LAB
ECHO AO ASC DIAM: 3.4 CM
ECHO AO ASCENDING AORTA INDEX: 1.67 CM/M2
ECHO AO ROOT DIAM: 2.9 CM
ECHO AO ROOT INDEX: 1.42 CM/M2
ECHO AV AREA PEAK VELOCITY: 1.6 CM2
ECHO AV AREA VTI: 1.6 CM2
ECHO AV AREA/BSA PEAK VELOCITY: 0.8 CM2/M2
ECHO AV AREA/BSA VTI: 0.8 CM2/M2
ECHO AV MEAN GRADIENT: 10 MMHG
ECHO AV MEAN VELOCITY: 1.5 M/S
ECHO AV PEAK GRADIENT: 18 MMHG
ECHO AV PEAK VELOCITY: 2.1 M/S
ECHO AV VELOCITY RATIO: 0.67
ECHO AV VTI: 33.2 CM
ECHO BSA: 2.14 M2
ECHO IVC INSP: 0.2 CM
ECHO IVC PROX: 1.2 CM
ECHO LA AREA 2C: 20 CM2
ECHO LA AREA 4C: 19.3 CM2
ECHO LA MAJOR AXIS: 5.2 CM
ECHO LA MINOR AXIS: 5.7 CM
ECHO LA VOL BP: 61 ML (ref 22–52)
ECHO LA VOL MOD A2C: 59 ML (ref 22–52)
ECHO LA VOL MOD A4C: 58 ML (ref 22–52)
ECHO LA VOL/BSA BIPLANE: 30 ML/M2 (ref 16–34)
ECHO LA VOLUME INDEX MOD A2C: 29 ML/M2 (ref 16–34)
ECHO LA VOLUME INDEX MOD A4C: 28 ML/M2 (ref 16–34)
ECHO LV E' LATERAL VELOCITY: 11.5 CM/S
ECHO LV E' SEPTAL VELOCITY: 8.6 CM/S
ECHO LV EDV 3D: 124 ML
ECHO LV EDV INDEX 3D: 61 ML/M2
ECHO LV EJECTION FRACTION 3D: 56 %
ECHO LV ESV 3D: 55 ML
ECHO LV ESV INDEX 3D: 27 ML/M2
ECHO LV FRACTIONAL SHORTENING: 31 % (ref 28–44)
ECHO LV INTERNAL DIMENSION DIASTOLE INDEX: 2.21 CM/M2
ECHO LV INTERNAL DIMENSION DIASTOLIC: 4.5 CM (ref 3.9–5.3)
ECHO LV INTERNAL DIMENSION SYSTOLIC INDEX: 1.52 CM/M2
ECHO LV INTERNAL DIMENSION SYSTOLIC: 3.1 CM
ECHO LV IVSD: 1 CM (ref 0.6–0.9)
ECHO LV MASS 2D: 153.3 G (ref 67–162)
ECHO LV MASS 3D INDEX: 71.1 G/M2
ECHO LV MASS 3D: 145 G
ECHO LV MASS INDEX 2D: 75.1 G/M2 (ref 43–95)
ECHO LV POSTERIOR WALL DIASTOLIC: 1 CM (ref 0.6–0.9)
ECHO LV RELATIVE WALL THICKNESS RATIO: 0.44
ECHO LVOT AREA: 2.5 CM2
ECHO LVOT AV VTI INDEX: 0.64
ECHO LVOT DIAM: 1.8 CM
ECHO LVOT MEAN GRADIENT: 4 MMHG
ECHO LVOT PEAK GRADIENT: 7 MMHG
ECHO LVOT PEAK VELOCITY: 1.4 M/S
ECHO LVOT STROKE VOLUME INDEX: 26.4 ML/M2
ECHO LVOT SV: 53.9 ML
ECHO LVOT VTI: 21.2 CM
ECHO MV A VELOCITY: 0.74 M/S
ECHO MV E DECELERATION TIME (DT): 217 MS
ECHO MV E VELOCITY: 0.65 M/S
ECHO MV E/A RATIO: 0.88
ECHO MV E/E' LATERAL: 5.65
ECHO MV E/E' RATIO (AVERAGED): 6.61
ECHO MV E/E' SEPTAL: 7.56
ECHO PV MAX VELOCITY: 1.1 M/S
ECHO PV PEAK GRADIENT: 5 MMHG
ECHO RA AREA 4C: 13.9 CM2
ECHO RA END SYSTOLIC VOLUME APICAL 4 CHAMBER INDEX BSA: 14 ML/M2
ECHO RA VOLUME: 29 ML
ECHO RV BASAL DIMENSION: 3.5 CM
ECHO RV FREE WALL PEAK S': 17.3 CM/S
ECHO RV MID DIMENSION: 2 CM
ECHO RV TAPSE: 1.9 CM (ref 1.7–?)
EKG ATRIAL RATE: 105 BPM
EKG DIAGNOSIS: NORMAL
EKG P AXIS: 32 DEGREES
EKG P-R INTERVAL: 148 MS
EKG Q-T INTERVAL: 330 MS
EKG QRS DURATION: 80 MS
EKG QTC CALCULATION (BAZETT): 436 MS
EKG R AXIS: 74 DEGREES
EKG T AXIS: -20 DEGREES
EKG VENTRICULAR RATE: 105 BPM

## 2024-09-30 PROCEDURE — 94760 N-INVAS EAR/PLS OXIMETRY 1: CPT

## 2024-09-30 PROCEDURE — 94726 PLETHYSMOGRAPHY LUNG VOLUMES: CPT

## 2024-09-30 PROCEDURE — 71046 X-RAY EXAM CHEST 2 VIEWS: CPT

## 2024-09-30 PROCEDURE — 93306 TTE W/DOPPLER COMPLETE: CPT

## 2024-09-30 PROCEDURE — 93005 ELECTROCARDIOGRAM TRACING: CPT

## 2024-09-30 PROCEDURE — 94060 EVALUATION OF WHEEZING: CPT

## 2024-09-30 PROCEDURE — 94729 DIFFUSING CAPACITY: CPT

## 2024-09-30 PROCEDURE — 93010 ELECTROCARDIOGRAM REPORT: CPT | Performed by: INTERNAL MEDICINE

## 2024-09-30 PROCEDURE — 6360000002 HC RX W HCPCS: Performed by: STUDENT IN AN ORGANIZED HEALTH CARE EDUCATION/TRAINING PROGRAM

## 2024-09-30 PROCEDURE — 94664 DEMO&/EVAL PT USE INHALER: CPT

## 2024-09-30 RX ORDER — ALBUTEROL SULFATE 0.83 MG/ML
2.5 SOLUTION RESPIRATORY (INHALATION) ONCE
Status: COMPLETED | OUTPATIENT
Start: 2024-09-30 | End: 2024-09-30

## 2024-09-30 RX ADMIN — ALBUTEROL SULFATE 2.5 MG: 2.5 SOLUTION RESPIRATORY (INHALATION) at 14:07

## 2024-09-30 NOTE — TELEPHONE ENCOUNTER
[facility] called, patient had echo & EKG today. They recommend she see a cardiologist.      Any questions, [phone]

## 2024-10-01 ENCOUNTER — TELEPHONE (OUTPATIENT)
Dept: CARDIOLOGY CLINIC | Age: 69
End: 2024-10-01

## 2024-10-01 NOTE — TELEPHONE ENCOUNTER
Received a call from Darlene. Kisha Qureshi is needing clearance for a stem cell transplant. She had an EKG yesterday that showed ischemic change and needs to be reviewed prior to scheduling. Procedure is with Dr. Olguin at [facility] TBD.     CARDIAC CLEARANCE     What type of procedure are you having?  STEM CELL TRANSPLANT    Which physician is performing your procedure?  DR. JUDI OLGUIN    When is your procedure scheduled?  TBD    Where are you having this procedure?  [facility]    Are you taking Blood Thinners?   If so what? (Name/dose/frequesncy)  apixaban (ELIQUIS) 5 MG TABS tablet      Does the surgeon want you to stop your blood thinner?  If so for how long?  N/A    Are you having any new or worsening cardiac symptoms?   N/A    Phone Number and Contact Name for Physicians office:  Darlene's callback: [phone]    Fax number to send information:  Fax clearance to: [phone]    Cardiac History:  Last office visit with Cardiologist:  07/2024    Cardiac Procedures:    Cardiac Testing:

## 2024-10-01 NOTE — TELEPHONE ENCOUNTER
This is puzzling request as she has cardiologist   Not sure why they did not call   If patient is well and no sob no cp then [facility]   For request to follow up on testing done by [facility]     If she is not feeling well and having those symptoms then to the ER

## 2024-10-02 ENCOUNTER — TELEPHONE (OUTPATIENT)
Dept: PULMONOLOGY | Age: 69
End: 2024-10-02

## 2024-10-02 NOTE — TELEPHONE ENCOUNTER
Patient needs clearance for stem cell transplant - TBD  Had PFTs   On PAP sees Dr. Montana for sleep    Darlene [phone]

## 2024-10-02 NOTE — TELEPHONE ENCOUNTER
Darlene calling back to get an update on CC - she states there is a certain timeframe they have to follow and they are trying to get this scheduled.     Please advise.     Darlene can be reached at [phone]

## 2024-10-03 ENCOUNTER — HOSPITAL ENCOUNTER (OUTPATIENT)
Dept: PHYSICAL THERAPY | Age: 69
Setting detail: THERAPIES SERIES
Discharge: HOME | End: 2024-10-03
Attending: STUDENT IN AN ORGANIZED HEALTH CARE EDUCATION/TRAINING PROGRAM
Payer: MEDICARE

## 2024-10-03 ENCOUNTER — HOSPITAL ENCOUNTER (OUTPATIENT)
Dept: ONCOLOGY | Age: 69
Setting detail: INFUSION SERIES
Discharge: HOME | End: 2024-10-03
Payer: MEDICARE

## 2024-10-03 VITALS
BODY MASS INDEX: 39.57 KG/M2 | SYSTOLIC BLOOD PRESSURE: 126 MMHG | TEMPERATURE: 97.7 F | WEIGHT: 223.33 LBS | OXYGEN SATURATION: 96 % | DIASTOLIC BLOOD PRESSURE: 63 MMHG | HEIGHT: 63 IN | HEART RATE: 73 BPM

## 2024-10-03 DIAGNOSIS — R53.0 NEOPLASTIC (MALIGNANT) RELATED FATIGUE: Primary | ICD-10-CM

## 2024-10-03 LAB
ABO + RH BLD: NORMAL
ALBUMIN SERPL-MCNC: 4.2 G/DL (ref 3.4–5)
ALBUMIN/GLOB SERPL: 1.6 {RATIO} (ref 1.1–2.2)
ALP SERPL-CCNC: 121 U/L (ref 40–129)
ALT SERPL-CCNC: 27 U/L (ref 10–40)
AMPHETAMINES UR QL SCN>1000 NG/ML: NORMAL
ANION GAP SERPL CALCULATED.3IONS-SCNC: 13 MMOL/L (ref 3–16)
APTT BLD: 29.8 SEC (ref 22.1–36.4)
AST SERPL-CCNC: 29 U/L (ref 15–37)
BACTERIA URNS QL MICRO: ABNORMAL /HPF
BARBITURATES UR QL SCN>200 NG/ML: NORMAL
BASOPHILS # BLD: 0 K/UL (ref 0–0.2)
BASOPHILS NFR BLD: 0.5 %
BENZODIAZ UR QL SCN>200 NG/ML: NORMAL
BILIRUB DIRECT SERPL-MCNC: 0.2 MG/DL (ref 0–0.3)
BILIRUB INDIRECT SERPL-MCNC: 0.4 MG/DL (ref 0–1)
BILIRUB SERPL-MCNC: 0.6 MG/DL (ref 0–1)
BILIRUB UR QL STRIP.AUTO: NEGATIVE
BLD GP AB SCN SERPL QL: NORMAL
BUN SERPL-MCNC: 26 MG/DL (ref 7–20)
CALCIUM SERPL-MCNC: 9.3 MG/DL (ref 8.3–10.6)
CANNABINOIDS UR QL SCN>50 NG/ML: NORMAL
CHLORIDE SERPL-SCNC: 105 MMOL/L (ref 99–110)
CLARITY UR: CLEAR
CO2 SERPL-SCNC: 24 MMOL/L (ref 21–32)
COCAINE UR QL SCN: NORMAL
COLOR UR: YELLOW
CREAT SERPL-MCNC: 1.2 MG/DL (ref 0.6–1.2)
CRP SERPL-MCNC: 9.2 MG/L (ref 0–5.1)
DEPRECATED RDW RBC AUTO: 15.6 % (ref 12.4–15.4)
DRUG SCREEN COMMENT UR-IMP: NORMAL
EOSINOPHIL # BLD: 0.1 K/UL (ref 0–0.6)
EOSINOPHIL NFR BLD: 1.2 %
FENTANYL SCREEN, URINE: NORMAL
FERRITIN SERPL IA-MCNC: 236.6 NG/ML (ref 15–150)
GFR SERPLBLD CREATININE-BSD FMLA CKD-EPI: 50 ML/MIN/{1.73_M2}
GLUCOSE SERPL-MCNC: 121 MG/DL (ref 70–99)
GLUCOSE UR STRIP.AUTO-MCNC: NEGATIVE MG/DL
HCT VFR BLD AUTO: 40.5 % (ref 36–48)
HGB BLD-MCNC: 13.3 G/DL (ref 12–16)
HGB UR QL STRIP.AUTO: ABNORMAL
INR PPP: 1.23 (ref 0.85–1.15)
KETONES UR STRIP.AUTO-MCNC: NEGATIVE MG/DL
LDH SERPL L TO P-CCNC: 174 U/L (ref 100–190)
LEUKOCYTE ESTERASE UR QL STRIP.AUTO: ABNORMAL
LYMPHOCYTES # BLD: 0.8 K/UL (ref 1–5.1)
LYMPHOCYTES NFR BLD: 11.1 %
MAGNESIUM SERPL-MCNC: 1.9 MG/DL (ref 1.8–2.4)
MCH RBC QN AUTO: 31.2 PG (ref 26–34)
MCHC RBC AUTO-ENTMCNC: 32.9 G/DL (ref 31–36)
MCV RBC AUTO: 94.7 FL (ref 80–100)
METHADONE UR QL SCN>300 NG/ML: NORMAL
MONOCYTES # BLD: 0.5 K/UL (ref 0–1.3)
MONOCYTES NFR BLD: 6.2 %
NEUTROPHILS # BLD: 6 K/UL (ref 1.7–7.7)
NEUTROPHILS NFR BLD: 81 %
NITRITE UR QL STRIP.AUTO: POSITIVE
OPIATES UR QL SCN>300 NG/ML: NORMAL
OXYCODONE UR QL SCN: NORMAL
PCP UR QL SCN>25 NG/ML: NORMAL
PH UR STRIP.AUTO: 6 [PH] (ref 5–8)
PH UR STRIP: 6 [PH]
PHOSPHATE SERPL-MCNC: 2.4 MG/DL (ref 2.5–4.9)
PLATELET # BLD AUTO: 164 K/UL (ref 135–450)
PMV BLD AUTO: 10.2 FL (ref 5–10.5)
POTASSIUM SERPL-SCNC: 3.5 MMOL/L (ref 3.5–5.1)
PROT SERPL-MCNC: 6.8 G/DL (ref 6.4–8.2)
PROT UR STRIP.AUTO-MCNC: NEGATIVE MG/DL
PROTHROMBIN TIME: 15.7 SEC (ref 11.9–14.9)
RBC # BLD AUTO: 4.28 M/UL (ref 4–5.2)
RBC #/AREA URNS HPF: ABNORMAL /HPF (ref 0–4)
SODIUM SERPL-SCNC: 142 MMOL/L (ref 136–145)
SP GR UR STRIP.AUTO: 1.02 (ref 1–1.03)
SPECIMEN SOURCE: NORMAL
UA DIPSTICK W REFLEX MICRO PNL UR: YES
URATE SERPL-MCNC: 6.4 MG/DL (ref 2.6–6)
URN SPEC COLLECT METH UR: ABNORMAL
UROBILINOGEN UR STRIP-ACNC: 0.2 E.U./DL
WBC # BLD AUTO: 7.4 K/UL (ref 4–11)
WBC #/AREA URNS HPF: ABNORMAL /HPF (ref 0–5)

## 2024-10-03 PROCEDURE — 86664 EPSTEIN-BARR NUCLEAR ANTIGEN: CPT

## 2024-10-03 PROCEDURE — 86901 BLOOD TYPING SEROLOGIC RH(D): CPT

## 2024-10-03 PROCEDURE — 86706 HEP B SURFACE ANTIBODY: CPT

## 2024-10-03 PROCEDURE — 86644 CMV ANTIBODY: CPT

## 2024-10-03 PROCEDURE — 86777 TOXOPLASMA ANTIBODY: CPT

## 2024-10-03 PROCEDURE — 86900 BLOOD TYPING SEROLOGIC ABO: CPT

## 2024-10-03 PROCEDURE — 86696 HERPES SIMPLEX TYPE 2 TEST: CPT

## 2024-10-03 PROCEDURE — 85610 PROTHROMBIN TIME: CPT

## 2024-10-03 PROCEDURE — 83735 ASSAY OF MAGNESIUM: CPT

## 2024-10-03 PROCEDURE — 82784 ASSAY IGA/IGD/IGG/IGM EACH: CPT

## 2024-10-03 PROCEDURE — G0480 DRUG TEST DEF 1-7 CLASSES: HCPCS

## 2024-10-03 PROCEDURE — 86695 HERPES SIMPLEX TYPE 1 TEST: CPT

## 2024-10-03 PROCEDURE — 82955 ASSAY OF G6PD ENZYME: CPT

## 2024-10-03 PROCEDURE — 85730 THROMBOPLASTIN TIME PARTIAL: CPT

## 2024-10-03 PROCEDURE — 84100 ASSAY OF PHOSPHORUS: CPT

## 2024-10-03 PROCEDURE — 97110 THERAPEUTIC EXERCISES: CPT | Performed by: PHYSICAL THERAPIST

## 2024-10-03 PROCEDURE — 87529 HSV DNA AMP PROBE: CPT

## 2024-10-03 PROCEDURE — 82728 ASSAY OF FERRITIN: CPT

## 2024-10-03 PROCEDURE — 82248 BILIRUBIN DIRECT: CPT

## 2024-10-03 PROCEDURE — 86140 C-REACTIVE PROTEIN: CPT

## 2024-10-03 PROCEDURE — 86707 HEPATITIS BE ANTIBODY: CPT

## 2024-10-03 PROCEDURE — 84550 ASSAY OF BLOOD/URIC ACID: CPT

## 2024-10-03 PROCEDURE — 80307 DRUG TEST PRSMV CHEM ANLYZR: CPT

## 2024-10-03 PROCEDURE — 86663 EPSTEIN-BARR ANTIBODY: CPT

## 2024-10-03 PROCEDURE — 86778 TOXOPLASMA ANTIBODY IGM: CPT

## 2024-10-03 PROCEDURE — 97161 PT EVAL LOW COMPLEX 20 MIN: CPT | Performed by: PHYSICAL THERAPIST

## 2024-10-03 PROCEDURE — 85025 COMPLETE CBC W/AUTO DIFF WBC: CPT

## 2024-10-03 PROCEDURE — 86645 CMV ANTIBODY IGM: CPT

## 2024-10-03 PROCEDURE — 81001 URINALYSIS AUTO W/SCOPE: CPT

## 2024-10-03 PROCEDURE — 86665 EPSTEIN-BARR CAPSID VCA: CPT

## 2024-10-03 PROCEDURE — 86850 RBC ANTIBODY SCREEN: CPT

## 2024-10-03 PROCEDURE — 80053 COMPREHEN METABOLIC PANEL: CPT

## 2024-10-03 PROCEDURE — 83615 LACTATE (LD) (LDH) ENZYME: CPT

## 2024-10-03 PROCEDURE — 86709 HEPATITIS A IGM ANTIBODY: CPT

## 2024-10-03 PROCEDURE — 36591 DRAW BLOOD OFF VENOUS DEVICE: CPT

## 2024-10-03 PROCEDURE — 86787 VARICELLA-ZOSTER ANTIBODY: CPT

## 2024-10-03 NOTE — PLAN OF CARE
[facility]- Outpatient Rehabilitation and Therapy [address], [place] [address] office: [phone] fax: [phone]     Physical Therapy Initial Evaluation Certification      Dear Valentina Brewer DO,    We had the pleasure of evaluating the following patient for physical therapy services at [facility].  A summary of our findings can be found in the initial assessment below.  This includes our plan of care.  If you have any questions or concerns regarding these findings, please do not hesitate to contact me at the office phone number listed above.  Thank you for the referral.     Physician Signature:_______________________________Date:__________________  By signing above (or electronic signature), therapist’s plan is approved by physician       Physical Therapy: TREATMENT/PROGRESS NOTE   Patient: Kiera Irvin (68 y.o. female)   Examination Date: 10/03/2024   :  1955 MRN: [id number]   Visit #: 1   Insurance Allowable Auth Needed   Medical Necessity []Yes    [x]No    Insurance: Payor: MEDICARE / Plan: MEDICARE PART A AND B / Product Type: *No Product type* /   Insurance ID: [id number] - (Medicare)  Secondary Insurance (if applicable): [facility]   Treatment Diagnosis:     ICD-10-CM    1. Neoplastic (malignant) related fatigue  R53.0          Medical Diagnosis:  Pre-transplant evaluation for stem cell transplant [Z01.818]   Referring Physician: Valentina Brewer DO  PCP: Ritchie Goodman MD       Plan of care signed (Y/N):     Date of Patient follow up with Physician:      Plan of Care Report: EVAL today  POC update due: (10 visits /OR AUTH LIMITS, whichever is less)  2024        Medical History:  Comorbidities:  Diabetes (Type I or II)  Other Cardio/Pulmonary Conditions: a-fib  Other: neuropathy in feet  Relevant Medical History: bone spurs on spine and hip; occasional knee pain                                         Precautions/

## 2024-10-03 NOTE — PROGRESS NOTES
Pt seen and assessed at [facility] today for nurse visit with labs. Port accessed with sterile technique. Port remained accessed per the pt's request for her appointment at [facility] later this afternoon. Pt tolerated port access and lab draw well. Pt verbalizes understanding of discharge instructions.  Discharged ambulatory to home with spouse.    Amy Cross RN

## 2024-10-03 NOTE — PROGRESS NOTES
Transplant coordinator met with patient for pre-CAR-T evaluation and education on CAR-T therapy. Discussed purpose of CAR-T (chimergic antigen receptor) T-cells. The entire process of collection of T cells, the re-engineering process, lymphocyte depleting chemotherapy, reinfusion of CAR cells and associated side effects and toxicities were discussed. I specifically went over in detail the expected side effects such as cytokine release syndrome as well as the potential neuro toxicities associated with CAR T-cell infusion. Patient and caregiver made aware of need to have 24 hour caregiver for 30 days after CAR-T infusion and must be within 45 minutes of the hospital. Instructed will be given CAR-T wallet card to carry at all times once CAR-T is infused and will need to present the wallet card when coming to the [facility] . Aware will be on anti-seizure medication for 8 weeks and can not drive or operate machinery x 8 weeks. Discussed daily Outpatient Infusion Department routine up to 30 days. I also explained the potential long-term hypogammaglobulinemia. Patient is interested in proceeding with CAR T-cell infusion. Information on CAR-T given and reviewed. Tentative calendar including collection date, return of cells, dates of lymphodepleting chemotherapy and infusion of cells all discussed.

## 2024-10-04 ENCOUNTER — OFFICE VISIT (OUTPATIENT)
Dept: PULMONOLOGY | Age: 69
End: 2024-10-04
Payer: MEDICARE

## 2024-10-04 VITALS
WEIGHT: 225 LBS | HEIGHT: 63 IN | BODY MASS INDEX: 39.87 KG/M2 | OXYGEN SATURATION: 97 % | HEART RATE: 59 BPM | SYSTOLIC BLOOD PRESSURE: 124 MMHG | RESPIRATION RATE: 16 BRPM | DIASTOLIC BLOOD PRESSURE: 70 MMHG

## 2024-10-04 DIAGNOSIS — Z01.811 PREOP PULMONARY/RESPIRATORY EXAM: Primary | ICD-10-CM

## 2024-10-04 LAB
HAV IGM SERPL QL IA: NORMAL
HBV SURFACE AB SERPL IA-ACNC: <3.5 MIU/ML
HERPES SIMPLEX VIRUS 1 IGG: POSITIVE
HERPES SIMPLEX VIRUS 2 IGG: NEGATIVE
IGG SERPL-MCNC: 483 MG/DL (ref 700–1600)

## 2024-10-04 PROCEDURE — G8400 PT W/DXA NO RESULTS DOC: HCPCS | Performed by: INTERNAL MEDICINE

## 2024-10-04 PROCEDURE — G9899 SCRN MAM PERF RSLTS DOC: HCPCS | Performed by: INTERNAL MEDICINE

## 2024-10-04 PROCEDURE — 1090F PRES/ABSN URINE INCON ASSESS: CPT | Performed by: INTERNAL MEDICINE

## 2024-10-04 PROCEDURE — 3074F SYST BP LT 130 MM HG: CPT | Performed by: INTERNAL MEDICINE

## 2024-10-04 PROCEDURE — G8417 CALC BMI ABV UP PARAM F/U: HCPCS | Performed by: INTERNAL MEDICINE

## 2024-10-04 PROCEDURE — 99204 OFFICE O/P NEW MOD 45 MIN: CPT | Performed by: INTERNAL MEDICINE

## 2024-10-04 PROCEDURE — 1036F TOBACCO NON-USER: CPT | Performed by: INTERNAL MEDICINE

## 2024-10-04 PROCEDURE — 1123F ACP DISCUSS/DSCN MKR DOCD: CPT | Performed by: INTERNAL MEDICINE

## 2024-10-04 PROCEDURE — G8484 FLU IMMUNIZE NO ADMIN: HCPCS | Performed by: INTERNAL MEDICINE

## 2024-10-04 PROCEDURE — G8427 DOCREV CUR MEDS BY ELIG CLIN: HCPCS | Performed by: INTERNAL MEDICINE

## 2024-10-04 PROCEDURE — 3078F DIAST BP <80 MM HG: CPT | Performed by: INTERNAL MEDICINE

## 2024-10-04 PROCEDURE — 3017F COLORECTAL CA SCREEN DOC REV: CPT | Performed by: INTERNAL MEDICINE

## 2024-10-04 NOTE — PROCEDURES
PROCEDURE NOTE  Date: 10/4/2024   Name: Kiera Irvin  YOB: 1955    Procedures    Date of test: September 30, 2024  Indication for PFT: Pretransplant evaluation for stem cell trans  BMI: 38.1  Tobacco history: Patient smoked for 30 years but quit 8 years ago    Spirometry data is acceptable and reproducible.  Albuterol was given per protocol  Lung volumes performed via Plethysomography  Diffusion capacity not adjusted for hemoglobin  Oxygen saturation was 95% on room air  SPCO was 3%    Spirometry    FEV/FVC ratio was 88%  Prebronchodilator FEV1 was 1.75 L, which is 75% of predicted  Prebronchodilator FVC was 2 L, which is 66% of predicted  There was was significant response to bronchodilator in small airways only    Lung volumes    Total lung capacity was   4.52 L, which is 88% of predicted  Residual volume was 2.52 L, which is 117% of predicted  ERV is 0.13 which is 12% of predicted    Diffusion capacity    Diffusion capacity was 11.52, which is 59% of predicted    Overall Interpretation    There is an obstructive defect based on low FEV1 and FVC with normal TLC  I suspect the TLC would be higher except that he may have some restriction on the basis of obesity given his ERV of 12% of predicted  Diffusion capacity is moderately decreased -this could be on the basis of COPD or anemia given this is a pretransplant evaluation for stem cell transplant.  Unfortunately hemoglobin was not provided and thus diffusion capacity is not corrected for anemia if one exists        Obstruction severity and Restriction Severity using FEV1 %  Gold Stage Severity per ERS/ATS FEV1 % per ERS/ATS   GOLD I:  FEV1>80% Mild COPD >70   GOLD II:  FEV1 50-79% Moderate 50-70   GOLD III:  FEV1 30-49% Severe 35-50   GOLD IV:  FEV1 <30 Very Severe  <35       DCLO:  Normal:  %  Mild:  65-70%  Moderate:  41-60%  Severe:  <40%      PFT data will be scanned into the media tab in epic under this encounter    Roberto Porter

## 2024-10-04 NOTE — PROGRESS NOTES
[facility] Pulmonary and Critical Care    Outpatient Note    Subjective:   CHIEF COMPLAINT:   Chief Complaint   Patient presents with    Surgical Clearance       HPI:     The patient is 68 y.o. female who presents today for a new patient visit for pulmonary clearance before stem cell transplant  She has B cell non Hodgkin's lymphoma diagnosed on 16.  She was intimally treated with chemo and radiation, then stem cell transplant in .  She had recurrence diagnosed in August.  There is a plan for CAR T.      Former smoker.  She quit in .  She smoked on and off for total of 13 years.  She used to smoke a pack a week.    She had COVID in  and was sent home with albuterol.  She is not using it anymore.      No SOB walking on level ground.  She has to stop with one flight of steps due to knee and hip pain.  There is no cough or sputum production.        Past Medical History:    Past Medical History:   Diagnosis Date    Allergic rhinitis     Atrial fibrillation with RVR (HCC)     Cancer (HCC)     Diabetes mellitus type 2, controlled (HCC)     Hypertension     Melanoma in situ of back (HCC)     dr fernandez    Precordial pain 2016    Recurrent upper respiratory infection (URI)     Sleep apnea        Social History:    Social History     Tobacco Use   Smoking Status Former    Current packs/day: 0.00    Average packs/day: 1 pack/day for 41.0 years (41.0 ttl pk-yrs)    Types: Cigarettes    Start date: 1975    Quit date: 2016    Years since quittin.7    Passive exposure: Past   Smokeless Tobacco Never       Family History:  Family History   Problem Relation Age of Onset    High Blood Pressure Mother     Cancer Mother         breast    High Blood Pressure Father     Cancer Father         lung       Current Medications:  Current Outpatient Medications on File Prior to Visit   Medication Sig Dispense Refill    insulin glargine (LANTUS SOLOSTAR) 100 UNIT/ML injection pen Inject 30 Units

## 2024-10-05 LAB
CMV IGG SERPL IA-ACNC: <0.2 U/ML
CMV IGM SERPL IA-ACNC: <8 AU/ML
EBV EA-D IGG SER-ACNC: <5 U/ML (ref 0–10.9)
EBV NA IGG SER IA-ACNC: 223 U/ML (ref 0–21.9)
EBV VCA IGG SER IA-ACNC: 106 U/ML (ref 0–21.9)
EBV VCA IGM SER IA-ACNC: <10 U/ML (ref 0–43.9)
G6PD RBC-CCNC: 13.4 U/G HB (ref 9.9–16.6)
HBV E AB SERPL QL IA: NEGATIVE
T GONDII IGG SER-ACNC: <3 IU/ML
T GONDII IGM SER-ACNC: <3 AU/ML

## 2024-10-06 LAB
VZV IGG SER IA-ACNC: 709.3 IV
VZV IGM SER IA-ACNC: 0.3 ISR

## 2024-10-07 LAB
HSV1 DNA CSF QL NAA+PROBE: NOT DETECTED
HSV2 DNA CSF QL NAA+PROBE: NOT DETECTED
SPECIMEN SOURCE: NORMAL

## 2024-10-07 NOTE — PSYCHOTHERAPY
level)    CAREGIVER: MINI MENTAL STATUS EXAM:  26/30  Completed for caregivers >60    CAREGIVER BRIEF MEDICAL NUMBERS  4/4  <4 is indicative of inadequate health numeracy and possible challenges with understanding and managing treatment recommendations including medication management or nutrition management.     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 Generalized Anxiety Disorder):  Reports that she has anxiety and a tendency to worry. Can worry about many different things including what she needs to do, finances, other stressors, treatment, etc.  Endorses excessive worry, uncontrollable worry, fatigue, and irritability related to worry. States that she has always been a worrier.  Panic attacks: Denies   Medical Anxiety (e.g. needles, pills, MRI, hospitals): Denies  Obsessive/Compulsive Behaviors: Denies  Eating disordered behavior (I.e. binging, purging, other compensatory behaviors, concern about body image): Denies  Katherine: Denies  Psychosis: Denies  Post-traumatic stress disorder: Denies  Sleep: 6-8 hours on average per night. Using CPAP most nights. Hx of sleep apnea: Yes        10/8/2024    12:48 PM 8/19/2024     9:39 AM 4/22/2024    11:38 AM 4/22/2024    10:55 AM 12/29/2023    11:01 AM 11/3/2023     6:18 PM 8/28/2023    10:41 AM   PHQ Scores   PHQ2 Score 0 0 0 0 0 0 0   PHQ9 Score 1 0 0 0 0 0 0     Interpretation of Total Score Depression Severity: 1-4 = Minimal

## 2024-10-08 ENCOUNTER — HOSPITAL ENCOUNTER (OUTPATIENT)
Dept: CT IMAGING | Age: 69
Discharge: HOME | End: 2024-10-08
Payer: MEDICARE

## 2024-10-08 ENCOUNTER — HOSPITAL ENCOUNTER (OUTPATIENT)
Dept: ONCOLOGY | Age: 69
Setting detail: INFUSION SERIES
Discharge: HOME | End: 2024-10-08

## 2024-10-08 VITALS
RESPIRATION RATE: 17 BRPM | OXYGEN SATURATION: 99 % | DIASTOLIC BLOOD PRESSURE: 68 MMHG | HEART RATE: 61 BPM | TEMPERATURE: 97.6 F | SYSTOLIC BLOOD PRESSURE: 152 MMHG

## 2024-10-08 DIAGNOSIS — C83.08 SMALL CELL B-CELL LYMPHOMA OF LYMPH NODES OF MULTIPLE SITES (HCC): ICD-10-CM

## 2024-10-08 LAB
ANABASINE UR-MCNC: <5 NG/ML
APTT BLD: 26.4 SEC (ref 22.1–36.4)
COTININE UR-MCNC: 471 NG/ML
INR PPP: 1.01 (ref 0.85–1.15)
NICOTINE UR-MCNC: 1062 NG/ML
PLATELET # BLD AUTO: 174 K/UL (ref 135–450)
PROTHROMBIN TIME: 13.5 SEC (ref 11.9–14.9)
TRANS-3-OH-COTININE UR-MCNC: >2000 NG/ML

## 2024-10-08 PROCEDURE — 85049 AUTOMATED PLATELET COUNT: CPT

## 2024-10-08 PROCEDURE — 88342 IMHCHEM/IMCYTCHM 1ST ANTB: CPT

## 2024-10-08 PROCEDURE — 36415 COLL VENOUS BLD VENIPUNCTURE: CPT

## 2024-10-08 PROCEDURE — 88305 TISSUE EXAM BY PATHOLOGIST: CPT

## 2024-10-08 PROCEDURE — 6360000002 HC RX W HCPCS: Performed by: STUDENT IN AN ORGANIZED HEALTH CARE EDUCATION/TRAINING PROGRAM

## 2024-10-08 PROCEDURE — 88341 IMHCHEM/IMCYTCHM EA ADD ANTB: CPT

## 2024-10-08 PROCEDURE — 85730 THROMBOPLASTIN TIME PARTIAL: CPT

## 2024-10-08 PROCEDURE — 85610 PROTHROMBIN TIME: CPT

## 2024-10-08 PROCEDURE — 2500000003 HC RX 250 WO HCPCS: Performed by: STUDENT IN AN ORGANIZED HEALTH CARE EDUCATION/TRAINING PROGRAM

## 2024-10-08 PROCEDURE — 88360 TUMOR IMMUNOHISTOCHEM/MANUAL: CPT

## 2024-10-08 PROCEDURE — 38505 NEEDLE BIOPSY LYMPH NODES: CPT | Performed by: STUDENT IN AN ORGANIZED HEALTH CARE EDUCATION/TRAINING PROGRAM

## 2024-10-08 PROCEDURE — 99152 MOD SED SAME PHYS/QHP 5/>YRS: CPT

## 2024-10-08 RX ORDER — FENTANYL CITRATE 50 UG/ML
INJECTION, SOLUTION INTRAMUSCULAR; INTRAVENOUS PRN
Status: COMPLETED | OUTPATIENT
Start: 2024-10-08 | End: 2024-10-08

## 2024-10-08 RX ORDER — LIDOCAINE HYDROCHLORIDE 10 MG/ML
INJECTION, SOLUTION EPIDURAL; INFILTRATION; INTRACAUDAL; PERINEURAL PRN
Status: COMPLETED | OUTPATIENT
Start: 2024-10-08 | End: 2024-10-08

## 2024-10-08 RX ORDER — MIDAZOLAM HYDROCHLORIDE 1 MG/ML
INJECTION INTRAMUSCULAR; INTRAVENOUS PRN
Status: COMPLETED | OUTPATIENT
Start: 2024-10-08 | End: 2024-10-08

## 2024-10-08 RX ADMIN — MIDAZOLAM HYDROCHLORIDE 1 MG: 2 INJECTION, SOLUTION INTRAMUSCULAR; INTRAVENOUS at 15:25

## 2024-10-08 RX ADMIN — LIDOCAINE HYDROCHLORIDE 15 ML: 10 INJECTION, SOLUTION EPIDURAL; INFILTRATION; INTRACAUDAL; PERINEURAL at 15:29

## 2024-10-08 RX ADMIN — FENTANYL CITRATE 50 MCG: 50 INJECTION, SOLUTION INTRAMUSCULAR; INTRAVENOUS at 15:25

## 2024-10-08 ASSESSMENT — PATIENT HEALTH QUESTIONNAIRE - PHQ9
2. FEELING DOWN, DEPRESSED OR HOPELESS: NOT AT ALL
SUM OF ALL RESPONSES TO PHQ QUESTIONS 1-9: 1
9. THOUGHTS THAT YOU WOULD BE BETTER OFF DEAD, OR OF HURTING YOURSELF: NOT AT ALL
3. TROUBLE FALLING OR STAYING ASLEEP: NOT AT ALL
SUM OF ALL RESPONSES TO PHQ QUESTIONS 1-9: 1
SUM OF ALL RESPONSES TO PHQ9 QUESTIONS 1 & 2: 0
8. MOVING OR SPEAKING SO SLOWLY THAT OTHER PEOPLE COULD HAVE NOTICED. OR THE OPPOSITE, BEING SO FIGETY OR RESTLESS THAT YOU HAVE BEEN MOVING AROUND A LOT MORE THAN USUAL: NOT AT ALL
6. FEELING BAD ABOUT YOURSELF - OR THAT YOU ARE A FAILURE OR HAVE LET YOURSELF OR YOUR FAMILY DOWN: NOT AT ALL
5. POOR APPETITE OR OVEREATING: NOT AT ALL
SUM OF ALL RESPONSES TO PHQ QUESTIONS 1-9: 1
4. FEELING TIRED OR HAVING LITTLE ENERGY: SEVERAL DAYS
7. TROUBLE CONCENTRATING ON THINGS, SUCH AS READING THE NEWSPAPER OR WATCHING TELEVISION: NOT AT ALL
SUM OF ALL RESPONSES TO PHQ QUESTIONS 1-9: 1
1. LITTLE INTEREST OR PLEASURE IN DOING THINGS: NOT AT ALL

## 2024-10-08 ASSESSMENT — PAIN SCALES - GENERAL
PAINLEVEL_OUTOF10: 0

## 2024-10-08 ASSESSMENT — ANXIETY QUESTIONNAIRES
5. BEING SO RESTLESS THAT IT IS HARD TO SIT STILL: 0-NOT AT ALL
3. WORRYING TOO MUCH ABOUT DIFFERENT THINGS: 0-NOT AT ALL
1. FEELING NERVOUS, ANXIOUS, OR ON EDGE: NOT AT ALL
GAD7 TOTAL SCORE: 0
4. TROUBLE RELAXING: 0-NOT AT ALL
6. BECOMING EASILY ANNOYED OR IRRITABLE: 0-NOT AT ALL
7. FEELING AFRAID AS IF SOMETHING AWFUL MIGHT HAPPEN: 0-NOT AT ALL
2. NOT BEING ABLE TO STOP OR CONTROL WORRYING: 0-NOT AT ALL

## 2024-10-08 ASSESSMENT — PAIN - FUNCTIONAL ASSESSMENT: PAIN_FUNCTIONAL_ASSESSMENT: NONE - DENIES PAIN

## 2024-10-08 NOTE — PROCEDURES
Interventional Radiology Post Procedure    Date: 10/8/2024    Physician: Trung Redd MD    Pre-op Diagnosis: lymphoma    Post-op Diagnosis: same    Variation from Planned Procedure: None       Findings: successful biopsy of the left supraclavicular node identified on recent PET/CT    Patient condition: Stable    Estimated Blood Loss: 1 cc    Specimens:  20G core x3      Signed,  Maurilio Redd MD  3:39 PM  10/8/2024

## 2024-10-08 NOTE — PROGRESS NOTES
Ambulatory Surgery/Procedure Discharge Note    Vitals:    10/08/24 1645   BP: (!) 152/68   Pulse: 61   Resp: 17   Temp:    SpO2: 99%     Patient meets criteria for discharge per Jovi score.    No intake/output data recorded.    Restroom use offered before discharge.  Yes    Pain assessment:  none  Pain Level: 0    Pt and  states \"ready to go home\". Pt alert and oriented x4. IV removed. Denies N/V or pain. Left clavicular biopsy site-C,D,I. Pt tolerating po intake. Discharge instructions given to pt and  with pt permission. Pt and  verbalized understanding of all instructions. Left with all belongings and discharge instructions.     Patient discharged to home/self care. Patient discharged via wheel chair by transporter to waiting family.

## 2024-10-08 NOTE — PROGRESS NOTES
IMAGING SERVICES NURSING PROGRESS NOTE    Procedure:  Lymph node Biopsy  October 8, 2024  Kiera Irvin      Allergies:    Allergies   Allergen Reactions    Penicillins Hives       Vitals:    10/08/24 1245   BP: (!) 146/60   Pulse: 64   Resp: 16   Temp: 97.6 °F (36.4 °C)   SpO2: 97%       Recent lab work reviewed with MD: yes    Procedure explained to patient by MD: yes   Informed consent obtained:yes  Family with patient:yes    Mental Status:  Normal  Readiness to learn:  Yes and No  Barriers to learning: No    Pain Assessment Pre-Procedure:  Pain Present:  no  Pain Score:  0  Pain Quality/Description:  na    Time out Procedure Verification with:  [x] RN  [x] Physician  [x] Patient  [x] Other: CT Technologist  Procedure site marked, if applicable:  Yes    Note: Patient arrived A & O x 4, denies pain, breathing easily on room air, Spoke to Dr. Redd prior to procedure.  Procedural sedation:  Fentanyl:  50mcg  Versed:    1mg  Post Procedureal Note:  Patient tolerated procedure well.  Breathing easily on room air.  Report given to [facility] RN.  Patient transported in stable conditon to room 4.    Pain Assessment Post-Procedure:  Pain Present:  no  Pain Score:  0  Pain Quality/Description:  na    Plan of Care Goals:  Safety measures met:  Yes  Patient understands explanation of procedure:  Yes    Time in:  1520  Time out:  1540  Jordyn Agustin RN.  R.N. 10/8/2024

## 2024-10-08 NOTE — DISCHARGE INSTRUCTIONS
Discharge Instructions for Biopsy of  left supraclavicular node   Interventional Radiologist performing procedure: Dr. Redd  Home Care     Remove the bandage after a day or two.  May apply band-aid if needed.   Diet     Resume your normal diet.    Physical Activity     Rest for the first 24-48 hours.      No driving for 24 hours if you have received analgesia/sedation   May shower.  Do not submerge biopsy site in water (ie:bath, hot tub, swimming pool) for one week to prevent infection    Avoid strenuous activities for one week (excercise, heavy lifting etc.)       Call Your Doctor If Any of the Following Occurs   Signs of infection, including fever and chills     Redness, swelling, increasing pain, excessive bleeding, or any discharge from the incision site     Pain that you can't control with over the counter medications   Cough, shortness of breath, or chest pain     Pain when taking a deep breath     You feel your heart rate is fast   In case of an emergency, call 911 immediately.       [facility] AMBULATORY PROCEDURE DISCHARGE INSTRUCTIONS  A responsible person should be with you for the next 24 hours.       MEDICATION INSTRUCTIONS:  [x]Resume your prescribed medications  [x]You may take a non-prescription “headache remedy”, preferably one that does not contain aspirin.  [x] Keep your medication list updated and carry it with you in case of emergencies.    IF YOU TAKE ANTI-COAGULANTS:  You may typically re-start your blood thinning drugs the day after your procedure UNLESS directed otherwise by the doctor who prescribes the drug for you. Some common blood thinning drugs include  Anti-inflammatory drugs (motrin, aleve)     Aspirin  Anti-coagulants such as plavix (clopidogrel) or coumadin (warfarin)      FOLLOW-UP CARE:  Follow up with your doctor for results and appointment    Physician Valentina Brewer DO         The above instructions were reviewed with patient/significant other.  The following

## 2024-10-08 NOTE — H&P
Patient:  Kiera Irvin   :   1955      Relevant clinical history, particularly as it involves the pending procedure, was reviewed and discussed.    The procedure including risks and benefits was discussed at length with the patient (or designated family member) and all questions were answered.  Informed consent to proceed with the procedure was given.    Vital signs were monitored and documented by the Radiology nurse.    Targeted physical examination  Heart : regular rate and rhythm  Lungs : clear, breathing easily  Condition : stable    [facility] nurses notes reviewed and agreed.    Past Medical History:        Diagnosis Date    Allergic rhinitis     Atrial fibrillation with RVR (HCC)     Cancer (HCC)     Diabetes mellitus type 2, controlled (HCC)     Hypertension     Melanoma in situ of back (HCC)     dr fernandez    Precordial pain 2016    Recurrent upper respiratory infection (URI)     Sleep apnea        Past Surgical History:           Procedure Laterality Date    BONE MARROW TRANSPLANT      BREAST CYST ASPIRATION      in past benign bilateral over years. 2 episodes     SECTION      CHOLECYSTECTOMY      COLONOSCOPY  09/10/2012    polyp, sharlene, repeat 5 years    COLONOSCOPY  10/09/2017    dr su and check in 5 years. polyps    COLONOSCOPY  2022    polyps, dr su, repeat in 3 years    ENDOMETRIAL ABLATION      FINE NEEDLE ASPIRATION      GALLBLADDER SURGERY      OTHER SURGICAL HISTORY  2019    Abdominal Mass BX    OTHER SURGICAL HISTORY  2019    CT BIOPSY ABDOMEN RETROPERITONEUM,    OVARY REMOVAL Left 2018    benign growth, dr laguerre at [facility]    TONSILLECTOMY      TUBAL LIGATION      TUNNELED VENOUS PORT PLACEMENT Right 2016    Dr. Alford/[facility] Port       Allergies:  Penicillins    Medications:   Home Meds  Current Outpatient Medications on File Prior to Encounter   Medication Sig Dispense Refill    insulin glargine (LANTUS SOLOSTAR) 100 UNIT/ML

## 2024-10-15 NOTE — TELEPHONE ENCOUNTER
Called Dr Brewer office spoke with Darlene. States Dr Brewer is out today and will return tomorrow. Darlene will speak with Dr Brewer and call our office back on how long patient is to hold Eliquis prior to procedure.

## 2024-10-16 ENCOUNTER — OFFICE VISIT (OUTPATIENT)
Dept: CARDIOLOGY CLINIC | Age: 69
End: 2024-10-16
Payer: MEDICARE

## 2024-10-16 VITALS
SYSTOLIC BLOOD PRESSURE: 104 MMHG | BODY MASS INDEX: 39.61 KG/M2 | WEIGHT: 225.4 LBS | HEART RATE: 66 BPM | DIASTOLIC BLOOD PRESSURE: 64 MMHG

## 2024-10-16 DIAGNOSIS — Z01.810 PREOP CARDIOVASCULAR EXAM: Primary | ICD-10-CM

## 2024-10-16 DIAGNOSIS — I48.0 PAF (PAROXYSMAL ATRIAL FIBRILLATION) (HCC): ICD-10-CM

## 2024-10-16 PROCEDURE — G8484 FLU IMMUNIZE NO ADMIN: HCPCS | Performed by: INTERNAL MEDICINE

## 2024-10-16 PROCEDURE — 1036F TOBACCO NON-USER: CPT | Performed by: INTERNAL MEDICINE

## 2024-10-16 PROCEDURE — 3078F DIAST BP <80 MM HG: CPT | Performed by: INTERNAL MEDICINE

## 2024-10-16 PROCEDURE — 99214 OFFICE O/P EST MOD 30 MIN: CPT | Performed by: INTERNAL MEDICINE

## 2024-10-16 PROCEDURE — 93000 ELECTROCARDIOGRAM COMPLETE: CPT | Performed by: INTERNAL MEDICINE

## 2024-10-16 PROCEDURE — 3017F COLORECTAL CA SCREEN DOC REV: CPT | Performed by: INTERNAL MEDICINE

## 2024-10-16 PROCEDURE — 1090F PRES/ABSN URINE INCON ASSESS: CPT | Performed by: INTERNAL MEDICINE

## 2024-10-16 PROCEDURE — G8427 DOCREV CUR MEDS BY ELIG CLIN: HCPCS | Performed by: INTERNAL MEDICINE

## 2024-10-16 PROCEDURE — G8417 CALC BMI ABV UP PARAM F/U: HCPCS | Performed by: INTERNAL MEDICINE

## 2024-10-16 PROCEDURE — 1123F ACP DISCUSS/DSCN MKR DOCD: CPT | Performed by: INTERNAL MEDICINE

## 2024-10-16 PROCEDURE — 3074F SYST BP LT 130 MM HG: CPT | Performed by: INTERNAL MEDICINE

## 2024-10-16 PROCEDURE — G9899 SCRN MAM PERF RSLTS DOC: HCPCS | Performed by: INTERNAL MEDICINE

## 2024-10-16 PROCEDURE — G8400 PT W/DXA NO RESULTS DOC: HCPCS | Performed by: INTERNAL MEDICINE

## 2024-10-16 ASSESSMENT — ENCOUNTER SYMPTOMS
FACIAL SWELLING: 0
ABDOMINAL DISTENTION: 0
BLOOD IN STOOL: 0
SHORTNESS OF BREATH: 0
ABDOMINAL PAIN: 0
COLOR CHANGE: 0
EYE DISCHARGE: 0
CHEST TIGHTNESS: 0
WHEEZING: 0
BACK PAIN: 0
VOMITING: 0
COUGH: 0

## 2024-10-16 NOTE — PROGRESS NOTES
[facility]     Outpatient Cardiology         Patient Name:  Kiera Irvin  Requesting Physician: No admitting provider for patient encounter.  Primary Care Physician: Ritchie Goodman MD    Reason for Consultation/Chief Complaint:   Chief Complaint   Patient presents with    Pre-op Exam         History of Present Illness:    HPI     Kiera Martinez a 68 y.o. female with PMH of HTN, ZOE on CPAP, non-hodgkins lymphoma, pAF. S/p afib ablation (10/28/2020), DM II.   Here for CV risk assessment    pAF, on eliquis 5 mg BiD, toprol XL 25 mg daily, had previous ablation, no recurrence of A-fib so far, most recent EKG within normal limits.  Sinus.  Discussed that she may need to hold anticoagulation if she develops thrombocytopenia during CAR-T therapy.  From a preop cardiovascular perspective she has undergone BMT, A-fib ablation without any issue, also had abnormal stress test in the past and her EKG is normal.  Overall she is good to go for CAR-T therapy      PMH  Past Medical History:   Diagnosis Date    Allergic rhinitis     Atrial fibrillation with RVR (HCC)     Cancer (HCC)     Diabetes mellitus type 2, controlled (HCC)     Hypertension     Melanoma in situ of back (HCC)     dr fernandez    Precordial pain 2016    Recurrent upper respiratory infection (URI)     Sleep apnea        PSH  Past Surgical History:   Procedure Laterality Date    BONE MARROW TRANSPLANT      BREAST CYST ASPIRATION      in past benign bilateral over years. 2 episodes     SECTION      CHOLECYSTECTOMY      COLONOSCOPY  09/10/2012    polypsharlene, repeat 5 years    COLONOSCOPY  10/09/2017    dr su and check in 5 years. polyps    COLONOSCOPY  2022    polyps, dr us, repeat in 3 years    CT BIOPSY LYMPH NODES SUPERFICIAL  10/8/2024    CT BIOPSY LYMPH NODES SUPERFICIAL 10/8/2024 [facility] CT SCAN    ENDOMETRIAL ABLATION  2007    FINE NEEDLE ASPIRATION      GALLBLADDER SURGERY      OTHER SURGICAL HISTORY

## 2024-10-16 NOTE — TELEPHONE ENCOUNTER
Darlene returning call - states Kiera will need to hold Eliquis 3 days prior (10/21) to line placement on 10/24. And resume after.

## 2024-10-16 NOTE — ASSESSMENT & PLAN NOTE
Had ablation.  Remains in sinus.  Currently on Eliquis.  May need to hold Eliquis if thrombocytopenia develops  Continue metoprolol

## 2024-10-16 NOTE — TELEPHONE ENCOUNTER
Called pt. To let her know to hold Eliquis for 3 days starting on 10/21 and ending on 10/24 Pt. Verbalized understanding. Pt said she went to Dr. Blood office this morning and was told she was cleared and to hold Eliquis.      CARDIAC CLEARANCE      What type of procedure are you having?  STEM CELL TRANSPLANT     Which physician is performing your procedure?  DR. JUDI OLGUIN     When is your procedure scheduled?  TBD     Where are you having this procedure?  [facility]     Are you taking Blood Thinners?              If so what? (Name/dose/frequesncy)  apixaban (ELIQUIS) 5 MG TABS tablet       Does the surgeon want you to stop your blood thinner? Yes If so for how long? 3 days starting on 10/21 and ending on 10/24  N/A     Are you having any new or worsening cardiac symptoms?   N/A     Phone Number and Contact Name for Physicians office:  Joe callback: [phone]     Fax number to send information:  Fax clearance to: [phone]     Cardiac History:  Last office visit with Cardiologist:  07/2024

## 2024-10-17 ASSESSMENT — ENCOUNTER SYMPTOMS
CHEST TIGHTNESS: 0
SHORTNESS OF BREATH: 0
COUGH: 0
WHEEZING: 0

## 2024-10-18 RX ORDER — METFORMIN HCL 500 MG
500 TABLET, EXTENDED RELEASE 24 HR ORAL 2 TIMES DAILY WITH MEALS
Qty: 180 TABLET | Refills: 1 | Status: SHIPPED | OUTPATIENT
Start: 2024-10-18

## 2024-10-23 NOTE — PROGRESS NOTES
Attempted to call patient about procedure. No answer. Voicemail left. Told to be here at 0900 for procedure at 1030. NPO after midnight, but can take morning medication with sips of water. Office should have told them when and if they should stop any blood thinners. Told to have a responsible adult be with the patient to take them home and stay with them afterwards, if they are not admitted to hospital afterwards. And if available bring current list of medications.

## 2024-10-24 ENCOUNTER — HOSPITAL ENCOUNTER (OUTPATIENT)
Dept: INTERVENTIONAL RADIOLOGY/VASCULAR | Age: 69
Discharge: HOME | End: 2024-10-26
Attending: INTERNAL MEDICINE
Payer: MEDICARE

## 2024-10-24 ENCOUNTER — HOSPITAL ENCOUNTER (OUTPATIENT)
Dept: INTERVENTIONAL RADIOLOGY/VASCULAR | Age: 69
Discharge: HOME | End: 2024-10-24
Attending: INTERNAL MEDICINE

## 2024-10-24 ENCOUNTER — HOSPITAL ENCOUNTER (OUTPATIENT)
Dept: ONCOLOGY | Age: 69
Setting detail: INFUSION SERIES
Discharge: HOME | End: 2024-10-24
Payer: MEDICARE

## 2024-10-24 VITALS — DIASTOLIC BLOOD PRESSURE: 61 MMHG | SYSTOLIC BLOOD PRESSURE: 130 MMHG

## 2024-10-24 VITALS — BODY MASS INDEX: 39.44 KG/M2 | WEIGHT: 224.4 LBS

## 2024-10-24 DIAGNOSIS — C83.00 SMALL B-CELL LYMPHOMA, UNSPECIFIED BODY REGION (HCC): Primary | ICD-10-CM

## 2024-10-24 DIAGNOSIS — C83.08 SMALL CELL B-CELL LYMPHOMA OF LYMPH NODES OF MULTIPLE SITES (HCC): ICD-10-CM

## 2024-10-24 LAB
ALBUMIN SERPL-MCNC: 4 G/DL (ref 3.4–5)
ALBUMIN/GLOB SERPL: 1.5 {RATIO} (ref 1.1–2.2)
ALP SERPL-CCNC: 131 U/L (ref 40–129)
ALT SERPL-CCNC: 30 U/L (ref 10–40)
ANION GAP SERPL CALCULATED.3IONS-SCNC: 10 MMOL/L (ref 3–16)
AST SERPL-CCNC: 25 U/L (ref 15–37)
BACTERIA URNS QL MICRO: ABNORMAL /HPF
BASOPHILS # BLD: 0 K/UL (ref 0–0.2)
BASOPHILS # BLD: 0 K/UL (ref 0–0.2)
BASOPHILS NFR BLD: 0.3 %
BASOPHILS NFR BLD: 0.3 %
BILIRUB SERPL-MCNC: 0.5 MG/DL (ref 0–1)
BILIRUB UR QL STRIP.AUTO: NEGATIVE
BUN SERPL-MCNC: 26 MG/DL (ref 7–20)
CALCIUM SERPL-MCNC: 9.6 MG/DL (ref 8.3–10.6)
CHLORIDE SERPL-SCNC: 105 MMOL/L (ref 99–110)
CLARITY UR: CLEAR
CO2 SERPL-SCNC: 27 MMOL/L (ref 21–32)
COLOR UR: YELLOW
CREAT SERPL-MCNC: 1.2 MG/DL (ref 0.6–1.2)
DEPRECATED RDW RBC AUTO: 15.4 % (ref 12.4–15.4)
DEPRECATED RDW RBC AUTO: 15.8 % (ref 12.4–15.4)
EOSINOPHIL # BLD: 0 K/UL (ref 0–0.6)
EOSINOPHIL # BLD: 0.1 K/UL (ref 0–0.6)
EOSINOPHIL NFR BLD: 0.7 %
EOSINOPHIL NFR BLD: 1.6 %
EPI CELLS #/AREA URNS HPF: ABNORMAL /HPF (ref 0–5)
GFR SERPLBLD CREATININE-BSD FMLA CKD-EPI: 50 ML/MIN/{1.73_M2}
GLUCOSE SERPL-MCNC: 159 MG/DL (ref 70–99)
GLUCOSE UR STRIP.AUTO-MCNC: NEGATIVE MG/DL
HCT VFR BLD AUTO: 35.5 % (ref 36–48)
HCT VFR BLD AUTO: 39.3 % (ref 36–48)
HGB BLD-MCNC: 11.9 G/DL (ref 12–16)
HGB BLD-MCNC: 13 G/DL (ref 12–16)
HGB UR QL STRIP.AUTO: ABNORMAL
KETONES UR STRIP.AUTO-MCNC: NEGATIVE MG/DL
LEUKOCYTE ESTERASE UR QL STRIP.AUTO: ABNORMAL
LYMPHOCYTES # BLD: 0.6 K/UL (ref 1–5.1)
LYMPHOCYTES # BLD: 0.8 K/UL (ref 1–5.1)
LYMPHOCYTES NFR BLD: 10.3 %
LYMPHOCYTES NFR BLD: 9.6 %
MAGNESIUM SERPL-MCNC: 2.2 MG/DL (ref 1.8–2.4)
MCH RBC QN AUTO: 31.2 PG (ref 26–34)
MCH RBC QN AUTO: 31.7 PG (ref 26–34)
MCHC RBC AUTO-ENTMCNC: 33.1 G/DL (ref 31–36)
MCHC RBC AUTO-ENTMCNC: 33.5 G/DL (ref 31–36)
MCV RBC AUTO: 94.5 FL (ref 80–100)
MCV RBC AUTO: 94.7 FL (ref 80–100)
MONOCYTES # BLD: 0.3 K/UL (ref 0–1.3)
MONOCYTES # BLD: 0.5 K/UL (ref 0–1.3)
MONOCYTES NFR BLD: 5.3 %
MONOCYTES NFR BLD: 6.5 %
NEUTROPHILS # BLD: 5.2 K/UL (ref 1.7–7.7)
NEUTROPHILS # BLD: 6.2 K/UL (ref 1.7–7.7)
NEUTROPHILS NFR BLD: 81.3 %
NEUTROPHILS NFR BLD: 84.1 %
NITRITE UR QL STRIP.AUTO: POSITIVE
PH UR STRIP.AUTO: 6 [PH] (ref 5–8)
PLATELET # BLD AUTO: 108 K/UL (ref 135–450)
PLATELET # BLD AUTO: 171 K/UL (ref 135–450)
PMV BLD AUTO: 10.1 FL (ref 5–10.5)
PMV BLD AUTO: 9.4 FL (ref 5–10.5)
POTASSIUM SERPL-SCNC: 4.3 MMOL/L (ref 3.5–5.1)
PROT SERPL-MCNC: 6.7 G/DL (ref 6.4–8.2)
PROT UR STRIP.AUTO-MCNC: 30 MG/DL
RBC # BLD AUTO: 3.75 M/UL (ref 4–5.2)
RBC # BLD AUTO: 4.16 M/UL (ref 4–5.2)
RBC #/AREA URNS HPF: ABNORMAL /HPF (ref 0–4)
SODIUM SERPL-SCNC: 142 MMOL/L (ref 136–145)
SP GR UR STRIP.AUTO: >=1.03 (ref 1–1.03)
UA COMPLETE W REFLEX CULTURE PNL UR: YES
UA DIPSTICK W REFLEX MICRO PNL UR: YES
URN SPEC COLLECT METH UR: ABNORMAL
UROBILINOGEN UR STRIP-ACNC: 0.2 E.U./DL
WBC # BLD AUTO: 6.2 K/UL (ref 4–11)
WBC # BLD AUTO: 7.6 K/UL (ref 4–11)
WBC #/AREA URNS HPF: >100 /HPF (ref 0–5)

## 2024-10-24 PROCEDURE — 85025 COMPLETE CBC W/AUTO DIFF WBC: CPT

## 2024-10-24 PROCEDURE — 36556 INSERT NON-TUNNEL CV CATH: CPT

## 2024-10-24 PROCEDURE — 87086 URINE CULTURE/COLONY COUNT: CPT

## 2024-10-24 PROCEDURE — 6360000002 HC RX W HCPCS: Performed by: STUDENT IN AN ORGANIZED HEALTH CARE EDUCATION/TRAINING PROGRAM

## 2024-10-24 PROCEDURE — 2500000003 HC RX 250 WO HCPCS: Performed by: STUDENT IN AN ORGANIZED HEALTH CARE EDUCATION/TRAINING PROGRAM

## 2024-10-24 PROCEDURE — 76937 US GUIDE VASCULAR ACCESS: CPT | Performed by: STUDENT IN AN ORGANIZED HEALTH CARE EDUCATION/TRAINING PROGRAM

## 2024-10-24 PROCEDURE — C1769 GUIDE WIRE: HCPCS | Performed by: STUDENT IN AN ORGANIZED HEALTH CARE EDUCATION/TRAINING PROGRAM

## 2024-10-24 PROCEDURE — 80053 COMPREHEN METABOLIC PANEL: CPT

## 2024-10-24 PROCEDURE — 36592 COLLECT BLOOD FROM PICC: CPT

## 2024-10-24 PROCEDURE — 83735 ASSAY OF MAGNESIUM: CPT

## 2024-10-24 PROCEDURE — 7100000010 HC PHASE II RECOVERY - FIRST 15 MIN: Performed by: STUDENT IN AN ORGANIZED HEALTH CARE EDUCATION/TRAINING PROGRAM

## 2024-10-24 PROCEDURE — C1752 CATH,HEMODIALYSIS,SHORT-TERM: HCPCS | Performed by: STUDENT IN AN ORGANIZED HEALTH CARE EDUCATION/TRAINING PROGRAM

## 2024-10-24 PROCEDURE — 81001 URINALYSIS AUTO W/SCOPE: CPT

## 2024-10-24 PROCEDURE — 99152 MOD SED SAME PHYS/QHP 5/>YRS: CPT | Performed by: STUDENT IN AN ORGANIZED HEALTH CARE EDUCATION/TRAINING PROGRAM

## 2024-10-24 PROCEDURE — 77001 FLUOROGUIDE FOR VEIN DEVICE: CPT | Performed by: STUDENT IN AN ORGANIZED HEALTH CARE EDUCATION/TRAINING PROGRAM

## 2024-10-24 PROCEDURE — C1894 INTRO/SHEATH, NON-LASER: HCPCS | Performed by: STUDENT IN AN ORGANIZED HEALTH CARE EDUCATION/TRAINING PROGRAM

## 2024-10-24 PROCEDURE — 7100000011 HC PHASE II RECOVERY - ADDTL 15 MIN: Performed by: STUDENT IN AN ORGANIZED HEALTH CARE EDUCATION/TRAINING PROGRAM

## 2024-10-24 RX ORDER — FENTANYL CITRATE 50 UG/ML
INJECTION, SOLUTION INTRAMUSCULAR; INTRAVENOUS PRN
Status: COMPLETED | OUTPATIENT
Start: 2024-10-24 | End: 2024-10-24

## 2024-10-24 RX ORDER — MIDAZOLAM HYDROCHLORIDE 5 MG/ML
INJECTION, SOLUTION INTRAMUSCULAR; INTRAVENOUS PRN
Status: COMPLETED | OUTPATIENT
Start: 2024-10-24 | End: 2024-10-24

## 2024-10-24 RX ORDER — VANCOMYCIN 1 G/200ML
1000 INJECTION, SOLUTION INTRAVENOUS ONCE
Status: COMPLETED | OUTPATIENT
Start: 2024-10-24 | End: 2024-10-24

## 2024-10-24 RX ORDER — PROCHLORPERAZINE EDISYLATE 5 MG/ML
10 INJECTION INTRAMUSCULAR; INTRAVENOUS EVERY 6 HOURS PRN
Status: CANCELLED | OUTPATIENT
Start: 2024-10-24

## 2024-10-24 RX ORDER — PROCHLORPERAZINE MALEATE 10 MG
10 TABLET ORAL EVERY 6 HOURS PRN
Status: CANCELLED | OUTPATIENT
Start: 2024-10-24

## 2024-10-24 RX ORDER — LOPERAMIDE HYDROCHLORIDE 2 MG/1
2 CAPSULE ORAL PRN
Status: CANCELLED | OUTPATIENT
Start: 2024-10-24

## 2024-10-24 RX ORDER — CALCIUM GLUCONATE 20 MG/ML
2000 INJECTION, SOLUTION INTRAVENOUS ONCE
Status: DISCONTINUED | OUTPATIENT
Start: 2024-10-24 | End: 2024-10-25 | Stop reason: HOSPADM

## 2024-10-24 RX ORDER — PROCHLORPERAZINE EDISYLATE 5 MG/ML
10 INJECTION INTRAMUSCULAR; INTRAVENOUS EVERY 6 HOURS PRN
Status: DISCONTINUED | OUTPATIENT
Start: 2024-10-24 | End: 2024-10-25 | Stop reason: HOSPADM

## 2024-10-24 RX ORDER — LIDOCAINE HYDROCHLORIDE 10 MG/ML
INJECTION, SOLUTION EPIDURAL; INFILTRATION; INTRACAUDAL; PERINEURAL PRN
Status: COMPLETED | OUTPATIENT
Start: 2024-10-24 | End: 2024-10-24

## 2024-10-24 RX ORDER — CALCIUM GLUCONATE 20 MG/ML
2000 INJECTION, SOLUTION INTRAVENOUS ONCE
Status: CANCELLED | OUTPATIENT
Start: 2024-10-24 | End: 2024-10-24

## 2024-10-24 RX ORDER — LOPERAMIDE HYDROCHLORIDE 2 MG/1
2 CAPSULE ORAL PRN
Status: DISCONTINUED | OUTPATIENT
Start: 2024-10-24 | End: 2024-10-25 | Stop reason: HOSPADM

## 2024-10-24 RX ORDER — PROCHLORPERAZINE MALEATE 10 MG
10 TABLET ORAL EVERY 6 HOURS PRN
Status: DISCONTINUED | OUTPATIENT
Start: 2024-10-24 | End: 2024-10-25 | Stop reason: HOSPADM

## 2024-10-24 RX ADMIN — LIDOCAINE HYDROCHLORIDE 10 ML: 10 INJECTION, SOLUTION EPIDURAL; INFILTRATION; INTRACAUDAL; PERINEURAL at 10:11

## 2024-10-24 RX ADMIN — VANCOMYCIN 1000 MG: 1 INJECTION, SOLUTION INTRAVENOUS at 09:45

## 2024-10-24 RX ADMIN — FENTANYL CITRATE 50 MCG: 50 INJECTION, SOLUTION INTRAMUSCULAR; INTRAVENOUS at 10:09

## 2024-10-24 RX ADMIN — MIDAZOLAM HYDROCHLORIDE 1 MG: 5 INJECTION, SOLUTION INTRAMUSCULAR; INTRAVENOUS at 10:09

## 2024-10-24 NOTE — DISCHARGE INSTRUCTIONS
doctor’s office for your results.      FOLLOW-UP APPOINTMENT    Follow up with MD as directed.    Belongings returned to patient and/or family: Yes.    The Discharge Instructions have been explained to me.  I understand and can verbalize these instructions.

## 2024-10-24 NOTE — PROGRESS NOTES
Pt arrived to OPO today for scheduled apheresis procedure. Labs drawn prior to Apheresis per WILL and Ap nurse BREEZY Gonsalez.Education & post lab draws all completed by Sunshine De Souza RN.  Review Ap documentation for details.  Discharged via wheelchair back to cath lab for line removal.

## 2024-10-24 NOTE — BRIEF OP NOTE
Interventional Radiology Post Procedure    Date: 10/24/2024    Physician: Luis Bustillos MD     Pre-op Diagnosis: lymphoma    Post-op Diagnosis: same    Variation from Planned Procedure: None       Findings: technically successful placement of RIJ nontunneled apheresis catheter    Patient condition: Stable    Estimated Blood Loss: Minimal    Specimens:  None      Signed,  Luis Bustillos MD  10:28 AM  10/24/2024

## 2024-10-24 NOTE — H&P
Present Diagnosis and Illness: 68 y.o. female who presents with lymphoma needing apheresis.    1. 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)        Allergies   Allergen Reactions    Penicillins Hives       Current Outpatient Medications   Medication Sig Dispense Refill    metFORMIN (GLUCOPHAGE-XR) 500 MG extended release tablet Take 1 tablet by mouth 2 times daily (with meals) 180 tablet 1    insulin glargine (LANTUS SOLOSTAR) 100 UNIT/ML injection pen Inject 30 Units into the skin nightly 5 Adjustable Dose Pre-filled Pen Syringe 3    apixaban (ELIQUIS) 5 MG TABS tablet Take 1 tablet by mouth 2 times daily 180 tablet 3    metoprolol succinate (TOPROL XL) 25 MG extended release tablet Take 1 tablet by mouth at bedtime 90 tablet 3    Insulin Pen Needle (BD PEN NEEDLE RAMIRO 2ND GEN) 32G X 4 MM MISC USE WITH INSULIN DAILY 100 each 1    potassium chloride (KLOR-CON) 10 MEQ extended release tablet Take 1 tablet by mouth 2 times daily 180 tablet 1    furosemide (LASIX) 20 MG tablet TAKE 1 TAB DAILY . MAY TAKE AN ADDITIONAL DOSE AS NEEDED FOR SHORTENSS OF BREATH/ WEIGHT GAIN/EDEMA 90 tablet 1    glimepiride (AMARYL) 2 MG tablet TAKE 1 TABLET BY MOUTH TWICE A DAY WITH FOOD 180 tablet 1    NONFORMULARY Liver supplement. Everyday      vitamin B-12 (CYANOCOBALAMIN) 1000 MCG tablet Take 1 tablet by mouth daily 30 tablet 3    azelastine (ASTELIN) 0.1 % nasal spray 2 sprays by Nasal route 2 times daily Use in each nostril as directed (Patient taking differently: 2 sprays by Nasal route 2 times daily Use in each nostril as directed  PRN) 120 mL 5    Multiple Vitamin (MULTIVITAMIN PO) Take by mouth      acetaminophen (TYLENOL) 500 MG tablet Take 1 tablet by mouth every 6 hours as needed for Pain       Current Facility-Administered Medications   Medication Dose Route Frequency Provider Last Rate Last Admin    vancomycin (VANCOCIN) 1000 mg in 200 mL IVPB  1,000 mg IntraVENous Once Luis Bustillos  mL/hr at

## 2024-10-27 LAB
BACTERIA UR CULT: ABNORMAL
ORGANISM: ABNORMAL

## 2024-10-28 LAB
BACTERIA UR CULT: ABNORMAL
ORGANISM: ABNORMAL

## 2024-10-29 LAB
Lab: NORMAL
REPORT: NORMAL
THIS TEST SENT TO: NORMAL

## 2024-11-14 RX ORDER — ONDANSETRON 4 MG/1
16 TABLET, FILM COATED ORAL ONCE
OUTPATIENT
Start: 2024-11-27

## 2024-11-14 RX ORDER — 0.9 % SODIUM CHLORIDE 0.9 %
1000 INTRAVENOUS SOLUTION INTRAVENOUS ONCE
OUTPATIENT
Start: 2024-11-27

## 2024-11-14 RX ORDER — PROCHLORPERAZINE EDISYLATE 5 MG/ML
10 INJECTION INTRAMUSCULAR; INTRAVENOUS EVERY 6 HOURS PRN
OUTPATIENT
Start: 2024-11-27

## 2024-11-14 RX ORDER — ALLOPURINOL 300 MG/1
300 TABLET ORAL ONCE
OUTPATIENT
Start: 2024-11-27 | End: 2024-11-20

## 2024-11-14 RX ORDER — PROCHLORPERAZINE MALEATE 10 MG
10 TABLET ORAL EVERY 6 HOURS PRN
OUTPATIENT
Start: 2024-11-27

## 2024-11-15 PROBLEM — Z01.810 PREOP CARDIOVASCULAR EXAM: Status: RESOLVED | Noted: 2024-10-16 | Resolved: 2024-11-15

## 2024-11-15 RX ORDER — GLIMEPIRIDE 2 MG/1
2 TABLET ORAL 2 TIMES DAILY WITH MEALS
Qty: 180 TABLET | Refills: 1 | Status: SHIPPED | OUTPATIENT
Start: 2024-11-15

## 2024-11-15 RX ORDER — POTASSIUM CHLORIDE 750 MG/1
10 TABLET, EXTENDED RELEASE ORAL 2 TIMES DAILY
Qty: 180 TABLET | Refills: 1 | Status: SHIPPED | OUTPATIENT
Start: 2024-11-15

## 2024-11-27 ENCOUNTER — HOSPITAL ENCOUNTER (OUTPATIENT)
Dept: ONCOLOGY | Age: 69
Setting detail: INFUSION SERIES
Discharge: HOME | End: 2024-11-27
Payer: MEDICARE

## 2024-11-27 VITALS
HEART RATE: 55 BPM | DIASTOLIC BLOOD PRESSURE: 79 MMHG | TEMPERATURE: 97.5 F | RESPIRATION RATE: 18 BRPM | WEIGHT: 227.07 LBS | SYSTOLIC BLOOD PRESSURE: 118 MMHG | BODY MASS INDEX: 39.91 KG/M2 | OXYGEN SATURATION: 98 %

## 2024-11-27 DIAGNOSIS — C83.08 SMALL CELL B-CELL LYMPHOMA, LYMPH NODES OF MULTIPLE SITES (HCC): Primary | ICD-10-CM

## 2024-11-27 LAB
ALBUMIN SERPL-MCNC: 3.9 G/DL (ref 3.4–5)
ALP SERPL-CCNC: 124 U/L (ref 40–129)
ALT SERPL-CCNC: 27 U/L (ref 10–40)
ANION GAP SERPL CALCULATED.3IONS-SCNC: 13 MMOL/L (ref 3–16)
AST SERPL-CCNC: 31 U/L (ref 15–37)
BASOPHILS # BLD: 0 K/UL (ref 0–0.2)
BASOPHILS NFR BLD: 0.4 %
BILIRUB DIRECT SERPL-MCNC: 0.2 MG/DL (ref 0–0.3)
BILIRUB INDIRECT SERPL-MCNC: 0.2 MG/DL (ref 0–1)
BILIRUB SERPL-MCNC: 0.4 MG/DL (ref 0–1)
BUN SERPL-MCNC: 31 MG/DL (ref 7–20)
CALCIUM SERPL-MCNC: 9.2 MG/DL (ref 8.3–10.6)
CHLORIDE SERPL-SCNC: 106 MMOL/L (ref 99–110)
CO2 SERPL-SCNC: 21 MMOL/L (ref 21–32)
CREAT SERPL-MCNC: 1.4 MG/DL (ref 0.6–1.2)
DEPRECATED RDW RBC AUTO: 15.7 % (ref 12.4–15.4)
EOSINOPHIL # BLD: 0.1 K/UL (ref 0–0.6)
EOSINOPHIL NFR BLD: 1.2 %
GFR SERPLBLD CREATININE-BSD FMLA CKD-EPI: 41 ML/MIN/{1.73_M2}
GLUCOSE SERPL-MCNC: 214 MG/DL (ref 70–99)
HCT VFR BLD AUTO: 38.5 % (ref 36–48)
HGB BLD-MCNC: 12.6 G/DL (ref 12–16)
LDH SERPL L TO P-CCNC: 178 U/L (ref 100–190)
LYMPHOCYTES # BLD: 0.6 K/UL (ref 1–5.1)
LYMPHOCYTES NFR BLD: 8.4 %
MAGNESIUM SERPL-MCNC: 1.93 MG/DL (ref 1.8–2.4)
MCH RBC QN AUTO: 31.1 PG (ref 26–34)
MCHC RBC AUTO-ENTMCNC: 32.8 G/DL (ref 31–36)
MCV RBC AUTO: 94.7 FL (ref 80–100)
MONOCYTES # BLD: 0.5 K/UL (ref 0–1.3)
MONOCYTES NFR BLD: 6.3 %
NEUTROPHILS # BLD: 6.5 K/UL (ref 1.7–7.7)
NEUTROPHILS NFR BLD: 83.7 %
PHOSPHATE SERPL-MCNC: 2.8 MG/DL (ref 2.5–4.9)
PLATELET # BLD AUTO: 172 K/UL (ref 135–450)
PMV BLD AUTO: 10 FL (ref 5–10.5)
POTASSIUM SERPL-SCNC: 3.7 MMOL/L (ref 3.5–5.1)
PROT SERPL-MCNC: 6.2 G/DL (ref 6.4–8.2)
RBC # BLD AUTO: 4.06 M/UL (ref 4–5.2)
SODIUM SERPL-SCNC: 140 MMOL/L (ref 136–145)
URATE SERPL-MCNC: 6.3 MG/DL (ref 2.6–6)
WBC # BLD AUTO: 7.7 K/UL (ref 4–11)

## 2024-11-27 PROCEDURE — 84550 ASSAY OF BLOOD/URIC ACID: CPT

## 2024-11-27 PROCEDURE — 6370000000 HC RX 637 (ALT 250 FOR IP): Performed by: STUDENT IN AN ORGANIZED HEALTH CARE EDUCATION/TRAINING PROGRAM

## 2024-11-27 PROCEDURE — 83615 LACTATE (LD) (LDH) ENZYME: CPT

## 2024-11-27 PROCEDURE — 80076 HEPATIC FUNCTION PANEL: CPT

## 2024-11-27 PROCEDURE — 36591 DRAW BLOOD OFF VENOUS DEVICE: CPT

## 2024-11-27 PROCEDURE — 2580000003 HC RX 258: Performed by: STUDENT IN AN ORGANIZED HEALTH CARE EDUCATION/TRAINING PROGRAM

## 2024-11-27 PROCEDURE — 80069 RENAL FUNCTION PANEL: CPT

## 2024-11-27 PROCEDURE — 83735 ASSAY OF MAGNESIUM: CPT

## 2024-11-27 PROCEDURE — 96360 HYDRATION IV INFUSION INIT: CPT

## 2024-11-27 PROCEDURE — 96413 CHEMO IV INFUSION 1 HR: CPT

## 2024-11-27 PROCEDURE — 85025 COMPLETE CBC W/AUTO DIFF WBC: CPT

## 2024-11-27 PROCEDURE — 96361 HYDRATE IV INFUSION ADD-ON: CPT

## 2024-11-27 PROCEDURE — 96417 CHEMO IV INFUS EACH ADDL SEQ: CPT

## 2024-11-27 PROCEDURE — 6360000002 HC RX W HCPCS: Performed by: STUDENT IN AN ORGANIZED HEALTH CARE EDUCATION/TRAINING PROGRAM

## 2024-11-27 RX ORDER — AZELASTINE 1 MG/ML
2 SPRAY, METERED NASAL 2 TIMES DAILY
Qty: 1 EACH | Refills: 0
Start: 2024-11-27

## 2024-11-27 RX ORDER — FLUCONAZOLE 200 MG/1
200 TABLET ORAL DAILY
Qty: 14 TABLET | Refills: 0 | Status: SHIPPED | OUTPATIENT
Start: 2024-11-27 | End: 2024-12-11

## 2024-11-27 RX ORDER — LEVOFLOXACIN 500 MG/1
500 TABLET, FILM COATED ORAL DAILY
Qty: 14 TABLET | Refills: 0 | Status: SHIPPED | OUTPATIENT
Start: 2024-11-27 | End: 2024-12-11

## 2024-11-27 RX ORDER — ALLOPURINOL 300 MG/1
300 TABLET ORAL DAILY
Qty: 14 TABLET | Refills: 0 | Status: SHIPPED | OUTPATIENT
Start: 2024-11-30 | End: 2024-12-14

## 2024-11-27 RX ORDER — PROCHLORPERAZINE EDISYLATE 5 MG/ML
10 INJECTION INTRAMUSCULAR; INTRAVENOUS EVERY 6 HOURS PRN
Status: CANCELLED | OUTPATIENT
Start: 2024-11-28

## 2024-11-27 RX ORDER — 0.9 % SODIUM CHLORIDE 0.9 %
1000 INTRAVENOUS SOLUTION INTRAVENOUS ONCE
Status: COMPLETED | OUTPATIENT
Start: 2024-11-27 | End: 2024-11-27

## 2024-11-27 RX ORDER — PROCHLORPERAZINE MALEATE 10 MG
10 TABLET ORAL EVERY 6 HOURS PRN
Status: DISCONTINUED | OUTPATIENT
Start: 2024-11-27 | End: 2024-11-28 | Stop reason: HOSPADM

## 2024-11-27 RX ORDER — ALLOPURINOL 300 MG/1
300 TABLET ORAL ONCE
Status: COMPLETED | OUTPATIENT
Start: 2024-11-27 | End: 2024-11-27

## 2024-11-27 RX ORDER — ALLOPURINOL 300 MG/1
300 TABLET ORAL ONCE
Status: CANCELLED | OUTPATIENT
Start: 2024-11-28 | End: 2024-11-28

## 2024-11-27 RX ORDER — 0.9 % SODIUM CHLORIDE 0.9 %
1000 INTRAVENOUS SOLUTION INTRAVENOUS ONCE
Status: CANCELLED | OUTPATIENT
Start: 2024-11-28

## 2024-11-27 RX ORDER — PROCHLORPERAZINE EDISYLATE 5 MG/ML
10 INJECTION INTRAMUSCULAR; INTRAVENOUS EVERY 6 HOURS PRN
Status: DISCONTINUED | OUTPATIENT
Start: 2024-11-27 | End: 2024-11-28 | Stop reason: HOSPADM

## 2024-11-27 RX ORDER — ONDANSETRON 4 MG/1
16 TABLET, FILM COATED ORAL ONCE
Status: CANCELLED | OUTPATIENT
Start: 2024-11-28

## 2024-11-27 RX ORDER — PROCHLORPERAZINE MALEATE 10 MG
10 TABLET ORAL EVERY 6 HOURS PRN
Status: CANCELLED | OUTPATIENT
Start: 2024-11-28

## 2024-11-27 RX ORDER — VALACYCLOVIR HYDROCHLORIDE 500 MG/1
500 TABLET, FILM COATED ORAL 2 TIMES DAILY
Qty: 60 TABLET | Refills: 11 | Status: SHIPPED | OUTPATIENT
Start: 2024-12-02 | End: 2025-11-27

## 2024-11-27 RX ORDER — ONDANSETRON 4 MG/1
16 TABLET, FILM COATED ORAL ONCE
Status: COMPLETED | OUTPATIENT
Start: 2024-11-27 | End: 2024-11-27

## 2024-11-27 RX ADMIN — SODIUM CHLORIDE 1000 ML: 9 INJECTION, SOLUTION INTRAVENOUS at 08:33

## 2024-11-27 RX ADMIN — ONDANSETRON HYDROCHLORIDE 16 MG: 4 TABLET, FILM COATED ORAL at 10:06

## 2024-11-27 RX ADMIN — ALLOPURINOL 300 MG: 300 TABLET ORAL at 10:06

## 2024-11-27 RX ADMIN — FLUDARABINE PHOSPHATE 65 MG: 25 INJECTION, SOLUTION INTRAVENOUS at 10:53

## 2024-11-27 RX ADMIN — CYCLOPHOSPHAMIDE 640 MG: 1 INJECTION, POWDER, FOR SOLUTION INTRAVENOUS; ORAL at 11:43

## 2024-11-27 NOTE — PROGRESS NOTES
Original chemotherapy orders reviewed and acknowledged. Appropriateness of chemotherapy treatment regimen Fludarabine and Cytoxan for diagnosis of lymphoma was verified.  Patient educated on chemotherapy regimen.  Acknowledgement of informed consent for chemotherapy obtained.      Estimated body surface area is 2.14 meters squared as calculated from the following:    Height as of 10/4/24: 1.607 m (5' 3.25\").    Weight as of this encounter: 103 kg (227 lb 1.2 oz). verified.  Appropriate dosing calculations of chemotherapy based on above height, weight, and BSA verified.        Administration: Chemotherapy drug Cytoxan and Fludarabine independently verified with Carol RN prior to administration.  Acknowledgement of informed consent for chemotherapy administration verified.  Original order, appropriateness of regimen, drug supplied, height, weight, BSA, dose calculations, expiration dates/times, drug appearance, and two patient identifiers were verified by both RNs.  Drug checked for vesicant/irritant status and for risk of hypersensitivity.  Most recent laboratory values and allergies, were reviewed.  Positive, brisk blood return via CVC was confirmed prior to administration. Chest x-ray for correct line placement reviewed. Bernadette Ferrell RN and Carol RN verified correct rate of chemotherapy and maintenance IV fluids.  Patient was educated on chemotherapy regimen prior to administration including indication for treatment related to disease & side effects of chemotherapy drug.  Patient verbalizes understanding of all instructions.      Completion of Chemotherapy: Monitoring during infusion done per policy, see Flowsheets.  Blood return verified before, during, and after infusion per policy; no signs of extravasation.  Pt tolerated chemotherapy well and without incident.  Chemotherapy infusion end time on the MAR.

## 2024-11-27 NOTE — H&P
BCC History and Physical        Attending Physician: No att. providers found    Primary Care: Ritchie Goodman MD       Referring MD: Valentina Brewer DO  [address]  [address]  [place] [address]    Name: Kiera Irvin :  1955  MRN:  [id number]    Admission: 2024      Date: 2024    Reason for Admission: Lymphodepleting chemotherapy for CAR-T Infusion     History of Present Illness:   Jessy is a 68-year-old female with history of diabetes, atrial fibrillation, reflux and refractory follicular lymphoma who was referred to [facility] for a second opinion.  Patient has been through multiple lines of treatment including: XRT to thoracic spine, Bendamustine / Rituxan x 6 cycles (ending 2016), Rituxan maintenance x 12 months, Clinical Trial - Revlimid & Rituxan x 7 cycles (ending 2018), CVP- R x 8 cycles (2018 - 2019), 3 cycles of R-ICE in  followed by [employer] in 2019 and XRT to mesentery in . Recent scan with Dr. Wasserman on 24 showed relapse with left supraclavicular and left paraaortic node, repeat bx consistent with follicular lymphoma.  She then had initial consult with Dr. Valentina Brewer to discuss her treatment options. Her most recent supraclavicular LN Bx showed atypical lymphoid process suspicious for B-Cell lymphoma, favor follicular. She then had her T-Cell collection on 10/24/24.      She now presents to OP Infusion for Lymphodepleting chemotherapy consisting of Fludarabine and Cytoxan x 3 days (24 - 24), followed by Breyanzi CAR-T Infusion on 24.  She feels well today and denies fever, chills, sweats, change in appetite, visual changes, headache, sinus congestion, sore throat, mouth pain, cough, increased shortness of breath, chest pain, abdominal pain, nausea, vomiting, diarrhea, constipation, dysuria, hematuria, lower extremity pain or swelling.  She has no complaints today.          Pre CAR-T Workup:      Pre Transplant labs:    Most 
CAR-T which include the risk of CRS toxicity, neurological toxicity, infection, bleeding,  inability to obtain a long term remission and death. He/She understands these risks and is willing to proceed.  The consent is signed and on the chart.  Two doses of Tocilizumab will remain available in the inpatient pharmacy until day + 28 post - infusion.    Lymphodepleting Chemotherapy:  Fludarabine and cyclophosphamide   Disease Status at time of Infusion: Ongoing Disease   CAR-T Infusion Date: 12/2/24  CAR-T Product: Breyanzi  Patient ID Number:  [id number]  JOIN #: [id number]    Day - 5    2. CRS / Neuro: At risk post-infusion  CRS Grade: N/A  - Monitor CRP and Ferrtin closely   Lab Results   Component Value Date    CRP 9.2 (H) 10/03/2024    CRP 71.5 (H) 04/17/2020     Lab Results   Component Value Date    FERRITIN 236.6 (H) 10/03/2024    FERRITIN 766.6 (H) 02/02/2021    FERRITIN 1,043.0 (H) 01/23/2021       ICANS Grade: N/A  CAR-T Score:   N/A  - Neuro checks w/ CARTOX 10-point assessment Q4hrs  - Start Keppra on 12/2/24      3.  ID:  Afebrile, no evidence of infection.    - Start Valtrex, Diflucan, and Levaquin ppx when ANC < 1.5    Post CAR- T monitoring / maintenance:  - IgG & CD4 level monthly starting on day + 30  - Check disease titers on day + 30 or when WBC recovered. Re-vaccinate vs booster based on titer (assure titers are drawn prior to administering immunoglobulins)  - Start PCP ppx on day + 30 - stop when CD4 > 250  - Cont Valtrex for 1 year post CAR-T    4.  Heme:  Blood counts stable  - Transfuse for Hgb < 7 and Platelets < 10K  - No transfusion today      Informed Consent for Blood Component Transfusion  I have discussed with the patient the rationale for blood component transfusion; its benefits in treating or preventing fatigue, organ damage, or death; and its risk which includes mild transfusion reactions, rare risk of blood borne infection, or more serious but rare reactions. I have discussed the

## 2024-11-28 ENCOUNTER — HOSPITAL ENCOUNTER (OUTPATIENT)
Dept: ONCOLOGY | Age: 69
Setting detail: INFUSION SERIES
Discharge: HOME | End: 2024-11-28
Payer: MEDICARE

## 2024-11-28 VITALS
DIASTOLIC BLOOD PRESSURE: 37 MMHG | BODY MASS INDEX: 40.29 KG/M2 | RESPIRATION RATE: 19 BRPM | SYSTOLIC BLOOD PRESSURE: 124 MMHG | HEART RATE: 66 BPM | OXYGEN SATURATION: 98 % | TEMPERATURE: 97.6 F | WEIGHT: 229.28 LBS

## 2024-11-28 DIAGNOSIS — C83.08 SMALL CELL B-CELL LYMPHOMA, LYMPH NODES OF MULTIPLE SITES (HCC): Primary | ICD-10-CM

## 2024-11-28 LAB
ALBUMIN SERPL-MCNC: 3.9 G/DL (ref 3.4–5)
ALP SERPL-CCNC: 120 U/L (ref 40–129)
ALT SERPL-CCNC: 26 U/L (ref 10–40)
ANION GAP SERPL CALCULATED.3IONS-SCNC: 10 MMOL/L (ref 3–16)
AST SERPL-CCNC: 26 U/L (ref 15–37)
BASOPHILS # BLD: 0 K/UL (ref 0–0.2)
BASOPHILS NFR BLD: 0.3 %
BILIRUB DIRECT SERPL-MCNC: 0.2 MG/DL (ref 0–0.3)
BILIRUB INDIRECT SERPL-MCNC: 0.1 MG/DL (ref 0–1)
BILIRUB SERPL-MCNC: 0.3 MG/DL (ref 0–1)
BUN SERPL-MCNC: 27 MG/DL (ref 7–20)
CALCIUM SERPL-MCNC: 9 MG/DL (ref 8.3–10.6)
CHLORIDE SERPL-SCNC: 110 MMOL/L (ref 99–110)
CO2 SERPL-SCNC: 23 MMOL/L (ref 21–32)
CREAT SERPL-MCNC: 1.4 MG/DL (ref 0.6–1.2)
DEPRECATED RDW RBC AUTO: 16 % (ref 12.4–15.4)
EOSINOPHIL # BLD: 0.1 K/UL (ref 0–0.6)
EOSINOPHIL NFR BLD: 1.3 %
GFR SERPLBLD CREATININE-BSD FMLA CKD-EPI: 41 ML/MIN/{1.73_M2}
GLUCOSE SERPL-MCNC: 184 MG/DL (ref 70–99)
HCT VFR BLD AUTO: 36.8 % (ref 36–48)
HGB BLD-MCNC: 12 G/DL (ref 12–16)
LDH SERPL L TO P-CCNC: 159 U/L (ref 100–190)
LYMPHOCYTES # BLD: 0.4 K/UL (ref 1–5.1)
LYMPHOCYTES NFR BLD: 5.7 %
MAGNESIUM SERPL-MCNC: 2.02 MG/DL (ref 1.8–2.4)
MCH RBC QN AUTO: 30.9 PG (ref 26–34)
MCHC RBC AUTO-ENTMCNC: 32.6 G/DL (ref 31–36)
MCV RBC AUTO: 94.6 FL (ref 80–100)
MONOCYTES # BLD: 0.3 K/UL (ref 0–1.3)
MONOCYTES NFR BLD: 5.4 %
NEUTROPHILS # BLD: 5.4 K/UL (ref 1.7–7.7)
NEUTROPHILS NFR BLD: 87.3 %
PHOSPHATE SERPL-MCNC: 2.8 MG/DL (ref 2.5–4.9)
PLATELET # BLD AUTO: 158 K/UL (ref 135–450)
PMV BLD AUTO: 9.6 FL (ref 5–10.5)
POTASSIUM SERPL-SCNC: 4 MMOL/L (ref 3.5–5.1)
PROT SERPL-MCNC: 6.1 G/DL (ref 6.4–8.2)
RBC # BLD AUTO: 3.89 M/UL (ref 4–5.2)
SODIUM SERPL-SCNC: 143 MMOL/L (ref 136–145)
URATE SERPL-MCNC: 5.2 MG/DL (ref 2.6–6)
WBC # BLD AUTO: 6.2 K/UL (ref 4–11)

## 2024-11-28 PROCEDURE — 96413 CHEMO IV INFUSION 1 HR: CPT

## 2024-11-28 PROCEDURE — 84550 ASSAY OF BLOOD/URIC ACID: CPT

## 2024-11-28 PROCEDURE — 83735 ASSAY OF MAGNESIUM: CPT

## 2024-11-28 PROCEDURE — 2580000003 HC RX 258: Performed by: STUDENT IN AN ORGANIZED HEALTH CARE EDUCATION/TRAINING PROGRAM

## 2024-11-28 PROCEDURE — 6360000002 HC RX W HCPCS: Performed by: STUDENT IN AN ORGANIZED HEALTH CARE EDUCATION/TRAINING PROGRAM

## 2024-11-28 PROCEDURE — 83615 LACTATE (LD) (LDH) ENZYME: CPT

## 2024-11-28 PROCEDURE — 80069 RENAL FUNCTION PANEL: CPT

## 2024-11-28 PROCEDURE — 85025 COMPLETE CBC W/AUTO DIFF WBC: CPT

## 2024-11-28 PROCEDURE — 36591 DRAW BLOOD OFF VENOUS DEVICE: CPT

## 2024-11-28 PROCEDURE — 96360 HYDRATION IV INFUSION INIT: CPT

## 2024-11-28 PROCEDURE — 96417 CHEMO IV INFUS EACH ADDL SEQ: CPT

## 2024-11-28 PROCEDURE — 6370000000 HC RX 637 (ALT 250 FOR IP): Performed by: STUDENT IN AN ORGANIZED HEALTH CARE EDUCATION/TRAINING PROGRAM

## 2024-11-28 PROCEDURE — 80076 HEPATIC FUNCTION PANEL: CPT

## 2024-11-28 PROCEDURE — 96361 HYDRATE IV INFUSION ADD-ON: CPT

## 2024-11-28 RX ORDER — ALLOPURINOL 300 MG/1
300 TABLET ORAL ONCE
Status: CANCELLED | OUTPATIENT
Start: 2024-11-29 | End: 2024-11-29

## 2024-11-28 RX ORDER — PROCHLORPERAZINE EDISYLATE 5 MG/ML
10 INJECTION INTRAMUSCULAR; INTRAVENOUS EVERY 6 HOURS PRN
Status: DISCONTINUED | OUTPATIENT
Start: 2024-11-28 | End: 2024-11-29 | Stop reason: HOSPADM

## 2024-11-28 RX ORDER — PROCHLORPERAZINE EDISYLATE 5 MG/ML
10 INJECTION INTRAMUSCULAR; INTRAVENOUS EVERY 6 HOURS PRN
Status: CANCELLED | OUTPATIENT
Start: 2024-11-29

## 2024-11-28 RX ORDER — PROCHLORPERAZINE MALEATE 10 MG
10 TABLET ORAL EVERY 6 HOURS PRN
Status: CANCELLED | OUTPATIENT
Start: 2024-11-29

## 2024-11-28 RX ORDER — ONDANSETRON 4 MG/1
16 TABLET, FILM COATED ORAL ONCE
Status: COMPLETED | OUTPATIENT
Start: 2024-11-28 | End: 2024-11-28

## 2024-11-28 RX ORDER — ALLOPURINOL 300 MG/1
300 TABLET ORAL ONCE
Status: COMPLETED | OUTPATIENT
Start: 2024-11-28 | End: 2024-11-28

## 2024-11-28 RX ORDER — ONDANSETRON 4 MG/1
16 TABLET, FILM COATED ORAL ONCE
Status: CANCELLED | OUTPATIENT
Start: 2024-11-29

## 2024-11-28 RX ORDER — 0.9 % SODIUM CHLORIDE 0.9 %
1000 INTRAVENOUS SOLUTION INTRAVENOUS ONCE
Status: CANCELLED | OUTPATIENT
Start: 2024-11-29

## 2024-11-28 RX ORDER — 0.9 % SODIUM CHLORIDE 0.9 %
1000 INTRAVENOUS SOLUTION INTRAVENOUS ONCE
Status: COMPLETED | OUTPATIENT
Start: 2024-11-28 | End: 2024-11-28

## 2024-11-28 RX ORDER — PROCHLORPERAZINE MALEATE 10 MG
10 TABLET ORAL EVERY 6 HOURS PRN
Status: DISCONTINUED | OUTPATIENT
Start: 2024-11-28 | End: 2024-11-29 | Stop reason: HOSPADM

## 2024-11-28 RX ADMIN — CYCLOPHOSPHAMIDE 640 MG: 1 INJECTION, POWDER, FOR SOLUTION INTRAVENOUS; ORAL at 11:32

## 2024-11-28 RX ADMIN — ONDANSETRON HYDROCHLORIDE 16 MG: 4 TABLET, FILM COATED ORAL at 09:53

## 2024-11-28 RX ADMIN — SODIUM CHLORIDE 1000 ML: 9 INJECTION, SOLUTION INTRAVENOUS at 08:27

## 2024-11-28 RX ADMIN — ALLOPURINOL 300 MG: 300 TABLET ORAL at 09:53

## 2024-11-28 RX ADMIN — FLUDARABINE PHOSPHATE 65 MG: 25 INJECTION, SOLUTION INTRAVENOUS at 10:39

## 2024-11-28 NOTE — PROGRESS NOTES
[facility] Progress Note    2024     Kiera Irvin    MRN: [id number]    : 1955    Referring MD: Valentina Brewer DO  [address] [facility] [address]  [place] [address]      SUBJECTIVE:  Patient is doing welI. She denies nausea/vomiting or shortness of breath.      ECOG PS:  (1) Restricted in physically strenuous activity, ambulatory and able to do work of light nature    KPS: 90% Able to carry on normal activity; minor signs or symptoms of disease    Isolation: None    Medications    Scheduled Meds:  Continuous Infusions:  PRN Meds:.prochlorperazine, prochlorperazine    ROS:  As noted above, otherwise remainder of 10-point ROS negative    Physical Exam:     I&O:  No intake or output data in the 24 hours ending 24 1437    Vital Signs:  BP (!) 124/37   Pulse 66   Temp 97.6 °F (36.4 °C) (Oral)   Resp 19   Wt 104 kg (229 lb 4.5 oz)   SpO2 98%   BMI 40.29 kg/m²     Weight:    Wt Readings from Last 3 Encounters:   24 104 kg (229 lb 4.5 oz)   24 103 kg (227 lb 1.2 oz)   10/24/24 101.8 kg (224 lb 6.4 oz)         General: Awake, alert and oriented.  HEENT: normocephalic, PERRL, no scleral erythema or icterus, Oral mucosa moist and intact, throat clear  NECK: supple  BACK: Straight   SKIN: warm dry and intact without lesions rashes or masses  CHEST: CTA bilaterally without use of accessory muscles  CV: Normal S1 S2, RRR, no MRG  ABD: NT ND normoactive BS  EXTREMITIES: without edema, denies calf tenderness  NEURO: CN II - XII grossly intact  CATHETER: clear    Data    CBC:   Recent Labs     24  0830 24  0856   WBC 7.7 6.2   HGB 12.6 12.0   HCT 38.5 36.8   MCV 94.7 94.6    158     BMP/Mag:  Recent Labs     24  0830 24  0829    143   K 3.7 4.0    110   CO2 21 23   PHOS 2.8 2.8   BUN 31* 27*   CREATININE 1.4* 1.4*   MG 1.93 2.02     LIVP:   Recent Labs     24  0830 24  0829   AST 31 26   ALT 27 26   BILIDIR 0.2 0.2   BILITOT 0.4 0.3

## 2024-11-28 NOTE — PROGRESS NOTES
Estimated body surface area is 2.15 meters squared as calculated from the following:    Height as of 10/4/24: 1.607 m (5' 3.25\").    Weight as of this encounter: 104 kg (229 lb 4.5 oz). verified.  Appropriate dosing calculations of chemotherapy based on above height, weight, and BSA verified.        Administration: Chemotherapy drug Cytoxan and Fludarabine independently verified with Renetta TIJERINA prior to administration.  Acknowledgement of informed consent for chemotherapy administration verified.  Original order, appropriateness of regimen, drug supplied, height, weight, BSA, dose calculations, expiration dates/times, drug appearance, and two patient identifiers were verified by both RNs.  Drug checked for vesicant/irritant status and for risk of hypersensitivity.  Most recent laboratory values and allergies, were reviewed.  Positive, brisk blood return via CVC was confirmed prior to administration. Chest x-ray for correct line placement reviewed. Fabi Rivas RN and Renetta TIJERINA verified correct rate of chemotherapy and maintenance IV fluids.  Patient was educated on chemotherapy regimen prior to administration including indication for treatment related to disease & side effects of chemotherapy drug.  Patient verbalizes understanding of all instructions.      Completion of Chemotherapy: Monitoring during infusion done per policy, see Flowsheets.  Blood return verified before, during, and after infusion per policy; no signs of extravasation.  Pt tolerated chemotherapy well and without incident.  Chemotherapy infusion end time on the MAR.      Fabi Rivas RN

## 2024-11-29 ENCOUNTER — HOSPITAL ENCOUNTER (OUTPATIENT)
Dept: ONCOLOGY | Age: 69
Setting detail: INFUSION SERIES
Discharge: HOME | End: 2024-11-29
Payer: MEDICARE

## 2024-11-29 VITALS
SYSTOLIC BLOOD PRESSURE: 126 MMHG | HEART RATE: 77 BPM | RESPIRATION RATE: 18 BRPM | DIASTOLIC BLOOD PRESSURE: 90 MMHG | OXYGEN SATURATION: 98 % | WEIGHT: 229.28 LBS | TEMPERATURE: 97.8 F | BODY MASS INDEX: 40.29 KG/M2

## 2024-11-29 DIAGNOSIS — C83.08 SMALL CELL B-CELL LYMPHOMA, LYMPH NODES OF MULTIPLE SITES (HCC): Primary | ICD-10-CM

## 2024-11-29 LAB
ALBUMIN SERPL-MCNC: 3.8 G/DL (ref 3.4–5)
ALP SERPL-CCNC: 127 U/L (ref 40–129)
ALT SERPL-CCNC: 25 U/L (ref 10–40)
ANION GAP SERPL CALCULATED.3IONS-SCNC: 10 MMOL/L (ref 3–16)
AST SERPL-CCNC: 23 U/L (ref 15–37)
BASOPHILS # BLD: 0 K/UL (ref 0–0.2)
BASOPHILS NFR BLD: 0.4 %
BILIRUB DIRECT SERPL-MCNC: 0.2 MG/DL (ref 0–0.3)
BILIRUB INDIRECT SERPL-MCNC: 0.3 MG/DL (ref 0–1)
BILIRUB SERPL-MCNC: 0.5 MG/DL (ref 0–1)
BUN SERPL-MCNC: 21 MG/DL (ref 7–20)
CALCIUM SERPL-MCNC: 8.9 MG/DL (ref 8.3–10.6)
CHLORIDE SERPL-SCNC: 110 MMOL/L (ref 99–110)
CO2 SERPL-SCNC: 23 MMOL/L (ref 21–32)
CREAT SERPL-MCNC: 1.2 MG/DL (ref 0.6–1.2)
DEPRECATED RDW RBC AUTO: 15.4 % (ref 12.4–15.4)
EOSINOPHIL # BLD: 0.1 K/UL (ref 0–0.6)
EOSINOPHIL NFR BLD: 1.5 %
GFR SERPLBLD CREATININE-BSD FMLA CKD-EPI: 50 ML/MIN/{1.73_M2}
GLUCOSE SERPL-MCNC: 140 MG/DL (ref 70–99)
HCT VFR BLD AUTO: 36.2 % (ref 36–48)
HGB BLD-MCNC: 11.9 G/DL (ref 12–16)
LDH SERPL L TO P-CCNC: 156 U/L (ref 100–190)
LYMPHOCYTES # BLD: 0.1 K/UL (ref 1–5.1)
LYMPHOCYTES NFR BLD: 1.2 %
MAGNESIUM SERPL-MCNC: 2.05 MG/DL (ref 1.8–2.4)
MCH RBC QN AUTO: 31 PG (ref 26–34)
MCHC RBC AUTO-ENTMCNC: 32.7 G/DL (ref 31–36)
MCV RBC AUTO: 94.6 FL (ref 80–100)
MONOCYTES # BLD: 0.1 K/UL (ref 0–1.3)
MONOCYTES NFR BLD: 2.3 %
NEUTROPHILS # BLD: 5 K/UL (ref 1.7–7.7)
NEUTROPHILS NFR BLD: 94.6 %
PHOSPHATE SERPL-MCNC: 2.6 MG/DL (ref 2.5–4.9)
PLATELET # BLD AUTO: 134 K/UL (ref 135–450)
PMV BLD AUTO: 9.2 FL (ref 5–10.5)
POTASSIUM SERPL-SCNC: 3.8 MMOL/L (ref 3.5–5.1)
PROT SERPL-MCNC: 6 G/DL (ref 6.4–8.2)
RBC # BLD AUTO: 3.83 M/UL (ref 4–5.2)
SODIUM SERPL-SCNC: 143 MMOL/L (ref 136–145)
URATE SERPL-MCNC: 4.9 MG/DL (ref 2.6–6)
WBC # BLD AUTO: 5.3 K/UL (ref 4–11)

## 2024-11-29 PROCEDURE — 84550 ASSAY OF BLOOD/URIC ACID: CPT

## 2024-11-29 PROCEDURE — 80076 HEPATIC FUNCTION PANEL: CPT

## 2024-11-29 PROCEDURE — 6360000002 HC RX W HCPCS: Performed by: STUDENT IN AN ORGANIZED HEALTH CARE EDUCATION/TRAINING PROGRAM

## 2024-11-29 PROCEDURE — 96360 HYDRATION IV INFUSION INIT: CPT

## 2024-11-29 PROCEDURE — 96413 CHEMO IV INFUSION 1 HR: CPT

## 2024-11-29 PROCEDURE — 83615 LACTATE (LD) (LDH) ENZYME: CPT

## 2024-11-29 PROCEDURE — 96361 HYDRATE IV INFUSION ADD-ON: CPT

## 2024-11-29 PROCEDURE — 36591 DRAW BLOOD OFF VENOUS DEVICE: CPT

## 2024-11-29 PROCEDURE — 6370000000 HC RX 637 (ALT 250 FOR IP): Performed by: STUDENT IN AN ORGANIZED HEALTH CARE EDUCATION/TRAINING PROGRAM

## 2024-11-29 PROCEDURE — 80069 RENAL FUNCTION PANEL: CPT

## 2024-11-29 PROCEDURE — 2580000003 HC RX 258: Performed by: STUDENT IN AN ORGANIZED HEALTH CARE EDUCATION/TRAINING PROGRAM

## 2024-11-29 PROCEDURE — 96417 CHEMO IV INFUS EACH ADDL SEQ: CPT

## 2024-11-29 PROCEDURE — 83735 ASSAY OF MAGNESIUM: CPT

## 2024-11-29 PROCEDURE — 85025 COMPLETE CBC W/AUTO DIFF WBC: CPT

## 2024-11-29 RX ORDER — ONDANSETRON 4 MG/1
16 TABLET, FILM COATED ORAL ONCE
Status: COMPLETED | OUTPATIENT
Start: 2024-11-29 | End: 2024-11-29

## 2024-11-29 RX ORDER — PROCHLORPERAZINE MALEATE 10 MG
10 TABLET ORAL EVERY 6 HOURS PRN
Status: DISCONTINUED | OUTPATIENT
Start: 2024-11-29 | End: 2024-11-30 | Stop reason: HOSPADM

## 2024-11-29 RX ORDER — 0.9 % SODIUM CHLORIDE 0.9 %
1000 INTRAVENOUS SOLUTION INTRAVENOUS ONCE
Status: CANCELLED | OUTPATIENT
Start: 2024-11-30

## 2024-11-29 RX ORDER — CETIRIZINE HYDROCHLORIDE 10 MG/1
10 TABLET ORAL NIGHTLY
COMMUNITY

## 2024-11-29 RX ORDER — PROCHLORPERAZINE EDISYLATE 5 MG/ML
10 INJECTION INTRAMUSCULAR; INTRAVENOUS EVERY 6 HOURS PRN
Status: DISCONTINUED | OUTPATIENT
Start: 2024-11-29 | End: 2024-11-30 | Stop reason: HOSPADM

## 2024-11-29 RX ORDER — PROCHLORPERAZINE MALEATE 10 MG
10 TABLET ORAL EVERY 6 HOURS PRN
Status: CANCELLED | OUTPATIENT
Start: 2024-11-30

## 2024-11-29 RX ORDER — ONDANSETRON 4 MG/1
16 TABLET, FILM COATED ORAL ONCE
Status: CANCELLED | OUTPATIENT
Start: 2024-11-30

## 2024-11-29 RX ORDER — ALLOPURINOL 300 MG/1
300 TABLET ORAL ONCE
Status: CANCELLED | OUTPATIENT
Start: 2024-11-30 | End: 2024-11-30

## 2024-11-29 RX ORDER — PROCHLORPERAZINE EDISYLATE 5 MG/ML
10 INJECTION INTRAMUSCULAR; INTRAVENOUS EVERY 6 HOURS PRN
Status: CANCELLED | OUTPATIENT
Start: 2024-11-30

## 2024-11-29 RX ORDER — 0.9 % SODIUM CHLORIDE 0.9 %
1000 INTRAVENOUS SOLUTION INTRAVENOUS ONCE
Status: COMPLETED | OUTPATIENT
Start: 2024-11-29 | End: 2024-11-29

## 2024-11-29 RX ORDER — ALLOPURINOL 300 MG/1
300 TABLET ORAL ONCE
Status: COMPLETED | OUTPATIENT
Start: 2024-11-29 | End: 2024-11-29

## 2024-11-29 RX ADMIN — ALLOPURINOL 300 MG: 300 TABLET ORAL at 10:03

## 2024-11-29 RX ADMIN — ONDANSETRON HYDROCHLORIDE 16 MG: 4 TABLET, FILM COATED ORAL at 10:03

## 2024-11-29 RX ADMIN — FLUDARABINE PHOSPHATE 65 MG: 25 INJECTION, SOLUTION INTRAVENOUS at 10:53

## 2024-11-29 RX ADMIN — SODIUM CHLORIDE 1000 ML: 9 INJECTION, SOLUTION INTRAVENOUS at 08:30

## 2024-11-29 RX ADMIN — CYCLOPHOSPHAMIDE 640 MG: 1 INJECTION, POWDER, FOR SOLUTION INTRAVENOUS; ORAL at 11:40

## 2024-11-29 NOTE — ONCOLOGY
Administration: Chemotherapy drug Cytoxan and Fludarabine independently verified with Bernadette Ferrell RN prior to administration.  Acknowledgement of informed consent for chemotherapy administration verified.  Original order, appropriateness of regimen, drug supplied, height, weight, BSA, dose calculations, expiration dates/times, drug appearance, and two patient identifiers were verified by both RNs.  Drug checked for vesicant/irritant status and for risk of hypersensitivity.  Most recent laboratory values and allergies, were reviewed.  Positive, brisk blood return via CVC was confirmed prior to administration. Chest x-ray for correct line placement reviewed. Sonal Bartlett RN and Bernadette Ferrell RN verified correct rate of chemotherapy and maintenance IV fluids.  Patient was educated on chemotherapy regimen prior to administration including indication for treatment related to disease & side effects of chemotherapy drug.  Patient verbalizes understanding of all instructions.    Completion of Chemotherapy: Monitoring during infusion done per policy, see Flowsheets.  Blood return verified before, during, and after infusion per policy; no signs of extravasation.  Pt tolerated chemotherapy well and without incident.  Chemotherapy infusion end time on the MAR.

## 2024-12-01 RX ORDER — PROCHLORPERAZINE EDISYLATE 5 MG/ML
10 INJECTION INTRAMUSCULAR; INTRAVENOUS EVERY 6 HOURS PRN
Status: CANCELLED | OUTPATIENT
Start: 2024-12-02

## 2024-12-01 RX ORDER — POTASSIUM CHLORIDE 29.8 MG/ML
80 INJECTION INTRAVENOUS PRN
Status: CANCELLED | OUTPATIENT
Start: 2024-12-02 | End: 2025-03-12

## 2024-12-01 RX ORDER — MAGNESIUM SULFATE IN WATER 40 MG/ML
4000 INJECTION, SOLUTION INTRAVENOUS PRN
Status: CANCELLED | OUTPATIENT
Start: 2024-12-02

## 2024-12-01 RX ORDER — PROCHLORPERAZINE MALEATE 10 MG
10 TABLET ORAL EVERY 6 HOURS PRN
Status: CANCELLED | OUTPATIENT
Start: 2024-12-02

## 2024-12-01 RX ORDER — ONDANSETRON 4 MG/1
8 TABLET, FILM COATED ORAL EVERY 8 HOURS PRN
Status: CANCELLED | OUTPATIENT
Start: 2024-12-02

## 2024-12-01 RX ORDER — OXYCODONE HYDROCHLORIDE 5 MG/1
10 TABLET ORAL EVERY 4 HOURS PRN
Status: CANCELLED | OUTPATIENT
Start: 2024-12-02

## 2024-12-01 RX ORDER — SODIUM CHLORIDE 9 MG/ML
INJECTION, SOLUTION INTRAVENOUS CONTINUOUS PRN
Status: CANCELLED | OUTPATIENT
Start: 2024-12-02

## 2024-12-01 RX ORDER — 0.9 % SODIUM CHLORIDE 0.9 %
1000 INTRAVENOUS SOLUTION INTRAVENOUS ONCE
Status: CANCELLED | OUTPATIENT
Start: 2024-12-02 | End: 2024-12-02

## 2024-12-01 RX ORDER — HEPARIN 100 UNIT/ML
500 SYRINGE INTRAVENOUS PRN
Status: CANCELLED | OUTPATIENT
Start: 2024-12-02

## 2024-12-01 RX ORDER — ONDANSETRON 2 MG/ML
8 INJECTION INTRAMUSCULAR; INTRAVENOUS EVERY 8 HOURS PRN
Status: CANCELLED | OUTPATIENT
Start: 2024-12-02

## 2024-12-01 RX ORDER — OXYCODONE HYDROCHLORIDE 5 MG/1
5 TABLET ORAL EVERY 4 HOURS PRN
Status: CANCELLED | OUTPATIENT
Start: 2024-12-02

## 2024-12-01 RX ORDER — POTASSIUM CHLORIDE 1500 MG/1
40 TABLET, EXTENDED RELEASE ORAL PRN
Status: CANCELLED | OUTPATIENT
Start: 2024-12-02

## 2024-12-02 ENCOUNTER — HOSPITAL ENCOUNTER (OUTPATIENT)
Dept: ONCOLOGY | Age: 69
Setting detail: INFUSION SERIES
Discharge: HOME | End: 2024-12-02
Payer: MEDICARE

## 2024-12-02 ENCOUNTER — HOSPITAL ENCOUNTER (OUTPATIENT)
Dept: GENERAL RADIOLOGY | Age: 69
Discharge: HOME | End: 2024-12-02
Payer: MEDICARE

## 2024-12-02 VITALS
DIASTOLIC BLOOD PRESSURE: 71 MMHG | TEMPERATURE: 97.6 F | WEIGHT: 225.53 LBS | RESPIRATION RATE: 19 BRPM | BODY MASS INDEX: 39.64 KG/M2 | SYSTOLIC BLOOD PRESSURE: 128 MMHG | HEART RATE: 68 BPM | OXYGEN SATURATION: 98 %

## 2024-12-02 DIAGNOSIS — I48.19 PERSISTENT ATRIAL FIBRILLATION (HCC): ICD-10-CM

## 2024-12-02 DIAGNOSIS — C83.00 SMALL B-CELL LYMPHOMA, UNSPECIFIED BODY REGION (HCC): Primary | ICD-10-CM

## 2024-12-02 LAB
ALBUMIN SERPL-MCNC: 4 G/DL (ref 3.4–5)
ALP SERPL-CCNC: 140 U/L (ref 40–129)
ALT SERPL-CCNC: 26 U/L (ref 10–40)
ANION GAP SERPL CALCULATED.3IONS-SCNC: 12 MMOL/L (ref 3–16)
AST SERPL-CCNC: 32 U/L (ref 15–37)
BACTERIA URNS QL MICRO: ABNORMAL /HPF
BASOPHILS # BLD: 0 K/UL (ref 0–0.2)
BASOPHILS NFR BLD: 0.4 %
BILIRUB DIRECT SERPL-MCNC: 0.3 MG/DL (ref 0–0.3)
BILIRUB INDIRECT SERPL-MCNC: 0.4 MG/DL (ref 0–1)
BILIRUB SERPL-MCNC: 0.7 MG/DL (ref 0–1)
BILIRUB UR QL STRIP.AUTO: NEGATIVE
BUN SERPL-MCNC: 24 MG/DL (ref 7–20)
CALCIUM SERPL-MCNC: 9.2 MG/DL (ref 8.3–10.6)
CHLORIDE SERPL-SCNC: 107 MMOL/L (ref 99–110)
CLARITY UR: CLEAR
CO2 SERPL-SCNC: 24 MMOL/L (ref 21–32)
COLOR UR: YELLOW
CREAT SERPL-MCNC: 1.2 MG/DL (ref 0.6–1.2)
CRP SERPL-MCNC: 20.5 MG/L (ref 0–5.1)
DEPRECATED RDW RBC AUTO: 15.7 % (ref 12.4–15.4)
EOSINOPHIL # BLD: 0 K/UL (ref 0–0.6)
EOSINOPHIL NFR BLD: 1.6 %
FERRITIN SERPL IA-MCNC: 628 NG/ML (ref 15–150)
GFR SERPLBLD CREATININE-BSD FMLA CKD-EPI: 50 ML/MIN/{1.73_M2}
GLUCOSE SERPL-MCNC: 174 MG/DL (ref 70–99)
GLUCOSE UR STRIP.AUTO-MCNC: NEGATIVE MG/DL
HCT VFR BLD AUTO: 36.2 % (ref 36–48)
HGB BLD-MCNC: 11.8 G/DL (ref 12–16)
HGB UR QL STRIP.AUTO: NEGATIVE
INR PPP: 1.33 (ref 0.85–1.15)
KETONES UR STRIP.AUTO-MCNC: NEGATIVE MG/DL
LDH SERPL L TO P-CCNC: 191 U/L (ref 100–190)
LEUKOCYTE ESTERASE UR QL STRIP.AUTO: ABNORMAL
LYMPHOCYTES # BLD: 0 K/UL (ref 1–5.1)
LYMPHOCYTES NFR BLD: 0.5 %
MAGNESIUM SERPL-MCNC: 2.13 MG/DL (ref 1.8–2.4)
MCH RBC QN AUTO: 31 PG (ref 26–34)
MCHC RBC AUTO-ENTMCNC: 32.6 G/DL (ref 31–36)
MCV RBC AUTO: 95.1 FL (ref 80–100)
MONOCYTES # BLD: 0 K/UL (ref 0–1.3)
MONOCYTES NFR BLD: 1.5 %
NEUTROPHILS # BLD: 2.4 K/UL (ref 1.7–7.7)
NEUTROPHILS NFR BLD: 96 %
NITRITE UR QL STRIP.AUTO: POSITIVE
PH UR STRIP.AUTO: 6 [PH] (ref 5–8)
PHOSPHATE SERPL-MCNC: 2.8 MG/DL (ref 2.5–4.9)
PLATELET # BLD AUTO: 132 K/UL (ref 135–450)
PMV BLD AUTO: 9.9 FL (ref 5–10.5)
POTASSIUM SERPL-SCNC: 3.6 MMOL/L (ref 3.5–5.1)
PROT SERPL-MCNC: 6.4 G/DL (ref 6.4–8.2)
PROT UR STRIP.AUTO-MCNC: NEGATIVE MG/DL
PROTHROMBIN TIME: 16.7 SEC (ref 11.9–14.9)
RBC # BLD AUTO: 3.81 M/UL (ref 4–5.2)
RBC #/AREA URNS HPF: ABNORMAL /HPF (ref 0–4)
SODIUM SERPL-SCNC: 143 MMOL/L (ref 136–145)
SP GR UR STRIP.AUTO: 1.02 (ref 1–1.03)
UA DIPSTICK W REFLEX MICRO PNL UR: YES
URATE SERPL-MCNC: 4.9 MG/DL (ref 2.6–6)
URN SPEC COLLECT METH UR: ABNORMAL
UROBILINOGEN UR STRIP-ACNC: 0.2 E.U./DL
WBC # BLD AUTO: 2.5 K/UL (ref 4–11)
WBC #/AREA URNS HPF: ABNORMAL /HPF (ref 0–5)

## 2024-12-02 PROCEDURE — 82728 ASSAY OF FERRITIN: CPT

## 2024-12-02 PROCEDURE — 36591 DRAW BLOOD OFF VENOUS DEVICE: CPT

## 2024-12-02 PROCEDURE — 85025 COMPLETE CBC W/AUTO DIFF WBC: CPT

## 2024-12-02 PROCEDURE — 6370000000 HC RX 637 (ALT 250 FOR IP): Performed by: NURSE PRACTITIONER

## 2024-12-02 PROCEDURE — 86140 C-REACTIVE PROTEIN: CPT

## 2024-12-02 PROCEDURE — 84550 ASSAY OF BLOOD/URIC ACID: CPT

## 2024-12-02 PROCEDURE — 80076 HEPATIC FUNCTION PANEL: CPT

## 2024-12-02 PROCEDURE — 0539T HC CART-T THERAPY RECEIPT & PREP CAR-T CELLS F/ADMIN: CPT | Performed by: STUDENT IN AN ORGANIZED HEALTH CARE EDUCATION/TRAINING PROGRAM

## 2024-12-02 PROCEDURE — 83735 ASSAY OF MAGNESIUM: CPT

## 2024-12-02 PROCEDURE — 6360000002 HC RX W HCPCS: Performed by: NURSE PRACTITIONER

## 2024-12-02 PROCEDURE — 96361 HYDRATE IV INFUSION ADD-ON: CPT

## 2024-12-02 PROCEDURE — 84100 ASSAY OF PHOSPHORUS: CPT

## 2024-12-02 PROCEDURE — 81001 URINALYSIS AUTO W/SCOPE: CPT

## 2024-12-02 PROCEDURE — 71045 X-RAY EXAM CHEST 1 VIEW: CPT

## 2024-12-02 PROCEDURE — 0540T HC CAR-T THERAPY AUTOLOGOUS CELL ADMIN: CPT | Performed by: STUDENT IN AN ORGANIZED HEALTH CARE EDUCATION/TRAINING PROGRAM

## 2024-12-02 PROCEDURE — 85610 PROTHROMBIN TIME: CPT

## 2024-12-02 PROCEDURE — 80048 BASIC METABOLIC PNL TOTAL CA: CPT

## 2024-12-02 PROCEDURE — 96360 HYDRATION IV INFUSION INIT: CPT

## 2024-12-02 PROCEDURE — 2580000003 HC RX 258: Performed by: NURSE PRACTITIONER

## 2024-12-02 PROCEDURE — 83615 LACTATE (LD) (LDH) ENZYME: CPT

## 2024-12-02 PROCEDURE — 8910000000 HC RX FDA APPROVED CELL THERAPY: Performed by: STUDENT IN AN ORGANIZED HEALTH CARE EDUCATION/TRAINING PROGRAM

## 2024-12-02 PROCEDURE — 96374 THER/PROPH/DIAG INJ IV PUSH: CPT

## 2024-12-02 PROCEDURE — 6370000000 HC RX 637 (ALT 250 FOR IP)

## 2024-12-02 RX ORDER — OXYCODONE HYDROCHLORIDE 5 MG/1
10 TABLET ORAL EVERY 4 HOURS PRN
Status: CANCELLED | OUTPATIENT
Start: 2024-12-03

## 2024-12-02 RX ORDER — LEVETIRACETAM 500 MG/5ML
500 INJECTION, SOLUTION, CONCENTRATE INTRAVENOUS ONCE
Status: COMPLETED | OUTPATIENT
Start: 2024-12-02 | End: 2024-12-02

## 2024-12-02 RX ORDER — HEPARIN 100 UNIT/ML
500 SYRINGE INTRAVENOUS PRN
Status: CANCELLED | OUTPATIENT
Start: 2024-12-03

## 2024-12-02 RX ORDER — LEVETIRACETAM 500 MG/1
500 TABLET ORAL DAILY
Status: DISCONTINUED | OUTPATIENT
Start: 2024-12-02 | End: 2024-12-02

## 2024-12-02 RX ORDER — MORPHINE SULFATE 2 MG/ML
2 INJECTION, SOLUTION INTRAMUSCULAR; INTRAVENOUS PRN
Status: DISCONTINUED | OUTPATIENT
Start: 2024-12-02 | End: 2024-12-03 | Stop reason: HOSPADM

## 2024-12-02 RX ORDER — EPINEPHRINE 1 MG/ML
0.3 INJECTION, SOLUTION INTRAMUSCULAR; SUBCUTANEOUS
Status: DISCONTINUED | OUTPATIENT
Start: 2024-12-02 | End: 2024-12-03 | Stop reason: HOSPADM

## 2024-12-02 RX ORDER — SODIUM CHLORIDE 9 MG/ML
INJECTION, SOLUTION INTRAVENOUS CONTINUOUS PRN
Status: DISCONTINUED | OUTPATIENT
Start: 2024-12-02 | End: 2024-12-03 | Stop reason: HOSPADM

## 2024-12-02 RX ORDER — LEVETIRACETAM 500 MG/1
500 TABLET ORAL 2 TIMES DAILY
COMMUNITY
Start: 2024-10-17

## 2024-12-02 RX ORDER — 0.9 % SODIUM CHLORIDE 0.9 %
1000 INTRAVENOUS SOLUTION INTRAVENOUS ONCE
Status: CANCELLED | OUTPATIENT
Start: 2024-12-03 | End: 2024-12-03

## 2024-12-02 RX ORDER — SODIUM CHLORIDE 9 MG/ML
INJECTION, SOLUTION INTRAVENOUS CONTINUOUS PRN
Status: CANCELLED | OUTPATIENT
Start: 2024-12-03

## 2024-12-02 RX ORDER — MAGNESIUM SULFATE IN WATER 40 MG/ML
4000 INJECTION, SOLUTION INTRAVENOUS PRN
Status: CANCELLED | OUTPATIENT
Start: 2024-12-03

## 2024-12-02 RX ORDER — DIPHENHYDRAMINE HYDROCHLORIDE 50 MG/ML
25 INJECTION INTRAMUSCULAR; INTRAVENOUS PRN
Status: DISCONTINUED | OUTPATIENT
Start: 2024-12-02 | End: 2024-12-03 | Stop reason: HOSPADM

## 2024-12-02 RX ORDER — PROCHLORPERAZINE MALEATE 10 MG
10 TABLET ORAL EVERY 6 HOURS PRN
Status: CANCELLED | OUTPATIENT
Start: 2024-12-03

## 2024-12-02 RX ORDER — POTASSIUM CHLORIDE 1500 MG/1
40 TABLET, EXTENDED RELEASE ORAL PRN
Status: CANCELLED | OUTPATIENT
Start: 2024-12-03

## 2024-12-02 RX ORDER — 0.9 % SODIUM CHLORIDE 0.9 %
500 INTRAVENOUS SOLUTION INTRAVENOUS ONCE
Status: COMPLETED | OUTPATIENT
Start: 2024-12-02 | End: 2024-12-02

## 2024-12-02 RX ORDER — ONDANSETRON 4 MG/1
8 TABLET, FILM COATED ORAL EVERY 8 HOURS PRN
Status: CANCELLED | OUTPATIENT
Start: 2024-12-03

## 2024-12-02 RX ORDER — OXYCODONE HYDROCHLORIDE 5 MG/1
5 TABLET ORAL EVERY 4 HOURS PRN
Status: CANCELLED | OUTPATIENT
Start: 2024-12-03

## 2024-12-02 RX ORDER — ONDANSETRON 2 MG/ML
8 INJECTION INTRAMUSCULAR; INTRAVENOUS EVERY 8 HOURS PRN
Status: CANCELLED | OUTPATIENT
Start: 2024-12-03

## 2024-12-02 RX ORDER — ALLOPURINOL 300 MG/1
300 TABLET ORAL DAILY
Status: COMPLETED | OUTPATIENT
Start: 2024-12-02 | End: 2024-12-02

## 2024-12-02 RX ORDER — ACETAMINOPHEN 325 MG/1
650 TABLET ORAL ONCE
Status: COMPLETED | OUTPATIENT
Start: 2024-12-02 | End: 2024-12-02

## 2024-12-02 RX ORDER — PROCHLORPERAZINE EDISYLATE 5 MG/ML
10 INJECTION INTRAMUSCULAR; INTRAVENOUS EVERY 6 HOURS PRN
Status: CANCELLED | OUTPATIENT
Start: 2024-12-03

## 2024-12-02 RX ORDER — POTASSIUM CHLORIDE 29.8 MG/ML
80 INJECTION INTRAVENOUS PRN
Status: CANCELLED | OUTPATIENT
Start: 2024-12-03 | End: 2025-03-13

## 2024-12-02 RX ORDER — DIPHENHYDRAMINE HCL 25 MG
25 TABLET ORAL ONCE
Status: COMPLETED | OUTPATIENT
Start: 2024-12-02 | End: 2024-12-02

## 2024-12-02 RX ADMIN — SODIUM CHLORIDE 500 ML: 9 INJECTION, SOLUTION INTRAVENOUS at 08:38

## 2024-12-02 RX ADMIN — DIPHENHYDRAMINE HCL 25 MG: 25 TABLET ORAL at 10:57

## 2024-12-02 RX ADMIN — ACETAMINOPHEN 650 MG: 325 TABLET ORAL at 10:58

## 2024-12-02 RX ADMIN — LEVETIRACETAM 500 MG: 100 INJECTION, SOLUTION INTRAVENOUS at 11:05

## 2024-12-02 RX ADMIN — LISOCABTAGENE MARALEUCEL 1 SYRINGE: KIT at 12:01

## 2024-12-02 RX ADMIN — ALLOPURINOL 300 MG: 300 TABLET ORAL at 09:06

## 2024-12-02 NOTE — PROCEDURES
PROCEDURE NOTE  Date: 12/2/2024   Name: Kiera Irvin  YOB: 1955    Procedures        CAR-T(T0) Progress Note      Kiera Irvin                              12/2/24                             Start time: 1201 Completion time: 1222    CAR-T Type: Breyanzi    Product Type: PBSC (peripheral blood stem cell)    Product Unit Number:   CD-8  LOT NO: [id number] Volume: 3.1 mL  CD-4  LOT NO: [id number] Volume: 3.2 mL        Catheter lumen used for infusion: Port             Positive Blood Return: Yes      Premeds Given: 25 mg benadryl, 650 mg tylenol    Adverse Reaction(s) and treatment: Baseline vital signs obtained prior to infusion and monitoring completed throughout per [facility] protocol - see flowsheets. All IVFs turned down to KVO during stem cell infusion. Stem cell product(s) verified with 2nd RN LARA Medina prior to infusion using 2 patient identifiers. Infused via slow IVP with rate controlled by Bernadette SPEAR RN per [facility] protocols. Slow NS flush programed on pump post IVP.    Emergency medications epinephrine, benadryl, & tociluzimab available as needed. Emergency medical equipment available as needed including telemetry monitoring throughout infusion, supplemental O2, suction equipment, and crash cart. RN at bedside throughout infusion.    Pt to be seen again tomorrow for follow-up appt. Pt educated to take Keppra at home twice a day. Dose given here today prior to Breyanzi infusion.     Bernadette Ferrell RN

## 2024-12-02 NOTE — PROGRESS NOTES
[facility] Progress Note    2024     Kiera Irvin    MRN: [id number]    : 1955    Referring MD: Valentina Brewer DO  [address]  [address]  [place] [address]      SUBJECTIVE:  Patient is doing welI. She denies nausea/vomiting or shortness of breath.      ECOG PS:  (1) Restricted in physically strenuous activity, ambulatory and able to do work of light nature    KPS: 90% Able to carry on normal activity; minor signs or symptoms of disease    Isolation: None    Medications    Scheduled Meds:      Continuous Infusions:   sodium chloride       PRN Meds:.diphenhydrAMINE, EPINEPHrine, morphine, sodium chloride, sodium chloride    ROS:  As noted above, otherwise remainder of 10-point ROS negative    Physical Exam:     I&O:  No intake or output data in the 24 hours ending 24 1530    Vital Signs:  /71   Pulse 68   Temp 97.6 °F (36.4 °C) (Oral)   Resp 19   Wt 102.3 kg (225 lb 8.5 oz)   SpO2 98%   BMI 39.64 kg/m²     Weight:    Wt Readings from Last 3 Encounters:   24 102.3 kg (225 lb 8.5 oz)   24 104 kg (229 lb 4.5 oz)   24 104 kg (229 lb 4.5 oz)         General: Awake, alert and oriented.  HEENT: normocephalic, PERRL, no scleral erythema or icterus, Oral mucosa moist and intact, throat clear  NECK: supple  BACK: Straight   SKIN: warm dry and intact without lesions rashes or masses  CHEST: CTA bilaterally without use of accessory muscles  CV: Normal S1 S2, RRR, no MRG  ABD: NT ND normoactive BS  EXTREMITIES: without edema, denies calf tenderness  NEURO: CN II - XII grossly intact  CATHETER: PAC     Data    CBC:   Recent Labs     24  0840   WBC 2.5*   HGB 11.8*   HCT 36.2   MCV 95.1   *     BMP/Mag:  Recent Labs     24  0840      K 3.6      CO2 24   PHOS 2.8   BUN 24*   CREATININE 1.2   MG 2.13     LIVP:   Recent Labs     24  0840   AST 32   ALT 26   BILIDIR 0.3   BILITOT 0.7   ALKPHOS 140*     Coags:   Recent Labs     24  0874

## 2024-12-02 NOTE — CARE COORDINATION
Patient provided BREYANZI wallet card and given instructions for it's use. Reviewed signs and symptoms of CRS and/or neurotoxicity. Patient without further questions at this time.

## 2024-12-02 NOTE — PROGRESS NOTES
Availability of two 800 mg doses of Tocilizumab was verified prior to patient CAR-T infusion by this pharmacist.     Noa Argueta 12/2/2024 9:13 AM

## 2024-12-03 ENCOUNTER — HOSPITAL ENCOUNTER (OUTPATIENT)
Dept: ONCOLOGY | Age: 69
Setting detail: INFUSION SERIES
Discharge: HOME | End: 2024-12-03
Payer: MEDICARE

## 2024-12-03 VITALS
TEMPERATURE: 97.4 F | WEIGHT: 228.4 LBS | DIASTOLIC BLOOD PRESSURE: 62 MMHG | BODY MASS INDEX: 40.14 KG/M2 | RESPIRATION RATE: 18 BRPM | OXYGEN SATURATION: 96 % | HEART RATE: 80 BPM | SYSTOLIC BLOOD PRESSURE: 120 MMHG

## 2024-12-03 DIAGNOSIS — C83.00 SMALL B-CELL LYMPHOMA, UNSPECIFIED BODY REGION (HCC): Primary | ICD-10-CM

## 2024-12-03 LAB
ANION GAP SERPL CALCULATED.3IONS-SCNC: 11 MMOL/L (ref 3–16)
BASOPHILS # BLD: 0 K/UL (ref 0–0.2)
BASOPHILS NFR BLD: 0.6 %
BUN SERPL-MCNC: 26 MG/DL (ref 7–20)
CALCIUM SERPL-MCNC: 9 MG/DL (ref 8.3–10.6)
CHLORIDE SERPL-SCNC: 106 MMOL/L (ref 99–110)
CO2 SERPL-SCNC: 23 MMOL/L (ref 21–32)
CREAT SERPL-MCNC: 1.4 MG/DL (ref 0.6–1.2)
CRP SERPL-MCNC: 15.9 MG/L (ref 0–5.1)
DEPRECATED RDW RBC AUTO: 15.5 % (ref 12.4–15.4)
EOSINOPHIL # BLD: 0 K/UL (ref 0–0.6)
EOSINOPHIL NFR BLD: 2.8 %
FERRITIN SERPL IA-MCNC: 556 NG/ML (ref 15–150)
GFR SERPLBLD CREATININE-BSD FMLA CKD-EPI: 41 ML/MIN/{1.73_M2}
GLUCOSE SERPL-MCNC: 268 MG/DL (ref 70–99)
HCT VFR BLD AUTO: 33.9 % (ref 36–48)
HGB BLD-MCNC: 11.2 G/DL (ref 12–16)
LYMPHOCYTES # BLD: 0 K/UL (ref 1–5.1)
LYMPHOCYTES NFR BLD: 0.8 %
MCH RBC QN AUTO: 31.1 PG (ref 26–34)
MCHC RBC AUTO-ENTMCNC: 32.9 G/DL (ref 31–36)
MCV RBC AUTO: 94.3 FL (ref 80–100)
MONOCYTES # BLD: 0 K/UL (ref 0–1.3)
MONOCYTES NFR BLD: 3.5 %
NEUTROPHILS # BLD: 1 K/UL (ref 1.7–7.7)
NEUTROPHILS NFR BLD: 92.3 %
PHOSPHATE SERPL-MCNC: 2.4 MG/DL (ref 2.5–4.9)
PLATELET # BLD AUTO: 115 K/UL (ref 135–450)
PMV BLD AUTO: 9.6 FL (ref 5–10.5)
POTASSIUM SERPL-SCNC: 3.9 MMOL/L (ref 3.5–5.1)
RBC # BLD AUTO: 3.59 M/UL (ref 4–5.2)
SODIUM SERPL-SCNC: 140 MMOL/L (ref 136–145)
WBC # BLD AUTO: 1.1 K/UL (ref 4–11)

## 2024-12-03 PROCEDURE — 80048 BASIC METABOLIC PNL TOTAL CA: CPT

## 2024-12-03 PROCEDURE — 36591 DRAW BLOOD OFF VENOUS DEVICE: CPT

## 2024-12-03 PROCEDURE — 84100 ASSAY OF PHOSPHORUS: CPT

## 2024-12-03 PROCEDURE — 86140 C-REACTIVE PROTEIN: CPT

## 2024-12-03 PROCEDURE — 82728 ASSAY OF FERRITIN: CPT

## 2024-12-03 PROCEDURE — 85025 COMPLETE CBC W/AUTO DIFF WBC: CPT

## 2024-12-03 RX ORDER — OXYCODONE HYDROCHLORIDE 5 MG/1
5 TABLET ORAL EVERY 4 HOURS PRN
Status: CANCELLED | OUTPATIENT
Start: 2024-12-04

## 2024-12-03 RX ORDER — SODIUM CHLORIDE 9 MG/ML
INJECTION, SOLUTION INTRAVENOUS CONTINUOUS PRN
Status: CANCELLED | OUTPATIENT
Start: 2024-12-04

## 2024-12-03 RX ORDER — LEVOFLOXACIN 500 MG/1
500 TABLET, FILM COATED ORAL DAILY
Qty: 30 TABLET | Refills: 3 | Status: SHIPPED | OUTPATIENT
Start: 2024-12-03

## 2024-12-03 RX ORDER — HEPARIN 100 UNIT/ML
500 SYRINGE INTRAVENOUS PRN
Status: CANCELLED | OUTPATIENT
Start: 2024-12-04

## 2024-12-03 RX ORDER — ONDANSETRON 4 MG/1
8 TABLET, FILM COATED ORAL EVERY 8 HOURS PRN
Status: CANCELLED | OUTPATIENT
Start: 2024-12-04

## 2024-12-03 RX ORDER — MAGNESIUM SULFATE IN WATER 40 MG/ML
4000 INJECTION, SOLUTION INTRAVENOUS PRN
Status: CANCELLED | OUTPATIENT
Start: 2024-12-04

## 2024-12-03 RX ORDER — FUROSEMIDE 20 MG/1
TABLET ORAL
Qty: 90 TABLET | Refills: 3 | Status: SHIPPED | OUTPATIENT
Start: 2024-12-03

## 2024-12-03 RX ORDER — FLUCONAZOLE 200 MG/1
200 TABLET ORAL DAILY
Qty: 30 TABLET | Refills: 3 | Status: SHIPPED | OUTPATIENT
Start: 2024-12-03

## 2024-12-03 RX ORDER — 0.9 % SODIUM CHLORIDE 0.9 %
1000 INTRAVENOUS SOLUTION INTRAVENOUS ONCE
Status: CANCELLED | OUTPATIENT
Start: 2024-12-04 | End: 2024-12-04

## 2024-12-03 RX ORDER — POTASSIUM CHLORIDE 29.8 MG/ML
80 INJECTION INTRAVENOUS PRN
Status: CANCELLED | OUTPATIENT
Start: 2024-12-04 | End: 2025-03-14

## 2024-12-03 RX ORDER — ONDANSETRON 2 MG/ML
8 INJECTION INTRAMUSCULAR; INTRAVENOUS EVERY 8 HOURS PRN
Status: CANCELLED | OUTPATIENT
Start: 2024-12-04

## 2024-12-03 RX ORDER — PROCHLORPERAZINE MALEATE 10 MG
10 TABLET ORAL EVERY 6 HOURS PRN
Qty: 120 TABLET | Refills: 3 | Status: SHIPPED | OUTPATIENT
Start: 2024-12-03

## 2024-12-03 RX ORDER — PROCHLORPERAZINE MALEATE 10 MG
10 TABLET ORAL EVERY 6 HOURS PRN
Status: CANCELLED | OUTPATIENT
Start: 2024-12-04

## 2024-12-03 RX ORDER — POTASSIUM CHLORIDE 1500 MG/1
40 TABLET, EXTENDED RELEASE ORAL PRN
Status: CANCELLED | OUTPATIENT
Start: 2024-12-04

## 2024-12-03 RX ORDER — PROCHLORPERAZINE EDISYLATE 5 MG/ML
10 INJECTION INTRAMUSCULAR; INTRAVENOUS EVERY 6 HOURS PRN
Status: CANCELLED | OUTPATIENT
Start: 2024-12-04

## 2024-12-03 RX ORDER — OXYCODONE HYDROCHLORIDE 5 MG/1
10 TABLET ORAL EVERY 4 HOURS PRN
Status: CANCELLED | OUTPATIENT
Start: 2024-12-04

## 2024-12-03 NOTE — PROGRESS NOTES
Short Stay Communication Note  Kiera Irvin  Diagnosis:  Primary MD:  Treatment: Melphalan Fludarabine/Cytoxan  CAR-T Administration Date:   Day +1 of Car-T Ketty   Pt seen in outpatient infusion today. Labs drawn and reviewed.   CBC:   Recent Labs     12/02/24  0840 12/03/24  0915   WBC 2.5* 1.1*   HGB 11.8* 11.2*   HCT 36.2 33.9*   MCV 95.1 94.3   * 115*     BMP/Mag:  Recent Labs     12/02/24  0840 12/03/24  0915    140   K 3.6 3.9    106   CO2 24 23   PHOS 2.8 2.4*   BUN 24* 26*   CREATININE 1.2 1.4*   MG 2.13  --      Standing parameters for replacement for this patient:   1 unit of pack red blood cells for a hemoglobin < or equal to 7  1 pack of platelets for a platelet count less than or equal to 10  40 MeQ of Potassium administered for a potassium level less than or equal to 3.4  4g of Magnesium Sulfate for a magnesum level less than or equal to 1.4  No transfusions required for the above lab values.    Urinalysis last done: 12/2/2024 Urinalysis next due: 12/9/2024    Chest X-Ray last done: 12/2/2024 Chest X-Ray next due: 12/9/2024    Symptoms addressed and reported to care team this date: Pt had questions about her medications and about safe foods to eat. DUNG Cardoso met with the patient and spouse after morning rounds to review medication schedule. This RN and DUNG Cardoso answered questions/concerns about safe foods.     Treatments this date: Labs collected    Reviewed medication schedule with pt and caregiver. Both able to verbalize all medications and schedule. Pt to be seen again tomorrow. Patient and caregiver verbalized understanding of discharge instructions including when and how to call the doctor and when to report  to the ER. [employer] education and placement completed by Bernadette SPEAR RN. Discharged ambulatory to home with spouse.

## 2024-12-03 NOTE — PROGRESS NOTES
[facility] Progress Note    12/3/2024     Kiera Irvin    MRN: [id number]    : 1955    Referring MD: Eileen Palomino, APRN - NP  [address] [place] [address]      SUBJECTIVE:  Patient is doing welI. Tolerated CAR-T Infusion well yesterday. She denies nausea/vomiting or shortness of breath. Denies fevers, no confusion. Eating and drinking well.     ECOG PS:  (1) Restricted in physically strenuous activity, ambulatory and able to do work of light nature    KPS: 90% Able to carry on normal activity; minor signs or symptoms of disease    Isolation: None    Medications    Scheduled Meds:      Continuous Infusions:      PRN Meds:.    ROS:  As noted above, otherwise remainder of 10-point ROS negative    Physical Exam:     I&O:  No intake or output data in the 24 hours ending 24 0801    Vital Signs:  There were no vitals taken for this visit.    Weight:    Wt Readings from Last 3 Encounters:   24 102.3 kg (225 lb 8.5 oz)   24 104 kg (229 lb 4.5 oz)   24 104 kg (229 lb 4.5 oz)         General: Awake, alert and oriented.  HEENT: normocephalic, PERRL, no scleral erythema or icterus, Oral mucosa moist and intact, throat clear  NECK: supple  BACK: Straight   SKIN: warm dry and intact without lesions rashes or masses  CHEST: CTA bilaterally without use of accessory muscles  CV: Normal S1 S2, RRR, no MRG  ABD: NT ND normoactive BS  EXTREMITIES: without edema, denies calf tenderness  NEURO: CN II - XII grossly intact  CATHETER: PAC     Data    CBC:   Recent Labs     24  0840   WBC 2.5*   HGB 11.8*   HCT 36.2   MCV 95.1   *     BMP/Mag:  Recent Labs     24  0840      K 3.6      CO2 24   PHOS 2.8   BUN 24*   CREATININE 1.2   MG 2.13     LIVP:   Recent Labs     24  0840   AST 32   ALT 26   BILIDIR 0.3   BILITOT 0.7   ALKPHOS 140*     Coags:   Recent Labs     24  0840   PROTIME 16.7*   INR 1.33*     Uric Acid No results for input(s): \"LABURIC\"

## 2024-12-03 NOTE — TELEPHONE ENCOUNTER
09-May-2018 15:16 Requested Prescriptions     Pending Prescriptions Disp Refills    furosemide (LASIX) 20 MG tablet 90 tablet 3     Sig: TAKE 1 TAB DAILY . MAY TAKE AN ADDITIONAL DOSE AS NEEDED FOR SHORTENSS OF BREATH/ WEIGHT GAIN/EDEMA            Checked Correct Pharmacy: Yes    Any changes since last refill? No     Number: [id number]    Refills: 3    Last Office Visit: 10/16/2024     Next Office Visit: Visit date not found         Last Labs: 12.02.2024

## 2024-12-04 ENCOUNTER — HOSPITAL ENCOUNTER (OUTPATIENT)
Dept: ONCOLOGY | Age: 69
Setting detail: INFUSION SERIES
Discharge: HOME | End: 2024-12-04
Payer: MEDICARE

## 2024-12-04 VITALS
OXYGEN SATURATION: 98 % | TEMPERATURE: 97.6 F | BODY MASS INDEX: 40.37 KG/M2 | DIASTOLIC BLOOD PRESSURE: 79 MMHG | SYSTOLIC BLOOD PRESSURE: 133 MMHG | HEART RATE: 68 BPM | WEIGHT: 229.72 LBS | RESPIRATION RATE: 18 BRPM

## 2024-12-04 DIAGNOSIS — C83.00 SMALL B-CELL LYMPHOMA, UNSPECIFIED BODY REGION (HCC): Primary | ICD-10-CM

## 2024-12-04 LAB
ALBUMIN SERPL-MCNC: 3.7 G/DL (ref 3.4–5)
ALP SERPL-CCNC: 135 U/L (ref 40–129)
ALT SERPL-CCNC: 27 U/L (ref 10–40)
ANION GAP SERPL CALCULATED.3IONS-SCNC: 12 MMOL/L (ref 3–16)
ANISOCYTOSIS BLD QL SMEAR: ABNORMAL
AST SERPL-CCNC: 32 U/L (ref 15–37)
BASOPHILS # BLD: 0 K/UL (ref 0–0.2)
BASOPHILS NFR BLD: 0 %
BILIRUB DIRECT SERPL-MCNC: 0.3 MG/DL (ref 0–0.3)
BILIRUB INDIRECT SERPL-MCNC: 0.3 MG/DL (ref 0–1)
BILIRUB SERPL-MCNC: 0.6 MG/DL (ref 0–1)
BUN SERPL-MCNC: 20 MG/DL (ref 7–20)
CALCIUM SERPL-MCNC: 8.9 MG/DL (ref 8.3–10.6)
CHLORIDE SERPL-SCNC: 107 MMOL/L (ref 99–110)
CO2 SERPL-SCNC: 22 MMOL/L (ref 21–32)
CREAT SERPL-MCNC: 1.2 MG/DL (ref 0.6–1.2)
CRP SERPL-MCNC: 16.2 MG/L (ref 0–5.1)
DACRYOCYTES BLD QL SMEAR: ABNORMAL
DEPRECATED RDW RBC AUTO: 15.3 % (ref 12.4–15.4)
EOSINOPHIL # BLD: 0 K/UL (ref 0–0.6)
EOSINOPHIL NFR BLD: 5 %
FERRITIN SERPL IA-MCNC: 499 NG/ML (ref 15–150)
GFR SERPLBLD CREATININE-BSD FMLA CKD-EPI: 50 ML/MIN/{1.73_M2}
GLUCOSE SERPL-MCNC: 258 MG/DL (ref 70–99)
HCT VFR BLD AUTO: 34 % (ref 36–48)
HGB BLD-MCNC: 11.2 G/DL (ref 12–16)
LDH SERPL L TO P-CCNC: 183 U/L (ref 100–190)
LYMPHOCYTES # BLD: 0 K/UL (ref 1–5.1)
LYMPHOCYTES NFR BLD: 0 %
MAGNESIUM SERPL-MCNC: 1.96 MG/DL (ref 1.8–2.4)
MCH RBC QN AUTO: 30.7 PG (ref 26–34)
MCHC RBC AUTO-ENTMCNC: 32.9 G/DL (ref 31–36)
MCV RBC AUTO: 93.4 FL (ref 80–100)
MONOCYTES # BLD: 0 K/UL (ref 0–1.3)
MONOCYTES NFR BLD: 2 %
NEUTROPHILS # BLD: 0.7 K/UL (ref 1.7–7.7)
NEUTROPHILS NFR BLD: 93 %
OVALOCYTES BLD QL SMEAR: ABNORMAL
PHOSPHATE SERPL-MCNC: 2.6 MG/DL (ref 2.5–4.9)
PLATELET # BLD AUTO: 119 K/UL (ref 135–450)
PMV BLD AUTO: 8.9 FL (ref 5–10.5)
POTASSIUM SERPL-SCNC: 3.8 MMOL/L (ref 3.5–5.1)
PROT SERPL-MCNC: 6 G/DL (ref 6.4–8.2)
RBC # BLD AUTO: 3.64 M/UL (ref 4–5.2)
SODIUM SERPL-SCNC: 141 MMOL/L (ref 136–145)
URATE SERPL-MCNC: 3.3 MG/DL (ref 2.6–6)
WBC # BLD AUTO: 0.8 K/UL (ref 4–11)

## 2024-12-04 PROCEDURE — 36591 DRAW BLOOD OFF VENOUS DEVICE: CPT

## 2024-12-04 PROCEDURE — 80048 BASIC METABOLIC PNL TOTAL CA: CPT

## 2024-12-04 PROCEDURE — 85025 COMPLETE CBC W/AUTO DIFF WBC: CPT

## 2024-12-04 PROCEDURE — 83615 LACTATE (LD) (LDH) ENZYME: CPT

## 2024-12-04 PROCEDURE — 82728 ASSAY OF FERRITIN: CPT

## 2024-12-04 PROCEDURE — 83735 ASSAY OF MAGNESIUM: CPT

## 2024-12-04 PROCEDURE — 84100 ASSAY OF PHOSPHORUS: CPT

## 2024-12-04 PROCEDURE — 86140 C-REACTIVE PROTEIN: CPT

## 2024-12-04 PROCEDURE — 84550 ASSAY OF BLOOD/URIC ACID: CPT

## 2024-12-04 PROCEDURE — 80076 HEPATIC FUNCTION PANEL: CPT

## 2024-12-04 RX ORDER — HEPARIN 100 UNIT/ML
500 SYRINGE INTRAVENOUS PRN
Status: CANCELLED | OUTPATIENT
Start: 2024-12-05

## 2024-12-04 RX ORDER — MAGNESIUM SULFATE IN WATER 40 MG/ML
4000 INJECTION, SOLUTION INTRAVENOUS PRN
Status: CANCELLED | OUTPATIENT
Start: 2024-12-05

## 2024-12-04 RX ORDER — PROCHLORPERAZINE EDISYLATE 5 MG/ML
10 INJECTION INTRAMUSCULAR; INTRAVENOUS EVERY 6 HOURS PRN
Status: CANCELLED | OUTPATIENT
Start: 2024-12-05

## 2024-12-04 RX ORDER — SODIUM CHLORIDE 9 MG/ML
INJECTION, SOLUTION INTRAVENOUS CONTINUOUS PRN
Status: CANCELLED | OUTPATIENT
Start: 2024-12-05

## 2024-12-04 RX ORDER — 0.9 % SODIUM CHLORIDE 0.9 %
1000 INTRAVENOUS SOLUTION INTRAVENOUS ONCE
Status: CANCELLED | OUTPATIENT
Start: 2024-12-05 | End: 2024-12-05

## 2024-12-04 RX ORDER — OXYCODONE HYDROCHLORIDE 5 MG/1
5 TABLET ORAL EVERY 4 HOURS PRN
Status: CANCELLED | OUTPATIENT
Start: 2024-12-05

## 2024-12-04 RX ORDER — ONDANSETRON 4 MG/1
8 TABLET, FILM COATED ORAL EVERY 8 HOURS PRN
Status: CANCELLED | OUTPATIENT
Start: 2024-12-05

## 2024-12-04 RX ORDER — OXYCODONE HYDROCHLORIDE 5 MG/1
10 TABLET ORAL EVERY 4 HOURS PRN
Status: CANCELLED | OUTPATIENT
Start: 2024-12-05

## 2024-12-04 RX ORDER — POTASSIUM CHLORIDE 1500 MG/1
40 TABLET, EXTENDED RELEASE ORAL PRN
Status: CANCELLED | OUTPATIENT
Start: 2024-12-05

## 2024-12-04 RX ORDER — POTASSIUM CHLORIDE 29.8 MG/ML
80 INJECTION INTRAVENOUS PRN
Status: CANCELLED | OUTPATIENT
Start: 2024-12-05 | End: 2025-03-15

## 2024-12-04 RX ORDER — ONDANSETRON 2 MG/ML
8 INJECTION INTRAMUSCULAR; INTRAVENOUS EVERY 8 HOURS PRN
Status: CANCELLED | OUTPATIENT
Start: 2024-12-05

## 2024-12-04 RX ORDER — PROCHLORPERAZINE MALEATE 10 MG
10 TABLET ORAL EVERY 6 HOURS PRN
Status: CANCELLED | OUTPATIENT
Start: 2024-12-05

## 2024-12-04 NOTE — PROGRESS NOTES
[facility] Progress Note    2024     Kiera Irvin    MRN: [id number]    : 1955    Referring MD: Eileen Palomino, APRN - NP  [address] [place] [address]      SUBJECTIVE:  Patient is doing welI. Denies fevers, no confusion. Eating and drinking well.     ECOG PS:  (1) Restricted in physically strenuous activity, ambulatory and able to do work of light nature    KPS: 90% Able to carry on normal activity; minor signs or symptoms of disease    Isolation: None    Medications    Scheduled Meds:      Continuous Infusions:      PRN Meds:.    ROS:  As noted above, otherwise remainder of 10-point ROS negative    Physical Exam:     I&O:  No intake or output data in the 24 hours ending 24 08    Vital Signs:  There were no vitals taken for this visit.    Weight:    Wt Readings from Last 3 Encounters:   24 103.6 kg (228 lb 6.3 oz)   24 102.3 kg (225 lb 8.5 oz)   24 104 kg (229 lb 4.5 oz)         General: Awake, alert and oriented.  HEENT: normocephalic, PERRL, no scleral erythema or icterus, Oral mucosa moist and intact, throat clear  NECK: supple  BACK: Straight   SKIN: warm dry and intact without lesions rashes or masses  CHEST: CTA bilaterally without use of accessory muscles  CV: Normal S1 S2, RRR, no MRG  ABD: NT ND normoactive BS  EXTREMITIES: without edema, denies calf tenderness  NEURO: CN II - XII grossly intact  CATHETER: PAC     Data    CBC:   Recent Labs     24  0840 24  0915   WBC 2.5* 1.1*   HGB 11.8* 11.2*   HCT 36.2 33.9*   MCV 95.1 94.3   * 115*     BMP/Mag:  Recent Labs     24  0840 24  0915    140   K 3.6 3.9    106   CO2 24 23   PHOS 2.8 2.4*   BUN 24* 26*   CREATININE 1.2 1.4*   MG 2.13  --      LIVP:   Recent Labs     24  0840   AST 32   ALT 26   BILIDIR 0.3   BILITOT 0.7   ALKPHOS 140*     Coags:   Recent Labs     24  0840   PROTIME 16.7*   INR 1.33*     Uric Acid No results for input(s):

## 2024-12-04 NOTE — PROGRESS NOTES
Short Stay Communication Note  Kiera Irvin  Diagnosis: B Cell Lymphoma   Primary MD: Dr Valentina Brewer  Treatment: Fludarabine/Cytoxan  CAR-T Administration Date: 12/2/2024  Day +2 of Car-T Ketty   Pt seen in outpatient infusion today. Labs drawn and reviewed.   CBC:   Recent Labs     12/02/24  0840 12/03/24  0915 12/04/24  0845   WBC 2.5* 1.1* 0.8*   HGB 11.8* 11.2* 11.2*   HCT 36.2 33.9* 34.0*   MCV 95.1 94.3 93.4   * 115* 119*     BMP/Mag:  Recent Labs     12/02/24  0840 12/03/24  0915 12/04/24  0845    140 141   K 3.6 3.9 3.8    106 107   CO2 24 23 22   PHOS 2.8 2.4* 2.6   BUN 24* 26* 20   CREATININE 1.2 1.4* 1.2   MG 2.13  --  1.96     Standing parameters for replacement for this patient:   1 unit of pack red blood cells for a hemoglobin < or equal to 7  1 pack of platelets for a platelet count less than or equal to 10  40 MeQ of Potassium administered for a potassium level less than or equal to 3.4  4g of Magnesium Sulfate for a magnesum level less than or equal to 1.4  No transfusions required for the above lab values.    Urinalysis last done: 12/2/2024 Urinalysis next due: 12/9/2024    Chest X-Ray last done: 12/2/2024 Chest X-Ray next due: 12/9/2024    Symptoms addressed and reported to care team this date: N/A  Treatments this date: Labs collected    Reviewed medication schedule with pt and caregiver. Both able to verbalize all medications and schedule. Pt to be seen again tomorrow. Patient and caregiver verbalized understanding of discharge instructions including when and how to call the doctor and when to report  to the ER. [employer]Tra[employer] education and placement completed by Bernadette SPEAR RN. Discharged ambulatory to home with spouse.

## 2024-12-05 ENCOUNTER — HOSPITAL ENCOUNTER (OUTPATIENT)
Dept: ONCOLOGY | Age: 69
Setting detail: INFUSION SERIES
Discharge: HOME | End: 2024-12-05
Payer: MEDICARE

## 2024-12-05 VITALS
SYSTOLIC BLOOD PRESSURE: 136 MMHG | OXYGEN SATURATION: 98 % | TEMPERATURE: 97.6 F | HEART RATE: 63 BPM | DIASTOLIC BLOOD PRESSURE: 70 MMHG | WEIGHT: 228.84 LBS | RESPIRATION RATE: 18 BRPM | BODY MASS INDEX: 40.22 KG/M2

## 2024-12-05 DIAGNOSIS — C83.00 SMALL B-CELL LYMPHOMA, UNSPECIFIED BODY REGION (HCC): Primary | ICD-10-CM

## 2024-12-05 LAB
ANION GAP SERPL CALCULATED.3IONS-SCNC: 9 MMOL/L (ref 3–16)
BASOPHILS # BLD: 0 K/UL (ref 0–0.2)
BASOPHILS NFR BLD: 0.2 %
BUN SERPL-MCNC: 19 MG/DL (ref 7–20)
CALCIUM SERPL-MCNC: 8.8 MG/DL (ref 8.3–10.6)
CHLORIDE SERPL-SCNC: 108 MMOL/L (ref 99–110)
CO2 SERPL-SCNC: 24 MMOL/L (ref 21–32)
CREAT SERPL-MCNC: 1.2 MG/DL (ref 0.6–1.2)
CRP SERPL-MCNC: 11.7 MG/L (ref 0–5.1)
DEPRECATED RDW RBC AUTO: 15 % (ref 12.4–15.4)
EOSINOPHIL # BLD: 0.1 K/UL (ref 0–0.6)
EOSINOPHIL NFR BLD: 6.6 %
FERRITIN SERPL IA-MCNC: 439 NG/ML (ref 15–150)
GFR SERPLBLD CREATININE-BSD FMLA CKD-EPI: 50 ML/MIN/{1.73_M2}
GLUCOSE SERPL-MCNC: 245 MG/DL (ref 70–99)
HCT VFR BLD AUTO: 34.1 % (ref 36–48)
HGB BLD-MCNC: 11.4 G/DL (ref 12–16)
INR PPP: 1.21 (ref 0.85–1.15)
LYMPHOCYTES # BLD: 0 K/UL (ref 1–5.1)
LYMPHOCYTES NFR BLD: 1.2 %
MCH RBC QN AUTO: 31.2 PG (ref 26–34)
MCHC RBC AUTO-ENTMCNC: 33.5 G/DL (ref 31–36)
MCV RBC AUTO: 93.2 FL (ref 80–100)
MONOCYTES # BLD: 0 K/UL (ref 0–1.3)
MONOCYTES NFR BLD: 5.5 %
NEUTROPHILS # BLD: 0.8 K/UL (ref 1.7–7.7)
NEUTROPHILS NFR BLD: 86.5 %
PHOSPHATE SERPL-MCNC: 1.9 MG/DL (ref 2.5–4.9)
PLATELET # BLD AUTO: 129 K/UL (ref 135–450)
PMV BLD AUTO: 8.9 FL (ref 5–10.5)
POTASSIUM SERPL-SCNC: 4 MMOL/L (ref 3.5–5.1)
PROTHROMBIN TIME: 15.5 SEC (ref 11.9–14.9)
RBC # BLD AUTO: 3.66 M/UL (ref 4–5.2)
SODIUM SERPL-SCNC: 141 MMOL/L (ref 136–145)
WBC # BLD AUTO: 0.9 K/UL (ref 4–11)

## 2024-12-05 PROCEDURE — 36591 DRAW BLOOD OFF VENOUS DEVICE: CPT

## 2024-12-05 PROCEDURE — 2500000003 HC RX 250 WO HCPCS: Performed by: NURSE PRACTITIONER

## 2024-12-05 PROCEDURE — 85025 COMPLETE CBC W/AUTO DIFF WBC: CPT

## 2024-12-05 PROCEDURE — 96365 THER/PROPH/DIAG IV INF INIT: CPT

## 2024-12-05 PROCEDURE — 85610 PROTHROMBIN TIME: CPT

## 2024-12-05 PROCEDURE — 2580000003 HC RX 258: Performed by: NURSE PRACTITIONER

## 2024-12-05 PROCEDURE — 80048 BASIC METABOLIC PNL TOTAL CA: CPT

## 2024-12-05 PROCEDURE — 96367 TX/PROPH/DG ADDL SEQ IV INF: CPT

## 2024-12-05 PROCEDURE — 84100 ASSAY OF PHOSPHORUS: CPT

## 2024-12-05 PROCEDURE — 86140 C-REACTIVE PROTEIN: CPT

## 2024-12-05 PROCEDURE — 82728 ASSAY OF FERRITIN: CPT

## 2024-12-05 RX ORDER — OXYCODONE HYDROCHLORIDE 5 MG/1
10 TABLET ORAL EVERY 4 HOURS PRN
Status: CANCELLED | OUTPATIENT
Start: 2024-12-06

## 2024-12-05 RX ORDER — ONDANSETRON 2 MG/ML
8 INJECTION INTRAMUSCULAR; INTRAVENOUS EVERY 8 HOURS PRN
Status: CANCELLED | OUTPATIENT
Start: 2024-12-06

## 2024-12-05 RX ORDER — POTASSIUM CHLORIDE 1500 MG/1
40 TABLET, EXTENDED RELEASE ORAL PRN
Status: CANCELLED | OUTPATIENT
Start: 2024-12-06

## 2024-12-05 RX ORDER — ONDANSETRON 4 MG/1
8 TABLET, FILM COATED ORAL EVERY 8 HOURS PRN
Status: CANCELLED | OUTPATIENT
Start: 2024-12-06

## 2024-12-05 RX ORDER — OXYCODONE HYDROCHLORIDE 5 MG/1
5 TABLET ORAL EVERY 4 HOURS PRN
Status: CANCELLED | OUTPATIENT
Start: 2024-12-06

## 2024-12-05 RX ORDER — POTASSIUM CHLORIDE 29.8 MG/ML
80 INJECTION INTRAVENOUS PRN
Status: CANCELLED | OUTPATIENT
Start: 2024-12-06 | End: 2025-03-16

## 2024-12-05 RX ORDER — SODIUM CHLORIDE 9 MG/ML
INJECTION, SOLUTION INTRAVENOUS CONTINUOUS PRN
Status: CANCELLED | OUTPATIENT
Start: 2024-12-06

## 2024-12-05 RX ORDER — MAGNESIUM SULFATE IN WATER 40 MG/ML
4000 INJECTION, SOLUTION INTRAVENOUS PRN
Status: CANCELLED | OUTPATIENT
Start: 2024-12-06

## 2024-12-05 RX ORDER — PROCHLORPERAZINE EDISYLATE 5 MG/ML
10 INJECTION INTRAMUSCULAR; INTRAVENOUS EVERY 6 HOURS PRN
Status: CANCELLED | OUTPATIENT
Start: 2024-12-06

## 2024-12-05 RX ORDER — PROCHLORPERAZINE MALEATE 10 MG
10 TABLET ORAL EVERY 6 HOURS PRN
Status: CANCELLED | OUTPATIENT
Start: 2024-12-06

## 2024-12-05 RX ORDER — HEPARIN 100 UNIT/ML
500 SYRINGE INTRAVENOUS PRN
Status: CANCELLED | OUTPATIENT
Start: 2024-12-06

## 2024-12-05 RX ORDER — 0.9 % SODIUM CHLORIDE 0.9 %
1000 INTRAVENOUS SOLUTION INTRAVENOUS ONCE
Status: CANCELLED | OUTPATIENT
Start: 2024-12-06 | End: 2024-12-06

## 2024-12-05 RX ADMIN — POTASSIUM PHOSPHATE, MONOBASIC POTASSIUM PHOSPHATE, DIBASIC 30 MMOL: 224; 236 INJECTION, SOLUTION, CONCENTRATE INTRAVENOUS at 11:06

## 2024-12-05 NOTE — PROGRESS NOTES
[facility] Progress Note    2024     Kiera Irvin    MRN: [id number]    : 1955    Referring MD: Eileen Palomino, APRN - NP  [address] [place] [address]      SUBJECTIVE: Patient is doing welI. Denies fevers, no confusion. Eating and drinking well.     ECOG PS:  (1) Restricted in physically strenuous activity, ambulatory and able to do work of light nature    KPS: 90% Able to carry on normal activity; minor signs or symptoms of disease    Isolation: None    Medications    Scheduled Meds:      Continuous Infusions:      PRN Meds:.    ROS:  As noted above, otherwise remainder of 10-point ROS negative    Physical Exam:     I&O:  No intake or output data in the 24 hours ending 24 0829    Vital Signs:  There were no vitals taken for this visit.    Weight:    Wt Readings from Last 3 Encounters:   24 104.2 kg (229 lb 11.5 oz)   24 103.6 kg (228 lb 6.3 oz)   24 102.3 kg (225 lb 8.5 oz)         General: Awake, alert and oriented.  HEENT: normocephalic, PERRL, no scleral erythema or icterus, Oral mucosa moist and intact, throat clear  NECK: supple  BACK: Straight   SKIN: warm dry and intact without lesions rashes or masses  CHEST: CTA bilaterally without use of accessory muscles  CV: Normal S1 S2, RRR, no MRG  ABD: NT ND normoactive BS  EXTREMITIES: without edema, denies calf tenderness  NEURO: CN II - XII grossly intact  CATHETER: PAC     Data    CBC:   Recent Labs     24  0840 24  0915 24  0845   WBC 2.5* 1.1* 0.8*   HGB 11.8* 11.2* 11.2*   HCT 36.2 33.9* 34.0*   MCV 95.1 94.3 93.4   * 115* 119*     BMP/Mag:  Recent Labs     24  0840 24  0915 24  0845    140 141   K 3.6 3.9 3.8    106 107   CO2 24 23 22   PHOS 2.8 2.4* 2.6   BUN 24* 26* 20   CREATININE 1.2 1.4* 1.2   MG 2.13  --  1.96     LIVP:   Recent Labs     24  0840 24  0845   AST 32 32   ALT 26 27   BILIDIR 0.3 0.3   BILITOT 0.7 0.6   ALKPHOS 140*

## 2024-12-05 NOTE — PROGRESS NOTES
Short Stay Communication Note  Kiera Irvin  Diagnosis: B Cell Lymphoma   Primary MD: Dr Valentina Brewer  Treatment: Fludarabine/Cytoxan  CAR-T Administration Date: 12/2/2024  Day +3 of Car-T Ketty   Pt seen in outpatient infusion today. Labs drawn and reviewed.   CBC:   Recent Labs     12/03/24  0915 12/04/24  0845 12/05/24  0852   WBC 1.1* 0.8* 0.9*   HGB 11.2* 11.2* 11.4*   HCT 33.9* 34.0* 34.1*   MCV 94.3 93.4 93.2   * 119* 129*     BMP/Mag:  Recent Labs     12/03/24  0915 12/04/24  0845 12/05/24  0852    141 141   K 3.9 3.8 4.0    107 108   CO2 23 22 24   PHOS 2.4* 2.6 1.9*   BUN 26* 20 19   CREATININE 1.4* 1.2 1.2   MG  --  1.96  --      Standing parameters for replacement for this patient:   1 unit of pack red blood cells for a hemoglobin < or equal to 7  1 pack of platelets for a platelet count less than or equal to 10  40 MeQ of Potassium administered for a potassium level less than or equal to 3.4  4g of Magnesium Sulfate for a magnesum level less than or equal to 1.4  30 mmol potassium phosphate IV administered over 4 hours for above lab values as ordered per NP    Urinalysis last done: 12/2/2024 Urinalysis next due: 12/9/2024    Chest X-Ray last done: 12/2/2024 Chest X-Ray next due: 12/9/2024    Symptoms addressed and reported to care team this date: N/A  Treatments this date: Labs collected, Kphos administered as above    Reviewed medication schedule with pt and caregiver. Both able to verbalize all medications and schedule. Pt to be seen again tomorrow. Patient and caregiver verbalized understanding of discharge instructions including when and how to call the doctor and when to report  to the ER. [employer] education and placement completed by Benradette SPEAR RN. Discharged ambulatory to home with daughter.    Fabi Rivas RN

## 2024-12-06 ENCOUNTER — HOSPITAL ENCOUNTER (OUTPATIENT)
Dept: ONCOLOGY | Age: 69
Setting detail: INFUSION SERIES
Discharge: HOME | End: 2024-12-06
Payer: MEDICARE

## 2024-12-06 VITALS
TEMPERATURE: 97.6 F | RESPIRATION RATE: 18 BRPM | DIASTOLIC BLOOD PRESSURE: 82 MMHG | BODY MASS INDEX: 40.18 KG/M2 | HEART RATE: 77 BPM | SYSTOLIC BLOOD PRESSURE: 131 MMHG | OXYGEN SATURATION: 97 % | WEIGHT: 228.62 LBS

## 2024-12-06 DIAGNOSIS — C83.00 SMALL B-CELL LYMPHOMA, UNSPECIFIED BODY REGION (HCC): Primary | ICD-10-CM

## 2024-12-06 LAB
ALBUMIN SERPL-MCNC: 3.9 G/DL (ref 3.4–5)
ALP SERPL-CCNC: 139 U/L (ref 40–129)
ALT SERPL-CCNC: 34 U/L (ref 10–40)
ANION GAP SERPL CALCULATED.3IONS-SCNC: 13 MMOL/L (ref 3–16)
ANISOCYTOSIS BLD QL SMEAR: ABNORMAL
AST SERPL-CCNC: 38 U/L (ref 15–37)
BASOPHILS # BLD: 0 K/UL (ref 0–0.2)
BASOPHILS NFR BLD: 0 %
BILIRUB DIRECT SERPL-MCNC: 0.2 MG/DL (ref 0–0.3)
BILIRUB INDIRECT SERPL-MCNC: 0.3 MG/DL (ref 0–1)
BILIRUB SERPL-MCNC: 0.5 MG/DL (ref 0–1)
BUN SERPL-MCNC: 19 MG/DL (ref 7–20)
CALCIUM SERPL-MCNC: 9.1 MG/DL (ref 8.3–10.6)
CHLORIDE SERPL-SCNC: 106 MMOL/L (ref 99–110)
CO2 SERPL-SCNC: 23 MMOL/L (ref 21–32)
CREAT SERPL-MCNC: 1.2 MG/DL (ref 0.6–1.2)
CRP SERPL-MCNC: 10.8 MG/L (ref 0–5.1)
DACRYOCYTES BLD QL SMEAR: ABNORMAL
DEPRECATED RDW RBC AUTO: 15.1 % (ref 12.4–15.4)
EOSINOPHIL # BLD: 0.1 K/UL (ref 0–0.6)
EOSINOPHIL NFR BLD: 8 %
FERRITIN SERPL IA-MCNC: 426 NG/ML (ref 15–150)
GFR SERPLBLD CREATININE-BSD FMLA CKD-EPI: 50 ML/MIN/{1.73_M2}
GLUCOSE SERPL-MCNC: 305 MG/DL (ref 70–99)
HCT VFR BLD AUTO: 33.5 % (ref 36–48)
HGB BLD-MCNC: 11.1 G/DL (ref 12–16)
LDH SERPL L TO P-CCNC: 187 U/L (ref 100–190)
LYMPHOCYTES # BLD: 0 K/UL (ref 1–5.1)
LYMPHOCYTES NFR BLD: 5 %
MAGNESIUM SERPL-MCNC: 1.91 MG/DL (ref 1.8–2.4)
MCH RBC QN AUTO: 31.1 PG (ref 26–34)
MCHC RBC AUTO-ENTMCNC: 33.2 G/DL (ref 31–36)
MCV RBC AUTO: 93.8 FL (ref 80–100)
MICROCYTES BLD QL SMEAR: ABNORMAL
MONOCYTES # BLD: 0 K/UL (ref 0–1.3)
MONOCYTES NFR BLD: 4 %
NEUTROPHILS # BLD: 0.6 K/UL (ref 1.7–7.7)
NEUTROPHILS NFR BLD: 83 %
OVALOCYTES BLD QL SMEAR: ABNORMAL
PHOSPHATE SERPL-MCNC: 2.9 MG/DL (ref 2.5–4.9)
PLATELET # BLD AUTO: 131 K/UL (ref 135–450)
PMV BLD AUTO: 9.2 FL (ref 5–10.5)
POTASSIUM SERPL-SCNC: 3.6 MMOL/L (ref 3.5–5.1)
PROT SERPL-MCNC: 6.2 G/DL (ref 6.4–8.2)
RBC # BLD AUTO: 3.57 M/UL (ref 4–5.2)
SODIUM SERPL-SCNC: 142 MMOL/L (ref 136–145)
URATE SERPL-MCNC: 2.5 MG/DL (ref 2.6–6)
WBC # BLD AUTO: 0.7 K/UL (ref 4–11)

## 2024-12-06 PROCEDURE — 80076 HEPATIC FUNCTION PANEL: CPT

## 2024-12-06 PROCEDURE — 80048 BASIC METABOLIC PNL TOTAL CA: CPT

## 2024-12-06 PROCEDURE — 86140 C-REACTIVE PROTEIN: CPT

## 2024-12-06 PROCEDURE — 82728 ASSAY OF FERRITIN: CPT

## 2024-12-06 PROCEDURE — 83735 ASSAY OF MAGNESIUM: CPT

## 2024-12-06 PROCEDURE — 84550 ASSAY OF BLOOD/URIC ACID: CPT

## 2024-12-06 PROCEDURE — 84100 ASSAY OF PHOSPHORUS: CPT

## 2024-12-06 PROCEDURE — 83615 LACTATE (LD) (LDH) ENZYME: CPT

## 2024-12-06 PROCEDURE — 85025 COMPLETE CBC W/AUTO DIFF WBC: CPT

## 2024-12-06 PROCEDURE — 36591 DRAW BLOOD OFF VENOUS DEVICE: CPT

## 2024-12-06 RX ORDER — POTASSIUM CHLORIDE 1500 MG/1
40 TABLET, EXTENDED RELEASE ORAL PRN
Status: DISCONTINUED | OUTPATIENT
Start: 2024-12-06 | End: 2024-12-07 | Stop reason: HOSPADM

## 2024-12-06 RX ORDER — ONDANSETRON 4 MG/1
8 TABLET, FILM COATED ORAL EVERY 8 HOURS PRN
Status: CANCELLED | OUTPATIENT
Start: 2024-12-07

## 2024-12-06 RX ORDER — OXYCODONE HYDROCHLORIDE 5 MG/1
5 TABLET ORAL EVERY 4 HOURS PRN
Status: DISCONTINUED | OUTPATIENT
Start: 2024-12-06 | End: 2024-12-07 | Stop reason: HOSPADM

## 2024-12-06 RX ORDER — HEPARIN 100 UNIT/ML
500 SYRINGE INTRAVENOUS PRN
Status: CANCELLED | OUTPATIENT
Start: 2024-12-07

## 2024-12-06 RX ORDER — PROCHLORPERAZINE EDISYLATE 5 MG/ML
10 INJECTION INTRAMUSCULAR; INTRAVENOUS EVERY 6 HOURS PRN
Status: DISCONTINUED | OUTPATIENT
Start: 2024-12-06 | End: 2024-12-07 | Stop reason: HOSPADM

## 2024-12-06 RX ORDER — MAGNESIUM SULFATE IN WATER 40 MG/ML
4000 INJECTION, SOLUTION INTRAVENOUS PRN
Status: DISCONTINUED | OUTPATIENT
Start: 2024-12-06 | End: 2024-12-07 | Stop reason: HOSPADM

## 2024-12-06 RX ORDER — ONDANSETRON 4 MG/1
8 TABLET, FILM COATED ORAL EVERY 8 HOURS PRN
Status: DISCONTINUED | OUTPATIENT
Start: 2024-12-06 | End: 2024-12-07 | Stop reason: HOSPADM

## 2024-12-06 RX ORDER — MAGNESIUM SULFATE IN WATER 40 MG/ML
4000 INJECTION, SOLUTION INTRAVENOUS PRN
Status: CANCELLED | OUTPATIENT
Start: 2024-12-07

## 2024-12-06 RX ORDER — 0.9 % SODIUM CHLORIDE 0.9 %
1000 INTRAVENOUS SOLUTION INTRAVENOUS ONCE
Status: CANCELLED | OUTPATIENT
Start: 2024-12-07 | End: 2024-12-07

## 2024-12-06 RX ORDER — ONDANSETRON 2 MG/ML
8 INJECTION INTRAMUSCULAR; INTRAVENOUS EVERY 8 HOURS PRN
Status: DISCONTINUED | OUTPATIENT
Start: 2024-12-06 | End: 2024-12-07 | Stop reason: HOSPADM

## 2024-12-06 RX ORDER — 0.9 % SODIUM CHLORIDE 0.9 %
1000 INTRAVENOUS SOLUTION INTRAVENOUS ONCE
Status: DISCONTINUED | OUTPATIENT
Start: 2024-12-06 | End: 2024-12-07 | Stop reason: HOSPADM

## 2024-12-06 RX ORDER — POTASSIUM CHLORIDE 1500 MG/1
40 TABLET, EXTENDED RELEASE ORAL PRN
Status: CANCELLED | OUTPATIENT
Start: 2024-12-07

## 2024-12-06 RX ORDER — POTASSIUM CHLORIDE 29.8 MG/ML
80 INJECTION INTRAVENOUS PRN
Status: CANCELLED | OUTPATIENT
Start: 2024-12-07 | End: 2025-03-17

## 2024-12-06 RX ORDER — OXYCODONE HYDROCHLORIDE 5 MG/1
5 TABLET ORAL EVERY 4 HOURS PRN
Status: CANCELLED | OUTPATIENT
Start: 2024-12-07

## 2024-12-06 RX ORDER — ONDANSETRON 2 MG/ML
8 INJECTION INTRAMUSCULAR; INTRAVENOUS EVERY 8 HOURS PRN
Status: CANCELLED | OUTPATIENT
Start: 2024-12-07

## 2024-12-06 RX ORDER — SODIUM CHLORIDE 9 MG/ML
INJECTION, SOLUTION INTRAVENOUS CONTINUOUS PRN
Status: CANCELLED | OUTPATIENT
Start: 2024-12-07

## 2024-12-06 RX ORDER — PROCHLORPERAZINE EDISYLATE 5 MG/ML
10 INJECTION INTRAMUSCULAR; INTRAVENOUS EVERY 6 HOURS PRN
Status: CANCELLED | OUTPATIENT
Start: 2024-12-07

## 2024-12-06 RX ORDER — OXYCODONE HYDROCHLORIDE 5 MG/1
10 TABLET ORAL EVERY 4 HOURS PRN
Status: CANCELLED | OUTPATIENT
Start: 2024-12-07

## 2024-12-06 RX ORDER — POTASSIUM CHLORIDE 29.8 MG/ML
80 INJECTION INTRAVENOUS PRN
Status: DISCONTINUED | OUTPATIENT
Start: 2024-12-06 | End: 2024-12-07 | Stop reason: HOSPADM

## 2024-12-06 RX ORDER — PROCHLORPERAZINE MALEATE 10 MG
10 TABLET ORAL EVERY 6 HOURS PRN
Status: DISCONTINUED | OUTPATIENT
Start: 2024-12-06 | End: 2024-12-07 | Stop reason: HOSPADM

## 2024-12-06 RX ORDER — OXYCODONE HYDROCHLORIDE 5 MG/1
10 TABLET ORAL EVERY 4 HOURS PRN
Status: DISCONTINUED | OUTPATIENT
Start: 2024-12-06 | End: 2024-12-07 | Stop reason: HOSPADM

## 2024-12-06 RX ORDER — PROCHLORPERAZINE MALEATE 10 MG
10 TABLET ORAL EVERY 6 HOURS PRN
Status: CANCELLED | OUTPATIENT
Start: 2024-12-07

## 2024-12-06 RX ORDER — SODIUM CHLORIDE 9 MG/ML
INJECTION, SOLUTION INTRAVENOUS CONTINUOUS PRN
Status: DISCONTINUED | OUTPATIENT
Start: 2024-12-06 | End: 2024-12-07 | Stop reason: HOSPADM

## 2024-12-06 NOTE — PROGRESS NOTES
Short Stay Communication Note  Kiera Irvin  Diagnosis: B Cell Lymphoma   Primary MD: Dr Valentina Brewer  Treatment: Fludarabine/Cytoxan  CAR-T Administration Date: 12/2/2024  Day +4 of Car-T Ketty   Pt seen in outpatient infusion today. Labs drawn and reviewed.   CBC:   Recent Labs     12/04/24  0845 12/05/24  0852 12/06/24  0910   WBC 0.8* 0.9* 0.7*   HGB 11.2* 11.4* 11.1*   HCT 34.0* 34.1* 33.5*   MCV 93.4 93.2 93.8   * 129* 131*     BMP/Mag:  Recent Labs     12/04/24  0845 12/05/24  0852 12/06/24  0910    141 142   K 3.8 4.0 3.6    108 106   CO2 22 24 23   PHOS 2.6 1.9* 2.9   BUN 20 19 19   CREATININE 1.2 1.2 1.2   MG 1.96  --  1.91     Standing parameters for replacement for this patient:   1 unit of pack red blood cells for a hemoglobin < or equal to 7  1 pack of platelets for a platelet count less than or equal to 10  40 MeQ of Potassium administered for a potassium level less than or equal to 3.4  4g of Magnesium Sulfate for a magnesum level less than or equal to 1.4  None this visit    Urinalysis last done: 12/2/2024 Urinalysis next due: 12/9/2024    Chest X-Ray last done: 12/2/2024 Chest X-Ray next due: 12/9/2024    Symptoms addressed and reported to care team this date: N/A  Treatments this date: Labs    Reviewed medication schedule with pt and caregiver. Both able to verbalize all medications and schedule. Pt to be seen again tomorrow. Patient and caregiver verbalized understanding of discharge instructions including when and how to call the doctor and when to report  to the ER. Discharged ambulatory to home with .    Fabi Rivas, RN

## 2024-12-07 ENCOUNTER — HOSPITAL ENCOUNTER (OUTPATIENT)
Dept: ONCOLOGY | Age: 69
Setting detail: INFUSION SERIES
Discharge: HOME | End: 2024-12-07
Payer: MEDICARE

## 2024-12-07 VITALS
HEART RATE: 72 BPM | TEMPERATURE: 98.4 F | DIASTOLIC BLOOD PRESSURE: 81 MMHG | OXYGEN SATURATION: 98 % | BODY MASS INDEX: 40.1 KG/M2 | SYSTOLIC BLOOD PRESSURE: 128 MMHG | RESPIRATION RATE: 18 BRPM | WEIGHT: 228.18 LBS

## 2024-12-07 DIAGNOSIS — C83.00 SMALL B-CELL LYMPHOMA, UNSPECIFIED BODY REGION (HCC): Primary | ICD-10-CM

## 2024-12-07 LAB
ANION GAP SERPL CALCULATED.3IONS-SCNC: 13 MMOL/L (ref 3–16)
BASOPHILS # BLD: 0 K/UL (ref 0–0.2)
BASOPHILS NFR BLD: 0.3 %
BUN SERPL-MCNC: 16 MG/DL (ref 7–20)
CALCIUM SERPL-MCNC: 9 MG/DL (ref 8.3–10.6)
CHLORIDE SERPL-SCNC: 106 MMOL/L (ref 99–110)
CO2 SERPL-SCNC: 23 MMOL/L (ref 21–32)
CREAT SERPL-MCNC: 1.2 MG/DL (ref 0.6–1.2)
CRP SERPL-MCNC: 10.7 MG/L (ref 0–5.1)
DEPRECATED RDW RBC AUTO: 15.1 % (ref 12.4–15.4)
EOSINOPHIL # BLD: 0.1 K/UL (ref 0–0.6)
EOSINOPHIL NFR BLD: 6.9 %
FERRITIN SERPL IA-MCNC: 403 NG/ML (ref 15–150)
GFR SERPLBLD CREATININE-BSD FMLA CKD-EPI: 50 ML/MIN/{1.73_M2}
GLUCOSE SERPL-MCNC: 236 MG/DL (ref 70–99)
HCT VFR BLD AUTO: 33.3 % (ref 36–48)
HGB BLD-MCNC: 11.1 G/DL (ref 12–16)
LYMPHOCYTES # BLD: 0 K/UL (ref 1–5.1)
LYMPHOCYTES NFR BLD: 3 %
MCH RBC QN AUTO: 31.3 PG (ref 26–34)
MCHC RBC AUTO-ENTMCNC: 33.3 G/DL (ref 31–36)
MCV RBC AUTO: 94 FL (ref 80–100)
MONOCYTES # BLD: 0.1 K/UL (ref 0–1.3)
MONOCYTES NFR BLD: 13.6 %
NEUTROPHILS # BLD: 0.6 K/UL (ref 1.7–7.7)
NEUTROPHILS NFR BLD: 76.2 %
PHOSPHATE SERPL-MCNC: 2.6 MG/DL (ref 2.5–4.9)
PLATELET # BLD AUTO: 145 K/UL (ref 135–450)
PMV BLD AUTO: 9 FL (ref 5–10.5)
POTASSIUM SERPL-SCNC: 3.8 MMOL/L (ref 3.5–5.1)
RBC # BLD AUTO: 3.55 M/UL (ref 4–5.2)
SODIUM SERPL-SCNC: 142 MMOL/L (ref 136–145)
WBC # BLD AUTO: 0.8 K/UL (ref 4–11)

## 2024-12-07 PROCEDURE — 84100 ASSAY OF PHOSPHORUS: CPT

## 2024-12-07 PROCEDURE — 80048 BASIC METABOLIC PNL TOTAL CA: CPT

## 2024-12-07 PROCEDURE — 36591 DRAW BLOOD OFF VENOUS DEVICE: CPT

## 2024-12-07 PROCEDURE — 82728 ASSAY OF FERRITIN: CPT

## 2024-12-07 PROCEDURE — 86140 C-REACTIVE PROTEIN: CPT

## 2024-12-07 PROCEDURE — 85025 COMPLETE CBC W/AUTO DIFF WBC: CPT

## 2024-12-07 RX ORDER — SODIUM CHLORIDE 9 MG/ML
INJECTION, SOLUTION INTRAVENOUS CONTINUOUS PRN
OUTPATIENT
Start: 2024-12-08

## 2024-12-07 RX ORDER — OXYCODONE HYDROCHLORIDE 5 MG/1
5 TABLET ORAL EVERY 4 HOURS PRN
OUTPATIENT
Start: 2024-12-08

## 2024-12-07 RX ORDER — PROCHLORPERAZINE MALEATE 10 MG
10 TABLET ORAL EVERY 6 HOURS PRN
OUTPATIENT
Start: 2024-12-08

## 2024-12-07 RX ORDER — PROCHLORPERAZINE EDISYLATE 5 MG/ML
10 INJECTION INTRAMUSCULAR; INTRAVENOUS EVERY 6 HOURS PRN
OUTPATIENT
Start: 2024-12-08

## 2024-12-07 RX ORDER — HEPARIN 100 UNIT/ML
500 SYRINGE INTRAVENOUS PRN
OUTPATIENT
Start: 2024-12-08

## 2024-12-07 RX ORDER — ONDANSETRON 4 MG/1
8 TABLET, FILM COATED ORAL EVERY 8 HOURS PRN
OUTPATIENT
Start: 2024-12-08

## 2024-12-07 RX ORDER — POTASSIUM CHLORIDE 1500 MG/1
40 TABLET, EXTENDED RELEASE ORAL PRN
OUTPATIENT
Start: 2024-12-08

## 2024-12-07 RX ORDER — ONDANSETRON 2 MG/ML
8 INJECTION INTRAMUSCULAR; INTRAVENOUS EVERY 8 HOURS PRN
OUTPATIENT
Start: 2024-12-08

## 2024-12-07 RX ORDER — MAGNESIUM SULFATE IN WATER 40 MG/ML
4000 INJECTION, SOLUTION INTRAVENOUS PRN
OUTPATIENT
Start: 2024-12-08

## 2024-12-07 RX ORDER — POTASSIUM CHLORIDE 29.8 MG/ML
80 INJECTION INTRAVENOUS PRN
OUTPATIENT
Start: 2024-12-08 | End: 2025-03-18

## 2024-12-07 RX ORDER — OXYCODONE HYDROCHLORIDE 5 MG/1
10 TABLET ORAL EVERY 4 HOURS PRN
OUTPATIENT
Start: 2024-12-08

## 2024-12-07 RX ORDER — 0.9 % SODIUM CHLORIDE 0.9 %
1000 INTRAVENOUS SOLUTION INTRAVENOUS ONCE
OUTPATIENT
Start: 2024-12-08 | End: 2024-12-08

## 2024-12-07 NOTE — PROGRESS NOTES
[facility] Progress Note    2024     Kiera Irvin    MRN: [id number]    : 1955    Referring MD: Eileen Palomino, APRN - NP  [address] [place] [address]      SUBJECTIVE: Patient is doing welI. Denies fevers, no confusion. Eating and drinking well.     ECOG PS:  (1) Restricted in physically strenuous activity, ambulatory and able to do work of light nature    KPS: 90% Able to carry on normal activity; minor signs or symptoms of disease    Isolation: None    Medications    Scheduled Meds:      Continuous Infusions:      PRN Meds:.    ROS:  As noted above, otherwise remainder of 10-point ROS negative    Physical Exam:     I&O:  No intake or output data in the 24 hours ending 24 0700    Vital Signs:  /82   Pulse 77   Temp 97.6 °F (36.4 °C) (Oral)   Resp 18   Wt 103.7 kg (228 lb 9.9 oz)   SpO2 97%   BMI 40.18 kg/m²     Weight:    Wt Readings from Last 3 Encounters:   24 103.7 kg (228 lb 9.9 oz)   24 103.8 kg (228 lb 13.4 oz)   24 104.2 kg (229 lb 11.5 oz)         General: Awake, alert and oriented.  HEENT: normocephalic, PERRL, no scleral erythema or icterus, Oral mucosa moist and intact, throat clear  NECK: supple  BACK: Straight   SKIN: warm dry and intact without lesions rashes or masses  CHEST: CTA bilaterally without use of accessory muscles  CV: Normal S1 S2, RRR, no MRG  ABD: NT ND normoactive BS  EXTREMITIES: without edema, denies calf tenderness  NEURO: CN II - XII grossly intact  CATHETER: PAC     Data    CBC:   Recent Labs     24  0845 24  0852 24  0910   WBC 0.8* 0.9* 0.7*   HGB 11.2* 11.4* 11.1*   HCT 34.0* 34.1* 33.5*   MCV 93.4 93.2 93.8   * 129* 131*     BMP/Mag:  Recent Labs     24  0845 24  0852 12/06/24  0910    141 142   K 3.8 4.0 3.6    108 106   CO2 22 24 23   PHOS 2.6 1.9* 2.9   BUN 20 19 19   CREATININE 1.2 1.2 1.2   MG 1.96  --  1.91     LIVP:   Recent Labs     24  0845

## 2024-12-07 NOTE — PROGRESS NOTES
Short Stay Communication Note  Kiera Irvin  Diagnosis: B Cell Lymphoma   Primary MD: Dr Valentina Brewer  Treatment: Fludarabine/Cytoxan  CAR-T Administration Date: 12/2/2024  Day +5 of Car-T Ketty   Pt seen in outpatient infusion today. Labs drawn and reviewed.   CBC:   Recent Labs     12/05/24  0852 12/06/24  0910 12/07/24  0842   WBC 0.9* 0.7* 0.8*   HGB 11.4* 11.1* 11.1*   HCT 34.1* 33.5* 33.3*   MCV 93.2 93.8 94.0   * 131* 145     BMP/Mag:  Recent Labs     12/05/24  0852 12/06/24  0910 12/07/24  0842    142 142   K 4.0 3.6 3.8    106 106   CO2 24 23 23   PHOS 1.9* 2.9 2.6   BUN 19 19 16   CREATININE 1.2 1.2 1.2   MG  --  1.91  --      Standing parameters for replacement for this patient:   1 unit of pack red blood cells for a hemoglobin < or equal to 7  1 pack of platelets for a platelet count less than or equal to 10  40 MeQ of Potassium administered for a potassium level less than or equal to 3.4  4g of Magnesium Sulfate for a magnesum level less than or equal to 1.4  None this visit    Urinalysis last done: 12/2/2024 Urinalysis next due: 12/9/2024    Chest X-Ray last done: 12/2/2024 Chest X-Ray next due: 12/9/2024    Symptoms addressed and reported to care team this date: N/A  Treatments this date: Labs    Reviewed medication schedule with pt and caregiver. Both able to verbalize all medications and schedule. Pt to be seen again tomorrow. Patient and caregiver verbalized understanding of discharge instructions including when and how to call the doctor and when to report to the ER. Discharged ambulatory to home with .    Michell Ortiz RN    
Grade: N/A  - Monitor CRP and Ferrtin closely     Lab Results   Component Value Date    CRP 10.7 (H) 12/07/2024    CRP 10.8 (H) 12/06/2024    CRP 11.7 (H) 12/05/2024     Lab Results   Component Value Date    FERRITIN 403.0 (H) 12/07/2024    FERRITIN 426.0 (H) 12/06/2024    FERRITIN 439.0 (H) 12/05/2024       ICANS Grade: N/A  CAR-T Score:   N/A  - Neuro checks w/ CARTOX 10-point assessment Q4hrs  - Started Keppra on 12/2/24     3.  ID:  Afebrile, no evidence of infection.    - Cont Valtrex, Diflucan and Levaquin ppx (started 12/3/24)      Post CAR- T monitoring / maintenance:  - IgG & CD4 level monthly starting on day + 30  - Check disease titers on day + 30 or when WBC recovered. Re-vaccinate vs booster based on titer (assure titers are drawn prior to administering immunoglobulins)  - Start PCP ppx on day + 30 - stop when CD4 > 250  - Cont Valtrex for 1 year post CAR-T     4.  Heme:    Pancytopenia d/t recent chemo / CAR-T Infusion  - Transfuse for Hgb < 7 and Platelets < 10K  - No transfusion today     5. Metabolic/CKD 3A:   HypoPhos, creatinine mildly elevated  - Baseline Cr 1.1-1.2  (SrCr: 1.4 12/3/24, encouraged fluid intake at home): Back down to baseline: 1.2   - Cont Potassium 10 mEq BID supplement while on Lasix  Risk for TLS:  No evidence  - Cont allopurinol daily  - Daily TLS labs   - Replace K+ & Mg per PRN orders  - Give IV K Phos 30 mmol once 12/5/24     6. GI / Nutrition:    Nutrition:  Appetite and oral intake is adequate  - Cont low microbial diet   - Follow closely with dietician     7. Cardiac: Hx Atrial fibrillation s/p ablation, HTN  - Cont Metoprolol XL and Eliquis (Hold Eliquis when plt < 50)   - Cont Lasix 20 mg PO daily  - ECHO 9/30/24: 60-65%     8.  Endocrine:  Type 2 DM  - Cont Lantus 30 units Q HS  - Cont Glimepiride 2 mg PO BID  - Cont Metformin  mg PO BID     9.  Pulm:  H/O ZOE with CPAP  - PFTs :  FEV1 75%, DLCO 59%     - Disposition: Patient will be seen in OP Infusion for 30

## 2024-12-08 ENCOUNTER — HOSPITAL ENCOUNTER (OUTPATIENT)
Dept: ONCOLOGY | Age: 69
Setting detail: INFUSION SERIES
Discharge: HOME | End: 2024-12-08
Payer: MEDICARE

## 2024-12-08 VITALS
SYSTOLIC BLOOD PRESSURE: 127 MMHG | BODY MASS INDEX: 40.49 KG/M2 | HEART RATE: 82 BPM | WEIGHT: 230.38 LBS | TEMPERATURE: 98 F | OXYGEN SATURATION: 97 % | DIASTOLIC BLOOD PRESSURE: 84 MMHG | RESPIRATION RATE: 16 BRPM

## 2024-12-08 DIAGNOSIS — C83.00 SMALL B-CELL LYMPHOMA, UNSPECIFIED BODY REGION (HCC): Primary | ICD-10-CM

## 2024-12-08 LAB
ANION GAP SERPL CALCULATED.3IONS-SCNC: 12 MMOL/L (ref 3–16)
BASOPHILS # BLD: 0 K/UL (ref 0–0.2)
BASOPHILS NFR BLD: 0.2 %
BUN SERPL-MCNC: 18 MG/DL (ref 7–20)
CALCIUM SERPL-MCNC: 9.3 MG/DL (ref 8.3–10.6)
CHLORIDE SERPL-SCNC: 105 MMOL/L (ref 99–110)
CO2 SERPL-SCNC: 25 MMOL/L (ref 21–32)
CREAT SERPL-MCNC: 1.3 MG/DL (ref 0.6–1.2)
CRP SERPL-MCNC: 11.7 MG/L (ref 0–5.1)
DEPRECATED RDW RBC AUTO: 15.6 % (ref 12.4–15.4)
EOSINOPHIL # BLD: 0 K/UL (ref 0–0.6)
EOSINOPHIL NFR BLD: 4 %
FERRITIN SERPL IA-MCNC: 368 NG/ML (ref 15–150)
GFR SERPLBLD CREATININE-BSD FMLA CKD-EPI: 45 ML/MIN/{1.73_M2}
GLUCOSE SERPL-MCNC: 225 MG/DL (ref 70–99)
HCT VFR BLD AUTO: 34.3 % (ref 36–48)
HGB BLD-MCNC: 11.5 G/DL (ref 12–16)
LYMPHOCYTES # BLD: 0 K/UL (ref 1–5.1)
LYMPHOCYTES NFR BLD: 3.5 %
MCH RBC QN AUTO: 31.7 PG (ref 26–34)
MCHC RBC AUTO-ENTMCNC: 33.6 G/DL (ref 31–36)
MCV RBC AUTO: 94.4 FL (ref 80–100)
MONOCYTES # BLD: 0.1 K/UL (ref 0–1.3)
MONOCYTES NFR BLD: 13.8 %
NEUTROPHILS # BLD: 0.8 K/UL (ref 1.7–7.7)
NEUTROPHILS NFR BLD: 78.5 %
PHOSPHATE SERPL-MCNC: 2.5 MG/DL (ref 2.5–4.9)
PLATELET # BLD AUTO: 150 K/UL (ref 135–450)
PMV BLD AUTO: 9.1 FL (ref 5–10.5)
POTASSIUM SERPL-SCNC: 4 MMOL/L (ref 3.5–5.1)
RBC # BLD AUTO: 3.63 M/UL (ref 4–5.2)
SODIUM SERPL-SCNC: 142 MMOL/L (ref 136–145)
WBC # BLD AUTO: 1 K/UL (ref 4–11)

## 2024-12-08 PROCEDURE — 84100 ASSAY OF PHOSPHORUS: CPT

## 2024-12-08 PROCEDURE — 86140 C-REACTIVE PROTEIN: CPT

## 2024-12-08 PROCEDURE — 85025 COMPLETE CBC W/AUTO DIFF WBC: CPT

## 2024-12-08 PROCEDURE — 36591 DRAW BLOOD OFF VENOUS DEVICE: CPT

## 2024-12-08 PROCEDURE — 82728 ASSAY OF FERRITIN: CPT

## 2024-12-08 PROCEDURE — 80048 BASIC METABOLIC PNL TOTAL CA: CPT

## 2024-12-08 RX ORDER — PROCHLORPERAZINE MALEATE 10 MG
10 TABLET ORAL EVERY 6 HOURS PRN
Status: CANCELLED | OUTPATIENT
Start: 2024-12-09

## 2024-12-08 RX ORDER — ONDANSETRON 4 MG/1
8 TABLET, FILM COATED ORAL EVERY 8 HOURS PRN
Status: CANCELLED | OUTPATIENT
Start: 2024-12-09

## 2024-12-08 RX ORDER — ONDANSETRON 2 MG/ML
8 INJECTION INTRAMUSCULAR; INTRAVENOUS EVERY 8 HOURS PRN
Status: CANCELLED | OUTPATIENT
Start: 2024-12-09

## 2024-12-08 RX ORDER — PROCHLORPERAZINE EDISYLATE 5 MG/ML
10 INJECTION INTRAMUSCULAR; INTRAVENOUS EVERY 6 HOURS PRN
Status: CANCELLED | OUTPATIENT
Start: 2024-12-09

## 2024-12-08 RX ORDER — OXYCODONE HYDROCHLORIDE 5 MG/1
5 TABLET ORAL EVERY 4 HOURS PRN
Status: CANCELLED | OUTPATIENT
Start: 2024-12-09

## 2024-12-08 RX ORDER — OXYCODONE HYDROCHLORIDE 5 MG/1
10 TABLET ORAL EVERY 4 HOURS PRN
Status: CANCELLED | OUTPATIENT
Start: 2024-12-09

## 2024-12-08 RX ORDER — POTASSIUM CHLORIDE 1500 MG/1
40 TABLET, EXTENDED RELEASE ORAL PRN
Status: CANCELLED | OUTPATIENT
Start: 2024-12-09

## 2024-12-08 RX ORDER — POTASSIUM CHLORIDE 29.8 MG/ML
80 INJECTION INTRAVENOUS PRN
Status: CANCELLED | OUTPATIENT
Start: 2024-12-09 | End: 2025-03-19

## 2024-12-08 RX ORDER — SODIUM CHLORIDE 9 MG/ML
INJECTION, SOLUTION INTRAVENOUS CONTINUOUS PRN
Status: CANCELLED | OUTPATIENT
Start: 2024-12-09

## 2024-12-08 RX ORDER — MAGNESIUM SULFATE IN WATER 40 MG/ML
4000 INJECTION, SOLUTION INTRAVENOUS PRN
Status: CANCELLED | OUTPATIENT
Start: 2024-12-09

## 2024-12-08 RX ORDER — HEPARIN 100 UNIT/ML
500 SYRINGE INTRAVENOUS PRN
Status: CANCELLED | OUTPATIENT
Start: 2024-12-09

## 2024-12-08 RX ORDER — 0.9 % SODIUM CHLORIDE 0.9 %
1000 INTRAVENOUS SOLUTION INTRAVENOUS ONCE
Status: CANCELLED | OUTPATIENT
Start: 2024-12-09 | End: 2024-12-09

## 2024-12-08 NOTE — PROGRESS NOTES
[facility] Progress Note    2024     Kiera Irvin    MRN: [id number]    : 1955    Referring MD: Eileen Palomino, APRN - NP  [address] [place] [address]      Interval History:  - Patient is doing welI.   - Denies fevers, no confusion.   - Eating and drinking well.   - Creatinine mildly elevated from baseline (CKD 3A, baseline creatinine 1.1- 1.2) 1.3 today, encouraged to drink well today and will reassess tomorrow.    ECOG PS:  (1) Restricted in physically strenuous activity, ambulatory and able to do work of light nature    KPS: 90% Able to carry on normal activity; minor signs or symptoms of disease    Isolation: None    Medications    Scheduled Meds:    Continuous Infusions:    PRN Meds:.    ROS:  As noted above, otherwise remainder of 10-point ROS negative    Physical Exam:     I&O:    Intake/Output Summary (Last 24 hours) at 2024 1022  Last data filed at 2024 0845  Gross per 24 hour   Intake 1920 ml   Output --   Net 1920 ml       Vital Signs:  /84   Pulse 82   Temp 98 °F (36.7 °C) (Oral)   Resp 16   Wt 104.5 kg (230 lb 6.1 oz)   SpO2 97%   BMI 40.49 kg/m²     Weight:    Wt Readings from Last 3 Encounters:   24 104.5 kg (230 lb 6.1 oz)   24 103.5 kg (228 lb 2.8 oz)   24 103.7 kg (228 lb 9.9 oz)       General: Awake, alert and oriented.  HEENT: normocephalic, PERRL, no scleral erythema or icterus, Oral mucosa moist and intact, throat clear  NECK: supple  BACK: Straight   SKIN: warm dry and intact without lesions rashes or masses  CHEST: CTA bilaterally without use of accessory muscles  CV: Normal S1 S2, RRR, no MRG  ABD: NT ND normoactive BS  EXTREMITIES: without edema, denies calf tenderness  NEURO: CN II - XII grossly intact  CATHETER: PAC     Data    CBC:   Recent Labs     24  0910 24  0842 24  0847   WBC 0.7* 0.8* 1.0*   HGB 11.1* 11.1* 11.5*   HCT 33.5* 33.3* 34.3*   MCV 93.8 94.0 94.4   * 145 150

## 2024-12-08 NOTE — PROGRESS NOTES
Short Stay Communication Note  Kiera Irvin  Diagnosis: B Cell Lymphoma   Primary MD: Dr Valentina Brewer  Treatment: Fludarabine/Cytoxan  CAR-T Administration Date: 12/2/2024  Day +6 of Car-T Ketty   Pt seen in outpatient infusion today. Labs drawn and reviewed.   CBC:   Recent Labs     12/06/24  0910 12/07/24  0842 12/08/24  0847   WBC 0.7* 0.8* 1.0*   HGB 11.1* 11.1* 11.5*   HCT 33.5* 33.3* 34.3*   MCV 93.8 94.0 94.4   * 145 150     BMP/Mag:  Recent Labs     12/06/24  0910 12/07/24  0842 12/08/24  0847    142 142   K 3.6 3.8 4.0    106 105   CO2 23 23 25   PHOS 2.9 2.6 2.5   BUN 19 16 18   CREATININE 1.2 1.2 1.3*   MG 1.91  --   --      Standing parameters for replacement for this patient:   1 unit of pack red blood cells for a hemoglobin < or equal to 7  1 pack of platelets for a platelet count less than or equal to 10  40 MeQ of Potassium administered for a potassium level less than or equal to 3.4  4g of Magnesium Sulfate for a magnesum level less than or equal to 1.4  None this visit    Urinalysis last done: 12/2/2024 Urinalysis next due: 12/9/2024    Chest X-Ray last done: 12/2/2024 Chest X-Ray next due: 12/9/2024    Symptoms addressed and reported to care team this date: none  Treatments this date: Labs, discussed fluid intake and creatinine    Reviewed medication schedule with pt and caregiver. Both able to verbalize all medications and schedule. Pt to be seen again tomorrow. Patient and caregiver verbalized understanding of discharge instructions including when and how to call the doctor and when to report to the ER. Discharged ambulatory to home with .    Bernadette Ferrell, RN

## 2024-12-09 ENCOUNTER — HOSPITAL ENCOUNTER (OUTPATIENT)
Dept: GENERAL RADIOLOGY | Age: 69
Discharge: HOME | End: 2024-12-09
Payer: MEDICARE

## 2024-12-09 ENCOUNTER — HOSPITAL ENCOUNTER (OUTPATIENT)
Dept: ONCOLOGY | Age: 69
Setting detail: INFUSION SERIES
Discharge: HOME | End: 2024-12-09
Payer: MEDICARE

## 2024-12-09 VITALS
WEIGHT: 228.4 LBS | HEART RATE: 96 BPM | DIASTOLIC BLOOD PRESSURE: 74 MMHG | BODY MASS INDEX: 40.14 KG/M2 | OXYGEN SATURATION: 96 % | RESPIRATION RATE: 18 BRPM | TEMPERATURE: 97.7 F | SYSTOLIC BLOOD PRESSURE: 133 MMHG

## 2024-12-09 DIAGNOSIS — C83.00 SMALL B-CELL LYMPHOMA, UNSPECIFIED BODY REGION (HCC): Primary | ICD-10-CM

## 2024-12-09 LAB
ALBUMIN SERPL-MCNC: 3.9 G/DL (ref 3.4–5)
ALP SERPL-CCNC: 139 U/L (ref 40–129)
ALT SERPL-CCNC: 31 U/L (ref 10–40)
ANION GAP SERPL CALCULATED.3IONS-SCNC: 11 MMOL/L (ref 3–16)
ANISOCYTOSIS BLD QL SMEAR: ABNORMAL
AST SERPL-CCNC: 30 U/L (ref 15–37)
BASOPHILS # BLD: 0 K/UL (ref 0–0.2)
BASOPHILS NFR BLD: 0 %
BILIRUB DIRECT SERPL-MCNC: 0.3 MG/DL (ref 0–0.3)
BILIRUB INDIRECT SERPL-MCNC: 0.3 MG/DL (ref 0–1)
BILIRUB SERPL-MCNC: 0.6 MG/DL (ref 0–1)
BILIRUB UR QL STRIP.AUTO: NEGATIVE
BUN SERPL-MCNC: 18 MG/DL (ref 7–20)
CALCIUM SERPL-MCNC: 9.1 MG/DL (ref 8.3–10.6)
CHLORIDE SERPL-SCNC: 107 MMOL/L (ref 99–110)
CLARITY UR: CLEAR
CO2 SERPL-SCNC: 24 MMOL/L (ref 21–32)
COLOR UR: YELLOW
CREAT SERPL-MCNC: 1.2 MG/DL (ref 0.6–1.2)
CRP SERPL-MCNC: 10.2 MG/L (ref 0–5.1)
DACRYOCYTES BLD QL SMEAR: ABNORMAL
DEPRECATED RDW RBC AUTO: 15.4 % (ref 12.4–15.4)
EOSINOPHIL # BLD: 0.1 K/UL (ref 0–0.6)
EOSINOPHIL NFR BLD: 7 %
EPI CELLS #/AREA URNS HPF: ABNORMAL /HPF (ref 0–5)
FERRITIN SERPL IA-MCNC: 370 NG/ML (ref 15–150)
GFR SERPLBLD CREATININE-BSD FMLA CKD-EPI: 50 ML/MIN/{1.73_M2}
GLUCOSE SERPL-MCNC: 151 MG/DL (ref 70–99)
GLUCOSE UR STRIP.AUTO-MCNC: NEGATIVE MG/DL
HCT VFR BLD AUTO: 32.8 % (ref 36–48)
HGB BLD-MCNC: 11.2 G/DL (ref 12–16)
HGB UR QL STRIP.AUTO: NEGATIVE
INR PPP: 1.25 (ref 0.85–1.15)
KETONES UR STRIP.AUTO-MCNC: NEGATIVE MG/DL
LDH SERPL L TO P-CCNC: 189 U/L (ref 100–190)
LEUKOCYTE ESTERASE UR QL STRIP.AUTO: ABNORMAL
LYMPHOCYTES # BLD: 0.1 K/UL (ref 1–5.1)
LYMPHOCYTES NFR BLD: 6 %
MACROCYTES BLD QL SMEAR: ABNORMAL
MAGNESIUM SERPL-MCNC: 2 MG/DL (ref 1.8–2.4)
MCH RBC QN AUTO: 32 PG (ref 26–34)
MCHC RBC AUTO-ENTMCNC: 34.1 G/DL (ref 31–36)
MCV RBC AUTO: 93.7 FL (ref 80–100)
MICROCYTES BLD QL SMEAR: ABNORMAL
MONOCYTES # BLD: 0.1 K/UL (ref 0–1.3)
MONOCYTES NFR BLD: 11 %
NEUTROPHILS # BLD: 0.9 K/UL (ref 1.7–7.7)
NEUTROPHILS NFR BLD: 74 %
NITRITE UR QL STRIP.AUTO: NEGATIVE
OVALOCYTES BLD QL SMEAR: ABNORMAL
PH UR STRIP.AUTO: 6 [PH] (ref 5–8)
PHOSPHATE SERPL-MCNC: 2.8 MG/DL (ref 2.5–4.9)
PLATELET # BLD AUTO: 155 K/UL (ref 135–450)
PMV BLD AUTO: 8.5 FL (ref 5–10.5)
POTASSIUM SERPL-SCNC: 4 MMOL/L (ref 3.5–5.1)
PROT SERPL-MCNC: 6 G/DL (ref 6.4–8.2)
PROT UR STRIP.AUTO-MCNC: NEGATIVE MG/DL
PROTHROMBIN TIME: 15.9 SEC (ref 11.9–14.9)
RBC # BLD AUTO: 3.5 M/UL (ref 4–5.2)
RBC #/AREA URNS HPF: ABNORMAL /HPF (ref 0–4)
RENAL EPI CELLS #/AREA UR COMP ASSIST: ABNORMAL /HPF (ref 0–1)
SODIUM SERPL-SCNC: 142 MMOL/L (ref 136–145)
SP GR UR STRIP.AUTO: 1.02 (ref 1–1.03)
UA DIPSTICK W REFLEX MICRO PNL UR: YES
URATE SERPL-MCNC: 3.1 MG/DL (ref 2.6–6)
URN SPEC COLLECT METH UR: ABNORMAL
UROBILINOGEN UR STRIP-ACNC: 0.2 E.U./DL
VARIANT LYMPHS NFR BLD MANUAL: 2 % (ref 0–6)
WBC # BLD AUTO: 1.2 K/UL (ref 4–11)
WBC #/AREA URNS HPF: ABNORMAL /HPF (ref 0–5)

## 2024-12-09 PROCEDURE — 82728 ASSAY OF FERRITIN: CPT

## 2024-12-09 PROCEDURE — 85610 PROTHROMBIN TIME: CPT

## 2024-12-09 PROCEDURE — 80076 HEPATIC FUNCTION PANEL: CPT

## 2024-12-09 PROCEDURE — 36591 DRAW BLOOD OFF VENOUS DEVICE: CPT

## 2024-12-09 PROCEDURE — 71045 X-RAY EXAM CHEST 1 VIEW: CPT

## 2024-12-09 PROCEDURE — 80048 BASIC METABOLIC PNL TOTAL CA: CPT

## 2024-12-09 PROCEDURE — 84550 ASSAY OF BLOOD/URIC ACID: CPT

## 2024-12-09 PROCEDURE — 84100 ASSAY OF PHOSPHORUS: CPT

## 2024-12-09 PROCEDURE — 81001 URINALYSIS AUTO W/SCOPE: CPT

## 2024-12-09 PROCEDURE — 83735 ASSAY OF MAGNESIUM: CPT

## 2024-12-09 PROCEDURE — 86140 C-REACTIVE PROTEIN: CPT

## 2024-12-09 PROCEDURE — 85025 COMPLETE CBC W/AUTO DIFF WBC: CPT

## 2024-12-09 PROCEDURE — 83615 LACTATE (LD) (LDH) ENZYME: CPT

## 2024-12-09 RX ORDER — SODIUM CHLORIDE 9 MG/ML
INJECTION, SOLUTION INTRAVENOUS CONTINUOUS PRN
Status: CANCELLED | OUTPATIENT
Start: 2024-12-10

## 2024-12-09 RX ORDER — OXYCODONE HYDROCHLORIDE 5 MG/1
5 TABLET ORAL EVERY 4 HOURS PRN
Status: CANCELLED | OUTPATIENT
Start: 2024-12-10

## 2024-12-09 RX ORDER — HEPARIN 100 UNIT/ML
500 SYRINGE INTRAVENOUS PRN
Status: CANCELLED | OUTPATIENT
Start: 2024-12-10

## 2024-12-09 RX ORDER — POTASSIUM CHLORIDE 1500 MG/1
40 TABLET, EXTENDED RELEASE ORAL PRN
Status: CANCELLED | OUTPATIENT
Start: 2024-12-10

## 2024-12-09 RX ORDER — OXYCODONE HYDROCHLORIDE 5 MG/1
10 TABLET ORAL EVERY 4 HOURS PRN
Status: CANCELLED | OUTPATIENT
Start: 2024-12-10

## 2024-12-09 RX ORDER — MAGNESIUM SULFATE IN WATER 40 MG/ML
4000 INJECTION, SOLUTION INTRAVENOUS PRN
Status: CANCELLED | OUTPATIENT
Start: 2024-12-10

## 2024-12-09 RX ORDER — ONDANSETRON 4 MG/1
8 TABLET, FILM COATED ORAL EVERY 8 HOURS PRN
Status: CANCELLED | OUTPATIENT
Start: 2024-12-10

## 2024-12-09 RX ORDER — PROCHLORPERAZINE EDISYLATE 5 MG/ML
10 INJECTION INTRAMUSCULAR; INTRAVENOUS EVERY 6 HOURS PRN
Status: CANCELLED | OUTPATIENT
Start: 2024-12-10

## 2024-12-09 RX ORDER — POTASSIUM CHLORIDE 29.8 MG/ML
80 INJECTION INTRAVENOUS PRN
Status: CANCELLED | OUTPATIENT
Start: 2024-12-10 | End: 2025-03-20

## 2024-12-09 RX ORDER — PROCHLORPERAZINE MALEATE 10 MG
10 TABLET ORAL EVERY 6 HOURS PRN
Status: CANCELLED | OUTPATIENT
Start: 2024-12-10

## 2024-12-09 RX ORDER — ONDANSETRON 2 MG/ML
8 INJECTION INTRAMUSCULAR; INTRAVENOUS EVERY 8 HOURS PRN
Status: CANCELLED | OUTPATIENT
Start: 2024-12-10

## 2024-12-09 RX ORDER — 0.9 % SODIUM CHLORIDE 0.9 %
1000 INTRAVENOUS SOLUTION INTRAVENOUS ONCE
Status: CANCELLED | OUTPATIENT
Start: 2024-12-10 | End: 2024-12-10

## 2024-12-09 NOTE — PROGRESS NOTES
[facility] Progress Note    2024     Kiera Irvin    MRN: [id number]    : 1955    Referring MD: Eileen Palomino, APRN - NP  [address] [place] [address]      Interval History:  - Patient is doing welI.   - Denies fevers, no confusion.   - Eating and drinking well.        ECOG PS:  (1) Restricted in physically strenuous activity, ambulatory and able to do work of light nature    KPS: 90% Able to carry on normal activity; minor signs or symptoms of disease    Isolation: None    Medications    Scheduled Meds:    Continuous Infusions:    PRN Meds:.    ROS:  As noted above, otherwise remainder of 10-point ROS negative    Physical Exam:     I&O:    Intake/Output Summary (Last 24 hours) at 2024 1057  Last data filed at 2024 0909  Gross per 24 hour   Intake 2922 ml   Output --   Net 2922 ml       Vital Signs:  /74   Pulse 96   Temp 97.7 °F (36.5 °C) (Oral)   Resp 18   Wt 103.6 kg (228 lb 6.3 oz)   SpO2 96%   BMI 40.14 kg/m²     Weight:    Wt Readings from Last 3 Encounters:   24 103.6 kg (228 lb 6.3 oz)   24 104.5 kg (230 lb 6.1 oz)   24 103.5 kg (228 lb 2.8 oz)       General: Awake, alert and oriented.  HEENT: normocephalic, PERRL, no scleral erythema or icterus, Oral mucosa moist and intact, throat clear  NECK: supple  BACK: Straight   SKIN: warm dry and intact without lesions rashes or masses  CHEST: CTA bilaterally without use of accessory muscles  CV: Normal S1 S2, RRR, no MRG  ABD: NT ND normoactive BS  EXTREMITIES: without edema, denies calf tenderness  NEURO: CN II - XII grossly intact  CATHETER: PAC     Data    CBC:   Recent Labs     24  0842 24  0847 24  0859   WBC 0.8* 1.0* 1.2*   HGB 11.1* 11.5* 11.2*   HCT 33.3* 34.3* 32.8*   MCV 94.0 94.4 93.7    150 155     BMP/Mag:  Recent Labs     24  0842 24  0847 24  0859    142 142   K 3.8 4.0 4.0    105 107   CO2 23 25 24   PHOS 2.6 2.5 2.8

## 2024-12-09 NOTE — PROGRESS NOTES
Short Stay Communication Note  Kiera Irvin  Diagnosis: B Cell Lymphoma   Primary MD: Dr Valentina Brewer  Treatment: Fludarabine/Cytoxan  CAR-T Administration Date: 12/2/2024  Day +7 of Car-T Ketty   Pt seen in outpatient infusion today. Labs drawn and reviewed.   CBC:   Recent Labs     12/07/24  0842 12/08/24  0847 12/09/24  0859   WBC 0.8* 1.0* 1.2*   HGB 11.1* 11.5* 11.2*   HCT 33.3* 34.3* 32.8*   MCV 94.0 94.4 93.7    150 155     BMP/Mag:  Recent Labs     12/07/24  0842 12/08/24  0847 12/09/24  0859    142 142   K 3.8 4.0 4.0    105 107   CO2 23 25 24   PHOS 2.6 2.5 2.8   BUN 16 18 18   CREATININE 1.2 1.3* 1.2   MG  --   --  2.00     Standing parameters for replacement for this patient:   1 unit of pack red blood cells for a hemoglobin < or equal to 7  1 pack of platelets for a platelet count less than or equal to 10  40 MeQ of Potassium administered for a potassium level less than or equal to 3.4  4g of Magnesium Sulfate for a magnesum level less than or equal to 1.4  None this visit    Urinalysis last done: 12/9/2024 Urinalysis next due: 12/16/2024    Chest X-Ray last done: 12/9/2024 Chest X-Ray next due: 12/16/2024    Symptoms addressed and reported to care team this date: none  Treatments this date: Labs, discussed fluid intake and creatinine    Reviewed medication schedule with pt and caregiver. Both able to verbalize all medications and schedule. Pt to be seen again tomorrow. Patient and caregiver verbalized understanding of discharge instructions including when and how to call the doctor and when to report to the ER. Discharged ambulatory to home with .    Sonal Bartlett RN

## 2024-12-10 ENCOUNTER — HOSPITAL ENCOUNTER (OUTPATIENT)
Dept: ONCOLOGY | Age: 69
Setting detail: INFUSION SERIES
Discharge: HOME | End: 2024-12-10
Payer: MEDICARE

## 2024-12-10 VITALS
RESPIRATION RATE: 18 BRPM | OXYGEN SATURATION: 95 % | SYSTOLIC BLOOD PRESSURE: 138 MMHG | DIASTOLIC BLOOD PRESSURE: 69 MMHG | WEIGHT: 229.94 LBS | HEART RATE: 86 BPM | TEMPERATURE: 97.8 F | BODY MASS INDEX: 40.41 KG/M2

## 2024-12-10 DIAGNOSIS — C83.00 SMALL B-CELL LYMPHOMA, UNSPECIFIED BODY REGION (HCC): Primary | ICD-10-CM

## 2024-12-10 LAB
ANION GAP SERPL CALCULATED.3IONS-SCNC: 12 MMOL/L (ref 3–16)
BASOPHILS # BLD: 0 K/UL (ref 0–0.2)
BASOPHILS NFR BLD: 0.3 %
BUN SERPL-MCNC: 18 MG/DL (ref 7–20)
CALCIUM SERPL-MCNC: 9.2 MG/DL (ref 8.3–10.6)
CHLORIDE SERPL-SCNC: 106 MMOL/L (ref 99–110)
CO2 SERPL-SCNC: 24 MMOL/L (ref 21–32)
CREAT SERPL-MCNC: 1.2 MG/DL (ref 0.6–1.2)
CRP SERPL-MCNC: 8 MG/L (ref 0–5.1)
DEPRECATED RDW RBC AUTO: 15.7 % (ref 12.4–15.4)
EOSINOPHIL # BLD: 0 K/UL (ref 0–0.6)
EOSINOPHIL NFR BLD: 3.2 %
FERRITIN SERPL IA-MCNC: 360 NG/ML (ref 15–150)
GFR SERPLBLD CREATININE-BSD FMLA CKD-EPI: 50 ML/MIN/{1.73_M2}
GLUCOSE SERPL-MCNC: 188 MG/DL (ref 70–99)
HCT VFR BLD AUTO: 34.5 % (ref 36–48)
HGB BLD-MCNC: 11.6 G/DL (ref 12–16)
LYMPHOCYTES # BLD: 0.1 K/UL (ref 1–5.1)
LYMPHOCYTES NFR BLD: 8.2 %
MCH RBC QN AUTO: 31.7 PG (ref 26–34)
MCHC RBC AUTO-ENTMCNC: 33.6 G/DL (ref 31–36)
MCV RBC AUTO: 94.3 FL (ref 80–100)
MONOCYTES # BLD: 0.3 K/UL (ref 0–1.3)
MONOCYTES NFR BLD: 23.7 %
NEUTROPHILS # BLD: 0.8 K/UL (ref 1.7–7.7)
NEUTROPHILS NFR BLD: 64.6 %
PHOSPHATE SERPL-MCNC: 3.2 MG/DL (ref 2.5–4.9)
PLATELET # BLD AUTO: 164 K/UL (ref 135–450)
PMV BLD AUTO: 8.8 FL (ref 5–10.5)
POTASSIUM SERPL-SCNC: 4 MMOL/L (ref 3.5–5.1)
RBC # BLD AUTO: 3.66 M/UL (ref 4–5.2)
SODIUM SERPL-SCNC: 142 MMOL/L (ref 136–145)
WBC # BLD AUTO: 1.3 K/UL (ref 4–11)

## 2024-12-10 PROCEDURE — 82728 ASSAY OF FERRITIN: CPT

## 2024-12-10 PROCEDURE — 36591 DRAW BLOOD OFF VENOUS DEVICE: CPT

## 2024-12-10 PROCEDURE — 86140 C-REACTIVE PROTEIN: CPT

## 2024-12-10 PROCEDURE — 85025 COMPLETE CBC W/AUTO DIFF WBC: CPT

## 2024-12-10 PROCEDURE — 84100 ASSAY OF PHOSPHORUS: CPT

## 2024-12-10 PROCEDURE — 80048 BASIC METABOLIC PNL TOTAL CA: CPT

## 2024-12-10 RX ORDER — POTASSIUM CHLORIDE 1500 MG/1
40 TABLET, EXTENDED RELEASE ORAL PRN
Status: DISCONTINUED | OUTPATIENT
Start: 2024-12-10 | End: 2024-12-11 | Stop reason: HOSPADM

## 2024-12-10 RX ORDER — MAGNESIUM SULFATE IN WATER 40 MG/ML
4000 INJECTION, SOLUTION INTRAVENOUS PRN
Status: CANCELLED | OUTPATIENT
Start: 2024-12-11

## 2024-12-10 RX ORDER — POTASSIUM CHLORIDE 29.8 MG/ML
80 INJECTION INTRAVENOUS PRN
Status: CANCELLED | OUTPATIENT
Start: 2024-12-11 | End: 2025-03-21

## 2024-12-10 RX ORDER — PROCHLORPERAZINE EDISYLATE 5 MG/ML
10 INJECTION INTRAMUSCULAR; INTRAVENOUS EVERY 6 HOURS PRN
Status: DISCONTINUED | OUTPATIENT
Start: 2024-12-10 | End: 2024-12-11 | Stop reason: HOSPADM

## 2024-12-10 RX ORDER — SODIUM CHLORIDE 9 MG/ML
INJECTION, SOLUTION INTRAVENOUS CONTINUOUS PRN
Status: DISCONTINUED | OUTPATIENT
Start: 2024-12-10 | End: 2024-12-11 | Stop reason: HOSPADM

## 2024-12-10 RX ORDER — PROCHLORPERAZINE MALEATE 10 MG
10 TABLET ORAL EVERY 6 HOURS PRN
Status: CANCELLED | OUTPATIENT
Start: 2024-12-11

## 2024-12-10 RX ORDER — 0.9 % SODIUM CHLORIDE 0.9 %
1000 INTRAVENOUS SOLUTION INTRAVENOUS ONCE
Status: CANCELLED | OUTPATIENT
Start: 2024-12-11 | End: 2024-12-11

## 2024-12-10 RX ORDER — ONDANSETRON 2 MG/ML
8 INJECTION INTRAMUSCULAR; INTRAVENOUS EVERY 8 HOURS PRN
Status: CANCELLED | OUTPATIENT
Start: 2024-12-11

## 2024-12-10 RX ORDER — ONDANSETRON 4 MG/1
8 TABLET, FILM COATED ORAL EVERY 8 HOURS PRN
Status: DISCONTINUED | OUTPATIENT
Start: 2024-12-10 | End: 2024-12-11 | Stop reason: HOSPADM

## 2024-12-10 RX ORDER — PROCHLORPERAZINE MALEATE 10 MG
10 TABLET ORAL EVERY 6 HOURS PRN
Status: DISCONTINUED | OUTPATIENT
Start: 2024-12-10 | End: 2024-12-11 | Stop reason: HOSPADM

## 2024-12-10 RX ORDER — MAGNESIUM SULFATE IN WATER 40 MG/ML
4000 INJECTION, SOLUTION INTRAVENOUS PRN
Status: DISCONTINUED | OUTPATIENT
Start: 2024-12-10 | End: 2024-12-11 | Stop reason: HOSPADM

## 2024-12-10 RX ORDER — OXYCODONE HYDROCHLORIDE 5 MG/1
10 TABLET ORAL EVERY 4 HOURS PRN
Status: DISCONTINUED | OUTPATIENT
Start: 2024-12-10 | End: 2024-12-11 | Stop reason: HOSPADM

## 2024-12-10 RX ORDER — OXYCODONE HYDROCHLORIDE 5 MG/1
5 TABLET ORAL EVERY 4 HOURS PRN
Status: CANCELLED | OUTPATIENT
Start: 2024-12-11

## 2024-12-10 RX ORDER — OXYCODONE HYDROCHLORIDE 5 MG/1
5 TABLET ORAL EVERY 4 HOURS PRN
Status: DISCONTINUED | OUTPATIENT
Start: 2024-12-10 | End: 2024-12-11 | Stop reason: HOSPADM

## 2024-12-10 RX ORDER — SODIUM CHLORIDE 9 MG/ML
INJECTION, SOLUTION INTRAVENOUS CONTINUOUS PRN
Status: CANCELLED | OUTPATIENT
Start: 2024-12-11

## 2024-12-10 RX ORDER — HEPARIN 100 UNIT/ML
500 SYRINGE INTRAVENOUS PRN
Status: CANCELLED | OUTPATIENT
Start: 2024-12-11

## 2024-12-10 RX ORDER — OXYCODONE HYDROCHLORIDE 5 MG/1
10 TABLET ORAL EVERY 4 HOURS PRN
Status: CANCELLED | OUTPATIENT
Start: 2024-12-11

## 2024-12-10 RX ORDER — ONDANSETRON 4 MG/1
8 TABLET, FILM COATED ORAL EVERY 8 HOURS PRN
Status: CANCELLED | OUTPATIENT
Start: 2024-12-11

## 2024-12-10 RX ORDER — PROCHLORPERAZINE EDISYLATE 5 MG/ML
10 INJECTION INTRAMUSCULAR; INTRAVENOUS EVERY 6 HOURS PRN
Status: CANCELLED | OUTPATIENT
Start: 2024-12-11

## 2024-12-10 RX ORDER — ONDANSETRON 2 MG/ML
8 INJECTION INTRAMUSCULAR; INTRAVENOUS EVERY 8 HOURS PRN
Status: DISCONTINUED | OUTPATIENT
Start: 2024-12-10 | End: 2024-12-11 | Stop reason: HOSPADM

## 2024-12-10 RX ORDER — POTASSIUM CHLORIDE 29.8 MG/ML
80 INJECTION INTRAVENOUS PRN
Status: DISCONTINUED | OUTPATIENT
Start: 2024-12-10 | End: 2024-12-11 | Stop reason: HOSPADM

## 2024-12-10 RX ORDER — POTASSIUM CHLORIDE 1500 MG/1
40 TABLET, EXTENDED RELEASE ORAL PRN
Status: CANCELLED | OUTPATIENT
Start: 2024-12-11

## 2024-12-10 RX ORDER — 0.9 % SODIUM CHLORIDE 0.9 %
1000 INTRAVENOUS SOLUTION INTRAVENOUS ONCE
Status: DISCONTINUED | OUTPATIENT
Start: 2024-12-10 | End: 2024-12-11 | Stop reason: HOSPADM

## 2024-12-10 NOTE — PROGRESS NOTES
Short Stay Communication Note  Kiera Irvin  Diagnosis: B Cell Lymphoma   Primary MD: Dr Valentina Brewer  Treatment: Fludarabine/Cytoxan  CAR-T Administration Date: 12/2/2024  Day +8 of Car-T Ketty   Pt seen in outpatient infusion today. Labs drawn and reviewed.   CBC:   Recent Labs     12/08/24  0847 12/09/24  0859 12/10/24  0850   WBC 1.0* 1.2* 1.3*   HGB 11.5* 11.2* 11.6*   HCT 34.3* 32.8* 34.5*   MCV 94.4 93.7 94.3    155 164     BMP/Mag:  Recent Labs     12/08/24  0847 12/09/24  0859 12/10/24  0850    142 142   K 4.0 4.0 4.0    107 106   CO2 25 24 24   PHOS 2.5 2.8 3.2   BUN 18 18 18   CREATININE 1.3* 1.2 1.2   MG  --  2.00  --      Standing parameters for replacement for this patient:   1 unit of pack red blood cells for a hemoglobin < or equal to 7  1 pack of platelets for a platelet count less than or equal to 10  40 MeQ of Potassium administered for a potassium level less than or equal to 3.4  4g of Magnesium Sulfate for a magnesum level less than or equal to 1.4  None this visit    Urinalysis last done: 12/9/2024 Urinalysis next due: 12/16/2024    Chest X-Ray last done: 12/9/2024 Chest X-Ray next due: 12/16/2024    Symptoms addressed and reported to care team this date: none  Treatments this date: Labs    Reviewed medication schedule with pt and caregiver. Both able to verbalize all medications and schedule. Pt to be seen again tomorrow. Patient and caregiver verbalized understanding of discharge instructions including when and how to call the doctor and when to report to the ER. Discharged ambulatory to home with .    Fabi Rivas RN    
ALICIA Palomino, APRN - NP

## 2024-12-11 ENCOUNTER — HOSPITAL ENCOUNTER (OUTPATIENT)
Dept: ONCOLOGY | Age: 69
Setting detail: INFUSION SERIES
Discharge: HOME | End: 2024-12-11
Payer: MEDICARE

## 2024-12-11 VITALS
WEIGHT: 227.51 LBS | OXYGEN SATURATION: 96 % | SYSTOLIC BLOOD PRESSURE: 125 MMHG | RESPIRATION RATE: 16 BRPM | HEART RATE: 70 BPM | DIASTOLIC BLOOD PRESSURE: 63 MMHG | BODY MASS INDEX: 39.98 KG/M2 | TEMPERATURE: 97.9 F

## 2024-12-11 DIAGNOSIS — C83.00 SMALL B-CELL LYMPHOMA, UNSPECIFIED BODY REGION (HCC): Primary | ICD-10-CM

## 2024-12-11 LAB
ALBUMIN SERPL-MCNC: 3.9 G/DL (ref 3.4–5)
ALP SERPL-CCNC: 136 U/L (ref 40–129)
ALT SERPL-CCNC: 30 U/L (ref 10–40)
ANION GAP SERPL CALCULATED.3IONS-SCNC: 12 MMOL/L (ref 3–16)
AST SERPL-CCNC: 33 U/L (ref 15–37)
BASOPHILS # BLD: 0 K/UL (ref 0–0.2)
BASOPHILS NFR BLD: 0.3 %
BILIRUB DIRECT SERPL-MCNC: 0.2 MG/DL (ref 0–0.3)
BILIRUB INDIRECT SERPL-MCNC: 0.3 MG/DL (ref 0–1)
BILIRUB SERPL-MCNC: 0.5 MG/DL (ref 0–1)
BUN SERPL-MCNC: 20 MG/DL (ref 7–20)
CALCIUM SERPL-MCNC: 9.2 MG/DL (ref 8.3–10.6)
CHLORIDE SERPL-SCNC: 105 MMOL/L (ref 99–110)
CO2 SERPL-SCNC: 24 MMOL/L (ref 21–32)
CREAT SERPL-MCNC: 1.2 MG/DL (ref 0.6–1.2)
CRP SERPL-MCNC: 7.2 MG/L (ref 0–5.1)
DEPRECATED RDW RBC AUTO: 16.2 % (ref 12.4–15.4)
EOSINOPHIL # BLD: 0 K/UL (ref 0–0.6)
EOSINOPHIL NFR BLD: 2.6 %
FERRITIN SERPL IA-MCNC: 340 NG/ML (ref 15–150)
GFR SERPLBLD CREATININE-BSD FMLA CKD-EPI: 50 ML/MIN/{1.73_M2}
GLUCOSE SERPL-MCNC: 207 MG/DL (ref 70–99)
HCT VFR BLD AUTO: 35 % (ref 36–48)
HGB BLD-MCNC: 11.7 G/DL (ref 12–16)
LDH SERPL L TO P-CCNC: 188 U/L (ref 100–190)
LYMPHOCYTES # BLD: 0.2 K/UL (ref 1–5.1)
LYMPHOCYTES NFR BLD: 13.6 %
MAGNESIUM SERPL-MCNC: 2.08 MG/DL (ref 1.8–2.4)
MCH RBC QN AUTO: 31.6 PG (ref 26–34)
MCHC RBC AUTO-ENTMCNC: 33.5 G/DL (ref 31–36)
MCV RBC AUTO: 94.5 FL (ref 80–100)
MONOCYTES # BLD: 0.4 K/UL (ref 0–1.3)
MONOCYTES NFR BLD: 29 %
NEUTROPHILS # BLD: 0.7 K/UL (ref 1.7–7.7)
NEUTROPHILS NFR BLD: 54.5 %
PHOSPHATE SERPL-MCNC: 3.1 MG/DL (ref 2.5–4.9)
PLATELET # BLD AUTO: 162 K/UL (ref 135–450)
PMV BLD AUTO: 8.6 FL (ref 5–10.5)
POTASSIUM SERPL-SCNC: 4.2 MMOL/L (ref 3.5–5.1)
PROT SERPL-MCNC: 6.2 G/DL (ref 6.4–8.2)
RBC # BLD AUTO: 3.7 M/UL (ref 4–5.2)
SODIUM SERPL-SCNC: 141 MMOL/L (ref 136–145)
URATE SERPL-MCNC: 3.2 MG/DL (ref 2.6–6)
WBC # BLD AUTO: 1.3 K/UL (ref 4–11)

## 2024-12-11 PROCEDURE — 80048 BASIC METABOLIC PNL TOTAL CA: CPT

## 2024-12-11 PROCEDURE — 83615 LACTATE (LD) (LDH) ENZYME: CPT

## 2024-12-11 PROCEDURE — 80076 HEPATIC FUNCTION PANEL: CPT

## 2024-12-11 PROCEDURE — 86140 C-REACTIVE PROTEIN: CPT

## 2024-12-11 PROCEDURE — 84100 ASSAY OF PHOSPHORUS: CPT

## 2024-12-11 PROCEDURE — 85025 COMPLETE CBC W/AUTO DIFF WBC: CPT

## 2024-12-11 PROCEDURE — 36591 DRAW BLOOD OFF VENOUS DEVICE: CPT

## 2024-12-11 PROCEDURE — 84550 ASSAY OF BLOOD/URIC ACID: CPT

## 2024-12-11 PROCEDURE — 83735 ASSAY OF MAGNESIUM: CPT

## 2024-12-11 PROCEDURE — 82728 ASSAY OF FERRITIN: CPT

## 2024-12-11 RX ORDER — ONDANSETRON 2 MG/ML
8 INJECTION INTRAMUSCULAR; INTRAVENOUS EVERY 8 HOURS PRN
Status: CANCELLED | OUTPATIENT
Start: 2024-12-12

## 2024-12-11 RX ORDER — SODIUM CHLORIDE 9 MG/ML
INJECTION, SOLUTION INTRAVENOUS CONTINUOUS PRN
Status: DISCONTINUED | OUTPATIENT
Start: 2024-12-11 | End: 2024-12-12 | Stop reason: HOSPADM

## 2024-12-11 RX ORDER — POTASSIUM CHLORIDE 1500 MG/1
40 TABLET, EXTENDED RELEASE ORAL PRN
Status: CANCELLED | OUTPATIENT
Start: 2024-12-12

## 2024-12-11 RX ORDER — OXYCODONE HYDROCHLORIDE 5 MG/1
10 TABLET ORAL EVERY 4 HOURS PRN
Status: DISCONTINUED | OUTPATIENT
Start: 2024-12-11 | End: 2024-12-12 | Stop reason: HOSPADM

## 2024-12-11 RX ORDER — 0.9 % SODIUM CHLORIDE 0.9 %
1000 INTRAVENOUS SOLUTION INTRAVENOUS ONCE
Status: CANCELLED | OUTPATIENT
Start: 2024-12-12 | End: 2024-12-12

## 2024-12-11 RX ORDER — OXYCODONE HYDROCHLORIDE 5 MG/1
5 TABLET ORAL EVERY 4 HOURS PRN
Status: CANCELLED | OUTPATIENT
Start: 2024-12-12

## 2024-12-11 RX ORDER — ONDANSETRON 4 MG/1
8 TABLET, FILM COATED ORAL EVERY 8 HOURS PRN
Status: CANCELLED | OUTPATIENT
Start: 2024-12-12

## 2024-12-11 RX ORDER — PROCHLORPERAZINE EDISYLATE 5 MG/ML
10 INJECTION INTRAMUSCULAR; INTRAVENOUS EVERY 6 HOURS PRN
Status: DISCONTINUED | OUTPATIENT
Start: 2024-12-11 | End: 2024-12-12 | Stop reason: HOSPADM

## 2024-12-11 RX ORDER — HEPARIN 100 UNIT/ML
500 SYRINGE INTRAVENOUS PRN
Status: CANCELLED | OUTPATIENT
Start: 2024-12-12

## 2024-12-11 RX ORDER — PROCHLORPERAZINE MALEATE 10 MG
10 TABLET ORAL EVERY 6 HOURS PRN
Status: DISCONTINUED | OUTPATIENT
Start: 2024-12-11 | End: 2024-12-12 | Stop reason: HOSPADM

## 2024-12-11 RX ORDER — PROCHLORPERAZINE MALEATE 10 MG
10 TABLET ORAL EVERY 6 HOURS PRN
Status: CANCELLED | OUTPATIENT
Start: 2024-12-12

## 2024-12-11 RX ORDER — 0.9 % SODIUM CHLORIDE 0.9 %
1000 INTRAVENOUS SOLUTION INTRAVENOUS ONCE
Status: DISCONTINUED | OUTPATIENT
Start: 2024-12-11 | End: 2024-12-12 | Stop reason: HOSPADM

## 2024-12-11 RX ORDER — OXYCODONE HYDROCHLORIDE 5 MG/1
10 TABLET ORAL EVERY 4 HOURS PRN
Status: CANCELLED | OUTPATIENT
Start: 2024-12-12

## 2024-12-11 RX ORDER — PROCHLORPERAZINE EDISYLATE 5 MG/ML
10 INJECTION INTRAMUSCULAR; INTRAVENOUS EVERY 6 HOURS PRN
Status: CANCELLED | OUTPATIENT
Start: 2024-12-12

## 2024-12-11 RX ORDER — ONDANSETRON 4 MG/1
8 TABLET, FILM COATED ORAL EVERY 8 HOURS PRN
Status: DISCONTINUED | OUTPATIENT
Start: 2024-12-11 | End: 2024-12-12 | Stop reason: HOSPADM

## 2024-12-11 RX ORDER — MAGNESIUM SULFATE IN WATER 40 MG/ML
4000 INJECTION, SOLUTION INTRAVENOUS PRN
Status: CANCELLED | OUTPATIENT
Start: 2024-12-12

## 2024-12-11 RX ORDER — POTASSIUM CHLORIDE 29.8 MG/ML
80 INJECTION INTRAVENOUS PRN
Status: CANCELLED | OUTPATIENT
Start: 2024-12-12 | End: 2025-03-22

## 2024-12-11 RX ORDER — POTASSIUM CHLORIDE 29.8 MG/ML
80 INJECTION INTRAVENOUS PRN
Status: DISCONTINUED | OUTPATIENT
Start: 2024-12-11 | End: 2024-12-12 | Stop reason: HOSPADM

## 2024-12-11 RX ORDER — SODIUM CHLORIDE 9 MG/ML
INJECTION, SOLUTION INTRAVENOUS CONTINUOUS PRN
Status: CANCELLED | OUTPATIENT
Start: 2024-12-12

## 2024-12-11 RX ORDER — OXYCODONE HYDROCHLORIDE 5 MG/1
5 TABLET ORAL EVERY 4 HOURS PRN
Status: DISCONTINUED | OUTPATIENT
Start: 2024-12-11 | End: 2024-12-12 | Stop reason: HOSPADM

## 2024-12-11 RX ORDER — POTASSIUM CHLORIDE 1500 MG/1
40 TABLET, EXTENDED RELEASE ORAL PRN
Status: DISCONTINUED | OUTPATIENT
Start: 2024-12-11 | End: 2024-12-12 | Stop reason: HOSPADM

## 2024-12-11 RX ORDER — MAGNESIUM SULFATE IN WATER 40 MG/ML
4000 INJECTION, SOLUTION INTRAVENOUS PRN
Status: DISCONTINUED | OUTPATIENT
Start: 2024-12-11 | End: 2024-12-12 | Stop reason: HOSPADM

## 2024-12-11 RX ORDER — ONDANSETRON 2 MG/ML
8 INJECTION INTRAMUSCULAR; INTRAVENOUS EVERY 8 HOURS PRN
Status: DISCONTINUED | OUTPATIENT
Start: 2024-12-11 | End: 2024-12-12 | Stop reason: HOSPADM

## 2024-12-11 NOTE — PROGRESS NOTES
Short Stay Communication Note  Kiera Irvin  Diagnosis: B Cell Lymphoma   Primary MD: Dr Valentina Brewer  Treatment: Fludarabine/Cytoxan  CAR-T Administration Date: 12/2/2024  Day +9 of Car-T Ketty   Pt seen in outpatient infusion today. Labs drawn and reviewed.   CBC:   Recent Labs     12/09/24  0859 12/10/24  0850 12/11/24  0843   WBC 1.2* 1.3* 1.3*   HGB 11.2* 11.6* 11.7*   HCT 32.8* 34.5* 35.0*   MCV 93.7 94.3 94.5    164 162     BMP/Mag:  Recent Labs     12/09/24  0859 12/10/24  0850 12/11/24  0843    142 141   K 4.0 4.0 4.2    106 105   CO2 24 24 24   PHOS 2.8 3.2 3.1   BUN 18 18 20   CREATININE 1.2 1.2 1.2   MG 2.00  --  2.08     Standing parameters for replacement for this patient:   1 unit of pack red blood cells for a hemoglobin < or equal to 7  1 pack of platelets for a platelet count less than or equal to 10  40 MeQ of Potassium administered for a potassium level less than or equal to 3.4  4g of Magnesium Sulfate for a magnesum level less than or equal to 1.4  None this visit    Urinalysis last done: 12/9/2024 Urinalysis next due: 12/16/2024    Chest X-Ray last done: 12/9/2024 Chest X-Ray next due: 12/16/2024    Symptoms addressed and reported to care team this date: none  Treatments this date: Labs    Reviewed medication schedule with pt and caregiver. Both able to verbalize all medications and schedule. Pt to be seen again tomorrow. Patient and caregiver verbalized understanding of discharge instructions including when and how to call the doctor and when to report to the ER. Discharged ambulatory to home with .    Fabi Rivas RN

## 2024-12-11 NOTE — PROGRESS NOTES
[facility] Progress Note    2024     Kiera Irvin    MRN: [id number]    : 1955    Referring MD: Eileen Palomino, APRN - NP  [address] [address]      Subjective: Patient is doing welI. Denies fevers, no confusion. Eating and drinking well.        ECOG PS:  (1) Restricted in physically strenuous activity, ambulatory and able to do work of light nature    KPS: 90% Able to carry on normal activity; minor signs or symptoms of disease    Isolation: None    Medications    Scheduled Meds:   sodium chloride  1,000 mL IntraVENous Once    ALTEplase (CATHFLO) 2 mg in sterile water 2 mL injection  2 mg IntraCATHeter Once     Continuous Infusions:   sodium chloride       PRN Meds:.sodium chloride, potassium chloride, potassium chloride, magnesium sulfate, magnesium hydroxide, oxyCODONE, oxyCODONE, prochlorperazine, prochlorperazine, ondansetron, ondansetron    ROS:  As noted above, otherwise remainder of 10-point ROS negative    Physical Exam:     I&O:    Intake/Output Summary (Last 24 hours) at 2024 1202  Last data filed at 2024 0915  Gross per 24 hour   Intake 2209 ml   Output --   Net 2209 ml         Vital Signs:  /63   Pulse 70   Temp 97.9 °F (36.6 °C) (Oral)   Resp 16   Wt 103.2 kg (227 lb 8.2 oz)   SpO2 96%   BMI 39.98 kg/m²     Weight:    Wt Readings from Last 3 Encounters:   24 103.2 kg (227 lb 8.2 oz)   12/10/24 104.3 kg (229 lb 15 oz)   24 103.6 kg (228 lb 6.3 oz)       General: Awake, alert and oriented.  HEENT: normocephalic, PERRL, no scleral erythema or icterus, Oral mucosa moist and intact, throat clear  NECK: supple  BACK: Straight   SKIN: warm dry and intact without lesions rashes or masses  CHEST: CTA bilaterally without use of accessory muscles  CV: Normal S1 S2, RRR, no MRG  ABD: NT ND normoactive BS  EXTREMITIES: without edema, denies calf tenderness  NEURO: CN II - XII grossly intact  CATHETER: PAC     Data    CBC:   Recent Labs

## 2024-12-12 ENCOUNTER — HOSPITAL ENCOUNTER (OUTPATIENT)
Dept: ONCOLOGY | Age: 69
Setting detail: INFUSION SERIES
Discharge: HOME | End: 2024-12-12
Payer: MEDICARE

## 2024-12-12 VITALS
SYSTOLIC BLOOD PRESSURE: 114 MMHG | WEIGHT: 229.06 LBS | TEMPERATURE: 98.1 F | BODY MASS INDEX: 40.26 KG/M2 | OXYGEN SATURATION: 97 % | DIASTOLIC BLOOD PRESSURE: 57 MMHG | HEART RATE: 83 BPM | RESPIRATION RATE: 18 BRPM

## 2024-12-12 DIAGNOSIS — C83.00 SMALL B-CELL LYMPHOMA, UNSPECIFIED BODY REGION (HCC): Primary | ICD-10-CM

## 2024-12-12 LAB
ANION GAP SERPL CALCULATED.3IONS-SCNC: 13 MMOL/L (ref 3–16)
BASOPHILS # BLD: 0 K/UL (ref 0–0.2)
BASOPHILS NFR BLD: 0.7 %
BUN SERPL-MCNC: 17 MG/DL (ref 7–20)
CALCIUM SERPL-MCNC: 9 MG/DL (ref 8.3–10.6)
CHLORIDE SERPL-SCNC: 106 MMOL/L (ref 99–110)
CO2 SERPL-SCNC: 22 MMOL/L (ref 21–32)
CREAT SERPL-MCNC: 1.2 MG/DL (ref 0.6–1.2)
CRP SERPL-MCNC: 6.4 MG/L (ref 0–5.1)
DEPRECATED RDW RBC AUTO: 16.3 % (ref 12.4–15.4)
EOSINOPHIL # BLD: 0 K/UL (ref 0–0.6)
EOSINOPHIL NFR BLD: 2.5 %
FERRITIN SERPL IA-MCNC: 323 NG/ML (ref 15–150)
GFR SERPLBLD CREATININE-BSD FMLA CKD-EPI: 50 ML/MIN/{1.73_M2}
GLUCOSE SERPL-MCNC: 199 MG/DL (ref 70–99)
HCT VFR BLD AUTO: 35 % (ref 36–48)
HGB BLD-MCNC: 11.5 G/DL (ref 12–16)
INR PPP: 1.06 (ref 0.85–1.15)
LYMPHOCYTES # BLD: 0.2 K/UL (ref 1–5.1)
LYMPHOCYTES NFR BLD: 18.3 %
MCH RBC QN AUTO: 31.4 PG (ref 26–34)
MCHC RBC AUTO-ENTMCNC: 32.8 G/DL (ref 31–36)
MCV RBC AUTO: 95.6 FL (ref 80–100)
MONOCYTES # BLD: 0.4 K/UL (ref 0–1.3)
MONOCYTES NFR BLD: 38.7 %
NEUTROPHILS # BLD: 0.4 K/UL (ref 1.7–7.7)
NEUTROPHILS NFR BLD: 39.8 %
PHOSPHATE SERPL-MCNC: 2.6 MG/DL (ref 2.5–4.9)
PLATELET # BLD AUTO: 155 K/UL (ref 135–450)
PMV BLD AUTO: 9.1 FL (ref 5–10.5)
POTASSIUM SERPL-SCNC: 4.1 MMOL/L (ref 3.5–5.1)
PROTHROMBIN TIME: 14 SEC (ref 11.9–14.9)
RBC # BLD AUTO: 3.66 M/UL (ref 4–5.2)
SODIUM SERPL-SCNC: 141 MMOL/L (ref 136–145)
WBC # BLD AUTO: 1.1 K/UL (ref 4–11)

## 2024-12-12 PROCEDURE — 85025 COMPLETE CBC W/AUTO DIFF WBC: CPT

## 2024-12-12 PROCEDURE — 85610 PROTHROMBIN TIME: CPT

## 2024-12-12 PROCEDURE — 82728 ASSAY OF FERRITIN: CPT

## 2024-12-12 PROCEDURE — 84100 ASSAY OF PHOSPHORUS: CPT

## 2024-12-12 PROCEDURE — 80048 BASIC METABOLIC PNL TOTAL CA: CPT

## 2024-12-12 PROCEDURE — 86140 C-REACTIVE PROTEIN: CPT

## 2024-12-12 PROCEDURE — 36591 DRAW BLOOD OFF VENOUS DEVICE: CPT

## 2024-12-12 RX ORDER — THIAMINE MONONITRATE (VIT B1) 100 MG
100 TABLET ORAL DAILY
COMMUNITY

## 2024-12-12 RX ORDER — SODIUM CHLORIDE 9 MG/ML
INJECTION, SOLUTION INTRAVENOUS CONTINUOUS PRN
Status: CANCELLED | OUTPATIENT
Start: 2024-12-13

## 2024-12-12 RX ORDER — POTASSIUM CHLORIDE 1500 MG/1
40 TABLET, EXTENDED RELEASE ORAL PRN
Status: CANCELLED | OUTPATIENT
Start: 2024-12-13

## 2024-12-12 RX ORDER — PROCHLORPERAZINE EDISYLATE 5 MG/ML
10 INJECTION INTRAMUSCULAR; INTRAVENOUS EVERY 6 HOURS PRN
Status: CANCELLED | OUTPATIENT
Start: 2024-12-13

## 2024-12-12 RX ORDER — OXYCODONE HYDROCHLORIDE 5 MG/1
10 TABLET ORAL EVERY 4 HOURS PRN
Status: CANCELLED | OUTPATIENT
Start: 2024-12-13

## 2024-12-12 RX ORDER — MAGNESIUM SULFATE IN WATER 40 MG/ML
4000 INJECTION, SOLUTION INTRAVENOUS PRN
Status: CANCELLED | OUTPATIENT
Start: 2024-12-13

## 2024-12-12 RX ORDER — HEPARIN 100 UNIT/ML
500 SYRINGE INTRAVENOUS PRN
Status: CANCELLED | OUTPATIENT
Start: 2024-12-13

## 2024-12-12 RX ORDER — PROCHLORPERAZINE MALEATE 10 MG
10 TABLET ORAL EVERY 6 HOURS PRN
Status: CANCELLED | OUTPATIENT
Start: 2024-12-13

## 2024-12-12 RX ORDER — OXYCODONE HYDROCHLORIDE 5 MG/1
5 TABLET ORAL EVERY 4 HOURS PRN
Status: CANCELLED | OUTPATIENT
Start: 2024-12-13

## 2024-12-12 RX ORDER — POTASSIUM CHLORIDE 29.8 MG/ML
80 INJECTION INTRAVENOUS PRN
Status: CANCELLED | OUTPATIENT
Start: 2024-12-13 | End: 2025-03-23

## 2024-12-12 RX ORDER — 0.9 % SODIUM CHLORIDE 0.9 %
1000 INTRAVENOUS SOLUTION INTRAVENOUS ONCE
Status: CANCELLED | OUTPATIENT
Start: 2024-12-13 | End: 2024-12-13

## 2024-12-12 RX ORDER — ONDANSETRON 4 MG/1
8 TABLET, FILM COATED ORAL EVERY 8 HOURS PRN
Status: CANCELLED | OUTPATIENT
Start: 2024-12-13

## 2024-12-12 RX ORDER — ONDANSETRON 2 MG/ML
8 INJECTION INTRAMUSCULAR; INTRAVENOUS EVERY 8 HOURS PRN
Status: CANCELLED | OUTPATIENT
Start: 2024-12-13

## 2024-12-12 NOTE — PROGRESS NOTES
Short Stay Communication Note  Kiera Irvin  Diagnosis: B cell lymphoma  Primary MD:Dr. Valentina Brewer  Treatment: Fludarabine/Cytoxan  CAR-T Administration Date:   Day +10 of Car-T Ketty   Pt seen in outpatient infusion today. Labs drawn and reviewed.   CBC:   Recent Labs     12/10/24  0850 12/11/24  0843 12/12/24  0846   WBC 1.3* 1.3* 1.1*   HGB 11.6* 11.7* 11.5*   HCT 34.5* 35.0* 35.0*   MCV 94.3 94.5 95.6    162 155     BMP/Mag:  Recent Labs     12/10/24  0850 12/11/24  0843    141   K 4.0 4.2    105   CO2 24 24   PHOS 3.2 3.1   BUN 18 20   CREATININE 1.2 1.2   MG  --  2.08     Standing parameters for replacement for this patient:   1 unit of pack red blood cells for a hemoglobin < or equal to 7  1 pack of platelets for a platelet count less than or equal to 10  40 MeQ of Potassium administered for a potassium level less than or equal to 3.4  4g of Magnesium Sulfate for a magnesum level less than or equal to 1.4  No transfusions required for the above lab values.    Urinalysis last done: 12/9/24 Urinalysis next due: 12/16/24    Chest X-Ray last done: 12/9/24 Chest X-Ray next due: 12/16/24    Symptoms addressed and reported to care team this date:   Treatments this date: labs    Reviewed medication schedule with pt and caregiver. Both able to verbalize all medications and schedule. Pt to be seen again tomorrow. Patient and caregiver verbalized understanding of discharge instructions including when and how to call the doctor and when to report  to the ER. Discharged ambulatory to home.   
Patient reported only having 3 allopurinol pills left and inquired if she would need a refill. Per DUNG Cardoso, she will not need to refill allopurinol.   
  MG  --  2.08  --      LIVP:   Recent Labs     12/11/24  0843   AST 33   ALT 30   BILIDIR 0.2   BILITOT 0.5   ALKPHOS 136*     Coags:   Recent Labs     12/12/24  0846   PROTIME 14.0   INR 1.06         Uric Acid No results for input(s): \"LABURIC\" in the last 72 hours.    PROBLEM LIST:           B-cell lymphoma (disorder) ( Stage Date: 01/21/2016, Stage III-Pathological  Date of Dx:01/21/2016 Disease Status: Initial presentation; CD30 Result: Negative; B Symptoms: No B symptoms; ALK: Negative; SCT Eligible: Yes; CD20 Result: Positive; B Cell Lymphoma is a for of CLL )  2. Anxiety  3. Atrial fibrillation with rapid ventricular response s/p ablation  4. Diabetes mellitus type 2   5. Endometrial hyperplasia   6. Essential hypertension   7. GERD  8. Obstructive sleep apnea  9. Thyroid nodule      TREATMENT:            XRT to thoracic spine  Bendamustine / Rituxan x 6 cycles (ending 7/2016)  Rituxan maintenance x 12 months  Clinical Trial - Revlimid & Rituxan x 7 cycles (ending 12/2018)  CVP- R x 8 cycles (12/2018 - 6/2019)   3 cycles of R-ICE in 2019 followed by [employer] in 12/2019  XRT to mesentery in 2020      Lymphodepleting Chemotherapy:  Fludarabine and cyclophosphamide   Disease Status at time of Infusion: Ongoing Disease   CAR-T Infusion Date: 12/2/24  CAR-T Product: Breyanzi  Patient ID Number:  [id number]  JOIN #: [id number]     ASSESSMENT AND PLAN:           1. Relapsed refractory follicular lymphoma: Ongoing Disease   - Recent scan on 8/30/24 showed relapse with left supraclavicular and left paraaortic node, repeat bx consistent with follicular lymphoma      PLAN:   Two doses of Tocilizumab will remain available in the inpatient pharmacy until day + 28 post - infusion.     Lymphodepleting Chemotherapy:  Fludarabine and cyclophosphamide   Disease Status at time of Infusion: Ongoing Disease   CAR-T Infusion Date: 12/2/24  CAR-T Product: Breyanzi  Patient ID Number:  [id number]  JOIN #: [id number]     Day + 10

## 2024-12-13 ENCOUNTER — HOSPITAL ENCOUNTER (OUTPATIENT)
Dept: ONCOLOGY | Age: 69
Setting detail: INFUSION SERIES
Discharge: HOME | End: 2024-12-13
Payer: MEDICARE

## 2024-12-13 VITALS
DIASTOLIC BLOOD PRESSURE: 84 MMHG | TEMPERATURE: 98.6 F | WEIGHT: 227.51 LBS | OXYGEN SATURATION: 97 % | SYSTOLIC BLOOD PRESSURE: 124 MMHG | RESPIRATION RATE: 18 BRPM | HEART RATE: 86 BPM | BODY MASS INDEX: 39.98 KG/M2

## 2024-12-13 DIAGNOSIS — C83.00 SMALL B-CELL LYMPHOMA, UNSPECIFIED BODY REGION (HCC): Primary | ICD-10-CM

## 2024-12-13 LAB
ALBUMIN SERPL-MCNC: 4.1 G/DL (ref 3.4–5)
ALP SERPL-CCNC: 142 U/L (ref 40–129)
ALT SERPL-CCNC: 27 U/L (ref 10–40)
ANION GAP SERPL CALCULATED.3IONS-SCNC: 14 MMOL/L (ref 3–16)
AST SERPL-CCNC: 33 U/L (ref 15–37)
BASOPHILS # BLD: 0 K/UL (ref 0–0.2)
BASOPHILS NFR BLD: 0.8 %
BILIRUB DIRECT SERPL-MCNC: 0.2 MG/DL (ref 0–0.3)
BILIRUB INDIRECT SERPL-MCNC: 0.3 MG/DL (ref 0–1)
BILIRUB SERPL-MCNC: 0.5 MG/DL (ref 0–1)
BUN SERPL-MCNC: 18 MG/DL (ref 7–20)
CALCIUM SERPL-MCNC: 9.1 MG/DL (ref 8.3–10.6)
CHLORIDE SERPL-SCNC: 104 MMOL/L (ref 99–110)
CO2 SERPL-SCNC: 23 MMOL/L (ref 21–32)
CREAT SERPL-MCNC: 1.3 MG/DL (ref 0.6–1.2)
CRP SERPL-MCNC: 8.4 MG/L (ref 0–5.1)
DEPRECATED RDW RBC AUTO: 17.4 % (ref 12.4–15.4)
EOSINOPHIL # BLD: 0 K/UL (ref 0–0.6)
EOSINOPHIL NFR BLD: 2.3 %
FERRITIN SERPL IA-MCNC: 320 NG/ML (ref 15–150)
GFR SERPLBLD CREATININE-BSD FMLA CKD-EPI: 45 ML/MIN/{1.73_M2}
GLUCOSE SERPL-MCNC: 246 MG/DL (ref 70–99)
HCT VFR BLD AUTO: 36.5 % (ref 36–48)
HGB BLD-MCNC: 12.1 G/DL (ref 12–16)
LDH SERPL L TO P-CCNC: 199 U/L (ref 100–190)
LYMPHOCYTES # BLD: 0.3 K/UL (ref 1–5.1)
LYMPHOCYTES NFR BLD: 21.9 %
MAGNESIUM SERPL-MCNC: 1.98 MG/DL (ref 1.8–2.4)
MCH RBC QN AUTO: 31.7 PG (ref 26–34)
MCHC RBC AUTO-ENTMCNC: 33.2 G/DL (ref 31–36)
MCV RBC AUTO: 95.6 FL (ref 80–100)
MONOCYTES # BLD: 0.5 K/UL (ref 0–1.3)
MONOCYTES NFR BLD: 43.7 %
NEUTROPHILS # BLD: 0.4 K/UL (ref 1.7–7.7)
NEUTROPHILS NFR BLD: 31.3 %
PHOSPHATE SERPL-MCNC: 2.7 MG/DL (ref 2.5–4.9)
PLATELET # BLD AUTO: 162 K/UL (ref 135–450)
PMV BLD AUTO: 9.1 FL (ref 5–10.5)
POTASSIUM SERPL-SCNC: 3.9 MMOL/L (ref 3.5–5.1)
PROT SERPL-MCNC: 6.3 G/DL (ref 6.4–8.2)
RBC # BLD AUTO: 3.82 M/UL (ref 4–5.2)
SODIUM SERPL-SCNC: 141 MMOL/L (ref 136–145)
URATE SERPL-MCNC: 3.5 MG/DL (ref 2.6–6)
WBC # BLD AUTO: 1.2 K/UL (ref 4–11)

## 2024-12-13 PROCEDURE — 86140 C-REACTIVE PROTEIN: CPT

## 2024-12-13 PROCEDURE — 83615 LACTATE (LD) (LDH) ENZYME: CPT

## 2024-12-13 PROCEDURE — 80076 HEPATIC FUNCTION PANEL: CPT

## 2024-12-13 PROCEDURE — 36591 DRAW BLOOD OFF VENOUS DEVICE: CPT

## 2024-12-13 PROCEDURE — 84550 ASSAY OF BLOOD/URIC ACID: CPT

## 2024-12-13 PROCEDURE — 83735 ASSAY OF MAGNESIUM: CPT

## 2024-12-13 PROCEDURE — 84100 ASSAY OF PHOSPHORUS: CPT

## 2024-12-13 PROCEDURE — 85025 COMPLETE CBC W/AUTO DIFF WBC: CPT

## 2024-12-13 PROCEDURE — 80048 BASIC METABOLIC PNL TOTAL CA: CPT

## 2024-12-13 PROCEDURE — 82728 ASSAY OF FERRITIN: CPT

## 2024-12-13 RX ORDER — ONDANSETRON 4 MG/1
8 TABLET, FILM COATED ORAL EVERY 8 HOURS PRN
Status: CANCELLED | OUTPATIENT
Start: 2024-12-14

## 2024-12-13 RX ORDER — ONDANSETRON 2 MG/ML
8 INJECTION INTRAMUSCULAR; INTRAVENOUS EVERY 8 HOURS PRN
Status: CANCELLED | OUTPATIENT
Start: 2024-12-14

## 2024-12-13 RX ORDER — HEPARIN 100 UNIT/ML
500 SYRINGE INTRAVENOUS PRN
Status: CANCELLED | OUTPATIENT
Start: 2024-12-14

## 2024-12-13 RX ORDER — POTASSIUM CHLORIDE 29.8 MG/ML
80 INJECTION INTRAVENOUS PRN
Status: CANCELLED | OUTPATIENT
Start: 2024-12-14 | End: 2025-03-24

## 2024-12-13 RX ORDER — POTASSIUM CHLORIDE 1500 MG/1
40 TABLET, EXTENDED RELEASE ORAL PRN
Status: CANCELLED | OUTPATIENT
Start: 2024-12-14

## 2024-12-13 RX ORDER — OXYCODONE HYDROCHLORIDE 5 MG/1
5 TABLET ORAL EVERY 4 HOURS PRN
Status: CANCELLED | OUTPATIENT
Start: 2024-12-14

## 2024-12-13 RX ORDER — 0.9 % SODIUM CHLORIDE 0.9 %
1000 INTRAVENOUS SOLUTION INTRAVENOUS ONCE
Status: CANCELLED | OUTPATIENT
Start: 2024-12-14 | End: 2024-12-14

## 2024-12-13 RX ORDER — PROCHLORPERAZINE EDISYLATE 5 MG/ML
10 INJECTION INTRAMUSCULAR; INTRAVENOUS EVERY 6 HOURS PRN
Status: CANCELLED | OUTPATIENT
Start: 2024-12-14

## 2024-12-13 RX ORDER — MAGNESIUM SULFATE IN WATER 40 MG/ML
4000 INJECTION, SOLUTION INTRAVENOUS PRN
Status: CANCELLED | OUTPATIENT
Start: 2024-12-14

## 2024-12-13 RX ORDER — OXYCODONE HYDROCHLORIDE 5 MG/1
10 TABLET ORAL EVERY 4 HOURS PRN
Status: CANCELLED | OUTPATIENT
Start: 2024-12-14

## 2024-12-13 RX ORDER — PROCHLORPERAZINE MALEATE 10 MG
10 TABLET ORAL EVERY 6 HOURS PRN
Status: CANCELLED | OUTPATIENT
Start: 2024-12-14

## 2024-12-13 RX ORDER — SODIUM CHLORIDE 9 MG/ML
INJECTION, SOLUTION INTRAVENOUS CONTINUOUS PRN
Status: CANCELLED | OUTPATIENT
Start: 2024-12-14

## 2024-12-13 NOTE — PROGRESS NOTES
Short Stay Communication Note  Kiera Irvin  Diagnosis: B cell lymphoma  Primary MD:Dr. Valentina Brewer  Treatment: Fludarabine/Cytoxan  CAR-T Administration Date:   Day +11 of Car-T Ketty   Pt seen in outpatient infusion today. Labs drawn and reviewed.   CBC:   Recent Labs     12/11/24  0843 12/12/24  0846 12/13/24  0840   WBC 1.3* 1.1* 1.2*   HGB 11.7* 11.5* 12.1   HCT 35.0* 35.0* 36.5   MCV 94.5 95.6 95.6    155 162     BMP/Mag:  Recent Labs     12/11/24  0843 12/12/24  0846 12/13/24  0840    141 141   K 4.2 4.1 3.9    106 104   CO2 24 22 23   PHOS 3.1 2.6 2.7   BUN 20 17 18   CREATININE 1.2 1.2 1.3*   MG 2.08  --  1.98     Standing parameters for replacement for this patient:   1 unit of pack red blood cells for a hemoglobin < or equal to 7  1 pack of platelets for a platelet count less than or equal to 10  40 MeQ of Potassium administered for a potassium level less than or equal to 3.4  4g of Magnesium Sulfate for a magnesum level less than or equal to 1.4  No transfusions required for the above lab values.    Urinalysis last done: 12/9/24 Urinalysis next due: 12/16/24    Chest X-Ray last done: 12/9/24 Chest X-Ray next due: 12/16/24    Symptoms addressed and reported to care team this date:   Treatments this date: labs    Reviewed medication schedule with pt and caregiver- per note from Janae PACHECO NP no longer needs allopurinol after her current prescription is complete. Both able to verbalize all medications and schedule. Pt to be seen again tomorrow. Patient and caregiver verbalized understanding of discharge instructions including when and how to call the doctor and when to report  to the ER. Temp traq has only a few days remaining, and will not be replaced. Pt encouraged to take oral temps 4x daily once temp traq is complete to monitor for fevers and call on call physician of temp reaches 100.4 or higher. Discharged ambulatory to home.   
   106 104   CO2 24 22 23   PHOS 3.1 2.6 2.7   BUN 20 17 18   CREATININE 1.2 1.2 1.3*   MG 2.08  --  1.98     LIVP:   Recent Labs     12/11/24  0843 12/13/24  0840   AST 33 33   ALT 30 27   BILIDIR 0.2 0.2   BILITOT 0.5 0.5   ALKPHOS 136* 142*     Coags:   Recent Labs     12/12/24  0846   PROTIME 14.0   INR 1.06         Uric Acid No results for input(s): \"LABURIC\" in the last 72 hours.    PROBLEM LIST:           B-cell lymphoma (disorder) ( Stage Date: 01/21/2016, Stage III-Pathological  Date of Dx:01/21/2016 Disease Status: Initial presentation; CD30 Result: Negative; B Symptoms: No B symptoms; ALK: Negative; SCT Eligible: Yes; CD20 Result: Positive; B Cell Lymphoma is a for of CLL )  2. Anxiety  3. Atrial fibrillation with rapid ventricular response s/p ablation  4. Diabetes mellitus type 2   5. Endometrial hyperplasia   6. Essential hypertension   7. GERD  8. Obstructive sleep apnea  9. Thyroid nodule      TREATMENT:            XRT to thoracic spine  Bendamustine / Rituxan x 6 cycles (ending 7/2016)  Rituxan maintenance x 12 months  Clinical Trial - Revlimid & Rituxan x 7 cycles (ending 12/2018)  CVP- R x 8 cycles (12/2018 - 6/2019)   3 cycles of R-ICE in 2019 followed by [employer] in 12/2019  XRT to mesentery in 2020      Lymphodepleting Chemotherapy:  Fludarabine and cyclophosphamide   Disease Status at time of Infusion: Ongoing Disease   CAR-T Infusion Date: 12/2/24  CAR-T Product: Breyanzi  Patient ID Number:  [id number] [id number]  JOIN #: [id number]     ASSESSMENT AND PLAN:           1. Relapsed refractory follicular lymphoma: Ongoing Disease   - Recent scan on 8/30/24 showed relapse with left supraclavicular and left paraaortic node, repeat bx consistent with follicular lymphoma      PLAN:   Two doses of Tocilizumab will remain available in the inpatient pharmacy until day + 28 post - infusion.     Lymphodepleting Chemotherapy:  Fludarabine and cyclophosphamide   Disease Status at time of Infusion: Ongoing

## 2024-12-14 ENCOUNTER — HOSPITAL ENCOUNTER (OUTPATIENT)
Dept: ONCOLOGY | Age: 69
Setting detail: INFUSION SERIES
Discharge: HOME | End: 2024-12-14
Payer: MEDICARE

## 2024-12-14 VITALS
SYSTOLIC BLOOD PRESSURE: 107 MMHG | RESPIRATION RATE: 18 BRPM | HEART RATE: 77 BPM | OXYGEN SATURATION: 97 % | DIASTOLIC BLOOD PRESSURE: 60 MMHG | BODY MASS INDEX: 39.75 KG/M2 | TEMPERATURE: 97.7 F | WEIGHT: 226.19 LBS

## 2024-12-14 DIAGNOSIS — C83.00 SMALL B-CELL LYMPHOMA, UNSPECIFIED BODY REGION (HCC): Primary | ICD-10-CM

## 2024-12-14 LAB
ANION GAP SERPL CALCULATED.3IONS-SCNC: 10 MMOL/L (ref 3–16)
BASOPHILS # BLD: 0 K/UL (ref 0–0.2)
BASOPHILS NFR BLD: 3 %
BUN SERPL-MCNC: 21 MG/DL (ref 7–20)
CALCIUM SERPL-MCNC: 9.1 MG/DL (ref 8.3–10.6)
CHLORIDE SERPL-SCNC: 106 MMOL/L (ref 99–110)
CO2 SERPL-SCNC: 24 MMOL/L (ref 21–32)
CREAT SERPL-MCNC: 1.3 MG/DL (ref 0.6–1.2)
CRP SERPL-MCNC: 11.4 MG/L (ref 0–5.1)
DEPRECATED RDW RBC AUTO: 16.7 % (ref 12.4–15.4)
EOSINOPHIL # BLD: 0 K/UL (ref 0–0.6)
EOSINOPHIL NFR BLD: 0 %
FERRITIN SERPL IA-MCNC: 307 NG/ML (ref 15–150)
GFR SERPLBLD CREATININE-BSD FMLA CKD-EPI: 45 ML/MIN/{1.73_M2}
GLUCOSE SERPL-MCNC: 224 MG/DL (ref 70–99)
HCT VFR BLD AUTO: 35.9 % (ref 36–48)
HGB BLD-MCNC: 12 G/DL (ref 12–16)
LYMPHOCYTES # BLD: 0.4 K/UL (ref 1–5.1)
LYMPHOCYTES NFR BLD: 30 %
MCH RBC QN AUTO: 31.8 PG (ref 26–34)
MCHC RBC AUTO-ENTMCNC: 33.4 G/DL (ref 31–36)
MCV RBC AUTO: 95.4 FL (ref 80–100)
METAMYELOCYTES NFR BLD MANUAL: 1 %
MONOCYTES # BLD: 0.5 K/UL (ref 0–1.3)
MONOCYTES NFR BLD: 38 %
NEUTROPHILS # BLD: 0.4 K/UL (ref 1.7–7.7)
NEUTROPHILS NFR BLD: 28 %
PHOSPHATE SERPL-MCNC: 2.7 MG/DL (ref 2.5–4.9)
PLATELET # BLD AUTO: 156 K/UL (ref 135–450)
PMV BLD AUTO: 9 FL (ref 5–10.5)
POTASSIUM SERPL-SCNC: 3.9 MMOL/L (ref 3.5–5.1)
RBC # BLD AUTO: 3.77 M/UL (ref 4–5.2)
SODIUM SERPL-SCNC: 140 MMOL/L (ref 136–145)
WBC # BLD AUTO: 1.3 K/UL (ref 4–11)

## 2024-12-14 PROCEDURE — 36591 DRAW BLOOD OFF VENOUS DEVICE: CPT

## 2024-12-14 RX ORDER — PROCHLORPERAZINE MALEATE 10 MG
10 TABLET ORAL EVERY 6 HOURS PRN
Status: CANCELLED | OUTPATIENT
Start: 2024-12-15

## 2024-12-14 RX ORDER — OXYCODONE HYDROCHLORIDE 5 MG/1
5 TABLET ORAL EVERY 4 HOURS PRN
Status: DISCONTINUED | OUTPATIENT
Start: 2024-12-14 | End: 2024-12-15 | Stop reason: HOSPADM

## 2024-12-14 RX ORDER — SODIUM CHLORIDE 9 MG/ML
INJECTION, SOLUTION INTRAVENOUS CONTINUOUS PRN
Status: DISCONTINUED | OUTPATIENT
Start: 2024-12-14 | End: 2024-12-15 | Stop reason: HOSPADM

## 2024-12-14 RX ORDER — POTASSIUM CHLORIDE 1500 MG/1
40 TABLET, EXTENDED RELEASE ORAL PRN
Status: DISCONTINUED | OUTPATIENT
Start: 2024-12-14 | End: 2024-12-15 | Stop reason: HOSPADM

## 2024-12-14 RX ORDER — MAGNESIUM SULFATE IN WATER 40 MG/ML
4000 INJECTION, SOLUTION INTRAVENOUS PRN
Status: DISCONTINUED | OUTPATIENT
Start: 2024-12-14 | End: 2024-12-15 | Stop reason: HOSPADM

## 2024-12-14 RX ORDER — POTASSIUM CHLORIDE 1500 MG/1
40 TABLET, EXTENDED RELEASE ORAL PRN
Status: CANCELLED | OUTPATIENT
Start: 2024-12-15

## 2024-12-14 RX ORDER — OXYCODONE HYDROCHLORIDE 5 MG/1
10 TABLET ORAL EVERY 4 HOURS PRN
Status: DISCONTINUED | OUTPATIENT
Start: 2024-12-14 | End: 2024-12-15 | Stop reason: HOSPADM

## 2024-12-14 RX ORDER — ONDANSETRON 2 MG/ML
8 INJECTION INTRAMUSCULAR; INTRAVENOUS EVERY 8 HOURS PRN
Status: CANCELLED | OUTPATIENT
Start: 2024-12-15

## 2024-12-14 RX ORDER — ONDANSETRON 4 MG/1
8 TABLET, FILM COATED ORAL EVERY 8 HOURS PRN
Status: DISCONTINUED | OUTPATIENT
Start: 2024-12-14 | End: 2024-12-15 | Stop reason: HOSPADM

## 2024-12-14 RX ORDER — ONDANSETRON 2 MG/ML
8 INJECTION INTRAMUSCULAR; INTRAVENOUS EVERY 8 HOURS PRN
Status: DISCONTINUED | OUTPATIENT
Start: 2024-12-14 | End: 2024-12-15 | Stop reason: HOSPADM

## 2024-12-14 RX ORDER — OXYCODONE HYDROCHLORIDE 5 MG/1
10 TABLET ORAL EVERY 4 HOURS PRN
Status: CANCELLED | OUTPATIENT
Start: 2024-12-15

## 2024-12-14 RX ORDER — PROCHLORPERAZINE EDISYLATE 5 MG/ML
10 INJECTION INTRAMUSCULAR; INTRAVENOUS EVERY 6 HOURS PRN
Status: CANCELLED | OUTPATIENT
Start: 2024-12-15

## 2024-12-14 RX ORDER — 0.9 % SODIUM CHLORIDE 0.9 %
1000 INTRAVENOUS SOLUTION INTRAVENOUS ONCE
Status: DISCONTINUED | OUTPATIENT
Start: 2024-12-14 | End: 2024-12-15 | Stop reason: HOSPADM

## 2024-12-14 RX ORDER — PROCHLORPERAZINE MALEATE 10 MG
10 TABLET ORAL EVERY 6 HOURS PRN
Status: DISCONTINUED | OUTPATIENT
Start: 2024-12-14 | End: 2024-12-15 | Stop reason: HOSPADM

## 2024-12-14 RX ORDER — 0.9 % SODIUM CHLORIDE 0.9 %
1000 INTRAVENOUS SOLUTION INTRAVENOUS ONCE
Status: CANCELLED | OUTPATIENT
Start: 2024-12-15 | End: 2024-12-15

## 2024-12-14 RX ORDER — ONDANSETRON 4 MG/1
8 TABLET, FILM COATED ORAL EVERY 8 HOURS PRN
Status: CANCELLED | OUTPATIENT
Start: 2024-12-15

## 2024-12-14 RX ORDER — POTASSIUM CHLORIDE 29.8 MG/ML
80 INJECTION INTRAVENOUS PRN
Status: CANCELLED | OUTPATIENT
Start: 2024-12-15 | End: 2025-03-25

## 2024-12-14 RX ORDER — PROCHLORPERAZINE EDISYLATE 5 MG/ML
10 INJECTION INTRAMUSCULAR; INTRAVENOUS EVERY 6 HOURS PRN
Status: DISCONTINUED | OUTPATIENT
Start: 2024-12-14 | End: 2024-12-15 | Stop reason: HOSPADM

## 2024-12-14 RX ORDER — SODIUM CHLORIDE 9 MG/ML
INJECTION, SOLUTION INTRAVENOUS CONTINUOUS PRN
Status: CANCELLED | OUTPATIENT
Start: 2024-12-15

## 2024-12-14 RX ORDER — HEPARIN 100 UNIT/ML
500 SYRINGE INTRAVENOUS PRN
OUTPATIENT
Start: 2024-12-15

## 2024-12-14 RX ORDER — OXYCODONE HYDROCHLORIDE 5 MG/1
5 TABLET ORAL EVERY 4 HOURS PRN
Status: CANCELLED | OUTPATIENT
Start: 2024-12-15

## 2024-12-14 RX ORDER — MAGNESIUM SULFATE IN WATER 40 MG/ML
4000 INJECTION, SOLUTION INTRAVENOUS PRN
Status: CANCELLED | OUTPATIENT
Start: 2024-12-15

## 2024-12-14 RX ORDER — POTASSIUM CHLORIDE 29.8 MG/ML
20 INJECTION INTRAVENOUS PRN
Status: DISCONTINUED | OUTPATIENT
Start: 2024-12-14 | End: 2024-12-15 | Stop reason: HOSPADM

## 2024-12-14 NOTE — PROGRESS NOTES
Short Stay Communication Note  Kiera Irvin  Diagnosis: B cell lymphoma  Primary MD:Dr. Valentina Brewer  Treatment: Fludarabine/Cytoxan  CAR-T Administration Date:   Day +12 of Car-T Breyanzi   Pt seen in outpatient infusion today. Labs drawn and reviewed.   CBC:   Recent Labs     12/12/24  0846 12/13/24  0840 12/14/24  0851   WBC 1.1* 1.2* 1.3*   HGB 11.5* 12.1 12.0   HCT 35.0* 36.5 35.9*   MCV 95.6 95.6 95.4    162 156     BMP/Mag:  Recent Labs     12/12/24  0846 12/13/24  0840 12/14/24  0851    141 140   K 4.1 3.9 3.9    104 106   CO2 22 23 24   PHOS 2.6 2.7 2.7   BUN 17 18 21*   CREATININE 1.2 1.3* 1.3*   MG  --  1.98  --      Standing parameters for replacement for this patient:   1 unit of pack red blood cells for a hemoglobin < or equal to 7  1 pack of platelets for a platelet count less than or equal to 10  40 MeQ of Potassium administered for a potassium level less than or equal to 3.4  4g of Magnesium Sulfate for a magnesum level less than or equal to 1.4  No transfusions required for the above lab values.    Urinalysis last done: 12/9/24 Urinalysis next due: 12/16/24    Chest X-Ray last done: 12/9/24 Chest X-Ray next due: 12/16/24    Symptoms addressed and reported to care team this date:   Treatments this date: labs    Reviewed medication schedule with pt and caregiver. Both able to verbalize all medications and schedule. Pt to be seen again tomorrow. Patient and caregiver verbalized understanding of discharge instructions including when and how to call the doctor and when to report  to the ER. Temp traq has only a few days remaining, and will not be replaced. Pt encouraged to take oral temps 4x daily once temp traq is complete to monitor for fevers and call on call physician of temp reaches 100.4 or higher. Discharged ambulatory to home.   
paraaortic node, repeat bx consistent with follicular lymphoma      PLAN:   Two doses of Tocilizumab will remain available in the inpatient pharmacy until day + 28 post - infusion.     Lymphodepleting Chemotherapy:  Fludarabine and cyclophosphamide   Disease Status at time of Infusion: Ongoing Disease   CAR-T Infusion Date: 12/2/24  CAR-T Product: Breyanzi  Patient ID Number:  [id number] [id number]  JOIN #: [id number]     Day + 12     2. CRS / Neuro: At risk post-infusion  CRS Grade: N/A  - Monitor CRP and Ferrtin closely     Lab Results   Component Value Date    CRP 11.4 (H) 12/14/2024    CRP 8.4 (H) 12/13/2024    CRP 6.4 (H) 12/12/2024      Lab Results   Component Value Date    FERRITIN 307.0 (H) 12/14/2024    FERRITIN 320.0 (H) 12/13/2024    FERRITIN 323.0 (H) 12/12/2024         ICANS Grade: N/A  CAR-T Score:   N/A  - Neuro checks w/ CARTOX 10-point assessment Q4hrs  - Started Keppra on 12/2/24     3.  ID:  Afebrile, no evidence of infection.    - Cont Valtrex, Diflucan and Levaquin ppx (started 12/3/24)      Post CAR- T monitoring / maintenance:  - IgG & CD4 level monthly starting on day + 30  - Check disease titers on day + 30 or when WBC recovered. Re-vaccinate vs booster based on titer (assure titers are drawn prior to administering immunoglobulins)  - Start PCP ppx on day + 30 - stop when CD4 > 250  - Cont Valtrex for 1 year post CAR-T     4.  Heme:  Pancytopenia d/t recent chemo / CAR-T Infusion  - Transfuse for Hgb < 7 and Platelets < 10K  - No transfusion today     5. Metabolic/CKD 3A:   - Baseline Cr 1.1-1.2    - Cont Potassium 10 mEq BID supplement while on Lasix  Risk for TLS:  No evidence  - Cont allopurinol daily  - Daily TLS labs   - Replace K+ & Mg per PRN orders  - Give IV K Phos 30 mmol once 12/5/24     6. GI / Nutrition:    Nutrition:  Appetite and oral intake is adequate  - Cont low microbial diet   - Follow closely with dietician     7. Cardiac: Hx Atrial fibrillation s/p ablation, HTN  - Cont Metoprolol

## 2024-12-15 ENCOUNTER — HOSPITAL ENCOUNTER (OUTPATIENT)
Dept: ONCOLOGY | Age: 69
Setting detail: INFUSION SERIES
Discharge: HOME | End: 2024-12-15
Payer: MEDICARE

## 2024-12-15 VITALS
WEIGHT: 226.85 LBS | SYSTOLIC BLOOD PRESSURE: 155 MMHG | HEART RATE: 87 BPM | OXYGEN SATURATION: 98 % | DIASTOLIC BLOOD PRESSURE: 109 MMHG | BODY MASS INDEX: 39.87 KG/M2 | RESPIRATION RATE: 16 BRPM | TEMPERATURE: 97.4 F

## 2024-12-15 DIAGNOSIS — C83.00 SMALL B-CELL LYMPHOMA, UNSPECIFIED BODY REGION (HCC): Primary | ICD-10-CM

## 2024-12-15 LAB
ANION GAP SERPL CALCULATED.3IONS-SCNC: 15 MMOL/L (ref 3–16)
BASOPHILS # BLD: 0 K/UL (ref 0–0.2)
BASOPHILS NFR BLD: 0 %
BUN SERPL-MCNC: 19 MG/DL (ref 7–20)
CALCIUM SERPL-MCNC: 9 MG/DL (ref 8.3–10.6)
CHLORIDE SERPL-SCNC: 104 MMOL/L (ref 99–110)
CO2 SERPL-SCNC: 22 MMOL/L (ref 21–32)
CREAT SERPL-MCNC: 1.3 MG/DL (ref 0.6–1.2)
CRP SERPL-MCNC: 12.1 MG/L (ref 0–5.1)
DEPRECATED RDW RBC AUTO: 17.4 % (ref 12.4–15.4)
EOSINOPHIL # BLD: 0 K/UL (ref 0–0.6)
EOSINOPHIL NFR BLD: 1 %
FERRITIN SERPL IA-MCNC: 303 NG/ML (ref 15–150)
GFR SERPLBLD CREATININE-BSD FMLA CKD-EPI: 45 ML/MIN/{1.73_M2}
GLUCOSE SERPL-MCNC: 267 MG/DL (ref 70–99)
HCT VFR BLD AUTO: 36.3 % (ref 36–48)
HGB BLD-MCNC: 11.9 G/DL (ref 12–16)
LYMPHOCYTES # BLD: 0.3 K/UL (ref 1–5.1)
LYMPHOCYTES NFR BLD: 20 %
MCH RBC QN AUTO: 31.6 PG (ref 26–34)
MCHC RBC AUTO-ENTMCNC: 32.8 G/DL (ref 31–36)
MCV RBC AUTO: 96.3 FL (ref 80–100)
METAMYELOCYTES NFR BLD MANUAL: 1 %
MONOCYTES # BLD: 0.5 K/UL (ref 0–1.3)
MONOCYTES NFR BLD: 33 %
NEUTROPHILS # BLD: 0.6 K/UL (ref 1.7–7.7)
NEUTROPHILS NFR BLD: 44 %
NEUTS BAND NFR BLD MANUAL: 1 % (ref 0–7)
PHOSPHATE SERPL-MCNC: 2.8 MG/DL (ref 2.5–4.9)
PLATELET # BLD AUTO: 151 K/UL (ref 135–450)
PMV BLD AUTO: 9.2 FL (ref 5–10.5)
POTASSIUM SERPL-SCNC: 3.6 MMOL/L (ref 3.5–5.1)
RBC # BLD AUTO: 3.77 M/UL (ref 4–5.2)
SODIUM SERPL-SCNC: 141 MMOL/L (ref 136–145)
WBC # BLD AUTO: 1.4 K/UL (ref 4–11)

## 2024-12-15 PROCEDURE — 86140 C-REACTIVE PROTEIN: CPT

## 2024-12-15 PROCEDURE — 85025 COMPLETE CBC W/AUTO DIFF WBC: CPT

## 2024-12-15 PROCEDURE — 84100 ASSAY OF PHOSPHORUS: CPT

## 2024-12-15 PROCEDURE — 80048 BASIC METABOLIC PNL TOTAL CA: CPT

## 2024-12-15 PROCEDURE — 36591 DRAW BLOOD OFF VENOUS DEVICE: CPT

## 2024-12-15 PROCEDURE — 82728 ASSAY OF FERRITIN: CPT

## 2024-12-15 RX ORDER — OXYCODONE HYDROCHLORIDE 5 MG/1
10 TABLET ORAL EVERY 4 HOURS PRN
Status: CANCELLED | OUTPATIENT
Start: 2024-12-16

## 2024-12-15 RX ORDER — PROCHLORPERAZINE EDISYLATE 5 MG/ML
10 INJECTION INTRAMUSCULAR; INTRAVENOUS EVERY 6 HOURS PRN
Status: DISCONTINUED | OUTPATIENT
Start: 2024-12-15 | End: 2024-12-16 | Stop reason: HOSPADM

## 2024-12-15 RX ORDER — POTASSIUM CHLORIDE 29.8 MG/ML
20 INJECTION INTRAVENOUS PRN
Status: DISCONTINUED | OUTPATIENT
Start: 2024-12-15 | End: 2024-12-16 | Stop reason: HOSPADM

## 2024-12-15 RX ORDER — POTASSIUM CHLORIDE 1500 MG/1
40 TABLET, EXTENDED RELEASE ORAL PRN
Status: DISCONTINUED | OUTPATIENT
Start: 2024-12-15 | End: 2024-12-16 | Stop reason: HOSPADM

## 2024-12-15 RX ORDER — PROCHLORPERAZINE MALEATE 10 MG
10 TABLET ORAL EVERY 6 HOURS PRN
Status: CANCELLED | OUTPATIENT
Start: 2024-12-16

## 2024-12-15 RX ORDER — MAGNESIUM SULFATE IN WATER 40 MG/ML
4000 INJECTION, SOLUTION INTRAVENOUS PRN
Status: CANCELLED | OUTPATIENT
Start: 2024-12-16

## 2024-12-15 RX ORDER — 0.9 % SODIUM CHLORIDE 0.9 %
1000 INTRAVENOUS SOLUTION INTRAVENOUS ONCE
Status: CANCELLED | OUTPATIENT
Start: 2024-12-16 | End: 2024-12-16

## 2024-12-15 RX ORDER — HEPARIN 100 UNIT/ML
500 SYRINGE INTRAVENOUS PRN
Status: CANCELLED | OUTPATIENT
Start: 2024-12-16

## 2024-12-15 RX ORDER — SODIUM CHLORIDE 9 MG/ML
INJECTION, SOLUTION INTRAVENOUS CONTINUOUS PRN
Status: DISCONTINUED | OUTPATIENT
Start: 2024-12-15 | End: 2024-12-16 | Stop reason: HOSPADM

## 2024-12-15 RX ORDER — ONDANSETRON 2 MG/ML
8 INJECTION INTRAMUSCULAR; INTRAVENOUS EVERY 8 HOURS PRN
Status: DISCONTINUED | OUTPATIENT
Start: 2024-12-15 | End: 2024-12-16 | Stop reason: HOSPADM

## 2024-12-15 RX ORDER — ONDANSETRON 4 MG/1
8 TABLET, FILM COATED ORAL EVERY 8 HOURS PRN
Status: CANCELLED | OUTPATIENT
Start: 2024-12-16

## 2024-12-15 RX ORDER — POTASSIUM CHLORIDE 29.8 MG/ML
80 INJECTION INTRAVENOUS PRN
Status: CANCELLED | OUTPATIENT
Start: 2024-12-16 | End: 2025-03-26

## 2024-12-15 RX ORDER — MAGNESIUM SULFATE IN WATER 40 MG/ML
4000 INJECTION, SOLUTION INTRAVENOUS PRN
Status: DISCONTINUED | OUTPATIENT
Start: 2024-12-15 | End: 2024-12-16 | Stop reason: HOSPADM

## 2024-12-15 RX ORDER — OXYCODONE HYDROCHLORIDE 5 MG/1
5 TABLET ORAL EVERY 4 HOURS PRN
Status: DISCONTINUED | OUTPATIENT
Start: 2024-12-15 | End: 2024-12-16 | Stop reason: HOSPADM

## 2024-12-15 RX ORDER — ONDANSETRON 4 MG/1
8 TABLET, FILM COATED ORAL EVERY 8 HOURS PRN
Status: DISCONTINUED | OUTPATIENT
Start: 2024-12-15 | End: 2024-12-16 | Stop reason: HOSPADM

## 2024-12-15 RX ORDER — PROCHLORPERAZINE MALEATE 10 MG
10 TABLET ORAL EVERY 6 HOURS PRN
Status: DISCONTINUED | OUTPATIENT
Start: 2024-12-15 | End: 2024-12-16 | Stop reason: HOSPADM

## 2024-12-15 RX ORDER — OXYCODONE HYDROCHLORIDE 5 MG/1
10 TABLET ORAL EVERY 4 HOURS PRN
Status: DISCONTINUED | OUTPATIENT
Start: 2024-12-15 | End: 2024-12-16 | Stop reason: HOSPADM

## 2024-12-15 RX ORDER — PROCHLORPERAZINE EDISYLATE 5 MG/ML
10 INJECTION INTRAMUSCULAR; INTRAVENOUS EVERY 6 HOURS PRN
Status: CANCELLED | OUTPATIENT
Start: 2024-12-16

## 2024-12-15 RX ORDER — POTASSIUM CHLORIDE 1500 MG/1
40 TABLET, EXTENDED RELEASE ORAL PRN
Status: CANCELLED | OUTPATIENT
Start: 2024-12-16

## 2024-12-15 RX ORDER — ONDANSETRON 2 MG/ML
8 INJECTION INTRAMUSCULAR; INTRAVENOUS EVERY 8 HOURS PRN
Status: CANCELLED | OUTPATIENT
Start: 2024-12-16

## 2024-12-15 RX ORDER — OXYCODONE HYDROCHLORIDE 5 MG/1
5 TABLET ORAL EVERY 4 HOURS PRN
Status: CANCELLED | OUTPATIENT
Start: 2024-12-16

## 2024-12-15 RX ORDER — 0.9 % SODIUM CHLORIDE 0.9 %
1000 INTRAVENOUS SOLUTION INTRAVENOUS ONCE
Status: DISCONTINUED | OUTPATIENT
Start: 2024-12-15 | End: 2024-12-16 | Stop reason: HOSPADM

## 2024-12-15 RX ORDER — SODIUM CHLORIDE 9 MG/ML
INJECTION, SOLUTION INTRAVENOUS CONTINUOUS PRN
Status: CANCELLED | OUTPATIENT
Start: 2024-12-16

## 2024-12-15 NOTE — PROGRESS NOTES
[facility] Progress Note    12/15/2024     Kiera Irvin    MRN: [id number]    : 1955    Referring MD: Eileen Palomino, APRN - NP  [address] [place] [address]      Subjective: Patient is doing welI.         ECOG PS:  (1) Restricted in physically strenuous activity, ambulatory and able to do work of light nature    KPS: 90% Able to carry on normal activity; minor signs or symptoms of disease    Isolation: None    Medications    Scheduled Meds:        Continuous Infusions:        PRN Meds:.    ROS:  As noted above, otherwise remainder of 10-point ROS negative    Physical Exam:     I&O:  No intake or output data in the 24 hours ending 12/15/24 0739          Vital Signs:  There were no vitals taken for this visit.    Weight:    Wt Readings from Last 3 Encounters:   24 102.6 kg (226 lb 3.1 oz)   24 103.2 kg (227 lb 8.2 oz)   24 103.9 kg (229 lb 0.9 oz)       PE performed by Dr. Camacho:   General: Awake, alert and oriented.  HEENT: normocephalic, PERRL, no scleral erythema or icterus, Oral mucosa moist and intact, throat clear  NECK: supple  BACK: Straight   SKIN: warm dry and intact without lesions rashes or masses  CHEST: CTA bilaterally without use of accessory muscles  CV: Normal S1 S2, RRR, no MRG  ABD: NT ND normoactive BS  EXTREMITIES: without edema, denies calf tenderness  NEURO: CN II - XII grossly intact  CATHETER: PAC     Data    CBC:   Recent Labs     24  0846 24  0840 24  0851   WBC 1.1* 1.2* 1.3*   HGB 11.5* 12.1 12.0   HCT 35.0* 36.5 35.9*   MCV 95.6 95.6 95.4    162 156     BMP/Mag:  Recent Labs     24  0846 24  0840 24  0851    141 140   K 4.1 3.9 3.9    104 106   CO2 22 23 24   PHOS 2.6 2.7 2.7   BUN 17 18 21*   CREATININE 1.2 1.3* 1.3*   MG  --  1.98  --      LIVP:   Recent Labs     24  0840   AST 33   ALT 27   BILIDIR 0.2   BILITOT 0.5   ALKPHOS 142*     Coags:   Recent Labs     24  0846

## 2024-12-15 NOTE — PROGRESS NOTES
Short Stay Communication Note  Kiera Irvin  Diagnosis: B cell lymphoma  Primary MD:Dr. Valentina Brewer  Treatment: Fludarabine/Cytoxan  CAR-T Administration Date:   Day +13 of Car-T Jannabreazi   Pt seen in outpatient infusion today. Labs drawn and reviewed.   CBC:   Recent Labs     12/13/24  0840 12/14/24  0851 12/15/24  0853   WBC 1.2* 1.3* 1.4*   HGB 12.1 12.0 11.9*   HCT 36.5 35.9* 36.3   MCV 95.6 95.4 96.3    156 151     BMP/Mag:  Recent Labs     12/13/24  0840 12/14/24  0851 12/15/24  0853    140 141   K 3.9 3.9 3.6    106 104   CO2 23 24 22   PHOS 2.7 2.7 2.8   BUN 18 21* 19   CREATININE 1.3* 1.3* 1.3*   MG 1.98  --   --      Standing parameters for replacement for this patient:   1 unit of pack red blood cells for a hemoglobin < or equal to 7  1 pack of platelets for a platelet count less than or equal to 10  40 MeQ of Potassium administered for a potassium level less than or equal to 3.4  4g of Magnesium Sulfate for a magnesum level less than or equal to 1.4  No transfusions required for the above lab values.    Urinalysis last done: 12/9/24 Urinalysis next due: 12/16/24    Chest X-Ray last done: 12/9/24 Chest X-Ray next due: 12/16/24    Symptoms addressed and reported to care team this date:   Treatments this date: labs    Reviewed medication schedule with pt and caregiver. Both able to verbalize all medications and schedule. Pt to be seen again tomorrow. Patient and caregiver verbalized understanding of discharge instructions including when and how to call the doctor and when to report  to the ER. Temp traq has only a few days remaining, and will not be replaced. Pt encouraged to take oral temps 4x daily once temp traq is complete to monitor for fevers and call on call physician of temp reaches 100.4 or higher. Discharged ambulatory to home.

## 2024-12-16 ENCOUNTER — HOSPITAL ENCOUNTER (OUTPATIENT)
Dept: ONCOLOGY | Age: 69
Setting detail: INFUSION SERIES
Discharge: HOME | End: 2024-12-16
Payer: MEDICARE

## 2024-12-16 ENCOUNTER — HOSPITAL ENCOUNTER (OUTPATIENT)
Dept: GENERAL RADIOLOGY | Age: 69
Discharge: HOME | End: 2024-12-16
Payer: MEDICARE

## 2024-12-16 VITALS
DIASTOLIC BLOOD PRESSURE: 73 MMHG | SYSTOLIC BLOOD PRESSURE: 121 MMHG | WEIGHT: 227.96 LBS | OXYGEN SATURATION: 97 % | RESPIRATION RATE: 16 BRPM | BODY MASS INDEX: 40.06 KG/M2 | TEMPERATURE: 98.1 F | HEART RATE: 82 BPM

## 2024-12-16 DIAGNOSIS — C83.00 SMALL B-CELL LYMPHOMA, UNSPECIFIED BODY REGION (HCC): Primary | ICD-10-CM

## 2024-12-16 LAB
ALBUMIN SERPL-MCNC: 3.9 G/DL (ref 3.4–5)
ALP SERPL-CCNC: 136 U/L (ref 40–129)
ALT SERPL-CCNC: 27 U/L (ref 10–40)
ANION GAP SERPL CALCULATED.3IONS-SCNC: 15 MMOL/L (ref 3–16)
AST SERPL-CCNC: 31 U/L (ref 15–37)
BASOPHILS # BLD: 0 K/UL (ref 0–0.2)
BASOPHILS NFR BLD: 0 %
BILIRUB DIRECT SERPL-MCNC: 0.2 MG/DL (ref 0–0.3)
BILIRUB INDIRECT SERPL-MCNC: 0.2 MG/DL (ref 0–1)
BILIRUB SERPL-MCNC: 0.4 MG/DL (ref 0–1)
BILIRUB UR QL STRIP.AUTO: NEGATIVE
BUN SERPL-MCNC: 23 MG/DL (ref 7–20)
CALCIUM SERPL-MCNC: 9 MG/DL (ref 8.3–10.6)
CHLORIDE SERPL-SCNC: 104 MMOL/L (ref 99–110)
CLARITY UR: CLEAR
CO2 SERPL-SCNC: 22 MMOL/L (ref 21–32)
COLOR UR: YELLOW
CREAT SERPL-MCNC: 1.3 MG/DL (ref 0.6–1.2)
CRP SERPL-MCNC: 16.1 MG/L (ref 0–5.1)
DEPRECATED RDW RBC AUTO: 17.4 % (ref 12.4–15.4)
EOSINOPHIL # BLD: 0 K/UL (ref 0–0.6)
EOSINOPHIL NFR BLD: 1 %
FERRITIN SERPL IA-MCNC: 294 NG/ML (ref 15–150)
GFR SERPLBLD CREATININE-BSD FMLA CKD-EPI: 45 ML/MIN/{1.73_M2}
GLUCOSE SERPL-MCNC: 288 MG/DL (ref 70–99)
GLUCOSE UR STRIP.AUTO-MCNC: 500 MG/DL
HCT VFR BLD AUTO: 35.1 % (ref 36–48)
HGB BLD-MCNC: 11.7 G/DL (ref 12–16)
HGB UR QL STRIP.AUTO: NEGATIVE
INR PPP: 1.44 (ref 0.85–1.15)
KETONES UR STRIP.AUTO-MCNC: NEGATIVE MG/DL
LDH SERPL L TO P-CCNC: 173 U/L (ref 100–190)
LEUKOCYTE ESTERASE UR QL STRIP.AUTO: NEGATIVE
LYMPHOCYTES # BLD: 0.4 K/UL (ref 1–5.1)
LYMPHOCYTES NFR BLD: 23 %
MAGNESIUM SERPL-MCNC: 1.93 MG/DL (ref 1.8–2.4)
MCH RBC QN AUTO: 31.9 PG (ref 26–34)
MCHC RBC AUTO-ENTMCNC: 33.4 G/DL (ref 31–36)
MCV RBC AUTO: 95.5 FL (ref 80–100)
METAMYELOCYTES NFR BLD MANUAL: 1 %
MONOCYTES # BLD: 0.4 K/UL (ref 0–1.3)
MONOCYTES NFR BLD: 24 %
NEUTROPHILS # BLD: 0.8 K/UL (ref 1.7–7.7)
NEUTROPHILS NFR BLD: 51 %
NITRITE UR QL STRIP.AUTO: NEGATIVE
PH UR STRIP.AUTO: 6 [PH] (ref 5–8)
PHOSPHATE SERPL-MCNC: 2.6 MG/DL (ref 2.5–4.9)
PLATELET # BLD AUTO: 137 K/UL (ref 135–450)
PLATELET BLD QL SMEAR: ADEQUATE
PMV BLD AUTO: 9.3 FL (ref 5–10.5)
POTASSIUM SERPL-SCNC: 3.7 MMOL/L (ref 3.5–5.1)
PROT SERPL-MCNC: 6.1 G/DL (ref 6.4–8.2)
PROT UR STRIP.AUTO-MCNC: NEGATIVE MG/DL
PROTHROMBIN TIME: 17.7 SEC (ref 11.9–14.9)
RBC # BLD AUTO: 3.68 M/UL (ref 4–5.2)
RBC MORPH BLD: NORMAL
SLIDE REVIEW: ABNORMAL
SODIUM SERPL-SCNC: 141 MMOL/L (ref 136–145)
SP GR UR STRIP.AUTO: 1.02 (ref 1–1.03)
UA DIPSTICK W REFLEX MICRO PNL UR: ABNORMAL
URATE SERPL-MCNC: 4.6 MG/DL (ref 2.6–6)
URN SPEC COLLECT METH UR: ABNORMAL
UROBILINOGEN UR STRIP-ACNC: 0.2 E.U./DL
WBC # BLD AUTO: 1.6 K/UL (ref 4–11)

## 2024-12-16 PROCEDURE — 80076 HEPATIC FUNCTION PANEL: CPT

## 2024-12-16 PROCEDURE — 84550 ASSAY OF BLOOD/URIC ACID: CPT

## 2024-12-16 PROCEDURE — 86140 C-REACTIVE PROTEIN: CPT

## 2024-12-16 PROCEDURE — 82728 ASSAY OF FERRITIN: CPT

## 2024-12-16 PROCEDURE — 71045 X-RAY EXAM CHEST 1 VIEW: CPT

## 2024-12-16 PROCEDURE — 85610 PROTHROMBIN TIME: CPT

## 2024-12-16 PROCEDURE — 85025 COMPLETE CBC W/AUTO DIFF WBC: CPT

## 2024-12-16 PROCEDURE — 80048 BASIC METABOLIC PNL TOTAL CA: CPT

## 2024-12-16 PROCEDURE — 84100 ASSAY OF PHOSPHORUS: CPT

## 2024-12-16 PROCEDURE — 36591 DRAW BLOOD OFF VENOUS DEVICE: CPT

## 2024-12-16 PROCEDURE — 81003 URINALYSIS AUTO W/O SCOPE: CPT

## 2024-12-16 PROCEDURE — 83615 LACTATE (LD) (LDH) ENZYME: CPT

## 2024-12-16 PROCEDURE — 83735 ASSAY OF MAGNESIUM: CPT

## 2024-12-16 RX ORDER — OXYCODONE HYDROCHLORIDE 5 MG/1
10 TABLET ORAL EVERY 4 HOURS PRN
Status: CANCELLED | OUTPATIENT
Start: 2024-12-17

## 2024-12-16 RX ORDER — OXYCODONE HYDROCHLORIDE 5 MG/1
10 TABLET ORAL EVERY 4 HOURS PRN
Status: DISCONTINUED | OUTPATIENT
Start: 2024-12-16 | End: 2024-12-17 | Stop reason: HOSPADM

## 2024-12-16 RX ORDER — ONDANSETRON 4 MG/1
8 TABLET, FILM COATED ORAL EVERY 8 HOURS PRN
Status: CANCELLED | OUTPATIENT
Start: 2024-12-17

## 2024-12-16 RX ORDER — PROCHLORPERAZINE MALEATE 10 MG
10 TABLET ORAL EVERY 6 HOURS PRN
Status: CANCELLED | OUTPATIENT
Start: 2024-12-17

## 2024-12-16 RX ORDER — MAGNESIUM SULFATE IN WATER 40 MG/ML
4000 INJECTION, SOLUTION INTRAVENOUS PRN
Status: DISCONTINUED | OUTPATIENT
Start: 2024-12-16 | End: 2024-12-17 | Stop reason: HOSPADM

## 2024-12-16 RX ORDER — SODIUM CHLORIDE 9 MG/ML
INJECTION, SOLUTION INTRAVENOUS CONTINUOUS PRN
Status: DISCONTINUED | OUTPATIENT
Start: 2024-12-16 | End: 2024-12-17 | Stop reason: HOSPADM

## 2024-12-16 RX ORDER — POTASSIUM CHLORIDE 29.8 MG/ML
80 INJECTION INTRAVENOUS PRN
Status: CANCELLED | OUTPATIENT
Start: 2024-12-17 | End: 2025-03-27

## 2024-12-16 RX ORDER — SODIUM CHLORIDE 9 MG/ML
INJECTION, SOLUTION INTRAVENOUS CONTINUOUS PRN
Status: CANCELLED | OUTPATIENT
Start: 2024-12-17

## 2024-12-16 RX ORDER — PROCHLORPERAZINE EDISYLATE 5 MG/ML
10 INJECTION INTRAMUSCULAR; INTRAVENOUS EVERY 6 HOURS PRN
Status: CANCELLED | OUTPATIENT
Start: 2024-12-17

## 2024-12-16 RX ORDER — 0.9 % SODIUM CHLORIDE 0.9 %
1000 INTRAVENOUS SOLUTION INTRAVENOUS ONCE
Status: DISCONTINUED | OUTPATIENT
Start: 2024-12-16 | End: 2024-12-17 | Stop reason: HOSPADM

## 2024-12-16 RX ORDER — ONDANSETRON 2 MG/ML
8 INJECTION INTRAMUSCULAR; INTRAVENOUS EVERY 8 HOURS PRN
Status: DISCONTINUED | OUTPATIENT
Start: 2024-12-16 | End: 2024-12-17 | Stop reason: HOSPADM

## 2024-12-16 RX ORDER — 0.9 % SODIUM CHLORIDE 0.9 %
1000 INTRAVENOUS SOLUTION INTRAVENOUS ONCE
Status: CANCELLED | OUTPATIENT
Start: 2024-12-17 | End: 2024-12-17

## 2024-12-16 RX ORDER — OXYCODONE HYDROCHLORIDE 5 MG/1
5 TABLET ORAL EVERY 4 HOURS PRN
Status: CANCELLED | OUTPATIENT
Start: 2024-12-17

## 2024-12-16 RX ORDER — ONDANSETRON 2 MG/ML
8 INJECTION INTRAMUSCULAR; INTRAVENOUS EVERY 8 HOURS PRN
Status: CANCELLED | OUTPATIENT
Start: 2024-12-17

## 2024-12-16 RX ORDER — MAGNESIUM SULFATE IN WATER 40 MG/ML
4000 INJECTION, SOLUTION INTRAVENOUS PRN
Status: CANCELLED | OUTPATIENT
Start: 2024-12-17

## 2024-12-16 RX ORDER — POTASSIUM CHLORIDE 1500 MG/1
40 TABLET, EXTENDED RELEASE ORAL PRN
Status: CANCELLED | OUTPATIENT
Start: 2024-12-17

## 2024-12-16 RX ORDER — ONDANSETRON 4 MG/1
8 TABLET, FILM COATED ORAL EVERY 8 HOURS PRN
Status: DISCONTINUED | OUTPATIENT
Start: 2024-12-16 | End: 2024-12-17 | Stop reason: HOSPADM

## 2024-12-16 RX ORDER — PROCHLORPERAZINE MALEATE 10 MG
10 TABLET ORAL EVERY 6 HOURS PRN
Status: DISCONTINUED | OUTPATIENT
Start: 2024-12-16 | End: 2024-12-17 | Stop reason: HOSPADM

## 2024-12-16 RX ORDER — POTASSIUM CHLORIDE 1500 MG/1
40 TABLET, EXTENDED RELEASE ORAL PRN
Status: DISCONTINUED | OUTPATIENT
Start: 2024-12-16 | End: 2024-12-17 | Stop reason: HOSPADM

## 2024-12-16 RX ORDER — HEPARIN 100 UNIT/ML
500 SYRINGE INTRAVENOUS PRN
Status: CANCELLED | OUTPATIENT
Start: 2024-12-17

## 2024-12-16 RX ORDER — OXYCODONE HYDROCHLORIDE 5 MG/1
5 TABLET ORAL EVERY 4 HOURS PRN
Status: DISCONTINUED | OUTPATIENT
Start: 2024-12-16 | End: 2024-12-17 | Stop reason: HOSPADM

## 2024-12-16 RX ORDER — POTASSIUM CHLORIDE 29.8 MG/ML
20 INJECTION INTRAVENOUS PRN
Status: DISCONTINUED | OUTPATIENT
Start: 2024-12-16 | End: 2024-12-17 | Stop reason: HOSPADM

## 2024-12-16 RX ORDER — PROCHLORPERAZINE EDISYLATE 5 MG/ML
10 INJECTION INTRAMUSCULAR; INTRAVENOUS EVERY 6 HOURS PRN
Status: DISCONTINUED | OUTPATIENT
Start: 2024-12-16 | End: 2024-12-17 | Stop reason: HOSPADM

## 2024-12-16 NOTE — PROGRESS NOTES
Short Stay Communication Note  Kiera Irvin  Diagnosis: B cell lymphoma  Primary MD:Dr. Valentina Brewer  Treatment: Fludarabine/Cytoxan  CAR-T Administration Date:   Day +14 of Car-T Jannabreazi   Pt seen in outpatient infusion today. Labs drawn and reviewed.   CBC:   Recent Labs     24  0851 12/15/24  0853 24  0921   WBC 1.3* 1.4* 1.6*   HGB 12.0 11.9* 11.7*   HCT 35.9* 36.3 35.1*   MCV 95.4 96.3 95.5    151 137     BMP/Mag:  Recent Labs     24  0851 12/15/24  0853 24  0921    141 141   K 3.9 3.6 3.7    104 104   CO2 24 22 22   PHOS 2.7 2.8 2.6   BUN 21* 19 23*   CREATININE 1.3* 1.3* 1.3*   MG  --   --  1.93     Standing parameters for replacement for this patient:   1 unit of pack red blood cells for a hemoglobin < or equal to 7  1 pack of platelets for a platelet count less than or equal to 10  40 MeQ of Potassium administered for a potassium level less than or equal to 3.4  4g of Magnesium Sulfate for a magnesum level less than or equal to 1.4  No transfusions required for the above lab values.    Urinalysis last done: 24 Urinalysis next due: 24    Chest X-Ray last done: 24 Chest X-Ray next due: 24    Symptoms addressed and reported to care team this date:   Treatments this date: labs    Reviewed medication schedule with pt and caregiver. Both able to verbalize all medications and schedule. Pt to be seen again . Patient and caregiver verbalized understanding of discharge instructions including when and how to call the doctor and when to report  to the ER. Temp traq has , and will not be replaced. Pt encouraged to take oral temps 4x daily once temp traq is complete to monitor for fevers and call on call physician of temp reaches 100.4 or higher. Discharged ambulatory to home.   
- infusion.     Lymphodepleting Chemotherapy:  Fludarabine and cyclophosphamide   Disease Status at time of Infusion: Ongoing Disease   CAR-T Infusion Date: 12/2/24  CAR-T Product: Breyanzi  Patient ID Number:  [id number] [id number]  JOIN #: [id number]     Day + 14     2. CRS / Neuro: At risk post-infusion  CRS Grade: N/A  - Monitor CRP and Ferrtin closely     Lab Results   Component Value Date    CRP 16.1 (H) 12/16/2024    CRP 12.1 (H) 12/15/2024    CRP 11.4 (H) 12/14/2024      Lab Results   Component Value Date    FERRITIN 294.0 (H) 12/16/2024    FERRITIN 303.0 (H) 12/15/2024    FERRITIN 307.0 (H) 12/14/2024          ICANS Grade: N/A  CAR-T Score:   N/A  - Neuro checks w/ CARTOX 10-point assessment Q4hrs  - Started Keppra on 12/2/24     3.  ID:  Afebrile, no evidence of infection.    - Cont Valtrex, Diflucan and Levaquin ppx (started 12/3/24)      Post CAR- T monitoring / maintenance:  - IgG & CD4 level monthly starting on day + 30  - Check disease titers on day + 30 or when WBC recovered. Re-vaccinate vs booster based on titer (assure titers are drawn prior to administering immunoglobulins)  - Start PCP ppx on day + 30 - stop when CD4 > 250  - Cont Valtrex for 1 year post CAR-T     4.  Heme:  Pancytopenia d/t recent chemo / CAR-T Infusion  - Transfuse for Hgb < 7 and Platelets < 10K  - No transfusion today     5. Metabolic/CKD 3A:   - Baseline Cr 1.1-1.2    - Cont Potassium 10 mEq BID supplement while on Lasix  Risk for TLS:  No evidence  - Cont allopurinol daily  - Daily TLS labs   - Replace K+ & Mg per PRN orders  - Give IV K Phos 30 mmol once 12/5/24     6. GI / Nutrition:    Nutrition:  Appetite and oral intake is adequate  - Cont low microbial diet   - Follow closely with dietician     7. Cardiac: Hx Atrial fibrillation s/p ablation, HTN  - Cont Metoprolol XL and Eliquis (Hold Eliquis when plt < 50)   - Cont Lasix 20 mg PO daily  - ECHO 9/30/24: 60-65%     8.  Endocrine:  Type 2 DM  - Cont Lantus 30 units Q HS  - Cont

## 2024-12-18 ENCOUNTER — HOSPITAL ENCOUNTER (OUTPATIENT)
Dept: ONCOLOGY | Age: 69
Setting detail: INFUSION SERIES
Discharge: HOME | End: 2024-12-18
Payer: MEDICARE

## 2024-12-18 VITALS
HEART RATE: 77 BPM | BODY MASS INDEX: 40.33 KG/M2 | WEIGHT: 229.5 LBS | SYSTOLIC BLOOD PRESSURE: 136 MMHG | OXYGEN SATURATION: 95 % | TEMPERATURE: 97.7 F | DIASTOLIC BLOOD PRESSURE: 84 MMHG | RESPIRATION RATE: 16 BRPM

## 2024-12-18 DIAGNOSIS — C83.00 SMALL B-CELL LYMPHOMA, UNSPECIFIED BODY REGION (HCC): Primary | ICD-10-CM

## 2024-12-18 LAB
ALBUMIN SERPL-MCNC: 3.8 G/DL (ref 3.4–5)
ALP SERPL-CCNC: 132 U/L (ref 40–129)
ALT SERPL-CCNC: 31 U/L (ref 10–40)
ANION GAP SERPL CALCULATED.3IONS-SCNC: 15 MMOL/L (ref 3–16)
AST SERPL-CCNC: 35 U/L (ref 15–37)
BASOPHILS # BLD: 0 K/UL (ref 0–0.2)
BASOPHILS NFR BLD: 1.2 %
BILIRUB DIRECT SERPL-MCNC: 0.2 MG/DL (ref 0–0.3)
BILIRUB INDIRECT SERPL-MCNC: 0.1 MG/DL (ref 0–1)
BILIRUB SERPL-MCNC: 0.3 MG/DL (ref 0–1)
BUN SERPL-MCNC: 23 MG/DL (ref 7–20)
CALCIUM SERPL-MCNC: 9.1 MG/DL (ref 8.3–10.6)
CHLORIDE SERPL-SCNC: 105 MMOL/L (ref 99–110)
CO2 SERPL-SCNC: 22 MMOL/L (ref 21–32)
CREAT SERPL-MCNC: 1.2 MG/DL (ref 0.6–1.2)
CRP SERPL-MCNC: 14.9 MG/L (ref 0–5.1)
DEPRECATED RDW RBC AUTO: 17.9 % (ref 12.4–15.4)
EOSINOPHIL # BLD: 0 K/UL (ref 0–0.6)
EOSINOPHIL NFR BLD: 1.1 %
FERRITIN SERPL IA-MCNC: 291 NG/ML (ref 15–150)
GFR SERPLBLD CREATININE-BSD FMLA CKD-EPI: 50 ML/MIN/{1.73_M2}
GLUCOSE SERPL-MCNC: 257 MG/DL (ref 70–99)
HCT VFR BLD AUTO: 35.4 % (ref 36–48)
HGB BLD-MCNC: 11.7 G/DL (ref 12–16)
LDH SERPL L TO P-CCNC: 172 U/L (ref 100–190)
LYMPHOCYTES # BLD: 0.3 K/UL (ref 1–5.1)
LYMPHOCYTES NFR BLD: 10.2 %
MAGNESIUM SERPL-MCNC: 1.9 MG/DL (ref 1.8–2.4)
MCH RBC QN AUTO: 31.6 PG (ref 26–34)
MCHC RBC AUTO-ENTMCNC: 33 G/DL (ref 31–36)
MCV RBC AUTO: 95.9 FL (ref 80–100)
MONOCYTES # BLD: 0.6 K/UL (ref 0–1.3)
MONOCYTES NFR BLD: 21.8 %
NEUTROPHILS # BLD: 1.8 K/UL (ref 1.7–7.7)
NEUTROPHILS NFR BLD: 65.7 %
PHOSPHATE SERPL-MCNC: 2.9 MG/DL (ref 2.5–4.9)
PLATELET # BLD AUTO: 133 K/UL (ref 135–450)
PMV BLD AUTO: 9.6 FL (ref 5–10.5)
POTASSIUM SERPL-SCNC: 3.8 MMOL/L (ref 3.5–5.1)
PROT SERPL-MCNC: 6 G/DL (ref 6.4–8.2)
RBC # BLD AUTO: 3.69 M/UL (ref 4–5.2)
SODIUM SERPL-SCNC: 142 MMOL/L (ref 136–145)
URATE SERPL-MCNC: 5 MG/DL (ref 2.6–6)
WBC # BLD AUTO: 2.7 K/UL (ref 4–11)

## 2024-12-18 PROCEDURE — 86140 C-REACTIVE PROTEIN: CPT

## 2024-12-18 PROCEDURE — 84100 ASSAY OF PHOSPHORUS: CPT

## 2024-12-18 PROCEDURE — 85025 COMPLETE CBC W/AUTO DIFF WBC: CPT

## 2024-12-18 PROCEDURE — 83615 LACTATE (LD) (LDH) ENZYME: CPT

## 2024-12-18 PROCEDURE — 80048 BASIC METABOLIC PNL TOTAL CA: CPT

## 2024-12-18 PROCEDURE — 84550 ASSAY OF BLOOD/URIC ACID: CPT

## 2024-12-18 PROCEDURE — 82728 ASSAY OF FERRITIN: CPT

## 2024-12-18 PROCEDURE — 80076 HEPATIC FUNCTION PANEL: CPT

## 2024-12-18 PROCEDURE — 36591 DRAW BLOOD OFF VENOUS DEVICE: CPT

## 2024-12-18 PROCEDURE — 83735 ASSAY OF MAGNESIUM: CPT

## 2024-12-18 RX ORDER — SODIUM CHLORIDE 9 MG/ML
INJECTION, SOLUTION INTRAVENOUS CONTINUOUS PRN
OUTPATIENT
Start: 2024-12-19

## 2024-12-18 RX ORDER — ONDANSETRON 4 MG/1
8 TABLET, FILM COATED ORAL EVERY 8 HOURS PRN
Status: DISCONTINUED | OUTPATIENT
Start: 2024-12-18 | End: 2024-12-19 | Stop reason: HOSPADM

## 2024-12-18 RX ORDER — OXYCODONE HYDROCHLORIDE 5 MG/1
5 TABLET ORAL EVERY 4 HOURS PRN
OUTPATIENT
Start: 2024-12-19

## 2024-12-18 RX ORDER — PROCHLORPERAZINE EDISYLATE 5 MG/ML
10 INJECTION INTRAMUSCULAR; INTRAVENOUS EVERY 6 HOURS PRN
OUTPATIENT
Start: 2024-12-19

## 2024-12-18 RX ORDER — ONDANSETRON 4 MG/1
8 TABLET, FILM COATED ORAL EVERY 8 HOURS PRN
OUTPATIENT
Start: 2024-12-19

## 2024-12-18 RX ORDER — OXYCODONE HYDROCHLORIDE 5 MG/1
10 TABLET ORAL EVERY 4 HOURS PRN
Status: DISCONTINUED | OUTPATIENT
Start: 2024-12-18 | End: 2024-12-19 | Stop reason: HOSPADM

## 2024-12-18 RX ORDER — 0.9 % SODIUM CHLORIDE 0.9 %
1000 INTRAVENOUS SOLUTION INTRAVENOUS ONCE
Status: CANCELLED | OUTPATIENT
Start: 2024-12-19 | End: 2024-12-19

## 2024-12-18 RX ORDER — POTASSIUM CHLORIDE 29.8 MG/ML
80 INJECTION INTRAVENOUS PRN
Status: DISCONTINUED | OUTPATIENT
Start: 2024-12-18 | End: 2024-12-19 | Stop reason: HOSPADM

## 2024-12-18 RX ORDER — MAGNESIUM SULFATE IN WATER 40 MG/ML
4000 INJECTION, SOLUTION INTRAVENOUS PRN
OUTPATIENT
Start: 2024-12-19

## 2024-12-18 RX ORDER — ONDANSETRON 2 MG/ML
8 INJECTION INTRAMUSCULAR; INTRAVENOUS EVERY 8 HOURS PRN
OUTPATIENT
Start: 2024-12-19

## 2024-12-18 RX ORDER — POTASSIUM CHLORIDE 1500 MG/1
40 TABLET, EXTENDED RELEASE ORAL PRN
Status: DISCONTINUED | OUTPATIENT
Start: 2024-12-18 | End: 2024-12-19 | Stop reason: HOSPADM

## 2024-12-18 RX ORDER — MAGNESIUM SULFATE IN WATER 40 MG/ML
4000 INJECTION, SOLUTION INTRAVENOUS PRN
Status: DISCONTINUED | OUTPATIENT
Start: 2024-12-18 | End: 2024-12-19 | Stop reason: HOSPADM

## 2024-12-18 RX ORDER — HEPARIN 100 UNIT/ML
500 SYRINGE INTRAVENOUS PRN
OUTPATIENT
Start: 2024-12-19

## 2024-12-18 RX ORDER — OXYCODONE HYDROCHLORIDE 5 MG/1
5 TABLET ORAL EVERY 4 HOURS PRN
Status: DISCONTINUED | OUTPATIENT
Start: 2024-12-18 | End: 2024-12-19 | Stop reason: HOSPADM

## 2024-12-18 RX ORDER — PROCHLORPERAZINE MALEATE 10 MG
10 TABLET ORAL EVERY 6 HOURS PRN
OUTPATIENT
Start: 2024-12-19

## 2024-12-18 RX ORDER — ONDANSETRON 2 MG/ML
8 INJECTION INTRAMUSCULAR; INTRAVENOUS EVERY 8 HOURS PRN
Status: DISCONTINUED | OUTPATIENT
Start: 2024-12-18 | End: 2024-12-19 | Stop reason: HOSPADM

## 2024-12-18 RX ORDER — SODIUM CHLORIDE 9 MG/ML
INJECTION, SOLUTION INTRAVENOUS CONTINUOUS PRN
Status: DISCONTINUED | OUTPATIENT
Start: 2024-12-18 | End: 2024-12-19 | Stop reason: HOSPADM

## 2024-12-18 RX ORDER — POTASSIUM CHLORIDE 1500 MG/1
40 TABLET, EXTENDED RELEASE ORAL PRN
OUTPATIENT
Start: 2024-12-19

## 2024-12-18 RX ORDER — OXYCODONE HYDROCHLORIDE 5 MG/1
10 TABLET ORAL EVERY 4 HOURS PRN
OUTPATIENT
Start: 2024-12-19

## 2024-12-18 RX ORDER — PROCHLORPERAZINE EDISYLATE 5 MG/ML
10 INJECTION INTRAMUSCULAR; INTRAVENOUS EVERY 6 HOURS PRN
Status: DISCONTINUED | OUTPATIENT
Start: 2024-12-18 | End: 2024-12-19 | Stop reason: HOSPADM

## 2024-12-18 RX ORDER — POTASSIUM CHLORIDE 29.8 MG/ML
80 INJECTION INTRAVENOUS PRN
OUTPATIENT
Start: 2024-12-19 | End: 2025-03-29

## 2024-12-18 RX ORDER — 0.9 % SODIUM CHLORIDE 0.9 %
1000 INTRAVENOUS SOLUTION INTRAVENOUS ONCE
Status: DISCONTINUED | OUTPATIENT
Start: 2024-12-18 | End: 2024-12-19 | Stop reason: HOSPADM

## 2024-12-18 RX ORDER — PROCHLORPERAZINE MALEATE 10 MG
10 TABLET ORAL EVERY 6 HOURS PRN
Status: DISCONTINUED | OUTPATIENT
Start: 2024-12-18 | End: 2024-12-19 | Stop reason: HOSPADM

## 2024-12-18 NOTE — PROGRESS NOTES
Short Stay Communication Note  Kiera Irvin  Diagnosis: B cell lymphoma  Primary MD:Dr. Valentina Brewer  Treatment: Fludarabine/Cytoxan  CAR-T Administration Date: 12/2/24  Day +16 of Car-T Ketty   Pt seen in outpatient infusion today. Labs drawn and reviewed.   CBC:   Recent Labs     12/16/24  0921 12/18/24  0846   WBC 1.6* 2.7*   HGB 11.7* 11.7*   HCT 35.1* 35.4*   MCV 95.5 95.9    133*     BMP/Mag:  Recent Labs     12/16/24  0921 12/18/24  0846    142   K 3.7 3.8    105   CO2 22 22   PHOS 2.6 2.9   BUN 23* 23*   CREATININE 1.3* 1.2   MG 1.93 1.90     Standing parameters for replacement for this patient:   1 unit of pack red blood cells for a hemoglobin < or equal to 7  1 pack of platelets for a platelet count less than or equal to 10  40 MeQ of Potassium administered for a potassium level less than or equal to 3.4  4g of Magnesium Sulfate for a magnesum level less than or equal to 1.4  No transfusions required for the above lab values.    Urinalysis last done: 12/16/24 Urinalysis next due: 12/23/24    Chest X-Ray last done: 12/16/24 Chest X-Ray next due: 12/23/24    Symptoms addressed and reported to care team this date: fatigue  Treatments this date: labs    Reviewed medication schedule with pt and caregiver. Both able to verbalize all medications and schedule. Pt to be seen again Friday 12/20. Patient and caregiver verbalized understanding of discharge instructions including when and how to call the doctor and when to report  to the ER. Temp traq has completed, and will not be replaced. Pt encouraged to take oral temps 4x daily once temp traq is complete to monitor for fevers and call on call physician of temp reaches 100.4 or higher. Discharged ambulatory to home.

## 2024-12-18 NOTE — PROGRESS NOTES
[facility] Progress Note    2024     Kiera Irvin    MRN: [id number]    : 1955    Referring MD: Eileen Palomino, APRN - NP  [address] [place] [address]      Subjective: Patient is doing welI. Her only complaint is fatigue.         ECOG PS:  (1) Restricted in physically strenuous activity, ambulatory and able to do work of light nature    KPS: 90% Able to carry on normal activity; minor signs or symptoms of disease    Isolation: None    Medications    Scheduled Meds:   sodium chloride  1,000 mL IntraVENous Once    ALTEplase (CATHFLO) 2 mg in sterile water 2 mL injection  2 mg IntraCATHeter Once           Continuous Infusions:   sodium chloride             PRN Meds:.sodium chloride, potassium chloride, potassium chloride, magnesium sulfate, magnesium hydroxide, oxyCODONE, oxyCODONE, prochlorperazine, prochlorperazine, ondansetron, ondansetron    ROS:  As noted above, otherwise remainder of 10-point ROS negative    Physical Exam:     I&O:  No intake or output data in the 24 hours ending 24 1314            Vital Signs:  /84   Pulse 77   Temp 97.7 °F (36.5 °C) (Oral)   Resp 16   Wt 104.1 kg (229 lb 8 oz)   SpO2 95%   BMI 40.33 kg/m²     Weight:    Wt Readings from Last 3 Encounters:   24 104.1 kg (229 lb 8 oz)   24 103.4 kg (227 lb 15.3 oz)   12/15/24 102.9 kg (226 lb 13.7 oz)       PE performed by Dr. Camacho:   General: Awake, alert and oriented.  HEENT: normocephalic, PERRL, no scleral erythema or icterus, Oral mucosa moist and intact, throat clear  NECK: supple  BACK: Straight   SKIN: warm dry and intact without lesions rashes or masses  CHEST: CTA bilaterally without use of accessory muscles  CV: Normal S1 S2, RRR, no MRG  ABD: NT ND normoactive BS  EXTREMITIES: without edema, denies calf tenderness  NEURO: CN II - XII grossly intact  CATHETER: PAC     Data    CBC:   Recent Labs     24  0921 24  0846   WBC 1.6* 2.7*   HGB 11.7* 11.7*   HCT

## 2024-12-20 ENCOUNTER — HOSPITAL ENCOUNTER (OUTPATIENT)
Dept: ONCOLOGY | Age: 69
Setting detail: INFUSION SERIES
Discharge: HOME | End: 2024-12-20
Payer: MEDICARE

## 2024-12-20 VITALS
BODY MASS INDEX: 40.37 KG/M2 | SYSTOLIC BLOOD PRESSURE: 119 MMHG | OXYGEN SATURATION: 97 % | DIASTOLIC BLOOD PRESSURE: 80 MMHG | WEIGHT: 229.72 LBS | RESPIRATION RATE: 18 BRPM | TEMPERATURE: 98 F | HEART RATE: 78 BPM

## 2024-12-20 DIAGNOSIS — C83.00 SMALL B-CELL LYMPHOMA, UNSPECIFIED BODY REGION (HCC): Primary | ICD-10-CM

## 2024-12-20 LAB
ALBUMIN SERPL-MCNC: 3.9 G/DL (ref 3.4–5)
ALP SERPL-CCNC: 133 U/L (ref 40–129)
ALT SERPL-CCNC: 32 U/L (ref 10–40)
ANION GAP SERPL CALCULATED.3IONS-SCNC: 15 MMOL/L (ref 3–16)
AST SERPL-CCNC: 34 U/L (ref 15–37)
BASOPHILS # BLD: 0 K/UL (ref 0–0.2)
BASOPHILS NFR BLD: 0.7 %
BILIRUB DIRECT SERPL-MCNC: 0.2 MG/DL (ref 0–0.3)
BILIRUB INDIRECT SERPL-MCNC: 0.1 MG/DL (ref 0–1)
BILIRUB SERPL-MCNC: 0.3 MG/DL (ref 0–1)
BUN SERPL-MCNC: 19 MG/DL (ref 7–20)
CALCIUM SERPL-MCNC: 9.2 MG/DL (ref 8.3–10.6)
CHLORIDE SERPL-SCNC: 104 MMOL/L (ref 99–110)
CO2 SERPL-SCNC: 23 MMOL/L (ref 21–32)
CREAT SERPL-MCNC: 1.3 MG/DL (ref 0.6–1.2)
CRP SERPL-MCNC: 17.3 MG/L (ref 0–5.1)
DEPRECATED RDW RBC AUTO: 17.5 % (ref 12.4–15.4)
EOSINOPHIL # BLD: 0 K/UL (ref 0–0.6)
EOSINOPHIL NFR BLD: 0.6 %
FERRITIN SERPL IA-MCNC: 284 NG/ML (ref 15–150)
GFR SERPLBLD CREATININE-BSD FMLA CKD-EPI: 45 ML/MIN/{1.73_M2}
GLUCOSE SERPL-MCNC: 274 MG/DL (ref 70–99)
HCT VFR BLD AUTO: 36.3 % (ref 36–48)
HGB BLD-MCNC: 11.9 G/DL (ref 12–16)
INR PPP: 1.18 (ref 0.85–1.15)
LDH SERPL L TO P-CCNC: 166 U/L (ref 100–190)
LYMPHOCYTES # BLD: 0.3 K/UL (ref 1–5.1)
LYMPHOCYTES NFR BLD: 7.3 %
MAGNESIUM SERPL-MCNC: 1.89 MG/DL (ref 1.8–2.4)
MCH RBC QN AUTO: 31.3 PG (ref 26–34)
MCHC RBC AUTO-ENTMCNC: 32.8 G/DL (ref 31–36)
MCV RBC AUTO: 95.7 FL (ref 80–100)
MONOCYTES # BLD: 0.7 K/UL (ref 0–1.3)
MONOCYTES NFR BLD: 16.6 %
NEUTROPHILS # BLD: 3 K/UL (ref 1.7–7.7)
NEUTROPHILS NFR BLD: 74.8 %
PHOSPHATE SERPL-MCNC: 2.8 MG/DL (ref 2.5–4.9)
PLATELET # BLD AUTO: 126 K/UL (ref 135–450)
PMV BLD AUTO: 9.9 FL (ref 5–10.5)
POTASSIUM SERPL-SCNC: 3.6 MMOL/L (ref 3.5–5.1)
PROT SERPL-MCNC: 6.1 G/DL (ref 6.4–8.2)
PROTHROMBIN TIME: 15.2 SEC (ref 11.9–14.9)
RBC # BLD AUTO: 3.79 M/UL (ref 4–5.2)
SODIUM SERPL-SCNC: 142 MMOL/L (ref 136–145)
URATE SERPL-MCNC: 5.1 MG/DL (ref 2.6–6)
WBC # BLD AUTO: 4.1 K/UL (ref 4–11)

## 2024-12-20 PROCEDURE — 84100 ASSAY OF PHOSPHORUS: CPT

## 2024-12-20 PROCEDURE — 85025 COMPLETE CBC W/AUTO DIFF WBC: CPT

## 2024-12-20 PROCEDURE — 83735 ASSAY OF MAGNESIUM: CPT

## 2024-12-20 PROCEDURE — 82728 ASSAY OF FERRITIN: CPT

## 2024-12-20 PROCEDURE — 86140 C-REACTIVE PROTEIN: CPT

## 2024-12-20 PROCEDURE — 83615 LACTATE (LD) (LDH) ENZYME: CPT

## 2024-12-20 PROCEDURE — 85610 PROTHROMBIN TIME: CPT

## 2024-12-20 PROCEDURE — 36591 DRAW BLOOD OFF VENOUS DEVICE: CPT

## 2024-12-20 PROCEDURE — 80076 HEPATIC FUNCTION PANEL: CPT

## 2024-12-20 PROCEDURE — 84550 ASSAY OF BLOOD/URIC ACID: CPT

## 2024-12-20 PROCEDURE — 80048 BASIC METABOLIC PNL TOTAL CA: CPT

## 2024-12-20 RX ORDER — ONDANSETRON 4 MG/1
8 TABLET, FILM COATED ORAL EVERY 8 HOURS PRN
OUTPATIENT
Start: 2024-12-21

## 2024-12-20 RX ORDER — PROCHLORPERAZINE MALEATE 10 MG
10 TABLET ORAL EVERY 6 HOURS PRN
OUTPATIENT
Start: 2024-12-21

## 2024-12-20 RX ORDER — PROCHLORPERAZINE EDISYLATE 5 MG/ML
10 INJECTION INTRAMUSCULAR; INTRAVENOUS EVERY 6 HOURS PRN
OUTPATIENT
Start: 2024-12-21

## 2024-12-20 RX ORDER — 0.9 % SODIUM CHLORIDE 0.9 %
1000 INTRAVENOUS SOLUTION INTRAVENOUS ONCE
OUTPATIENT
Start: 2024-12-21 | End: 2024-12-21

## 2024-12-20 RX ORDER — OXYCODONE HYDROCHLORIDE 5 MG/1
5 TABLET ORAL EVERY 4 HOURS PRN
OUTPATIENT
Start: 2024-12-21

## 2024-12-20 RX ORDER — MAGNESIUM SULFATE IN WATER 40 MG/ML
4000 INJECTION, SOLUTION INTRAVENOUS PRN
OUTPATIENT
Start: 2024-12-21

## 2024-12-20 RX ORDER — 0.9 % SODIUM CHLORIDE 0.9 %
1000 INTRAVENOUS SOLUTION INTRAVENOUS ONCE
Status: DISCONTINUED | OUTPATIENT
Start: 2024-12-20 | End: 2024-12-21 | Stop reason: HOSPADM

## 2024-12-20 RX ORDER — POTASSIUM CHLORIDE 29.8 MG/ML
80 INJECTION INTRAVENOUS PRN
OUTPATIENT
Start: 2024-12-21 | End: 2025-03-31

## 2024-12-20 RX ORDER — OXYCODONE HYDROCHLORIDE 5 MG/1
10 TABLET ORAL EVERY 4 HOURS PRN
OUTPATIENT
Start: 2024-12-21

## 2024-12-20 RX ORDER — ONDANSETRON 2 MG/ML
8 INJECTION INTRAMUSCULAR; INTRAVENOUS EVERY 8 HOURS PRN
OUTPATIENT
Start: 2024-12-21

## 2024-12-20 RX ORDER — SODIUM CHLORIDE 9 MG/ML
INJECTION, SOLUTION INTRAVENOUS CONTINUOUS PRN
OUTPATIENT
Start: 2024-12-21

## 2024-12-20 RX ORDER — HEPARIN 100 UNIT/ML
500 SYRINGE INTRAVENOUS PRN
OUTPATIENT
Start: 2024-12-21

## 2024-12-20 RX ORDER — POTASSIUM CHLORIDE 1500 MG/1
40 TABLET, EXTENDED RELEASE ORAL PRN
OUTPATIENT
Start: 2024-12-21

## 2024-12-20 NOTE — PROGRESS NOTES
Short Stay Communication Note  Kiera Irvin  Diagnosis: B cell lymphoma  Primary MD:Dr. Valentina Brewer  Treatment: Fludarabine/Cytoxan  CAR-T Administration Date: 12/2/24  Day +18 of Car-T Ketty   Pt seen in outpatient infusion today. Labs drawn and reviewed.   CBC:   Recent Labs     12/18/24  0846 12/20/24  0825   WBC 2.7* 4.1   HGB 11.7* 11.9*   HCT 35.4* 36.3   MCV 95.9 95.7   * 126*     BMP/Mag:  Recent Labs     12/18/24  0846 12/20/24  0825    142   K 3.8 3.6    104   CO2 22 23   PHOS 2.9 2.8   BUN 23* 19   CREATININE 1.2 1.3*   MG 1.90 1.89     Standing parameters for replacement for this patient:   1 unit of pack red blood cells for a hemoglobin < or equal to 7  1 pack of platelets for a platelet count less than or equal to 10  40 MeQ of Potassium administered for a potassium level less than or equal to 3.4  4g of Magnesium Sulfate for a magnesum level less than or equal to 1.4  No transfusions required for the above lab values.    Urinalysis last done: 12/16/24 Urinalysis next due: 12/23/24    Chest X-Ray last done: 12/16/24 Chest X-Ray next due: 12/23/24    Symptoms addressed and reported to care team this date: fatigue  Team currently closely monitoring kidney function. No IV fluids needed today.    Treatments this date: labs    Reviewed medication schedule with pt and caregiver. Both able to verbalize all medications and schedule. Pt to be seen again Monday 12/23/24. Patient and caregiver verbalized understanding of discharge instructions including when and how to call the doctor and when to report  to the ER. Temp traq has completed, and will not be replaced. Pt encouraged to take oral temps 4x daily once temp traq is complete to monitor for fevers and call on call physician of temp reaches 100.4 or higher. Discharged ambulatory to home.

## 2024-12-20 NOTE — PROGRESS NOTES
[facility] Progress Note    2024     Kiera Irvin    MRN: [id number]    : 1955    Referring MD: Eileen Palomino, APRN - NP  [address] [place] [address]      Subjective: Patient is doing welI. She is happy to have the weekend off from visits in the infusion room.        ECOG PS:  (1) Restricted in physically strenuous activity, ambulatory and able to do work of light nature    KPS: 90% Able to carry on normal activity; minor signs or symptoms of disease    Isolation: None    Medications    Scheduled Meds:   sodium chloride  1,000 mL IntraVENous Once           Continuous Infusions:            PRN Meds:.    ROS:  As noted above, otherwise remainder of 10-point ROS negative    Physical Exam:     I&O:  No intake or output data in the 24 hours ending 24 1142            Vital Signs:  /80   Pulse 78   Temp 98 °F (36.7 °C)   Resp 18   Wt 104.2 kg (229 lb 11.5 oz)   SpO2 97%   BMI 40.37 kg/m²     Weight:    Wt Readings from Last 3 Encounters:   24 104.2 kg (229 lb 11.5 oz)   24 104.1 kg (229 lb 8 oz)   24 103.4 kg (227 lb 15.3 oz)       PE performed by Dr. Camacho:   General: Awake, alert and oriented.  HEENT: normocephalic, PERRL, no scleral erythema or icterus, Oral mucosa moist and intact, throat clear  NECK: supple  BACK: Straight   SKIN: warm dry and intact without lesions rashes or masses  CHEST: CTA bilaterally without use of accessory muscles  CV: Normal S1 S2, RRR, no MRG  ABD: NT ND normoactive BS  EXTREMITIES: without edema, denies calf tenderness  NEURO: CN II - XII grossly intact  CATHETER: PAC     Data    CBC:   Recent Labs     24  0846 24  0825   WBC 2.7* 4.1   HGB 11.7* 11.9*   HCT 35.4* 36.3   MCV 95.9 95.7   * 126*     BMP/Mag:  Recent Labs     24  0846 24  0825    142   K 3.8 3.6    104   CO2 22 23   PHOS 2.9 2.8   BUN 23* 19   CREATININE 1.2 1.3*   MG 1.90 1.89     LIVP:   Recent Labs

## 2024-12-21 ENCOUNTER — APPOINTMENT (OUTPATIENT)
Dept: ONCOLOGY | Age: 69
End: 2024-12-21
Payer: MEDICARE

## 2024-12-23 ENCOUNTER — HOSPITAL ENCOUNTER (OUTPATIENT)
Dept: GENERAL RADIOLOGY | Age: 69
Discharge: HOME | End: 2024-12-23
Payer: MEDICARE

## 2024-12-23 ENCOUNTER — HOSPITAL ENCOUNTER (OUTPATIENT)
Dept: ONCOLOGY | Age: 69
Setting detail: INFUSION SERIES
Discharge: HOME | End: 2024-12-23
Payer: MEDICARE

## 2024-12-23 VITALS
BODY MASS INDEX: 40.29 KG/M2 | HEART RATE: 84 BPM | WEIGHT: 229.28 LBS | DIASTOLIC BLOOD PRESSURE: 78 MMHG | TEMPERATURE: 97.7 F | RESPIRATION RATE: 16 BRPM | OXYGEN SATURATION: 96 % | SYSTOLIC BLOOD PRESSURE: 123 MMHG

## 2024-12-23 DIAGNOSIS — C83.00 SMALL B-CELL LYMPHOMA, UNSPECIFIED BODY REGION (HCC): Primary | ICD-10-CM

## 2024-12-23 LAB
ALBUMIN SERPL-MCNC: 4 G/DL (ref 3.4–5)
ALP SERPL-CCNC: 125 U/L (ref 40–129)
ALT SERPL-CCNC: 31 U/L (ref 10–40)
ANION GAP SERPL CALCULATED.3IONS-SCNC: 14 MMOL/L (ref 3–16)
AST SERPL-CCNC: 35 U/L (ref 15–37)
BASOPHILS # BLD: 0 K/UL (ref 0–0.2)
BASOPHILS NFR BLD: 0.5 %
BILIRUB DIRECT SERPL-MCNC: 0.2 MG/DL (ref 0–0.3)
BILIRUB INDIRECT SERPL-MCNC: 0.1 MG/DL (ref 0–1)
BILIRUB SERPL-MCNC: 0.3 MG/DL (ref 0–1)
BILIRUB UR QL STRIP.AUTO: NEGATIVE
BUN SERPL-MCNC: 18 MG/DL (ref 7–20)
CALCIUM SERPL-MCNC: 8.9 MG/DL (ref 8.3–10.6)
CHLORIDE SERPL-SCNC: 104 MMOL/L (ref 99–110)
CLARITY UR: CLEAR
CO2 SERPL-SCNC: 23 MMOL/L (ref 21–32)
COLOR UR: YELLOW
CREAT SERPL-MCNC: 1.2 MG/DL (ref 0.6–1.2)
CRP SERPL-MCNC: 11.2 MG/L (ref 0–5.1)
DEPRECATED RDW RBC AUTO: 18 % (ref 12.4–15.4)
EOSINOPHIL # BLD: 0 K/UL (ref 0–0.6)
EOSINOPHIL NFR BLD: 0.3 %
FERRITIN SERPL IA-MCNC: 270 NG/ML (ref 15–150)
GFR SERPLBLD CREATININE-BSD FMLA CKD-EPI: 50 ML/MIN/{1.73_M2}
GLUCOSE SERPL-MCNC: 271 MG/DL (ref 70–99)
GLUCOSE UR STRIP.AUTO-MCNC: 250 MG/DL
HCT VFR BLD AUTO: 36.5 % (ref 36–48)
HGB BLD-MCNC: 12.2 G/DL (ref 12–16)
HGB UR QL STRIP.AUTO: NEGATIVE
INR PPP: 1.25 (ref 0.85–1.15)
KETONES UR STRIP.AUTO-MCNC: NEGATIVE MG/DL
LDH SERPL L TO P-CCNC: 167 U/L (ref 100–190)
LEUKOCYTE ESTERASE UR QL STRIP.AUTO: NEGATIVE
LYMPHOCYTES # BLD: 0.3 K/UL (ref 1–5.1)
LYMPHOCYTES NFR BLD: 5.6 %
MAGNESIUM SERPL-MCNC: 1.93 MG/DL (ref 1.8–2.4)
MCH RBC QN AUTO: 31.9 PG (ref 26–34)
MCHC RBC AUTO-ENTMCNC: 33.4 G/DL (ref 31–36)
MCV RBC AUTO: 95.5 FL (ref 80–100)
MONOCYTES # BLD: 0.6 K/UL (ref 0–1.3)
MONOCYTES NFR BLD: 11.6 %
NEUTROPHILS # BLD: 4.4 K/UL (ref 1.7–7.7)
NEUTROPHILS NFR BLD: 82 %
NITRITE UR QL STRIP.AUTO: NEGATIVE
PH UR STRIP.AUTO: 6 [PH] (ref 5–8)
PHOSPHATE SERPL-MCNC: 2.5 MG/DL (ref 2.5–4.9)
PLATELET # BLD AUTO: 105 K/UL (ref 135–450)
PMV BLD AUTO: 9.9 FL (ref 5–10.5)
POTASSIUM SERPL-SCNC: 3.6 MMOL/L (ref 3.5–5.1)
PROT SERPL-MCNC: 6.2 G/DL (ref 6.4–8.2)
PROT UR STRIP.AUTO-MCNC: NEGATIVE MG/DL
PROTHROMBIN TIME: 15.9 SEC (ref 11.9–14.9)
RBC # BLD AUTO: 3.82 M/UL (ref 4–5.2)
SODIUM SERPL-SCNC: 141 MMOL/L (ref 136–145)
SP GR UR STRIP.AUTO: 1.02 (ref 1–1.03)
UA DIPSTICK W REFLEX MICRO PNL UR: ABNORMAL
URATE SERPL-MCNC: 5.3 MG/DL (ref 2.6–6)
URN SPEC COLLECT METH UR: ABNORMAL
UROBILINOGEN UR STRIP-ACNC: 0.2 E.U./DL
WBC # BLD AUTO: 5.4 K/UL (ref 4–11)

## 2024-12-23 PROCEDURE — 86140 C-REACTIVE PROTEIN: CPT

## 2024-12-23 PROCEDURE — 80076 HEPATIC FUNCTION PANEL: CPT

## 2024-12-23 PROCEDURE — 83735 ASSAY OF MAGNESIUM: CPT

## 2024-12-23 PROCEDURE — 83615 LACTATE (LD) (LDH) ENZYME: CPT

## 2024-12-23 PROCEDURE — 84100 ASSAY OF PHOSPHORUS: CPT

## 2024-12-23 PROCEDURE — 85610 PROTHROMBIN TIME: CPT

## 2024-12-23 PROCEDURE — 80048 BASIC METABOLIC PNL TOTAL CA: CPT

## 2024-12-23 PROCEDURE — 85025 COMPLETE CBC W/AUTO DIFF WBC: CPT

## 2024-12-23 PROCEDURE — 36591 DRAW BLOOD OFF VENOUS DEVICE: CPT

## 2024-12-23 PROCEDURE — 71045 X-RAY EXAM CHEST 1 VIEW: CPT

## 2024-12-23 PROCEDURE — 82728 ASSAY OF FERRITIN: CPT

## 2024-12-23 PROCEDURE — 84550 ASSAY OF BLOOD/URIC ACID: CPT

## 2024-12-23 PROCEDURE — 81003 URINALYSIS AUTO W/O SCOPE: CPT

## 2024-12-23 RX ORDER — OXYCODONE HYDROCHLORIDE 5 MG/1
5 TABLET ORAL EVERY 4 HOURS PRN
Status: DISCONTINUED | OUTPATIENT
Start: 2024-12-23 | End: 2024-12-24 | Stop reason: HOSPADM

## 2024-12-23 RX ORDER — ONDANSETRON 2 MG/ML
8 INJECTION INTRAMUSCULAR; INTRAVENOUS EVERY 8 HOURS PRN
Status: DISCONTINUED | OUTPATIENT
Start: 2024-12-23 | End: 2024-12-24 | Stop reason: HOSPADM

## 2024-12-23 RX ORDER — PROCHLORPERAZINE MALEATE 10 MG
10 TABLET ORAL EVERY 6 HOURS PRN
Status: DISCONTINUED | OUTPATIENT
Start: 2024-12-23 | End: 2024-12-24 | Stop reason: HOSPADM

## 2024-12-23 RX ORDER — 0.9 % SODIUM CHLORIDE 0.9 %
1000 INTRAVENOUS SOLUTION INTRAVENOUS ONCE
Status: DISCONTINUED | OUTPATIENT
Start: 2024-12-23 | End: 2024-12-24 | Stop reason: HOSPADM

## 2024-12-23 RX ORDER — OXYCODONE HYDROCHLORIDE 5 MG/1
5 TABLET ORAL EVERY 4 HOURS PRN
Status: CANCELLED | OUTPATIENT
Start: 2024-12-24

## 2024-12-23 RX ORDER — ONDANSETRON 4 MG/1
8 TABLET, FILM COATED ORAL EVERY 8 HOURS PRN
Status: CANCELLED | OUTPATIENT
Start: 2024-12-24

## 2024-12-23 RX ORDER — MAGNESIUM SULFATE IN WATER 40 MG/ML
4000 INJECTION, SOLUTION INTRAVENOUS PRN
Status: CANCELLED | OUTPATIENT
Start: 2024-12-24

## 2024-12-23 RX ORDER — OXYCODONE HYDROCHLORIDE 5 MG/1
10 TABLET ORAL EVERY 4 HOURS PRN
Status: DISCONTINUED | OUTPATIENT
Start: 2024-12-23 | End: 2024-12-24 | Stop reason: HOSPADM

## 2024-12-23 RX ORDER — PROCHLORPERAZINE EDISYLATE 5 MG/ML
10 INJECTION INTRAMUSCULAR; INTRAVENOUS EVERY 6 HOURS PRN
Status: DISCONTINUED | OUTPATIENT
Start: 2024-12-23 | End: 2024-12-24 | Stop reason: HOSPADM

## 2024-12-23 RX ORDER — POTASSIUM CHLORIDE 1500 MG/1
40 TABLET, EXTENDED RELEASE ORAL PRN
Status: CANCELLED | OUTPATIENT
Start: 2024-12-24

## 2024-12-23 RX ORDER — ONDANSETRON 4 MG/1
8 TABLET, FILM COATED ORAL EVERY 8 HOURS PRN
Status: DISCONTINUED | OUTPATIENT
Start: 2024-12-23 | End: 2024-12-24 | Stop reason: HOSPADM

## 2024-12-23 RX ORDER — MAGNESIUM SULFATE IN WATER 40 MG/ML
4000 INJECTION, SOLUTION INTRAVENOUS PRN
Status: DISCONTINUED | OUTPATIENT
Start: 2024-12-23 | End: 2024-12-24 | Stop reason: HOSPADM

## 2024-12-23 RX ORDER — ONDANSETRON 2 MG/ML
8 INJECTION INTRAMUSCULAR; INTRAVENOUS EVERY 8 HOURS PRN
Status: CANCELLED | OUTPATIENT
Start: 2024-12-24

## 2024-12-23 RX ORDER — OXYCODONE HYDROCHLORIDE 5 MG/1
10 TABLET ORAL EVERY 4 HOURS PRN
Status: CANCELLED | OUTPATIENT
Start: 2024-12-24

## 2024-12-23 RX ORDER — HEPARIN 100 UNIT/ML
500 SYRINGE INTRAVENOUS PRN
Status: CANCELLED | OUTPATIENT
Start: 2024-12-24

## 2024-12-23 RX ORDER — POTASSIUM CHLORIDE 1500 MG/1
40 TABLET, EXTENDED RELEASE ORAL PRN
Status: DISCONTINUED | OUTPATIENT
Start: 2024-12-23 | End: 2024-12-24 | Stop reason: HOSPADM

## 2024-12-23 RX ORDER — POTASSIUM CHLORIDE 29.8 MG/ML
80 INJECTION INTRAVENOUS PRN
Status: CANCELLED | OUTPATIENT
Start: 2024-12-24 | End: 2025-04-03

## 2024-12-23 RX ORDER — 0.9 % SODIUM CHLORIDE 0.9 %
1000 INTRAVENOUS SOLUTION INTRAVENOUS ONCE
Status: CANCELLED | OUTPATIENT
Start: 2024-12-24 | End: 2024-12-24

## 2024-12-23 RX ORDER — SODIUM CHLORIDE 9 MG/ML
INJECTION, SOLUTION INTRAVENOUS CONTINUOUS PRN
Status: CANCELLED | OUTPATIENT
Start: 2024-12-24

## 2024-12-23 RX ORDER — POTASSIUM CHLORIDE 29.8 MG/ML
80 INJECTION INTRAVENOUS PRN
Status: DISCONTINUED | OUTPATIENT
Start: 2024-12-23 | End: 2024-12-24 | Stop reason: HOSPADM

## 2024-12-23 RX ORDER — PROCHLORPERAZINE MALEATE 10 MG
10 TABLET ORAL EVERY 6 HOURS PRN
Status: CANCELLED | OUTPATIENT
Start: 2024-12-24

## 2024-12-23 RX ORDER — PROCHLORPERAZINE EDISYLATE 5 MG/ML
10 INJECTION INTRAMUSCULAR; INTRAVENOUS EVERY 6 HOURS PRN
Status: CANCELLED | OUTPATIENT
Start: 2024-12-24

## 2024-12-23 RX ORDER — SODIUM CHLORIDE 9 MG/ML
INJECTION, SOLUTION INTRAVENOUS CONTINUOUS PRN
Status: DISCONTINUED | OUTPATIENT
Start: 2024-12-23 | End: 2024-12-24 | Stop reason: HOSPADM

## 2024-12-23 NOTE — PROGRESS NOTES
Short Stay Communication Note  Kiera Irvin  Diagnosis: B cell lymphoma  Primary MD:Dr. Valentina Brewer  Treatment: Fludarabine/Cytoxan  CAR-T Administration Date: 12/2/24  Day +21 of Car-T Ketty   Pt seen in outpatient infusion today. Labs drawn and reviewed.   CBC:   Recent Labs     12/23/24  0842   WBC 5.4   HGB 12.2   HCT 36.5   MCV 95.5   *     BMP/Mag:  Recent Labs     12/23/24  0842      K 3.6      CO2 23   PHOS 2.5   BUN 18   CREATININE 1.2   MG 1.93     Standing parameters for replacement for this patient:   1 unit of pack red blood cells for a hemoglobin < or equal to 7  1 pack of platelets for a platelet count less than or equal to 10  40 MeQ of Potassium administered for a potassium level less than or equal to 3.4  4g of Magnesium Sulfate for a magnesum level less than or equal to 1.4  No transfusions required for the above lab values.    Urinalysis last done: 12/23/24 Urinalysis next due: 12/30/24    Chest X-Ray last done: 12/23/24 Chest X-Ray next due: 12/30/24    Symptoms addressed and reported to care team this date: none    Treatments this date: labs    Reviewed medication schedule with pt and caregiver. Both able to verbalize all medications and schedule. Pt to be seen again Thursday 12/26/24. Patient and caregiver verbalized understanding of discharge instructions including when and how to call the doctor and when to report  to the ER. Discharged ambulatory to home.     Fabi Rivas RN

## 2024-12-26 ENCOUNTER — HOSPITAL ENCOUNTER (OUTPATIENT)
Dept: ONCOLOGY | Age: 69
Setting detail: INFUSION SERIES
Discharge: HOME | End: 2024-12-26
Payer: MEDICARE

## 2024-12-26 VITALS
WEIGHT: 232.37 LBS | RESPIRATION RATE: 16 BRPM | TEMPERATURE: 97.6 F | SYSTOLIC BLOOD PRESSURE: 132 MMHG | OXYGEN SATURATION: 98 % | DIASTOLIC BLOOD PRESSURE: 82 MMHG | BODY MASS INDEX: 40.84 KG/M2 | HEART RATE: 85 BPM

## 2024-12-26 DIAGNOSIS — C83.00 SMALL B-CELL LYMPHOMA, UNSPECIFIED BODY REGION (HCC): Primary | ICD-10-CM

## 2024-12-26 LAB
ALBUMIN SERPL-MCNC: 3.9 G/DL (ref 3.4–5)
ALP SERPL-CCNC: 121 U/L (ref 40–129)
ALT SERPL-CCNC: 33 U/L (ref 10–40)
ANION GAP SERPL CALCULATED.3IONS-SCNC: 12 MMOL/L (ref 3–16)
ANISOCYTOSIS BLD QL SMEAR: ABNORMAL
AST SERPL-CCNC: 35 U/L (ref 15–37)
BASOPHILS # BLD: 0 K/UL (ref 0–0.2)
BASOPHILS NFR BLD: 0.5 %
BILIRUB DIRECT SERPL-MCNC: 0.2 MG/DL (ref 0–0.3)
BILIRUB INDIRECT SERPL-MCNC: 0.2 MG/DL (ref 0–1)
BILIRUB SERPL-MCNC: 0.4 MG/DL (ref 0–1)
BUN SERPL-MCNC: 23 MG/DL (ref 7–20)
CALCIUM SERPL-MCNC: 9.3 MG/DL (ref 8.3–10.6)
CHLORIDE SERPL-SCNC: 105 MMOL/L (ref 99–110)
CO2 SERPL-SCNC: 24 MMOL/L (ref 21–32)
CREAT SERPL-MCNC: 1.2 MG/DL (ref 0.6–1.2)
CRP SERPL-MCNC: 8.9 MG/L (ref 0–5.1)
DACRYOCYTES BLD QL SMEAR: ABNORMAL
DEPRECATED RDW RBC AUTO: 17.9 % (ref 12.4–15.4)
EOSINOPHIL # BLD: 0 K/UL (ref 0–0.6)
EOSINOPHIL NFR BLD: 0.5 %
FERRITIN SERPL IA-MCNC: 315 NG/ML (ref 15–150)
GFR SERPLBLD CREATININE-BSD FMLA CKD-EPI: 50 ML/MIN/{1.73_M2}
GLUCOSE SERPL-MCNC: 256 MG/DL (ref 70–99)
HCT VFR BLD AUTO: 36 % (ref 36–48)
HGB BLD-MCNC: 12.1 G/DL (ref 12–16)
INR PPP: 1.21 (ref 0.85–1.15)
LDH SERPL L TO P-CCNC: 173 U/L (ref 100–190)
LYMPHOCYTES # BLD: 0.3 K/UL (ref 1–5.1)
LYMPHOCYTES NFR BLD: 4.3 %
MAGNESIUM SERPL-MCNC: 1.97 MG/DL (ref 1.8–2.4)
MCH RBC QN AUTO: 32.4 PG (ref 26–34)
MCHC RBC AUTO-ENTMCNC: 33.7 G/DL (ref 31–36)
MCV RBC AUTO: 96.2 FL (ref 80–100)
MONOCYTES # BLD: 0.6 K/UL (ref 0–1.3)
MONOCYTES NFR BLD: 9.1 %
NEUTROPHILS # BLD: 5.4 K/UL (ref 1.7–7.7)
NEUTROPHILS NFR BLD: 85.6 %
OVALOCYTES BLD QL SMEAR: ABNORMAL
PHOSPHATE SERPL-MCNC: 2.4 MG/DL (ref 2.5–4.9)
PLATELET # BLD AUTO: 98 K/UL (ref 135–450)
PLATELET BLD QL SMEAR: ABNORMAL
PMV BLD AUTO: 10.4 FL (ref 5–10.5)
POTASSIUM SERPL-SCNC: 3.8 MMOL/L (ref 3.5–5.1)
PROT SERPL-MCNC: 6 G/DL (ref 6.4–8.2)
PROTHROMBIN TIME: 15.5 SEC (ref 11.9–14.9)
RBC # BLD AUTO: 3.74 M/UL (ref 4–5.2)
SODIUM SERPL-SCNC: 141 MMOL/L (ref 136–145)
URATE SERPL-MCNC: 5.4 MG/DL (ref 2.6–6)
WBC # BLD AUTO: 6.3 K/UL (ref 4–11)

## 2024-12-26 PROCEDURE — 80048 BASIC METABOLIC PNL TOTAL CA: CPT

## 2024-12-26 PROCEDURE — 85025 COMPLETE CBC W/AUTO DIFF WBC: CPT

## 2024-12-26 PROCEDURE — 80076 HEPATIC FUNCTION PANEL: CPT

## 2024-12-26 PROCEDURE — 85610 PROTHROMBIN TIME: CPT

## 2024-12-26 PROCEDURE — 86140 C-REACTIVE PROTEIN: CPT

## 2024-12-26 PROCEDURE — 83735 ASSAY OF MAGNESIUM: CPT

## 2024-12-26 PROCEDURE — 36591 DRAW BLOOD OFF VENOUS DEVICE: CPT

## 2024-12-26 PROCEDURE — 84550 ASSAY OF BLOOD/URIC ACID: CPT

## 2024-12-26 PROCEDURE — 84100 ASSAY OF PHOSPHORUS: CPT

## 2024-12-26 PROCEDURE — 82728 ASSAY OF FERRITIN: CPT

## 2024-12-26 PROCEDURE — 83615 LACTATE (LD) (LDH) ENZYME: CPT

## 2024-12-26 RX ORDER — SODIUM CHLORIDE 9 MG/ML
INJECTION, SOLUTION INTRAVENOUS CONTINUOUS PRN
Status: CANCELLED | OUTPATIENT
Start: 2024-12-27

## 2024-12-26 RX ORDER — ONDANSETRON 2 MG/ML
8 INJECTION INTRAMUSCULAR; INTRAVENOUS EVERY 8 HOURS PRN
Status: DISCONTINUED | OUTPATIENT
Start: 2024-12-26 | End: 2024-12-27 | Stop reason: HOSPADM

## 2024-12-26 RX ORDER — ONDANSETRON 2 MG/ML
8 INJECTION INTRAMUSCULAR; INTRAVENOUS EVERY 8 HOURS PRN
OUTPATIENT
Start: 2024-12-27

## 2024-12-26 RX ORDER — 0.9 % SODIUM CHLORIDE 0.9 %
1000 INTRAVENOUS SOLUTION INTRAVENOUS ONCE
Status: CANCELLED | OUTPATIENT
Start: 2024-12-27 | End: 2024-12-27

## 2024-12-26 RX ORDER — POTASSIUM CHLORIDE 1500 MG/1
40 TABLET, EXTENDED RELEASE ORAL PRN
Status: CANCELLED | OUTPATIENT
Start: 2024-12-27

## 2024-12-26 RX ORDER — MAGNESIUM SULFATE IN WATER 40 MG/ML
4000 INJECTION, SOLUTION INTRAVENOUS PRN
Status: CANCELLED | OUTPATIENT
Start: 2024-12-27

## 2024-12-26 RX ORDER — ONDANSETRON 4 MG/1
8 TABLET, FILM COATED ORAL EVERY 8 HOURS PRN
OUTPATIENT
Start: 2024-12-27

## 2024-12-26 RX ORDER — PROCHLORPERAZINE EDISYLATE 5 MG/ML
10 INJECTION INTRAMUSCULAR; INTRAVENOUS EVERY 6 HOURS PRN
OUTPATIENT
Start: 2024-12-27

## 2024-12-26 RX ORDER — OXYCODONE HYDROCHLORIDE 5 MG/1
5 TABLET ORAL EVERY 4 HOURS PRN
Status: DISCONTINUED | OUTPATIENT
Start: 2024-12-26 | End: 2024-12-27 | Stop reason: HOSPADM

## 2024-12-26 RX ORDER — OXYCODONE HYDROCHLORIDE 5 MG/1
5 TABLET ORAL EVERY 4 HOURS PRN
OUTPATIENT
Start: 2024-12-27

## 2024-12-26 RX ORDER — OXYCODONE HYDROCHLORIDE 5 MG/1
10 TABLET ORAL EVERY 4 HOURS PRN
Status: DISCONTINUED | OUTPATIENT
Start: 2024-12-26 | End: 2024-12-27 | Stop reason: HOSPADM

## 2024-12-26 RX ORDER — POTASSIUM CHLORIDE 29.8 MG/ML
80 INJECTION INTRAVENOUS PRN
Status: DISCONTINUED | OUTPATIENT
Start: 2024-12-26 | End: 2024-12-27 | Stop reason: HOSPADM

## 2024-12-26 RX ORDER — MAGNESIUM SULFATE IN WATER 40 MG/ML
4000 INJECTION, SOLUTION INTRAVENOUS PRN
Status: DISCONTINUED | OUTPATIENT
Start: 2024-12-26 | End: 2024-12-27 | Stop reason: HOSPADM

## 2024-12-26 RX ORDER — HEPARIN 100 UNIT/ML
500 SYRINGE INTRAVENOUS PRN
Status: CANCELLED | OUTPATIENT
Start: 2024-12-27

## 2024-12-26 RX ORDER — 0.9 % SODIUM CHLORIDE 0.9 %
1000 INTRAVENOUS SOLUTION INTRAVENOUS ONCE
Status: DISCONTINUED | OUTPATIENT
Start: 2024-12-26 | End: 2024-12-27 | Stop reason: HOSPADM

## 2024-12-26 RX ORDER — POTASSIUM CHLORIDE 29.8 MG/ML
80 INJECTION INTRAVENOUS PRN
Status: CANCELLED | OUTPATIENT
Start: 2024-12-27 | End: 2025-04-06

## 2024-12-26 RX ORDER — POTASSIUM CHLORIDE 1500 MG/1
40 TABLET, EXTENDED RELEASE ORAL PRN
Status: DISCONTINUED | OUTPATIENT
Start: 2024-12-26 | End: 2024-12-27 | Stop reason: HOSPADM

## 2024-12-26 RX ORDER — PROCHLORPERAZINE MALEATE 10 MG
10 TABLET ORAL EVERY 6 HOURS PRN
Status: DISCONTINUED | OUTPATIENT
Start: 2024-12-26 | End: 2024-12-27 | Stop reason: HOSPADM

## 2024-12-26 RX ORDER — PROCHLORPERAZINE EDISYLATE 5 MG/ML
10 INJECTION INTRAMUSCULAR; INTRAVENOUS EVERY 6 HOURS PRN
Status: DISCONTINUED | OUTPATIENT
Start: 2024-12-26 | End: 2024-12-27 | Stop reason: HOSPADM

## 2024-12-26 RX ORDER — PROCHLORPERAZINE MALEATE 10 MG
10 TABLET ORAL EVERY 6 HOURS PRN
OUTPATIENT
Start: 2024-12-27

## 2024-12-26 RX ORDER — SODIUM CHLORIDE 9 MG/ML
INJECTION, SOLUTION INTRAVENOUS CONTINUOUS PRN
Status: DISCONTINUED | OUTPATIENT
Start: 2024-12-26 | End: 2024-12-27 | Stop reason: HOSPADM

## 2024-12-26 RX ORDER — OXYCODONE HYDROCHLORIDE 5 MG/1
10 TABLET ORAL EVERY 4 HOURS PRN
OUTPATIENT
Start: 2024-12-27

## 2024-12-26 RX ORDER — ONDANSETRON 4 MG/1
8 TABLET, FILM COATED ORAL EVERY 8 HOURS PRN
Status: DISCONTINUED | OUTPATIENT
Start: 2024-12-26 | End: 2024-12-27 | Stop reason: HOSPADM

## 2024-12-26 NOTE — PROGRESS NOTES
[facility] Progress Note    2024     Kiera Irvin    MRN: [id number]    : 1955    Referring MD: Eileen Palomino, APRN - NP  [address] [address]      Subjective:  Patient is doing welI. She will return tomorrow then be back on MWF schedule.        ECOG PS:  (1) Restricted in physically strenuous activity, ambulatory and able to do work of light nature    KPS: 90% Able to carry on normal activity; minor signs or symptoms of disease    Isolation: None    Medications    Scheduled Meds:   sodium chloride  1,000 mL IntraVENous Once    ALTEplase (CATHFLO) 2 mg in sterile water 2 mL injection  2 mg IntraCATHeter Once           Continuous Infusions:   sodium chloride               PRN Meds:.sodium chloride, potassium chloride, potassium chloride, magnesium sulfate, magnesium hydroxide, oxyCODONE, oxyCODONE, prochlorperazine, prochlorperazine, ondansetron, ondansetron    ROS:  As noted above, otherwise remainder of 10-point ROS negative    Physical Exam:     I&O:  No intake or output data in the 24 hours ending 24 0944            Vital Signs:  /82   Pulse 85   Temp 97.6 °F (36.4 °C) (Oral)   Resp 16   Wt 105.4 kg (232 lb 5.8 oz)   SpO2 98%   BMI 40.84 kg/m²     Weight:    Wt Readings from Last 3 Encounters:   24 105.4 kg (232 lb 5.8 oz)   24 104 kg (229 lb 4.5 oz)   24 104.2 kg (229 lb 11.5 oz)       PE performed by Dr. Maher:   General: Awake, alert and oriented.  HEENT: normocephalic, PERRL, no scleral erythema or icterus, Oral mucosa moist and intact, throat clear  NECK: supple  BACK: Straight   SKIN: warm dry and intact without lesions rashes or masses  CHEST: CTA bilaterally without use of accessory muscles  CV: Normal S1 S2, RRR, no MRG  ABD: NT ND normoactive BS  EXTREMITIES: without edema, denies calf tenderness  NEURO: CN II - XII grossly intact  CATHETER: PAC     Data    CBC:   Recent Labs     24  0850   WBC 6.3   HGB 12.1   HCT

## 2024-12-27 ENCOUNTER — HOSPITAL ENCOUNTER (OUTPATIENT)
Dept: ONCOLOGY | Age: 69
Setting detail: INFUSION SERIES
Discharge: HOME | End: 2024-12-27
Payer: MEDICARE

## 2024-12-27 VITALS
WEIGHT: 231.7 LBS | HEART RATE: 76 BPM | DIASTOLIC BLOOD PRESSURE: 85 MMHG | TEMPERATURE: 97.7 F | BODY MASS INDEX: 40.72 KG/M2 | RESPIRATION RATE: 16 BRPM | OXYGEN SATURATION: 98 % | SYSTOLIC BLOOD PRESSURE: 143 MMHG

## 2024-12-27 DIAGNOSIS — C83.00 SMALL B-CELL LYMPHOMA, UNSPECIFIED BODY REGION (HCC): Primary | ICD-10-CM

## 2024-12-27 LAB
ALBUMIN SERPL-MCNC: 3.8 G/DL (ref 3.4–5)
ALP SERPL-CCNC: 118 U/L (ref 40–129)
ALT SERPL-CCNC: 35 U/L (ref 10–40)
ANION GAP SERPL CALCULATED.3IONS-SCNC: 13 MMOL/L (ref 3–16)
AST SERPL-CCNC: 38 U/L (ref 15–37)
BASOPHILS # BLD: 0 K/UL (ref 0–0.2)
BASOPHILS NFR BLD: 0.3 %
BILIRUB DIRECT SERPL-MCNC: 0.2 MG/DL (ref 0–0.3)
BILIRUB INDIRECT SERPL-MCNC: 0.2 MG/DL (ref 0–1)
BILIRUB SERPL-MCNC: 0.4 MG/DL (ref 0–1)
BUN SERPL-MCNC: 19 MG/DL (ref 7–20)
CALCIUM SERPL-MCNC: 9.1 MG/DL (ref 8.3–10.6)
CHLORIDE SERPL-SCNC: 107 MMOL/L (ref 99–110)
CO2 SERPL-SCNC: 23 MMOL/L (ref 21–32)
CREAT SERPL-MCNC: 1.1 MG/DL (ref 0.6–1.2)
CRP SERPL-MCNC: 9.3 MG/L (ref 0–5.1)
DEPRECATED RDW RBC AUTO: 17.9 % (ref 12.4–15.4)
EOSINOPHIL # BLD: 0 K/UL (ref 0–0.6)
EOSINOPHIL NFR BLD: 0.5 %
FERRITIN SERPL IA-MCNC: 313 NG/ML (ref 15–150)
GFR SERPLBLD CREATININE-BSD FMLA CKD-EPI: 55 ML/MIN/{1.73_M2}
GLUCOSE SERPL-MCNC: 181 MG/DL (ref 70–99)
HCT VFR BLD AUTO: 35.6 % (ref 36–48)
HGB BLD-MCNC: 12.1 G/DL (ref 12–16)
LDH SERPL L TO P-CCNC: 173 U/L (ref 100–190)
LYMPHOCYTES # BLD: 0.3 K/UL (ref 1–5.1)
LYMPHOCYTES NFR BLD: 5 %
MAGNESIUM SERPL-MCNC: 1.95 MG/DL (ref 1.8–2.4)
MCH RBC QN AUTO: 32 PG (ref 26–34)
MCHC RBC AUTO-ENTMCNC: 33.8 G/DL (ref 31–36)
MCV RBC AUTO: 94.7 FL (ref 80–100)
MONOCYTES # BLD: 0.5 K/UL (ref 0–1.3)
MONOCYTES NFR BLD: 9.3 %
NEUTROPHILS # BLD: 5 K/UL (ref 1.7–7.7)
NEUTROPHILS NFR BLD: 84.9 %
PHOSPHATE SERPL-MCNC: 2.6 MG/DL (ref 2.5–4.9)
PLATELET # BLD AUTO: 91 K/UL (ref 135–450)
PMV BLD AUTO: 9.7 FL (ref 5–10.5)
POTASSIUM SERPL-SCNC: 4 MMOL/L (ref 3.5–5.1)
PROT SERPL-MCNC: 6 G/DL (ref 6.4–8.2)
RBC # BLD AUTO: 3.76 M/UL (ref 4–5.2)
SODIUM SERPL-SCNC: 143 MMOL/L (ref 136–145)
URATE SERPL-MCNC: 5.3 MG/DL (ref 2.6–6)
WBC # BLD AUTO: 5.9 K/UL (ref 4–11)

## 2024-12-27 PROCEDURE — 86140 C-REACTIVE PROTEIN: CPT

## 2024-12-27 PROCEDURE — 84550 ASSAY OF BLOOD/URIC ACID: CPT

## 2024-12-27 PROCEDURE — 83615 LACTATE (LD) (LDH) ENZYME: CPT

## 2024-12-27 PROCEDURE — 36591 DRAW BLOOD OFF VENOUS DEVICE: CPT

## 2024-12-27 PROCEDURE — 80048 BASIC METABOLIC PNL TOTAL CA: CPT

## 2024-12-27 PROCEDURE — 83735 ASSAY OF MAGNESIUM: CPT

## 2024-12-27 PROCEDURE — 84100 ASSAY OF PHOSPHORUS: CPT

## 2024-12-27 PROCEDURE — 82728 ASSAY OF FERRITIN: CPT

## 2024-12-27 PROCEDURE — 85025 COMPLETE CBC W/AUTO DIFF WBC: CPT

## 2024-12-27 PROCEDURE — 80076 HEPATIC FUNCTION PANEL: CPT

## 2024-12-27 RX ORDER — POTASSIUM CHLORIDE 1500 MG/1
40 TABLET, EXTENDED RELEASE ORAL PRN
Status: DISCONTINUED | OUTPATIENT
Start: 2024-12-27 | End: 2024-12-28 | Stop reason: HOSPADM

## 2024-12-27 RX ORDER — 0.9 % SODIUM CHLORIDE 0.9 %
1000 INTRAVENOUS SOLUTION INTRAVENOUS ONCE
OUTPATIENT
Start: 2024-12-28 | End: 2024-12-28

## 2024-12-27 RX ORDER — POTASSIUM CHLORIDE 29.8 MG/ML
80 INJECTION INTRAVENOUS PRN
OUTPATIENT
Start: 2024-12-28 | End: 2025-04-07

## 2024-12-27 RX ORDER — 0.9 % SODIUM CHLORIDE 0.9 %
1000 INTRAVENOUS SOLUTION INTRAVENOUS ONCE
Status: DISCONTINUED | OUTPATIENT
Start: 2024-12-27 | End: 2024-12-28 | Stop reason: HOSPADM

## 2024-12-27 RX ORDER — HEPARIN 100 UNIT/ML
500 SYRINGE INTRAVENOUS PRN
OUTPATIENT
Start: 2024-12-28

## 2024-12-27 RX ORDER — PROCHLORPERAZINE EDISYLATE 5 MG/ML
10 INJECTION INTRAMUSCULAR; INTRAVENOUS EVERY 6 HOURS PRN
OUTPATIENT
Start: 2024-12-28

## 2024-12-27 RX ORDER — MAGNESIUM SULFATE IN WATER 40 MG/ML
4000 INJECTION, SOLUTION INTRAVENOUS PRN
OUTPATIENT
Start: 2024-12-28

## 2024-12-27 RX ORDER — SODIUM CHLORIDE 9 MG/ML
INJECTION, SOLUTION INTRAVENOUS CONTINUOUS PRN
OUTPATIENT
Start: 2024-12-28

## 2024-12-27 RX ORDER — HEPARIN 100 UNIT/ML
500 SYRINGE INTRAVENOUS PRN
Status: DISCONTINUED | OUTPATIENT
Start: 2024-12-27 | End: 2024-12-28 | Stop reason: HOSPADM

## 2024-12-27 RX ORDER — ONDANSETRON 4 MG/1
8 TABLET, FILM COATED ORAL EVERY 8 HOURS PRN
OUTPATIENT
Start: 2024-12-28

## 2024-12-27 RX ORDER — ONDANSETRON 2 MG/ML
8 INJECTION INTRAMUSCULAR; INTRAVENOUS EVERY 8 HOURS PRN
OUTPATIENT
Start: 2024-12-28

## 2024-12-27 RX ORDER — OXYCODONE HYDROCHLORIDE 5 MG/1
5 TABLET ORAL EVERY 4 HOURS PRN
OUTPATIENT
Start: 2024-12-28

## 2024-12-27 RX ORDER — PROCHLORPERAZINE MALEATE 10 MG
10 TABLET ORAL EVERY 6 HOURS PRN
OUTPATIENT
Start: 2024-12-28

## 2024-12-27 RX ORDER — POTASSIUM CHLORIDE 1500 MG/1
40 TABLET, EXTENDED RELEASE ORAL PRN
OUTPATIENT
Start: 2024-12-28

## 2024-12-27 RX ORDER — MAGNESIUM SULFATE IN WATER 40 MG/ML
4000 INJECTION, SOLUTION INTRAVENOUS PRN
Status: DISCONTINUED | OUTPATIENT
Start: 2024-12-27 | End: 2024-12-28 | Stop reason: HOSPADM

## 2024-12-27 RX ORDER — POTASSIUM CHLORIDE 29.8 MG/ML
80 INJECTION INTRAVENOUS PRN
Status: DISCONTINUED | OUTPATIENT
Start: 2024-12-27 | End: 2024-12-28 | Stop reason: HOSPADM

## 2024-12-27 RX ORDER — SODIUM CHLORIDE 9 MG/ML
INJECTION, SOLUTION INTRAVENOUS CONTINUOUS PRN
Status: DISCONTINUED | OUTPATIENT
Start: 2024-12-27 | End: 2024-12-28 | Stop reason: HOSPADM

## 2024-12-27 RX ORDER — OXYCODONE HYDROCHLORIDE 5 MG/1
10 TABLET ORAL EVERY 4 HOURS PRN
OUTPATIENT
Start: 2024-12-28

## 2024-12-27 NOTE — PROGRESS NOTES
Short Stay Communication Note  Kiera Irvin  Diagnosis: B cell lymphoma  Primary MD:Dr. Valentina Brewer  Treatment: Fludarabine/Cytoxan  CAR-T Administration Date: 12/2/24  Day +25 of Car-T Jannabreazi   Pt seen in outpatient infusion today. Labs drawn and reviewed.   CBC:   Recent Labs     12/26/24  0850 12/27/24  0852   WBC 6.3 5.9   HGB 12.1 12.1   HCT 36.0 35.6*   MCV 96.2 94.7   PLT 98* 91*     BMP/Mag:  Recent Labs     12/26/24  0850 12/27/24  0852    143   K 3.8 4.0    107   CO2 24 23   PHOS 2.4* 2.6   BUN 23* 19   CREATININE 1.2 1.1   MG 1.97 1.95     Standing parameters for replacement for this patient:   1 unit of pack red blood cells for a hemoglobin < or equal to 7  1 pack of platelets for a platelet count less than or equal to 10  40 MeQ of Potassium administered for a potassium level less than or equal to 3.4  4g of Magnesium Sulfate for a magnesum level less than or equal to 1.4  No transfusions required for the above lab values.    Urinalysis last done: 12/23/24 Urinalysis next due: 12/30/24    Chest X-Ray last done: 12/23/24 Chest X-Ray next due: 12/30/24    Symptoms addressed and reported to care team this date: none    Treatments this date: labs    Reviewed medication schedule with pt and caregiver. Both able to verbalize all medications and schedule. Pt to be seen again Monday 12/30. Patient and caregiver verbalized understanding of discharge instructions including when and how to call the doctor and when to report  to the ER. Discharged ambulatory to home.     Geeta Angel, BREEZY      
12/27/24  0852    143   K 3.8 4.0    107   CO2 24 23   PHOS 2.4* 2.6   BUN 23* 19   CREATININE 1.2 1.1   MG 1.97 1.95       LIVP:   Recent Labs     12/26/24  0850 12/27/24  0852   AST 35 38*   ALT 33 35   BILIDIR 0.2 0.2   BILITOT 0.4 0.4   ALKPHOS 121 118       Coags:   Recent Labs     12/26/24  0850   PROTIME 15.5*   INR 1.21*         Uric Acid No results for input(s): \"LABURIC\" in the last 72 hours.    PROBLEM LIST:           B-cell lymphoma (disorder) ( Stage Date: 01/21/2016, Stage III-Pathological  Date of Dx:01/21/2016 Disease Status: Initial presentation; CD30 Result: Negative; B Symptoms: No B symptoms; ALK: Negative; SCT Eligible: Yes; CD20 Result: Positive; B Cell Lymphoma is a for of CLL )  2. Anxiety  3. Atrial fibrillation with rapid ventricular response s/p ablation  4. Diabetes mellitus type 2   5. Endometrial hyperplasia   6. Essential hypertension   7. GERD  8. Obstructive sleep apnea  9. Thyroid nodule      TREATMENT:            XRT to thoracic spine  Bendamustine / Rituxan x 6 cycles (ending 7/2016)  Rituxan maintenance x 12 months  Clinical Trial - Revlimid & Rituxan x 7 cycles (ending 12/2018)  CVP- R x 8 cycles (12/2018 - 6/2019)   3 cycles of R-ICE in 2019 followed by [employer] in 12/2019  XRT to mesentery in 2020      Lymphodepleting Chemotherapy:  Fludarabine and cyclophosphamide   Disease Status at time of Infusion: Ongoing Disease   CAR-T Infusion Date: 12/2/24  CAR-T Product: Breyanzi  Patient ID Number:  [id number]  JOIN #: [id number]     ASSESSMENT AND PLAN:           1. Relapsed refractory follicular lymphoma: Ongoing Disease   - Recent scan on 8/30/24 showed relapse with left supraclavicular and left paraaortic node, repeat bx consistent with follicular lymphoma      PLAN:   Two doses of Tocilizumab will remain available in the inpatient pharmacy until day + 28 post - infusion.     Ketty CAR-T (12/2/24)   Day + 25         2. CRS / Neuro: At risk post-infusion  CRS

## 2024-12-30 ENCOUNTER — HOSPITAL ENCOUNTER (OUTPATIENT)
Dept: GENERAL RADIOLOGY | Age: 69
Discharge: HOME | End: 2024-12-30
Payer: MEDICARE

## 2024-12-30 ENCOUNTER — HOSPITAL ENCOUNTER (OUTPATIENT)
Dept: ONCOLOGY | Age: 69
Setting detail: INFUSION SERIES
Discharge: HOME | End: 2024-12-30
Payer: MEDICARE

## 2024-12-30 VITALS
HEART RATE: 100 BPM | TEMPERATURE: 97.6 F | RESPIRATION RATE: 18 BRPM | SYSTOLIC BLOOD PRESSURE: 134 MMHG | BODY MASS INDEX: 40.41 KG/M2 | DIASTOLIC BLOOD PRESSURE: 72 MMHG | WEIGHT: 229.94 LBS | OXYGEN SATURATION: 95 %

## 2024-12-30 DIAGNOSIS — C83.00 SMALL B-CELL LYMPHOMA, UNSPECIFIED BODY REGION (HCC): Primary | ICD-10-CM

## 2024-12-30 LAB
ALBUMIN SERPL-MCNC: 4.1 G/DL (ref 3.4–5)
ALP SERPL-CCNC: 121 U/L (ref 40–129)
ALT SERPL-CCNC: 39 U/L (ref 10–40)
ANION GAP SERPL CALCULATED.3IONS-SCNC: 15 MMOL/L (ref 3–16)
AST SERPL-CCNC: 42 U/L (ref 15–37)
BASOPHILS # BLD: 0 K/UL (ref 0–0.2)
BASOPHILS NFR BLD: 0.4 %
BILIRUB DIRECT SERPL-MCNC: 0.2 MG/DL (ref 0–0.3)
BILIRUB INDIRECT SERPL-MCNC: 0.2 MG/DL (ref 0–1)
BILIRUB SERPL-MCNC: 0.4 MG/DL (ref 0–1)
BILIRUB UR QL STRIP.AUTO: NEGATIVE
BUN SERPL-MCNC: 21 MG/DL (ref 7–20)
CALCIUM SERPL-MCNC: 9.6 MG/DL (ref 8.3–10.6)
CHLORIDE SERPL-SCNC: 104 MMOL/L (ref 99–110)
CLARITY UR: CLEAR
CO2 SERPL-SCNC: 24 MMOL/L (ref 21–32)
COLOR UR: YELLOW
CREAT SERPL-MCNC: 1.3 MG/DL (ref 0.6–1.2)
CRP SERPL-MCNC: 10.6 MG/L (ref 0–5.1)
DEPRECATED RDW RBC AUTO: 18.6 % (ref 12.4–15.4)
EOSINOPHIL # BLD: 0.1 K/UL (ref 0–0.6)
EOSINOPHIL NFR BLD: 1.1 %
FERRITIN SERPL IA-MCNC: 354 NG/ML (ref 15–150)
GFR SERPLBLD CREATININE-BSD FMLA CKD-EPI: 45 ML/MIN/{1.73_M2}
GLUCOSE SERPL-MCNC: 273 MG/DL (ref 70–99)
GLUCOSE UR STRIP.AUTO-MCNC: 250 MG/DL
HCT VFR BLD AUTO: 37.2 % (ref 36–48)
HGB BLD-MCNC: 12.5 G/DL (ref 12–16)
HGB UR QL STRIP.AUTO: NEGATIVE
INR PPP: 1.27 (ref 0.85–1.15)
KETONES UR STRIP.AUTO-MCNC: NEGATIVE MG/DL
LDH SERPL L TO P-CCNC: 189 U/L (ref 100–190)
LEUKOCYTE ESTERASE UR QL STRIP.AUTO: NEGATIVE
LYMPHOCYTES # BLD: 0.3 K/UL (ref 1–5.1)
LYMPHOCYTES NFR BLD: 4.9 %
MAGNESIUM SERPL-MCNC: 1.97 MG/DL (ref 1.8–2.4)
MCH RBC QN AUTO: 32.4 PG (ref 26–34)
MCHC RBC AUTO-ENTMCNC: 33.7 G/DL (ref 31–36)
MCV RBC AUTO: 96.1 FL (ref 80–100)
MONOCYTES # BLD: 0.5 K/UL (ref 0–1.3)
MONOCYTES NFR BLD: 8.3 %
NEUTROPHILS # BLD: 5.5 K/UL (ref 1.7–7.7)
NEUTROPHILS NFR BLD: 85.3 %
NITRITE UR QL STRIP.AUTO: NEGATIVE
PH UR STRIP.AUTO: 6 [PH] (ref 5–8)
PHOSPHATE SERPL-MCNC: 2.9 MG/DL (ref 2.5–4.9)
PLATELET # BLD AUTO: 101 K/UL (ref 135–450)
PMV BLD AUTO: 10 FL (ref 5–10.5)
POTASSIUM SERPL-SCNC: 3.8 MMOL/L (ref 3.5–5.1)
PROT SERPL-MCNC: 6.3 G/DL (ref 6.4–8.2)
PROT UR STRIP.AUTO-MCNC: NEGATIVE MG/DL
PROTHROMBIN TIME: 16.1 SEC (ref 11.9–14.9)
RBC # BLD AUTO: 3.87 M/UL (ref 4–5.2)
SODIUM SERPL-SCNC: 143 MMOL/L (ref 136–145)
SP GR UR STRIP.AUTO: 1.01 (ref 1–1.03)
UA DIPSTICK W REFLEX MICRO PNL UR: ABNORMAL
URATE SERPL-MCNC: 5.7 MG/DL (ref 2.6–6)
URN SPEC COLLECT METH UR: ABNORMAL
UROBILINOGEN UR STRIP-ACNC: 0.2 E.U./DL
WBC # BLD AUTO: 6.4 K/UL (ref 4–11)

## 2024-12-30 PROCEDURE — 86140 C-REACTIVE PROTEIN: CPT

## 2024-12-30 PROCEDURE — 80076 HEPATIC FUNCTION PANEL: CPT

## 2024-12-30 PROCEDURE — 2580000003 HC RX 258: Performed by: NURSE PRACTITIONER

## 2024-12-30 PROCEDURE — 83735 ASSAY OF MAGNESIUM: CPT

## 2024-12-30 PROCEDURE — 84100 ASSAY OF PHOSPHORUS: CPT

## 2024-12-30 PROCEDURE — 85610 PROTHROMBIN TIME: CPT

## 2024-12-30 PROCEDURE — 84550 ASSAY OF BLOOD/URIC ACID: CPT

## 2024-12-30 PROCEDURE — 80048 BASIC METABOLIC PNL TOTAL CA: CPT

## 2024-12-30 PROCEDURE — 36591 DRAW BLOOD OFF VENOUS DEVICE: CPT

## 2024-12-30 PROCEDURE — 85025 COMPLETE CBC W/AUTO DIFF WBC: CPT

## 2024-12-30 PROCEDURE — 96360 HYDRATION IV INFUSION INIT: CPT

## 2024-12-30 PROCEDURE — 82728 ASSAY OF FERRITIN: CPT

## 2024-12-30 PROCEDURE — 71045 X-RAY EXAM CHEST 1 VIEW: CPT

## 2024-12-30 PROCEDURE — 83615 LACTATE (LD) (LDH) ENZYME: CPT

## 2024-12-30 PROCEDURE — 96361 HYDRATE IV INFUSION ADD-ON: CPT

## 2024-12-30 PROCEDURE — 81003 URINALYSIS AUTO W/O SCOPE: CPT

## 2024-12-30 RX ORDER — POTASSIUM CHLORIDE 29.8 MG/ML
80 INJECTION INTRAVENOUS PRN
OUTPATIENT
Start: 2024-12-31 | End: 2025-04-10

## 2024-12-30 RX ORDER — ONDANSETRON 4 MG/1
8 TABLET, FILM COATED ORAL EVERY 8 HOURS PRN
OUTPATIENT
Start: 2024-12-31

## 2024-12-30 RX ORDER — 0.9 % SODIUM CHLORIDE 0.9 %
1000 INTRAVENOUS SOLUTION INTRAVENOUS ONCE
Status: COMPLETED | OUTPATIENT
Start: 2024-12-30 | End: 2024-12-30

## 2024-12-30 RX ORDER — OXYCODONE HYDROCHLORIDE 5 MG/1
10 TABLET ORAL EVERY 4 HOURS PRN
OUTPATIENT
Start: 2024-12-31

## 2024-12-30 RX ORDER — PROCHLORPERAZINE EDISYLATE 5 MG/ML
10 INJECTION INTRAMUSCULAR; INTRAVENOUS EVERY 6 HOURS PRN
Status: DISCONTINUED | OUTPATIENT
Start: 2024-12-30 | End: 2024-12-31 | Stop reason: HOSPADM

## 2024-12-30 RX ORDER — ONDANSETRON 2 MG/ML
8 INJECTION INTRAMUSCULAR; INTRAVENOUS EVERY 8 HOURS PRN
Status: DISCONTINUED | OUTPATIENT
Start: 2024-12-30 | End: 2024-12-31 | Stop reason: HOSPADM

## 2024-12-30 RX ORDER — 0.9 % SODIUM CHLORIDE 0.9 %
1000 INTRAVENOUS SOLUTION INTRAVENOUS ONCE
OUTPATIENT
Start: 2024-12-31 | End: 2024-12-31

## 2024-12-30 RX ORDER — MAGNESIUM SULFATE IN WATER 40 MG/ML
4000 INJECTION, SOLUTION INTRAVENOUS PRN
Status: DISCONTINUED | OUTPATIENT
Start: 2024-12-30 | End: 2024-12-31 | Stop reason: HOSPADM

## 2024-12-30 RX ORDER — ONDANSETRON 4 MG/1
8 TABLET, FILM COATED ORAL EVERY 8 HOURS PRN
Status: DISCONTINUED | OUTPATIENT
Start: 2024-12-30 | End: 2024-12-31 | Stop reason: HOSPADM

## 2024-12-30 RX ORDER — SODIUM CHLORIDE 9 MG/ML
INJECTION, SOLUTION INTRAVENOUS CONTINUOUS PRN
Status: DISCONTINUED | OUTPATIENT
Start: 2024-12-30 | End: 2024-12-31 | Stop reason: HOSPADM

## 2024-12-30 RX ORDER — PROCHLORPERAZINE EDISYLATE 5 MG/ML
10 INJECTION INTRAMUSCULAR; INTRAVENOUS EVERY 6 HOURS PRN
OUTPATIENT
Start: 2024-12-31

## 2024-12-30 RX ORDER — HEPARIN 100 UNIT/ML
500 SYRINGE INTRAVENOUS PRN
OUTPATIENT
Start: 2024-12-31

## 2024-12-30 RX ORDER — MAGNESIUM SULFATE IN WATER 40 MG/ML
4000 INJECTION, SOLUTION INTRAVENOUS PRN
OUTPATIENT
Start: 2024-12-31

## 2024-12-30 RX ORDER — ONDANSETRON 2 MG/ML
8 INJECTION INTRAMUSCULAR; INTRAVENOUS EVERY 8 HOURS PRN
OUTPATIENT
Start: 2024-12-31

## 2024-12-30 RX ORDER — SODIUM CHLORIDE 9 MG/ML
INJECTION, SOLUTION INTRAVENOUS CONTINUOUS PRN
OUTPATIENT
Start: 2024-12-31

## 2024-12-30 RX ORDER — POTASSIUM CHLORIDE 1500 MG/1
40 TABLET, EXTENDED RELEASE ORAL PRN
OUTPATIENT
Start: 2024-12-31

## 2024-12-30 RX ORDER — POTASSIUM CHLORIDE 29.8 MG/ML
80 INJECTION INTRAVENOUS PRN
Status: DISCONTINUED | OUTPATIENT
Start: 2024-12-30 | End: 2024-12-31 | Stop reason: HOSPADM

## 2024-12-30 RX ORDER — PROCHLORPERAZINE MALEATE 10 MG
10 TABLET ORAL EVERY 6 HOURS PRN
OUTPATIENT
Start: 2024-12-31

## 2024-12-30 RX ORDER — OXYCODONE HYDROCHLORIDE 5 MG/1
10 TABLET ORAL EVERY 4 HOURS PRN
Status: DISCONTINUED | OUTPATIENT
Start: 2024-12-30 | End: 2024-12-31 | Stop reason: HOSPADM

## 2024-12-30 RX ORDER — PROCHLORPERAZINE MALEATE 10 MG
10 TABLET ORAL EVERY 6 HOURS PRN
Status: DISCONTINUED | OUTPATIENT
Start: 2024-12-30 | End: 2024-12-31 | Stop reason: HOSPADM

## 2024-12-30 RX ORDER — OXYCODONE HYDROCHLORIDE 5 MG/1
5 TABLET ORAL EVERY 4 HOURS PRN
Status: DISCONTINUED | OUTPATIENT
Start: 2024-12-30 | End: 2024-12-31 | Stop reason: HOSPADM

## 2024-12-30 RX ORDER — POTASSIUM CHLORIDE 1500 MG/1
40 TABLET, EXTENDED RELEASE ORAL PRN
Status: DISCONTINUED | OUTPATIENT
Start: 2024-12-30 | End: 2024-12-31 | Stop reason: HOSPADM

## 2024-12-30 RX ORDER — OXYCODONE HYDROCHLORIDE 5 MG/1
5 TABLET ORAL EVERY 4 HOURS PRN
OUTPATIENT
Start: 2024-12-31

## 2024-12-30 RX ADMIN — SODIUM CHLORIDE 1000 ML: 9 INJECTION, SOLUTION INTRAVENOUS at 10:26

## 2024-12-30 NOTE — PROGRESS NOTES
Short Stay Communication Note  Kiera Irvin  Diagnosis: B cell lymphoma  Primary MD:Dr. Valentina Brewer  Treatment: Fludarabine/Cytoxan  CAR-T Administration Date: 12/2/24  Day +28 of Car-T Ketty   Pt seen in outpatient infusion today. Labs drawn and reviewed. 1 liters of NS over 2 hours  CBC:   Recent Labs     12/30/24  0858   WBC 6.4   HGB 12.5   HCT 37.2   MCV 96.1   *     BMP/Mag:  Recent Labs     12/30/24  0858      K 3.8      CO2 24   PHOS 2.9   BUN 21*   CREATININE 1.3*   MG 1.97     Standing parameters for replacement for this patient:   1 unit of pack red blood cells for a hemoglobin < or equal to 7  1 pack of platelets for a platelet count less than or equal to 10  40 MeQ of Potassium administered for a potassium level less than or equal to 3.4  4g of Magnesium Sulfate for a magnesum level less than or equal to 1.4  No transfusions required for the above lab values.    Urinalysis last done: 12/30/24 Urinalysis next due: 1/6/2025    Chest X-Ray last done: 12/30/24 Chest X-Ray next due: 1/6/2025    Symptoms addressed and reported to care team this date: none    Treatments this date: labs and 1 liter NS over 2 hours    Reviewed medication schedule with pt and caregiver. Both able to verbalize all medications and schedule. Pt to be [facility] Thursday at [facility]. Patient and caregiver verbalized understanding of discharge instructions including when and how to call the doctor and when to report  to the ER. Discharged ambulatory to home.     Carol oRsenthal RN      
12/27/2024    CRP 8.9 (H) 12/26/2024      Lab Results   Component Value Date    FERRITIN 354.0 (H) 12/30/2024    FERRITIN 313.0 (H) 12/27/2024    FERRITIN 315.0 (H) 12/26/2024      ICANS Grade: N/A  CAR-T Score:   N/A  - Neuro checks w/ CARTOX 10-point assessment Q4hrs  - Started Keppra on 12/2/24     3.  ID:  Afebrile, no evidence of infection.    - Cont Valtrex ppx   - S/p Diflucan and Levaquin (Restart if ANC < 1)      Post CAR- T monitoring / maintenance:  - IgG & CD4 level monthly starting on day + 30  - Check disease titers on day + 30 or when WBC recovered. Re-vaccinate vs booster based on titer (assure titers are drawn prior to administering immunoglobulins)  - Start PCP ppx on day + 30 - stop when CD4 > 250  - Cont Valtrex for 1 year post CAR-T     4.  Heme:  Pancytopenia d/t recent chemo / CAR-T Infusion  - Transfuse for Hgb < 7 and Platelets < 10K  - No transfusion today     5. Metabolic/CKD 3A:   - Baseline Cr 1.1-1.2    - Cont Potassium 10 mEq BID supplement while on Lasix  - SrCr 1.3: Will receive 1 L NS today   Risk for TLS:  No evidence  - S/p allopurinol daily        6. GI / Nutrition:    Nutrition:  Appetite and oral intake is adequate  - Cont low microbial diet         7. Cardiac: Hx Atrial fibrillation s/p ablation, HTN  - Cont Metoprolol XL and Eliquis (Hold Eliquis when plt < 50)   - Cont Lasix 20 mg PO daily  - ECHO 9/30/24: 60-65%     8.  Endocrine:  Type 2 DM  - Cont Lantus 30 units Q HS  - Cont Glimepiride 2 mg PO BID  - Cont Metformin  mg PO BID     9.  Pulm:  H/O ZOE with CPAP  - PFTs :  FEV1 75%, DLCO 59%     - Disposition: Patient will be seen in OP Infusion for 30 days post CAR-T Infusion for CAR-T assessment for s/s of toxicity including CRS & ICANS, labs (CBC w/ diff, CMP, Mag & Phos, Ferritin & CRP) and provider visit.     Patient seen and examined by Dr. Camacho.    ALEXUS Ha - NP    Andrew Camacho MD  [facility]  [phone]

## 2025-01-13 ENCOUNTER — HOSPITAL ENCOUNTER (OUTPATIENT)
Dept: MRI IMAGING | Age: 70
Discharge: HOME | End: 2025-01-13
Payer: MEDICARE

## 2025-01-13 DIAGNOSIS — M79.18 MYALGIA, OTHER SITE: ICD-10-CM

## 2025-01-13 PROCEDURE — 2500000003 HC RX 250 WO HCPCS

## 2025-01-13 PROCEDURE — A9579 GAD-BASE MR CONTRAST NOS,1ML: HCPCS

## 2025-01-13 PROCEDURE — 72156 MRI NECK SPINE W/O & W/DYE: CPT

## 2025-01-13 PROCEDURE — 6360000004 HC RX CONTRAST MEDICATION

## 2025-01-13 RX ORDER — SODIUM CHLORIDE 0.9 % (FLUSH) 0.9 %
5-40 SYRINGE (ML) INJECTION 2 TIMES DAILY
Status: DISCONTINUED | OUTPATIENT
Start: 2025-01-13 | End: 2025-01-14 | Stop reason: HOSPADM

## 2025-01-13 RX ADMIN — SODIUM CHLORIDE, PRESERVATIVE FREE 10 ML: 5 INJECTION INTRAVENOUS at 13:48

## 2025-01-13 RX ADMIN — GADOTERIDOL 20 ML: 279.3 INJECTION, SOLUTION INTRAVENOUS at 13:47

## 2025-01-21 ENCOUNTER — OFFICE VISIT (OUTPATIENT)
Dept: FAMILY MEDICINE CLINIC | Age: 70
End: 2025-01-21

## 2025-01-21 VITALS
WEIGHT: 236.6 LBS | SYSTOLIC BLOOD PRESSURE: 130 MMHG | DIASTOLIC BLOOD PRESSURE: 82 MMHG | HEIGHT: 63 IN | BODY MASS INDEX: 41.92 KG/M2 | TEMPERATURE: 97.3 F

## 2025-01-21 DIAGNOSIS — I48.0 PAF (PAROXYSMAL ATRIAL FIBRILLATION) (HCC): ICD-10-CM

## 2025-01-21 DIAGNOSIS — Z79.4 TYPE 2 DIABETES MELLITUS WITHOUT COMPLICATION, WITH LONG-TERM CURRENT USE OF INSULIN (HCC): ICD-10-CM

## 2025-01-21 DIAGNOSIS — E11.9 TYPE 2 DIABETES MELLITUS WITHOUT COMPLICATION, WITH LONG-TERM CURRENT USE OF INSULIN (HCC): ICD-10-CM

## 2025-01-21 DIAGNOSIS — I10 ESSENTIAL HYPERTENSION: Primary | ICD-10-CM

## 2025-01-21 RX ORDER — DAPSONE 100 MG/1
100 TABLET ORAL DAILY
COMMUNITY
Start: 2025-01-02

## 2025-01-21 RX ORDER — VALACYCLOVIR HYDROCHLORIDE 500 MG/1
500 TABLET, FILM COATED ORAL 2 TIMES DAILY
Qty: 180 TABLET | Refills: 1 | Status: SHIPPED | OUTPATIENT
Start: 2025-01-21

## 2025-01-21 RX ORDER — CYCLOBENZAPRINE HCL 5 MG
5 TABLET ORAL 3 TIMES DAILY
COMMUNITY
Start: 2025-01-07

## 2025-01-21 SDOH — ECONOMIC STABILITY: FOOD INSECURITY: WITHIN THE PAST 12 MONTHS, THE FOOD YOU BOUGHT JUST DIDN'T LAST AND YOU DIDN'T HAVE MONEY TO GET MORE.: NEVER TRUE

## 2025-01-21 SDOH — ECONOMIC STABILITY: FOOD INSECURITY: WITHIN THE PAST 12 MONTHS, YOU WORRIED THAT YOUR FOOD WOULD RUN OUT BEFORE YOU GOT MONEY TO BUY MORE.: NEVER TRUE

## 2025-01-21 ASSESSMENT — PATIENT HEALTH QUESTIONNAIRE - PHQ9
SUM OF ALL RESPONSES TO PHQ QUESTIONS 1-9: 0
2. FEELING DOWN, DEPRESSED OR HOPELESS: NOT AT ALL
SUM OF ALL RESPONSES TO PHQ QUESTIONS 1-9: 0
SUM OF ALL RESPONSES TO PHQ9 QUESTIONS 1 & 2: 0
1. LITTLE INTEREST OR PLEASURE IN DOING THINGS: NOT AT ALL

## 2025-01-21 NOTE — PROGRESS NOTES
Subjective:      Patient ID: Kiera Irvin is a 69 y.o. female.    Chief Complaint   Patient presents with    Follow-up     Diabetes, lipids, hypertension        Patient presents with:  Follow-up: Diabetes, lipids, hypertension    Here for the above   Still with chemo     Sheis well     YOB: 1955    Date of Visit:  1/21/2025     -- Corticosteroids -- Other (See Comments)    --  All corticosteroids should be avoided from day -5             to day +90 due to CAR T infusion. No steroids             unless confirmed with [facility] MD   -- Penicillins -- Hives    Current Outpatient Medications:  cyclobenzaprine (FLEXERIL) 5 MG tablet, Take 1 tablet by mouth 3 times daily, Disp: , Rfl:   dapsone 100 MG tablet, Take 1 tablet by mouth daily, Disp: , Rfl:   vitamin B-1 (THIAMINE) 100 MG tablet, Take 1 tablet by mouth daily, Disp: , Rfl:   furosemide (LASIX) 20 MG tablet, TAKE 1 TAB DAILY . MAY TAKE AN ADDITIONAL DOSE AS NEEDED FOR SHORTENSS OF BREATH/ WEIGHT GAIN/EDEMA, Disp: 90 tablet, Rfl: 3  prochlorperazine (COMPAZINE) 10 MG tablet, Take 1 tablet by mouth every 6 hours as needed (Nausea / Vomiting), Disp: 120 tablet, Rfl: 3  levETIRAcetam (KEPPRA) 500 MG tablet, Take 1 tablet by mouth 2 times daily, Disp: , Rfl:   cetirizine (ZYRTEC) 10 MG tablet, Take 1 tablet by mouth at bedtime, Disp: , Rfl:   valACYclovir (VALTREX) 500 MG tablet, Take 1 tablet by mouth 2 times daily, Disp: 60 tablet, Rfl: 11  potassium chloride (KLOR-CON) 10 MEQ extended release tablet, TAKE 1 TABLET BY MOUTH TWICE A DAY, Disp: 180 tablet, Rfl: 1  glimepiride (AMARYL) 2 MG tablet, TAKE 1 TABLET BY MOUTH TWICE A DAY WITH FOOD, Disp: 180 tablet, Rfl: 1  metFORMIN (GLUCOPHAGE-XR) 500 MG extended release tablet, Take 1 tablet by mouth 2 times daily (with meals), Disp: 180 tablet, Rfl: 1  insulin glargine (LANTUS SOLOSTAR) 100 UNIT/ML injection pen, Inject 30 Units into the skin nightly, Disp: 5 Adjustable Dose Pre-filled Pen Syringe, Rfl:

## 2025-01-21 NOTE — PATIENT INSTRUCTIONS
Continue the medicine  Continue the diet   See back in about 4 months      Do check with the oncology doctor about the tiny nodule on the left breast     See us back in about 4 months

## 2025-02-06 NOTE — PROGRESS NOTES
Orders sent to patient via [employer] message.     Dr. Villasenor, patient also sent in lab results via [employer]    PT received from cath lab. pt placed on monitor, pt presents with Normal sinus rythm. pt on 2L of oxygen via NC. pt jose score is 9/10 at the time of handoff. handoff preformed with [facility].

## 2025-02-10 DIAGNOSIS — Z79.4 TYPE 2 DIABETES MELLITUS WITHOUT COMPLICATION, WITH LONG-TERM CURRENT USE OF INSULIN (HCC): ICD-10-CM

## 2025-02-10 DIAGNOSIS — E11.9 TYPE 2 DIABETES MELLITUS WITHOUT COMPLICATION, WITH LONG-TERM CURRENT USE OF INSULIN (HCC): ICD-10-CM

## 2025-02-11 LAB
EST. AVERAGE GLUCOSE BLD GHB EST-MCNC: 116.9 MG/DL
HBA1C MFR BLD: 5.7 %

## 2025-02-11 RX ORDER — INSULIN GLARGINE 100 [IU]/ML
10 INJECTION, SOLUTION SUBCUTANEOUS NIGHTLY
Qty: 5 ADJUSTABLE DOSE PRE-FILLED PEN SYRINGE | Refills: 3
Start: 2025-02-11

## 2025-02-11 RX ORDER — GLIMEPIRIDE 2 MG/1
2 TABLET ORAL
Qty: 180 TABLET | Refills: 1
Start: 2025-02-11

## 2025-02-21 ENCOUNTER — TELEPHONE (OUTPATIENT)
Dept: FAMILY MEDICINE CLINIC | Age: 70
End: 2025-02-21

## 2025-02-21 RX ORDER — INSULIN GLARGINE 100 [IU]/ML
10 INJECTION, SOLUTION SUBCUTANEOUS NIGHTLY
Qty: 5 ADJUSTABLE DOSE PRE-FILLED PEN SYRINGE | Refills: 3
Start: 2025-02-21

## 2025-02-21 RX ORDER — INSULIN GLARGINE 100 [IU]/ML
15 INJECTION, SOLUTION SUBCUTANEOUS NIGHTLY
Qty: 5 ADJUSTABLE DOSE PRE-FILLED PEN SYRINGE | Refills: 3
Start: 2025-02-21 | End: 2025-02-21

## 2025-02-21 NOTE — TELEPHONE ENCOUNTER
[phone] ([place]) - Kiera advised and verbalized understanding. She states she doesn't feel like her numbers are low.

## 2025-02-21 NOTE — TELEPHONE ENCOUNTER
Am sugars before eating first thing in the morning.   Feb   Feb   Feb   Feb   Feb   Feb   Feb   Feb   Feb   Feb   Feb   Feb   She would like to know if she should continue to take medication as she currently is or if she needs to change anything?

## 2025-02-21 NOTE — TELEPHONE ENCOUNTER
Reasonable   Continue the same for now   Her last A1c was low  These are fine  Does she feel like it is driopping to low?  Call me numbers in one week

## 2025-02-23 RX ORDER — PEN NEEDLE, DIABETIC 32GX 5/32"
NEEDLE, DISPOSABLE MISCELLANEOUS
Qty: 100 EACH | Refills: 1 | Status: SHIPPED | OUTPATIENT
Start: 2025-02-23

## 2025-02-28 ENCOUNTER — TELEPHONE (OUTPATIENT)
Dept: FAMILY MEDICINE CLINIC | Age: 70
End: 2025-02-28

## 2025-02-28 NOTE — TELEPHONE ENCOUNTER
Patient calling with sugars readings.    Feb    Feb  23 -178  Feb    Feb    Feb    Feb    Feb     Please advise,  [phone]

## 2025-03-07 ENCOUNTER — TELEPHONE (OUTPATIENT)
Dept: FAMILY MEDICINE CLINIC | Age: 70
End: 2025-03-07

## 2025-03-07 NOTE — TELEPHONE ENCOUNTER
New medicine  Have her also call me glucose numbers in a week please    Orders Placed This Encounter   Medications    insulin glargine (LANTUS;BASAGLAR) 100 UNIT/ML injection pen     Sig: Inject 10 Units into the skin nightly     Dispense:  5 Adjustable Dose Pre-filled Pen Syringe     Refill:  0

## 2025-03-07 NOTE — TELEPHONE ENCOUNTER
Patient called in stating that she received a letter form her insurance company, [employer], stating that they will no longer approve her Lantus.     [employer] is recommending, Basaglar.     Please Advise if the patient can swap from Lantus to Basaglar.

## 2025-03-24 ENCOUNTER — OFFICE VISIT (OUTPATIENT)
Dept: FAMILY MEDICINE CLINIC | Age: 70
End: 2025-03-24
Payer: MEDICARE

## 2025-03-24 VITALS
HEART RATE: 72 BPM | BODY MASS INDEX: 41.85 KG/M2 | DIASTOLIC BLOOD PRESSURE: 80 MMHG | HEIGHT: 63 IN | WEIGHT: 236.2 LBS | SYSTOLIC BLOOD PRESSURE: 128 MMHG | TEMPERATURE: 98.2 F

## 2025-03-24 DIAGNOSIS — E11.65 POORLY CONTROLLED DIABETES MELLITUS (HCC): ICD-10-CM

## 2025-03-24 DIAGNOSIS — E11.65 POORLY CONTROLLED DIABETES MELLITUS (HCC): Primary | ICD-10-CM

## 2025-03-24 LAB
ANION GAP SERPL CALCULATED.3IONS-SCNC: 11 MMOL/L (ref 3–16)
BUN SERPL-MCNC: 20 MG/DL (ref 7–20)
CALCIUM SERPL-MCNC: 9.3 MG/DL (ref 8.3–10.6)
CHLORIDE SERPL-SCNC: 106 MMOL/L (ref 99–110)
CO2 SERPL-SCNC: 22 MMOL/L (ref 21–32)
CREAT SERPL-MCNC: 1.2 MG/DL (ref 0.6–1.2)
EST. AVERAGE GLUCOSE BLD GHB EST-MCNC: 119.8 MG/DL
GFR SERPLBLD CREATININE-BSD FMLA CKD-EPI: 50 ML/MIN/{1.73_M2}
GLUCOSE SERPL-MCNC: 168 MG/DL (ref 70–99)
HBA1C MFR BLD: 5.8 %
POTASSIUM SERPL-SCNC: 4.6 MMOL/L (ref 3.5–5.1)
SODIUM SERPL-SCNC: 139 MMOL/L (ref 136–145)

## 2025-03-24 PROCEDURE — G8400 PT W/DXA NO RESULTS DOC: HCPCS | Performed by: FAMILY MEDICINE

## 2025-03-24 PROCEDURE — 3044F HG A1C LEVEL LT 7.0%: CPT | Performed by: FAMILY MEDICINE

## 2025-03-24 PROCEDURE — 3017F COLORECTAL CA SCREEN DOC REV: CPT | Performed by: FAMILY MEDICINE

## 2025-03-24 PROCEDURE — G9899 SCRN MAM PERF RSLTS DOC: HCPCS | Performed by: FAMILY MEDICINE

## 2025-03-24 PROCEDURE — G8427 DOCREV CUR MEDS BY ELIG CLIN: HCPCS | Performed by: FAMILY MEDICINE

## 2025-03-24 PROCEDURE — G8417 CALC BMI ABV UP PARAM F/U: HCPCS | Performed by: FAMILY MEDICINE

## 2025-03-24 PROCEDURE — 1159F MED LIST DOCD IN RCRD: CPT | Performed by: FAMILY MEDICINE

## 2025-03-24 PROCEDURE — 1090F PRES/ABSN URINE INCON ASSESS: CPT | Performed by: FAMILY MEDICINE

## 2025-03-24 PROCEDURE — 1123F ACP DISCUSS/DSCN MKR DOCD: CPT | Performed by: FAMILY MEDICINE

## 2025-03-24 PROCEDURE — 1036F TOBACCO NON-USER: CPT | Performed by: FAMILY MEDICINE

## 2025-03-24 PROCEDURE — 1160F RVW MEDS BY RX/DR IN RCRD: CPT | Performed by: FAMILY MEDICINE

## 2025-03-24 PROCEDURE — 3079F DIAST BP 80-89 MM HG: CPT | Performed by: FAMILY MEDICINE

## 2025-03-24 PROCEDURE — 2022F DILAT RTA XM EVC RTNOPTHY: CPT | Performed by: FAMILY MEDICINE

## 2025-03-24 PROCEDURE — 99213 OFFICE O/P EST LOW 20 MIN: CPT | Performed by: FAMILY MEDICINE

## 2025-03-24 PROCEDURE — 3074F SYST BP LT 130 MM HG: CPT | Performed by: FAMILY MEDICINE

## 2025-03-24 RX ORDER — SULFAMETHOXAZOLE AND TRIMETHOPRIM 400; 80 MG/1; MG/1
1 TABLET ORAL DAILY
COMMUNITY
Start: 2025-03-10

## 2025-03-24 RX ORDER — INSULIN GLARGINE 100 [IU]/ML
20 INJECTION, SOLUTION SUBCUTANEOUS NIGHTLY
Qty: 5 ADJUSTABLE DOSE PRE-FILLED PEN SYRINGE | Refills: 1 | Status: SHIPPED | OUTPATIENT
Start: 2025-03-24

## 2025-03-24 RX ORDER — METFORMIN HYDROCHLORIDE 500 MG/1
500 TABLET, EXTENDED RELEASE ORAL
Qty: 180 TABLET | Refills: 1
Start: 2025-03-24

## 2025-03-24 NOTE — PATIENT INSTRUCTIONS
Ok to use the glimepiride twice a day  Keep the metformin to once a day   Drop the insulin from 40 to 20 units a day   Check the labs today   See back in about 3 months

## 2025-03-24 NOTE — PROGRESS NOTES
Subjective:      Patient ID: Kiera Irvin is a 69 y.o. female.    Chief Complaint   Patient presents with    Follow-up     Diabetes, lipids, hypertension         Patient presents with:  Follow-up: Diabetes, lipids, hypertension     We asked to come in as some of her readings at home had been up   Here for the above   She is well   She did not change glucose meds!!!    On further questioning she is having what sound like low glucose occurrences     Glucose log mid 100 and lower 200's  She notes not eating well and things she should not     She tells me she is taking 40 units of insulin     She ells me keppra has been stopped     YOB: 1955    Date of Visit:  3/24/2025     -- Corticosteroids -- Other (See Comments)    --  All corticosteroids should be avoided from day -5             to day +90 due to CAR T infusion. No steroids             unless confirmed with [facility] MD   -- Penicillins -- Hives    Current Outpatient Medications:  sulfamethoxazole-trimethoprim (BACTRIM;SEPTRA) 400-80 MG per tablet, Take 1 tablet by mouth daily, Disp: , Rfl:   insulin glargine (LANTUS;BASAGLAR) 100 UNIT/ML injection pen, Inject 10 Units into the skin nightly, Disp: 5 Adjustable Dose Pre-filled Pen Syringe, Rfl: 0  Insulin Pen Needle (BD PEN NEEDLE RAMIRO 2ND GEN) 32G X 4 MM MISC, USE WITH INSULIN DAILY, Disp: 100 each, Rfl: 1  glimepiride (AMARYL) 2 MG tablet, Take 1 tablet by mouth every morning (before breakfast), Disp: 180 tablet, Rfl: 1  cyclobenzaprine (FLEXERIL) 5 MG tablet, Take 1 tablet by mouth 3 times daily, Disp: , Rfl:   dapsone 100 MG tablet, Take 1 tablet by mouth daily, Disp: , Rfl:   valACYclovir (VALTREX) 500 MG tablet, Take 1 tablet by mouth 2 times daily, Disp: 180 tablet, Rfl: 1  vitamin B-1 (THIAMINE) 100 MG tablet, Take 1 tablet by mouth daily, Disp: , Rfl:   furosemide (LASIX) 20 MG tablet, TAKE 1 TAB DAILY . MAY TAKE AN ADDITIONAL DOSE AS NEEDED FOR SHORTENSS OF BREATH/ WEIGHT GAIN/EDEMA, Disp: 90

## 2025-03-25 ENCOUNTER — RESULTS FOLLOW-UP (OUTPATIENT)
Dept: FAMILY MEDICINE CLINIC | Age: 70
End: 2025-03-25

## 2025-04-01 ENCOUNTER — TRANSCRIBE ORDERS (OUTPATIENT)
Dept: ADMINISTRATIVE | Age: 70
End: 2025-04-01

## 2025-04-01 DIAGNOSIS — C83.08 SMALL CELL B-CELL LYMPHOMA OF LYMPH NODES OF MULTIPLE SITES (HCC): Primary | ICD-10-CM

## 2025-04-04 ENCOUNTER — HOSPITAL ENCOUNTER (OUTPATIENT)
Dept: CT IMAGING | Age: 70
Discharge: HOME | End: 2025-04-04
Payer: MEDICARE

## 2025-04-04 DIAGNOSIS — C83.08 SMALL CELL B-CELL LYMPHOMA OF LYMPH NODES OF MULTIPLE SITES (HCC): ICD-10-CM

## 2025-04-04 PROCEDURE — 71260 CT THORAX DX C+: CPT

## 2025-04-04 PROCEDURE — 70491 CT SOFT TISSUE NECK W/DYE: CPT

## 2025-04-04 PROCEDURE — 6360000004 HC RX CONTRAST MEDICATION: Performed by: INTERNAL MEDICINE

## 2025-04-04 RX ORDER — IOPAMIDOL 612 MG/ML
50 INJECTION, SOLUTION INTRAVASCULAR
Status: COMPLETED | OUTPATIENT
Start: 2025-04-04 | End: 2025-04-04

## 2025-04-04 RX ORDER — IOPAMIDOL 755 MG/ML
75 INJECTION, SOLUTION INTRAVASCULAR
Status: COMPLETED | OUTPATIENT
Start: 2025-04-04 | End: 2025-04-04

## 2025-04-04 RX ADMIN — IOPAMIDOL 75 ML: 755 INJECTION, SOLUTION INTRAVENOUS at 14:37

## 2025-04-04 RX ADMIN — IOPAMIDOL 50 ML: 612 INJECTION, SOLUTION INTRAVENOUS at 14:36

## 2025-04-17 NOTE — PROGRESS NOTES
Cardiac Electrophysiology Follow up   Date: 4/23/2025   Reason for Consultation: Atrial fibrillation/atrial flutter   Consult Requesting Physician: Ritchie Goodman MD     Chief Complaint:   Chief Complaint   Patient presents with    Follow-up     PT would like to know if she should continue with lasix?      HPI: Kiera Irvin is a 69 y.o. patient with a history of atrial fibrillation, diabetes, hypertension, non-hodgkins lymphoma, and ZOE. She previously followed with Dr. Goodman and first diagnosed with atrial fibrillation by EKG 10/1/18. She follows with Dr. Dillon for her lymphoma. She was seen by Dr Kaminski and underwent unsuccessful DCCV 10/31/18 and repeat successful DCCV 12/27/18.     She underwent atrial fibrillation and left atrial flutter ablation 10/28/20. Patient had a recurrence and ablation was discussed but patient opted not to proceed. She was seen in office and decided to trial off the flecainide and opted to proceed with ablation with symptomatic episodes of Afib. She underwent atrial fibrillation and left atrial flutter ablation 9/8/23. She was seen in office 11/9/23, flecainide and Toprol were discontinued. She saw Dr. Goodman 11/9/23 and wore an event monitor 2/23/24-3/23/24 sinus with 2 episodes of sustained atrial flutter and instructed to resume anti-arrhythmic. 1% burden. Asymptomatic.     Seen in clinic 07/17/2024 and ECG noted sinus rhythm. Reports being back on Flecainide and Metoprolol but unable to identify a difference on vs off. QRS widened with RBBB of 134 ms. Also noted to have fatigue. Flecainide discontinued.     Interval History:   Today, Kiera Irvin presents today in office for a follow up. ECG today shows sinus rhythm. Reports she is doing well overall. Denies any known recurrence of atrial fibrillation. She previously monitored heart rhythm via FitBit but has not monitored any longer as it broke.     Past Medical History:   Diagnosis Date    Allergic rhinitis     Atrial

## 2025-04-23 ENCOUNTER — OFFICE VISIT (OUTPATIENT)
Dept: CARDIOLOGY CLINIC | Age: 70
End: 2025-04-23
Payer: MEDICARE

## 2025-04-23 VITALS
HEIGHT: 63 IN | OXYGEN SATURATION: 97 % | HEART RATE: 65 BPM | SYSTOLIC BLOOD PRESSURE: 114 MMHG | WEIGHT: 235 LBS | DIASTOLIC BLOOD PRESSURE: 72 MMHG | BODY MASS INDEX: 41.64 KG/M2

## 2025-04-23 DIAGNOSIS — I48.19 PERSISTENT ATRIAL FIBRILLATION (HCC): Primary | ICD-10-CM

## 2025-04-23 PROCEDURE — 1159F MED LIST DOCD IN RCRD: CPT | Performed by: INTERNAL MEDICINE

## 2025-04-23 PROCEDURE — G9899 SCRN MAM PERF RSLTS DOC: HCPCS | Performed by: INTERNAL MEDICINE

## 2025-04-23 PROCEDURE — 1123F ACP DISCUSS/DSCN MKR DOCD: CPT | Performed by: INTERNAL MEDICINE

## 2025-04-23 PROCEDURE — 3078F DIAST BP <80 MM HG: CPT | Performed by: INTERNAL MEDICINE

## 2025-04-23 PROCEDURE — 93000 ELECTROCARDIOGRAM COMPLETE: CPT | Performed by: INTERNAL MEDICINE

## 2025-04-23 PROCEDURE — 1090F PRES/ABSN URINE INCON ASSESS: CPT | Performed by: INTERNAL MEDICINE

## 2025-04-23 PROCEDURE — G8427 DOCREV CUR MEDS BY ELIG CLIN: HCPCS | Performed by: INTERNAL MEDICINE

## 2025-04-23 PROCEDURE — G8400 PT W/DXA NO RESULTS DOC: HCPCS | Performed by: INTERNAL MEDICINE

## 2025-04-23 PROCEDURE — 99214 OFFICE O/P EST MOD 30 MIN: CPT | Performed by: INTERNAL MEDICINE

## 2025-04-23 PROCEDURE — 1036F TOBACCO NON-USER: CPT | Performed by: INTERNAL MEDICINE

## 2025-04-23 PROCEDURE — G8417 CALC BMI ABV UP PARAM F/U: HCPCS | Performed by: INTERNAL MEDICINE

## 2025-04-23 PROCEDURE — 3074F SYST BP LT 130 MM HG: CPT | Performed by: INTERNAL MEDICINE

## 2025-04-23 PROCEDURE — 3017F COLORECTAL CA SCREEN DOC REV: CPT | Performed by: INTERNAL MEDICINE

## 2025-05-12 ENCOUNTER — RESULTS FOLLOW-UP (OUTPATIENT)
Dept: FAMILY MEDICINE CLINIC | Age: 70
End: 2025-05-12

## 2025-05-22 RX ORDER — INSULIN GLARGINE 100 [IU]/ML
10 INJECTION, SOLUTION SUBCUTANEOUS NIGHTLY
Qty: 5 ADJUSTABLE DOSE PRE-FILLED PEN SYRINGE | Refills: 1
Start: 2025-05-22

## 2025-05-22 RX ORDER — GLIMEPIRIDE 2 MG/1
2 TABLET ORAL
Qty: 90 TABLET | Refills: 0 | Status: SHIPPED | OUTPATIENT
Start: 2025-05-22

## 2025-05-23 NOTE — TELEPHONE ENCOUNTER
Kiera advised and verbalized understanding. She states she is taking Metformin 500 BID, Glimepiride 2 mg BID and 30 units of Lantus. She states she has not started the Basaglar. Her sugars are running in the 200s.

## 2025-06-12 RX ORDER — POTASSIUM CHLORIDE 750 MG/1
10 TABLET, EXTENDED RELEASE ORAL 2 TIMES DAILY
Qty: 180 TABLET | Refills: 1 | Status: SHIPPED | OUTPATIENT
Start: 2025-06-12

## 2025-06-17 ENCOUNTER — TELEPHONE (OUTPATIENT)
Dept: FAMILY MEDICINE CLINIC | Age: 70
End: 2025-06-17

## 2025-06-17 DIAGNOSIS — R30.0 DYSURIA: Primary | ICD-10-CM

## 2025-06-17 DIAGNOSIS — R30.0 DYSURIA: ICD-10-CM

## 2025-06-17 LAB
BACTERIA URNS QL MICRO: ABNORMAL /HPF
BILIRUB UR QL STRIP.AUTO: NEGATIVE
CLARITY UR: CLEAR
COLOR UR: YELLOW
EPI CELLS #/AREA URNS AUTO: 0 /HPF (ref 0–5)
GLUCOSE UR STRIP.AUTO-MCNC: 500 MG/DL
HGB UR QL STRIP.AUTO: NEGATIVE
HYALINE CASTS #/AREA URNS AUTO: 0 /LPF (ref 0–8)
KETONES UR STRIP.AUTO-MCNC: NEGATIVE MG/DL
LEUKOCYTE ESTERASE UR QL STRIP.AUTO: ABNORMAL
NITRITE UR QL STRIP.AUTO: NEGATIVE
PH UR STRIP.AUTO: 6 [PH] (ref 5–8)
PROT UR STRIP.AUTO-MCNC: NEGATIVE MG/DL
RBC CLUMPS #/AREA URNS AUTO: 2 /HPF (ref 0–4)
SP GR UR STRIP.AUTO: 1.01 (ref 1–1.03)
UA COMPLETE W REFLEX CULTURE PNL UR: YES
UA DIPSTICK W REFLEX MICRO PNL UR: YES
URN SPEC COLLECT METH UR: ABNORMAL
UROBILINOGEN UR STRIP-ACNC: 0.2 E.U./DL
WBC #/AREA URNS AUTO: 114 /HPF (ref 0–5)

## 2025-06-17 RX ORDER — INSULIN GLARGINE 100 [IU]/ML
24 INJECTION, SOLUTION SUBCUTANEOUS NIGHTLY
Qty: 5 ADJUSTABLE DOSE PRE-FILLED PEN SYRINGE | Refills: 1
Start: 2025-06-17 | End: 2025-07-24

## 2025-06-20 ENCOUNTER — RESULTS FOLLOW-UP (OUTPATIENT)
Dept: FAMILY MEDICINE CLINIC | Age: 70
End: 2025-06-20

## 2025-06-20 LAB
BACTERIA UR CULT: ABNORMAL
BACTERIA UR CULT: ABNORMAL
ORGANISM: ABNORMAL

## 2025-06-20 RX ORDER — NITROFURANTOIN 25; 75 MG/1; MG/1
100 CAPSULE ORAL 2 TIMES DAILY
Qty: 14 CAPSULE | Refills: 0 | Status: SHIPPED | OUTPATIENT
Start: 2025-06-20 | End: 2025-06-27

## 2025-06-26 ENCOUNTER — APPOINTMENT (OUTPATIENT)
Dept: CT IMAGING | Age: 70
End: 2025-06-26
Payer: MEDICARE

## 2025-06-26 ENCOUNTER — HOSPITAL ENCOUNTER (EMERGENCY)
Age: 70
Discharge: HOME | End: 2025-06-26
Payer: MEDICARE

## 2025-06-26 VITALS
SYSTOLIC BLOOD PRESSURE: 163 MMHG | HEART RATE: 78 BPM | TEMPERATURE: 98.5 F | WEIGHT: 227 LBS | BODY MASS INDEX: 40.22 KG/M2 | RESPIRATION RATE: 23 BRPM | HEIGHT: 63 IN | DIASTOLIC BLOOD PRESSURE: 66 MMHG | OXYGEN SATURATION: 97 %

## 2025-06-26 DIAGNOSIS — R10.31 RIGHT LOWER QUADRANT ABDOMINAL PAIN: Primary | ICD-10-CM

## 2025-06-26 LAB
ALBUMIN SERPL-MCNC: 3.9 G/DL (ref 3.4–5)
ALBUMIN/GLOB SERPL: 1.7 {RATIO} (ref 1.1–2.2)
ALP SERPL-CCNC: 153 U/L (ref 40–129)
ALT SERPL-CCNC: 34 U/L (ref 10–40)
ANION GAP SERPL CALCULATED.3IONS-SCNC: 14 MMOL/L (ref 3–16)
ANISOCYTOSIS BLD QL SMEAR: ABNORMAL
AST SERPL-CCNC: 34 U/L (ref 15–37)
BACTERIA URNS QL MICRO: ABNORMAL /HPF
BASOPHILS # BLD: 0 K/UL (ref 0–0.2)
BASOPHILS NFR BLD: 0 %
BILIRUB SERPL-MCNC: 0.6 MG/DL (ref 0–1)
BILIRUB UR QL STRIP.AUTO: NEGATIVE
BUN SERPL-MCNC: 19 MG/DL (ref 7–20)
CALCIUM SERPL-MCNC: 9.1 MG/DL (ref 8.3–10.6)
CHLORIDE SERPL-SCNC: 105 MMOL/L (ref 99–110)
CLARITY UR: CLEAR
CO2 SERPL-SCNC: 22 MMOL/L (ref 21–32)
COLOR UR: YELLOW
CREAT SERPL-MCNC: 1.1 MG/DL (ref 0.6–1.2)
DACRYOCYTES BLD QL SMEAR: ABNORMAL
DEPRECATED RDW RBC AUTO: 17.4 % (ref 12.4–15.4)
EOSINOPHIL # BLD: 0.2 K/UL (ref 0–0.6)
EOSINOPHIL NFR BLD: 4 %
EPI CELLS #/AREA URNS AUTO: 2 /HPF (ref 0–5)
GFR SERPLBLD CREATININE-BSD FMLA CKD-EPI: 55 ML/MIN/{1.73_M2}
GLUCOSE SERPL-MCNC: 244 MG/DL (ref 70–99)
GLUCOSE UR STRIP.AUTO-MCNC: >=1000 MG/DL
HCT VFR BLD AUTO: 37.3 % (ref 36–48)
HGB BLD-MCNC: 12.3 G/DL (ref 12–16)
HGB UR QL STRIP.AUTO: NEGATIVE
HYALINE CASTS #/AREA URNS AUTO: 0 /LPF (ref 0–8)
KETONES UR STRIP.AUTO-MCNC: ABNORMAL MG/DL
LEUKOCYTE ESTERASE UR QL STRIP.AUTO: ABNORMAL
LIPASE SERPL-CCNC: 26 U/L (ref 13–60)
LYMPHOCYTES # BLD: 0.3 K/UL (ref 1–5.1)
LYMPHOCYTES NFR BLD: 6 %
MCH RBC QN AUTO: 32 PG (ref 26–34)
MCHC RBC AUTO-ENTMCNC: 33 G/DL (ref 31–36)
MCV RBC AUTO: 97.1 FL (ref 80–100)
MONOCYTES # BLD: 0.4 K/UL (ref 0–1.3)
MONOCYTES NFR BLD: 7 %
NEUTROPHILS # BLD: 4.3 K/UL (ref 1.7–7.7)
NEUTROPHILS NFR BLD: 77 %
NEUTS BAND NFR BLD MANUAL: 6 % (ref 0–7)
NITRITE UR QL STRIP.AUTO: NEGATIVE
PH UR STRIP.AUTO: 5.5 [PH] (ref 5–8)
PLATELET # BLD AUTO: 142 K/UL (ref 135–450)
PLATELET BLD QL SMEAR: ADEQUATE
PMV BLD AUTO: 10.3 FL (ref 5–10.5)
POIKILOCYTOSIS BLD QL SMEAR: ABNORMAL
POTASSIUM SERPL-SCNC: 3.8 MMOL/L (ref 3.5–5.1)
PROT SERPL-MCNC: 6.2 G/DL (ref 6.4–8.2)
PROT UR STRIP.AUTO-MCNC: 30 MG/DL
RBC # BLD AUTO: 3.84 M/UL (ref 4–5.2)
RBC CLUMPS #/AREA URNS AUTO: 1 /HPF (ref 0–4)
SLIDE REVIEW: ABNORMAL
SODIUM SERPL-SCNC: 141 MMOL/L (ref 136–145)
SP GR UR STRIP.AUTO: 1.02 (ref 1–1.03)
UA COMPLETE W REFLEX CULTURE PNL UR: YES
UA DIPSTICK W REFLEX MICRO PNL UR: YES
URN SPEC COLLECT METH UR: ABNORMAL
UROBILINOGEN UR STRIP-ACNC: 1 E.U./DL
WBC # BLD AUTO: 5.2 K/UL (ref 4–11)
WBC #/AREA URNS AUTO: 34 /HPF (ref 0–5)

## 2025-06-26 PROCEDURE — 81001 URINALYSIS AUTO W/SCOPE: CPT

## 2025-06-26 PROCEDURE — 2580000003 HC RX 258

## 2025-06-26 PROCEDURE — 36415 COLL VENOUS BLD VENIPUNCTURE: CPT

## 2025-06-26 PROCEDURE — 87086 URINE CULTURE/COLONY COUNT: CPT

## 2025-06-26 PROCEDURE — 83690 ASSAY OF LIPASE: CPT

## 2025-06-26 PROCEDURE — 99285 EMERGENCY DEPT VISIT HI MDM: CPT

## 2025-06-26 PROCEDURE — 6360000004 HC RX CONTRAST MEDICATION

## 2025-06-26 PROCEDURE — 74177 CT ABD & PELVIS W/CONTRAST: CPT

## 2025-06-26 PROCEDURE — 87040 BLOOD CULTURE FOR BACTERIA: CPT

## 2025-06-26 PROCEDURE — 85025 COMPLETE CBC W/AUTO DIFF WBC: CPT

## 2025-06-26 PROCEDURE — 80053 COMPREHEN METABOLIC PANEL: CPT

## 2025-06-26 RX ORDER — MORPHINE SULFATE 4 MG/ML
4 INJECTION, SOLUTION INTRAMUSCULAR; INTRAVENOUS
Refills: 0 | Status: DISCONTINUED | OUTPATIENT
Start: 2025-06-26 | End: 2025-06-26 | Stop reason: HOSPADM

## 2025-06-26 RX ORDER — ONDANSETRON 2 MG/ML
4 INJECTION INTRAMUSCULAR; INTRAVENOUS ONCE
Status: DISCONTINUED | OUTPATIENT
Start: 2025-06-26 | End: 2025-06-26 | Stop reason: HOSPADM

## 2025-06-26 RX ORDER — 0.9 % SODIUM CHLORIDE 0.9 %
1000 INTRAVENOUS SOLUTION INTRAVENOUS ONCE
Status: COMPLETED | OUTPATIENT
Start: 2025-06-26 | End: 2025-06-26

## 2025-06-26 RX ORDER — IOPAMIDOL 755 MG/ML
75 INJECTION, SOLUTION INTRAVASCULAR
Status: COMPLETED | OUTPATIENT
Start: 2025-06-26 | End: 2025-06-26

## 2025-06-26 RX ORDER — NITROFURANTOIN 25; 75 MG/1; MG/1
100 CAPSULE ORAL 2 TIMES DAILY
Qty: 6 CAPSULE | Refills: 0 | Status: SHIPPED | OUTPATIENT
Start: 2025-06-26 | End: 2025-06-29

## 2025-06-26 RX ADMIN — IOPAMIDOL 75 ML: 755 INJECTION, SOLUTION INTRAVENOUS at 11:08

## 2025-06-26 RX ADMIN — SODIUM CHLORIDE 1000 ML: 0.9 INJECTION, SOLUTION INTRAVENOUS at 11:34

## 2025-06-26 ASSESSMENT — PAIN DESCRIPTION - DESCRIPTORS
DESCRIPTORS: SHARP;THROBBING
DESCRIPTORS: SHARP;THROBBING
DESCRIPTORS: PATIENT UNABLE TO DESCRIBE

## 2025-06-26 ASSESSMENT — PAIN - FUNCTIONAL ASSESSMENT
PAIN_FUNCTIONAL_ASSESSMENT: ACTIVITIES ARE NOT PREVENTED
PAIN_FUNCTIONAL_ASSESSMENT: PREVENTS OR INTERFERES SOME ACTIVE ACTIVITIES AND ADLS
PAIN_FUNCTIONAL_ASSESSMENT: PREVENTS OR INTERFERES SOME ACTIVE ACTIVITIES AND ADLS
PAIN_FUNCTIONAL_ASSESSMENT: 0-10

## 2025-06-26 ASSESSMENT — PAIN DESCRIPTION - FREQUENCY
FREQUENCY: CONTINUOUS
FREQUENCY: CONTINUOUS
FREQUENCY: INTERMITTENT

## 2025-06-26 ASSESSMENT — PAIN SCALES - GENERAL
PAINLEVEL_OUTOF10: 4
PAINLEVEL_OUTOF10: 7
PAINLEVEL_OUTOF10: 2

## 2025-06-26 ASSESSMENT — PAIN DESCRIPTION - LOCATION
LOCATION: ABDOMEN

## 2025-06-26 ASSESSMENT — ENCOUNTER SYMPTOMS
ABDOMINAL PAIN: 1
CHEST TIGHTNESS: 0
SHORTNESS OF BREATH: 0
VOMITING: 0
NAUSEA: 0

## 2025-06-26 ASSESSMENT — PAIN DESCRIPTION - ORIENTATION
ORIENTATION: RIGHT;LOWER

## 2025-06-26 ASSESSMENT — PAIN DESCRIPTION - PAIN TYPE
TYPE: ACUTE PAIN

## 2025-06-26 ASSESSMENT — PAIN DESCRIPTION - ONSET
ONSET: ON-GOING
ONSET: SUDDEN

## 2025-06-26 NOTE — DISCHARGE INSTR - COC
Continuity of Care Form    Patient Name: Kiera Irvin   :  1955  MRN:  [id number]    Admit date:  2025  Discharge date:  ***    Code Status Order: Prior   Advance Directives:     Admitting Physician:  No admitting provider for patient encounter.  PCP: No primary care provider on file.    Discharging Nurse: ***  Discharging Hospital Unit/Room#: 040/E-40  Discharging Unit Phone Number: ***    Emergency Contact:   Extended Emergency Contact Information  Primary Emergency Contact: Federico Irvin  Address: [address] [place] [address]  Home Phone: [phone]  Mobile Phone: [phone]  Relation: Spouse  Secondary Emergency Contact: Consuelo Luo  Address: [address] [place] [address]  Home Phone: [phone]  Mobile Phone: [phone]  Relation: Child    Past Surgical History:  Past Surgical History:   Procedure Laterality Date    BONE MARROW TRANSPLANT      BREAST CYST ASPIRATION      in past benign bilateral over years. 2 episodes     SECTION      CHOLECYSTECTOMY  1998    COLONOSCOPY  09/10/2012    polyp, sharlene, repeat 5 years    COLONOSCOPY  10/09/2017    dr su and check in 5 years. polyps    COLONOSCOPY  2022    polyps, dr su, repeat in 3 years    CT BIOPSY LYMPH NODES SUPERFICIAL  10/8/2024    CT BIOPSY LYMPH NODES SUPERFICIAL 10/8/2024 [facility] CT SCAN    ENDOMETRIAL ABLATION  2007    FINE NEEDLE ASPIRATION      GALLBLADDER SURGERY      IR NONTUNNELED VASCULAR CATHETER > 5 YEARS  10/24/2024    IR NONTUNNELED VASCULAR CATHETER 10/24/2024 [facility] CATH/IR/NEURO LAB    OTHER SURGICAL HISTORY  2019    Abdominal Mass BX    OTHER SURGICAL HISTORY  2019    CT BIOPSY ABDOMEN RETROPERITONEUM,    OVARY REMOVAL Left 2018    benign growth, dr laguerre at [facility]    TONSILLECTOMY      TUBAL LIGATION      TUNNELED VENOUS PORT PLACEMENT Right 2016    Dr. Alford/IR [facility]

## 2025-06-26 NOTE — DISCHARGE INSTRUCTIONS
Workup in the emergency department is overall reassuring.  No acute findings which would require hospitalization.  I am going to extend your Macrobid by an additional 3 days because your urine still does show some evidence of infection.  On review of your sensitivities on your urine culture this is the best oral medication to treat your current infection    Take 500 mg - 1000 mg of Tylenol every 6 hours.    Closely follow-up with [facility] and primary care    We are always happy to reevaluate you should anything worsen.

## 2025-06-26 NOTE — ED PROVIDER NOTES
[facility] EMERGENCY DEPARTMENT  EMERGENCY DEPARTMENT ENCOUNTER        Pt Name: Kiera Irvin  MRN: [id number]  Birthdate 1955  Date of evaluation: 6/26/2025  Provider: ALEXUS Rodriguez CNP  PCP: No primary care provider on file.  Note Started: 3:24 PM EDT 6/26/25      UMAIR. I have evaluated this patient.        CHIEF COMPLAINT       Chief Complaint   Patient presents with    Abdominal Pain     Lower right abdominal pain since Sunday after doing leg exercises in the pool. Pain noted right after getting out. Hx gallbladder removed, lymphoma, afib, DM, on-going Uti with antibiotics. Retains appendix.        HISTORY OF PRESENT ILLNESS: 1 or more Elements     History From: Patient, EMR review    Chief Complaint: Right lower quadrant abdominal pain    Kiera Irvin is a 69 y.o. female with a past medical history notable for small B-cell lymphoma, hypertension, type 2 diabetes, hyperlipidemia, atrial flutter, ZOE, elevated BMI who presents to the emergency department reporting right lower quadrant abdominal pain.  Prior abdominal surgical history includes cholecystectomy back in 1998 however is negative for appendectomy which is the patient's primary concern although reports that the pain started after doing some leg exercises in a pool.  Additionally notes that she is currently on antibiotics for management of urinary tract infection and has 1 day left of antibiotics.  Not sure if this would contribute to her symptoms.  Denies gross hematuria, flank pain, fevers, chills, nausea, vomiting.    Nursing Notes were all reviewed and agreed with or any disagreements were addressed in the HPI.    REVIEW OF SYSTEMS :      Review of Systems   Constitutional:  Negative for chills, fatigue and fever.   Respiratory:  Negative for chest tightness and shortness of breath.    Cardiovascular:  Negative for chest pain and palpitations.   Gastrointestinal:  Positive for abdominal pain (Right lower quadrant).

## 2025-06-27 LAB
BACTERIA BLD CULT: NORMAL
BACTERIA UR CULT: NORMAL

## 2025-06-30 LAB — BACTERIA BLD CULT: NORMAL

## 2025-07-01 RX ORDER — VALACYCLOVIR HYDROCHLORIDE 500 MG/1
500 TABLET, FILM COATED ORAL 2 TIMES DAILY
Qty: 180 TABLET | Refills: 0 | Status: SHIPPED | OUTPATIENT
Start: 2025-07-01

## 2025-07-01 RX ORDER — METFORMIN HYDROCHLORIDE 500 MG/1
500 TABLET, EXTENDED RELEASE ORAL
Qty: 90 TABLET | Refills: 0 | Status: SHIPPED | OUTPATIENT
Start: 2025-07-01 | End: 2025-07-03 | Stop reason: SDUPTHER

## 2025-07-01 NOTE — TELEPHONE ENCOUNTER
PT IS CALLING FOR REFILL ON MEDICATION :     Disp Refills Start End    metFORMIN (GLUCOPHAGE-XR) 500 MG extended release tablet 180 tablet 1 3/24/2025 --    Sig - Route: Take 1 tablet by mouth daily (with breakfast) - Oral    Class: Adjust Sig    Notes to Pharmacy: New dosing        QTY : 1 WEEK LEFT      NEXT OV : 07/03/2025 DAVID    LAST OV : 03/24/2025    Pharmacy    [id number] - [place], [place] - [address] - P [phone] - F [phone]  [address] [place] [address] [place] [address]  Phone: [phone]  Fax: [phone]

## 2025-07-01 NOTE — TELEPHONE ENCOUNTER
Pharmacy requesting  refill at [facility], [place] pt has a NTP appt with Dr Liao 07.03.25

## 2025-07-02 PROBLEM — C85.90 NHL (NON-HODGKIN'S LYMPHOMA) (HCC): Status: ACTIVE | Noted: 2019-09-30

## 2025-07-02 PROBLEM — I48.91 ATRIAL FIBRILLATION WITH RAPID VENTRICULAR RESPONSE (HCC): Status: RESOLVED | Noted: 2018-10-31 | Resolved: 2025-07-02

## 2025-07-02 PROBLEM — N12 PYELONEPHRITIS: Status: RESOLVED | Noted: 2022-07-05 | Resolved: 2025-07-02

## 2025-07-02 PROBLEM — J18.9 PNEUMONIA: Status: RESOLVED | Noted: 2021-02-24 | Resolved: 2025-07-02

## 2025-07-02 PROBLEM — U07.1 PNEUMONIA DUE TO COVID-19 VIRUS: Status: RESOLVED | Noted: 2021-02-24 | Resolved: 2025-07-02

## 2025-07-02 PROBLEM — J06.9 UPPER RESPIRATORY INFECTION: Status: RESOLVED | Noted: 2020-04-18 | Resolved: 2025-07-02

## 2025-07-02 PROBLEM — J12.82 PNEUMONIA DUE TO COVID-19 VIRUS: Status: RESOLVED | Noted: 2021-02-24 | Resolved: 2025-07-02

## 2025-07-02 PROBLEM — A41.9 SEPSIS (HCC): Status: RESOLVED | Noted: 2020-04-18 | Resolved: 2025-07-02

## 2025-07-02 PROBLEM — M70.62 TROCHANTERIC BURSITIS OF LEFT HIP: Status: RESOLVED | Noted: 2018-02-16 | Resolved: 2025-07-02

## 2025-07-02 PROBLEM — I48.0 PAF (PAROXYSMAL ATRIAL FIBRILLATION) (HCC): Status: RESOLVED | Noted: 2019-11-26 | Resolved: 2025-07-02

## 2025-07-02 NOTE — PROGRESS NOTES
Kiera Irvin (:  1955) is a 69 y.o. female,Established patient, here for evaluation of the following chief complaint(s):  Established New Doctor (Former Dr. Goodman Patient /) and Check-Up (Diabetes, hypertension, lipids)      Subjective       HPI    DM-Pt is on glimepiride 2 mg one time per day , insulin glargine 24 u bid 500 bid, metformin 500 mg po bid.  Last HgA1c was 5.8 on 3/24/25.   AM glucose   Last diabetic eye exam-Pt gets this every yr. Never been told anything about diabetic changes.  Last urine microalbumin/Cr-due    A fib/flutter-Pt is on eliquis and metoprolol. Pt follows with Dr Henning    B cell lymphoma/NHL- following with Dr Brewer at [facility]. They are monitoring her levels every 2 weeks    ZOE-Pt has not been using this.     Pt went to ER one week ago for  RLQ abd pain. Had CT scan that was unremarkable.     Started on macrobid for UTI 3 weeks ago. Urine cx 75,000 ESBL ss to macrobid. Repeat u/a done in er shows less bacteri and LE on U/a; sp far urine cx shows no growth.  They extended abx for three more days. Started tylenol and symptoms are improving.     Last blood work-CMP/CBC done 25 (done in ER).         Review of Systems   Constitutional:  Negative for chills and fever.   Respiratory:  Positive for shortness of breath. Negative for wheezing.    Cardiovascular:  Positive for leg swelling (occasional).   Gastrointestinal:  Negative for abdominal pain, diarrhea, nausea and vomiting.   Neurological:  Negative for dizziness, light-headedness and headaches.             Objective     /80 (BP Site: Left Upper Arm, Patient Position: Sitting, BP Cuff Size: Large Adult)   Pulse 89   Temp 97.1 °F (36.2 °C) (Temporal)   Ht 1.6 m (5' 3\")   Wt 104.1 kg (229 lb 9.6 oz)   SpO2 97%   BMI 40.67 kg/m²      Physical Exam  Constitutional:       Appearance: Normal appearance.   Cardiovascular:      Rate and Rhythm: Normal rate and regular rhythm.      Pulses: Normal

## 2025-07-03 ENCOUNTER — OFFICE VISIT (OUTPATIENT)
Dept: FAMILY MEDICINE CLINIC | Age: 70
End: 2025-07-03

## 2025-07-03 VITALS
HEART RATE: 89 BPM | TEMPERATURE: 97.1 F | WEIGHT: 229.6 LBS | OXYGEN SATURATION: 97 % | SYSTOLIC BLOOD PRESSURE: 118 MMHG | DIASTOLIC BLOOD PRESSURE: 80 MMHG | BODY MASS INDEX: 40.68 KG/M2 | HEIGHT: 63 IN

## 2025-07-03 DIAGNOSIS — C83.08 SMALL CELL B-CELL LYMPHOMA, LYMPH NODES OF MULTIPLE SITES (HCC): ICD-10-CM

## 2025-07-03 DIAGNOSIS — C85.90 NON-HODGKIN'S LYMPHOMA, UNSPECIFIED BODY REGION, UNSPECIFIED NON-HODGKIN LYMPHOMA TYPE (HCC): ICD-10-CM

## 2025-07-03 DIAGNOSIS — G47.33 OSA (OBSTRUCTIVE SLEEP APNEA): ICD-10-CM

## 2025-07-03 DIAGNOSIS — C83.00 SMALL B-CELL LYMPHOMA, UNSPECIFIED BODY REGION (HCC): ICD-10-CM

## 2025-07-03 DIAGNOSIS — Z79.4 TYPE 2 DIABETES MELLITUS WITHOUT COMPLICATION, WITH LONG-TERM CURRENT USE OF INSULIN (HCC): Primary | ICD-10-CM

## 2025-07-03 DIAGNOSIS — N39.0 URINARY TRACT INFECTION WITHOUT HEMATURIA, SITE UNSPECIFIED: ICD-10-CM

## 2025-07-03 DIAGNOSIS — Z79.4 TYPE 2 DIABETES MELLITUS WITHOUT COMPLICATION, WITH LONG-TERM CURRENT USE OF INSULIN (HCC): ICD-10-CM

## 2025-07-03 DIAGNOSIS — I48.92 LEFT ATRIAL FLUTTER BY ELECTROCARDIOGRAM (HCC): ICD-10-CM

## 2025-07-03 DIAGNOSIS — E11.9 TYPE 2 DIABETES MELLITUS WITHOUT COMPLICATION, WITH LONG-TERM CURRENT USE OF INSULIN (HCC): ICD-10-CM

## 2025-07-03 DIAGNOSIS — E11.9 TYPE 2 DIABETES MELLITUS WITHOUT COMPLICATION, WITH LONG-TERM CURRENT USE OF INSULIN (HCC): Primary | ICD-10-CM

## 2025-07-03 DIAGNOSIS — I48.91 ATRIAL FIBRILLATION, UNSPECIFIED TYPE (HCC): ICD-10-CM

## 2025-07-03 RX ORDER — UBIDECARENONE 75 MG
50 CAPSULE ORAL DAILY
COMMUNITY

## 2025-07-03 RX ORDER — METFORMIN HYDROCHLORIDE 500 MG/1
500 TABLET, EXTENDED RELEASE ORAL 2 TIMES DAILY
Qty: 90 TABLET | Refills: 0 | Status: SHIPPED | OUTPATIENT
Start: 2025-07-03

## 2025-07-03 ASSESSMENT — ENCOUNTER SYMPTOMS
VOMITING: 0
ABDOMINAL PAIN: 0
NAUSEA: 0
SHORTNESS OF BREATH: 1
DIARRHEA: 0
WHEEZING: 0

## 2025-07-04 LAB
EST. AVERAGE GLUCOSE BLD GHB EST-MCNC: 191.5 MG/DL
HBA1C MFR BLD: 8.3 %

## 2025-07-06 ENCOUNTER — RESULTS FOLLOW-UP (OUTPATIENT)
Dept: FAMILY MEDICINE CLINIC | Age: 70
End: 2025-07-06

## 2025-07-08 DIAGNOSIS — Z79.4 TYPE 2 DIABETES MELLITUS WITHOUT COMPLICATION, WITH LONG-TERM CURRENT USE OF INSULIN (HCC): ICD-10-CM

## 2025-07-08 DIAGNOSIS — N39.0 URINARY TRACT INFECTION WITHOUT HEMATURIA, SITE UNSPECIFIED: ICD-10-CM

## 2025-07-08 DIAGNOSIS — E11.9 TYPE 2 DIABETES MELLITUS WITHOUT COMPLICATION, WITH LONG-TERM CURRENT USE OF INSULIN (HCC): ICD-10-CM

## 2025-07-08 LAB
BILIRUB UR QL STRIP.AUTO: NEGATIVE
CLARITY UR: CLEAR
COLOR UR: YELLOW
CREAT UR-MCNC: 26.8 MG/DL (ref 28–259)
GLUCOSE UR STRIP.AUTO-MCNC: 250 MG/DL
HGB UR QL STRIP.AUTO: NEGATIVE
KETONES UR STRIP.AUTO-MCNC: NEGATIVE MG/DL
LEUKOCYTE ESTERASE UR QL STRIP.AUTO: NEGATIVE
MICROALBUMIN UR DL<=1MG/L-MCNC: <1.2 MG/DL
MICROALBUMIN/CREAT UR: ABNORMAL MG/G (ref 0–30)
NITRITE UR QL STRIP.AUTO: NEGATIVE
PH UR STRIP.AUTO: 6 [PH] (ref 5–8)
PROT UR STRIP.AUTO-MCNC: NEGATIVE MG/DL
SP GR UR STRIP.AUTO: 1.01 (ref 1–1.03)
UA COMPLETE W REFLEX CULTURE PNL UR: ABNORMAL
UA DIPSTICK W REFLEX MICRO PNL UR: ABNORMAL
URN SPEC COLLECT METH UR: ABNORMAL
UROBILINOGEN UR STRIP-ACNC: 0.2 E.U./DL

## 2025-07-22 ENCOUNTER — OFFICE VISIT (OUTPATIENT)
Dept: SLEEP MEDICINE | Age: 70
End: 2025-07-22
Payer: MEDICARE

## 2025-07-22 ENCOUNTER — TELEPHONE (OUTPATIENT)
Dept: CARDIOLOGY CLINIC | Age: 70
End: 2025-07-22

## 2025-07-22 VITALS
HEIGHT: 63 IN | BODY MASS INDEX: 40.08 KG/M2 | RESPIRATION RATE: 18 BRPM | SYSTOLIC BLOOD PRESSURE: 130 MMHG | WEIGHT: 226.2 LBS | DIASTOLIC BLOOD PRESSURE: 80 MMHG | HEART RATE: 75 BPM | OXYGEN SATURATION: 98 % | TEMPERATURE: 97.9 F

## 2025-07-22 DIAGNOSIS — I48.92 LEFT ATRIAL FLUTTER BY ELECTROCARDIOGRAM (HCC): ICD-10-CM

## 2025-07-22 DIAGNOSIS — I10 ESSENTIAL HYPERTENSION: ICD-10-CM

## 2025-07-22 DIAGNOSIS — E66.813 CLASS 3 SEVERE OBESITY DUE TO EXCESS CALORIES WITH SERIOUS COMORBIDITY AND BODY MASS INDEX (BMI) OF 40.0 TO 44.9 IN ADULT (HCC): ICD-10-CM

## 2025-07-22 DIAGNOSIS — G47.33 OSA ON CPAP: Primary | ICD-10-CM

## 2025-07-22 DIAGNOSIS — Z99.89 DEPENDENCE ON OTHER ENABLING MACHINES AND DEVICES: ICD-10-CM

## 2025-07-22 PROCEDURE — G8400 PT W/DXA NO RESULTS DOC: HCPCS | Performed by: PSYCHIATRY & NEUROLOGY

## 2025-07-22 PROCEDURE — 1123F ACP DISCUSS/DSCN MKR DOCD: CPT | Performed by: PSYCHIATRY & NEUROLOGY

## 2025-07-22 PROCEDURE — 1090F PRES/ABSN URINE INCON ASSESS: CPT | Performed by: PSYCHIATRY & NEUROLOGY

## 2025-07-22 PROCEDURE — 3079F DIAST BP 80-89 MM HG: CPT | Performed by: PSYCHIATRY & NEUROLOGY

## 2025-07-22 PROCEDURE — 1159F MED LIST DOCD IN RCRD: CPT | Performed by: PSYCHIATRY & NEUROLOGY

## 2025-07-22 PROCEDURE — 1160F RVW MEDS BY RX/DR IN RCRD: CPT | Performed by: PSYCHIATRY & NEUROLOGY

## 2025-07-22 PROCEDURE — G2211 COMPLEX E/M VISIT ADD ON: HCPCS | Performed by: PSYCHIATRY & NEUROLOGY

## 2025-07-22 PROCEDURE — 1036F TOBACCO NON-USER: CPT | Performed by: PSYCHIATRY & NEUROLOGY

## 2025-07-22 PROCEDURE — G9899 SCRN MAM PERF RSLTS DOC: HCPCS | Performed by: PSYCHIATRY & NEUROLOGY

## 2025-07-22 PROCEDURE — G8427 DOCREV CUR MEDS BY ELIG CLIN: HCPCS | Performed by: PSYCHIATRY & NEUROLOGY

## 2025-07-22 PROCEDURE — 99214 OFFICE O/P EST MOD 30 MIN: CPT | Performed by: PSYCHIATRY & NEUROLOGY

## 2025-07-22 PROCEDURE — G8417 CALC BMI ABV UP PARAM F/U: HCPCS | Performed by: PSYCHIATRY & NEUROLOGY

## 2025-07-22 PROCEDURE — 3017F COLORECTAL CA SCREEN DOC REV: CPT | Performed by: PSYCHIATRY & NEUROLOGY

## 2025-07-22 PROCEDURE — 3075F SYST BP GE 130 - 139MM HG: CPT | Performed by: PSYCHIATRY & NEUROLOGY

## 2025-07-22 ASSESSMENT — SLEEP AND FATIGUE QUESTIONNAIRES
HOW LIKELY ARE YOU TO NOD OFF OR FALL ASLEEP WHILE SITTING AND TALKING TO SOMEONE: WOULD NEVER DOZE
HOW LIKELY ARE YOU TO NOD OFF OR FALL ASLEEP WHEN YOU ARE A PASSENGER IN A CAR FOR AN HOUR WITHOUT A BREAK: WOULD NEVER DOZE
HOW LIKELY ARE YOU TO NOD OFF OR FALL ASLEEP WHILE WATCHING TV: WOULD NEVER DOZE
HOW LIKELY ARE YOU TO NOD OFF OR FALL ASLEEP WHILE SITTING QUIETLY AFTER LUNCH WITHOUT ALCOHOL: WOULD NEVER DOZE
ESS TOTAL SCORE: 3
HOW LIKELY ARE YOU TO NOD OFF OR FALL ASLEEP WHILE SITTING AND READING: WOULD NEVER DOZE
HOW LIKELY ARE YOU TO NOD OFF OR FALL ASLEEP IN A CAR, WHILE STOPPED FOR A FEW MINUTES IN TRAFFIC: WOULD NEVER DOZE
HOW LIKELY ARE YOU TO NOD OFF OR FALL ASLEEP WHILE SITTING INACTIVE IN A PUBLIC PLACE: WOULD NEVER DOZE
HOW LIKELY ARE YOU TO NOD OFF OR FALL ASLEEP WHILE LYING DOWN TO REST IN THE AFTERNOON WHEN CIRCUMSTANCES PERMIT: HIGH CHANCE OF DOZING

## 2025-07-22 NOTE — TELEPHONE ENCOUNTER
Prepared samples for PT. Updated samples log accordingly. Called/spoke to PT and advised samples ready for pickup. PT V/U

## 2025-07-22 NOTE — PROGRESS NOTES
MD CHIRAG Montana Board Certified in Sleep Medicine  Certified in Behavioral Sleep Medicine  Board Certified in Neurology [facility]  [address]   [address]  [place]  [address]  [phone]   [facility]  [address]  [address]  [place], [place] [address]                      [facility]  [address] [id number] [phone]    Subjective:     Patient ID: Kiera Irvin is a 69 y.o. female.    Chief Complaint   Patient presents with    Follow-up    Sleep Apnea       HPI:        Kiera Irvin is a 69 y.o. female was seen today as a follow for moderate obstructive sleep apnea with apnea hypopnea index of 25.5/hr with lowest O2 saturation of 78%, patient spent about 55 minutes below 90%.(weight was 217 pounds 11/07/2018).   Has farm house and hot upstairs and could not use it  Patient is using the PAP machine about 23% of the time, more than 4 hours a night about  20 %, in total average of 5:35 hours a night in last 30 days.  Currently on PAP at 9 cm (), the AHI is only 1.4 events per hour at this pressure.  Patient improved regarding daytime sleepiness and fatigue, wakes up refreshed in the morning.  The Patient scored [place] Sleepiness Score: 3 on [place] Sleepiness Scale ( more than 10 is indicative of daytime sleepiness)   Patient has no problem with PAP pressure or mask. Uses nasal pillow.    BP,and A-Fib are stable . Has gained 5 pounds since last visit.   DOT/CDL - N/A      Previous Report(s)Reviewed: historical medical records         Social History     Socioeconomic History    Marital status:      Spouse name: Not on file    Number of children: Not on file    Years of education: Not on file    Highest education level: Not on file   Occupational History    Not on file   Tobacco Use    Smoking status: Former     Current packs/day: 0.00

## 2025-07-22 NOTE — PROGRESS NOTES
Kiera Irvin         : 1955  [] [facility]     [x] [facility]      [] [employer]     [][employer]    [] [employer]  [] [employer]   [] [facility]  [] [employer] [] [employer] [] [employer] [] [facility] [] [employer] [] [facility]  Diagnosis: [x] ZOE (G47.33) [] CSA (G47.31) [] Apnea (G47.30)   Length of Need: [] 12 Months [x] 99 Months [] Other:    Machine (MARKUS!):  [x] ResMed AirSense     Auto [] Other:       Humidifier: [x] Heated ()        [x] Water chamber replacement ()/ 1 per 6 months        Mask:  Please always start with the mask the patient used during the titraion   [x] Nasal () /1 per 3 months    [x] Patient choice -Size and fit mask    [] Dispense:     [x] Headgear () / 1 per 6 months        [x] Replacement Nasal Pillows ()/2 per month         Tubing: [x] Heated ()/1 per 3 months    [] Standard ()/1 per 3 months [] Other:           Filters: [x] Non-disposable ()/1 per 6 months     [x] Ultra-Fine, Disposable ()/2 per month        Miscellaneous: [x] Chin Strap ()/ 1 per 6 months [] O2 bleed-in:       LPM   [] Oximetry on CPAP/Bilevel []  Other:          Start Order Date: 25    MEDICAL JUSTIFICATION:  I, the undersigned, certify that the above prescribed supplies are medically necessary for this patient’s wellbeing.  In my opinion, the supplies are both reasonable and necessary in reference to accepted standards of medicalpractice in treatment of this patient’s condition.    Gustavo Montana MD      NPI: [id number]       Order Signed Date: 25    Electronically signed by Gustavo Montana MD on 2025 at 9:59 AM

## 2025-07-22 NOTE — TELEPHONE ENCOUNTER
Medication Samples    Is this a new prescription?  No  Have you received samples of this medication in the past?  When?  2024  Does your Insurance cover this medication?   yes  Have you applied for patient assistance?     Medication:  apixaban (ELIQUIS)     Dosage of the medication:  5 MG TABS tablet   How are you taking this medication (QD, BID, TID, QID, PRN):  Take 1 tablet by mouth 2 times daily     When was your last appointment with cardiology?    (If 1 yr or longer, please schedule appointment)    (If patient has been told they do not need to follow-up - medications should be filled by PCP)  04/23/25  When did you last have labs drawn?     When will you run out of your medication?   Patient is not out of medication, wants a box of samples in case there is another delay with outside pharmacy.

## 2025-07-23 ENCOUNTER — TELEPHONE (OUTPATIENT)
Dept: FAMILY MEDICINE CLINIC | Age: 70
End: 2025-07-23

## 2025-07-23 NOTE — TELEPHONE ENCOUNTER
Assuming these are fasting glucose levels, increase basaglar by 4 units and contact office in two weeks with fasting glucose levels to further titrate meds.    Regarding nephro referral. I'm assuming Dr Goodman wanted pt to see nephro b/c of renal insufficiency. I have reviewed everything and I am ok with managing/monitoring this for now.

## 2025-07-23 NOTE — TELEPHONE ENCOUNTER
07/04- 185  07/05- 179  07/06- 213  07/07- 214  07/08- 293   07/  07/10- 253  07/11- 155  07/  07/  07/  07/15/220  07/  07/17- 230  07/  07/  07/  07/  07/22- 254  07/23- 354    Sugar numbers       Also, she states that Dr. Goodman wanted her to see a kidney doctor and when she called a doctor they asked her why she needs to be seen and she didn't know why ? I do not see any past referrals with dx codes on it .

## 2025-07-23 NOTE — TELEPHONE ENCOUNTER
SW patient regarding this information. She is currently taking 28 units at night, with the increased units of 4 she will be at 32 units per night?  Is that correct? If so I will change her RX to reflect the 32 units instead of the 24 that it currently states.

## 2025-07-24 RX ORDER — INSULIN GLARGINE 100 [IU]/ML
INJECTION, SOLUTION SUBCUTANEOUS
Qty: 5 ADJUSTABLE DOSE PRE-FILLED PEN SYRINGE | Refills: 1 | Status: SHIPPED | OUTPATIENT
Start: 2025-07-24

## 2025-08-05 ENCOUNTER — TELEPHONE (OUTPATIENT)
Dept: PULMONOLOGY | Age: 70
End: 2025-08-05

## 2025-08-12 ENCOUNTER — TELEPHONE (OUTPATIENT)
Dept: FAMILY MEDICINE CLINIC | Age: 70
End: 2025-08-12

## 2025-08-12 ENCOUNTER — TELEPHONE (OUTPATIENT)
Dept: PULMONOLOGY | Age: 70
End: 2025-08-12

## 2025-08-18 RX ORDER — METOPROLOL SUCCINATE 25 MG/1
25 TABLET, EXTENDED RELEASE ORAL
Qty: 90 TABLET | Refills: 3 | Status: SHIPPED | OUTPATIENT
Start: 2025-08-18

## 2025-08-18 RX ORDER — GLIMEPIRIDE 2 MG/1
2 TABLET ORAL
Qty: 90 TABLET | Refills: 0 | Status: SHIPPED | OUTPATIENT
Start: 2025-08-18

## 2025-08-18 RX ORDER — PEN NEEDLE, DIABETIC 32GX 5/32"
NEEDLE, DISPOSABLE MISCELLANEOUS
Qty: 100 EACH | Refills: 1 | Status: SHIPPED | OUTPATIENT
Start: 2025-08-18

## 2025-08-21 DIAGNOSIS — I48.19 PERSISTENT ATRIAL FIBRILLATION (HCC): ICD-10-CM

## 2025-08-26 ENCOUNTER — TELEPHONE (OUTPATIENT)
Dept: FAMILY MEDICINE CLINIC | Age: 70
End: 2025-08-26

## 2025-08-26 DIAGNOSIS — I48.19 PERSISTENT ATRIAL FIBRILLATION (HCC): ICD-10-CM

## 2025-08-26 RX ORDER — FUROSEMIDE 20 MG/1
TABLET ORAL
Qty: 90 TABLET | Refills: 3 | Status: SHIPPED | OUTPATIENT
Start: 2025-08-26

## 2025-08-26 RX ORDER — INSULIN GLARGINE 100 [IU]/ML
INJECTION, SOLUTION SUBCUTANEOUS
Qty: 5 ADJUSTABLE DOSE PRE-FILLED PEN SYRINGE | Refills: 1 | Status: SHIPPED | OUTPATIENT
Start: 2025-08-26

## 2025-08-26 RX ORDER — INSULIN GLARGINE 100 [IU]/ML
INJECTION, SOLUTION SUBCUTANEOUS
Qty: 5 ADJUSTABLE DOSE PRE-FILLED PEN SYRINGE | Refills: 1
Start: 2025-08-26 | End: 2025-08-26 | Stop reason: SDUPTHER